# Patient Record
Sex: FEMALE | Race: OTHER | HISPANIC OR LATINO
[De-identification: names, ages, dates, MRNs, and addresses within clinical notes are randomized per-mention and may not be internally consistent; named-entity substitution may affect disease eponyms.]

---

## 2017-01-10 ENCOUNTER — APPOINTMENT (OUTPATIENT)
Dept: RHEUMATOLOGY | Facility: CLINIC | Age: 32
End: 2017-01-10

## 2017-01-11 ENCOUNTER — RX RENEWAL (OUTPATIENT)
Age: 32
End: 2017-01-11

## 2017-01-18 ENCOUNTER — APPOINTMENT (OUTPATIENT)
Dept: RHEUMATOLOGY | Facility: CLINIC | Age: 32
End: 2017-01-18

## 2017-02-06 ENCOUNTER — APPOINTMENT (OUTPATIENT)
Dept: RHEUMATOLOGY | Facility: CLINIC | Age: 32
End: 2017-02-06

## 2017-03-07 ENCOUNTER — APPOINTMENT (OUTPATIENT)
Dept: RHEUMATOLOGY | Facility: CLINIC | Age: 32
End: 2017-03-07

## 2017-03-28 ENCOUNTER — RX RENEWAL (OUTPATIENT)
Age: 32
End: 2017-03-28

## 2017-04-03 ENCOUNTER — APPOINTMENT (OUTPATIENT)
Dept: RHEUMATOLOGY | Facility: CLINIC | Age: 32
End: 2017-04-03

## 2017-05-02 ENCOUNTER — APPOINTMENT (OUTPATIENT)
Dept: RHEUMATOLOGY | Facility: CLINIC | Age: 32
End: 2017-05-02

## 2017-05-02 ENCOUNTER — RX RENEWAL (OUTPATIENT)
Age: 32
End: 2017-05-02

## 2017-05-05 ENCOUNTER — APPOINTMENT (OUTPATIENT)
Dept: RHEUMATOLOGY | Facility: CLINIC | Age: 32
End: 2017-05-05

## 2017-05-25 ENCOUNTER — APPOINTMENT (OUTPATIENT)
Dept: DERMATOLOGY | Facility: CLINIC | Age: 32
End: 2017-05-25

## 2017-05-25 VITALS
HEIGHT: 62 IN | SYSTOLIC BLOOD PRESSURE: 124 MMHG | BODY MASS INDEX: 22.82 KG/M2 | WEIGHT: 124 LBS | DIASTOLIC BLOOD PRESSURE: 80 MMHG

## 2017-05-30 ENCOUNTER — APPOINTMENT (OUTPATIENT)
Dept: RHEUMATOLOGY | Facility: CLINIC | Age: 32
End: 2017-05-30

## 2017-05-30 VITALS
SYSTOLIC BLOOD PRESSURE: 124 MMHG | TEMPERATURE: 98.3 F | HEIGHT: 62 IN | HEART RATE: 99 BPM | DIASTOLIC BLOOD PRESSURE: 88 MMHG | BODY MASS INDEX: 23.74 KG/M2 | OXYGEN SATURATION: 99 % | WEIGHT: 129 LBS

## 2017-05-30 DIAGNOSIS — B36.0 PITYRIASIS VERSICOLOR: ICD-10-CM

## 2017-06-26 ENCOUNTER — APPOINTMENT (OUTPATIENT)
Dept: RHEUMATOLOGY | Facility: CLINIC | Age: 32
End: 2017-06-26

## 2017-07-24 ENCOUNTER — APPOINTMENT (OUTPATIENT)
Dept: RHEUMATOLOGY | Facility: CLINIC | Age: 32
End: 2017-07-24

## 2017-08-22 ENCOUNTER — APPOINTMENT (OUTPATIENT)
Dept: RHEUMATOLOGY | Facility: CLINIC | Age: 32
End: 2017-08-22

## 2017-09-01 ENCOUNTER — RX RENEWAL (OUTPATIENT)
Age: 32
End: 2017-09-01

## 2017-09-18 ENCOUNTER — APPOINTMENT (OUTPATIENT)
Dept: RHEUMATOLOGY | Facility: CLINIC | Age: 32
End: 2017-09-18

## 2017-09-22 ENCOUNTER — LABORATORY RESULT (OUTPATIENT)
Age: 32
End: 2017-09-22

## 2017-09-22 LAB
BASOPHILS # BLD AUTO: 0.02 K/UL
BASOPHILS NFR BLD AUTO: 0.5 %
EOSINOPHIL # BLD AUTO: 0.12 K/UL
EOSINOPHIL NFR BLD AUTO: 3 %
HCT VFR BLD CALC: 38.8 %
HGB BLD-MCNC: 12.6 G/DL
IMM GRANULOCYTES NFR BLD AUTO: 0.2 %
LYMPHOCYTES # BLD AUTO: 0.99 K/UL
LYMPHOCYTES NFR BLD AUTO: 24.7 %
MAGNESIUM SERPL-MCNC: 2.4 MG/DL
MAN DIFF?: NORMAL
MCHC RBC-ENTMCNC: 28 PG
MCHC RBC-ENTMCNC: 32.5 GM/DL
MCV RBC AUTO: 86.2 FL
MONOCYTES # BLD AUTO: 0.54 K/UL
MONOCYTES NFR BLD AUTO: 13.5 %
NEUTROPHILS # BLD AUTO: 2.33 K/UL
NEUTROPHILS NFR BLD AUTO: 58.1 %
PLATELET # BLD AUTO: 375 K/UL
RBC # BLD: 4.5 M/UL
RBC # FLD: 13.6 %
WBC # FLD AUTO: 4.01 K/UL

## 2017-09-25 LAB
25(OH)D3 SERPL-MCNC: 28.7 NG/ML
APPEARANCE: ABNORMAL
BILIRUBIN URINE: NEGATIVE
BLOOD URINE: ABNORMAL
COLOR: ABNORMAL
GLUCOSE QUALITATIVE U: NORMAL MG/DL
KETONES URINE: NEGATIVE
LEUKOCYTE ESTERASE URINE: ABNORMAL
NITRITE URINE: POSITIVE
PH URINE: 5.5
PROTEIN URINE: 100 MG/DL
SPECIFIC GRAVITY URINE: 1.01
T3 SERPL-MCNC: 139 NG/DL
T3FREE SERPL-MCNC: 3.23 PG/ML
T3RU NFR SERPL: 1.18 INDEX
THYROGLOB AB SERPL-ACNC: <20 IU/ML
THYROPEROXIDASE AB SERPL IA-ACNC: <10 IU/ML
TSH SERPL-ACNC: 3.63 UIU/ML
UROBILINOGEN URINE: NORMAL MG/DL

## 2017-10-02 LAB — TSI ACT/NOR SER: 65 %

## 2017-10-03 ENCOUNTER — APPOINTMENT (OUTPATIENT)
Dept: RHEUMATOLOGY | Facility: CLINIC | Age: 32
End: 2017-10-03
Payer: COMMERCIAL

## 2017-10-03 VITALS
SYSTOLIC BLOOD PRESSURE: 115 MMHG | WEIGHT: 126 LBS | BODY MASS INDEX: 23.05 KG/M2 | OXYGEN SATURATION: 98 % | DIASTOLIC BLOOD PRESSURE: 85 MMHG | HEART RATE: 98 BPM | TEMPERATURE: 97.8 F

## 2017-10-03 PROCEDURE — 99214 OFFICE O/P EST MOD 30 MIN: CPT

## 2017-10-17 ENCOUNTER — APPOINTMENT (OUTPATIENT)
Dept: RHEUMATOLOGY | Facility: CLINIC | Age: 32
End: 2017-10-17

## 2017-11-14 ENCOUNTER — APPOINTMENT (OUTPATIENT)
Dept: RHEUMATOLOGY | Facility: CLINIC | Age: 32
End: 2017-11-14

## 2017-11-15 LAB
CARDIOLIPIN AB SER IA-ACNC: NEGATIVE
TSH SERPL-ACNC: 2.36 UIU/ML

## 2017-11-17 ENCOUNTER — MOBILE ON CALL (OUTPATIENT)
Age: 32
End: 2017-11-17

## 2017-11-20 LAB
B2 GLYCOPROT1 IGA SERPL IA-ACNC: 23.1 SAU
B2 GLYCOPROT1 IGG SER-ACNC: <5 SGU
B2 GLYCOPROT1 IGM SER-ACNC: <5 SMU
RPR SER-TITR: NORMAL

## 2017-11-21 ENCOUNTER — MESSAGE (OUTPATIENT)
Age: 32
End: 2017-11-21

## 2017-12-12 ENCOUNTER — APPOINTMENT (OUTPATIENT)
Dept: RHEUMATOLOGY | Facility: CLINIC | Age: 32
End: 2017-12-12

## 2018-01-09 ENCOUNTER — APPOINTMENT (OUTPATIENT)
Dept: RHEUMATOLOGY | Facility: CLINIC | Age: 33
End: 2018-01-09

## 2018-02-06 ENCOUNTER — APPOINTMENT (OUTPATIENT)
Dept: RHEUMATOLOGY | Facility: CLINIC | Age: 33
End: 2018-02-06

## 2018-02-15 ENCOUNTER — RX RENEWAL (OUTPATIENT)
Age: 33
End: 2018-02-15

## 2018-03-06 ENCOUNTER — APPOINTMENT (OUTPATIENT)
Dept: RHEUMATOLOGY | Facility: CLINIC | Age: 33
End: 2018-03-06

## 2018-03-06 ENCOUNTER — RX RENEWAL (OUTPATIENT)
Age: 33
End: 2018-03-06

## 2018-03-06 ENCOUNTER — MEDICATION RENEWAL (OUTPATIENT)
Age: 33
End: 2018-03-06

## 2018-04-03 ENCOUNTER — APPOINTMENT (OUTPATIENT)
Dept: RHEUMATOLOGY | Facility: CLINIC | Age: 33
End: 2018-04-03

## 2018-05-01 ENCOUNTER — APPOINTMENT (OUTPATIENT)
Dept: RHEUMATOLOGY | Facility: CLINIC | Age: 33
End: 2018-05-01

## 2018-05-29 ENCOUNTER — APPOINTMENT (OUTPATIENT)
Dept: RHEUMATOLOGY | Facility: CLINIC | Age: 33
End: 2018-05-29

## 2018-06-08 ENCOUNTER — APPOINTMENT (OUTPATIENT)
Dept: RHEUMATOLOGY | Facility: CLINIC | Age: 33
End: 2018-06-08
Payer: COMMERCIAL

## 2018-06-08 VITALS
SYSTOLIC BLOOD PRESSURE: 127 MMHG | WEIGHT: 124 LBS | DIASTOLIC BLOOD PRESSURE: 85 MMHG | TEMPERATURE: 98.3 F | BODY MASS INDEX: 22.82 KG/M2 | HEIGHT: 62 IN | HEART RATE: 98 BPM | OXYGEN SATURATION: 99 %

## 2018-06-08 PROCEDURE — 99214 OFFICE O/P EST MOD 30 MIN: CPT

## 2018-06-28 ENCOUNTER — APPOINTMENT (OUTPATIENT)
Dept: RHEUMATOLOGY | Facility: CLINIC | Age: 33
End: 2018-06-28

## 2018-08-01 ENCOUNTER — APPOINTMENT (OUTPATIENT)
Dept: RHEUMATOLOGY | Facility: CLINIC | Age: 33
End: 2018-08-01

## 2018-08-21 ENCOUNTER — APPOINTMENT (OUTPATIENT)
Dept: RHEUMATOLOGY | Facility: CLINIC | Age: 33
End: 2018-08-21

## 2018-09-18 ENCOUNTER — APPOINTMENT (OUTPATIENT)
Dept: RHEUMATOLOGY | Facility: CLINIC | Age: 33
End: 2018-09-18

## 2018-10-30 ENCOUNTER — APPOINTMENT (OUTPATIENT)
Dept: RHEUMATOLOGY | Facility: CLINIC | Age: 33
End: 2018-10-30

## 2019-01-05 RX ADMIN — HYDROMORPHONE HYDROCHLORIDE 0.5 MILLIGRAM(S): 2 INJECTION INTRAMUSCULAR; INTRAVENOUS; SUBCUTANEOUS at 05:53

## 2019-01-07 ENCOUNTER — MEDICATION RENEWAL (OUTPATIENT)
Age: 34
End: 2019-01-07

## 2019-01-10 ENCOUNTER — APPOINTMENT (OUTPATIENT)
Dept: INTERNAL MEDICINE | Facility: CLINIC | Age: 34
End: 2019-01-10
Payer: COMMERCIAL

## 2019-01-10 VITALS — RESPIRATION RATE: 12 BRPM | DIASTOLIC BLOOD PRESSURE: 72 MMHG | HEART RATE: 67 BPM | SYSTOLIC BLOOD PRESSURE: 120 MMHG

## 2019-01-10 VITALS — HEIGHT: 62 IN | WEIGHT: 119 LBS | BODY MASS INDEX: 21.9 KG/M2

## 2019-01-10 PROCEDURE — 99214 OFFICE O/P EST MOD 30 MIN: CPT

## 2019-01-10 NOTE — ASSESSMENT
[FreeTextEntry1] : predominant symptom is fibromyalgia\par don’t see lupus flareup and need for steroid\par suspect more emotional issues\par patient is emotionallyh fragile\par sed rate is normal last testing\par trial of sertraline\par see BHT

## 2019-01-10 NOTE — HISTORY OF PRESENT ILLNESS
[FreeTextEntry1] : patient for follow up  [de-identified] : patient not seen in long time\par has lupus and fibromyalgia\par of late has been depressed and has had flareups of fibromyalgia\par her back neck hurts no joint pain\par labs fro [place] were ok witn no new lupus issues\par completed treatment for lupus proteinuria\par her father  in October and has been a bit messed up about it

## 2019-01-10 NOTE — PHYSICAL EXAM

## 2019-01-11 ENCOUNTER — APPOINTMENT (OUTPATIENT)
Dept: INTERNAL MEDICINE | Facility: CLINIC | Age: 34
End: 2019-01-11

## 2019-01-14 ENCOUNTER — MESSAGE (OUTPATIENT)
Age: 34
End: 2019-01-14

## 2019-01-15 ENCOUNTER — MESSAGE (OUTPATIENT)
Age: 34
End: 2019-01-15

## 2019-01-15 ENCOUNTER — INPATIENT (INPATIENT)
Facility: HOSPITAL | Age: 34
LOS: 21 days | Discharge: NURSING, REHAB OR LONG-TERM CARE | DRG: 98 | End: 2019-02-06
Attending: HOSPITALIST | Admitting: FAMILY MEDICINE
Payer: COMMERCIAL

## 2019-01-15 VITALS
TEMPERATURE: 98 F | HEART RATE: 98 BPM | DIASTOLIC BLOOD PRESSURE: 70 MMHG | WEIGHT: 119.93 LBS | RESPIRATION RATE: 20 BRPM | HEIGHT: 62 IN | SYSTOLIC BLOOD PRESSURE: 100 MMHG | OXYGEN SATURATION: 98 %

## 2019-01-15 PROCEDURE — 99285 EMERGENCY DEPT VISIT HI MDM: CPT | Mod: 25

## 2019-01-15 PROCEDURE — 71046 X-RAY EXAM CHEST 2 VIEWS: CPT | Mod: 26

## 2019-01-15 PROCEDURE — 62270 DX LMBR SPI PNXR: CPT

## 2019-01-15 RX ORDER — DEXAMETHASONE 0.5 MG/5ML
6 ELIXIR ORAL ONCE
Qty: 0 | Refills: 0 | Status: COMPLETED | OUTPATIENT
Start: 2019-01-15 | End: 2019-01-15

## 2019-01-15 RX ORDER — SODIUM CHLORIDE 9 MG/ML
1000 INJECTION INTRAMUSCULAR; INTRAVENOUS; SUBCUTANEOUS ONCE
Qty: 0 | Refills: 0 | Status: COMPLETED | OUTPATIENT
Start: 2019-01-15 | End: 2019-01-15

## 2019-01-15 RX ORDER — ONDANSETRON 8 MG/1
4 TABLET, FILM COATED ORAL ONCE
Qty: 0 | Refills: 0 | Status: COMPLETED | OUTPATIENT
Start: 2019-01-15 | End: 2019-01-15

## 2019-01-15 RX ORDER — OXYCODONE AND ACETAMINOPHEN 5; 325 MG/1; MG/1
1 TABLET ORAL ONCE
Qty: 0 | Refills: 0 | Status: DISCONTINUED | OUTPATIENT
Start: 2019-01-15 | End: 2019-01-15

## 2019-01-15 RX ORDER — KETOROLAC TROMETHAMINE 30 MG/ML
30 SYRINGE (ML) INJECTION ONCE
Qty: 0 | Refills: 0 | Status: DISCONTINUED | OUTPATIENT
Start: 2019-01-15 | End: 2019-01-15

## 2019-01-15 NOTE — ED ADULT TRIAGE NOTE - CHIEF COMPLAINT QUOTE
Pt c/o pain all over body r/t fibromyalgia.  Pt also reports hx of lupus.  Pt was recently started on Sertraline 3 days ago.

## 2019-01-15 NOTE — ED PROVIDER NOTE - CARE PLAN
Principal Discharge DX:	Hypokalemia  Secondary Diagnosis:	Hyponatremia  Secondary Diagnosis:	Intractable pain Principal Discharge DX:	Leg weakness  Secondary Diagnosis:	Hyponatremia  Secondary Diagnosis:	Intractable pain  Secondary Diagnosis:	Hypokalemia

## 2019-01-15 NOTE — ED PROVIDER NOTE - PHYSICAL EXAMINATION
Gen: mild discomfort, awake, non toxic.   HEENT: Mucous membranes moist, pink conjunctivae, EOMI  Neck: supple, full rom.   CV: RRR, nl s1/s2.  Resp: CTAB, normal rate and effort  GI: Abdomen soft, mild lower abd tenderness.  : No CVAT  Neuro: A&O x 3, moving all 4 extremities, nl sensation/motor. nl affect.   MSK: No spine or joint tenderness to palpation  Skin: No rashes. intact and perfused.

## 2019-01-15 NOTE — ED PROVIDER NOTE - MEDICAL DECISION MAKING DETAILS
diffuse pain likely fibromylagia related, setraline and pain may make urinary retention worse, no paresthesias/signs cauda equina. will do labs, pain control, steroids for possible lupus component, ct abd/pel given some lower abd pain and difficulty urinating, reassess.

## 2019-01-15 NOTE — ED PROVIDER NOTE - PROGRESS NOTE DETAILS
Boris: pt with continued pain, given morphine, hypokalemic, given k and mag. hyponatremic. still with some difficulty urinating though bladder seems distended. nl rectal tone on rectal exam (chaperoned by tech Arthur). awaiting ct spine, abd, and head. will need admission, with possible lupus/fibromyalgia flare on top of lyte abnormalities. Patient ressess, finished potassium chloride, will sent a repeat BMP. Patient report pain is better but still very weak. She report weakness in the legs and unable to sit up. She report feeling off balanced. She had a MRI of the spine in december in [place] that was negative. Will give a dose of meclizine 25 mg PO and recheck BMP. Likely will need an MRI to assess for weakness or possible MS. bladder scan 1268, will place summers for urinary retention Patient seen by neurology, report she may have transverse myolitis. She has good reflex in the lower extremity, not guillian-barre at this point. She will need MRI of head, cervical psine, thoracic spine. Repeat BMP improved.  I spoke to Dr. ANTHONY Tom for admission.

## 2019-01-15 NOTE — ED STATDOCS - PROGRESS NOTE DETAILS
32 y/o F, with hx of lupus and fibromyalgia, presents to the ED c/o diffuse body aches, onset yesterday.  Family at bedside states that pt was recently started on new medication and started taking it shortly before pain onset.  Pt was started on Sertraline.  Family notes that at first pt was taking Sertraline with her Lyrica for 2 days, however sx worsened.   Associated sx include fever (Tmax 103F), cough, constipation, decreased appetite, vomiting, ear pain, and difficulty urinating.  Pt states that she is also hearing a persistent buzzing in her ears.  Pt also notes that she has been unable to sleep for the past 3 days secondary to pain.  Family at bedside states that pt has been more depressed and told them that when she closes her eyes she is hearing voices.  Family notes that pt recently lost her father and travelled to [place] for an extended period of time to be with family.  While in [place], pt had similar sx and had difficulty urinating.  At the time pt was seen by a neurologist in [place] and was prescribed Lyrica.  LNMP: 12/28  HPI translated by ED .  Pt will be transferred to the main ED for further evaluation by another provider.

## 2019-01-16 DIAGNOSIS — Z98.890 OTHER SPECIFIED POSTPROCEDURAL STATES: Chronic | ICD-10-CM

## 2019-01-16 DIAGNOSIS — R29.898 OTHER SYMPTOMS AND SIGNS INVOLVING THE MUSCULOSKELETAL SYSTEM: ICD-10-CM

## 2019-01-16 LAB
ALBUMIN SERPL ELPH-MCNC: 4 G/DL — SIGNIFICANT CHANGE UP (ref 3.3–5.2)
ALP SERPL-CCNC: 108 U/L — SIGNIFICANT CHANGE UP (ref 40–120)
ALT FLD-CCNC: 43 U/L — HIGH
AMPHET UR-MCNC: NEGATIVE — SIGNIFICANT CHANGE UP
ANION GAP SERPL CALC-SCNC: 13 MMOL/L — SIGNIFICANT CHANGE UP (ref 5–17)
ANION GAP SERPL CALC-SCNC: 16 MMOL/L — SIGNIFICANT CHANGE UP (ref 5–17)
APAP SERPL-MCNC: <7.5 UG/ML — LOW (ref 10–26)
APPEARANCE CSF: CLEAR — SIGNIFICANT CHANGE UP
APPEARANCE UR: CLEAR — SIGNIFICANT CHANGE UP
APTT BLD: 32.7 SEC — SIGNIFICANT CHANGE UP (ref 27.5–36.3)
AST SERPL-CCNC: 42 U/L — HIGH
BACTERIA # UR AUTO: ABNORMAL
BARBITURATES UR SCN-MCNC: NEGATIVE — SIGNIFICANT CHANGE UP
BENZODIAZ UR-MCNC: NEGATIVE — SIGNIFICANT CHANGE UP
BILIRUB SERPL-MCNC: 0.5 MG/DL — SIGNIFICANT CHANGE UP (ref 0.4–2)
BILIRUB UR-MCNC: NEGATIVE — SIGNIFICANT CHANGE UP
BUN SERPL-MCNC: 9 MG/DL — SIGNIFICANT CHANGE UP (ref 8–20)
BUN SERPL-MCNC: 9 MG/DL — SIGNIFICANT CHANGE UP (ref 8–20)
CALCIUM SERPL-MCNC: 7.9 MG/DL — LOW (ref 8.6–10.2)
CALCIUM SERPL-MCNC: 8.9 MG/DL — SIGNIFICANT CHANGE UP (ref 8.6–10.2)
CHLORIDE SERPL-SCNC: 100 MMOL/L — SIGNIFICANT CHANGE UP (ref 98–107)
CHLORIDE SERPL-SCNC: 87 MMOL/L — LOW (ref 98–107)
CO2 SERPL-SCNC: 22 MMOL/L — SIGNIFICANT CHANGE UP (ref 22–29)
CO2 SERPL-SCNC: 23 MMOL/L — SIGNIFICANT CHANGE UP (ref 22–29)
COCAINE METAB.OTHER UR-MCNC: NEGATIVE — SIGNIFICANT CHANGE UP
COLOR CSF: SIGNIFICANT CHANGE UP
COLOR SPEC: YELLOW — SIGNIFICANT CHANGE UP
CREAT SERPL-MCNC: 0.45 MG/DL — LOW (ref 0.5–1.3)
CREAT SERPL-MCNC: 0.55 MG/DL — SIGNIFICANT CHANGE UP (ref 0.5–1.3)
DIFF PNL FLD: ABNORMAL
EPI CELLS # UR: SIGNIFICANT CHANGE UP
ERYTHROCYTE [SEDIMENTATION RATE] IN BLOOD: 46 MM/HR — HIGH (ref 0–20)
ETHANOL SERPL-MCNC: <10 MG/DL — SIGNIFICANT CHANGE UP
FOLATE SERPL-MCNC: 15.5 NG/ML — SIGNIFICANT CHANGE UP
GLUCOSE SERPL-MCNC: 115 MG/DL — SIGNIFICANT CHANGE UP (ref 70–115)
GLUCOSE SERPL-MCNC: 118 MG/DL — HIGH (ref 70–115)
GLUCOSE UR QL: NEGATIVE MG/DL — SIGNIFICANT CHANGE UP
GRAM STN FLD: SIGNIFICANT CHANGE UP
HCG SERPL-ACNC: <4 MIU/ML — SIGNIFICANT CHANGE UP
HCT VFR BLD CALC: 39.4 % — SIGNIFICANT CHANGE UP (ref 37–47)
HGB BLD-MCNC: 13.3 G/DL — SIGNIFICANT CHANGE UP (ref 12–16)
HIV 1 & 2 AB SERPL IA.RAPID: SIGNIFICANT CHANGE UP
INR BLD: 1 RATIO — SIGNIFICANT CHANGE UP (ref 0.88–1.16)
KETONES UR-MCNC: ABNORMAL
LACTATE SERPL-SCNC: 1.1 MMOL/L — SIGNIFICANT CHANGE UP (ref 0.5–2)
LEUKOCYTE ESTERASE UR-ACNC: ABNORMAL
LG PLATELETS BLD QL AUTO: SLIGHT — SIGNIFICANT CHANGE UP
LYMPHOCYTES # BLD AUTO: 6 % — LOW (ref 20–55)
LYMPHOCYTES # CSF: 8 % — LOW (ref 40–80)
MCHC RBC-ENTMCNC: 26.8 PG — LOW (ref 27–31)
MCHC RBC-ENTMCNC: 33.8 G/DL — SIGNIFICANT CHANGE UP (ref 32–36)
MCV RBC AUTO: 79.4 FL — LOW (ref 81–99)
METHADONE UR-MCNC: NEGATIVE — SIGNIFICANT CHANGE UP
MONOCYTES NFR BLD AUTO: 7 % — SIGNIFICANT CHANGE UP (ref 3–10)
MONOS+MACROS NFR CSF: 19 % — SIGNIFICANT CHANGE UP
NEUTROPHILS # CSF: 73 % — SIGNIFICANT CHANGE UP
NEUTROPHILS NFR BLD AUTO: 85 % — HIGH (ref 37–73)
NEUTS BAND # BLD: 2 % — SIGNIFICANT CHANGE UP (ref 0–8)
NITRITE UR-MCNC: NEGATIVE — SIGNIFICANT CHANGE UP
NRBC NFR CSF: 324 /UL — HIGH (ref 0–5)
OPIATES UR-MCNC: POSITIVE
PCP SPEC-MCNC: SIGNIFICANT CHANGE UP
PCP UR-MCNC: NEGATIVE — SIGNIFICANT CHANGE UP
PH UR: 6 — SIGNIFICANT CHANGE UP (ref 5–8)
PLAT MORPH BLD: NORMAL — SIGNIFICANT CHANGE UP
PLATELET # BLD AUTO: 228 K/UL — SIGNIFICANT CHANGE UP (ref 150–400)
PLATELET COUNT - ESTIMATE: ADEQUATE — SIGNIFICANT CHANGE UP
POTASSIUM SERPL-MCNC: 2.8 MMOL/L — CRITICAL LOW (ref 3.5–5.3)
POTASSIUM SERPL-MCNC: 3.7 MMOL/L — SIGNIFICANT CHANGE UP (ref 3.5–5.3)
POTASSIUM SERPL-SCNC: 2.8 MMOL/L — CRITICAL LOW (ref 3.5–5.3)
POTASSIUM SERPL-SCNC: 3.7 MMOL/L — SIGNIFICANT CHANGE UP (ref 3.5–5.3)
PROT CSF-MCNC: 131 MG/DL — HIGH (ref 15–45)
PROT SERPL-MCNC: 8.1 G/DL — SIGNIFICANT CHANGE UP (ref 6.6–8.7)
PROT UR-MCNC: 100 MG/DL
PROTHROM AB SERPL-ACNC: 11.5 SEC — SIGNIFICANT CHANGE UP (ref 10–12.9)
RBC # BLD: 4.96 M/UL — SIGNIFICANT CHANGE UP (ref 4.4–5.2)
RBC # CSF: 55 /CMM — HIGH (ref 0–1)
RBC # FLD: 15.9 % — HIGH (ref 11–15.6)
RBC BLD AUTO: NORMAL — SIGNIFICANT CHANGE UP
RBC CASTS # UR COMP ASSIST: SIGNIFICANT CHANGE UP /HPF (ref 0–4)
SALICYLATES SERPL-MCNC: <0.6 MG/DL — LOW (ref 10–20)
SODIUM SERPL-SCNC: 125 MMOL/L — LOW (ref 135–145)
SODIUM SERPL-SCNC: 136 MMOL/L — SIGNIFICANT CHANGE UP (ref 135–145)
SP GR SPEC: 1.01 — SIGNIFICANT CHANGE UP (ref 1.01–1.02)
SPECIMEN SOURCE: SIGNIFICANT CHANGE UP
THC UR QL: NEGATIVE — SIGNIFICANT CHANGE UP
TROPONIN T SERPL-MCNC: <0.01 NG/ML — SIGNIFICANT CHANGE UP (ref 0–0.06)
TSH SERPL-MCNC: 0.62 UIU/ML — SIGNIFICANT CHANGE UP (ref 0.27–4.2)
TUBE TYPE: SIGNIFICANT CHANGE UP
UROBILINOGEN FLD QL: NEGATIVE MG/DL — SIGNIFICANT CHANGE UP
VIT B12 SERPL-MCNC: 1317 PG/ML — HIGH (ref 232–1245)
WBC # BLD: 9.5 K/UL — SIGNIFICANT CHANGE UP (ref 4.8–10.8)
WBC # FLD AUTO: 9.5 K/UL — SIGNIFICANT CHANGE UP (ref 4.8–10.8)
WBC UR QL: ABNORMAL

## 2019-01-16 PROCEDURE — 72157 MRI CHEST SPINE W/O & W/DYE: CPT | Mod: 26

## 2019-01-16 PROCEDURE — 70553 MRI BRAIN STEM W/O & W/DYE: CPT | Mod: 26

## 2019-01-16 PROCEDURE — 72156 MRI NECK SPINE W/O & W/DYE: CPT | Mod: 26

## 2019-01-16 PROCEDURE — 74177 CT ABD & PELVIS W/CONTRAST: CPT | Mod: 26

## 2019-01-16 PROCEDURE — 12345: CPT | Mod: NC

## 2019-01-16 PROCEDURE — 72128 CT CHEST SPINE W/O DYE: CPT | Mod: 26

## 2019-01-16 PROCEDURE — 76830 TRANSVAGINAL US NON-OB: CPT | Mod: 26

## 2019-01-16 PROCEDURE — 99223 1ST HOSP IP/OBS HIGH 75: CPT

## 2019-01-16 PROCEDURE — 93010 ELECTROCARDIOGRAM REPORT: CPT

## 2019-01-16 PROCEDURE — 72131 CT LUMBAR SPINE W/O DYE: CPT | Mod: 26

## 2019-01-16 PROCEDURE — 70450 CT HEAD/BRAIN W/O DYE: CPT | Mod: 26

## 2019-01-16 PROCEDURE — 76856 US EXAM PELVIC COMPLETE: CPT | Mod: 26

## 2019-01-16 RX ORDER — ACETAMINOPHEN 500 MG
650 TABLET ORAL EVERY 6 HOURS
Qty: 0 | Refills: 0 | Status: DISCONTINUED | OUTPATIENT
Start: 2019-01-16 | End: 2019-02-06

## 2019-01-16 RX ORDER — CALCIUM GLUCONATE 100 MG/ML
2 VIAL (ML) INTRAVENOUS ONCE
Qty: 0 | Refills: 0 | Status: COMPLETED | OUTPATIENT
Start: 2019-01-16 | End: 2019-01-16

## 2019-01-16 RX ORDER — SERTRALINE 25 MG/1
1 TABLET, FILM COATED ORAL
Qty: 0 | Refills: 0 | COMMUNITY

## 2019-01-16 RX ORDER — CEFTRIAXONE 500 MG/1
1 INJECTION, POWDER, FOR SOLUTION INTRAMUSCULAR; INTRAVENOUS ONCE
Qty: 0 | Refills: 0 | Status: COMPLETED | OUTPATIENT
Start: 2019-01-16 | End: 2019-01-16

## 2019-01-16 RX ORDER — MECLIZINE HCL 12.5 MG
25 TABLET ORAL ONCE
Qty: 0 | Refills: 0 | Status: COMPLETED | OUTPATIENT
Start: 2019-01-16 | End: 2019-01-16

## 2019-01-16 RX ORDER — DULOXETINE HYDROCHLORIDE 30 MG/1
60 CAPSULE, DELAYED RELEASE ORAL DAILY
Qty: 0 | Refills: 0 | Status: DISCONTINUED | OUTPATIENT
Start: 2019-01-16 | End: 2019-02-06

## 2019-01-16 RX ORDER — DOCUSATE SODIUM 100 MG
100 CAPSULE ORAL THREE TIMES A DAY
Qty: 0 | Refills: 0 | Status: DISCONTINUED | OUTPATIENT
Start: 2019-01-16 | End: 2019-01-21

## 2019-01-16 RX ORDER — ENOXAPARIN SODIUM 100 MG/ML
40 INJECTION SUBCUTANEOUS DAILY
Qty: 0 | Refills: 0 | Status: DISCONTINUED | OUTPATIENT
Start: 2019-01-16 | End: 2019-01-28

## 2019-01-16 RX ORDER — PANTOPRAZOLE SODIUM 20 MG/1
40 TABLET, DELAYED RELEASE ORAL
Qty: 0 | Refills: 0 | Status: DISCONTINUED | OUTPATIENT
Start: 2019-01-16 | End: 2019-02-06

## 2019-01-16 RX ORDER — POTASSIUM CHLORIDE 20 MEQ
10 PACKET (EA) ORAL
Qty: 0 | Refills: 0 | Status: COMPLETED | OUTPATIENT
Start: 2019-01-16 | End: 2019-01-16

## 2019-01-16 RX ORDER — MORPHINE SULFATE 50 MG/1
4 CAPSULE, EXTENDED RELEASE ORAL ONCE
Qty: 0 | Refills: 0 | Status: DISCONTINUED | OUTPATIENT
Start: 2019-01-16 | End: 2019-01-16

## 2019-01-16 RX ORDER — CEFTRIAXONE 500 MG/1
2 INJECTION, POWDER, FOR SOLUTION INTRAMUSCULAR; INTRAVENOUS EVERY 12 HOURS
Qty: 0 | Refills: 0 | Status: DISCONTINUED | OUTPATIENT
Start: 2019-01-16 | End: 2019-01-17

## 2019-01-16 RX ORDER — POTASSIUM CHLORIDE 20 MEQ
40 PACKET (EA) ORAL EVERY 4 HOURS
Qty: 0 | Refills: 0 | Status: COMPLETED | OUTPATIENT
Start: 2019-01-16 | End: 2019-01-16

## 2019-01-16 RX ORDER — TAMSULOSIN HYDROCHLORIDE 0.4 MG/1
0.4 CAPSULE ORAL AT BEDTIME
Qty: 0 | Refills: 0 | Status: DISCONTINUED | OUTPATIENT
Start: 2019-01-16 | End: 2019-01-25

## 2019-01-16 RX ORDER — CEFTRIAXONE 500 MG/1
1 INJECTION, POWDER, FOR SOLUTION INTRAMUSCULAR; INTRAVENOUS EVERY 24 HOURS
Qty: 0 | Refills: 0 | Status: DISCONTINUED | OUTPATIENT
Start: 2019-01-16 | End: 2019-01-16

## 2019-01-16 RX ORDER — ACYCLOVIR SODIUM 500 MG
VIAL (EA) INTRAVENOUS
Qty: 0 | Refills: 0 | Status: DISCONTINUED | OUTPATIENT
Start: 2019-01-16 | End: 2019-01-18

## 2019-01-16 RX ORDER — MAGNESIUM SULFATE 500 MG/ML
1 VIAL (ML) INJECTION ONCE
Qty: 0 | Refills: 0 | Status: COMPLETED | OUTPATIENT
Start: 2019-01-16 | End: 2019-01-16

## 2019-01-16 RX ORDER — VANCOMYCIN HCL 1 G
1000 VIAL (EA) INTRAVENOUS EVERY 8 HOURS
Qty: 0 | Refills: 0 | Status: DISCONTINUED | OUTPATIENT
Start: 2019-01-16 | End: 2019-01-17

## 2019-01-16 RX ORDER — SENNA PLUS 8.6 MG/1
2 TABLET ORAL AT BEDTIME
Qty: 0 | Refills: 0 | Status: DISCONTINUED | OUTPATIENT
Start: 2019-01-16 | End: 2019-01-28

## 2019-01-16 RX ORDER — ACYCLOVIR SODIUM 500 MG
500 VIAL (EA) INTRAVENOUS ONCE
Qty: 0 | Refills: 0 | Status: COMPLETED | OUTPATIENT
Start: 2019-01-16 | End: 2019-01-16

## 2019-01-16 RX ORDER — ACYCLOVIR SODIUM 500 MG
500 VIAL (EA) INTRAVENOUS EVERY 8 HOURS
Qty: 0 | Refills: 0 | Status: DISCONTINUED | OUTPATIENT
Start: 2019-01-16 | End: 2019-01-18

## 2019-01-16 RX ORDER — SACCHAROMYCES BOULARDII 250 MG
250 POWDER IN PACKET (EA) ORAL
Qty: 0 | Refills: 0 | Status: DISCONTINUED | OUTPATIENT
Start: 2019-01-16 | End: 2019-01-28

## 2019-01-16 RX ORDER — VANCOMYCIN HCL 1 G
750 VIAL (EA) INTRAVENOUS EVERY 8 HOURS
Qty: 0 | Refills: 0 | Status: DISCONTINUED | OUTPATIENT
Start: 2019-01-16 | End: 2019-01-16

## 2019-01-16 RX ADMIN — Medication 650 MILLIGRAM(S): at 16:45

## 2019-01-16 RX ADMIN — SENNA PLUS 2 TABLET(S): 8.6 TABLET ORAL at 22:52

## 2019-01-16 RX ADMIN — MORPHINE SULFATE 4 MILLIGRAM(S): 50 CAPSULE, EXTENDED RELEASE ORAL at 01:33

## 2019-01-16 RX ADMIN — Medication 200 GRAM(S): at 15:59

## 2019-01-16 RX ADMIN — Medication 100 MILLIEQUIVALENT(S): at 01:35

## 2019-01-16 RX ADMIN — Medication 1 GRAM(S): at 04:55

## 2019-01-16 RX ADMIN — Medication 40 MILLIEQUIVALENT(S): at 05:47

## 2019-01-16 RX ADMIN — OXYCODONE AND ACETAMINOPHEN 1 TABLET(S): 5; 325 TABLET ORAL at 00:00

## 2019-01-16 RX ADMIN — Medication 30 MILLIGRAM(S): at 01:34

## 2019-01-16 RX ADMIN — OXYCODONE AND ACETAMINOPHEN 1 TABLET(S): 5; 325 TABLET ORAL at 01:33

## 2019-01-16 RX ADMIN — SODIUM CHLORIDE 2000 MILLILITER(S): 9 INJECTION INTRAMUSCULAR; INTRAVENOUS; SUBCUTANEOUS at 00:06

## 2019-01-16 RX ADMIN — Medication 250 MILLIGRAM(S): at 17:34

## 2019-01-16 RX ADMIN — ONDANSETRON 4 MILLIGRAM(S): 8 TABLET, FILM COATED ORAL at 00:00

## 2019-01-16 RX ADMIN — Medication 58 MILLIGRAM(S): at 13:52

## 2019-01-16 RX ADMIN — MORPHINE SULFATE 4 MILLIGRAM(S): 50 CAPSULE, EXTENDED RELEASE ORAL at 14:58

## 2019-01-16 RX ADMIN — Medication 250 MILLIGRAM(S): at 22:00

## 2019-01-16 RX ADMIN — Medication 40 MILLIEQUIVALENT(S): at 01:34

## 2019-01-16 RX ADMIN — MORPHINE SULFATE 4 MILLIGRAM(S): 50 CAPSULE, EXTENDED RELEASE ORAL at 01:24

## 2019-01-16 RX ADMIN — Medication 6 MILLIGRAM(S): at 00:01

## 2019-01-16 RX ADMIN — Medication 10 MILLIEQUIVALENT(S): at 07:06

## 2019-01-16 RX ADMIN — CEFTRIAXONE 100 GRAM(S): 500 INJECTION, POWDER, FOR SOLUTION INTRAMUSCULAR; INTRAVENOUS at 09:09

## 2019-01-16 RX ADMIN — Medication 25 MILLIGRAM(S): at 09:09

## 2019-01-16 RX ADMIN — Medication 10 MILLIEQUIVALENT(S): at 04:55

## 2019-01-16 RX ADMIN — Medication 100 MILLIEQUIVALENT(S): at 07:06

## 2019-01-16 RX ADMIN — Medication 110 MILLIGRAM(S): at 18:59

## 2019-01-16 RX ADMIN — Medication 40 MILLIEQUIVALENT(S): at 14:04

## 2019-01-16 RX ADMIN — Medication 100 MILLIEQUIVALENT(S): at 05:47

## 2019-01-16 RX ADMIN — Medication 650 MILLIGRAM(S): at 16:15

## 2019-01-16 RX ADMIN — Medication 30 MILLIGRAM(S): at 04:55

## 2019-01-16 RX ADMIN — Medication 100 GRAM(S): at 03:22

## 2019-01-16 RX ADMIN — TAMSULOSIN HYDROCHLORIDE 0.4 MILLIGRAM(S): 0.4 CAPSULE ORAL at 22:52

## 2019-01-16 NOTE — H&P ADULT - ASSESSMENT
INCOMPLETE 33 years old female with PMH of SLE, Lupus Nephritis and Fibromyalgia brought by her  yesterday evening with generalized weakness. As per patient, she is not feeling well for last few days. She is having low grade fevers, generalized body pain, throat irritation, cough, ear pain and vomiting. Her appetite is very poor. She went to her PMD few days ago who prescribed Sertraline for her depressive symptoms. As per her, symptoms started after taking that medication. Yesterday, she was feeling so weak that she could not walk so her  brought her to ER. She is also complaining of difficulty urinating. She went to Urologist 10 days ago and she was prescribed Flomax which she has been taking but is unable to void. She also has constipation.   She recently came back from [place] on 12/29/18 after staying there for 2.5 to 3 months. She had MRI there in December due to back pain and generalized weakness and it was normal.   She has headache, neck pain and lightheadedness. Denies photophobia. + sick contacts with common cold.     1) Transverse Myelitis  - Neuro Consult appreciated  - Pending further work up including LP  - Continue Solumedrol 1 gm daily x 5 days  2) Urinary Retention  - Continue Jefferson's Catheter  - Flomax 0.4 mg  - Continue Rocephin for UTI  - Urine Culture pending  3) SLE / Lupus Nephritis  - Was on Cellcept but stopped few weeks ago  - Hold Hydroxychloroquine for now  4) Electrolyte Abnormality  - Potassium replaced  - Sodium is normal after IVF  - Calcium Gluconate 2 gm  5) Fibromyalgia  - Continue Cymbalta   6) Subserosal Myoma   - Outpatient follow up with GYN  DVT Prophylaxis -- Lovenox 40 mg 33 years old female with PMH of SLE, Lupus Nephritis and Fibromyalgia brought by her  yesterday evening with generalized weakness. As per patient, she is not feeling well for last few days. She is having low grade fevers, generalized body pain, throat irritation, cough, ear pain and vomiting. Her appetite is very poor. She went to her PMD few days ago who prescribed Sertraline for her depressive symptoms. As per her, symptoms started after taking that medication. Yesterday, she was feeling so weak that she could not walk so her  brought her to ER. She is also complaining of difficulty urinating. She went to Urologist 10 days ago and she was prescribed Flomax which she has been taking but is unable to void. She also has constipation.   She recently came back from [place] on 12/29/18 after staying there for 2.5 to 3 months. She had MRI there in December due to back pain and generalized weakness and it was normal.   She has headache, neck pain and lightheadedness. Denies photophobia. + sick contacts with common cold.     1) Transverse Myelitis  - Neuro Consult appreciated  - Pending further work up including LP  - Continue Solumedrol 1 gm daily x 5 days  2) Urinary Retention  - Continue Jefferson's Catheter  - Flomax 0.4 mg  - Continue Rocephin for UTI  - Urine Culture pending  - Blood Cultures  3) SLE / Lupus Nephritis  - Was on Cellcept but stopped few weeks ago  - Hold Hydroxychloroquine for now  4) Electrolyte Abnormality  - Potassium replaced  - Sodium is normal after IVF  - Calcium Gluconate 2 gm  5) Fibromyalgia  - Continue Cymbalta   6) Subserosal Myoma   - Outpatient follow up with GYN  DVT Prophylaxis -- Lovenox 40 mg 33 years old female with PMH of SLE, Lupus Nephritis and Fibromyalgia brought by her  yesterday evening with generalized weakness. As per patient, she is not feeling well for last few days. She is having low grade fevers, generalized body pain, throat irritation, cough, ear pain and vomiting. Her appetite is very poor. She went to her PMD few days ago who prescribed Sertraline for her depressive symptoms. As per her, symptoms started after taking that medication. Yesterday, she was feeling so weak that she could not walk so her  brought her to ER. She is also complaining of difficulty urinating. She went to Urologist 10 days ago and she was prescribed Flomax which she has been taking but is unable to void. She also has constipation.   She recently came back from [place] on 12/29/18 after staying there for 2.5 to 3 months. She had MRI there in December due to back pain and generalized weakness and it was normal.   She has headache, neck pain and lightheadedness. Denies photophobia. + sick contacts with common cold.     1) Transverse Myelitis  - Neuro Consult appreciated  - Pending further work up including LP  - Continue Solumedrol 1 gm daily x 5 days  2) Urinary Retention  - Continue Jefferson's Catheter  - Flomax 0.4 mg  - Continue Rocephin for UTI  - Urine Culture pending  - Blood Cultures  3) SLE / Lupus Nephritis  - Was on Cellcept but stopped few weeks ago  - Hold Hydroxychloroquine for now  4) Electrolyte Abnormality  - Potassium replaced  - Sodium is normal after IVF  - Calcium Gluconate 2 gm  5) Fibromyalgia  - Continue Cymbalta   6) Elevated LFTs  - CT shows hepatomegaly.   - Outpatient follow up with GI.  7) Subserosal Myoma   - Outpatient follow up with GYN  DVT Prophylaxis -- Lovenox 40 mg

## 2019-01-16 NOTE — H&P ADULT - HISTORY OF PRESENT ILLNESS
INCOMPLETE 33 years old female with PMH of SLE, Lupus Nephritis and Fibromyalgia brought by her  yesterday evening with generalized weakness. As per patient, she is not feeling well for last few days. She is having low grade fevers, generalized body pain, throat irritation, cough, ear pain and vomiting. Her appetite is very poor. She went to her PMD few days ago who prescribed Sertraline for her depressive symptoms. As per her, symptoms started after taking that medication. Yesterday, she was feeling so weak that she could not walk so her  brought her to ER. She is also complaining of difficulty urinating. She went to Urologist 10 days ago and she was prescribed Flomax which she has been taking but is unable to void. She also has constipation.   She recently came back from [place] on 12/29/18 after staying there for 2.5 to 3 months. She had MRI there in December due to back pain and generalized weakness and it was normal.   She has headache, neck pain and lightheadedness. Denies photophobia. + sick contacts with common cold.

## 2019-01-16 NOTE — ED ADULT NURSE REASSESSMENT NOTE - COMFORT CARE
meal provided/wait time explained/plan of care explained/po fluids offered
plan of care explained/wait time explained
wait time explained/plan of care explained
wait time explained/plan of care explained

## 2019-01-16 NOTE — ED ADULT NURSE NOTE - NSIMPLEMENTINTERV_GEN_ALL_ED
Implemented All Universal Safety Interventions:  [place] to call system. Call bell, personal items and telephone within reach. Instruct patient to call for assistance. Room bathroom lighting operational. Non-slip footwear when patient is off stretcher. Physically safe environment: no spills, clutter or unnecessary equipment. Stretcher in lowest position, wheels locked, appropriate side rails in place.

## 2019-01-16 NOTE — H&P ADULT - ATTENDING COMMENTS
Got a call from Dr. Mathew around 5 pm with LP results - recommending treatment for suspected Meningitis and ID Consult.  Patient is started on Rocephin, Vanco and Acyclovir.  RVP.  ID Consult placed.

## 2019-01-16 NOTE — H&P ADULT - FAMILY HISTORY
Father  Still living? Unknown  Family history of liver disease, Age at diagnosis: Age Unknown     Mother  Still living? Unknown  Family history of osteoarthritis, Age at diagnosis: Age Unknown Father  Still living? Unknown  Family history of liver disease, Age at diagnosis: Age Unknown     Mother  Still living? Unknown  Family history of osteoarthritis, Age at diagnosis: Age Unknown  Family history of melanoma, Age at diagnosis: Age Unknown

## 2019-01-16 NOTE — H&P ADULT - NSHPPHYSICALEXAM_GEN_ALL_CORE
Vital Signs   T(C): 36.5 (16 Jan 2019 07:34), Max: 36.8 (16 Jan 2019 06:33)  T(F): 97.7 (16 Jan 2019 07:34), Max: 98.3 (16 Jan 2019 06:33)  HR: 77 (16 Jan 2019 07:34) (67 - 98)  BP: 115/76 (16 Jan 2019 07:34) (97/68 - 115/76)  RR: 20 (16 Jan 2019 07:34) (19 - 20)  SpO2: 97% (16 Jan 2019 07:34) (97% - 99%) Vital Signs   T(C): 36.5 (16 Jan 2019 07:34), Max: 36.8 (16 Jan 2019 06:33)  T(F): 97.7 (16 Jan 2019 07:34), Max: 98.3 (16 Jan 2019 06:33)  HR: 77 (16 Jan 2019 07:34) (67 - 98)  BP: 115/76 (16 Jan 2019 07:34) (97/68 - 115/76)  RR: 20 (16 Jan 2019 07:34) (19 - 20)  SpO2: 97% (16 Jan 2019 07:34) (97% - 99%)  General: Young female lying in bed with mild discomfort.   HEENT: PERRLA. EOMI. Clear conjunctivae. Moist mucus membrane  Neck: Supple. No JVD. No Thyromegaly   Chest: CTA bilaterally - no wheezing, rales or rhonchi. No chest wall tenderness.  Heart: Normal S1 & S2 with RRR. No murmur.   Abdomen: Soft. Non-tender. Non-distended. + BS  Ext: No pedal edema. No calf tenderness   Neuro: AAO x 3. CN II-XII grossly WNL.  Motor 5/5 in Upper Extremities and 3/5 in Lower Extremities. Sensation intact. Reflexes 2+. No speech disorder.  Skin: Warm and Dry  Psychiatry: Normal mood and affect Vital Signs   T(C): 36.5 (16 Jan 2019 07:34), Max: 36.8 (16 Jan 2019 06:33)  T(F): 97.7 (16 Jan 2019 07:34), Max: 98.3 (16 Jan 2019 06:33)  HR: 77 (16 Jan 2019 07:34) (67 - 98)  BP: 115/76 (16 Jan 2019 07:34) (97/68 - 115/76)  RR: 20 (16 Jan 2019 07:34) (19 - 20)  SpO2: 97% (16 Jan 2019 07:34) (97% - 99%)  General: Young female lying in bed with mild discomfort.   HEENT: PERRLA. EOMI. Clear conjunctivae. Moist mucus membrane  Neck: Supple. No JVD. No Thyromegaly   Chest: CTA bilaterally - no wheezing, rales or rhonchi. No chest wall tenderness.  Heart: Normal S1 & S2 with RRR. No murmur.   Abdomen: Soft. Non-tender. Non-distended. + BS  Ext: No pedal edema. No calf tenderness   Neuro: AAO x 3. CN II-XII grossly WNL.  Motor 5/5 in Upper Extremities and 3/5 in Lower Extremities. Sensation intact. Reflexes 2+. No speech disorder. Negative Kernig Sign. ? Brudzinski Sign.   Skin: Warm and Dry  Psychiatry: Normal mood and affect

## 2019-01-16 NOTE — H&P ADULT - PSH
No significant past surgical history History of biopsy  Renal  History of esophagogastroduodenoscopy (EGD) History of biopsy  Renal  History of esophagogastroduodenoscopy (EGD)    S/P correction of deviated nasal septum

## 2019-01-16 NOTE — ED ADULT NURSE NOTE - OBJECTIVE STATEMENT
assumed care of pt aox4 family at bedside. pt was taking new medication for lupus and started feeling weak and was having trouble walking, pt was having pain all over and had not urinated in four hours per family.  pt c/o difficulty moving legs

## 2019-01-16 NOTE — H&P ADULT - NSHPLABSRESULTS_GEN_ALL_CORE
LABS:                        13.3   9.5   )-----------( 228      ( 16 Jan 2019 00:32 )             39.4     01-16    136  |  100  |  9.0  ----------------------------<  118<H>  3.7   |  23.0  |  0.55    Ca    7.9<L>      16 Jan 2019 09:47    TPro  8.1  /  Alb  4.0  /  TBili  0.5  /  DBili  x   /  AST  42<H>  /  ALT  43<H>  /  AlkPhos  108  01-16      CARDIAC MARKERS ( 16 Jan 2019 00:32 )  x     / <0.01 ng/mL / 36 U/L / x     / x          Xray Chest 2 Views PA/Lat (01.15.19 @ 23:34)    No evidence of acute cardiopulmonary disease. Calcified granuloma left lung base, unchanged..    CT Head No Cont (01.16.19 @ 02:40)    No acute CT findings.    CT Abd/Pelvis, Thoracic and Lumbar Spines No Cont (01.16.19 @ 02:45)     1. Moderately distended bladder. No evidence of hydronephrosis.  2. Complex 5.8 x 3.7 x 2.9 cm left adnexal lesion versus exophytic fibroid. Pelvic ultrasound is recommended for further evaluation.  3. Distended gallbladder without secondary signs of acute cholecystitis.  4. No acute findings within the thoracic and lumbar spine. MRI can be obtained for further evaluation if there is concern for an intraspinal  process.     US Pelvis Complete (01.16.19 @ 05:00)    Abnormality seen on CT correlates to a left subserosal myoma measuring 5.0 x 3.3 x 4.4 cm. LABS:                        13.3   9.5   )-----------( 228      ( 16 Jan 2019 00:32 )             39.4     01-16    136  |  100  |  9.0  ----------------------------<  118<H>  3.7   |  23.0  |  0.55    Ca    7.9<L>      16 Jan 2019 09:47    TPro  8.1  /  Alb  4.0  /  TBili  0.5  /  DBili  x   /  AST  42<H>  /  ALT  43<H>  /  AlkPhos  108  01-16      CARDIAC MARKERS ( 16 Jan 2019 00:32 )  x     / <0.01 ng/mL / 36 U/L / x     / x          Xray Chest 2 Views PA/Lat (01.15.19 @ 23:34)  No evidence of acute cardiopulmonary disease. Calcified granuloma left lung base, unchanged..    CT Head No Cont (01.16.19 @ 02:40)  No acute CT findings.    CT Abd/Pelvis, Thoracic and Lumbar Spines No Cont (01.16.19 @ 02:45)   1. Moderately distended bladder. No evidence of hydronephrosis.  2. Complex 5.8 x 3.7 x 2.9 cm left adnexal lesion versus exophytic fibroid. Pelvic ultrasound is recommended for further evaluation.  3. Distended gallbladder without secondary signs of acute cholecystitis.  4. No acute findings within the thoracic and lumbar spine. MRI can be obtained for further evaluation if there is concern for an intraspinal  process.     US Pelvis Complete (01.16.19 @ 05:00)  Abnormality seen on CT correlates to a left subserosal myoma measuring 5.0 x 3.3 x 4.4 cm.    MR Head w/wo IV Cont (01.16.19 @ 13:12)  Unremarkable MRI of the brain. Abnormal FLAIR signal in the cervical cord is partially visualized. Please refer to the dedicated CT   of the C-spine for further details.    MR Cervical Spine w/wo IV Cont (01.16.19 @ 13:13)  Abnormal cord signal throughout the cervical spine from the C1/C2 junction through C6/C7 with cord swelling. No evidence of cord   compression. No abnormal enhancement. Differential diagnosis includes transverse myelitis, infectious, inflammatory, autoimmune, infarct, and   demyelinating disease processes. Findings discussed with Dr. Tom.    MR Thoracic Spine w/wo IV Cont (01.16.19 @ 13:13)    Diffuse abnormal spinal cord T2 hyperintensity with edema. Focal enhancement in the conus medullaris.

## 2019-01-17 DIAGNOSIS — G04.91 MYELITIS, UNSPECIFIED: ICD-10-CM

## 2019-01-17 LAB
ALBUMIN CSF-MCNC: 64 MG/DL — HIGH (ref 14–25)
ALBUMIN SERPL ELPH-MCNC: 3.4 G/DL — SIGNIFICANT CHANGE UP (ref 3.3–5.2)
ALBUMIN SERPL ELPH-MCNC: 3016 MG/DL — LOW (ref 3500–5200)
ALP SERPL-CCNC: 92 U/L — SIGNIFICANT CHANGE UP (ref 40–120)
ALT FLD-CCNC: 41 U/L — HIGH
ANION GAP SERPL CALC-SCNC: 12 MMOL/L — SIGNIFICANT CHANGE UP (ref 5–17)
AST SERPL-CCNC: 35 U/L — HIGH
BASOPHILS # BLD AUTO: 0 K/UL — SIGNIFICANT CHANGE UP (ref 0–0.2)
BASOPHILS NFR BLD AUTO: 0.1 % — SIGNIFICANT CHANGE UP (ref 0–2)
BILIRUB SERPL-MCNC: 0.3 MG/DL — LOW (ref 0.4–2)
BUN SERPL-MCNC: 11 MG/DL — SIGNIFICANT CHANGE UP (ref 8–20)
CALCIUM SERPL-MCNC: 8.6 MG/DL — SIGNIFICANT CHANGE UP (ref 8.6–10.2)
CHLORIDE SERPL-SCNC: 102 MMOL/L — SIGNIFICANT CHANGE UP (ref 98–107)
CO2 SERPL-SCNC: 21 MMOL/L — LOW (ref 22–29)
CREAT SERPL-MCNC: 0.38 MG/DL — LOW (ref 0.5–1.3)
CRP SERPL-MCNC: 0.9 MG/DL — HIGH (ref 0–0.4)
CSF PCR RESULT: SIGNIFICANT CHANGE UP
EBV EA AB SER IA-ACNC: 128 U/ML — HIGH
EBV EA AB TITR SER IF: POSITIVE
EBV EA IGG SER-ACNC: POSITIVE
EBV NA IGG SER IA-ACNC: 104 U/ML — HIGH
EBV PATRN SPEC IB-IMP: SIGNIFICANT CHANGE UP
EBV VCA IGG AVIDITY SER QL IA: POSITIVE
EBV VCA IGM SER IA-ACNC: 507 U/ML — HIGH
EBV VCA IGM SER IA-ACNC: <10 U/ML — SIGNIFICANT CHANGE UP
EBV VCA IGM TITR FLD: NEGATIVE — SIGNIFICANT CHANGE UP
EOSINOPHIL # BLD AUTO: 0 K/UL — SIGNIFICANT CHANGE UP (ref 0–0.5)
EOSINOPHIL NFR BLD AUTO: 0 % — SIGNIFICANT CHANGE UP (ref 0–6)
GLUCOSE SERPL-MCNC: 154 MG/DL — HIGH (ref 70–115)
HCT VFR BLD CALC: 37.5 % — SIGNIFICANT CHANGE UP (ref 37–47)
HGB BLD-MCNC: 12.2 G/DL — SIGNIFICANT CHANGE UP (ref 12–16)
IGG CSF-MCNC: 22 MG/DL — HIGH
IGG FLD-MCNC: 1308 MG/DL — SIGNIFICANT CHANGE UP (ref 610–1660)
IGG SYNTH RATE SER+CSF CALC-MRATE: 44.8 MG/DAY — HIGH
IGG/ALB CLEAR SER+CSF-RTO: 0.8 — HIGH
IGG/ALB CSF: 0.34 RATIO — HIGH
IGG/ALB SER: 0.43 RATIO — SIGNIFICANT CHANGE UP
LYMPHOCYTES # BLD AUTO: 0.4 K/UL — LOW (ref 1–4.8)
LYMPHOCYTES # BLD AUTO: 6.2 % — LOW (ref 20–55)
MAGNESIUM SERPL-MCNC: 2.5 MG/DL — SIGNIFICANT CHANGE UP (ref 1.8–2.6)
MCHC RBC-ENTMCNC: 26.8 PG — LOW (ref 27–31)
MCHC RBC-ENTMCNC: 32.5 G/DL — SIGNIFICANT CHANGE UP (ref 32–36)
MCV RBC AUTO: 82.4 FL — SIGNIFICANT CHANGE UP (ref 81–99)
MONOCYTES # BLD AUTO: 0.8 K/UL — SIGNIFICANT CHANGE UP (ref 0–0.8)
MONOCYTES NFR BLD AUTO: 11.1 % — HIGH (ref 3–10)
NEUTROPHILS # BLD AUTO: 5.9 K/UL — SIGNIFICANT CHANGE UP (ref 1.8–8)
NEUTROPHILS NFR BLD AUTO: 82.3 % — HIGH (ref 37–73)
PLATELET # BLD AUTO: 276 K/UL — SIGNIFICANT CHANGE UP (ref 150–400)
POTASSIUM SERPL-MCNC: 4.1 MMOL/L — SIGNIFICANT CHANGE UP (ref 3.5–5.3)
POTASSIUM SERPL-SCNC: 4.1 MMOL/L — SIGNIFICANT CHANGE UP (ref 3.5–5.3)
PROT SERPL-MCNC: 7.1 G/DL — SIGNIFICANT CHANGE UP (ref 6.6–8.7)
RAPID RVP RESULT: SIGNIFICANT CHANGE UP
RBC # BLD: 4.55 M/UL — SIGNIFICANT CHANGE UP (ref 4.4–5.2)
RBC # FLD: 16.8 % — HIGH (ref 11–15.6)
RHEUMATOID FACT SERPL-ACNC: 17 IU/ML — HIGH (ref 0–13)
SODIUM SERPL-SCNC: 135 MMOL/L — SIGNIFICANT CHANGE UP (ref 135–145)
T PALLIDUM AB TITR SER: NEGATIVE — SIGNIFICANT CHANGE UP
VZV IGG FLD QL IA: 1316 INDEX — SIGNIFICANT CHANGE UP
VZV IGG FLD QL IA: POSITIVE — SIGNIFICANT CHANGE UP
WBC # BLD: 7.1 K/UL — SIGNIFICANT CHANGE UP (ref 4.8–10.8)
WBC # FLD AUTO: 7.1 K/UL — SIGNIFICANT CHANGE UP (ref 4.8–10.8)

## 2019-01-17 PROCEDURE — 99223 1ST HOSP IP/OBS HIGH 75: CPT

## 2019-01-17 PROCEDURE — 99233 SBSQ HOSP IP/OBS HIGH 50: CPT

## 2019-01-17 RX ADMIN — Medication 110 MILLIGRAM(S): at 03:53

## 2019-01-17 RX ADMIN — CEFTRIAXONE 100 GRAM(S): 500 INJECTION, POWDER, FOR SOLUTION INTRAMUSCULAR; INTRAVENOUS at 00:55

## 2019-01-17 RX ADMIN — Medication 110 MILLIGRAM(S): at 12:17

## 2019-01-17 RX ADMIN — Medication 250 MILLIGRAM(S): at 06:11

## 2019-01-17 RX ADMIN — Medication 58 MILLIGRAM(S): at 05:04

## 2019-01-17 RX ADMIN — Medication 250 MILLIGRAM(S): at 18:23

## 2019-01-17 RX ADMIN — PANTOPRAZOLE SODIUM 40 MILLIGRAM(S): 20 TABLET, DELAYED RELEASE ORAL at 08:12

## 2019-01-17 RX ADMIN — DULOXETINE HYDROCHLORIDE 60 MILLIGRAM(S): 30 CAPSULE, DELAYED RELEASE ORAL at 11:11

## 2019-01-17 RX ADMIN — Medication 110 MILLIGRAM(S): at 21:26

## 2019-01-17 RX ADMIN — ENOXAPARIN SODIUM 40 MILLIGRAM(S): 100 INJECTION SUBCUTANEOUS at 11:12

## 2019-01-17 NOTE — ED ADULT NURSE REASSESSMENT NOTE - NS ED NURSE REASSESS COMMENT FT1
Pt at MRI at this time.
Received patient alert skin warm and dry color good iv infusing well denies pains or discomforts at this time will continue to follow
assessment remains unchanged, pt in no distress, denies complaints, family at bedside updated regarding plan of care. will cont to monitor
patient sleeping at thia time iv infusing well patient on  in no distress will continue to follow
pt educated on need for straight cath, pt agree, pt straight cath, pt tolerated, 1250 urine obtain, Dr. Posada aware.
report received @ 7:45, assumed pt care at this time, pt resting comfortably in bed on CM, VSS, in no acute distress. RVP sent, Respirations are even and unlabored. pt voices no complaints at this time. pt updated and aware of plan of care. will cont to monitor.
Bladder scanner shows 1268ml of urine, Dr Quintana made aware.
Patient A&OX3, c/o generalized weakness at this time. Pt back from MRI. VSS.  at bedside.
Patient A&OX3, mild generalized pain at this time. Pt back from LP, dsg D/C/I. VSS.  at bedside.
Patient received at 0730; awake; alert and oriented x4. c/o generalized pain at this time. Denies SOB, dizziness. No distress noted. VSS. Respirations unlabored. Report received at bedside. Call bell and personal items in reach. Continue to monitor patient and maintain safety.

## 2019-01-17 NOTE — PROGRESS NOTE ADULT - ASSESSMENT
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

## 2019-01-17 NOTE — PROGRESS NOTE ADULT - SUBJECTIVE AND OBJECTIVE BOX
Interval History:  no change  MEDICATIONS  (STANDING):  acyclovir IVPB 500 milliGRAM(s) IV Intermittent every 8 hours  acyclovir IVPB      DULoxetine 60 milliGRAM(s) Oral daily  enoxaparin Injectable 40 milliGRAM(s) SubCutaneous daily  methylPREDNISolone sodium succinate IVPB 1000 milliGRAM(s) IV Intermittent daily  pantoprazole    Tablet 40 milliGRAM(s) Oral before breakfast  saccharomyces boulardii 250 milliGRAM(s) Oral two times a day  senna 2 Tablet(s) Oral at bedtime  tamsulosin 0.4 milliGRAM(s) Oral at bedtime    MEDICATIONS  (PRN):  acetaminophen   Tablet .. 650 milliGRAM(s) Oral every 6 hours PRN Temp greater or equal to 38C (100.4F), Mild Pain (1 - 3)  docusate sodium 100 milliGRAM(s) Oral three times a day PRN Constipation      Allergies    No Known Allergies    Intolerances        PHYSICAL EXAM:  Vital Signs Last 24 Hrs  T(F): 98.3 (19 @ 08:06)  HR: 88 (19 @ 03:14)  BP: 126/73 (19 @ 08:06)  RR: 20 (19 @ 08:06)    GENERAL: NAD, well-groomed, well-developed  HEAD:  Atraumatic, Normocephalic  EYES: EOMI, PERRLA, conjunctiva and sclera clear  NECK: Supple, No JVD, Normal thyroid, no carotid bruit bilateral  NERVOUS SYSTEM:  Alert & Oriented X3, speech and language normal, cranial nerves II-XII normal,   Good concentration; Motor Strength 4+/5, upper and lower extremities 3-4, ; DTRs 2+ intact and symmetric, plantar responses neutral, lt intact.   HEART: Regular rate and rhythm; No murmurs, rubs, or gallops    LABS:                        12.2   7.1   )-----------( 276      ( 2019 09:00 )             37.5         135  |  102  |  11.0  ----------------------------<  154<H>  4.1   |  21.0<L>  |  0.38<L>    Ca    8.6      2019 09:00  Mg     2.5         TPro  7.1  /  Alb  3.4  /  TBili  0.3<L>  /  DBili  x   /  AST  35<H>  /  ALT  41<H>  /  AlkPhos  92      PT/INR - ( 2019 17:02 )   PT: 11.5 sec;   INR: 1.00 ratio         PTT - ( 2019 17:02 )  PTT:32.7 sec  Urinalysis Basic - ( 2019 03:59 )    Color: Yellow / Appearance: Clear / S.010 / pH: x  Gluc: x / Ketone: Trace  / Bili: Negative / Urobili: Negative mg/dL   Blood: x / Protein: 100 mg/dL / Nitrite: Negative   Leuk Esterase: Trace / RBC: 0-2 /HPF / WBC 11-25   Sq Epi: x / Non Sq Epi: Occasional / Bacteria: Many        RADIOLOGY & ADDITIONAL STUDIES:

## 2019-01-17 NOTE — PROGRESS NOTE ADULT - ASSESSMENT
33 years old female with PMH of SLE, Lupus Nephritis and Fibromyalgia brought by her  yesterday evening with generalized weakness. As per patient, she is not feeling well for last few days. She is having low grade fevers, generalized body pain, throat irritation, cough, ear pain and vomiting. Her appetite is very poor. She went to her PMD few days ago who prescribed Sertraline for her depressive symptoms. As per her, symptoms started after taking that medication. Yesterday, she was feeling so weak that she could not walk so her  brought her to ER. She is also complaining of difficulty urinating. She went to Urologist 10 days ago and she was prescribed Flomax which she has been taking but is unable to void. She also has constipation.   She recently came back from [place] on 12/29/18 after staying there for 2.5 to 3 months. She had MRI there in December due to back pain and generalized weakness and it was normal.   She has headache, neck pain and lightheadedness. Denies photophobia. + sick contacts with common cold.     1) Transverse Myelitis  - Neuro Consult appreciated  - Pending further work up including LP  - Continue Solumedrol 1 gm daily x 5 days  2) Urinary Retention  - Continue Jefferson's Catheter  - Flomax 0.4 mg  - Continue Rocephin for UTI  - Urine Culture pending  - Blood Cultures  3) SLE / Lupus Nephritis  - Was on Cellcept but stopped few weeks ago  - Hold Hydroxychloroquine for now  4) Electrolyte Abnormality  - Potassium replaced  - Sodium is normal after IVF  - Calcium Gluconate 2 gm  5) Fibromyalgia  - Continue Cymbalta   6) Elevated LFTs  - CT shows hepatomegaly.   - Outpatient follow up with GI.  7) Subserosal Myoma   - Outpatient follow up with GYN  DVT Prophylaxis -- Lovenox 40 mg 33 years old female with PMH of SLE, Lupus Nephritis and Fibromyalgia brought by her  yesterday evening with generalized weakness. As per patient, she is not feeling well for last few days. She is having low grade fevers, generalized body pain, throat irritation, cough, ear pain and vomiting. Her appetite is very poor. She went to her PMD few days ago who prescribed Sertraline for her depressive symptoms. As per her, symptoms started after taking that medication. Yesterday, she was feeling so weak that she could not walk so her  brought her to ER. She is also complaining of difficulty urinating. She went to Urologist 10 days ago and she was prescribed Flomax which she has been taking but is unable to void. She also has constipation.   She recently came back from [place] on 12/29/18 after staying there for 2.5 to 3 months. She had MRI there in December due to back pain and generalized weakness and it was normal.   She has headache, neck pain and lightheadedness. Denies photophobia. + sick contacts with common cold.     1) Transverse Myelitis:   - Neuro Consult appreciated, CSF Meningo-encephalitis PRCs negative, CFS WBC are high, Lymphocytes are 8, Neutrophil 70,  CSF Protein is high, Rapid viral panel is negative, HIV negative, EBV workup is positive, Rheumatoid factor quantitative is 17, Continue Solumedrol 1 gm daily x 5 days, will continue with Neuro checks, Neurology is following, will get rheumatology consult as patient has Hx of SLE and is could be due to exacerbation of disease, ID is following and patient is on Acyclovir and being followed by ID.     2) Urinary Retention: Continue Jefferson's Catheter, Flomax 0.4 mg daily,, Urine Cultures shows coagulase negative staph, will repeat UA and urine cultures,   Blood Cultures pending.      3) SLE / Lupus Nephritis: Was on Cellcept but stopped few weeks ago,  Hold Hydroxychloroquine for now, CRP and ESR is high, will continue with steroids.     4) Electrolyte Abnormality: Potassium replaced, Sodium is normal after IVF, Calcium Gluconate 2 gm, will monitor calcium level.     5) Fibromyalgia: Continue Cymbalta.     6) Elevated LFTs: CT shows hepatomegaly, Outpatient follow up with GI, will send hepatitis panel.     7) Subserosal Myoma:  Outpatient follow up with GYN    DVT Prophylaxis -- Lovenox 40 mg

## 2019-01-17 NOTE — PROGRESS NOTE ADULT - SUBJECTIVE AND OBJECTIVE BOX
KANE LARA    [id number]    33y      Female    Patient is a 33y old  Female who presents with a chief complaint of Generalized pain (2019 10:09)      INTERVAL HPI/OVERNIGHT EVENTS:  Patient still have some improvement of blurry vision, has b/l lower extremities weakness no improvement, has urine retention required summers's cathter, she denies any back pain, has no chest pain, sob, dizziness, fever, chills.     REVIEW OF SYSTEMS:    CONSTITUTIONAL: No fever, weight loss, or fatigue  RESPIRATORY: No cough, No shortness of breath  CARDIOVASCULAR: No chest pain, palpitations  GASTROINTESTINAL: No abdominal, No nausea, vomiting  NEUROLOGICAL: b/l lower extremities weakness.   MISCELLANEOUS: No joint swelling or pain       Vital Signs Last 24 Hrs  T(C): 36.8 (2019 11:20), Max: 36.8 (2019 23:30)  T(F): 98.3 (2019 11:20), Max: 98.3 (2019 23:30)  HR: 75 (2019 18:19) (60 - 88)  BP: 131/82 (2019 18:19) (112/70 - 140/80)  RR: 18 (2019 18:19) (18 - 20)  SpO2: 95% (2019 18:19) (95% - 97%)    PHYSICAL EXAM:    GENERAL: Middle age female looking comfortable   HEENT: PERRL, +EOMI  NECK: soft, Supple, No JVD,   CHEST/LUNG: Clear to auscultate bilaterally; No wheezing  HEART: S1S2+, Regular rate and rhythm; No murmurs  ABDOMEN: Soft, Nontender, Nondistended; Bowel sounds present  EXTREMITIES:  2+ Peripheral Pulses, No edema  SKIN: No rashes or lesions  NEURO: AAOX3, Alert & Oriented X3, speech and language normal, cranial nerves II-XII normal, Motor Strength 4+/5, upper and lower extremities 3-4, ; DTRs 2+ intact and symmetric, plantar responses neutral, sensation intact  PSYCH: normal mood      LABS:                        12.2   7.1   )-----------( 276      ( 2019 09:00 )             37.5     01-17    135  |  102  |  11.0  ----------------------------<  154<H>  4.1   |  21.0<L>  |  0.38<L>    Ca    8.6      2019 09:00  Mg     2.5         TPro  7.1  /  Alb  3.4  /  TBili  0.3<L>  /  DBili  x   /  AST  35<H>  /  ALT  41<H>  /  AlkPhos  92      PT/INR - ( 2019 17:02 )   PT: 11.5 sec;   INR: 1.00 ratio         PTT - ( 2019 17:02 )  PTT:32.7 sec  Urinalysis Basic - ( 2019 03:59 )    Color: Yellow / Appearance: Clear / S.010 / pH: x  Gluc: x / Ketone: Trace  / Bili: Negative / Urobili: Negative mg/dL   Blood: x / Protein: 100 mg/dL / Nitrite: Negative   Leuk Esterase: Trace / RBC: 0-2 /HPF / WBC 11-25   Sq Epi: x / Non Sq Epi: Occasional / Bacteria: Many          I&O's Summary      MEDICATIONS  (STANDING):  acyclovir IVPB 500 milliGRAM(s) IV Intermittent every 8 hours  acyclovir IVPB      DULoxetine 60 milliGRAM(s) Oral daily  enoxaparin Injectable 40 milliGRAM(s) SubCutaneous daily  methylPREDNISolone sodium succinate IVPB 1000 milliGRAM(s) IV Intermittent daily  pantoprazole    Tablet 40 milliGRAM(s) Oral before breakfast  saccharomyces boulardii 250 milliGRAM(s) Oral two times a day  senna 2 Tablet(s) Oral at bedtime  tamsulosin 0.4 milliGRAM(s) Oral at bedtime    MEDICATIONS  (PRN):  acetaminophen   Tablet .. 650 milliGRAM(s) Oral every 6 hours PRN Temp greater or equal to 38C (100.4F), Mild Pain (1 - 3)  docusate sodium 100 milliGRAM(s) Oral three times a day PRN Constipation

## 2019-01-18 LAB
4/8 RATIO: 1.1 RATIO — SIGNIFICANT CHANGE UP (ref 0.9–3.6)
ABS CD8: 61 /UL — LOW (ref 142–740)
ALBUMIN SERPL ELPH-MCNC: 3.4 G/DL — SIGNIFICANT CHANGE UP (ref 3.3–5.2)
ALP SERPL-CCNC: 81 U/L — SIGNIFICANT CHANGE UP (ref 40–120)
ALT FLD-CCNC: 37 U/L — HIGH
ANA PAT FLD IF-IMP: ABNORMAL
ANA TITR SER: ABNORMAL
ANION GAP SERPL CALC-SCNC: 14 MMOL/L — SIGNIFICANT CHANGE UP (ref 5–17)
ANTI-RIBONUCLEAR PROTEIN: 3.8 AI — HIGH
APPEARANCE UR: CLEAR — SIGNIFICANT CHANGE UP
AST SERPL-CCNC: 25 U/L — SIGNIFICANT CHANGE UP
BILIRUB SERPL-MCNC: 0.3 MG/DL — LOW (ref 0.4–2)
BILIRUB UR-MCNC: NEGATIVE — SIGNIFICANT CHANGE UP
BUN SERPL-MCNC: 15 MG/DL — SIGNIFICANT CHANGE UP (ref 8–20)
CALCIUM SERPL-MCNC: 8.5 MG/DL — LOW (ref 8.6–10.2)
CD16+CD56+ CELLS NFR BLD: 17 % — SIGNIFICANT CHANGE UP (ref 5–23)
CD16+CD56+ CELLS NFR SPEC: 43 /UL — LOW (ref 71–410)
CD19 BLASTS SPEC-ACNC: 17 % — SIGNIFICANT CHANGE UP (ref 6–24)
CD19 BLASTS SPEC-ACNC: 42 /UL — LOW (ref 84–469)
CD3 BLASTS SPEC-ACNC: 145 /UL — LOW (ref 672–1870)
CD3 BLASTS SPEC-ACNC: 61 % — SIGNIFICANT CHANGE UP (ref 59–83)
CD4 %: 29 % — LOW (ref 30–62)
CD8 %: 26 % — SIGNIFICANT CHANGE UP (ref 12–36)
CHLORIDE SERPL-SCNC: 102 MMOL/L — SIGNIFICANT CHANGE UP (ref 98–107)
CO2 SERPL-SCNC: 22 MMOL/L — SIGNIFICANT CHANGE UP (ref 22–29)
COLOR SPEC: YELLOW — SIGNIFICANT CHANGE UP
CREAT SERPL-MCNC: 0.34 MG/DL — LOW (ref 0.5–1.3)
CULTURE RESULTS: SIGNIFICANT CHANGE UP
DIFF PNL FLD: ABNORMAL
ENA SM AB FLD QL: 1.2 AI — HIGH
EPI CELLS # UR: ABNORMAL
GLUCOSE SERPL-MCNC: 142 MG/DL — HIGH (ref 70–115)
GLUCOSE UR QL: NEGATIVE MG/DL — SIGNIFICANT CHANGE UP
HCT VFR BLD CALC: 37.4 % — SIGNIFICANT CHANGE UP (ref 37–47)
HGB BLD-MCNC: 11.8 G/DL — LOW (ref 12–16)
HYALINE CASTS # UR AUTO: ABNORMAL /LPF
KETONES UR-MCNC: ABNORMAL
LDH SERPL L TO P-CCNC: 320 U/L — HIGH (ref 98–192)
LEUKOCYTE ESTERASE UR-ACNC: ABNORMAL
MCHC RBC-ENTMCNC: 26.5 PG — LOW (ref 27–31)
MCHC RBC-ENTMCNC: 31.6 G/DL — LOW (ref 32–36)
MCV RBC AUTO: 84 FL — SIGNIFICANT CHANGE UP (ref 81–99)
NITRITE UR-MCNC: NEGATIVE — SIGNIFICANT CHANGE UP
PH UR: 6.5 — SIGNIFICANT CHANGE UP (ref 5–8)
PLATELET # BLD AUTO: 322 K/UL — SIGNIFICANT CHANGE UP (ref 150–400)
POTASSIUM SERPL-MCNC: 3.6 MMOL/L — SIGNIFICANT CHANGE UP (ref 3.5–5.3)
POTASSIUM SERPL-SCNC: 3.6 MMOL/L — SIGNIFICANT CHANGE UP (ref 3.5–5.3)
PROT SERPL-MCNC: 6.8 G/DL — SIGNIFICANT CHANGE UP (ref 6.6–8.7)
PROT UR-MCNC: 100 MG/DL
RBC # BLD: 4.45 M/UL — SIGNIFICANT CHANGE UP (ref 4.4–5.2)
RBC # FLD: 17 % — HIGH (ref 11–15.6)
RBC CASTS # UR COMP ASSIST: SIGNIFICANT CHANGE UP /HPF (ref 0–4)
SODIUM SERPL-SCNC: 138 MMOL/L — SIGNIFICANT CHANGE UP (ref 135–145)
SP GR SPEC: 1.01 — SIGNIFICANT CHANGE UP (ref 1.01–1.02)
SPECIMEN SOURCE: SIGNIFICANT CHANGE UP
T-CELL CD4 SUBSET PNL BLD: 67 /UL — LOW (ref 489–1457)
UROBILINOGEN FLD QL: NEGATIVE MG/DL — SIGNIFICANT CHANGE UP
WBC # BLD: 9 K/UL — SIGNIFICANT CHANGE UP (ref 4.8–10.8)
WBC # FLD AUTO: 9 K/UL — SIGNIFICANT CHANGE UP (ref 4.8–10.8)
WBC UR QL: ABNORMAL

## 2019-01-18 PROCEDURE — 99232 SBSQ HOSP IP/OBS MODERATE 35: CPT

## 2019-01-18 RX ORDER — ONDANSETRON 8 MG/1
4 TABLET, FILM COATED ORAL EVERY 6 HOURS
Qty: 0 | Refills: 0 | Status: DISCONTINUED | OUTPATIENT
Start: 2019-01-18 | End: 2019-02-06

## 2019-01-18 RX ORDER — CEFTRIAXONE 500 MG/1
INJECTION, POWDER, FOR SOLUTION INTRAMUSCULAR; INTRAVENOUS
Qty: 0 | Refills: 0 | Status: DISCONTINUED | OUTPATIENT
Start: 2019-01-18 | End: 2019-01-18

## 2019-01-18 RX ORDER — OXYCODONE AND ACETAMINOPHEN 5; 325 MG/1; MG/1
1 TABLET ORAL EVERY 4 HOURS
Qty: 0 | Refills: 0 | Status: DISCONTINUED | OUTPATIENT
Start: 2019-01-18 | End: 2019-01-24

## 2019-01-18 RX ORDER — CEFTRIAXONE 500 MG/1
1 INJECTION, POWDER, FOR SOLUTION INTRAMUSCULAR; INTRAVENOUS ONCE
Qty: 0 | Refills: 0 | Status: COMPLETED | OUTPATIENT
Start: 2019-01-18 | End: 2019-01-18

## 2019-01-18 RX ORDER — MORPHINE SULFATE 50 MG/1
2 CAPSULE, EXTENDED RELEASE ORAL EVERY 4 HOURS
Qty: 0 | Refills: 0 | Status: DISCONTINUED | OUTPATIENT
Start: 2019-01-18 | End: 2019-01-22

## 2019-01-18 RX ADMIN — SENNA PLUS 2 TABLET(S): 8.6 TABLET ORAL at 00:07

## 2019-01-18 RX ADMIN — Medication 250 MILLIGRAM(S): at 17:01

## 2019-01-18 RX ADMIN — Medication 110 MILLIGRAM(S): at 11:57

## 2019-01-18 RX ADMIN — ENOXAPARIN SODIUM 40 MILLIGRAM(S): 100 INJECTION SUBCUTANEOUS at 11:57

## 2019-01-18 RX ADMIN — Medication 58 MILLIGRAM(S): at 05:56

## 2019-01-18 RX ADMIN — PANTOPRAZOLE SODIUM 40 MILLIGRAM(S): 20 TABLET, DELAYED RELEASE ORAL at 05:57

## 2019-01-18 RX ADMIN — TAMSULOSIN HYDROCHLORIDE 0.4 MILLIGRAM(S): 0.4 CAPSULE ORAL at 21:13

## 2019-01-18 RX ADMIN — CEFTRIAXONE 100 GRAM(S): 500 INJECTION, POWDER, FOR SOLUTION INTRAMUSCULAR; INTRAVENOUS at 15:20

## 2019-01-18 RX ADMIN — SENNA PLUS 2 TABLET(S): 8.6 TABLET ORAL at 21:13

## 2019-01-18 RX ADMIN — Medication 110 MILLIGRAM(S): at 05:56

## 2019-01-18 RX ADMIN — DULOXETINE HYDROCHLORIDE 60 MILLIGRAM(S): 30 CAPSULE, DELAYED RELEASE ORAL at 11:57

## 2019-01-18 RX ADMIN — MORPHINE SULFATE 2 MILLIGRAM(S): 50 CAPSULE, EXTENDED RELEASE ORAL at 21:13

## 2019-01-18 RX ADMIN — MORPHINE SULFATE 2 MILLIGRAM(S): 50 CAPSULE, EXTENDED RELEASE ORAL at 22:13

## 2019-01-18 RX ADMIN — MORPHINE SULFATE 2 MILLIGRAM(S): 50 CAPSULE, EXTENDED RELEASE ORAL at 12:00

## 2019-01-18 RX ADMIN — MORPHINE SULFATE 2 MILLIGRAM(S): 50 CAPSULE, EXTENDED RELEASE ORAL at 13:03

## 2019-01-18 RX ADMIN — Medication 250 MILLIGRAM(S): at 05:57

## 2019-01-18 RX ADMIN — TAMSULOSIN HYDROCHLORIDE 0.4 MILLIGRAM(S): 0.4 CAPSULE ORAL at 00:07

## 2019-01-18 NOTE — CHART NOTE - NSCHARTNOTEFT_GEN_A_CORE
Consult re SLE and recent transverse myelitis seen on MRI and inflammatory CSF  Currently on pulse steroids  Pt w/ h/o SLE for 5-6 years (+serologies, alopecia, oral/nasal ulcers, proliferative LN, arthralgias, malar rash), most recently on MMF and HCQ. Previous study patient on anifrolumab; other meds including Benlysta and steroids.  Also w/ concomitant fibromyalgia previously on duloxetine, Lyrica.    Concern for possible NMO 2/2 SLE vs infectious process  - agree w/ pulse x 3 days, then 1mg/kg solumedrol. Also agree w/ antivirals and CTX. F/U cultures and labs  - c/w MMF 1.5g BID  - NMO pending, ordered addl serologies now. If +NMO, recommend PLEX.  Pre-testing labs for possible RTX ordered  - UA activtiy - concern SLE vs infectious due to urinary retention/UTI -will need to follow.  Spot TP/Cr ordered as well    Full consult to follow this evening  Call for any questions  [phone]

## 2019-01-18 NOTE — PROGRESS NOTE ADULT - SUBJECTIVE AND OBJECTIVE BOX
[facility]  INFECTIOUS DISEASES AND INTERNAL MEDICINE at [facility]  =======================================================  Adams Vasquez MD  Diplomates American Board of Internal Medicine and Infectious Diseases  =======================================================    KANE LARA [id number]    Follow up: R/O Meningitis     No headache, upper neck pain, no fever, no photophobia or phonophobia at this time.   No urinary symptoms.   Her initial symptoms looks like serotonin syndrome (recently started on zoloft and she was on many other meds that had interaction)    Allergies:  No Known Allergies    Antibiotics:   acyclovir IVPB      cefTRIAXone   IVPB         REVIEW OF SYSTEMS:  CONSTITUTIONAL:  No Fever or chills  HEENT:   No diplopia or blurred vision.  No earache, sore throat or runny nose.  CARDIOVASCULAR:  No chest pain or SOB  RESPIRATORY:  No cough, shortness of breath, PND or orthopnea.  GASTROINTESTINAL:  No nausea, vomiting or diarrhea.  GENITOURINARY:  No dysuria, frequency or urgency. No Blood in urine  MUSCULOSKELETAL:  no joint aches, no muscle pain  SKIN:  No change in skin, hair or nails.  NEUROLOGIC:  No paresthesias or weakness.  PSYCHIATRIC:  No disorder of thought or mood.  ENDOCRINE:  No heat or cold intolerance, polyuria or polydipsia.  HEMATOLOGICAL:  No easy bruising or bleeding.      Physical Exam:  ICU Vital Signs Last 24 Hrs  T(C): 36.7 (2019 12:03), Max: 37.6 (2019 00:05)  T(F): 98 (2019 12:03), Max: 99.6 (2019 00:05)  HR: 72 (2019 12:03) (72 - 77)  BP: 117/79 (2019 12:03) (117/79 - 133/65)  RR: 18 (2019 12:03) (18 - 19)  SpO2: 97% (2019 05:15) (94% - 98%)  GEN: NAD  HEENT: normocephalic and atraumatic. EOMI. BARTOLOME.    NECK: Supple.  No lymphadenopathy   LUNGS: Clear to auscultation.  HEART: Regular rate and rhythm without murmur.  ABDOMEN: Soft, nontender, and nondistended.  Positive bowel sounds.    : No CVA tenderness  EXTREMITIES: Without any cyanosis, clubbing, rash, lesions or edema.  MSK: no joint swelling  NEUROLOGIC: grossly intact.  PSYCHIATRIC: Appropriate affect .  SKIN: No ulceration or induration present.      Labs:      138  |  102  |  15.0  ----------------------------<  142<H>  3.6   |  22.0  |  0.34<L>    Ca    8.5<L>      2019 07:54  Mg     2.5         TPro  6.8  /  Alb  3.4  /  TBili  0.3<L>  /  DBili  x   /  AST  25  /  ALT  37<H>  /  AlkPhos  81                          11.8   9.0   )-----------( 322      ( 2019 07:54 )             37.4     PT/INR - ( 2019 17:02 )   PT: 11.5 sec;   INR: 1.00 ratio    PTT - ( 2019 17:02 )  PTT:32.7 sec  Urinalysis Basic - ( 2019 08:14 )    Color: Yellow / Appearance: Clear / S.010 / pH: x  Gluc: x / Ketone: Trace  / Bili: Negative / Urobili: Negative mg/dL   Blood: x / Protein: 100 mg/dL / Nitrite: Negative   Leuk Esterase: Trace / RBC: 20-30 /HPF / WBC 6-10   Sq Epi: x / Non Sq Epi: Moderate / Bacteria: x    LIVER FUNCTIONS - ( 2019 07:54 )  Alb: 3.4 g/dL / Pro: 6.8 g/dL / ALK PHOS: 81 U/L / ALT: 37 U/L / AST: 25 U/L / GGT: x           RECENT CULTURES:   @ 08:49        NotDetec   @ 15:53 .CSF     No growth at 1 day.  Culture in progress    Few White blood cells  No organisms seen     @ 04:00 .Urine     >100,000 CFU/ml Coag Negative Staphylococcus    All imaging and data are reviewed.

## 2019-01-18 NOTE — PROGRESS NOTE ADULT - SUBJECTIVE AND OBJECTIVE BOX
KANE LARA    [id number]    33y      Female    INTERVAL HPI/OVERNIGHT EVENTS:    patient being seen for transverse myelitis, lupus, uti and urinary retention. Patient seen at bedside with  and states her leg movement is improving.     patient complains of neck pain     REVIEW OF SYSTEMS:    CONSTITUTIONAL: pain   RESPIRATORY: No cough, wheezing, hemoptysis; No shortness of breath  CARDIOVASCULAR: No chest pain, palpitations  GASTROINTESTINAL: No abdominal or epigastric pain. No nausea, vomiting  NEUROLOGICAL: No headaches, memory loss, loss of strength.  MISCELLANEOUS:      Vital Signs Last 24 Hrs  T(C): 36.7 (2019 12:03), Max: 37.6 (2019 00:05)  T(F): 98 (2019 12:03), Max: 99.6 (2019 00:05)  HR: 72 (2019 12:03) (72 - 77)  BP: 117/79 (2019 12:03) (117/79 - 133/65)  BP(mean): --  RR: 18 (2019 12:03) (18 - 19)  SpO2: 97% (2019 05:15) (94% - 98%)    PHYSICAL EXAM:    GENERAL: Middle age female looking comfortable   HEENT: PERRL, +EOMI  CHEST/LUNG: Clear to auscultate bilaterally; No wheezing  HEART: S1S2+, Regular rate and rhythm; No murmurs  ABDOMEN: Soft, Nontender, Nondistended; Bowel sounds present  EXTREMITIES:  summers   SKIN: No rashes or lesions  NEURO: AAOX3, Alert & Oriented X3, reduced strength of LE, intact sensory   PSYCH: normal mood        LABS:                        11.8   9.0   )-----------( 322      ( 2019 07:54 )             37.4     01-18    138  |  102  |  15.0  ----------------------------<  142<H>  3.6   |  22.0  |  0.34<L>    Ca    8.5<L>      2019 07:54  Mg     2.5         TPro  6.8  /  Alb  3.4  /  TBili  0.3<L>  /  DBili  x   /  AST  25  /  ALT  37<H>  /  AlkPhos  81  18    PT/INR - ( 2019 17:02 )   PT: 11.5 sec;   INR: 1.00 ratio         PTT - ( 2019 17:02 )  PTT:32.7 sec  Urinalysis Basic - ( 2019 08:14 )    Color: Yellow / Appearance: Clear / S.010 / pH: x  Gluc: x / Ketone: Trace  / Bili: Negative / Urobili: Negative mg/dL   Blood: x / Protein: 100 mg/dL / Nitrite: Negative   Leuk Esterase: Trace / RBC: 20-30 /HPF / WBC 6-10   Sq Epi: x / Non Sq Epi: Moderate / Bacteria: x          MEDICATIONS  (STANDING):  acyclovir IVPB 500 milliGRAM(s) IV Intermittent every 8 hours  acyclovir IVPB      cefTRIAXone   IVPB 1 Gram(s) IV Intermittent once  cefTRIAXone   IVPB      DULoxetine 60 milliGRAM(s) Oral daily  enoxaparin Injectable 40 milliGRAM(s) SubCutaneous daily  methylPREDNISolone sodium succinate IVPB 1000 milliGRAM(s) IV Intermittent daily  pantoprazole    Tablet 40 milliGRAM(s) Oral before breakfast  saccharomyces boulardii 250 milliGRAM(s) Oral two times a day  senna 2 Tablet(s) Oral at bedtime  tamsulosin 0.4 milliGRAM(s) Oral at bedtime    MEDICATIONS  (PRN):  acetaminophen   Tablet .. 650 milliGRAM(s) Oral every 6 hours PRN Temp greater or equal to 38C (100.4F), Mild Pain (1 - 3)  bisacodyl 5 milliGRAM(s) Oral every 12 hours PRN Constipation  docusate sodium 100 milliGRAM(s) Oral three times a day PRN Constipation  morphine  - Injectable 2 milliGRAM(s) IV Push every 4 hours PRN Severe Pain (7 - 10)  ondansetron Injectable 4 milliGRAM(s) IV Push every 6 hours PRN Nausea and/or Vomiting  oxyCODONE    5 mG/acetaminophen 325 mG 1 Tablet(s) Oral every 4 hours PRN Moderate Pain (4 - 6)      RADIOLOGY & ADDITIONAL TESTS:

## 2019-01-18 NOTE — PROGRESS NOTE ADULT - SUBJECTIVE AND OBJECTIVE BOX
Interval History:  reports minimal improvment  MEDICATIONS  (STANDING):  acyclovir IVPB 500 milliGRAM(s) IV Intermittent every 8 hours  acyclovir IVPB      cefTRIAXone   IVPB 1 Gram(s) IV Intermittent once  cefTRIAXone   IVPB      DULoxetine 60 milliGRAM(s) Oral daily  enoxaparin Injectable 40 milliGRAM(s) SubCutaneous daily  methylPREDNISolone sodium succinate IVPB 1000 milliGRAM(s) IV Intermittent daily  pantoprazole    Tablet 40 milliGRAM(s) Oral before breakfast  saccharomyces boulardii 250 milliGRAM(s) Oral two times a day  senna 2 Tablet(s) Oral at bedtime  tamsulosin 0.4 milliGRAM(s) Oral at bedtime    MEDICATIONS  (PRN):  acetaminophen   Tablet .. 650 milliGRAM(s) Oral every 6 hours PRN Temp greater or equal to 38C (100.4F), Mild Pain (1 - 3)  bisacodyl 5 milliGRAM(s) Oral every 12 hours PRN Constipation  docusate sodium 100 milliGRAM(s) Oral three times a day PRN Constipation  morphine  - Injectable 2 milliGRAM(s) IV Push every 4 hours PRN Severe Pain (7 - 10)  ondansetron Injectable 4 milliGRAM(s) IV Push every 6 hours PRN Nausea and/or Vomiting  oxyCODONE    5 mG/acetaminophen 325 mG 1 Tablet(s) Oral every 4 hours PRN Moderate Pain (4 - 6)      Allergies    No Known Allergies    Intolerances        PHYSICAL EXAM:  Vital Signs Last 24 Hrs  T(F): 98 (19 @ 05:15)  HR: 77 (19 @ 05:15)  BP: 129/85 (19 @ 05:15)  RR: 18 (19 @ 05:15)    GENERAL: NAD, well-groomed, well-developed  HEAD:  Atraumatic, Normocephalic  EYES: EOMI, PERRLA   NECK: Supple,   NERVOUS SYSTEM:  Alert & Oriented X3, speech and language normal, cranial nerves II-XII normal,   Good concentration; Motor Strength 5/5 B/L upper , right LE df/pf 3+/5, proximal 2+/5, left LE df/pf 3/5, proximal 1-2/5,  DTRs 2-3 + intact and symmetric, plantar responses neutral.   HEART: Regular rate and rhythm; No murmurs, rubs, or gallops    LABS:                        11.8   9.0   )-----------( 322      ( 2019 07:54 )             37.4         138  |  102  |  15.0  ----------------------------<  142<H>  3.6   |  22.0  |  0.34<L>    Ca    8.5<L>      2019 07:54  Mg     2.5         TPro  6.8  /  Alb  3.4  /  TBili  0.3<L>  /  DBili  x   /  AST  25  /  ALT  37<H>  /  AlkPhos  81      PT/INR - ( 2019 17:02 )   PT: 11.5 sec;   INR: 1.00 ratio         PTT - ( 2019 17:02 )  PTT:32.7 sec  Urinalysis Basic - ( 2019 08:14 )    Color: Yellow / Appearance: Clear / S.010 / pH: x  Gluc: x / Ketone: Trace  / Bili: Negative / Urobili: Negative mg/dL   Blood: x / Protein: 100 mg/dL / Nitrite: Negative   Leuk Esterase: Trace / RBC: 20-30 /HPF / WBC 6-10   Sq Epi: x / Non Sq Epi: Moderate / Bacteria: x        RADIOLOGY & ADDITIONAL STUDIES:

## 2019-01-18 NOTE — PROGRESS NOTE ADULT - ASSESSMENT
patient with extensive myelitis in whole spinal cord, hard to say infection or autoimmune, recommend to c/w solumedrol day # 3 today, antiviral per ID. wait for remaining tests especially nmo antibodies. will also consider plex for autoimmune cause if solumedrol fails.  recommend also consult rheumatology due to her h/o lupus.

## 2019-01-18 NOTE — PROGRESS NOTE ADULT - ASSESSMENT
32 y/o woman with PMH of SLE, Lupus Nephritis and Fibromyalgia was admitted on 1/15 with generalized weakness. She had a low grade fevers, weakness and vomiting.   She was seen by neurology for the LE weakness and paresthesia and MRI was advised that showed transverse myelitis. Part of work up LP was done showed high WBC so ID was called for recommendation. She was seen by Dr. Luna and ABx were stopped due to stronger possibility of noninfectious meningitis (SLE or MS? ). Now she is only on acyclovir for possible septic meningitis but PCR also came back neg for viral infection.   Also the other possibility is serotonin syndrome due to sertraline initiation and interaction with her other meds.   She was in [place] recently.     UTI with CoNS  Transverse myelitis   Abnormal CSF most likely noninfectious     - Will follow up cultures, CSF culture as well  - Will follow up CSF Lyme serology  - I added [place] serology as well  - Neurology and rheumatology follow up, most likely SLE or MS? causing abnormal CSF  - Will stop acyclovir  - Will stop ceftriaxone   - Repeat UA and UC, high WBC in urine could be related to her nephritis since she is asymptomatic.     Will follow up. 32 y/o woman with PMH of SLE, Lupus Nephritis and Fibromyalgia was admitted on 1/15 with generalized weakness. She had a low grade fevers, weakness and vomiting.   She was seen by neurology for the LE weakness and paresthesia and MRI was advised that showed transverse myelitis. Part of work up LP was done showed high WBC so ID was called for recommendation. She was seen by Dr. Luna and ABx were stopped due to stronger possibility of noninfectious meningitis (SLE or MS? ). Now she is only on acyclovir for possible septic meningitis but PCR also came back neg for viral infection.   Also the other possibility is serotonin syndrome due to sertraline initiation and interaction with her other meds.   She was in [place] recently.     UTI with CoNS  Transverse myelitis   Abnormal CSF most likely noninfectious     - Will follow up cultures, CSF culture as well  - Will follow up CSF Lyme serology  - I added [place] serology as well  - EBV serology + for old infection, EBV PCR pending.   - Neurology and rheumatology follow up, most likely SLE or MS? causing abnormal CSF, work up pending.   - Will stop acyclovir  - Will stop ceftriaxone   - Repeat UA and UC, high WBC in urine could be related to her nephritis since she is asymptomatic.     Will follow up.

## 2019-01-18 NOTE — PROGRESS NOTE ADULT - ASSESSMENT
33 years old female with PMH of SLE, Lupus Nephritis and Fibromyalgia brought by her  with generalized weakness. As per patient, she is not feeling well for last few days. She is having low grade fevers, generalized body pain, throat irritation, cough, ear pain and vomiting. Her appetite is very poor. She went to her PMD few days ago who prescribed Sertraline for her depressive symptoms. As per her, symptoms started after taking that medication., she was feeling so weak that she could not walk so her  brought her to ER. She is also complaining of difficulty urinating. She went to Urologist 10 days prior and she was prescribed Flomax which she has been taking but is unable to void. She also has constipation.   She recently came back from [place] on 12/29/18 after staying there for 2.5 to 3 months. She had MRI there in December due to back pain and generalized weakness and it was normal.   She has headache, neck pain and lightheadedness. Denies photophobia. + sick contacts with common cold.     1) Transverse Myelitis:   - Neuro Consult appreciated, CSF Meningo-encephalitis PRCs negative, CSF WBC are high, Lymphocytes are 8, Neutrophil 70,  CSF Protein is high, Rapid viral panel is negative, HIV negative, EBV workup is positive, Rheumatoid factor quantitative is 17, Continue Solumedrol 1 gm daily x 5 days, (today is day #3 of 5) will continue with Neuro checks  --> consulted rheum,   --> neuro and ID are following and patient is on Acyclovir     2) Urinary Retention: Continue Summers's Catheter, Flomax 0.4 mg daily,  --> continue summers for now, likely due to TM as above  --> repeat ua showed positive uti, start iv ceft    3) SLE / Lupus Nephritis: Was on Cellcept but stopped few weeks ago,  Hold Hydroxychloroquine for now, CRP and ESR is high, will continue with steroids.   --> consulted rheum today     4) Electrolyte Abnormality: repleted    5) Fibromyalgia: Continue Cymbalta.     6) Elevated LFTs: CT shows hepatomegaly, Outpatient follow up with GI, will send hepatitis panel.     7) Subserosal Myoma:  Outpatient follow up with GYN    DVT Prophylaxis -- Lovenox 40 mg

## 2019-01-19 DIAGNOSIS — R33.9 RETENTION OF URINE, UNSPECIFIED: ICD-10-CM

## 2019-01-19 DIAGNOSIS — M32.14 GLOMERULAR DISEASE IN SYSTEMIC LUPUS ERYTHEMATOSUS: ICD-10-CM

## 2019-01-19 DIAGNOSIS — M32.19 OTHER ORGAN OR SYSTEM INVOLVEMENT IN SYSTEMIC LUPUS ERYTHEMATOSUS: ICD-10-CM

## 2019-01-19 DIAGNOSIS — K21.9 GASTRO-ESOPHAGEAL REFLUX DISEASE WITHOUT ESOPHAGITIS: ICD-10-CM

## 2019-01-19 DIAGNOSIS — R29.898 OTHER SYMPTOMS AND SIGNS INVOLVING THE MUSCULOSKELETAL SYSTEM: ICD-10-CM

## 2019-01-19 LAB
ALBUMIN SERPL ELPH-MCNC: 3.6 G/DL — SIGNIFICANT CHANGE UP (ref 3.3–5.2)
ALP SERPL-CCNC: 79 U/L — SIGNIFICANT CHANGE UP (ref 40–120)
ALT FLD-CCNC: 105 U/L — HIGH
ANION GAP SERPL CALC-SCNC: 15 MMOL/L — SIGNIFICANT CHANGE UP (ref 5–17)
AST SERPL-CCNC: 60 U/L — HIGH
B BURGDOR C6 AB SER-ACNC: NEGATIVE — SIGNIFICANT CHANGE UP
B BURGDOR IGG+IGM SER-ACNC: 0.2 INDEX — SIGNIFICANT CHANGE UP (ref 0.01–0.89)
BILIRUB SERPL-MCNC: 0.4 MG/DL — SIGNIFICANT CHANGE UP (ref 0.4–2)
BUN SERPL-MCNC: 23 MG/DL — HIGH (ref 8–20)
C3 SERPL-MCNC: 40 MG/DL — LOW (ref 81–157)
C4 SERPL-MCNC: 5 MG/DL — LOW (ref 13–39)
CALCIUM SERPL-MCNC: 8.5 MG/DL — LOW (ref 8.6–10.2)
CHLORIDE SERPL-SCNC: 103 MMOL/L — SIGNIFICANT CHANGE UP (ref 98–107)
CO2 SERPL-SCNC: 23 MMOL/L — SIGNIFICANT CHANGE UP (ref 22–29)
CREAT SERPL-MCNC: 0.44 MG/DL — LOW (ref 0.5–1.3)
GLUCOSE SERPL-MCNC: 147 MG/DL — HIGH (ref 70–115)
HAV IGM SER-ACNC: SIGNIFICANT CHANGE UP
HBV CORE AB SER-ACNC: SIGNIFICANT CHANGE UP
HBV CORE IGM SER-ACNC: SIGNIFICANT CHANGE UP
HBV CORE IGM SER-ACNC: SIGNIFICANT CHANGE UP
HBV SURFACE AB SER-ACNC: SIGNIFICANT CHANGE UP
HBV SURFACE AG SER-ACNC: SIGNIFICANT CHANGE UP
HBV SURFACE AG SER-ACNC: SIGNIFICANT CHANGE UP
HCT VFR BLD CALC: 37.5 % — SIGNIFICANT CHANGE UP (ref 37–47)
HCV AB S/CO SERPL IA: 0.18 S/CO — SIGNIFICANT CHANGE UP
HCV AB S/CO SERPL IA: 0.2 S/CO — SIGNIFICANT CHANGE UP
HCV AB SERPL-IMP: SIGNIFICANT CHANGE UP
HCV AB SERPL-IMP: SIGNIFICANT CHANGE UP
HGB BLD-MCNC: 12 G/DL — SIGNIFICANT CHANGE UP (ref 12–16)
IGA FLD-MCNC: 233 MG/DL — SIGNIFICANT CHANGE UP (ref 84–499)
IGG FLD-MCNC: 1134 MG/DL — SIGNIFICANT CHANGE UP (ref 610–1660)
IGM SERPL-MCNC: 118 MG/DL — SIGNIFICANT CHANGE UP (ref 35–242)
KAPPA LC SER QL IFE: 1.5 MG/DL — SIGNIFICANT CHANGE UP (ref 0.33–1.94)
KAPPA/LAMBDA FREE LIGHT CHAIN RATIO, SERUM: 0.58 RATIO — SIGNIFICANT CHANGE UP (ref 0.26–1.65)
LAMBDA LC SER QL IFE: 2.59 MG/DL — SIGNIFICANT CHANGE UP (ref 0.57–2.63)
MAGNESIUM SERPL-MCNC: 2.7 MG/DL — HIGH (ref 1.6–2.6)
MCHC RBC-ENTMCNC: 27.4 PG — SIGNIFICANT CHANGE UP (ref 27–31)
MCHC RBC-ENTMCNC: 32 G/DL — SIGNIFICANT CHANGE UP (ref 32–36)
MCV RBC AUTO: 85.6 FL — SIGNIFICANT CHANGE UP (ref 81–99)
PLATELET # BLD AUTO: 337 K/UL — SIGNIFICANT CHANGE UP (ref 150–400)
POTASSIUM SERPL-MCNC: 3.9 MMOL/L — SIGNIFICANT CHANGE UP (ref 3.5–5.3)
POTASSIUM SERPL-SCNC: 3.9 MMOL/L — SIGNIFICANT CHANGE UP (ref 3.5–5.3)
PROT SERPL-MCNC: 6.5 G/DL — SIGNIFICANT CHANGE UP (ref 6–8.3)
PROT SERPL-MCNC: 6.5 G/DL — SIGNIFICANT CHANGE UP (ref 6–8.3)
PROT SERPL-MCNC: 6.8 G/DL — SIGNIFICANT CHANGE UP (ref 6.6–8.7)
RAPID RVP RESULT: SIGNIFICANT CHANGE UP
RBC # BLD: 4.38 M/UL — LOW (ref 4.4–5.2)
RBC # FLD: 16.6 % — HIGH (ref 11–15.6)
SODIUM SERPL-SCNC: 141 MMOL/L — SIGNIFICANT CHANGE UP (ref 135–145)
VZV IGM SER-ACNC: 2.97 INDEX — HIGH (ref 0–0.9)
WBC # BLD: 10.2 K/UL — SIGNIFICANT CHANGE UP (ref 4.8–10.8)
WBC # FLD AUTO: 10.2 K/UL — SIGNIFICANT CHANGE UP (ref 4.8–10.8)

## 2019-01-19 PROCEDURE — 99255 IP/OBS CONSLTJ NEW/EST HI 80: CPT

## 2019-01-19 PROCEDURE — 99233 SBSQ HOSP IP/OBS HIGH 50: CPT

## 2019-01-19 RX ADMIN — Medication 250 MILLIGRAM(S): at 17:42

## 2019-01-19 RX ADMIN — MORPHINE SULFATE 2 MILLIGRAM(S): 50 CAPSULE, EXTENDED RELEASE ORAL at 06:24

## 2019-01-19 RX ADMIN — MORPHINE SULFATE 2 MILLIGRAM(S): 50 CAPSULE, EXTENDED RELEASE ORAL at 07:06

## 2019-01-19 RX ADMIN — OXYCODONE AND ACETAMINOPHEN 1 TABLET(S): 5; 325 TABLET ORAL at 22:03

## 2019-01-19 RX ADMIN — SENNA PLUS 2 TABLET(S): 8.6 TABLET ORAL at 21:35

## 2019-01-19 RX ADMIN — OXYCODONE AND ACETAMINOPHEN 1 TABLET(S): 5; 325 TABLET ORAL at 18:15

## 2019-01-19 RX ADMIN — TAMSULOSIN HYDROCHLORIDE 0.4 MILLIGRAM(S): 0.4 CAPSULE ORAL at 21:34

## 2019-01-19 RX ADMIN — ENOXAPARIN SODIUM 40 MILLIGRAM(S): 100 INJECTION SUBCUTANEOUS at 12:25

## 2019-01-19 RX ADMIN — Medication 58 MILLIGRAM(S): at 05:16

## 2019-01-19 RX ADMIN — Medication 250 MILLIGRAM(S): at 05:16

## 2019-01-19 RX ADMIN — OXYCODONE AND ACETAMINOPHEN 1 TABLET(S): 5; 325 TABLET ORAL at 17:45

## 2019-01-19 RX ADMIN — DULOXETINE HYDROCHLORIDE 60 MILLIGRAM(S): 30 CAPSULE, DELAYED RELEASE ORAL at 12:25

## 2019-01-19 RX ADMIN — PANTOPRAZOLE SODIUM 40 MILLIGRAM(S): 20 TABLET, DELAYED RELEASE ORAL at 05:16

## 2019-01-19 RX ADMIN — OXYCODONE AND ACETAMINOPHEN 1 TABLET(S): 5; 325 TABLET ORAL at 21:33

## 2019-01-19 NOTE — PROGRESS NOTE ADULT - SUBJECTIVE AND OBJECTIVE BOX
Interval History:  patient reports slight improvment in right le.   MEDICATIONS  (STANDING):  DULoxetine 60 milliGRAM(s) Oral daily  enoxaparin Injectable 40 milliGRAM(s) SubCutaneous daily  methylPREDNISolone sodium succinate IVPB 1000 milliGRAM(s) IV Intermittent daily  pantoprazole    Tablet 40 milliGRAM(s) Oral before breakfast  saccharomyces boulardii 250 milliGRAM(s) Oral two times a day  senna 2 Tablet(s) Oral at bedtime  tamsulosin 0.4 milliGRAM(s) Oral at bedtime    MEDICATIONS  (PRN):  acetaminophen   Tablet .. 650 milliGRAM(s) Oral every 6 hours PRN Temp greater or equal to 38C (100.4F), Mild Pain (1 - 3)  bisacodyl 5 milliGRAM(s) Oral every 12 hours PRN Constipation  docusate sodium 100 milliGRAM(s) Oral three times a day PRN Constipation  morphine  - Injectable 2 milliGRAM(s) IV Push every 4 hours PRN Severe Pain (7 - 10)  ondansetron Injectable 4 milliGRAM(s) IV Push every 6 hours PRN Nausea and/or Vomiting  oxyCODONE    5 mG/acetaminophen 325 mG 1 Tablet(s) Oral every 4 hours PRN Moderate Pain (4 - 6)      Allergies    No Known Allergies    Intolerances        PHYSICAL EXAM:  Vital Signs Last 24 Hrs  T(F): 98.3 (19 @ 11:46)  HR: 68 (19 @ 11:46)  BP: 125/72 (19 @ 11:46)  RR: 18 (19 @ 11:46)    NERVOUS SYSTEM:  Alert & Oriented X3, speech and language normal, cranial nerves II-XII normal,   Good concentration; Motor Strength 4+5 B/L upper extremities, and right LE hf 3/, kf/ke 3+. df/pf 2+. left LE 2/5 proximal ,df/pf3/5. pp normal in le.   HEART: Regular rate and rhythm; No murmurs, rubs, or gallops    LABS:                        12.0   10.2  )-----------( 337      ( 2019 09:12 )             37.5         141  |  103  |  23.0<H>  ----------------------------<  147<H>  3.9   |  23.0  |  0.44<L>    Ca    8.5<L>      2019 09:12  Mg     2.7         TPro  6.8  /  Alb  3.6  /  TBili  0.4  /  DBili  x   /  AST  60<H>  /  ALT  105<H>  /  AlkPhos  79        Urinalysis Basic - ( 2019 08:14 )    Color: Yellow / Appearance: Clear / S.010 / pH: x  Gluc: x / Ketone: Trace  / Bili: Negative / Urobili: Negative mg/dL   Blood: x / Protein: 100 mg/dL / Nitrite: Negative   Leuk Esterase: Trace / RBC: 20-30 /HPF / WBC 6-10   Sq Epi: x / Non Sq Epi: Moderate / Bacteria: x        RADIOLOGY & ADDITIONAL STUDIES:

## 2019-01-19 NOTE — PROGRESS NOTE ADULT - SUBJECTIVE AND OBJECTIVE BOX
KANE LARA Female 33y [id number]    Patient is a 33y old  Female who presents with a chief complaint of Generalized pain (2019 13:44)    On Service note:  Pt seen/ examined and charts reviewed,  at bedside, no over night event reported by night staff. Pt reports some improvement in LLE movement but still with significant weakness, no other complaints.     Review of system:  No fever, chills, nausea, vomiting, headache, dizziness, chest pain, SOB or palpitation.      PHYSICAL EXAM:    Vital Signs Last 24 Hrs  T(C): 36.8 (2019 11:46), Max: 37.1 (2019 20:40)  T(F): 98.3 (2019 11:46), Max: 98.7 (2019 20:40)  HR: 68 (2019 11:46) (65 - 77)  BP: 125/72 (2019 11:46) (125/72 - 128/62)  BP(mean): --  RR: 18 (2019 11:46) (18 - 18)  SpO2: 97% (2019 04:42) (95% - 97%)    GENERAL: Pt lying comfortably, NAD.  CHEST/LUNG: Clear to auscultation bilaterally; No wheezing.  HEART: S1S2+, Regular rate and rhythm; No murmurs.  ABDOMEN: Soft, Nontender, Nondistended; Bowel sounds present.  Extremities: No LE edema, pulses+  NEURO: AAOX3, LLE 2/5, LLE 3-4/5, 5/5 b/l UE, speech normal.   PSYCH: normal mood.          MEDICATIONS  (STANDING):  DULoxetine 60 milliGRAM(s) Oral daily  enoxaparin Injectable 40 milliGRAM(s) SubCutaneous daily  methylPREDNISolone sodium succinate IVPB 1000 milliGRAM(s) IV Intermittent daily  pantoprazole    Tablet 40 milliGRAM(s) Oral before breakfast  saccharomyces boulardii 250 milliGRAM(s) Oral two times a day  senna 2 Tablet(s) Oral at bedtime  tamsulosin 0.4 milliGRAM(s) Oral at bedtime    MEDICATIONS  (PRN):  acetaminophen   Tablet .. 650 milliGRAM(s) Oral every 6 hours PRN Temp greater or equal to 38C (100.4F), Mild Pain (1 - 3)  bisacodyl 5 milliGRAM(s) Oral every 12 hours PRN Constipation  docusate sodium 100 milliGRAM(s) Oral three times a day PRN Constipation  morphine  - Injectable 2 milliGRAM(s) IV Push every 4 hours PRN Severe Pain (7 - 10)  ondansetron Injectable 4 milliGRAM(s) IV Push every 6 hours PRN Nausea and/or Vomiting  oxyCODONE    5 mG/acetaminophen 325 mG 1 Tablet(s) Oral every 4 hours PRN Moderate Pain (4 - 6)        Labs:  LABS:                        12.0   10.2  )-----------( 337      ( 2019 09:12 )             37.5         141  |  103  |  23.0<H>  ----------------------------<  147<H>  3.9   |  23.0  |  0.44<L>    Ca    8.5<L>      2019 09:12  Mg     2.7         TPro  6.8  /  Alb  3.6  /  TBili  0.4  /  DBili  x   /  AST  60<H>  /  ALT  105<H>  /  AlkPhos  79          LIVER FUNCTIONS - ( 2019 09:12 )  Alb: 3.6 g/dL / Pro: 6.8 g/dL / ALK PHOS: 79 U/L / ALT: 105 U/L / AST: 60 U/L / GGT: x           Urinalysis Basic - ( 2019 08:14 )    Color: Yellow / Appearance: Clear / S.010 / pH: x  Gluc: x / Ketone: Trace  / Bili: Negative / Urobili: Negative mg/dL   Blood: x / Protein: 100 mg/dL / Nitrite: Negative   Leuk Esterase: Trace / RBC: 20-30 /HPF / WBC 6-10   Sq Epi: x / Non Sq Epi: Moderate / Bacteria: x

## 2019-01-19 NOTE — PROGRESS NOTE ADULT - ASSESSMENT
33 years old female with PMH of SLE, Lupus Nephritis and Fibromyalgia brought by her  with generalized weakness with difficulty walking, generalized body pain, throat irritation. She recently came back from [place] on 12/29/18 after staying there for 2.5 to 3 months. She had MRI there in December due to back pain and generalized weakness and it was normal. MRI here showed transverse myelitis and LP with high WBC. Pt has been seen in consultation with Neurology/ Rheumatology/ ID, currently on Abx per ID less likely infectious etiology, and Pt undergoing w/u for NMO/ MS.       Transverse Myelitis:   - Per ID less likely infectious etiology. Related to ? SLE vs NMO or MS.  - ID/ Neuro/ Rheumatology on board.   - Continue Solumedrol 1 gm daily x 5 days, (today is day #4 of 5)  - Will continue with Neuro checks  - W/u in process NMO Ab, Oligoclonal bands- will follow.    - Per neuro- Will need PLEX- however  wants to see steroids response over the weekend.     >Urinary Retention:   - Continue Jefferson's Catheter, Flomax 0.4 mg daily,    > SLE / Lupus Nephritis:   - Was on Cellcept but stopped few weeks ago, Hold Hydroxychloroquine for now, CRP and ESR is high, will continue with steroids.   - Rheumatology on board.     >Electrolyte Abnormality- Replace and monitor.     >Fibromyalgia-  Continue Cymbalta.     >Elevated LFTs: CT shows hepatomegaly, hepatitis panel negative. Outpatient follow up with GI.     >Subserosal Myoma:  Outpatient follow up with GYN    >DVT Prophylaxis -- Lovenox 40 mg

## 2019-01-19 NOTE — PROGRESS NOTE ADULT - ASSESSMENT
extensive changes of myelitis in whole spinal cord, today solumedrol #4, recommend to complete 5 th dose tomorrow, antimicrobial therapy per ID, patient reports some subjective improvement in right LE, most likely will need plex,  wants to wait until tomorrow before finally deciding for plex, patient will need triple lumen catheter  to be placed by vascular surgery   and needs to call blood bank in the city [phone], and ask for plex one treatment every other day for 5 treatments over 10 days.

## 2019-01-20 LAB
ALBUMIN SERPL ELPH-MCNC: 3.2 G/DL — LOW (ref 3.3–5.2)
ALP SERPL-CCNC: 75 U/L — SIGNIFICANT CHANGE UP (ref 40–120)
ALT FLD-CCNC: 86 U/L — HIGH
ANION GAP SERPL CALC-SCNC: 13 MMOL/L — SIGNIFICANT CHANGE UP (ref 5–17)
AST SERPL-CCNC: 48 U/L — HIGH
BILIRUB SERPL-MCNC: 0.5 MG/DL — SIGNIFICANT CHANGE UP (ref 0.4–2)
BUN SERPL-MCNC: 24 MG/DL — HIGH (ref 8–20)
CALCIUM SERPL-MCNC: 7.9 MG/DL — LOW (ref 8.6–10.2)
CHLORIDE SERPL-SCNC: 103 MMOL/L — SIGNIFICANT CHANGE UP (ref 98–107)
CO2 SERPL-SCNC: 23 MMOL/L — SIGNIFICANT CHANGE UP (ref 22–29)
CREAT ?TM UR-MCNC: 129 MG/DL — SIGNIFICANT CHANGE UP
CREAT SERPL-MCNC: 0.43 MG/DL — LOW (ref 0.5–1.3)
CULTURE RESULTS: SIGNIFICANT CHANGE UP
GLUCOSE SERPL-MCNC: 118 MG/DL — HIGH (ref 70–115)
HCT VFR BLD CALC: 34.7 % — LOW (ref 37–47)
HGB BLD-MCNC: 11.2 G/DL — LOW (ref 12–16)
MCHC RBC-ENTMCNC: 27.6 PG — SIGNIFICANT CHANGE UP (ref 27–31)
MCHC RBC-ENTMCNC: 32.3 G/DL — SIGNIFICANT CHANGE UP (ref 32–36)
MCV RBC AUTO: 85.5 FL — SIGNIFICANT CHANGE UP (ref 81–99)
PLATELET # BLD AUTO: 332 K/UL — SIGNIFICANT CHANGE UP (ref 150–400)
POTASSIUM SERPL-MCNC: 3.6 MMOL/L — SIGNIFICANT CHANGE UP (ref 3.5–5.3)
POTASSIUM SERPL-SCNC: 3.6 MMOL/L — SIGNIFICANT CHANGE UP (ref 3.5–5.3)
PROT ?TM UR-MCNC: 111 MG/DL — HIGH (ref 0–12)
PROT SERPL-MCNC: 6.1 G/DL — LOW (ref 6.6–8.7)
PROT/CREAT UR-RTO: 0.9 RATIO — HIGH
RBC # BLD: 4.06 M/UL — LOW (ref 4.4–5.2)
RBC # FLD: 16.2 % — HIGH (ref 11–15.6)
SODIUM SERPL-SCNC: 139 MMOL/L — SIGNIFICANT CHANGE UP (ref 135–145)
SPECIMEN SOURCE: SIGNIFICANT CHANGE UP
WBC # BLD: 11.1 K/UL — HIGH (ref 4.8–10.8)
WBC # FLD AUTO: 11.1 K/UL — HIGH (ref 4.8–10.8)

## 2019-01-20 PROCEDURE — 99233 SBSQ HOSP IP/OBS HIGH 50: CPT

## 2019-01-20 RX ORDER — MYCOPHENOLATE MOFETIL 250 MG/1
1500 CAPSULE ORAL
Qty: 0 | Refills: 0 | Status: DISCONTINUED | OUTPATIENT
Start: 2019-01-20 | End: 2019-02-06

## 2019-01-20 RX ORDER — LACTULOSE 10 G/15ML
10 SOLUTION ORAL ONCE
Qty: 0 | Refills: 0 | Status: COMPLETED | OUTPATIENT
Start: 2019-01-20 | End: 2019-01-20

## 2019-01-20 RX ADMIN — Medication 650 MILLIGRAM(S): at 21:03

## 2019-01-20 RX ADMIN — Medication 100 MILLIGRAM(S): at 12:14

## 2019-01-20 RX ADMIN — TAMSULOSIN HYDROCHLORIDE 0.4 MILLIGRAM(S): 0.4 CAPSULE ORAL at 21:00

## 2019-01-20 RX ADMIN — Medication 100 MILLIGRAM(S): at 21:01

## 2019-01-20 RX ADMIN — Medication 5 MILLIGRAM(S): at 14:12

## 2019-01-20 RX ADMIN — Medication 650 MILLIGRAM(S): at 06:11

## 2019-01-20 RX ADMIN — MYCOPHENOLATE MOFETIL 1500 MILLIGRAM(S): 250 CAPSULE ORAL at 18:29

## 2019-01-20 RX ADMIN — DULOXETINE HYDROCHLORIDE 60 MILLIGRAM(S): 30 CAPSULE, DELAYED RELEASE ORAL at 12:12

## 2019-01-20 RX ADMIN — LACTULOSE 10 GRAM(S): 10 SOLUTION ORAL at 14:13

## 2019-01-20 RX ADMIN — ENOXAPARIN SODIUM 40 MILLIGRAM(S): 100 INJECTION SUBCUTANEOUS at 12:19

## 2019-01-20 RX ADMIN — Medication 650 MILLIGRAM(S): at 05:43

## 2019-01-20 RX ADMIN — Medication 250 MILLIGRAM(S): at 05:41

## 2019-01-20 RX ADMIN — PANTOPRAZOLE SODIUM 40 MILLIGRAM(S): 20 TABLET, DELAYED RELEASE ORAL at 05:42

## 2019-01-20 RX ADMIN — Medication 250 MILLIGRAM(S): at 18:28

## 2019-01-20 RX ADMIN — SENNA PLUS 2 TABLET(S): 8.6 TABLET ORAL at 21:00

## 2019-01-20 RX ADMIN — Medication 58 MILLIGRAM(S): at 05:42

## 2019-01-20 RX ADMIN — Medication 650 MILLIGRAM(S): at 22:00

## 2019-01-20 NOTE — PHYSICAL THERAPY INITIAL EVALUATION ADULT - ADDITIONAL COMMENTS
pt reports that she lives in a apartment with her , with 1 step to enter and 3 steps inside.   pt reports being independent with no assistive device prior to admission.

## 2019-01-20 NOTE — PROGRESS NOTE ADULT - SUBJECTIVE AND OBJECTIVE BOX
Interval History:  Pt notes having some improvement in LEs strength; had some numbness L lower leg    MEDICATIONS  (STANDING):  DULoxetine 60 milliGRAM(s) Oral daily  enoxaparin Injectable 40 milliGRAM(s) SubCutaneous daily  methylPREDNISolone sodium succinate IVPB 1000 milliGRAM(s) IV Intermittent daily  pantoprazole    Tablet 40 milliGRAM(s) Oral before breakfast  saccharomyces boulardii 250 milliGRAM(s) Oral two times a day  senna 2 Tablet(s) Oral at bedtime  tamsulosin 0.4 milliGRAM(s) Oral at bedtime    MEDICATIONS  (PRN):  acetaminophen   Tablet .. 650 milliGRAM(s) Oral every 6 hours PRN Temp greater or equal to 38C (100.4F), Mild Pain (1 - 3)  bisacodyl 5 milliGRAM(s) Oral every 12 hours PRN Constipation  docusate sodium 100 milliGRAM(s) Oral three times a day PRN Constipation  morphine  - Injectable 2 milliGRAM(s) IV Push every 4 hours PRN Severe Pain (7 - 10)  ondansetron Injectable 4 milliGRAM(s) IV Push every 6 hours PRN Nausea and/or Vomiting  oxyCODONE    5 mG/acetaminophen 325 mG 1 Tablet(s) Oral every 4 hours PRN Moderate Pain (4 - 6)      Allergies    No Known Allergies    Intolerances        PHYSICAL EXAM:  Vital Signs Last 24 Hrs  T(F): 98.9 (01-20-19 @ 04:55)  HR: 87 (01-20-19 @ 04:55)  BP: 105/73 (01-20-19 @ 04:55)  RR: 17 (01-20-19 @ 04:55)    GENERAL: NAD, well-groomed, well-developed  HEAD:  Atraumatic, Normocephalic  EYES: EOMI, PERRLA, conjunctiva and sclera clear  NECK: Supple, No JVD, Normal thyroid, no carotid bruit bilateral  NERVOUS SYSTEM:  Alert & Oriented X3, speech and language normal, cranial nerves II-XII normal,   Good concentration; Motor Strength 5/5 B/L upper and right LE hf 3/, kf/ke 3+. df/pf 2+. left LE 2/5 proximal ,df/pf3/5 in lower extremities; DTRs 2+ intact and symmetric, plantar responses flexor bilaterally,   sensory exam normal to light touch, pin prick and position sense, no dysmetria UEs bilaterally  HEART: Regular rate and rhythm; No murmurs, rubs, or gallops    LABS:                        11.2   11.1  )-----------( 332      ( 20 Jan 2019 08:34 )             34.7     01-20    139  |  103  |  24.0<H>  ----------------------------<  118<H>  3.6   |  23.0  |  0.43<L>    Ca    7.9<L>      20 Jan 2019 08:34  Mg     2.7     01-19    TPro  6.1<L>  /  Alb  3.2<L>  /  TBili  0.5  /  DBili  x   /  AST  48<H>  /  ALT  86<H>  /  AlkPhos  75  01-20          RADIOLOGY & ADDITIONAL STUDIES:

## 2019-01-20 NOTE — PROGRESS NOTE ADULT - SUBJECTIVE AND OBJECTIVE BOX
KANE LARA Female 33y [id number]    Patient is a 33y old  Female who presents with a chief complaint of Generalized pain (20 Jan 2019 12:02)      Subjective/objective:  Pt seen and examined before 1 PM,  at bedside, no over night event reported by night staff. Pt reports some improvement in LLE weakness but still significantly weak, she also reports some generalized pain and constipation- last BM few days ago.     Review of system:  No fever, chills, nausea, vomiting, headache, dizziness, chest pain, SOB or palpitation.      PHYSICAL EXAM:    Vital Signs Last 24 Hrs  T(C): 36.8 (20 Jan 2019 12:16), Max: 37.2 (20 Jan 2019 04:55)  T(F): 98.3 (20 Jan 2019 12:16), Max: 98.9 (20 Jan 2019 04:55)  HR: 95 (20 Jan 2019 12:16) (73 - 95)  BP: 107/75 (20 Jan 2019 12:16) (105/73 - 146/91)  BP(mean): --  RR: 18 (20 Jan 2019 12:16) (17 - 20)  SpO2: 93% (20 Jan 2019 04:55) (93% - 98%)    GENERAL: Pt lying comfortably, NAD.  CHEST/LUNG: Clear to auscultation bilaterally; No wheezing.  HEART: S1S2+, Regular rate and rhythm; No murmurs.  ABDOMEN: Soft, Nontender, Nondistended; Bowel sounds present.  Extremities: No LE edema, pulses+  NEURO: AAOX3, LLE 2/5, LLE 3-4/5, 5/5 b/l UE, speech normal.   PSYCH: normal mood.    MEDICATIONS  (STANDING):  DULoxetine 60 milliGRAM(s) Oral daily  enoxaparin Injectable 40 milliGRAM(s) SubCutaneous daily  methylPREDNISolone sodium succinate IVPB 1000 milliGRAM(s) IV Intermittent daily  pantoprazole    Tablet 40 milliGRAM(s) Oral before breakfast  saccharomyces boulardii 250 milliGRAM(s) Oral two times a day  senna 2 Tablet(s) Oral at bedtime  tamsulosin 0.4 milliGRAM(s) Oral at bedtime    MEDICATIONS  (PRN):  acetaminophen   Tablet .. 650 milliGRAM(s) Oral every 6 hours PRN Temp greater or equal to 38C (100.4F), Mild Pain (1 - 3)  bisacodyl 5 milliGRAM(s) Oral every 12 hours PRN Constipation  docusate sodium 100 milliGRAM(s) Oral three times a day PRN Constipation  morphine  - Injectable 2 milliGRAM(s) IV Push every 4 hours PRN Severe Pain (7 - 10)  ondansetron Injectable 4 milliGRAM(s) IV Push every 6 hours PRN Nausea and/or Vomiting  oxyCODONE    5 mG/acetaminophen 325 mG 1 Tablet(s) Oral every 4 hours PRN Moderate Pain (4 - 6)        Labs:  LABS:                        11.2   11.1  )-----------( 332      ( 20 Jan 2019 08:34 )             34.7     01-20    139  |  103  |  24.0<H>  ----------------------------<  118<H>  3.6   |  23.0  |  0.43<L>    Ca    7.9<L>      20 Jan 2019 08:34  Mg     2.7     01-19    TPro  6.1<L>  /  Alb  3.2<L>  /  TBili  0.5  /  DBili  x   /  AST  48<H>  /  ALT  86<H>  /  AlkPhos  75  01-20        LIVER FUNCTIONS - ( 20 Jan 2019 08:34 )  Alb: 3.2 g/dL / Pro: 6.1 g/dL / ALK PHOS: 75 U/L / ALT: 86 U/L / AST: 48 U/L / GGT: x

## 2019-01-20 NOTE — PROGRESS NOTE ADULT - ASSESSMENT
33 years old female with PMH of SLE, Lupus Nephritis and Fibromyalgia brought by her  with generalized weakness with difficulty walking, generalized body pain, throat irritation. She recently came back from [place] on 12/29/18 after staying there for 2.5 to 3 months. She had MRI there in December due to back pain and generalized weakness and it was normal. MRI here showed transverse myelitis and LP with high WBC. Pt has been seen in consultation with Neurology/ Rheumatology/ ID, currently on Abx per ID less likely infectious etiology, and Pt undergoing w/u for NMO/ MS.       Transverse Myelitis:   - Etiology unknown at this time.   - Per ID less likely infectious etiology. Related to ? SLE vs NMO or MS.  - ID/ Neuro/ Rheumatology on board.   - Continue Solumedrol 1 gm daily x 5 days, (today day 5) then 1mg/kg/day per rheumatology  - Will continue with Neuro checks  - W/u in process NMO Ab, Oligoclonal bands- will follow.    - Per neuro/ rheumatology- Will need PLEX- vascular surgery consult for access.      >Urinary Retention:   - Continue Jefferson's Catheter, Flomax 0.4 mg daily,    > SLE / Lupus Nephritis:   - Was on Cellcept but stopped few weeks ago,  - Hold Hydroxychloroquine for now, CRP and ESR is high, will continue with steroids.   - Rheumatology on board. Recommended to restart MMF 1.5 g BID     >Electrolyte Abnormality- Replace and monitor.     >Fibromyalgia-  Continue Cymbalta.     >Elevated LFTs: CT shows hepatomegaly, hepatitis panel negative. Outpatient follow up with GI.     >Subserosal Myoma:  Outpatient follow up with GYN    >DVT Prophylaxis -- Lovenox 40 mg

## 2019-01-20 NOTE — PHYSICAL THERAPY INITIAL EVALUATION ADULT - LEVEL OF INDEPENDENCE: SIT/STAND, REHAB EVAL
maximum assist (25% patients effort) pt's bilateral knees buckled during initial attempt, pt did not fall. during second attempt pt able to stand with 2 person max assist and pt was hyperextending bilateral knees./maximum assist (25% patients effort)

## 2019-01-20 NOTE — PHYSICAL THERAPY INITIAL EVALUATION ADULT - GAIT DEVIATIONS NOTED, PT EVAL
decreased isis/increased time in double stance/decreased weight-shifting ability/decreased stride length/decreased velocity of limb motion/decreased step length/decreased swing-to-stance ratio decreased velocity of limb motion/pt shuffling her feet along floor instead of taking steps/decreased weight-shifting ability/increased time in double stance/decreased stride length/decreased swing-to-stance ratio/decreased isis/decreased step length

## 2019-01-20 NOTE — PHYSICAL THERAPY INITIAL EVALUATION ADULT - PERTINENT HX OF CURRENT PROBLEM, REHAB EVAL
She presented on 1/15 w/ increasing generalized weakness, sudden LE weakness of her limbs. SHe was having for the prior 3-4 weeks increased back pain and urinary retention.

## 2019-01-20 NOTE — PROGRESS NOTE ADULT - ASSESSMENT
Assessment/  LEs weakness due to Myelitis involving whole spinal cord, etiology unkown  patient reports some subjective improvement in right LE    Plan:  Complete solumedrol #5,   antimicrobial therapy per ID, await further serologies and CSF  most likely will need PLEX  want to wait until tomorrow for PLEX  patient will need triple lumen catheter  to be placed by vascular surgery and needs to call blood bank in the [facility] [phone], and ask for PLEX one treatment every other day for 5 treatments over 10 days.

## 2019-01-21 LAB
ALBUMIN SERPL ELPH-MCNC: 3.2 G/DL — LOW (ref 3.3–5.2)
ALP SERPL-CCNC: 79 U/L — SIGNIFICANT CHANGE UP (ref 40–120)
ALT FLD-CCNC: 93 U/L — HIGH
ANION GAP SERPL CALC-SCNC: 14 MMOL/L — SIGNIFICANT CHANGE UP (ref 5–17)
ANTI-RIBONUCLEAR PROTEIN: 3.7 AI — HIGH
AST SERPL-CCNC: 35 U/L — HIGH
BILIRUB SERPL-MCNC: 0.5 MG/DL — SIGNIFICANT CHANGE UP (ref 0.4–2)
BLD GP AB SCN SERPL QL: SIGNIFICANT CHANGE UP
BUN SERPL-MCNC: 22 MG/DL — HIGH (ref 8–20)
CALCIUM SERPL-MCNC: 8.1 MG/DL — LOW (ref 8.6–10.2)
CHLORIDE SERPL-SCNC: 108 MMOL/L — HIGH (ref 98–107)
CO2 SERPL-SCNC: 20 MMOL/L — LOW (ref 22–29)
CREAT SERPL-MCNC: 0.46 MG/DL — LOW (ref 0.5–1.3)
CULTURE RESULTS: SIGNIFICANT CHANGE UP
DSDNA AB FLD-ACNC: 1.3 AI — HIGH
DSDNA AB SER-ACNC: 684 IU/ML — HIGH
ENA SM AB FLD QL: 0.9 AI — SIGNIFICANT CHANGE UP
ENA SS-A AB FLD IA-ACNC: >8 AI — HIGH
GLUCOSE SERPL-MCNC: 119 MG/DL — HIGH (ref 70–115)
HCT VFR BLD CALC: 35.5 % — LOW (ref 37–47)
HGB BLD-MCNC: 11.3 G/DL — LOW (ref 12–16)
MCHC RBC-ENTMCNC: 27.1 PG — SIGNIFICANT CHANGE UP (ref 27–31)
MCHC RBC-ENTMCNC: 31.8 G/DL — LOW (ref 32–36)
MCV RBC AUTO: 85.1 FL — SIGNIFICANT CHANGE UP (ref 81–99)
PLATELET # BLD AUTO: 370 K/UL — SIGNIFICANT CHANGE UP (ref 150–400)
POTASSIUM SERPL-MCNC: 3.8 MMOL/L — SIGNIFICANT CHANGE UP (ref 3.5–5.3)
POTASSIUM SERPL-SCNC: 3.8 MMOL/L — SIGNIFICANT CHANGE UP (ref 3.5–5.3)
PROT SERPL-MCNC: 6.2 G/DL — LOW (ref 6.6–8.7)
RBC # BLD: 4.17 M/UL — LOW (ref 4.4–5.2)
RBC # FLD: 15.8 % — HIGH (ref 11–15.6)
SODIUM SERPL-SCNC: 142 MMOL/L — SIGNIFICANT CHANGE UP (ref 135–145)
SPECIMEN SOURCE: SIGNIFICANT CHANGE UP
TYPE + AB SCN PNL BLD: SIGNIFICANT CHANGE UP
VDRL CSF-TITR: NEGATIVE — SIGNIFICANT CHANGE UP
WBC # BLD: 10.3 K/UL — SIGNIFICANT CHANGE UP (ref 4.8–10.8)
WBC # FLD AUTO: 10.3 K/UL — SIGNIFICANT CHANGE UP (ref 4.8–10.8)

## 2019-01-21 PROCEDURE — 71045 X-RAY EXAM CHEST 1 VIEW: CPT | Mod: 26

## 2019-01-21 PROCEDURE — 99232 SBSQ HOSP IP/OBS MODERATE 35: CPT

## 2019-01-21 PROCEDURE — 99233 SBSQ HOSP IP/OBS HIGH 50: CPT

## 2019-01-21 RX ORDER — ALBUMIN HUMAN 25 %
3500 VIAL (ML) INTRAVENOUS ONCE
Qty: 0 | Refills: 0 | Status: DISCONTINUED | OUTPATIENT
Start: 2019-01-21 | End: 2019-01-22

## 2019-01-21 RX ORDER — HEPARIN SODIUM 5000 [USP'U]/ML
5000 INJECTION INTRAVENOUS; SUBCUTANEOUS ONCE
Qty: 0 | Refills: 0 | Status: DISCONTINUED | OUTPATIENT
Start: 2019-01-21 | End: 2019-01-22

## 2019-01-21 RX ORDER — CALCIUM GLUCONATE 100 MG/ML
1 VIAL (ML) INTRAVENOUS ONCE
Qty: 0 | Refills: 0 | Status: COMPLETED | OUTPATIENT
Start: 2019-01-21 | End: 2019-01-22

## 2019-01-21 RX ORDER — LIDOCAINE HCL 20 MG/ML
20 VIAL (ML) INJECTION ONCE
Qty: 0 | Refills: 0 | Status: COMPLETED | OUTPATIENT
Start: 2019-01-21 | End: 2019-01-21

## 2019-01-21 RX ORDER — DOCUSATE SODIUM 100 MG
100 CAPSULE ORAL
Qty: 0 | Refills: 0 | Status: DISCONTINUED | OUTPATIENT
Start: 2019-01-21 | End: 2019-01-28

## 2019-01-21 RX ORDER — HYDROMORPHONE HYDROCHLORIDE 2 MG/ML
1 INJECTION INTRAMUSCULAR; INTRAVENOUS; SUBCUTANEOUS ONCE
Qty: 0 | Refills: 0 | Status: DISCONTINUED | OUTPATIENT
Start: 2019-01-21 | End: 2019-01-21

## 2019-01-21 RX ORDER — POLYETHYLENE GLYCOL 3350 17 G/17G
17 POWDER, FOR SOLUTION ORAL DAILY
Qty: 0 | Refills: 0 | Status: DISCONTINUED | OUTPATIENT
Start: 2019-01-21 | End: 2019-01-28

## 2019-01-21 RX ADMIN — MYCOPHENOLATE MOFETIL 1500 MILLIGRAM(S): 250 CAPSULE ORAL at 17:58

## 2019-01-21 RX ADMIN — PANTOPRAZOLE SODIUM 40 MILLIGRAM(S): 20 TABLET, DELAYED RELEASE ORAL at 05:43

## 2019-01-21 RX ADMIN — HYDROMORPHONE HYDROCHLORIDE 1 MILLIGRAM(S): 2 INJECTION INTRAMUSCULAR; INTRAVENOUS; SUBCUTANEOUS at 17:56

## 2019-01-21 RX ADMIN — Medication 250 MILLIGRAM(S): at 05:43

## 2019-01-21 RX ADMIN — MYCOPHENOLATE MOFETIL 1500 MILLIGRAM(S): 250 CAPSULE ORAL at 09:12

## 2019-01-21 RX ADMIN — MORPHINE SULFATE 2 MILLIGRAM(S): 50 CAPSULE, EXTENDED RELEASE ORAL at 08:30

## 2019-01-21 RX ADMIN — DULOXETINE HYDROCHLORIDE 60 MILLIGRAM(S): 30 CAPSULE, DELAYED RELEASE ORAL at 12:10

## 2019-01-21 RX ADMIN — Medication 650 MILLIGRAM(S): at 05:43

## 2019-01-21 RX ADMIN — SENNA PLUS 2 TABLET(S): 8.6 TABLET ORAL at 21:43

## 2019-01-21 RX ADMIN — Medication 100 MILLIGRAM(S): at 17:58

## 2019-01-21 RX ADMIN — Medication 58 MILLIGRAM(S): at 05:43

## 2019-01-21 RX ADMIN — ENOXAPARIN SODIUM 40 MILLIGRAM(S): 100 INJECTION SUBCUTANEOUS at 12:10

## 2019-01-21 RX ADMIN — HYDROMORPHONE HYDROCHLORIDE 1 MILLIGRAM(S): 2 INJECTION INTRAMUSCULAR; INTRAVENOUS; SUBCUTANEOUS at 18:54

## 2019-01-21 RX ADMIN — MORPHINE SULFATE 2 MILLIGRAM(S): 50 CAPSULE, EXTENDED RELEASE ORAL at 09:13

## 2019-01-21 RX ADMIN — TAMSULOSIN HYDROCHLORIDE 0.4 MILLIGRAM(S): 0.4 CAPSULE ORAL at 21:43

## 2019-01-21 NOTE — PROGRESS NOTE ADULT - SUBJECTIVE AND OBJECTIVE BOX
[facility]  INFECTIOUS DISEASES AND INTERNAL MEDICINE at [place]  =======================================================  Adams Vasquez MD  Diplomates American Board of Internal Medicine and Infectious Diseases  =======================================================    KANE LARA [id number]    Follow up:  transverse myelitis  workup so far negative.   Varicella Zoster IgM positive, but no signs of herpes zoster.     Allergies:  No Known Allergies     REVIEW OF SYSTEMS:  able to move legs and feet today      =======================================================  Antibiotics:    Other medications:  albumin human  5% IVPB 3500 milliLiter(s) IV Intermittent once  calcium gluconate IVPB 1 Gram(s) IV Intermittent once  docusate sodium 100 milliGRAM(s) Oral two times a day  DULoxetine 60 milliGRAM(s) Oral daily  enoxaparin Injectable 40 milliGRAM(s) SubCutaneous daily  heparin  Injectable 5000 Unit(s) IV Push once  lidocaine 1% Injectable 20 milliLiter(s) Local Injection once  mycophenolate mofetil 1500 milliGRAM(s) Oral two times a day  pantoprazole    Tablet 40 milliGRAM(s) Oral before breakfast  polyethylene glycol 3350 17 Gram(s) Oral daily  saccharomyces boulardii 250 milliGRAM(s) Oral two times a day  senna 2 Tablet(s) Oral at bedtime  tamsulosin 0.4 milliGRAM(s) Oral at bedtime    =======================================================    Physical Exam:  T(F): 97.5 (21 Jan 2019 11:35), Max: 98.9 (20 Jan 2019 04:55)  HR: 66 (21 Jan 2019 11:35)  BP: 122/79 (21 Jan 2019 11:35)  RR: 18 (21 Jan 2019 11:35)  SpO2: 97% (21 Jan 2019 11:35) (93% - 98%)    GEN: NAD  HEENT: normocephalic and atraumatic. EOMI. BARTOLOME.    NECK: Supple.  No lymphadenopathy   LUNGS: Clear to auscultation.  HEART: Regular rate and rhythm without murmur.  ABDOMEN: Soft, nontender, and nondistended.  Positive bowel sounds.    : No CVA tenderness  EXTREMITIES: Without any cyanosis, clubbing, rash, lesions or edema.  MSK: no joint swelling  NEUROLOGIC: grossly intact. moves all extremities., Lower ext weakness.   PSYCHIATRIC: Appropriate affect .  SKIN: No ulceration or induration present.      ====================================================  Labs:                        11.3   10.3  )-----------( 370      ( 21 Jan 2019 08:46 )             35.5     01-21    142  |  108<H>  |  22.0<H>  ----------------------------<  119<H>  3.8   |  20.0<L>  |  0.46<L>    Ca    8.1<L>      21 Jan 2019 08:46    TPro  6.2<L>  /  Alb  3.2<L>  /  TBili  0.5  /  DBili  x   /  AST  35<H>  /  ALT  93<H>  /  AlkPhos  79  01-21      Culture - Urine (collected 01-20-19 @ 14:37)  Source: .Urine  Final Report (01-21-19 @ 09:25):    <10,000 CFU/ml Gram Negative Rods    Culture - Blood (collected 01-16-19 @ 17:03)  Source: .Blood    Culture - Blood (collected 01-16-19 @ 17:03)  Source: .Blood    Culture - CSF with Gram Stain (collected 01-16-19 @ 15:53)  Source: .CSF  Gram Stain (01-16-19 @ 18:09):    Few White blood cells    No organisms seen  Final Report (01-20-19 @ 09:15):    No growth at 3 days.    Culture - Urine (collected 01-16-19 @ 04:00)  Source: .Urine  Final Report (01-18-19 @ 08:50):    >100,000 CFU/ml Coag Negative Staphylococcus

## 2019-01-21 NOTE — DIETITIAN INITIAL EVALUATION ADULT. - SIGNS/SYMPTOMS
as evidenced by pt meeting <75% est energy needs >1mo, 14# wt loss (10%) x3-4mo, mild muscle wasting

## 2019-01-21 NOTE — PROGRESS NOTE ADULT - ATTENDING COMMENTS
Plan of care discussed with Pt and her  at bedside. Plan of care discussed with Pt and her  at bedside and also with neurology.

## 2019-01-21 NOTE — PROGRESS NOTE ADULT - SUBJECTIVE AND OBJECTIVE BOX
KANE LARA Female 33y [id number]    Patient is a 33y old  Female who presents with a chief complaint of Generalized pain (20 Jan 2019 15:06)      Subjective/objective:  Pt seen and examined,  at bedside, no over night event reported by night staff. Pt reports some improvement in LLE weakness but still significantly weak, she also reports some generalized pain and constipation.     Review of system:  No fever, chills, nausea, vomiting, headache, dizziness, chest pain, SOB or palpitation.      PHYSICAL EXAM:    Vital Signs Last 24 Hrs  T(C): 36.9 (21 Jan 2019 04:32), Max: 36.9 (21 Jan 2019 04:32)  T(F): 98.4 (21 Jan 2019 04:32), Max: 98.4 (21 Jan 2019 04:32)  HR: 86 (21 Jan 2019 04:32) (86 - 95)  BP: 118/76 (21 Jan 2019 04:32) (107/75 - 118/76)  BP(mean): --  RR: 17 (21 Jan 2019 04:32) (17 - 18)  SpO2: --      GENERAL: Pt lying comfortably, NAD.  CHEST/LUNG: Clear to auscultation bilaterally; No wheezing.  HEART: S1S2+, Regular rate and rhythm; No murmurs.  ABDOMEN: Soft, Nontender, Nondistended; Bowel sounds present.  Extremities: No LE edema, pulses+  NEURO: AAOX3, LLE 2/5, LLE 3-4/5, 5/5 b/l UE, speech normal.   PSYCH: normal mood.      MEDICATIONS  (STANDING):  DULoxetine 60 milliGRAM(s) Oral daily  enoxaparin Injectable 40 milliGRAM(s) SubCutaneous daily  mycophenolate mofetil 1500 milliGRAM(s) Oral two times a day  pantoprazole    Tablet 40 milliGRAM(s) Oral before breakfast  saccharomyces boulardii 250 milliGRAM(s) Oral two times a day  senna 2 Tablet(s) Oral at bedtime  tamsulosin 0.4 milliGRAM(s) Oral at bedtime    MEDICATIONS  (PRN):  acetaminophen   Tablet .. 650 milliGRAM(s) Oral every 6 hours PRN Temp greater or equal to 38C (100.4F), Mild Pain (1 - 3)  bisacodyl 5 milliGRAM(s) Oral every 12 hours PRN Constipation  docusate sodium 100 milliGRAM(s) Oral three times a day PRN Constipation  morphine  - Injectable 2 milliGRAM(s) IV Push every 4 hours PRN Severe Pain (7 - 10)  ondansetron Injectable 4 milliGRAM(s) IV Push every 6 hours PRN Nausea and/or Vomiting  oxyCODONE    5 mG/acetaminophen 325 mG 1 Tablet(s) Oral every 4 hours PRN Moderate Pain (4 - 6)        Labs:  LABS:                        11.3   10.3  )-----------( 370      ( 21 Jan 2019 08:46 )             35.5     01-21    142  |  108<H>  |  22.0<H>  ----------------------------<  119<H>  3.8   |  20.0<L>  |  0.46<L>    Ca    8.1<L>      21 Jan 2019 08:46    TPro  6.2<L>  /  Alb  3.2<L>  /  TBili  0.5  /  DBili  x   /  AST  35<H>  /  ALT  93<H>  /  AlkPhos  79  01-21        LIVER FUNCTIONS - ( 21 Jan 2019 08:46 )  Alb: 3.2 g/dL / Pro: 6.2 g/dL / ALK PHOS: 79 U/L / ALT: 93 U/L / AST: 35 U/L / GGT: x

## 2019-01-21 NOTE — PROGRESS NOTE ADULT - ASSESSMENT
33 years old female with PMH of SLE, Lupus Nephritis and Fibromyalgia brought by her  with generalized weakness with difficulty walking, generalized body pain, throat irritation. She recently came back from [place] on 12/29/18 after staying there for 2.5 to 3 months. She had MRI there in December due to back pain and generalized weakness and it was normal. MRI here showed transverse myelitis and LP with high WBC. Pt has been seen in consultation with Neurology/ Rheumatology/ ID. Per ID less likely infectious etiology and Abx d/stiven. Pt undergoing w/u for NMO/ MS, completed 5 days of pulse dose steroids and now Neuro rheumatology recommended PLEX.      Transverse Myelitis:   - Etiology unknown at this time.   - Per ID less likely infectious etiology. Related to ? SLE vs NMO or MS.  - ID/ Neuro/ Rheumatology on board.   - Continue Solumedrol 1 gm daily x 5 days, (today day 5) then 1mg/kg/day per rheumatology  - Will continue with Neuro checks  - W/u in process NMO Ab, Oligoclonal bands- will follow.    - Per neuro/ rheumatology- Will need PLEX- vascular surgery consult for access.      >Urinary Retention:   - Continue Jefferson's Catheter, Flomax 0.4 mg daily,    > SLE / Lupus Nephritis:   - Was on Cellcept but stopped few weeks ago,  - Hold Hydroxychloroquine for now, CRP and ESR is high, will continue with steroids.   - Rheumatology on board. Recommended to restart MMF 1.5 g BID     >Electrolyte Abnormality- Replace and monitor.     >Fibromyalgia-  Continue Cymbalta.     >Elevated LFTs: CT shows hepatomegaly, hepatitis panel negative. Outpatient follow up with GI.     >Subserosal Myoma:  Outpatient follow up with GYN    >DVT Prophylaxis -- Lovenox 40 mg 33 years old female with PMH of SLE, Lupus Nephritis and Fibromyalgia brought by her  with generalized weakness with difficulty walking, generalized body pain, throat irritation. She recently came back from [place] on 12/29/18 after staying there for 2.5 to 3 months. She had MRI there in December due to back pain and generalized weakness and it was normal. MRI here showed transverse myelitis and LP with high WBC. Pt has been seen in consultation with Neurology/ Rheumatology/ ID. Per ID less likely infectious etiology and Abx d/stiven. Pt undergoing w/u for NMO/ MS, completed 5 days of pulse dose steroids and now Neuro rheumatology recommended PLEX.      Transverse Myelitis:   - Etiology unknown at this time.   - Per ID less likely infectious etiology. Related to ? SLE vs NMO or MS. Abx d/stiven by ID  - ID/ Neuro/ Rheumatology on board.   - Completed Solumedrol pulse dose, now on 1mg/kg/day per rheumatology  - Will continue with Neuro checks  - W/u in process NMO Ab, Oligoclonal bands- will follow.    - Per neuro/ rheumatology- Will need PLEX- vascular surgery consulted for access.      >Urinary Retention:   - Continue Jefferson's Catheter, Flomax 0.4 mg daily,    > SLE / Lupus Nephritis:   - Was on Cellcept but stopped few weeks ago,  - Hold Hydroxychloroquine for now, CRP and ESR is high, will continue with steroids.   - Rheumatology on board. Recommended to restart MMF 1.5 g BID     >Electrolyte Abnormality- Replace and monitor.     >Fibromyalgia-  Continue Cymbalta.     >Elevated LFTs: CT shows hepatomegaly, hepatitis panel negative. Outpatient follow up with GI.     >Subserosal Myoma:  Outpatient follow up with GYN    >DVT Prophylaxis -- Lovenox 40 mg

## 2019-01-21 NOTE — DIETITIAN INITIAL EVALUATION ADULT. - ETIOLOGY
related to inadequate protein energy intake with poor appetite in setting of feeling depressed, N/V and weakness

## 2019-01-21 NOTE — PROGRESS NOTE ADULT - ASSESSMENT
32 y/o woman with PMH of SLE, Lupus Nephritis and Fibromyalgia was admitted on 1/15 with generalized weakness. She had a low grade fevers, weakness and vomiting.   MRI was advised that showed transverse myelitis.     no clear infecitious etiology on workup.   Varicella Zoster IgM positivity without rash, not clinically consistent with Disseminated or localized shinglees.       UTI with CoNS  Transverse myelitis   Abnormal CSF most likely noninfectious       - continue to observe off antibiotics and off antivirals  - follow up all outstanding cultures AND studies  - trend temperature and WBC curve  - repeat cultures from blood and all sources if febrile.     Will follow up.

## 2019-01-21 NOTE — PROGRESS NOTE ADULT - SUBJECTIVE AND OBJECTIVE BOX
INTERVAL HISTORY:  feels a little imporovment      VITAL SIGNS:  Vital Signs Last 24 Hrs  T(C): 36.9 (21 Jan 2019 04:32), Max: 36.9 (21 Jan 2019 04:32)  T(F): 98.4 (21 Jan 2019 04:32), Max: 98.4 (21 Jan 2019 04:32)  HR: 86 (21 Jan 2019 04:32) (86 - 95)  BP: 118/76 (21 Jan 2019 04:32) (107/75 - 118/76)  BP(mean): --  RR: 17 (21 Jan 2019 04:32) (17 - 18)  SpO2: --    PHYSICAL EXAMINATION:    Mentation:  nl  Language/Speech: nl  CN: nl  Visual Fields: full  Motor: 5/5 BUE,  3/5 BLE.. slight increased tone  Sensory: prop BLE intact  DTR: 3+ KJ,  Babinski: bilat extensoir      MEDS:  MEDICATIONS  (STANDING):  albumin human  5% IVPB 3500 milliLiter(s) IV Intermittent once  calcium gluconate IVPB 1 Gram(s) IV Intermittent once  docusate sodium 100 milliGRAM(s) Oral two times a day  DULoxetine 60 milliGRAM(s) Oral daily  enoxaparin Injectable 40 milliGRAM(s) SubCutaneous daily  heparin  Injectable 5000 Unit(s) IV Push once  mycophenolate mofetil 1500 milliGRAM(s) Oral two times a day  pantoprazole    Tablet 40 milliGRAM(s) Oral before breakfast  polyethylene glycol 3350 17 Gram(s) Oral daily  saccharomyces boulardii 250 milliGRAM(s) Oral two times a day  senna 2 Tablet(s) Oral at bedtime  tamsulosin 0.4 milliGRAM(s) Oral at bedtime    MEDICATIONS  (PRN):  acetaminophen   Tablet .. 650 milliGRAM(s) Oral every 6 hours PRN Temp greater or equal to 38C (100.4F), Mild Pain (1 - 3)  bisacodyl 5 milliGRAM(s) Oral every 12 hours PRN Constipation  morphine  - Injectable 2 milliGRAM(s) IV Push every 4 hours PRN Severe Pain (7 - 10)  ondansetron Injectable 4 milliGRAM(s) IV Push every 6 hours PRN Nausea and/or Vomiting  oxyCODONE    5 mG/acetaminophen 325 mG 1 Tablet(s) Oral every 4 hours PRN Moderate Pain (4 - 6)      LABS:                          11.3   10.3  )-----------( 370      ( 21 Jan 2019 08:46 )             35.5     01-21    142  |  108<H>  |  22.0<H>  ----------------------------<  119<H>  3.8   |  20.0<L>  |  0.46<L>    Ca    8.1<L>      21 Jan 2019 08:46    TPro  6.2<L>  /  Alb  3.2<L>  /  TBili  0.5  /  DBili  x   /  AST  35<H>  /  ALT  93<H>  /  AlkPhos  79  01-21    LIVER FUNCTIONS - ( 21 Jan 2019 08:46 )  Alb: 3.2 g/dL / Pro: 6.2 g/dL / ALK PHOS: 79 U/L / ALT: 93 U/L / AST: 35 U/L / GGT: x               RADIOLOGY & ADDITIONAL STUDIES:      IMPRESSION & PLAN:    Autoimmune myelitis.  Steroids complete  For PLEX  QOD for  5 sessions ( 10 days)  Orders placed , IV access line - vasc surg   [place] [facility] notified as per Stella Cullen

## 2019-01-21 NOTE — CHART NOTE - NSCHARTNOTEFT_GEN_A_CORE
Upon Nutritional Assessment by the Registered Dietitian your patient was determined to meet criteria / has evidence of the following diagnosis/diagnoses:          [ ]  Mild Protein Calorie Malnutrition        [x ]  Moderate Protein Calorie Malnutrition        [ ] Severe Protein Calorie Malnutrition        [ ] Unspecified Protein Calorie Malnutrition        [ ] Underweight / BMI <19        [ ] Morbid Obesity / BMI > 40      Findings as based on:  •  Comprehensive nutrition assessment and consultation  •  Calorie counts (nutrient intake analysis)  •  Food acceptance and intake status from observations by staff  •  Follow up  •  Patient education  •  Intervention secondary to interdisciplinary rounds  •   concerns      Treatment:    The following diet has been recommended:  Add Ensure TID    PROVIDER Section:     By signing this assessment you are acknowledging and agree with the diagnosis/diagnoses assigned by the Registered Dietitian    Comments:

## 2019-01-21 NOTE — PROCEDURE NOTE - NSPOSTCAREGUIDE_GEN_A_CORE
Instructed patient/caregiver regarding signs and symptoms of infection/Keep the cast/splint/dressing clean and dry/Verbal/written post procedure instructions were given to patient/caregiver/Care for catheter as per unit/ICU protocols

## 2019-01-21 NOTE — DIETITIAN INITIAL EVALUATION ADULT. - OTHER INFO
Pt reports good po intake at meals, appetite improving over the past 3-4 days.  Pt reports declining appetite/wt loss since Oct 2018 following family member passing away.  More recently pt with poor po intake x1 wk pta due to N/V and weakness.  Pts family requesting Ensure with meals.

## 2019-01-22 ENCOUNTER — MESSAGE (OUTPATIENT)
Age: 34
End: 2019-01-22

## 2019-01-22 DIAGNOSIS — L93.0 DISCOID LUPUS ERYTHEMATOSUS: ICD-10-CM

## 2019-01-22 DIAGNOSIS — G37.3 ACUTE TRANSVERSE MYELITIS IN DEMYELINATING DISEASE OF CENTRAL NERVOUS SYSTEM: ICD-10-CM

## 2019-01-22 LAB
% ALBUMIN: 49.8 % — SIGNIFICANT CHANGE UP
% ALPHA 1: 6.7 % — SIGNIFICANT CHANGE UP
% ALPHA 2: 16.2 % — SIGNIFICANT CHANGE UP
% BETA: 10.3 % — SIGNIFICANT CHANGE UP
% GAMMA: 17 % — SIGNIFICANT CHANGE UP
ALBUMIN SERPL ELPH-MCNC: 3.2 G/DL — LOW (ref 3.6–5.5)
ALBUMIN/GLOB SERPL ELPH: 1 RATIO — SIGNIFICANT CHANGE UP
ALPHA1 GLOB SERPL ELPH-MCNC: 0.4 G/DL — SIGNIFICANT CHANGE UP (ref 0.1–0.4)
ALPHA2 GLOB SERPL ELPH-MCNC: 1.1 G/DL — HIGH (ref 0.5–1)
ANION GAP SERPL CALC-SCNC: 15 MMOL/L — SIGNIFICANT CHANGE UP (ref 5–17)
APTT BLD: 25.1 SEC — LOW (ref 27.5–36.3)
B-GLOBULIN SERPL ELPH-MCNC: 0.7 G/DL — SIGNIFICANT CHANGE UP (ref 0.5–1)
BUN SERPL-MCNC: 19 MG/DL — SIGNIFICANT CHANGE UP (ref 8–20)
CALCIUM SERPL-MCNC: 8.1 MG/DL — LOW (ref 8.6–10.2)
CHLORIDE SERPL-SCNC: 102 MMOL/L — SIGNIFICANT CHANGE UP (ref 98–107)
CO2 SERPL-SCNC: 21 MMOL/L — LOW (ref 22–29)
CREAT SERPL-MCNC: 0.37 MG/DL — LOW (ref 0.5–1.3)
FIBRINOGEN PPP-MCNC: 394 MG/DL — SIGNIFICANT CHANGE UP (ref 350–510)
GAMMA GLOBULIN: 1.1 G/DL — SIGNIFICANT CHANGE UP (ref 0.6–1.6)
GAMMA INTERFERON BACKGROUND BLD IA-ACNC: 0.02 IU/ML — SIGNIFICANT CHANGE UP
GLUCOSE SERPL-MCNC: 128 MG/DL — HIGH (ref 70–115)
INR BLD: 0.94 RATIO — SIGNIFICANT CHANGE UP (ref 0.88–1.16)
INTERPRETATION SERPL IFE-IMP: SIGNIFICANT CHANGE UP
M TB IFN-G BLD-IMP: ABNORMAL
M TB IFN-G CD4+ BCKGRND COR BLD-ACNC: 0 IU/ML — SIGNIFICANT CHANGE UP
M TB IFN-G CD4+CD8+ BCKGRND COR BLD-ACNC: 0 IU/ML — SIGNIFICANT CHANGE UP
MAGNESIUM SERPL-MCNC: 2.5 MG/DL — SIGNIFICANT CHANGE UP (ref 1.6–2.6)
METHYLMALONATE SERPL-SCNC: 57 NMOL/L — SIGNIFICANT CHANGE UP (ref 0–378)
OLIGOCLONAL BANDS CSF ELPH-IMP: SIGNIFICANT CHANGE UP
POTASSIUM SERPL-MCNC: 3.8 MMOL/L — SIGNIFICANT CHANGE UP (ref 3.5–5.3)
POTASSIUM SERPL-SCNC: 3.8 MMOL/L — SIGNIFICANT CHANGE UP (ref 3.5–5.3)
PROT PATTERN SERPL ELPH-IMP: SIGNIFICANT CHANGE UP
PROTHROM AB SERPL-ACNC: 10.8 SEC — SIGNIFICANT CHANGE UP (ref 10–12.9)
QUANT TB PLUS MITOGEN MINUS NIL: 0.01 IU/ML — SIGNIFICANT CHANGE UP
SODIUM SERPL-SCNC: 138 MMOL/L — SIGNIFICANT CHANGE UP (ref 135–145)

## 2019-01-22 PROCEDURE — 99233 SBSQ HOSP IP/OBS HIGH 50: CPT

## 2019-01-22 PROCEDURE — 72156 MRI NECK SPINE W/O & W/DYE: CPT | Mod: 26

## 2019-01-22 PROCEDURE — 72157 MRI CHEST SPINE W/O & W/DYE: CPT | Mod: 26

## 2019-01-22 RX ORDER — ALBUMIN HUMAN 25 %
2500 VIAL (ML) INTRAVENOUS ONCE
Qty: 0 | Refills: 0 | Status: COMPLETED | OUTPATIENT
Start: 2019-01-22 | End: 2019-01-22

## 2019-01-22 RX ORDER — HEPARIN SODIUM 5000 [USP'U]/ML
5000 INJECTION INTRAVENOUS; SUBCUTANEOUS ONCE
Qty: 0 | Refills: 0 | Status: COMPLETED | OUTPATIENT
Start: 2019-01-22 | End: 2019-01-22

## 2019-01-22 RX ORDER — HYDROMORPHONE HYDROCHLORIDE 2 MG/ML
0.5 INJECTION INTRAMUSCULAR; INTRAVENOUS; SUBCUTANEOUS
Qty: 0 | Refills: 0 | Status: DISCONTINUED | OUTPATIENT
Start: 2019-01-22 | End: 2019-01-27

## 2019-01-22 RX ADMIN — MORPHINE SULFATE 2 MILLIGRAM(S): 50 CAPSULE, EXTENDED RELEASE ORAL at 03:26

## 2019-01-22 RX ADMIN — HYDROMORPHONE HYDROCHLORIDE 0.5 MILLIGRAM(S): 2 INJECTION INTRAMUSCULAR; INTRAVENOUS; SUBCUTANEOUS at 23:50

## 2019-01-22 RX ADMIN — ENOXAPARIN SODIUM 40 MILLIGRAM(S): 100 INJECTION SUBCUTANEOUS at 11:57

## 2019-01-22 RX ADMIN — OXYCODONE AND ACETAMINOPHEN 1 TABLET(S): 5; 325 TABLET ORAL at 08:50

## 2019-01-22 RX ADMIN — Medication 250 MILLIGRAM(S): at 17:02

## 2019-01-22 RX ADMIN — MORPHINE SULFATE 2 MILLIGRAM(S): 50 CAPSULE, EXTENDED RELEASE ORAL at 01:57

## 2019-01-22 RX ADMIN — DULOXETINE HYDROCHLORIDE 60 MILLIGRAM(S): 30 CAPSULE, DELAYED RELEASE ORAL at 11:57

## 2019-01-22 RX ADMIN — HYDROMORPHONE HYDROCHLORIDE 0.5 MILLIGRAM(S): 2 INJECTION INTRAMUSCULAR; INTRAVENOUS; SUBCUTANEOUS at 11:57

## 2019-01-22 RX ADMIN — MYCOPHENOLATE MOFETIL 1500 MILLIGRAM(S): 250 CAPSULE ORAL at 05:27

## 2019-01-22 RX ADMIN — Medication 200 GRAM(S): at 15:43

## 2019-01-22 RX ADMIN — HYDROMORPHONE HYDROCHLORIDE 0.5 MILLIGRAM(S): 2 INJECTION INTRAMUSCULAR; INTRAVENOUS; SUBCUTANEOUS at 22:09

## 2019-01-22 RX ADMIN — PANTOPRAZOLE SODIUM 40 MILLIGRAM(S): 20 TABLET, DELAYED RELEASE ORAL at 05:27

## 2019-01-22 RX ADMIN — HYDROMORPHONE HYDROCHLORIDE 0.5 MILLIGRAM(S): 2 INJECTION INTRAMUSCULAR; INTRAVENOUS; SUBCUTANEOUS at 13:01

## 2019-01-22 RX ADMIN — TAMSULOSIN HYDROCHLORIDE 0.4 MILLIGRAM(S): 0.4 CAPSULE ORAL at 22:04

## 2019-01-22 RX ADMIN — Medication 100 MILLIGRAM(S): at 05:27

## 2019-01-22 RX ADMIN — Medication 100 MILLIGRAM(S): at 17:02

## 2019-01-22 RX ADMIN — Medication 1250 MILLILITER(S): at 15:44

## 2019-01-22 RX ADMIN — Medication 250 MILLIGRAM(S): at 05:27

## 2019-01-22 RX ADMIN — HEPARIN SODIUM 5000 UNIT(S): 5000 INJECTION INTRAVENOUS; SUBCUTANEOUS at 15:47

## 2019-01-22 RX ADMIN — Medication 55 MILLIGRAM(S): at 05:27

## 2019-01-22 RX ADMIN — MYCOPHENOLATE MOFETIL 1500 MILLIGRAM(S): 250 CAPSULE ORAL at 17:03

## 2019-01-22 RX ADMIN — OXYCODONE AND ACETAMINOPHEN 1 TABLET(S): 5; 325 TABLET ORAL at 09:15

## 2019-01-22 RX ADMIN — SENNA PLUS 2 TABLET(S): 8.6 TABLET ORAL at 22:04

## 2019-01-22 RX ADMIN — POLYETHYLENE GLYCOL 3350 17 GRAM(S): 17 POWDER, FOR SOLUTION ORAL at 11:57

## 2019-01-22 NOTE — PROGRESS NOTE ADULT - SUBJECTIVE AND OBJECTIVE BOX
follow up visit for leg weakness possible myelitis   seen in am plasma ex therapy  ordered , contacted the blood bank in am for treatment plan   now in progress   no overnight events reported   d/w Dr Schneider in am     Vital Signs Last 24 Hrs  T(C): 37 (22 Jan 2019 09:30), Max: 37 (22 Jan 2019 09:30)  T(F): 98.6 (22 Jan 2019 09:30), Max: 98.6 (22 Jan 2019 09:30)  HR: 86 (22 Jan 2019 09:30) (60 - 86)  BP: 108/83 (22 Jan 2019 09:30) (108/83 - 134/90)  BP(mean): --  RR: 20 (22 Jan 2019 09:30) (19 - 20)  SpO2: 95% (22 Jan 2019 09:30) (95% - 95%)    GENERAL: Pt lying comfortably, NAD.  CHEST/LUNG: Clear to auscultation bilaterally  HEART: S1S2+, Regular rate and rhythm; No murmurs.  ABDOMEN: Soft, Nontender, Nondistended; Bowel sounds present.  Extremities: No LE edema, no calf tenderness                           11.3   10.3  )-----------( 370      ( 21 Jan 2019 08:46 )             35.5   01-22    138  |  102  |  19.0  ----------------------------<  128<H>  3.8   |  21.0<L>  |  0.37<L>    Ca    8.1<L>      22 Jan 2019 09:52  Mg     2.5     01-22    TPro  6.2<L>  /  Alb  3.2<L>  /  TBili  0.5  /  DBili  x   /  AST  35<H>  /  ALT  93<H>  /  AlkPhos  79  01-21

## 2019-01-22 NOTE — PROGRESS NOTE ADULT - PROBLEM SELECTOR PLAN 1
work up in progress   plasma ex therapy initiated per neurology   will follow W/u in process NMO Ab, Oligoclonal bands- will follow.

## 2019-01-22 NOTE — PROGRESS NOTE ADULT - ASSESSMENT
33 years old female with PMH of SLE, Lupus Nephritis and Fibromyalgia brought by her  with generalized weakness with difficulty walking, generalized body pain, throat irritation. She recently came back from [place] on 12/29/18 after staying there for 2.5 to 3 months. She had MRI there in December due to back pain and generalized weakness and it was normal. MRI here showed transverse myelitis and LP with high WBC. Pt has been seen in consultation with Neurology/ Rheumatology/ ID. Per ID less likely infectious etiology and Abx d/stiven. Pt undergoing w/u for NMO/ MS, completed 5 days of pulse dose steroids and now Neuro rheumatology recommended PLEX.

## 2019-01-23 LAB
B BURGDOR DNA SPEC QL NAA+PROBE: NEGATIVE — SIGNIFICANT CHANGE UP
M PNEUMO IGG SER IA-ACNC: 1.92 INDEX — HIGH
M PNEUMO IGG SER IA-ACNC: POSITIVE
M PNEUMO IGM SER-ACNC: 74 UNITS/ML — SIGNIFICANT CHANGE UP
MYCOPLASMA AG SPEC QL: NEGATIVE — SIGNIFICANT CHANGE UP

## 2019-01-23 PROCEDURE — 99232 SBSQ HOSP IP/OBS MODERATE 35: CPT

## 2019-01-23 RX ORDER — CHLORHEXIDINE GLUCONATE 213 G/1000ML
1 SOLUTION TOPICAL
Qty: 0 | Refills: 0 | Status: DISCONTINUED | OUTPATIENT
Start: 2019-01-23 | End: 2019-02-06

## 2019-01-23 RX ADMIN — HYDROMORPHONE HYDROCHLORIDE 0.5 MILLIGRAM(S): 2 INJECTION INTRAMUSCULAR; INTRAVENOUS; SUBCUTANEOUS at 13:33

## 2019-01-23 RX ADMIN — Medication 100 MILLIGRAM(S): at 06:13

## 2019-01-23 RX ADMIN — Medication 250 MILLIGRAM(S): at 06:13

## 2019-01-23 RX ADMIN — SENNA PLUS 2 TABLET(S): 8.6 TABLET ORAL at 21:07

## 2019-01-23 RX ADMIN — POLYETHYLENE GLYCOL 3350 17 GRAM(S): 17 POWDER, FOR SOLUTION ORAL at 11:47

## 2019-01-23 RX ADMIN — Medication 55 MILLIGRAM(S): at 06:14

## 2019-01-23 RX ADMIN — MYCOPHENOLATE MOFETIL 1500 MILLIGRAM(S): 250 CAPSULE ORAL at 06:14

## 2019-01-23 RX ADMIN — ENOXAPARIN SODIUM 40 MILLIGRAM(S): 100 INJECTION SUBCUTANEOUS at 11:47

## 2019-01-23 RX ADMIN — CHLORHEXIDINE GLUCONATE 1 APPLICATION(S): 213 SOLUTION TOPICAL at 17:13

## 2019-01-23 RX ADMIN — MYCOPHENOLATE MOFETIL 1500 MILLIGRAM(S): 250 CAPSULE ORAL at 17:13

## 2019-01-23 RX ADMIN — PANTOPRAZOLE SODIUM 40 MILLIGRAM(S): 20 TABLET, DELAYED RELEASE ORAL at 06:13

## 2019-01-23 RX ADMIN — Medication 250 MILLIGRAM(S): at 17:13

## 2019-01-23 RX ADMIN — TAMSULOSIN HYDROCHLORIDE 0.4 MILLIGRAM(S): 0.4 CAPSULE ORAL at 21:07

## 2019-01-23 RX ADMIN — Medication 100 MILLIGRAM(S): at 17:13

## 2019-01-23 RX ADMIN — DULOXETINE HYDROCHLORIDE 60 MILLIGRAM(S): 30 CAPSULE, DELAYED RELEASE ORAL at 11:46

## 2019-01-23 RX ADMIN — HYDROMORPHONE HYDROCHLORIDE 0.5 MILLIGRAM(S): 2 INJECTION INTRAMUSCULAR; INTRAVENOUS; SUBCUTANEOUS at 14:10

## 2019-01-23 RX ADMIN — Medication 5 MILLIGRAM(S): at 19:13

## 2019-01-23 RX ADMIN — HYDROMORPHONE HYDROCHLORIDE 0.5 MILLIGRAM(S): 2 INJECTION INTRAMUSCULAR; INTRAVENOUS; SUBCUTANEOUS at 06:26

## 2019-01-23 NOTE — PROGRESS NOTE ADULT - SUBJECTIVE AND OBJECTIVE BOX
follow up visit for leg weakness ,pt is c/o right neck pain on the central line site   able to urinate , no chest pain  , normal speech     Vital Signs Last 24 Hrs  T(C): 36.9 (23 Jan 2019 04:00), Max: 36.9 (23 Jan 2019 04:00)  T(F): 98.4 (23 Jan 2019 04:00), Max: 98.4 (23 Jan 2019 04:00)  HR: 104 (23 Jan 2019 06:10) (68 - 105)  BP: 93/64 (23 Jan 2019 06:10) (93/64 - 117/79)  BP(mean): 95 (22 Jan 2019 19:55) (95 - 95)  RR: 18 (23 Jan 2019 06:10) (18 - 19)  SpO2: 95% (22 Jan 2019 19:55) (95% - 95%)    GENERAL: Pt lying comfortably, NAD.  CHEST/LUNG: Clear to auscultation bilaterally  HEART: S1S2+, Regular rate and rhythm; No murmurs.  ABDOMEN: Soft, Nontender, Nondistended; Bowel sounds present.  Extremities: No LE edema, no calf tenderness   Neuro : AxOx3 , leg weakness , able to move slightly try to lift both legs   normal speech          01-22    138  |  102  |  19.0  ----------------------------<  128<H>  3.8   |  21.0<L>  |  0.37<L>    Ca    8.1<L>      22 Jan 2019 09:52  Mg     2.5     01-22

## 2019-01-23 NOTE — PROGRESS NOTE ADULT - PROBLEM SELECTOR PLAN 1
work up in progress   plasma ex therapy initiated total 5 therapy every other day   will follow W/u in process NMO Ab, Oligoclonal bands

## 2019-01-23 NOTE — PROGRESS NOTE ADULT - ASSESSMENT
33 years old female with PMH of SLE, Lupus Nephritis and Fibromyalgia brought by her  with generalized weakness with difficulty walking, generalized body pain, throat irritation. She recently came back from [place] on 12/29/18 after staying there for 2.5 to 3 months. She had MRI there in December due to back pain and generalized weakness and it was normal. MRI here showed transverse myelitis and LP with high WBC. Pt has been seen in consultation with Neurology/ Rheumatology/ ID. Per ID less likely infectious etiology and Abx d/stiven. Pt undergoing w/u for NMO/ MS, completed 5 days of pulse dose steroids and now Neuro rheumatology recommended PLEX, had started felipe therapy 1/22 will be done every other day total 5 treatment

## 2019-01-23 NOTE — PROGRESS NOTE ADULT - ASSESSMENT
34 y/o woman with PMH of SLE, Lupus Nephritis and Fibromyalgia was admitted on 1/15 with generalized weakness. She had a low grade fevers, weakness and vomiting.   MRI showed transverse myelitis.     no clear infectious etiology on workup.   Varicella Zoster IgM positivity without rash, not clinically consistent with Disseminated or localized shingles     UTI with CoNS  Transverse myelitis   Abnormal CSF most likely noninfectious     - no clear evidence of active infection contributing to current illness  - continue management per neurology team      please call back should new concerns arise.

## 2019-01-23 NOTE — PROGRESS NOTE ADULT - SUBJECTIVE AND OBJECTIVE BOX
INTERVAL HISTORY:   no overall changes Getting PLEX      VITAL SIGNS:  Vital Signs Last 24 Hrs  T(C): 36.9 (23 Jan 2019 04:00), Max: 36.9 (23 Jan 2019 04:00)  T(F): 98.4 (23 Jan 2019 04:00), Max: 98.4 (23 Jan 2019 04:00)  HR: 104 (23 Jan 2019 06:10) (68 - 105)  BP: 93/64 (23 Jan 2019 06:10) (93/64 - 117/79)  BP(mean): 95 (22 Jan 2019 19:55) (95 - 95)  RR: 18 (23 Jan 2019 06:10) (18 - 19)  SpO2: 95% (22 Jan 2019 19:55) (95% - 95%)    PHYSICAL EXAMINATION:    Mentation:  nl  Language/Speech: nl  CN: nl  Visual Fields:  Motor: unchanged  Sensory:  DTR:  Babinski:      MEDS:  MEDICATIONS  (STANDING):  docusate sodium 100 milliGRAM(s) Oral two times a day  DULoxetine 60 milliGRAM(s) Oral daily  enoxaparin Injectable 40 milliGRAM(s) SubCutaneous daily  methylPREDNISolone sodium succinate Injectable 55 milliGRAM(s) IV Push daily  mycophenolate mofetil 1500 milliGRAM(s) Oral two times a day  pantoprazole    Tablet 40 milliGRAM(s) Oral before breakfast  polyethylene glycol 3350 17 Gram(s) Oral daily  saccharomyces boulardii 250 milliGRAM(s) Oral two times a day  senna 2 Tablet(s) Oral at bedtime  tamsulosin 0.4 milliGRAM(s) Oral at bedtime    MEDICATIONS  (PRN):  acetaminophen   Tablet .. 650 milliGRAM(s) Oral every 6 hours PRN Temp greater or equal to 38C (100.4F), Mild Pain (1 - 3)  bisacodyl 5 milliGRAM(s) Oral every 12 hours PRN Constipation  HYDROmorphone  Injectable 0.5 milliGRAM(s) IV Push four times a day PRN Moderate Pain (4 - 6)  ondansetron Injectable 4 milliGRAM(s) IV Push every 6 hours PRN Nausea and/or Vomiting  oxyCODONE    5 mG/acetaminophen 325 mG 1 Tablet(s) Oral every 4 hours PRN Moderate Pain (4 - 6)      LABS:      01-22    138  |  102  |  19.0  ----------------------------<  128<H>  3.8   |  21.0<L>  |  0.37<L>    Ca    8.1<L>      22 Jan 2019 09:52  Mg     2.5     01-22            RADIOLOGY & ADDITIONAL STUDIES:      IMPRESSION & PLAN:      PLEX 1/5 completed  Labs support widespread immune system hyperactivity/ autoimmune disease.    Will likely need a longer term immunosuppresssion approach after PLEX is complete.

## 2019-01-23 NOTE — PROGRESS NOTE ADULT - SUBJECTIVE AND OBJECTIVE BOX
[facility]  INFECTIOUS DISEASES AND INTERNAL MEDICINE at [place]  =======================================================  Adams Vasquez MD  Diplomates American Board of Internal Medicine and Infectious Diseases  =======================================================    MARCO, KANE [id number]    Follow up:  transverse myelitis  infectious workup negative to date  Quanitferon test indeterminant due to absent mitogen response  EBV testing show IgG positivity only      Allergies:  No Known Allergies     REVIEW OF SYSTEMS:  able to move legs and feet today  =======================================================  Antibiotics:    Other medications:  docusate sodium 100 milliGRAM(s) Oral two times a day  DULoxetine 60 milliGRAM(s) Oral daily  enoxaparin Injectable 40 milliGRAM(s) SubCutaneous daily  methylPREDNISolone sodium succinate Injectable 55 milliGRAM(s) IV Push daily  mycophenolate mofetil 1500 milliGRAM(s) Oral two times a day  pantoprazole    Tablet 40 milliGRAM(s) Oral before breakfast  polyethylene glycol 3350 17 Gram(s) Oral daily  saccharomyces boulardii 250 milliGRAM(s) Oral two times a day  senna 2 Tablet(s) Oral at bedtime  tamsulosin 0.4 milliGRAM(s) Oral at bedtime    =======================================================    Physical Exam:  T(F): 98.4 (23 Jan 2019 04:00), Max: 98.6 (22 Jan 2019 09:30)  HR: 104 (23 Jan 2019 06:10)  BP: 93/64 (23 Jan 2019 06:10)  RR: 18 (23 Jan 2019 06:10)  SpO2: 95% (22 Jan 2019 19:55) (95% - 95%)    GEN: NAD  HEENT: normocephalic and atraumatic. EOMI. BARTOLOME.    NECK: Supple.  No lymphadenopathy   LUNGS: Clear to auscultation.  HEART: Regular rate and rhythm without murmur.  ABDOMEN: Soft, nontender, and nondistended.  Positive bowel sounds.    : No CVA tenderness  EXTREMITIES: Without any cyanosis, clubbing, rash, lesions or edema.  MSK: no joint swelling  NEUROLOGIC: grossly intact. moves all extremities., Lower ext weakness.   PSYCHIATRIC: Appropriate affect .  SKIN: No ulceration or induration present.      ====================================================    Labs:    01-22    138  |  102  |  19.0  ----------------------------<  128<H>  3.8   |  21.0<L>  |  0.37<L>    Ca    8.1<L>      22 Jan 2019 09:52  Mg     2.5     01-22          Culture - Urine (collected 01-20-19 @ 14:37)  Source: .Urine  Final Report (01-21-19 @ 09:25):    <10,000 CFU/ml Gram Negative Rods    Culture - Blood (collected 01-16-19 @ 17:03)  Source: .Blood  Final Report (01-21-19 @ 19:01):    No growth at 5 days.    Culture - Blood (collected 01-16-19 @ 17:03)  Source: .Blood  Final Report (01-21-19 @ 19:01):    No growth at 5 days.    Culture - CSF with Gram Stain (collected 01-16-19 @ 15:53)  Source: .CSF  Gram Stain (01-16-19 @ 18:09):    Few White blood cells    No organisms seen  Final Report (01-20-19 @ 09:15):    No growth at 3 days.    Culture - Urine (collected 01-16-19 @ 04:00)  Source: .Urine  Final Report (01-18-19 @ 08:50):    >100,000 CFU/ml Coag Negative Staphylococcus

## 2019-01-24 DIAGNOSIS — D72.829 ELEVATED WHITE BLOOD CELL COUNT, UNSPECIFIED: ICD-10-CM

## 2019-01-24 LAB
AQP4 H2O CHANNEL AB SERPL IA-ACNC: NEGATIVE — SIGNIFICANT CHANGE UP
AQP4 H2O CHANNEL AB SERPL IA-ACNC: NEGATIVE — SIGNIFICANT CHANGE UP
HCT VFR BLD CALC: 34.8 % — LOW (ref 37–47)
HGB BLD-MCNC: 11.2 G/DL — LOW (ref 12–16)
MCHC RBC-ENTMCNC: 27.1 PG — SIGNIFICANT CHANGE UP (ref 27–31)
MCHC RBC-ENTMCNC: 32.2 G/DL — SIGNIFICANT CHANGE UP (ref 32–36)
MCV RBC AUTO: 84.3 FL — SIGNIFICANT CHANGE UP (ref 81–99)
PLATELET # BLD AUTO: 279 K/UL — SIGNIFICANT CHANGE UP (ref 150–400)
RBC # BLD: 4.13 M/UL — LOW (ref 4.4–5.2)
RBC # FLD: 16.9 % — HIGH (ref 11–15.6)
WBC # BLD: 16.4 K/UL — HIGH (ref 4.8–10.8)
WBC # FLD AUTO: 16.4 K/UL — HIGH (ref 4.8–10.8)

## 2019-01-24 PROCEDURE — 99232 SBSQ HOSP IP/OBS MODERATE 35: CPT

## 2019-01-24 RX ORDER — HEPARIN SODIUM 5000 [USP'U]/ML
5000 INJECTION INTRAVENOUS; SUBCUTANEOUS ONCE
Qty: 0 | Refills: 0 | Status: COMPLETED | OUTPATIENT
Start: 2019-01-24 | End: 2019-01-24

## 2019-01-24 RX ORDER — ALBUMIN HUMAN 25 %
2500 VIAL (ML) INTRAVENOUS
Qty: 0 | Refills: 0 | Status: COMPLETED | OUTPATIENT
Start: 2019-01-24 | End: 2019-01-30

## 2019-01-24 RX ORDER — LACTULOSE 10 G/15ML
20 SOLUTION ORAL
Qty: 0 | Refills: 0 | Status: DISCONTINUED | OUTPATIENT
Start: 2019-01-24 | End: 2019-02-06

## 2019-01-24 RX ORDER — CALCIUM GLUCONATE 100 MG/ML
1 VIAL (ML) INTRAVENOUS ONCE
Qty: 0 | Refills: 0 | Status: COMPLETED | OUTPATIENT
Start: 2019-01-24 | End: 2019-01-24

## 2019-01-24 RX ORDER — MINERAL OIL
133 OIL (ML) MISCELLANEOUS AT BEDTIME
Qty: 0 | Refills: 0 | Status: DISCONTINUED | OUTPATIENT
Start: 2019-01-24 | End: 2019-02-06

## 2019-01-24 RX ADMIN — Medication 100 MILLIGRAM(S): at 05:08

## 2019-01-24 RX ADMIN — Medication 250 MILLIGRAM(S): at 17:10

## 2019-01-24 RX ADMIN — CHLORHEXIDINE GLUCONATE 1 APPLICATION(S): 213 SOLUTION TOPICAL at 05:08

## 2019-01-24 RX ADMIN — TAMSULOSIN HYDROCHLORIDE 0.4 MILLIGRAM(S): 0.4 CAPSULE ORAL at 21:50

## 2019-01-24 RX ADMIN — Medication 100 MILLIGRAM(S): at 17:10

## 2019-01-24 RX ADMIN — Medication 1250 MILLILITER(S): at 10:36

## 2019-01-24 RX ADMIN — OXYCODONE AND ACETAMINOPHEN 1 TABLET(S): 5; 325 TABLET ORAL at 13:27

## 2019-01-24 RX ADMIN — HYDROMORPHONE HYDROCHLORIDE 0.5 MILLIGRAM(S): 2 INJECTION INTRAMUSCULAR; INTRAVENOUS; SUBCUTANEOUS at 00:03

## 2019-01-24 RX ADMIN — PANTOPRAZOLE SODIUM 40 MILLIGRAM(S): 20 TABLET, DELAYED RELEASE ORAL at 05:08

## 2019-01-24 RX ADMIN — HEPARIN SODIUM 5000 UNIT(S): 5000 INJECTION INTRAVENOUS; SUBCUTANEOUS at 10:36

## 2019-01-24 RX ADMIN — HYDROMORPHONE HYDROCHLORIDE 0.5 MILLIGRAM(S): 2 INJECTION INTRAMUSCULAR; INTRAVENOUS; SUBCUTANEOUS at 01:04

## 2019-01-24 RX ADMIN — ENOXAPARIN SODIUM 40 MILLIGRAM(S): 100 INJECTION SUBCUTANEOUS at 12:10

## 2019-01-24 RX ADMIN — MYCOPHENOLATE MOFETIL 1500 MILLIGRAM(S): 250 CAPSULE ORAL at 05:08

## 2019-01-24 RX ADMIN — OXYCODONE AND ACETAMINOPHEN 1 TABLET(S): 5; 325 TABLET ORAL at 04:21

## 2019-01-24 RX ADMIN — LACTULOSE 20 GRAM(S): 10 SOLUTION ORAL at 16:00

## 2019-01-24 RX ADMIN — HYDROMORPHONE HYDROCHLORIDE 0.5 MILLIGRAM(S): 2 INJECTION INTRAMUSCULAR; INTRAVENOUS; SUBCUTANEOUS at 22:50

## 2019-01-24 RX ADMIN — OXYCODONE AND ACETAMINOPHEN 1 TABLET(S): 5; 325 TABLET ORAL at 19:18

## 2019-01-24 RX ADMIN — HYDROMORPHONE HYDROCHLORIDE 0.5 MILLIGRAM(S): 2 INJECTION INTRAMUSCULAR; INTRAVENOUS; SUBCUTANEOUS at 21:50

## 2019-01-24 RX ADMIN — OXYCODONE AND ACETAMINOPHEN 1 TABLET(S): 5; 325 TABLET ORAL at 05:22

## 2019-01-24 RX ADMIN — MYCOPHENOLATE MOFETIL 1500 MILLIGRAM(S): 250 CAPSULE ORAL at 17:10

## 2019-01-24 RX ADMIN — POLYETHYLENE GLYCOL 3350 17 GRAM(S): 17 POWDER, FOR SOLUTION ORAL at 12:10

## 2019-01-24 RX ADMIN — Medication 250 MILLIGRAM(S): at 05:08

## 2019-01-24 RX ADMIN — OXYCODONE AND ACETAMINOPHEN 1 TABLET(S): 5; 325 TABLET ORAL at 12:10

## 2019-01-24 RX ADMIN — OXYCODONE AND ACETAMINOPHEN 1 TABLET(S): 5; 325 TABLET ORAL at 20:20

## 2019-01-24 RX ADMIN — SENNA PLUS 2 TABLET(S): 8.6 TABLET ORAL at 21:50

## 2019-01-24 RX ADMIN — Medication 55 MILLIGRAM(S): at 05:08

## 2019-01-24 RX ADMIN — HYDROMORPHONE HYDROCHLORIDE 0.5 MILLIGRAM(S): 2 INJECTION INTRAMUSCULAR; INTRAVENOUS; SUBCUTANEOUS at 16:00

## 2019-01-24 RX ADMIN — HYDROMORPHONE HYDROCHLORIDE 0.5 MILLIGRAM(S): 2 INJECTION INTRAMUSCULAR; INTRAVENOUS; SUBCUTANEOUS at 16:39

## 2019-01-24 RX ADMIN — DULOXETINE HYDROCHLORIDE 60 MILLIGRAM(S): 30 CAPSULE, DELAYED RELEASE ORAL at 12:10

## 2019-01-24 RX ADMIN — Medication 200 GRAM(S): at 10:38

## 2019-01-24 NOTE — PROGRESS NOTE ADULT - SUBJECTIVE AND OBJECTIVE BOX
follow up visit for leg weakness ,pt is still complaining of discomfort on the right neck radiates to her jaw and ear area   able to move her legs little   no overnight events , stable     Vital Signs Last 24 Hrs  T(C): 37.3 (24 Jan 2019 04:00), Max: 37.3 (24 Jan 2019 04:00)  T(F): 99.1 (24 Jan 2019 04:00), Max: 99.1 (24 Jan 2019 04:00)  HR: 96 (24 Jan 2019 04:00) (92 - 106)  BP: 95/65 (24 Jan 2019 04:00) (95/65 - 108/74)  BP(mean): --  RR: 18 (24 Jan 2019 04:00) (18 - 18)  SpO2: 95% (24 Jan 2019 04:00) (95% - 97%)    CHEST/LUNG: Clear to auscultation bilaterally  HEART: S1S2+, Regular rate and rhythm; No murmurs.  ABDOMEN: Soft, Nontender, Nondistended; Bowel sounds present.  Extremities: No LE edema, no calf tenderness   Neuro : AxOx3 , leg weakness , able to move slightly both legs   normal speech                                11.2   16.4  )-----------( 279      ( 24 Jan 2019 09:03 )             34.8   Fibrinogen Assay: 394: Effective October 30th, 2018 the reference range for fibrinogen has  changed. mg/dl (01.22.19 @ 09:52)

## 2019-01-24 NOTE — PROGRESS NOTE ADULT - PROBLEM SELECTOR PLAN 1
work up in progress   plasma ex therapy initiated total 5 therapy every other day , number 2 today   cbc and fibrinogen prior each therapy needed   neurology follow up

## 2019-01-24 NOTE — CHART NOTE - NSCHARTNOTEFT_GEN_A_CORE
Source: Patient [ ]  Family [ ]   other [x ]    Current Diet: regular      PO intake:  < 50% [ ]   50-75%  [ ]   %  [x ]  other :  %    Source for PO intake [ ] Patient [ ] family [x ] chart [ ] staff [ ] other    Enteral /Parenteral Nutrition:     Current Weight: 1/15 54.4kg, 1/23 49.5kg    % Weight Change     Pertinent Medications: MEDICATIONS  (STANDING):  albumin human  5% IVPB 2500 milliLiter(s) IV Intermittent every 48 hours  chlorhexidine 2% Cloths 1 Application(s) Topical <User Schedule>  docusate sodium 100 milliGRAM(s) Oral two times a day  DULoxetine 60 milliGRAM(s) Oral daily  enoxaparin Injectable 40 milliGRAM(s) SubCutaneous daily  methylPREDNISolone sodium succinate Injectable 55 milliGRAM(s) IV Push daily  mycophenolate mofetil 1500 milliGRAM(s) Oral two times a day  pantoprazole    Tablet 40 milliGRAM(s) Oral before breakfast  polyethylene glycol 3350 17 Gram(s) Oral daily  saccharomyces boulardii 250 milliGRAM(s) Oral two times a day  senna 2 Tablet(s) Oral at bedtime  tamsulosin 0.4 milliGRAM(s) Oral at bedtime    MEDICATIONS  (PRN):  acetaminophen   Tablet .. 650 milliGRAM(s) Oral every 6 hours PRN Temp greater or equal to 38C (100.4F), Mild Pain (1 - 3)  bisacodyl 5 milliGRAM(s) Oral every 12 hours PRN Constipation  HYDROmorphone  Injectable 0.5 milliGRAM(s) IV Push four times a day PRN Moderate Pain (4 - 6)  ondansetron Injectable 4 milliGRAM(s) IV Push every 6 hours PRN Nausea and/or Vomiting  oxyCODONE    5 mG/acetaminophen 325 mG 1 Tablet(s) Oral every 4 hours PRN Moderate Pain (4 - 6)    Pertinent Labs: CBC Full  -  ( 24 Jan 2019 09:03 )  WBC Count : 16.4 K/uL  Hemoglobin : 11.2 g/dL  Hematocrit : 34.8 %  Platelet Count - Automated : 279 K/uL  Mean Cell Volume : 84.3 fl  Mean Cell Hemoglobin : 27.1 pg  Mean Cell Hemoglobin Concentration : 32.2 g/dL  Auto Neutrophil # : x  Auto Lymphocyte # : x  Auto Monocyte # : x  Auto Eosinophil # : x  Auto Basophil # : x  Auto Neutrophil % : x  Auto Lymphocyte % : x  Auto Monocyte % : x  Auto Eosinophil % : x  Auto Basophil % : x          Skin:     Nutrition focused physical exam previously conducted - found signs of malnutrition [ ]absent [x ]present    Subcutaneous fat loss: [ ] Orbital fat pads region, [x ]Buccal fat region, [ ]Triceps region,  [ ]Ribs region    Muscle wasting: [ ]Temples region, [x ]Clavicle region, [ ]Shoulder region, [ ]Scapula region, [ ]Interosseous region,  [ ]thigh region, [ ]Calf region    Estimated Needs:   [x ] no change since previous assessment  [ ] recalculated:     Current Nutrition Diagnosis:  Malnutrition; moderate (chronic) related to inadequate protein energy intake with poor appetite in setting of feeling depressed, N/V and weakness as evidenced by pt meeting <75% est energy needs >1mo, 14# wt loss (10%) x3-4mo, mild muscle wasting.      Recommendations: rec ensure TID    Monitoring and Evaluation:   [x ] PO intake [x ] Tolerance to diet prescription [X] Weights  [X] Follow up per protocol [X] Labs:

## 2019-01-25 ENCOUNTER — APPOINTMENT (OUTPATIENT)
Dept: INTERNAL MEDICINE | Facility: CLINIC | Age: 34
End: 2019-01-25

## 2019-01-25 LAB
APPEARANCE UR: CLEAR — SIGNIFICANT CHANGE UP
BACTERIA # UR AUTO: ABNORMAL
BILIRUB UR-MCNC: NEGATIVE — SIGNIFICANT CHANGE UP
COLOR SPEC: YELLOW — SIGNIFICANT CHANGE UP
DIFF PNL FLD: ABNORMAL
EPI CELLS # UR: SIGNIFICANT CHANGE UP
GLUCOSE UR QL: NEGATIVE MG/DL — SIGNIFICANT CHANGE UP
KETONES UR-MCNC: NEGATIVE — SIGNIFICANT CHANGE UP
LEUKOCYTE ESTERASE UR-ACNC: ABNORMAL
NITRITE UR-MCNC: POSITIVE
PH UR: 5 — SIGNIFICANT CHANGE UP (ref 5–8)
PROT UR-MCNC: 100 MG/DL
RBC CASTS # UR COMP ASSIST: ABNORMAL /HPF (ref 0–4)
SP GR SPEC: 1.02 — SIGNIFICANT CHANGE UP (ref 1.01–1.02)
UROBILINOGEN FLD QL: NEGATIVE MG/DL — SIGNIFICANT CHANGE UP
WBC UR QL: ABNORMAL

## 2019-01-25 PROCEDURE — 99232 SBSQ HOSP IP/OBS MODERATE 35: CPT

## 2019-01-25 RX ORDER — IBUPROFEN 200 MG
400 TABLET ORAL EVERY 8 HOURS
Qty: 0 | Refills: 0 | Status: DISCONTINUED | OUTPATIENT
Start: 2019-01-25 | End: 2019-01-26

## 2019-01-25 RX ORDER — RITUXIMAB 10 MG/ML
1000 INJECTION, SOLUTION INTRAVENOUS ONCE
Qty: 0 | Refills: 0 | Status: DISCONTINUED | OUTPATIENT
Start: 2019-01-27 | End: 2019-01-27

## 2019-01-25 RX ORDER — ACETAMINOPHEN 500 MG
1000 TABLET ORAL ONCE
Qty: 0 | Refills: 0 | Status: DISCONTINUED | OUTPATIENT
Start: 2019-01-25 | End: 2019-02-06

## 2019-01-25 RX ORDER — MEPERIDINE HYDROCHLORIDE 50 MG/ML
25 INJECTION INTRAMUSCULAR; INTRAVENOUS; SUBCUTANEOUS ONCE
Qty: 0 | Refills: 0 | Status: DISCONTINUED | OUTPATIENT
Start: 2019-01-27 | End: 2019-01-31

## 2019-01-25 RX ORDER — ACETAMINOPHEN 500 MG
650 TABLET ORAL ONCE
Qty: 0 | Refills: 0 | Status: COMPLETED | OUTPATIENT
Start: 2019-01-27 | End: 2019-01-28

## 2019-01-25 RX ORDER — TAMSULOSIN HYDROCHLORIDE 0.4 MG/1
0.8 CAPSULE ORAL AT BEDTIME
Qty: 0 | Refills: 0 | Status: DISCONTINUED | OUTPATIENT
Start: 2019-01-25 | End: 2019-02-06

## 2019-01-25 RX ORDER — DIPHENHYDRAMINE HCL 50 MG
25 CAPSULE ORAL ONCE
Qty: 0 | Refills: 0 | Status: COMPLETED | OUTPATIENT
Start: 2019-01-27 | End: 2019-01-28

## 2019-01-25 RX ORDER — SODIUM CHLORIDE 9 MG/ML
1000 INJECTION, SOLUTION INTRAVENOUS
Qty: 0 | Refills: 0 | Status: DISCONTINUED | OUTPATIENT
Start: 2019-01-25 | End: 2019-01-27

## 2019-01-25 RX ADMIN — Medication 100 MILLIGRAM(S): at 05:04

## 2019-01-25 RX ADMIN — MYCOPHENOLATE MOFETIL 1500 MILLIGRAM(S): 250 CAPSULE ORAL at 05:04

## 2019-01-25 RX ADMIN — TAMSULOSIN HYDROCHLORIDE 0.8 MILLIGRAM(S): 0.4 CAPSULE ORAL at 22:09

## 2019-01-25 RX ADMIN — PANTOPRAZOLE SODIUM 40 MILLIGRAM(S): 20 TABLET, DELAYED RELEASE ORAL at 05:04

## 2019-01-25 RX ADMIN — Medication 400 MILLIGRAM(S): at 09:11

## 2019-01-25 RX ADMIN — DULOXETINE HYDROCHLORIDE 60 MILLIGRAM(S): 30 CAPSULE, DELAYED RELEASE ORAL at 13:48

## 2019-01-25 RX ADMIN — Medication 250 MILLIGRAM(S): at 17:48

## 2019-01-25 RX ADMIN — HYDROMORPHONE HYDROCHLORIDE 0.5 MILLIGRAM(S): 2 INJECTION INTRAMUSCULAR; INTRAVENOUS; SUBCUTANEOUS at 11:52

## 2019-01-25 RX ADMIN — CHLORHEXIDINE GLUCONATE 1 APPLICATION(S): 213 SOLUTION TOPICAL at 05:05

## 2019-01-25 RX ADMIN — MYCOPHENOLATE MOFETIL 1500 MILLIGRAM(S): 250 CAPSULE ORAL at 17:48

## 2019-01-25 RX ADMIN — HYDROMORPHONE HYDROCHLORIDE 0.5 MILLIGRAM(S): 2 INJECTION INTRAMUSCULAR; INTRAVENOUS; SUBCUTANEOUS at 10:36

## 2019-01-25 RX ADMIN — Medication 400 MILLIGRAM(S): at 22:42

## 2019-01-25 RX ADMIN — HYDROMORPHONE HYDROCHLORIDE 0.5 MILLIGRAM(S): 2 INJECTION INTRAMUSCULAR; INTRAVENOUS; SUBCUTANEOUS at 23:38

## 2019-01-25 RX ADMIN — SENNA PLUS 2 TABLET(S): 8.6 TABLET ORAL at 22:10

## 2019-01-25 RX ADMIN — HYDROMORPHONE HYDROCHLORIDE 0.5 MILLIGRAM(S): 2 INJECTION INTRAMUSCULAR; INTRAVENOUS; SUBCUTANEOUS at 17:49

## 2019-01-25 RX ADMIN — HYDROMORPHONE HYDROCHLORIDE 0.5 MILLIGRAM(S): 2 INJECTION INTRAMUSCULAR; INTRAVENOUS; SUBCUTANEOUS at 06:05

## 2019-01-25 RX ADMIN — Medication 100 MILLIGRAM(S): at 17:49

## 2019-01-25 RX ADMIN — SODIUM CHLORIDE 100 MILLILITER(S): 9 INJECTION, SOLUTION INTRAVENOUS at 19:10

## 2019-01-25 RX ADMIN — Medication 250 MILLIGRAM(S): at 05:04

## 2019-01-25 RX ADMIN — HYDROMORPHONE HYDROCHLORIDE 0.5 MILLIGRAM(S): 2 INJECTION INTRAMUSCULAR; INTRAVENOUS; SUBCUTANEOUS at 05:05

## 2019-01-25 RX ADMIN — ENOXAPARIN SODIUM 40 MILLIGRAM(S): 100 INJECTION SUBCUTANEOUS at 13:49

## 2019-01-25 RX ADMIN — Medication 400 MILLIGRAM(S): at 08:54

## 2019-01-25 RX ADMIN — Medication 55 MILLIGRAM(S): at 05:04

## 2019-01-25 RX ADMIN — POLYETHYLENE GLYCOL 3350 17 GRAM(S): 17 POWDER, FOR SOLUTION ORAL at 13:49

## 2019-01-25 RX ADMIN — Medication 400 MILLIGRAM(S): at 22:12

## 2019-01-25 RX ADMIN — HYDROMORPHONE HYDROCHLORIDE 0.5 MILLIGRAM(S): 2 INJECTION INTRAMUSCULAR; INTRAVENOUS; SUBCUTANEOUS at 19:11

## 2019-01-25 NOTE — PROGRESS NOTE ADULT - PROBLEM SELECTOR PLAN 1
plasma ex therapy initiated total 5 therapy every other day , number 3 in am   cbc and fibrinogen prior each therapy needed   neurology follow up , MS work up send out test pending

## 2019-01-25 NOTE — PROGRESS NOTE ADULT - SUBJECTIVE AND OBJECTIVE BOX
cc ; leg weakness   she is with urinary retention getting straight cath as needed   + neck and ear discomfort at line site right neck   + sore throat , had BM yesterday     Vital Signs Last 24 Hrs  T(C): 37.1 (25 Jan 2019 11:16), Max: 37.1 (25 Jan 2019 11:16)  T(F): 98.7 (25 Jan 2019 11:16), Max: 98.7 (25 Jan 2019 11:16)  HR: 78 (25 Jan 2019 11:16) (76 - 85)  BP: 106/70 (25 Jan 2019 11:16) (100/62 - 111/77)  BP(mean): --  RR: 20 (25 Jan 2019 11:16) (18 - 20)  SpO2: 92% (25 Jan 2019 05:09) (92% - 92%)  CHEST/LUNG: Clear to auscultation bilaterally  Neck Right shiley cath in place   HEART: S1S2+, Regular rate and rhythm; No murmurs.  ABDOMEN: Soft, Nontender, Nondistended; Bowel sounds present.  Extremities: No LE edema, no calf tenderness   Neuro : AxOx3 , leg weakness , able to move slightly both legs   normal speech                                 11.2   16.4  )-----------( 279      ( 24 Jan 2019 09:03 )             34.8        Fibrinogen Assay: 394: Effective October 30th, 2018 the reference range for fibrinogen has  changed. mg/dl (01.22.19 @ 09:52)

## 2019-01-25 NOTE — CHART NOTE - NSCHARTNOTEFT_GEN_A_CORE
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  Indeterminate quantiferon, no evidence of pulmonary symptoms, no evidence of granulomatous disease on CXR. Remains most likely due to high dose steroids and previous steroid use.    -Plan for RTX 1g IV x 2 doses (q2 weeks)  every 6 months.   Premeds including Tylenol 650mg po x 1, Benadryl IVP 25mg, and prior to infusion Solumedrol 100mg IVP.  - Would like ID opinion to begin RTX, once clear, will perform RTX infusion after PLEX preferred, or if needed day between PLEX sessions  - c/w MMF 1.5g BID  - c/w current steroid dose, at least 1mg/kg/d    Will place orders when cleared from ID for RTX  Call for any questions Pt still remains with little LE movement, overall little improvements  Tenderness at IJ site  Currently s/p PLEX #2 / 5 over 10 days  Remains on IV steroids  NMO serologies negative  Dx likely related overall to underlying autoimmune process / SLE      Imp:  SLE currently w/ stable renal disease, elevated dsDNA, low complements, and 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  Indeterminate quantiferon, no evidence of pulmonary symptoms, no evidence of granulomatous disease on CXR. Remains most likely due to high dose steroids and previous steroid use.    -Plan for RTX 1g IV x 2 doses (q2 weeks)  every 6 months.   Premeds including Tylenol 650mg po x 1, Benadryl IVP 25mg, and prior to infusion Solumedrol 100mg IVP.  - Would like ID opinion to begin RTX, once clear, will perform RTX infusion after PLEX preferred, or if needed day between PLEX sessions  - c/w MMF 1.5g BID  - c/w current steroid dose, at least 1mg/kg/d    Will place orders when cleared from ID for RTX  Call for any questions,   [phone]

## 2019-01-26 LAB
ANION GAP SERPL CALC-SCNC: 13 MMOL/L — SIGNIFICANT CHANGE UP (ref 5–17)
BUN SERPL-MCNC: 16 MG/DL — SIGNIFICANT CHANGE UP (ref 8–20)
CALCIUM SERPL-MCNC: 8.3 MG/DL — LOW (ref 8.6–10.2)
CHLORIDE SERPL-SCNC: 107 MMOL/L — SIGNIFICANT CHANGE UP (ref 98–107)
CO2 SERPL-SCNC: 22 MMOL/L — SIGNIFICANT CHANGE UP (ref 22–29)
CREAT SERPL-MCNC: 0.24 MG/DL — LOW (ref 0.5–1.3)
FIBRINOGEN PPP-MCNC: 376 MG/DL — SIGNIFICANT CHANGE UP (ref 350–510)
GLUCOSE SERPL-MCNC: 181 MG/DL — HIGH (ref 70–115)
HCT VFR BLD CALC: 30.7 % — LOW (ref 37–47)
HGB BLD-MCNC: 10 G/DL — LOW (ref 12–16)
LYMPHOCYTES # BLD AUTO: 2 % — LOW (ref 20–55)
MCHC RBC-ENTMCNC: 27.7 PG — SIGNIFICANT CHANGE UP (ref 27–31)
MCHC RBC-ENTMCNC: 32.6 G/DL — SIGNIFICANT CHANGE UP (ref 32–36)
MCV RBC AUTO: 85 FL — SIGNIFICANT CHANGE UP (ref 81–99)
MONOCYTES NFR BLD AUTO: 3 % — SIGNIFICANT CHANGE UP (ref 3–10)
MYELOCYTES NFR BLD: 1 % — HIGH (ref 0–0)
NEUTROPHILS NFR BLD AUTO: 92 % — HIGH (ref 37–73)
PLAT MORPH BLD: NORMAL — SIGNIFICANT CHANGE UP
PLATELET # BLD AUTO: 255 K/UL — SIGNIFICANT CHANGE UP (ref 150–400)
POTASSIUM SERPL-MCNC: 4 MMOL/L — SIGNIFICANT CHANGE UP (ref 3.5–5.3)
POTASSIUM SERPL-SCNC: 4 MMOL/L — SIGNIFICANT CHANGE UP (ref 3.5–5.3)
PROMYELOCYTES # FLD: 1 % — HIGH (ref 0–0)
RBC # BLD: 3.61 M/UL — LOW (ref 4.4–5.2)
RBC # FLD: 17 % — HIGH (ref 11–15.6)
RBC BLD AUTO: SIGNIFICANT CHANGE UP
SODIUM SERPL-SCNC: 142 MMOL/L — SIGNIFICANT CHANGE UP (ref 135–145)
VARIANT LYMPHS # BLD: 1 % — SIGNIFICANT CHANGE UP (ref 0–6)
WBC # BLD: 12.4 K/UL — HIGH (ref 4.8–10.8)
WBC # FLD AUTO: 12.4 K/UL — HIGH (ref 4.8–10.8)

## 2019-01-26 PROCEDURE — 99233 SBSQ HOSP IP/OBS HIGH 50: CPT

## 2019-01-26 RX ORDER — HEPARIN SODIUM 5000 [USP'U]/ML
5000 INJECTION INTRAVENOUS; SUBCUTANEOUS ONCE
Qty: 0 | Refills: 0 | Status: COMPLETED | OUTPATIENT
Start: 2019-01-26 | End: 2019-01-26

## 2019-01-26 RX ORDER — ALBUMIN HUMAN 25 %
2500 VIAL (ML) INTRAVENOUS ONCE
Qty: 0 | Refills: 0 | Status: DISCONTINUED | OUTPATIENT
Start: 2019-01-26 | End: 2019-01-28

## 2019-01-26 RX ORDER — CALCIUM GLUCONATE 100 MG/ML
1 VIAL (ML) INTRAVENOUS ONCE
Qty: 0 | Refills: 0 | Status: COMPLETED | OUTPATIENT
Start: 2019-01-26 | End: 2019-01-26

## 2019-01-26 RX ORDER — MAGNESIUM HYDROXIDE 400 MG/1
30 TABLET, CHEWABLE ORAL DAILY
Qty: 0 | Refills: 0 | Status: DISCONTINUED | OUTPATIENT
Start: 2019-01-26 | End: 2019-02-06

## 2019-01-26 RX ADMIN — TAMSULOSIN HYDROCHLORIDE 0.8 MILLIGRAM(S): 0.4 CAPSULE ORAL at 23:05

## 2019-01-26 RX ADMIN — DULOXETINE HYDROCHLORIDE 60 MILLIGRAM(S): 30 CAPSULE, DELAYED RELEASE ORAL at 15:33

## 2019-01-26 RX ADMIN — Medication 55 MILLIGRAM(S): at 05:16

## 2019-01-26 RX ADMIN — Medication 100 MILLIGRAM(S): at 18:19

## 2019-01-26 RX ADMIN — MYCOPHENOLATE MOFETIL 1500 MILLIGRAM(S): 250 CAPSULE ORAL at 05:16

## 2019-01-26 RX ADMIN — HYDROMORPHONE HYDROCHLORIDE 0.5 MILLIGRAM(S): 2 INJECTION INTRAMUSCULAR; INTRAVENOUS; SUBCUTANEOUS at 00:08

## 2019-01-26 RX ADMIN — POLYETHYLENE GLYCOL 3350 17 GRAM(S): 17 POWDER, FOR SOLUTION ORAL at 15:33

## 2019-01-26 RX ADMIN — MYCOPHENOLATE MOFETIL 1500 MILLIGRAM(S): 250 CAPSULE ORAL at 18:19

## 2019-01-26 RX ADMIN — Medication 100 MILLIGRAM(S): at 05:14

## 2019-01-26 RX ADMIN — Medication 250 MILLIGRAM(S): at 05:14

## 2019-01-26 RX ADMIN — Medication 250 MILLIGRAM(S): at 18:19

## 2019-01-26 RX ADMIN — Medication 400 MILLIGRAM(S): at 15:32

## 2019-01-26 RX ADMIN — SODIUM CHLORIDE 100 MILLILITER(S): 9 INJECTION, SOLUTION INTRAVENOUS at 04:36

## 2019-01-26 RX ADMIN — HYDROMORPHONE HYDROCHLORIDE 0.5 MILLIGRAM(S): 2 INJECTION INTRAMUSCULAR; INTRAVENOUS; SUBCUTANEOUS at 23:35

## 2019-01-26 RX ADMIN — HYDROMORPHONE HYDROCHLORIDE 0.5 MILLIGRAM(S): 2 INJECTION INTRAMUSCULAR; INTRAVENOUS; SUBCUTANEOUS at 23:05

## 2019-01-26 RX ADMIN — ENOXAPARIN SODIUM 40 MILLIGRAM(S): 100 INJECTION SUBCUTANEOUS at 18:19

## 2019-01-26 RX ADMIN — CHLORHEXIDINE GLUCONATE 1 APPLICATION(S): 213 SOLUTION TOPICAL at 05:14

## 2019-01-26 RX ADMIN — HYDROMORPHONE HYDROCHLORIDE 0.5 MILLIGRAM(S): 2 INJECTION INTRAMUSCULAR; INTRAVENOUS; SUBCUTANEOUS at 15:30

## 2019-01-26 RX ADMIN — PANTOPRAZOLE SODIUM 40 MILLIGRAM(S): 20 TABLET, DELAYED RELEASE ORAL at 05:17

## 2019-01-26 RX ADMIN — HYDROMORPHONE HYDROCHLORIDE 0.5 MILLIGRAM(S): 2 INJECTION INTRAMUSCULAR; INTRAVENOUS; SUBCUTANEOUS at 05:23

## 2019-01-26 RX ADMIN — Medication 200 GRAM(S): at 15:04

## 2019-01-26 RX ADMIN — SENNA PLUS 2 TABLET(S): 8.6 TABLET ORAL at 23:05

## 2019-01-26 RX ADMIN — HEPARIN SODIUM 5000 UNIT(S): 5000 INJECTION INTRAVENOUS; SUBCUTANEOUS at 15:04

## 2019-01-26 RX ADMIN — LACTULOSE 20 GRAM(S): 10 SOLUTION ORAL at 23:05

## 2019-01-26 NOTE — PROGRESS NOTE ADULT - ASSESSMENT
33 years old female with hx of SLE, lupus Nephritis and Fibromyalgia, recently returned from [place] on 12/29 who presented with generalized weakness with associated difficulty with ambulation; outpt MRI from December was normal. MRI here showed transverse myelitis and LP with high WBC. Neurology/ Rheumatology/ ID continued to follow the pt. Per ID less likely infectious etiology and Abx d/stiven. Pt is underwent w/u for NMO/ MS which was negative likely etiology is Autoimmune, s/p completion of 5 days of pulse dose steroids and now undergoing plasmapheresis- PLEX, S/P 2 sessions, 3rd to be performed today and will be performed every other day for a total of 5 sessions. Plan for RTX 1g IV x 2 doses (q2 weeks)  every 6 months to be started tomorrow as per rheum. Hospital course further complicated by acute urinary retention requiring frequent bladder scans and straight catheterization.     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  Premeds including Tylenol 650mg po x 1, Benadryl IVP 25mg, and prior to infusion Solumedrol 100mg IVP.  - c/w pain mx regimen with tylenol, demerol and dilaudid depending on pain scale   - rheum f/u noted and appreciated  - neuro f/u noted and appreciated and d/w Dr. Schneider the above plan of care     Acute urinary retention likely 2/2 to above  - c/w bladder scans q8hrs  - c/w straight cath as needed  - c/w flomax 0.8mg po qhs     SLE with nephritis   - stable renal disease, elevated dsDNA, low complements  - rheum f/u noted and appreciated     Fibromyalgia   - c/w tylenol for pain prn     Depression  - c/w cymbalta 60mg po qd    Constipation  - c/w colace/ senna   - will give lactulose tonight x 1 dose     DVT ppx  - c/w lovenox 40mg sq qd    Dispo: PT consulted and recommended acute rehab, PMNR consulted.

## 2019-01-26 NOTE — PROGRESS NOTE ADULT - SUBJECTIVE AND OBJECTIVE BOX
INTERVAL HISTORY:  Modest   improvement in LE power, Today is day 3/5 PLEX      VITAL SIGNS:  Vital Signs Last 24 Hrs  T(C): 36.7 (26 Jan 2019 04:35), Max: 36.9 (25 Jan 2019 22:07)  T(F): 98.1 (26 Jan 2019 04:35), Max: 98.4 (25 Jan 2019 22:07)  HR: 50 (26 Jan 2019 04:35) (50 - 81)  BP: 115/79 (26 Jan 2019 04:35) (107/74 - 115/79)  BP(mean): --  RR: 20 (26 Jan 2019 04:35) (18 - 20)  SpO2: 96% (26 Jan 2019 04:35) (95% - 96%)    PHYSICAL EXAMINATION:    Mentation:  nl  Language/Speech: nl  CN: nl  Visual Fields: full  Motor:  5/5 BUE,,  2/5 prox right LE, 3/5 prox LLE, ,4/5 distal BLE  Sensory:  DTR:  Babinski:      MEDS:  MEDICATIONS  (STANDING):  albumin human  5% IVPB 2500 milliLiter(s) IV Intermittent every 48 hours  chlorhexidine 2% Cloths 1 Application(s) Topical <User Schedule>  docusate sodium 100 milliGRAM(s) Oral two times a day  DULoxetine 60 milliGRAM(s) Oral daily  enoxaparin Injectable 40 milliGRAM(s) SubCutaneous daily  lactated ringers. 1000 milliLiter(s) (100 mL/Hr) IV Continuous <Continuous>  methylPREDNISolone sodium succinate Injectable 55 milliGRAM(s) IV Push daily  mycophenolate mofetil 1500 milliGRAM(s) Oral two times a day  pantoprazole    Tablet 40 milliGRAM(s) Oral before breakfast  polyethylene glycol 3350 17 Gram(s) Oral daily  saccharomyces boulardii 250 milliGRAM(s) Oral two times a day  senna 2 Tablet(s) Oral at bedtime  tamsulosin 0.8 milliGRAM(s) Oral at bedtime    MEDICATIONS  (PRN):  acetaminophen   Tablet .. 650 milliGRAM(s) Oral every 6 hours PRN Temp greater or equal to 38C (100.4F), Mild Pain (1 - 3)  acetaminophen  IVPB .. 1000 milliGRAM(s) IV Intermittent once PRN Severe Pain (7 - 10)  bisacodyl 5 milliGRAM(s) Oral every 12 hours PRN Constipation  HYDROmorphone  Injectable 0.5 milliGRAM(s) IV Push four times a day PRN Moderate Pain (4 - 6)  ibuprofen  Tablet. 400 milliGRAM(s) Oral every 8 hours PRN Moderate Pain (4 - 6)  lactulose Syrup 20 Gram(s) Oral two times a day PRN constipation  mineral oil enema 133 milliLiter(s) Rectal at bedtime PRN as needed if no bm for 3 days  ondansetron Injectable 4 milliGRAM(s) IV Push every 6 hours PRN Nausea and/or Vomiting      LABS:                          10.0   12.4  )-----------( 255      ( 26 Jan 2019 12:01 )             30.7                 RADIOLOGY & ADDITIONAL STUDIES:      IMPRESSION & PLAN:    Autoimmune myelitis.  Labs do not support MS or NMO.  Plan:  Complete PLEX by Wednesday  Rituxan as per Rheum.  Dispo planning for later next week.

## 2019-01-26 NOTE — PROGRESS NOTE ADULT - SUBJECTIVE AND OBJECTIVE BOX
Patient is a 33y old  Female who presents with a chief complaint of Generalized pain (2019 12:28)      HEALTH ISSUES - PROBLEM Dx:  Leukocytosis, unspecified type: Leukocytosis, unspecified type  Lupus erythematosus, unspecified form: Lupus erythematosus, unspecified form  Transverse myelitis: Transverse myelitis  Urinary retention: Urinary retention  GERD (gastroesophageal reflux disease): GERD (gastroesophageal reflux disease)  Leg weakness: Leg weakness  Lupus nephritis: Lupus nephritis  Systemic lupus erythematosus with other organ involvement: Systemic lupus erythematosus with other organ involvement  Myelitis: Myelitis  Weakness of both legs: Weakness of both legs      INTERVAL HPI/OVERNIGHT EVENTS:  Patient seen and examined at bedside. No acute events overnight. Patient states she is feeling better, is regaining back function in b/l le moves it much better than before. Continues to feel pain at times as well as constipation. Requesting lactulose post plasmapheresis. Aside from this in good spirits. Updated her and  on the plan of care. Patient denies chest pain, SOB, abd pain, N/V, fever, chills, dysuria or any other complaints. All remainder ROS negative.     Vital Signs Last 24 Hrs  T(C): 36.7 (2019 16:20), Max: 36.9 (2019 22:07)  T(F): 98 (2019 16:20), Max: 98.4 (2019 22:07)  HR: 91 (2019 16:20) (50 - 91)  BP: 98/67 (2019 16:20) (93/63 - 115/79)  BP(mean): --  RR: 18 (2019 15:21) (18 - 20)  SpO2: 96% (2019 04:35) (95% - 96%)      I&O's Summary    2019 07:01  -  2019 07:00  --------------------------------------------------------  IN: 1320 mL / OUT: 0 mL / NET: 1320 mL        CONSTITUTIONAL: Well appearing, well nourished, awake, alert and in no apparent distress  CARDIAC: Normal rate, regular rhythm.  Heart sounds S1, S2.  No murmurs, rubs or gallops   RESPIRATORY: Breath sounds clear and equal bilaterally. No wheezes, rhales or rhonchi  GASTROENTEROLOGY: Soft nt nd bs +normoactive   MUSCULOSKELETAL:  5/5 BUE,,  2/5 prox right LE, 3/5 prox LLE, 4/5 distal   EXTREMITIES: No edema, cyanosis or deformity   NEUROLOGICAL: Alert and oriented, no focal deficits, no motor or sensory deficits.  SKIN: No rash, skin turgor wnl    MEDICATIONS  (STANDING):  albumin human  5% IVPB 2500 milliLiter(s) IV Intermittent once  albumin human  5% IVPB 2500 milliLiter(s) IV Intermittent every 48 hours  chlorhexidine 2% Cloths 1 Application(s) Topical <User Schedule>  docusate sodium 100 milliGRAM(s) Oral two times a day  DULoxetine 60 milliGRAM(s) Oral daily  enoxaparin Injectable 40 milliGRAM(s) SubCutaneous daily  lactated ringers. 1000 milliLiter(s) (100 mL/Hr) IV Continuous <Continuous>  methylPREDNISolone sodium succinate Injectable 55 milliGRAM(s) IV Push daily  mycophenolate mofetil 1500 milliGRAM(s) Oral two times a day  pantoprazole    Tablet 40 milliGRAM(s) Oral before breakfast  polyethylene glycol 3350 17 Gram(s) Oral daily  saccharomyces boulardii 250 milliGRAM(s) Oral two times a day  senna 2 Tablet(s) Oral at bedtime  tamsulosin 0.8 milliGRAM(s) Oral at bedtime    MEDICATIONS  (PRN):  acetaminophen   Tablet .. 650 milliGRAM(s) Oral every 6 hours PRN Temp greater or equal to 38C (100.4F), Mild Pain (1 - 3)  acetaminophen  IVPB .. 1000 milliGRAM(s) IV Intermittent once PRN Severe Pain (7 - 10)  bisacodyl 5 milliGRAM(s) Oral every 12 hours PRN Constipation  HYDROmorphone  Injectable 0.5 milliGRAM(s) IV Push four times a day PRN Moderate Pain (4 - 6)  ibuprofen  Tablet. 400 milliGRAM(s) Oral every 8 hours PRN Moderate Pain (4 - 6)  lactulose Syrup 20 Gram(s) Oral two times a day PRN constipation  mineral oil enema 133 milliLiter(s) Rectal at bedtime PRN as needed if no bm for 3 days  ondansetron Injectable 4 milliGRAM(s) IV Push every 6 hours PRN Nausea and/or Vomiting      LABS:                        10.0   12.4  )-----------( 255      ( 2019 12:01 )             30.7         142  |  107  |  16.0  ----------------------------<  181<H>  4.0   |  22.0  |  0.24<L>    Ca    8.3<L>      2019 12:01        Urinalysis Basic - ( 2019 13:46 )    Color: Yellow / Appearance: Clear / S.020 / pH: x  Gluc: x / Ketone: Negative  / Bili: Negative / Urobili: Negative mg/dL   Blood: x / Protein: 100 mg/dL / Nitrite: Positive   Leuk Esterase: Moderate / RBC: 6-10 /HPF / WBC 26-50   Sq Epi: x / Non Sq Epi: Occasional / Bacteria: Many          RADIOLOGY & ADDITIONAL TESTS:  No new imaging studies

## 2019-01-27 LAB
ANION GAP SERPL CALC-SCNC: 16 MMOL/L — SIGNIFICANT CHANGE UP (ref 5–17)
BASOPHILS # BLD AUTO: 0 K/UL — SIGNIFICANT CHANGE UP (ref 0–0.2)
BASOPHILS NFR BLD AUTO: 0.2 % — SIGNIFICANT CHANGE UP (ref 0–2)
BUN SERPL-MCNC: 15 MG/DL — SIGNIFICANT CHANGE UP (ref 8–20)
CALCIUM SERPL-MCNC: 8.6 MG/DL — SIGNIFICANT CHANGE UP (ref 8.6–10.2)
CHLORIDE SERPL-SCNC: 110 MMOL/L — HIGH (ref 98–107)
CO2 SERPL-SCNC: 19 MMOL/L — LOW (ref 22–29)
CREAT SERPL-MCNC: 0.29 MG/DL — LOW (ref 0.5–1.3)
EOSINOPHIL # BLD AUTO: 0 K/UL — SIGNIFICANT CHANGE UP (ref 0–0.5)
EOSINOPHIL NFR BLD AUTO: 0.7 % — SIGNIFICANT CHANGE UP (ref 0–6)
GLUCOSE SERPL-MCNC: 106 MG/DL — SIGNIFICANT CHANGE UP (ref 70–115)
HCT VFR BLD CALC: 29.5 % — LOW (ref 37–47)
HGB BLD-MCNC: 9.7 G/DL — LOW (ref 12–16)
LYMPHOCYTES # BLD AUTO: 0.7 K/UL — LOW (ref 1–4.8)
LYMPHOCYTES # BLD AUTO: 12.1 % — LOW (ref 20–55)
MAGNESIUM SERPL-MCNC: 1.7 MG/DL — SIGNIFICANT CHANGE UP (ref 1.6–2.6)
MCHC RBC-ENTMCNC: 27.9 PG — SIGNIFICANT CHANGE UP (ref 27–31)
MCHC RBC-ENTMCNC: 32.9 G/DL — SIGNIFICANT CHANGE UP (ref 32–36)
MCV RBC AUTO: 84.8 FL — SIGNIFICANT CHANGE UP (ref 81–99)
MONOCYTES # BLD AUTO: 0.7 K/UL — SIGNIFICANT CHANGE UP (ref 0–0.8)
MONOCYTES NFR BLD AUTO: 11.7 % — HIGH (ref 3–10)
NEUTROPHILS # BLD AUTO: 4.2 K/UL — SIGNIFICANT CHANGE UP (ref 1.8–8)
NEUTROPHILS NFR BLD AUTO: 71.3 % — SIGNIFICANT CHANGE UP (ref 37–73)
PHOSPHATE SERPL-MCNC: 4.1 MG/DL — SIGNIFICANT CHANGE UP (ref 2.4–4.7)
PLATELET # BLD AUTO: 229 K/UL — SIGNIFICANT CHANGE UP (ref 150–400)
POTASSIUM SERPL-MCNC: 3.5 MMOL/L — SIGNIFICANT CHANGE UP (ref 3.5–5.3)
POTASSIUM SERPL-SCNC: 3.5 MMOL/L — SIGNIFICANT CHANGE UP (ref 3.5–5.3)
RBC # BLD: 3.48 M/UL — LOW (ref 4.4–5.2)
RBC # FLD: 16.9 % — HIGH (ref 11–15.6)
SODIUM SERPL-SCNC: 145 MMOL/L — SIGNIFICANT CHANGE UP (ref 135–145)
WBC # BLD: 6 K/UL — SIGNIFICANT CHANGE UP (ref 4.8–10.8)
WBC # FLD AUTO: 6 K/UL — SIGNIFICANT CHANGE UP (ref 4.8–10.8)

## 2019-01-27 PROCEDURE — 99233 SBSQ HOSP IP/OBS HIGH 50: CPT

## 2019-01-27 RX ORDER — IBUPROFEN 200 MG
400 TABLET ORAL ONCE
Qty: 0 | Refills: 0 | Status: COMPLETED | OUTPATIENT
Start: 2019-01-27 | End: 2019-01-27

## 2019-01-27 RX ORDER — IBUPROFEN 200 MG
400 TABLET ORAL EVERY 8 HOURS
Qty: 0 | Refills: 0 | Status: DISCONTINUED | OUTPATIENT
Start: 2019-01-27 | End: 2019-02-06

## 2019-01-27 RX ORDER — HYDROMORPHONE HYDROCHLORIDE 2 MG/ML
0.5 INJECTION INTRAMUSCULAR; INTRAVENOUS; SUBCUTANEOUS EVERY 8 HOURS
Qty: 0 | Refills: 0 | Status: DISCONTINUED | OUTPATIENT
Start: 2019-01-27 | End: 2019-01-29

## 2019-01-27 RX ADMIN — HYDROMORPHONE HYDROCHLORIDE 0.5 MILLIGRAM(S): 2 INJECTION INTRAMUSCULAR; INTRAVENOUS; SUBCUTANEOUS at 22:12

## 2019-01-27 RX ADMIN — Medication 250 MILLIGRAM(S): at 07:21

## 2019-01-27 RX ADMIN — Medication 400 MILLIGRAM(S): at 01:22

## 2019-01-27 RX ADMIN — HYDROMORPHONE HYDROCHLORIDE 0.5 MILLIGRAM(S): 2 INJECTION INTRAMUSCULAR; INTRAVENOUS; SUBCUTANEOUS at 14:02

## 2019-01-27 RX ADMIN — Medication 5 MILLIGRAM(S): at 09:28

## 2019-01-27 RX ADMIN — HYDROMORPHONE HYDROCHLORIDE 0.5 MILLIGRAM(S): 2 INJECTION INTRAMUSCULAR; INTRAVENOUS; SUBCUTANEOUS at 22:28

## 2019-01-27 RX ADMIN — Medication 100 MILLIGRAM(S): at 05:54

## 2019-01-27 RX ADMIN — Medication 55 MILLIGRAM(S): at 05:57

## 2019-01-27 RX ADMIN — HYDROMORPHONE HYDROCHLORIDE 0.5 MILLIGRAM(S): 2 INJECTION INTRAMUSCULAR; INTRAVENOUS; SUBCUTANEOUS at 09:00

## 2019-01-27 RX ADMIN — SODIUM CHLORIDE 100 MILLILITER(S): 9 INJECTION, SOLUTION INTRAVENOUS at 05:55

## 2019-01-27 RX ADMIN — TAMSULOSIN HYDROCHLORIDE 0.8 MILLIGRAM(S): 0.4 CAPSULE ORAL at 22:13

## 2019-01-27 RX ADMIN — Medication 650 MILLIGRAM(S): at 11:55

## 2019-01-27 RX ADMIN — CHLORHEXIDINE GLUCONATE 1 APPLICATION(S): 213 SOLUTION TOPICAL at 05:57

## 2019-01-27 RX ADMIN — HYDROMORPHONE HYDROCHLORIDE 0.5 MILLIGRAM(S): 2 INJECTION INTRAMUSCULAR; INTRAVENOUS; SUBCUTANEOUS at 08:43

## 2019-01-27 RX ADMIN — POLYETHYLENE GLYCOL 3350 17 GRAM(S): 17 POWDER, FOR SOLUTION ORAL at 11:55

## 2019-01-27 RX ADMIN — PANTOPRAZOLE SODIUM 40 MILLIGRAM(S): 20 TABLET, DELAYED RELEASE ORAL at 05:54

## 2019-01-27 RX ADMIN — ENOXAPARIN SODIUM 40 MILLIGRAM(S): 100 INJECTION SUBCUTANEOUS at 11:55

## 2019-01-27 RX ADMIN — LACTULOSE 20 GRAM(S): 10 SOLUTION ORAL at 09:25

## 2019-01-27 RX ADMIN — Medication 400 MILLIGRAM(S): at 00:52

## 2019-01-27 RX ADMIN — MYCOPHENOLATE MOFETIL 1500 MILLIGRAM(S): 250 CAPSULE ORAL at 05:54

## 2019-01-27 RX ADMIN — DULOXETINE HYDROCHLORIDE 60 MILLIGRAM(S): 30 CAPSULE, DELAYED RELEASE ORAL at 11:55

## 2019-01-27 RX ADMIN — HYDROMORPHONE HYDROCHLORIDE 0.5 MILLIGRAM(S): 2 INJECTION INTRAMUSCULAR; INTRAVENOUS; SUBCUTANEOUS at 15:02

## 2019-01-27 RX ADMIN — MYCOPHENOLATE MOFETIL 1500 MILLIGRAM(S): 250 CAPSULE ORAL at 18:32

## 2019-01-27 RX ADMIN — Medication 250 MILLIGRAM(S): at 22:13

## 2019-01-27 NOTE — PROGRESS NOTE ADULT - ASSESSMENT
33 years old female with hx of SLE, lupus Nephritis and Fibromyalgia, recently returned from [place] on 12/29 who presented with generalized weakness with associated difficulty with ambulation; outpt MRI from December was normal. MRI here showed transverse myelitis and LP with high WBC. Neurology/ Rheumatology/ ID continued to follow the pt. Per ID less likely infectious etiology and Abx d/stiven. Pt is underwent w/u for NMO/ MS which was negative likely etiology is Autoimmune, s/p completion of 5 days of pulse dose steroids and now undergoing plasmapheresis- PLEX, S/P 3 sessions, will be performed every other day for a total of 5 sessions, next session for the am. Plan for RTX 1g IV x 2 doses (q2 weeks) every 6 months to be started today as per rheum. Hospital course further complicated by acute urinary retention requiring frequent bladder scans and straight catheterization.     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  Premeds including Tylenol 650mg po x 1, Benadryl IVP 25mg, and prior to infusion Solumedrol 100mg IVP. To be started 1/27/19 which is today. Inability for pt to receive dose until tonight. Will d/w rheum if possible to postpone dose to after completion of plasmapheresis or after next session. Awaiting rheum call back.   - c/w pain mx regimen with tylenol, demerol and dilaudid depending on pain scale   - rheum f/u noted and appreciated  - neuro f/u noted and appreciated      Acute urinary retention likely 2/2 to above  - c/w bladder scans q8hrs  - c/w straight cath as needed  - c/w flomax 0.8mg po qhs     SLE with nephritis   - stable renal disease, elevated dsDNA, low complements  - rheum f/u noted and appreciated     Fibromyalgia   - c/w tylenol for pain prn     Depression  - c/w cymbalta 60mg po qd    Constipation  - c/w colace/ senna   - will give lactulose tonight x 1 dose     DVT ppx  - c/w lovenox 40mg sq qd    Dispo: PT consulted and recommended acute rehab, PMNR consulted for further recommendations.  is hesitant and would prefer patient home, d/w him will see how patient performs post tx.

## 2019-01-27 NOTE — PROGRESS NOTE ADULT - SUBJECTIVE AND OBJECTIVE BOX
Patient is a 33y old  Female who presents with a chief complaint of Generalized pain (2019 17:16) slight movement returning to b/l        HEALTH ISSUES - PROBLEM Dx:  Leukocytosis, unspecified type: Leukocytosis, unspecified type  Lupus erythematosus, unspecified form: Lupus erythematosus, unspecified form  Transverse myelitis: Transverse myelitis  Urinary retention: Urinary retention  GERD (gastroesophageal reflux disease): GERD (gastroesophageal reflux disease)  Leg weakness: Leg weakness  Lupus nephritis: Lupus nephritis  Systemic lupus erythematosus with other organ involvement: Systemic lupus erythematosus with other organ involvement  Myelitis: Myelitis  Weakness of both legs: Weakness of both legs      INTERVAL HPI/OVERNIGHT EVENTS:  Patient seen and examined at bedside. No acute events overnight. Patient states she is feeling well, just tired. Pain is well controlled on current regimen but requesting ibuprofen as anti inflammatory agent.  at bedside and has multiple questions with regards     Vital Signs Last 24 Hrs  T(C): 37.1 (2019 11:44), Max: 37.1 (2019 11:44)  T(F): 98.7 (2019 11:44), Max: 98.7 (2019 11:44)  HR: 85 (2019 11:44) (80 - 91)  BP: 104/66 (2019 11:44) (93/63 - 118/82)  BP(mean): --  RR: 18 (2019 11:44) (18 - 18)  SpO2: 97% (2019 11:44) (97% - 98%)      I&O's Summary    2019 07:  -  2019 07:00  --------------------------------------------------------  IN: 2701 mL / OUT: 2200 mL / NET: 501 mL    2019 07:01  -  2019 12:24  --------------------------------------------------------  IN: 0 mL / OUT: 650 mL / NET: -650 mL    CONSTITUTIONAL: Well appearing, well nourished, awake, alert and in no apparent distress  CARDIAC: Normal rate, regular rhythm.  Heart sounds S1, S2.  No murmurs, rubs or gallops   RESPIRATORY: Breath sounds clear and equal bilaterally. No wheezes, rhales or rhonchi  GASTROENTEROLOGY: Soft nt nd bs +normoactive   MUSCULOSKELETAL:  5/5 BUE,,  2/5 prox right LE, 3/5 prox LLE, 4/5 distal   EXTREMITIES: No edema, cyanosis or deformity   NEUROLOGICAL: Alert and oriented, no focal deficits, no motor or sensory deficits.  SKIN: No rash, skin turgor wnl      MEDICATIONS  (STANDING):  acetaminophen   Tablet .. 650 milliGRAM(s) Oral once  albumin human  5% IVPB 2500 milliLiter(s) IV Intermittent once  albumin human  5% IVPB 2500 milliLiter(s) IV Intermittent every 48 hours  chlorhexidine 2% Cloths 1 Application(s) Topical <User Schedule>  diphenhydrAMINE   Injectable 25 milliGRAM(s) IV Push once  docusate sodium 100 milliGRAM(s) Oral two times a day  DULoxetine 60 milliGRAM(s) Oral daily  enoxaparin Injectable 40 milliGRAM(s) SubCutaneous daily  methylPREDNISolone sodium succinate Injectable 100 milliGRAM(s) IV Push once  methylPREDNISolone sodium succinate Injectable 55 milliGRAM(s) IV Push daily  mycophenolate mofetil 1500 milliGRAM(s) Oral two times a day  pantoprazole    Tablet 40 milliGRAM(s) Oral before breakfast  polyethylene glycol 3350 17 Gram(s) Oral daily  riTUXimab IVPB (Non - oncologic) 1000 milliGRAM(s) IV Intermittent once  saccharomyces boulardii 250 milliGRAM(s) Oral two times a day  senna 2 Tablet(s) Oral at bedtime  tamsulosin 0.8 milliGRAM(s) Oral at bedtime    MEDICATIONS  (PRN):  acetaminophen   Tablet .. 650 milliGRAM(s) Oral every 6 hours PRN Temp greater or equal to 38C (100.4F), Mild Pain (1 - 3)  acetaminophen  IVPB .. 1000 milliGRAM(s) IV Intermittent once PRN Severe Pain (7 - 10)  bisacodyl 5 milliGRAM(s) Oral every 12 hours PRN Constipation  HYDROmorphone  Injectable 0.5 milliGRAM(s) IV Push every 8 hours PRN Severe Pain (7 - 10)  ibuprofen  Tablet. 400 milliGRAM(s) Oral every 8 hours PRN Moderate Pain (4 - 6)  lactulose Syrup 20 Gram(s) Oral two times a day PRN constipation  magnesium hydroxide Suspension 30 milliLiter(s) Oral daily PRN Constipation  meperidine     Injectable 25 milliGRAM(s) IV Push once PRN Shivering during infusion  mineral oil enema 133 milliLiter(s) Rectal at bedtime PRN as needed if no bm for 3 days  ondansetron Injectable 4 milliGRAM(s) IV Push every 6 hours PRN Nausea and/or Vomiting      LABS:                        9.7    6.0   )-----------( 229      ( 2019 07:33 )             29.5         145  |  110<H>  |  15.0  ----------------------------<  106  3.5   |  19.0<L>  |  0.29<L>    Ca    8.6      2019 07:33  Phos  4.1       Mg     1.7           Urinalysis Basic - ( 2019 13:46 )    Color: Yellow / Appearance: Clear / S.020 / pH: x  Gluc: x / Ketone: Negative  / Bili: Negative / Urobili: Negative mg/dL   Blood: x / Protein: 100 mg/dL / Nitrite: Positive   Leuk Esterase: Moderate / RBC: 6-10 /HPF / WBC 26-50   Sq Epi: x / Non Sq Epi: Occasional / Bacteria: Many      RADIOLOGY & ADDITIONAL TESTS:  No new imaging studies

## 2019-01-28 LAB
APPEARANCE UR: CLEAR — SIGNIFICANT CHANGE UP
BACTERIA # UR AUTO: ABNORMAL
BILIRUB UR-MCNC: NEGATIVE — SIGNIFICANT CHANGE UP
COLOR SPEC: YELLOW — SIGNIFICANT CHANGE UP
DIFF PNL FLD: ABNORMAL
EPI CELLS # UR: SIGNIFICANT CHANGE UP
FIBRINOGEN PPP-MCNC: 148 MG/DL — LOW (ref 350–510)
GLUCOSE UR QL: NEGATIVE MG/DL — SIGNIFICANT CHANGE UP
KETONES UR-MCNC: NEGATIVE — SIGNIFICANT CHANGE UP
LEUKOCYTE ESTERASE UR-ACNC: ABNORMAL
NITRITE UR-MCNC: NEGATIVE — SIGNIFICANT CHANGE UP
PH UR: 5 — SIGNIFICANT CHANGE UP (ref 5–8)
PROT UR-MCNC: 15 MG/DL
RBC CASTS # UR COMP ASSIST: SIGNIFICANT CHANGE UP /HPF (ref 0–4)
SP GR SPEC: 1.01 — SIGNIFICANT CHANGE UP (ref 1.01–1.02)
UROBILINOGEN FLD QL: NEGATIVE MG/DL — SIGNIFICANT CHANGE UP
WBC UR QL: SIGNIFICANT CHANGE UP

## 2019-01-28 PROCEDURE — 99223 1ST HOSP IP/OBS HIGH 75: CPT

## 2019-01-28 PROCEDURE — 99233 SBSQ HOSP IP/OBS HIGH 50: CPT

## 2019-01-28 RX ORDER — ENOXAPARIN SODIUM 100 MG/ML
40 INJECTION SUBCUTANEOUS DAILY
Qty: 0 | Refills: 0 | Status: DISCONTINUED | OUTPATIENT
Start: 2019-01-28 | End: 2019-02-06

## 2019-01-28 RX ORDER — RITUXIMAB 10 MG/ML
1000 INJECTION, SOLUTION INTRAVENOUS ONCE
Qty: 0 | Refills: 0 | Status: COMPLETED | OUTPATIENT
Start: 2019-01-28 | End: 2019-01-28

## 2019-01-28 RX ORDER — DOCUSATE SODIUM 100 MG
100 CAPSULE ORAL DAILY
Qty: 0 | Refills: 0 | Status: DISCONTINUED | OUTPATIENT
Start: 2019-01-28 | End: 2019-01-29

## 2019-01-28 RX ORDER — CALCIUM GLUCONATE 100 MG/ML
1 VIAL (ML) INTRAVENOUS ONCE
Qty: 0 | Refills: 0 | Status: COMPLETED | OUTPATIENT
Start: 2019-01-28 | End: 2019-01-28

## 2019-01-28 RX ORDER — OXYCODONE HYDROCHLORIDE 5 MG/1
5 TABLET ORAL ONCE
Qty: 0 | Refills: 0 | Status: DISCONTINUED | OUTPATIENT
Start: 2019-01-28 | End: 2019-01-28

## 2019-01-28 RX ORDER — HEPARIN SODIUM 5000 [USP'U]/ML
5000 INJECTION INTRAVENOUS; SUBCUTANEOUS ONCE
Qty: 0 | Refills: 0 | Status: COMPLETED | OUTPATIENT
Start: 2019-01-28 | End: 2019-01-28

## 2019-01-28 RX ORDER — BACLOFEN 100 %
10 POWDER (GRAM) MISCELLANEOUS EVERY 8 HOURS
Qty: 0 | Refills: 0 | Status: DISCONTINUED | OUTPATIENT
Start: 2019-01-28 | End: 2019-02-06

## 2019-01-28 RX ADMIN — DULOXETINE HYDROCHLORIDE 60 MILLIGRAM(S): 30 CAPSULE, DELAYED RELEASE ORAL at 13:07

## 2019-01-28 RX ADMIN — ENOXAPARIN SODIUM 40 MILLIGRAM(S): 100 INJECTION SUBCUTANEOUS at 13:07

## 2019-01-28 RX ADMIN — HYDROMORPHONE HYDROCHLORIDE 0.5 MILLIGRAM(S): 2 INJECTION INTRAMUSCULAR; INTRAVENOUS; SUBCUTANEOUS at 23:57

## 2019-01-28 RX ADMIN — CHLORHEXIDINE GLUCONATE 1 APPLICATION(S): 213 SOLUTION TOPICAL at 05:58

## 2019-01-28 RX ADMIN — Medication 25 MILLIGRAM(S): at 18:28

## 2019-01-28 RX ADMIN — HYDROMORPHONE HYDROCHLORIDE 0.5 MILLIGRAM(S): 2 INJECTION INTRAMUSCULAR; INTRAVENOUS; SUBCUTANEOUS at 06:20

## 2019-01-28 RX ADMIN — MYCOPHENOLATE MOFETIL 1500 MILLIGRAM(S): 250 CAPSULE ORAL at 06:19

## 2019-01-28 RX ADMIN — MYCOPHENOLATE MOFETIL 1500 MILLIGRAM(S): 250 CAPSULE ORAL at 21:56

## 2019-01-28 RX ADMIN — OXYCODONE HYDROCHLORIDE 5 MILLIGRAM(S): 5 TABLET ORAL at 04:27

## 2019-01-28 RX ADMIN — HEPARIN SODIUM 5000 UNIT(S): 5000 INJECTION INTRAVENOUS; SUBCUTANEOUS at 11:21

## 2019-01-28 RX ADMIN — OXYCODONE HYDROCHLORIDE 5 MILLIGRAM(S): 5 TABLET ORAL at 03:27

## 2019-01-28 RX ADMIN — TAMSULOSIN HYDROCHLORIDE 0.8 MILLIGRAM(S): 0.4 CAPSULE ORAL at 21:58

## 2019-01-28 RX ADMIN — Medication 250 MILLIGRAM(S): at 06:19

## 2019-01-28 RX ADMIN — RITUXIMAB 120 MILLIGRAM(S): 10 INJECTION, SOLUTION INTRAVENOUS at 18:34

## 2019-01-28 RX ADMIN — Medication 55 MILLIGRAM(S): at 06:24

## 2019-01-28 RX ADMIN — Medication 100 MILLIGRAM(S): at 18:27

## 2019-01-28 RX ADMIN — Medication 1250 MILLILITER(S): at 10:21

## 2019-01-28 RX ADMIN — PANTOPRAZOLE SODIUM 40 MILLIGRAM(S): 20 TABLET, DELAYED RELEASE ORAL at 06:19

## 2019-01-28 RX ADMIN — Medication 650 MILLIGRAM(S): at 18:27

## 2019-01-28 RX ADMIN — HYDROMORPHONE HYDROCHLORIDE 0.5 MILLIGRAM(S): 2 INJECTION INTRAMUSCULAR; INTRAVENOUS; SUBCUTANEOUS at 06:35

## 2019-01-28 RX ADMIN — HYDROMORPHONE HYDROCHLORIDE 0.5 MILLIGRAM(S): 2 INJECTION INTRAMUSCULAR; INTRAVENOUS; SUBCUTANEOUS at 15:38

## 2019-01-28 RX ADMIN — Medication 100 MILLIGRAM(S): at 13:06

## 2019-01-28 RX ADMIN — Medication 200 GRAM(S): at 10:21

## 2019-01-28 NOTE — CHART NOTE - NSCHARTNOTEFT_GEN_A_CORE
Consent signed in chart this evening  Pt otherwise stable, some mild RLE>LLE improvement  Still c/o trapezius/upper paraspinal spasms and pain    Imp/Plan:  SLE w/ transverse myelitis, NMO negative with LE weakness  Currently undergoing PLEX. Plan for RTX  - c/w solumedrol vs prendisone 1mg/kg/d  - start RTX 1g x 2 doses 2 weeks apart, premeds ordered  - c/w MMF 1500mg BID for now  - can resume hydroxychloroquine 200mg BID  - complete PLEX sessions x 5 over 10 days    Call for any questions  [phone]

## 2019-01-28 NOTE — PROGRESS NOTE ADULT - SUBJECTIVE AND OBJECTIVE BOX
Patient seen and examined . Very pleasant ,  at bed side . Comfortable , getting plasmapheresis , tolerating well  . c/o multiple loose stools since yesterday , urinary retention and abdominal discomfort .    CC : generalized weakness , LE weakness with slight improvement , loose stools since yesterday , urinary retention and abdominal discomfort     HPI:  33 years old female with PMH of SLE, Lupus Nephritis and Fibromyalgia brought by her  yesterday evening with generalized weakness. As per patient, she is not feeling well for last few days. She is having low grade fevers, generalized body pain, throat irritation, cough, ear pain and vomiting. Her appetite is very poor. She went to her PMD few days ago who prescribed Sertraline for her depressive symptoms. As per her, symptoms started after taking that medication. Yesterday, she was feeling so weak that she could not walk so her  brought her to ER. She is also complaining of difficulty urinating. She went to Urologist 10 days ago and she was prescribed Flomax which she has been taking but is unable to void. She also has constipation.   She recently came back from [place] on 12/29/18 after staying there for 2.5 to 3 months. She had MRI there in December due to back pain and generalized weakness and it was normal.   She has headache, neck pain and lightheadedness. Denies photophobia. + sick contacts with common cold. (16 Jan 2019 11:58)      PAST MEDICAL & SURGICAL HISTORY:  Fibromyalgia  Lupus  GERD (gastroesophageal reflux disease)  Lupus nephritis  S/P correction of deviated nasal septum  History of esophagogastroduodenoscopy (EGD)  History of biopsy: Renal      MEDICATIONS  (STANDING):  acetaminophen   Tablet .. 650 milliGRAM(s) Oral once  albumin human  5% IVPB 2500 milliLiter(s) IV Intermittent every 48 hours  chlorhexidine 2% Cloths 1 Application(s) Topical <User Schedule>  diphenhydrAMINE   Injectable 25 milliGRAM(s) IV Push once  docusate sodium 100 milliGRAM(s) Oral two times a day  DULoxetine 60 milliGRAM(s) Oral daily  enoxaparin Injectable 40 milliGRAM(s) SubCutaneous daily  heparin  Injectable 5000 Unit(s) IV Push once  methylPREDNISolone sodium succinate Injectable 100 milliGRAM(s) IV Push once  methylPREDNISolone sodium succinate Injectable 55 milliGRAM(s) IV Push daily  mycophenolate mofetil 1500 milliGRAM(s) Oral two times a day  pantoprazole    Tablet 40 milliGRAM(s) Oral before breakfast  riTUXimab IVPB (Non - oncologic) 1000 milliGRAM(s) IV Intermittent once  tamsulosin 0.8 milliGRAM(s) Oral at bedtime    MEDICATIONS  (PRN):  acetaminophen   Tablet .. 650 milliGRAM(s) Oral every 6 hours PRN Temp greater or equal to 38C (100.4F), Mild Pain (1 - 3)  acetaminophen  IVPB .. 1000 milliGRAM(s) IV Intermittent once PRN Severe Pain (7 - 10)  bisacodyl 5 milliGRAM(s) Oral every 12 hours PRN Constipation  HYDROmorphone  Injectable 0.5 milliGRAM(s) IV Push every 8 hours PRN Severe Pain (7 - 10)  ibuprofen  Tablet. 400 milliGRAM(s) Oral every 8 hours PRN Moderate Pain (4 - 6)  lactulose Syrup 20 Gram(s) Oral two times a day PRN constipation  magnesium hydroxide Suspension 30 milliLiter(s) Oral daily PRN Constipation  meperidine     Injectable 25 milliGRAM(s) IV Push once PRN Shivering during infusion  mineral oil enema 133 milliLiter(s) Rectal at bedtime PRN as needed if no bm for 3 days  ondansetron Injectable 4 milliGRAM(s) IV Push every 6 hours PRN Nausea and/or Vomiting      LABS:                          9.7    6.0   )-----------( 229      ( 27 Jan 2019 07:33 )             29.5     01-27    145  |  110<H>  |  15.0  ----------------------------<  106  3.5   |  19.0<L>  |  0.29<L>    Ca    8.6      27 Jan 2019 07:33  Phos  4.1     01-27  Mg     1.7     01-27    Urinalysis + Microscopic Examination (01.25.19 @ 13:46)    Urine Appearance: Clear    Urobilinogen: Negative mg/dL    Specific Gravity: 1.020    Protein, Urine: 100 mg/dL    pH Urine: 5.0    Leukocyte Esterase Concentration: Moderate    Nitrite: Positive    Ketone - Urine: Negative    Bilirubin: Negative    Color: Yellow    Glucose Qualitative, Urine: Negative mg/dL    Blood, Urine: Large    Red Blood Cell - Urine: 6-10 /HPF    White Blood Cell - Urine: 26-50    Epithelial Cells: Occasional    Bacteria: Many          RADIOLOGY & ADDITIONAL TESTS:  < from: MR Cervical Spine w/wo IV Cont (01.22.19 @ 07:46) >     EXAM:  MR SPINE THORACIC WAW IC                         EXAM:  MR SPINE CERVICAL WAW IC                          PROCEDURE DATE:  01/22/2019          INTERPRETATION:  CLINICAL HISTORY: Leg weakness, transverse myelitis    COMPARISON: MRI cervicothoracic spine dated 1/16/2019    TECHNIQUE: Cervical spine and thoracic MRI: Multiplanar, multisequence MR   images of the cervical and thoracic spine are obtained with and without   the administration of 5 cc intravenous Gadavist contrast. 2.5 cc of   contrast was discarded.    < end of copied text >  < from: MR Cervical Spine w/wo IV Cont (01.22.19 @ 07:46) >    IMPRESSION:    Mild improved cord signal abnormality in the cervical spine and thoracic   spine. Minimallyimproved focal enhancement in the spinal cord at the T5   and T12 levels.        LILIA DUMONT M.D., ATTENDING RADIOLOGIST  This document has been electronically signed. Jan 22 2019  8:42AM        < end of copied text >        REVIEW OF SYSTEMS:    Generalized weakness / b/l le weakness slightly better , loose stools + , urinary retention and abdominal discomfort ,all other systems reviewed and are negative     Vital Signs Last 24 Hrs  T(C): 37.1 (28 Jan 2019 08:04), Max: 37.1 (27 Jan 2019 11:44)  T(F): 98.8 (28 Jan 2019 08:04), Max: 98.8 (28 Jan 2019 08:04)  HR: 85 (28 Jan 2019 08:04) (85 - 97)  BP: 103/74 (28 Jan 2019 08:04) (103/74 - 119/68)  BP(mean): --  RR: 18 (28 Jan 2019 08:04) (18 - 18)  SpO2: 97% (28 Jan 2019 08:04) (97% - 98%)    PHYSICAL EXAM:    GENERAL: NAD, well-groomed, well-developed  HEAD:  Atraumatic, Normocephalic  EYES: EOMI, PERRLA, conjunctiva and sclera clear  NECK: Supple, No JVD, Normal thyroid  NERVOUS SYSTEM:  Alert & Oriented X3, b/l LE weakness   CHEST/LUNG: CTA b/l ,  no  rales, rhonchi, wheezing, or rubs  HEART: Regular rate and rhythm; No murmurs, rubs, or gallops  ABDOMEN: Soft, mild lower abdominal discomfort , Nondistended; Bowel sounds present  EXTREMITIES:  2+ Peripheral Pulses, No clubbing, cyanosis, or edema ,   5/5 BUE,,  2/5 prox right LE, 3/5 prox LLE, 4/5 distal   LYMPH: No lymphadenopathy noted  SKIN: No rashes or lesions  L IJ line +

## 2019-01-28 NOTE — PROGRESS NOTE ADULT - ASSESSMENT
33 years old female with hx of SLE, lupus Nephritis and Fibromyalgia, recently returned from [place] on 12/29 who presented with generalized weakness with associated difficulty with ambulation; outpt MRI from December was normal. MRI here showed transverse myelitis and LP with high WBC. Neurology/ Rheumatology/ ID continued to follow the pt. Per ID less likely infectious etiology and Abx d/stiven. Pt is underwent w/u for NMO/ MS which was negative likely etiology is Autoimmune, s/p completion of 5 days of pulse dose steroids and now undergoing plasmapheresis- PLEX, S/P 3 sessions, will be performed every other day for a total of 5 sessions, next session for the am. Plan for RTX 1g IV x 2 doses (q2 weeks) every 6 months to be started today as per rheum. Hospital course further complicated by acute urinary retention requiring frequent bladder scans and straight catheterization. Today c/o loose stools since yesterday .   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  Premeds including Tylenol 650mg po x 1, Benadryl IVP 25mg, and prior to infusion Solumedrol 100mg IVP. To be started 1/27/19 which is today. Inability for pt to receive dose until tonight. Will d/w rheum if possible to postpone dose to after completion of plasmapheresis or after next session. Awaiting rheum call back.   - c/w pain mx regimen with tylenol, demerol and dilaudid depending on pain scale   - rheum f/u noted and appreciated  - neuro f/u noted and appreciated      Acute urinary retention likely 2/2 to above  - c/w bladder scans q8hrs  - c/w straight cath as needed  - c/w flomax 0.8mg po qhs   - UA noted , will repeat UA / culture , r/o UTI     SLE with nephritis   - stable renal disease, elevated dsDNA, low complements  - rheum f/u noted and appreciated     Fibromyalgia   - c/w tylenol for pain prn     Depression  - c/w cymbalta 60mg po qd    Constipation  - now with loose stools , will d/c Senna / Miralax / hold colace if loose stools , will observe     DVT ppx  - c/w lovenox 40mg sq qd    Dispo: PT consulted and recommended acute rehab, PMNR consulted for further recommendations.  is hesitant and would prefer patient home, d/w him will see how patient performs post tx.

## 2019-01-29 LAB
APPEARANCE UR: CLEAR — SIGNIFICANT CHANGE UP
BACTERIA # UR AUTO: ABNORMAL
BILIRUB UR-MCNC: NEGATIVE — SIGNIFICANT CHANGE UP
COLOR SPEC: YELLOW — SIGNIFICANT CHANGE UP
DIFF PNL FLD: ABNORMAL
EPI CELLS # UR: SIGNIFICANT CHANGE UP
GLUCOSE UR QL: NEGATIVE MG/DL — SIGNIFICANT CHANGE UP
KETONES UR-MCNC: NEGATIVE — SIGNIFICANT CHANGE UP
LEUKOCYTE ESTERASE UR-ACNC: ABNORMAL
NITRITE UR-MCNC: NEGATIVE — SIGNIFICANT CHANGE UP
PH UR: 5 — SIGNIFICANT CHANGE UP (ref 5–8)
PROT UR-MCNC: 30 MG/DL
RBC CASTS # UR COMP ASSIST: ABNORMAL /HPF (ref 0–4)
SP GR SPEC: 1.02 — SIGNIFICANT CHANGE UP (ref 1.01–1.02)
UROBILINOGEN FLD QL: NEGATIVE MG/DL — SIGNIFICANT CHANGE UP
WBC UR QL: SIGNIFICANT CHANGE UP

## 2019-01-29 PROCEDURE — 99233 SBSQ HOSP IP/OBS HIGH 50: CPT

## 2019-01-29 PROCEDURE — 99232 SBSQ HOSP IP/OBS MODERATE 35: CPT

## 2019-01-29 RX ORDER — SENNA PLUS 8.6 MG/1
1 TABLET ORAL AT BEDTIME
Qty: 0 | Refills: 0 | Status: DISCONTINUED | OUTPATIENT
Start: 2019-01-29 | End: 2019-02-06

## 2019-01-29 RX ORDER — DOCUSATE SODIUM 100 MG
100 CAPSULE ORAL
Qty: 0 | Refills: 0 | Status: DISCONTINUED | OUTPATIENT
Start: 2019-01-29 | End: 2019-02-06

## 2019-01-29 RX ORDER — SODIUM CHLORIDE 9 MG/ML
1000 INJECTION INTRAMUSCULAR; INTRAVENOUS; SUBCUTANEOUS
Qty: 0 | Refills: 0 | Status: COMPLETED | OUTPATIENT
Start: 2019-01-29 | End: 2019-01-29

## 2019-01-29 RX ORDER — HYDROXYCHLOROQUINE SULFATE 200 MG
200 TABLET ORAL
Qty: 0 | Refills: 0 | Status: DISCONTINUED | OUTPATIENT
Start: 2019-01-29 | End: 2019-02-06

## 2019-01-29 RX ORDER — POLYETHYLENE GLYCOL 3350 17 G/17G
17 POWDER, FOR SOLUTION ORAL DAILY
Qty: 0 | Refills: 0 | Status: DISCONTINUED | OUTPATIENT
Start: 2019-01-29 | End: 2019-02-06

## 2019-01-29 RX ORDER — OXYCODONE AND ACETAMINOPHEN 5; 325 MG/1; MG/1
2 TABLET ORAL EVERY 6 HOURS
Qty: 0 | Refills: 0 | Status: DISCONTINUED | OUTPATIENT
Start: 2019-01-29 | End: 2019-02-05

## 2019-01-29 RX ADMIN — Medication 55 MILLIGRAM(S): at 05:21

## 2019-01-29 RX ADMIN — PANTOPRAZOLE SODIUM 40 MILLIGRAM(S): 20 TABLET, DELAYED RELEASE ORAL at 05:22

## 2019-01-29 RX ADMIN — MYCOPHENOLATE MOFETIL 1500 MILLIGRAM(S): 250 CAPSULE ORAL at 17:25

## 2019-01-29 RX ADMIN — SODIUM CHLORIDE 100 MILLILITER(S): 9 INJECTION INTRAMUSCULAR; INTRAVENOUS; SUBCUTANEOUS at 11:17

## 2019-01-29 RX ADMIN — HYDROMORPHONE HYDROCHLORIDE 0.5 MILLIGRAM(S): 2 INJECTION INTRAMUSCULAR; INTRAVENOUS; SUBCUTANEOUS at 01:15

## 2019-01-29 RX ADMIN — POLYETHYLENE GLYCOL 3350 17 GRAM(S): 17 POWDER, FOR SOLUTION ORAL at 17:25

## 2019-01-29 RX ADMIN — OXYCODONE AND ACETAMINOPHEN 2 TABLET(S): 5; 325 TABLET ORAL at 11:57

## 2019-01-29 RX ADMIN — OXYCODONE AND ACETAMINOPHEN 2 TABLET(S): 5; 325 TABLET ORAL at 12:50

## 2019-01-29 RX ADMIN — Medication 200 MILLIGRAM(S): at 17:25

## 2019-01-29 RX ADMIN — OXYCODONE AND ACETAMINOPHEN 2 TABLET(S): 5; 325 TABLET ORAL at 21:33

## 2019-01-29 RX ADMIN — Medication 100 MILLIGRAM(S): at 11:18

## 2019-01-29 RX ADMIN — TAMSULOSIN HYDROCHLORIDE 0.8 MILLIGRAM(S): 0.4 CAPSULE ORAL at 21:28

## 2019-01-29 RX ADMIN — SENNA PLUS 1 TABLET(S): 8.6 TABLET ORAL at 21:28

## 2019-01-29 RX ADMIN — CHLORHEXIDINE GLUCONATE 1 APPLICATION(S): 213 SOLUTION TOPICAL at 05:22

## 2019-01-29 RX ADMIN — DULOXETINE HYDROCHLORIDE 60 MILLIGRAM(S): 30 CAPSULE, DELAYED RELEASE ORAL at 11:18

## 2019-01-29 RX ADMIN — Medication 400 MILLIGRAM(S): at 06:30

## 2019-01-29 RX ADMIN — Medication 400 MILLIGRAM(S): at 05:21

## 2019-01-29 RX ADMIN — Medication 100 MILLIGRAM(S): at 17:26

## 2019-01-29 RX ADMIN — MYCOPHENOLATE MOFETIL 1500 MILLIGRAM(S): 250 CAPSULE ORAL at 05:22

## 2019-01-29 RX ADMIN — ENOXAPARIN SODIUM 40 MILLIGRAM(S): 100 INJECTION SUBCUTANEOUS at 11:18

## 2019-01-29 NOTE — PROGRESS NOTE ADULT - ASSESSMENT
33 years old female with hx of SLE, lupus Nephritis and Fibromyalgia, recently returned from [place] on 12/29 who presented with generalized weakness with associated difficulty with ambulation; outpt MRI from December was normal. MRI here showed transverse myelitis and LP with high WBC. Neurology/ Rheumatology/ ID continued to follow the pt. Per ID less likely infectious etiology and Abx d/stiven. Pt is underwent w/u for NMO/ MS which was negative likely etiology is Autoimmune, s/p completion of 5 days of pulse dose steroids and now undergoing plasmapheresis- PLEX, S/P 3 sessions, will be performed every other day for a total of 5 sessions, next session for the am. Plan for RTX 1g IV x 2 doses (q2 weeks) every 6 months to be started today as per rheum. Hospital course further complicated by acute urinary retention requiring frequent bladder scans and straight catheterization. Today seen and examined , c/o low back pain , still with urinary retention , being straight cath , no BM X 2 days .    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 Plan for RTX  - c/w solumedrol vs prendisone 1mg/kg/d ( may switch on discharge )   - started on  RTX 1g x 2 doses 2 weeks apart, premeds ordered  - c/w MMF 1500mg BID for now  - can resume hydroxychloroquine 200mg BID( restarted )   - complete PLEX sessions x 5 over 10 days 9 last dose tomorrow )     Call for any questions  [phone].    Acute urinary retention likely 2/2 to above  - c/w bladder scans q8hrs  - c/w straight cath as needed  - c/w flomax 0.8mg po qhs   - UA noted , as per nursing urine is smelly ,  will repeat UA / culture , r/o UTI     SLE with nephritis   - stable renal disease, elevated dsDNA, low complements  - rheum f/u noted and appreciated     Fibromyalgia   - c/w tylenol for pain prn     Depression  - c/w cymbalta 60mg po qd    Constipation  - had  loose stools 3 days , bowel regimen was adjusted , now no BM X 2 days , will restart Miralax / senna     DVT ppx  - c/w lovenox 40mg sq qd    BP on lower side / patient tach - likely dehydrated - will hydrate     Dispo: PT consulted and recommended acute rehab, PMNR consulted  - as per PMNR - patient will benefit from acute rehab .  If stable can be d/c to rehab by Friday .

## 2019-01-29 NOTE — OCCUPATIONAL THERAPY INITIAL EVALUATION ADULT - LEVEL OF INDEPENDENCE: STAND/SIT, REHAB EVAL
Message   thyroid is in range      Verified Results  (1) TSH [id number] 03:25PM Aron Contreras courtesy copy of this report has been sent to  the patient       Test Name Result Flag Reference   TSH 3 750 uIU/mL  0 450-4 500 minimum assist (75% patients effort)

## 2019-01-29 NOTE — PROGRESS NOTE ADULT - SUBJECTIVE AND OBJECTIVE BOX
Patient seen and examined . c/o back pain , urinary retention , abd discomfort , last BM 2 days ago , moving b/l LE better ,  at bed side     CC : LE weakness - improving , urinary retention , back pain     HPI:  33 years old female with PMH of SLE, Lupus Nephritis and Fibromyalgia brought by her  yesterday evening with generalized weakness. As per patient, she is not feeling well for last few days. She is having low grade fevers, generalized body pain, throat irritation, cough, ear pain and vomiting. Her appetite is very poor. She went to her PMD few days ago who prescribed Sertraline for her depressive symptoms. As per her, symptoms started after taking that medication. Yesterday, she was feeling so weak that she could not walk so her  brought her to ER. She is also complaining of difficulty urinating. She went to Urologist 10 days ago and she was prescribed Flomax which she has been taking but is unable to void. She also has constipation.   She recently came back from [place] on 18 after staying there for 2.5 to 3 months. She had MRI there in December due to back pain and generalized weakness and it was normal.     PAST MEDICAL & SURGICAL HISTORY:  Fibromyalgia  Lupus  GERD (gastroesophageal reflux disease)  Lupus nephritis  S/P correction of deviated nasal septum  History of esophagogastroduodenoscopy (EGD)  History of biopsy: Renal      MEDICATIONS  (STANDING):  albumin human  5% IVPB 2500 milliLiter(s) IV Intermittent every 48 hours  chlorhexidine 2% Cloths 1 Application(s) Topical <User Schedule>  docusate sodium 100 milliGRAM(s) Oral daily  DULoxetine 60 milliGRAM(s) Oral daily  enoxaparin Injectable 40 milliGRAM(s) SubCutaneous daily  methylPREDNISolone sodium succinate Injectable 55 milliGRAM(s) IV Push daily  mycophenolate mofetil 1500 milliGRAM(s) Oral two times a day  pantoprazole    Tablet 40 milliGRAM(s) Oral before breakfast  tamsulosin 0.8 milliGRAM(s) Oral at bedtime    MEDICATIONS  (PRN):  acetaminophen   Tablet .. 650 milliGRAM(s) Oral every 6 hours PRN Temp greater or equal to 38C (100.4F), Mild Pain (1 - 3)  acetaminophen  IVPB .. 1000 milliGRAM(s) IV Intermittent once PRN Severe Pain (7 - 10)  baclofen 10 milliGRAM(s) Oral every 8 hours PRN Muscle Spasm  bisacodyl 5 milliGRAM(s) Oral every 12 hours PRN Constipation  ibuprofen  Tablet. 400 milliGRAM(s) Oral every 8 hours PRN Moderate Pain (4 - 6)  lactulose Syrup 20 Gram(s) Oral two times a day PRN constipation  magnesium hydroxide Suspension 30 milliLiter(s) Oral daily PRN Constipation  meperidine     Injectable 25 milliGRAM(s) IV Push once PRN Shivering during infusion  mineral oil enema 133 milliLiter(s) Rectal at bedtime PRN as needed if no bm for 3 days  ondansetron Injectable 4 milliGRAM(s) IV Push every 6 hours PRN Nausea and/or Vomiting  oxyCODONE    5 mG/acetaminophen 325 mG 2 Tablet(s) Oral every 6 hours PRN Moderate Pain (4 - 6)      LABS:    Urinalysis Basic - ( 2019 15:57 )    Color: Yellow / Appearance: Clear / S.010 / pH: x  Gluc: x / Ketone: Negative  / Bili: Negative / Urobili: Negative mg/dL   Blood: x / Protein: 15 mg/dL / Nitrite: Negative   Leuk Esterase: Trace / RBC: 0-2 /HPF / WBC 0-2   Sq Epi: x / Non Sq Epi: Occasional / Bacteria: Many      REVIEW OF SYSTEMS:    As above , all other  systems are reviewed and are negative     Vital Signs Last 24 Hrs  T(C): 36.9 (2019 08:03), Max: 37 (2019 00:43)  T(F): 98.4 (2019 08:03), Max: 98.6 (2019 00:43)  HR: 95 (2019 08:03) (88 - 114)  BP: 95/57 (2019 08:03) (95/57 - 108/68)  BP(mean): --  RR: 18 (2019 08:03) (18 - 21)  SpO2: 97% (2019 00:43) (97% - 97%)    PHYSICAL EXAM:    GENERAL: NAD, well-groomed, well-developed  HEAD:  Atraumatic, Normocephalic  EYES: EOMI, PERRLA, conjunctiva and sclera clear  NECK: Supple, No JVD, Normal thyroid  NERVOUS SYSTEM:  Alert & Oriented X3, no focal deficit  CHEST/LUNG: CTA b/l ,  no  rales, rhonchi, wheezing, or rubs  HEART: Regular rate and rhythm; No murmurs, rubs, or gallops  ABDOMEN: Soft, Nontender, Nondistended; Bowel sounds present  EXTREMITIES:  2+ Peripheral Pulses, No clubbing, cyanosis, or edema     Motor -                      LEFT    UE - C5 5/5, C6 5/5, C7 5/5, C8 5/5, T1 5/5                    RIGHT UE - C5 5/5, C6 5/5, C7 5/5, C8 5/5, T1 5/5                    LEFT    LE - L2 2/5, L3 3/5, L4 2/5, L5 4/5, S1 5/5                    RIGHT LE - L2 3/5, L3 4/5, L4 2/5, L5 3/5, S1 5/5      Sensory - Impaired to LT and PP up to T8     Reflexes - Flaccid  LYMPH: No lymphadenopathy noted  SKIN: No rashes or lesions

## 2019-01-29 NOTE — OCCUPATIONAL THERAPY INITIAL EVALUATION ADULT - ADDITIONAL COMMENTS
Pt and  state lives in an apartment with 1 [address] and 2 steps inside to the den and pt was not using any device and was independent prior. Pts  works but he is taking time off from work to help her when she comes home.

## 2019-01-29 NOTE — PROGRESS NOTE ADULT - SUBJECTIVE AND OBJECTIVE BOX
Patient in bed, RN at bedside.   Continues to be 'ed.  Feels more sensation in her legs and noting more movement.   Pain is controlled.  Has not had BM in 3 days.   No abdominal pain.     FUNCTIONAL PROGRESS    Bed Mobility: Supine to Sit:     · Level of [place]	moderate assist (50% patients effort)	  · Physical Assist/Nonphysical Assist	1 person assist	    Bed Mobility Analysis:     · Bed Mobility Limitations	decreased ability to use legs for bridging/pushing	  · Impairments Contributing to Impaired Bed Mobility	impaired balance; pain; decreased strength; fibromyalgia pain 8/10	    Transfer: Sit to Stand:     · Level of [place]	maximum assist (25% patients effort); pt's bilateral knees buckled during initial attempt, pt did not fall. during second attempt pt able to stand with 2 person max assist and pt was hyperextending bilateral knees.	  		  · Physical Assist/Nonphysical Assist	2 person assist	  · Weight-Bearing Restrictions	full weight-bearing	  · Assistive Device	rolling walker	    Transfer: Stand to Sit:     · Level of [place]	maximum assist (25% patients effort)	  · Physical Assist/Nonphysical Assist	2 person assist	  · Weight-Bearing Restrictions	full weight-bearing	  · Assistive Device	rolling walker	    Sit/Stand Transfer Safety Analysis:     · Transfer Safety Concerns Noted	decreased step length; decreased weight-shifting ability	  		  · Impairments Contributing to Impaired Transfers	impaired balance; decreased ROM; decreased strength; knees buckling	    Gait Skills:     · Level of [place]	maximum assist (25% patients effort)	  · Physical Assist/Nonphysical Assist	2 person assist	  · Weight-Bearing Restrictions	full weight-bearing	  · Assistive Device	rolling walker	  · Gait Distance	bed to chair	            REVIEW OF SYSTEMS  Constitutional - No fever,  +fatigue  HEENT - No vertigo, No neck pain  Neurological - No headaches, No memory loss, +loss of strength, +numbness, No tremors  Skin - No rashes, No lesions   Musculoskeletal - No joint pain, No joint swelling, No muscle pain  Psychiatric - No depression, No anxiety    VITALS  T(C): 36.9 (19 @ 08:03), Max: 37 (19 @ 00:43)  HR: 95 (19 @ 08:03) (88 - 114)  BP: 95/57 (19 @ 08:03) (95/57 - 108/68)  RR: 18 (19 @ 08:03) (18 - 21)  SpO2: 97% (19 @ 00:43) (97% - 97%)  Wt(kg): --    MEDICATIONS   acetaminophen   Tablet .. 650 milliGRAM(s) every 6 hours PRN  acetaminophen  IVPB .. 1000 milliGRAM(s) once PRN  albumin human  5% IVPB 2500 milliLiter(s) every 48 hours  baclofen 10 milliGRAM(s) every 8 hours PRN  bisacodyl 5 milliGRAM(s) every 12 hours PRN  chlorhexidine 2% Cloths 1 Application(s) <User Schedule>  docusate sodium 100 milliGRAM(s) daily  DULoxetine 60 milliGRAM(s) daily  enoxaparin Injectable 40 milliGRAM(s) daily  HYDROmorphone  Injectable 0.5 milliGRAM(s) every 8 hours PRN  ibuprofen  Tablet. 400 milliGRAM(s) every 8 hours PRN  lactulose Syrup 20 Gram(s) two times a day PRN  magnesium hydroxide Suspension 30 milliLiter(s) daily PRN  meperidine     Injectable 25 milliGRAM(s) once PRN  methylPREDNISolone sodium succinate Injectable 55 milliGRAM(s) daily  mineral oil enema 133 milliLiter(s) at bedtime PRN  mycophenolate mofetil 1500 milliGRAM(s) two times a day  ondansetron Injectable 4 milliGRAM(s) every 6 hours PRN  pantoprazole    Tablet 40 milliGRAM(s) before breakfast  sodium chloride 0.9%. 1000 milliLiter(s) <Continuous>  tamsulosin 0.8 milliGRAM(s) at bedtime      RECENT LABS/IMAGING          Urinalysis Basic - ( 2019 15:57 )    Color: Yellow / Appearance: Clear / S.010 / pH: x  Gluc: x / Ketone: Negative  / Bili: Negative / Urobili: Negative mg/dL   Blood: x / Protein: 15 mg/dL / Nitrite: Negative   Leuk Esterase: Trace / RBC: 0-2 /HPF / WBC 0-2   Sq Epi: x / Non Sq Epi: Occasional / Bacteria: Many      ----------------------------------------------------------------------------------------  PHYSICAL EXAM  Constitutional - NAD, Comfortable  Abdomen - Soft, NT  Extremities - No C/C/E, No calf tenderness   Neurologic Exam -                    Cognitive - Awake, Alert, AAO to self, place, date, year, situation     Communication - Fluent, No dysarthria     Motor -                      LEFT    UE - C5 5/5, C6 5/5, C7 5/5, C8 5/5, T1 5/5                    RIGHT UE - C5 5/5, C6 5/5, C7 5/5, C8 5/5, T1 5/5                    LEFT    LE - L2 2/5, L3 3/5, L4 2/5, L5 4/5, S1 5/5                    RIGHT LE - L2 3/5, L3 4/5, L4 2/5, L5 3/5, S1 5/5      Sensory - Impaired to LT and PP up to T8     Reflexes - Flaccid  Psychiatric - Mood stable, Affect WNL  ----------------------------------------------------------------------------------------  ASSESSMENT/PLAN  33yFemale with functional deficits related to nontraumatic incomplete SCI paraparesis  SLE TM - Rituxan, Plasmapheresis, Medrol, Cellcept  Pain/Spasms - Tylenol, Ibuprofen, Cymbalta, Baclofen  Neurogenic Bladder - Maintain low volumes < 350mL, IC Q4 hours, Flomax  Neurogenic Bowel - Senna, Lactulose, Fleet, Colace, Miralax, MOM  DVT PPX - SCDs, Lovenox  Rehab - Will continue to follow for ongoing rehab needs and recommendations.  Recommend ACUTE inpatient rehabilitation for the functional deficits consisting of 3 hours of therapy/day & 24 hour RN/daily PMR physician for comorbid medical management. Patient will be able to tolerate 3 hours a day. Will need to wait after plasmapheresis.     Continue bedside therapy as well as OOB throughout the day with mobilization by staff to maintain cardiopulmonary function and prevention of secondary complications related to debility.

## 2019-01-30 LAB
-  AMIKACIN: SIGNIFICANT CHANGE UP
-  AMPICILLIN/SULBACTAM: SIGNIFICANT CHANGE UP
-  AMPICILLIN: SIGNIFICANT CHANGE UP
-  AZTREONAM: SIGNIFICANT CHANGE UP
-  CEFAZOLIN: SIGNIFICANT CHANGE UP
-  CEFEPIME: SIGNIFICANT CHANGE UP
-  CEFOXITIN: SIGNIFICANT CHANGE UP
-  CEFTRIAXONE: SIGNIFICANT CHANGE UP
-  CIPROFLOXACIN: SIGNIFICANT CHANGE UP
-  ERTAPENEM: SIGNIFICANT CHANGE UP
-  GENTAMICIN: SIGNIFICANT CHANGE UP
-  IMIPENEM: SIGNIFICANT CHANGE UP
-  LEVOFLOXACIN: SIGNIFICANT CHANGE UP
-  MEROPENEM: SIGNIFICANT CHANGE UP
-  NITROFURANTOIN: SIGNIFICANT CHANGE UP
-  PIPERACILLIN/TAZOBACTAM: SIGNIFICANT CHANGE UP
-  TIGECYCLINE: SIGNIFICANT CHANGE UP
-  TOBRAMYCIN: SIGNIFICANT CHANGE UP
-  TRIMETHOPRIM/SULFAMETHOXAZOLE: SIGNIFICANT CHANGE UP
CULTURE RESULTS: SIGNIFICANT CHANGE UP
FIBRINOGEN PPP-MCNC: 220 MG/DL — LOW (ref 350–510)
METHOD TYPE: SIGNIFICANT CHANGE UP
ORGANISM # SPEC MICROSCOPIC CNT: SIGNIFICANT CHANGE UP
ORGANISM # SPEC MICROSCOPIC CNT: SIGNIFICANT CHANGE UP
SPECIMEN SOURCE: SIGNIFICANT CHANGE UP

## 2019-01-30 PROCEDURE — 99232 SBSQ HOSP IP/OBS MODERATE 35: CPT

## 2019-01-30 PROCEDURE — 99233 SBSQ HOSP IP/OBS HIGH 50: CPT

## 2019-01-30 RX ORDER — ERTAPENEM SODIUM 1 G/1
1000 INJECTION, POWDER, LYOPHILIZED, FOR SOLUTION INTRAMUSCULAR; INTRAVENOUS ONCE
Qty: 0 | Refills: 0 | Status: COMPLETED | OUTPATIENT
Start: 2019-01-30 | End: 2019-01-30

## 2019-01-30 RX ORDER — CALCIUM GLUCONATE 100 MG/ML
1 VIAL (ML) INTRAVENOUS ONCE
Qty: 0 | Refills: 0 | Status: COMPLETED | OUTPATIENT
Start: 2019-01-30 | End: 2019-01-30

## 2019-01-30 RX ORDER — HEPARIN SODIUM 5000 [USP'U]/ML
5000 INJECTION INTRAVENOUS; SUBCUTANEOUS ONCE
Qty: 0 | Refills: 0 | Status: COMPLETED | OUTPATIENT
Start: 2019-01-30 | End: 2019-01-30

## 2019-01-30 RX ORDER — ACETAMINOPHEN 500 MG
1000 TABLET ORAL ONCE
Qty: 0 | Refills: 0 | Status: COMPLETED | OUTPATIENT
Start: 2019-01-30 | End: 2019-01-30

## 2019-01-30 RX ORDER — ERTAPENEM SODIUM 1 G/1
INJECTION, POWDER, LYOPHILIZED, FOR SOLUTION INTRAMUSCULAR; INTRAVENOUS
Qty: 0 | Refills: 0 | Status: COMPLETED | OUTPATIENT
Start: 2019-01-30 | End: 2019-02-04

## 2019-01-30 RX ORDER — ERTAPENEM SODIUM 1 G/1
1000 INJECTION, POWDER, LYOPHILIZED, FOR SOLUTION INTRAMUSCULAR; INTRAVENOUS EVERY 24 HOURS
Qty: 0 | Refills: 0 | Status: COMPLETED | OUTPATIENT
Start: 2019-01-31 | End: 2019-02-04

## 2019-01-30 RX ADMIN — OXYCODONE AND ACETAMINOPHEN 2 TABLET(S): 5; 325 TABLET ORAL at 18:15

## 2019-01-30 RX ADMIN — ERTAPENEM SODIUM 120 MILLIGRAM(S): 1 INJECTION, POWDER, LYOPHILIZED, FOR SOLUTION INTRAMUSCULAR; INTRAVENOUS at 12:33

## 2019-01-30 RX ADMIN — Medication 1000 MILLIGRAM(S): at 20:10

## 2019-01-30 RX ADMIN — SENNA PLUS 1 TABLET(S): 8.6 TABLET ORAL at 21:38

## 2019-01-30 RX ADMIN — Medication 200 MILLIGRAM(S): at 18:17

## 2019-01-30 RX ADMIN — Medication 100 MILLIGRAM(S): at 05:04

## 2019-01-30 RX ADMIN — TAMSULOSIN HYDROCHLORIDE 0.8 MILLIGRAM(S): 0.4 CAPSULE ORAL at 21:38

## 2019-01-30 RX ADMIN — ENOXAPARIN SODIUM 40 MILLIGRAM(S): 100 INJECTION SUBCUTANEOUS at 12:44

## 2019-01-30 RX ADMIN — Medication 400 MILLIGRAM(S): at 19:55

## 2019-01-30 RX ADMIN — HEPARIN SODIUM 5000 UNIT(S): 5000 INJECTION INTRAVENOUS; SUBCUTANEOUS at 10:00

## 2019-01-30 RX ADMIN — DULOXETINE HYDROCHLORIDE 60 MILLIGRAM(S): 30 CAPSULE, DELAYED RELEASE ORAL at 12:34

## 2019-01-30 RX ADMIN — MYCOPHENOLATE MOFETIL 1500 MILLIGRAM(S): 250 CAPSULE ORAL at 18:17

## 2019-01-30 RX ADMIN — Medication 55 MILLIGRAM(S): at 05:03

## 2019-01-30 RX ADMIN — MYCOPHENOLATE MOFETIL 1500 MILLIGRAM(S): 250 CAPSULE ORAL at 05:04

## 2019-01-30 RX ADMIN — Medication 200 MILLIGRAM(S): at 05:04

## 2019-01-30 RX ADMIN — PANTOPRAZOLE SODIUM 40 MILLIGRAM(S): 20 TABLET, DELAYED RELEASE ORAL at 05:04

## 2019-01-30 RX ADMIN — Medication 100 MILLIGRAM(S): at 18:17

## 2019-01-30 RX ADMIN — Medication 1250 MILLILITER(S): at 10:00

## 2019-01-30 RX ADMIN — CHLORHEXIDINE GLUCONATE 1 APPLICATION(S): 213 SOLUTION TOPICAL at 05:04

## 2019-01-30 RX ADMIN — Medication 200 GRAM(S): at 10:00

## 2019-01-30 NOTE — PROGRESS NOTE ADULT - SUBJECTIVE AND OBJECTIVE BOX
[facility]  INFECTIOUS DISEASES AND INTERNAL MEDICINE at [place]  =======================================================  Adams Vasquez MD  Diplomates American Board of Internal Medicine and Infectious Diseases  =======================================================    KANE LARA [id number]    Recalled for ESBL E coli UTI    No fevers or chills  Reports urinary retention the last few days      Allergies:  No Known Allergies      Antibiotics:  ertapenem  IVPB 1000 milliGRAM(s) IV Intermittent once       REVIEW OF SYSTEMS:  CONSTITUTIONAL:  No Fever or chills  HEENT:   No diplopia or blurred vision.  No earache, sore throat or runny nose.  CARDIOVASCULAR:  No pressure, squeezing, strangling, tightness, heaviness or aching about the chest, neck, axilla or epigastrium.  RESPIRATORY:  No cough, shortness of breath  GASTROINTESTINAL:  No nausea, vomiting or diarrhea.  GENITOURINARY:  + retention  MUSCULOSKELETAL:  no joint aches, no muscle pain  SKIN:  No change in skin, hair or nails.  NEUROLOGIC: + weakness.  PSYCHIATRIC:  No disorder of thought or mood.  ENDOCRINE:  No heat or cold intolerance  HEMATOLOGICAL:  No easy bruising or bleeding.       Physical Exam:  Vital Signs Last 24 Hrs  T(C): 36.7 (2019 08:03), Max: 36.9 (2019 00:53)  T(F): 98 (2019 08:03), Max: 98.4 (2019 00:53)  HR: 87 (2019 08:03) (71 - 92)  BP: 101/64 (2019 08:03) (100/66 - 108/71)  RR: 18 (2019 08:03) (18 - 18)  SpO2: 95% (2019 00:53) (95% - 98%)      GEN: NAD, pleasant  HEENT: normocephalic and atraumatic. EOMI. PERRL.  Anicteric   NECK: Supple.   LUNGS: Clear to auscultation.  HEART: Regular rate and rhythm   ABDOMEN: Soft, nontender, and nondistended.  Positive bowel sounds.    : No CVA tenderness  EXTREMITIES: Without any edema.  MSK: no joint swelling  NEUROLOGIC: Awake, alert and oriented x3  PSYCHIATRIC: Appropriate affect .  SKIN: No Rash      Labs:  Urinalysis Basic - ( 2019 14:14 )    Color: Yellow / Appearance: Clear / S.020 / pH: x  Gluc: x / Ketone: Negative  / Bili: Negative / Urobili: Negative mg/dL   Blood: x / Protein: 30 mg/dL / Nitrite: Negative   Leuk Esterase: Trace / RBC: 25-50 /HPF / WBC 3-5   Sq Epi: x / Non Sq Epi: Few / Bacteria: Many        RECENT CULTURES:   @ 14:14 .Urine     >100,000 CFU/ml Gram Negative Rods Identification and susceptibility to  follow.       @ 15:58 .Urine Escherichia coli ESBL    >100,000 CFU/ml Escherichia coli ESBL       @ 14:37 .Urine     <10,000 CFU/ml Gram Negative Rods       @ 12:49    RVP  NotDetec       @ 08:49    RVP  NotDetec       @ 17:03 .Blood     No growth at 5 days.       @ 15:53 .CSF     No growth at 3 days.  Few White blood cells  No organisms seen

## 2019-01-30 NOTE — PROGRESS NOTE ADULT - SUBJECTIVE AND OBJECTIVE BOX
Patient in bed, feels well.  Pain is localized to the neck at site of her central line.  Hope it will come out after completion of her immunoglobulin therapy.   Noted improved coordination, but limited in motor.   Sensory appears to improve.  Had BM.   Continuing IC.     FUNCTIONAL PROGRESS    Bathing Training:     · Level of [place]	moderate assist (50% patients effort)	  · Physical Assist/Nonphysical Assist	1 person assist; verbal cues	  · Assistive Device	tub seat with back	    Upper Body Dressing Training:     · Level of [place]	minimum assist (75% patients effort)	  · Physical Assist/Nonphysical Assist	1 person assist; verbal cues	    Lower Body Dressing Training:     · Level of [place]	moderate assist (50% patients effort)	  · Physical Assist/Nonphysical Assist	1 person assist; verbal cues	    Toilet Hygiene Training:     · Level of [place]	moderate assist (50% patients effort)	  · Physical Assist/Nonphysical Assist	1 person assist; verbal cues	  · Assistive Device	commode; rolling walker	    Grooming Training:     · Level of [place]	independent	    Eating/Self-Feeding Training:     · Level of [place]	independent	      REVIEW OF SYSTEMS  Constitutional - No fever,  +fatigue  HEENT - No vertigo, No neck pain  Neurological - No headaches, No memory loss, +loss of strength, +numbness, No tremors  Skin - No rashes, No lesions   Musculoskeletal - No joint pain, No joint swelling, No muscle pain  Psychiatric - No depression, No anxiety    VITALS  T(C): 36.7 (19 @ 08:03), Max: 36.9 (19 @ 00:53)  HR: 87 (19 @ 08:03) (71 - 92)  BP: 101/64 (19 @ 08:03) (100/66 - 108/71)  RR: 18 (19 @ 08:03) (18 - 18)  SpO2: 95% (19 @ 00:53) (95% - 98%)  Wt(kg): --    MEDICATIONS   acetaminophen   Tablet .. 650 milliGRAM(s) every 6 hours PRN  acetaminophen  IVPB .. 1000 milliGRAM(s) once PRN  albumin human  5% IVPB 2500 milliLiter(s) every 48 hours  baclofen 10 milliGRAM(s) every 8 hours PRN  bisacodyl 5 milliGRAM(s) every 12 hours PRN  calcium gluconate IVPB 1 Gram(s) once  chlorhexidine 2% Cloths 1 Application(s) <User Schedule>  docusate sodium 100 milliGRAM(s) two times a day  DULoxetine 60 milliGRAM(s) daily  enoxaparin Injectable 40 milliGRAM(s) daily  ertapenem  IVPB 1000 milliGRAM(s) once  ertapenem  IVPB     heparin  Injectable 5000 Unit(s) once  heparin  Injectable 5000 Unit(s) once  hydroxychloroquine 200 milliGRAM(s) two times a day  ibuprofen  Tablet. 400 milliGRAM(s) every 8 hours PRN  lactulose Syrup 20 Gram(s) two times a day PRN  magnesium hydroxide Suspension 30 milliLiter(s) daily PRN  meperidine     Injectable 25 milliGRAM(s) once PRN  methylPREDNISolone sodium succinate Injectable 55 milliGRAM(s) daily  mineral oil enema 133 milliLiter(s) at bedtime PRN  mycophenolate mofetil 1500 milliGRAM(s) two times a day  ondansetron Injectable 4 milliGRAM(s) every 6 hours PRN  oxyCODONE    5 mG/acetaminophen 325 mG 2 Tablet(s) every 6 hours PRN  pantoprazole    Tablet 40 milliGRAM(s) before breakfast  polyethylene glycol 3350 17 Gram(s) daily  senna 1 Tablet(s) at bedtime  tamsulosin 0.8 milliGRAM(s) at bedtime      RECENT LABS/IMAGING          Urinalysis Basic - ( 2019 14:14 )    Color: Yellow / Appearance: Clear / S.020 / pH: x  Gluc: x / Ketone: Negative  / Bili: Negative / Urobili: Negative mg/dL   Blood: x / Protein: 30 mg/dL / Nitrite: Negative   Leuk Esterase: Trace / RBC: 25-50 /HPF / WBC 3-5   Sq Epi: x / Non Sq Epi: Few / Bacteria: Many    ----------------------------------------------------------------------------------------  PHYSICAL EXAM  Constitutional - NAD, Comfortable  Extremities - No C/C/E, No calf tenderness   Neurologic Exam -                    Motor -                      LEFT    UE - C5 5/5, C6 5/5, C7 5/5, C8 5/5, T1 5/5                    RIGHT UE - C5 5/5, C6 5/5, C7 5/5, C8 5/5, T1 5/5                    LEFT    LE - L2 2/5, L3 3/5, L4 4/5, L5 4/5, S1 5/5                    RIGHT LE - L2 3/5, L3 4/5, L4 4/5, L5 4/5, S1 5/5      Sensory - Impaired to LT and PP up to T8     Reflexes - Flaccid  Psychiatric - Mood stable, Affect WNL  ----------------------------------------------------------------------------------------  ASSESSMENT/PLAN  33yFemale with functional deficits related to nontraumatic incomplete SCI paraparesis  SLE TM - Rituxan, Plasmapheresis, Medrol, Cellcept  ESBL UTO - Invanz  Pain/Spasms - Tylenol, Ibuprofen, Cymbalta, Baclofen, Percocet  Neurogenic Bladder - Maintain low volumes < 350mL, IC Q4 hours, Flomax  Neurogenic Bowel - Senna, Lactulose, Fleet, Colace, Miralax, MOM  DVT PPX - SCDs, Lovenox  Rehab - Will continue to follow for ongoing rehab needs and recommendations.  Recommend ACUTE inpatient rehabilitation for the functional deficits consisting of 3 hours of therapy/day & 24 hour RN/daily PMR physician for comorbid medical management. Patient will be able to tolerate 3 hours a day. Will need to wait after plasmapheresis.     Continue bedside therapy as well as OOB throughout the day with mobilization by staff to maintain cardiopulmonary function and prevention of secondary complications related to debility.

## 2019-01-30 NOTE — PROGRESS NOTE ADULT - SUBJECTIVE AND OBJECTIVE BOX
INTERVAL HISTORY:        VITAL SIGNS:  Vital Signs Last 24 Hrs  T(C): 36.7 (30 Jan 2019 08:03), Max: 36.9 (30 Jan 2019 00:53)  T(F): 98 (30 Jan 2019 08:03), Max: 98.4 (30 Jan 2019 00:53)  HR: 87 (30 Jan 2019 08:03) (71 - 92)  BP: 101/64 (30 Jan 2019 08:03) (100/66 - 108/71)  BP(mean): --  RR: 18 (30 Jan 2019 08:03) (18 - 18)  SpO2: 95% (30 Jan 2019 00:53) (95% - 98%)    PHYSICAL EXAMINATION:    Mentation:    Language/Speech:  CN:  Visual Fields:  Motor:  Sensory:  DTR:  Babinski:      MEDS:  MEDICATIONS  (STANDING):  albumin human  5% IVPB 2500 milliLiter(s) IV Intermittent every 48 hours  calcium gluconate IVPB 1 Gram(s) IV Intermittent once  chlorhexidine 2% Cloths 1 Application(s) Topical <User Schedule>  docusate sodium 100 milliGRAM(s) Oral two times a day  DULoxetine 60 milliGRAM(s) Oral daily  enoxaparin Injectable 40 milliGRAM(s) SubCutaneous daily  ertapenem  IVPB 1000 milliGRAM(s) IV Intermittent once  ertapenem  IVPB      heparin  Injectable 5000 Unit(s) IV Push once  heparin  Injectable 5000 Unit(s) IV Push once  hydroxychloroquine 200 milliGRAM(s) Oral two times a day  methylPREDNISolone sodium succinate Injectable 55 milliGRAM(s) IV Push daily  mycophenolate mofetil 1500 milliGRAM(s) Oral two times a day  pantoprazole    Tablet 40 milliGRAM(s) Oral before breakfast  polyethylene glycol 3350 17 Gram(s) Oral daily  senna 1 Tablet(s) Oral at bedtime  tamsulosin 0.8 milliGRAM(s) Oral at bedtime    MEDICATIONS  (PRN):  acetaminophen   Tablet .. 650 milliGRAM(s) Oral every 6 hours PRN Temp greater or equal to 38C (100.4F), Mild Pain (1 - 3)  acetaminophen  IVPB .. 1000 milliGRAM(s) IV Intermittent once PRN Severe Pain (7 - 10)  baclofen 10 milliGRAM(s) Oral every 8 hours PRN Muscle Spasm  bisacodyl 5 milliGRAM(s) Oral every 12 hours PRN Constipation  ibuprofen  Tablet. 400 milliGRAM(s) Oral every 8 hours PRN Moderate Pain (4 - 6)  lactulose Syrup 20 Gram(s) Oral two times a day PRN constipation  magnesium hydroxide Suspension 30 milliLiter(s) Oral daily PRN Constipation  meperidine     Injectable 25 milliGRAM(s) IV Push once PRN Shivering during infusion  mineral oil enema 133 milliLiter(s) Rectal at bedtime PRN as needed if no bm for 3 days  ondansetron Injectable 4 milliGRAM(s) IV Push every 6 hours PRN Nausea and/or Vomiting  oxyCODONE    5 mG/acetaminophen 325 mG 2 Tablet(s) Oral every 6 hours PRN Moderate Pain (4 - 6)      LABS:                  RADIOLOGY & ADDITIONAL STUDIES:      IMPRESSION & PLAN:

## 2019-01-30 NOTE — PROVIDER CONTACT NOTE (CRITICAL VALUE NOTIFICATION) - ACTION/TREATMENT ORDERED:
voice mail left for dr. villarreal, T [facility] notified of result and acknowledged.
MD will follow up

## 2019-01-30 NOTE — PROGRESS NOTE ADULT - ASSESSMENT
34 y/o F with transverse Myelitis now with urinary retention and urine culture with ESBL E coli      ESBL E coli UTI  Urinary retention  Transverse myelitis     - Urine culture with ESBL E coli  - No fevers  - No leukocytosis  - Continue Ertapenem 1gm IV q 24hours  - Plan for antibiotics till 2/4/19  - No need for imaging since cause of this is likely Transverse myelitis   - If re-infection will need imaging and Urology eval    Will sign off. Please call PRN.

## 2019-01-30 NOTE — PROGRESS NOTE ADULT - ASSESSMENT
33 years old female with hx of SLE, lupus Nephritis and Fibromyalgia, recently returned from [place] on 12/29 who presented with generalized weakness with associated difficulty with ambulation; outpt MRI from December was normal. MRI here showed transverse myelitis and LP with high WBC. Neurology/ Rheumatology/ ID continued to follow the pt. Per ID less likely infectious etiology and Abx d/stiven. Pt is underwent w/u for NMO/ MS which was negative likely etiology is Autoimmune, s/p completion of 5 days of pulse dose steroids and now undergoing plasmapheresis- PLEX, S/P 3 sessions, will be performed every other day for a total of 5 sessions, next session for the am. Plan for RTX 1g IV x 2 doses (q2 weeks) every 6 months to be started today as per rheum. Hospital course further complicated by acute urinary retention requiring frequent bladder scans and straight catheterization. Today seen and examined , c/o low back pain , still with urinary retention , being straight cath , no BM X 2 days .    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   - Improving function in b/l le   - leukocytosis likely 2/2 steroids - resolved   - c/w solumedrol 55mg IV QD, prednisone on dc  - c/w cellcept 1500mg po bid   - c/w hydroxychloroquine 200mg BID  - sp 5 sessions PLEX  - plan for acute rehab w/ continued rheum + neuro fu    Acute urinary retention  - c/w bladder scans q8hrs  - c/w straight cath as needed  - c/w flomax 0.8mg po qhs   - likely combo of neurogenic bladder + UTI, uc confirming esbl ecoli uti  - ertapenem started 1/30, last day 2/4 per ID, no plans for picc or mid, per ID can be given @ rehab via peripheral as it is short course    SLE with nephritis   - stable renal disease, elevated dsDNA, low complements  - rheum f/u noted and appreciated     Fibromyalgia   - c/w tylenol for pain prn     Depression  - c/w cymbalta 60mg po qd    Constipation  - Miralax / senna     DVT ppx  - c/w lovenox 40mg sq qd    Dispo: PT consulted and recommended acute rehab, PMNR consulted  - as per PMNR - patient will benefit from acute rehab. plan for dc in next 24-48hrs

## 2019-01-30 NOTE — PROGRESS NOTE ADULT - SUBJECTIVE AND OBJECTIVE BOX
INTERVAL HISTORY:  Finishing PLEX today. She notes definite improvment  Able to walk with walker to bathroom      VITAL SIGNS:  Vital Signs Last 24 Hrs  T(C): 36.7 (30 Jan 2019 08:03), Max: 36.9 (30 Jan 2019 00:53)  T(F): 98 (30 Jan 2019 08:03), Max: 98.4 (30 Jan 2019 00:53)  HR: 87 (30 Jan 2019 08:03) (71 - 92)  BP: 101/64 (30 Jan 2019 08:03) (100/66 - 108/71)  BP(mean): --  RR: 18 (30 Jan 2019 08:03) (18 - 18)  SpO2: 95% (30 Jan 2019 00:53) (95% - 98%)    PHYSICAL EXAMINATION:    Mentation:  nl  Language/Speech: nl  CN: nl  Visual Fields: full  Motor: 5/5 BUE, 2-3/5 BLE, no spasticity  Sensory:  DTR: 2+, nonsustained clonus  Babinski:      MEDS:  MEDICATIONS  (STANDING):  albumin human  5% IVPB 2500 milliLiter(s) IV Intermittent every 48 hours  calcium gluconate IVPB 1 Gram(s) IV Intermittent once  chlorhexidine 2% Cloths 1 Application(s) Topical <User Schedule>  docusate sodium 100 milliGRAM(s) Oral two times a day  DULoxetine 60 milliGRAM(s) Oral daily  enoxaparin Injectable 40 milliGRAM(s) SubCutaneous daily  ertapenem  IVPB 1000 milliGRAM(s) IV Intermittent once  ertapenem  IVPB      heparin  Injectable 5000 Unit(s) IV Push once  heparin  Injectable 5000 Unit(s) IV Push once  hydroxychloroquine 200 milliGRAM(s) Oral two times a day  methylPREDNISolone sodium succinate Injectable 55 milliGRAM(s) IV Push daily  mycophenolate mofetil 1500 milliGRAM(s) Oral two times a day  pantoprazole    Tablet 40 milliGRAM(s) Oral before breakfast  polyethylene glycol 3350 17 Gram(s) Oral daily  senna 1 Tablet(s) Oral at bedtime  tamsulosin 0.8 milliGRAM(s) Oral at bedtime    MEDICATIONS  (PRN):  acetaminophen   Tablet .. 650 milliGRAM(s) Oral every 6 hours PRN Temp greater or equal to 38C (100.4F), Mild Pain (1 - 3)  acetaminophen  IVPB .. 1000 milliGRAM(s) IV Intermittent once PRN Severe Pain (7 - 10)  baclofen 10 milliGRAM(s) Oral every 8 hours PRN Muscle Spasm  bisacodyl 5 milliGRAM(s) Oral every 12 hours PRN Constipation  ibuprofen  Tablet. 400 milliGRAM(s) Oral every 8 hours PRN Moderate Pain (4 - 6)  lactulose Syrup 20 Gram(s) Oral two times a day PRN constipation  magnesium hydroxide Suspension 30 milliLiter(s) Oral daily PRN Constipation  meperidine     Injectable 25 milliGRAM(s) IV Push once PRN Shivering during infusion  mineral oil enema 133 milliLiter(s) Rectal at bedtime PRN as needed if no bm for 3 days  ondansetron Injectable 4 milliGRAM(s) IV Push every 6 hours PRN Nausea and/or Vomiting  oxyCODONE    5 mG/acetaminophen 325 mG 2 Tablet(s) Oral every 6 hours PRN Severe Pain (7 - 10)      LABS:                  RADIOLOGY & ADDITIONAL STUDIES:      IMPRESSION & PLAN:    Transverse myelitis  SLE    Plan:  Acute rehab  Follow up with her rhematologist for medication regiment- RTX, hydroxycloroquine etc  She will follow up with me in 4-6 weeks. Repeat MRI  Will not actively follow.   Neurologically cleared for discharge/disposition.  Please recontact as needed.  Follow up in office in 4-6 weeks as instructed.

## 2019-01-30 NOTE — PROGRESS NOTE ADULT - SUBJECTIVE AND OBJECTIVE BOX
CC: transverse myelitis    Patient seen and examined at the bedside. No acute overnight events. urinary retention noted yesterday. Complaining of continued urine retention this AM. Denies fever/chills, headache, lightheadedness, dizziness, chest pain, palpitations, shortness of breath, cough, abd pain, nausea/vomiting/diarrhea. admits to mild RT neck pain      =========================================================================================  T(C): 37.4 (19 @ 16:02), Max: 37.4 (19 @ 16:02)  HR: 84 (19 @ 16:02) (84 - 92)  BP: 94/65 (19 @ 16:02) (94/65 - 108/71)  RR: 18 (19 @ 16:02) (18 - 18)  SpO2: 97% (19 @ 16:02) (95% - 97%)    PHYSICAL EXAM.    GEN - appears age appropriate. well nourished. pleasant. no distress.   HEENT - NCAT, EOMI, ARTEMIO. RIJ in place  RESP - CTA BL, no wheeze/stridor/rhonchi/crackles. not on supplemental O2. able to speak in full sentences without distress.   CARDIO - NS1S2, RRR. No murmurs/rubs/gallops.  ABD - Soft/Non tender/Non distended. Normal BS x4 quadrants. no guarding/rebound tenderness.  Ext - No NISHI.  MSK - BL 5/5 strength on upper ext. 3-4/5 RLE strength, 2/5 LLE strength  Neuro - AAOx3. cn 2-12 grossly intact  Psych - normal affect  Skin - c/d/i. no rashes/lesions      I&O's Summary    2019 07:01  -  2019 07:00  --------------------------------------------------------  IN: 900 mL / OUT: 1660 mL / NET: -760 mL    2019 07:  -  2019 17:52  --------------------------------------------------------  IN: 0 mL / OUT: 1600 mL / NET: -1600 mL      Daily     Daily     =========================================================================================  LABS.                    Urinalysis Basic - ( 2019 14:14 )    Color: Yellow / Appearance: Clear / S.020 / pH: x  Gluc: x / Ketone: Negative  / Bili: Negative / Urobili: Negative mg/dL   Blood: x / Protein: 30 mg/dL / Nitrite: Negative   Leuk Esterase: Trace / RBC: 25-50 /HPF / WBC 3-5   Sq Epi: x / Non Sq Epi: Few / Bacteria: Many          =========================================================================================  IMAGING.     =========================================================================================    HOME MEDS.    Home Medications:  Cymbalta 60 mg oral delayed release capsule: 1 cap(s) orally once a day (2019 22:11)  hydroxychloroquine 200 mg oral tablet: 1 tab(s) orally once a day (2019 22:12)  Lyrica 75 mg oral capsule: 1 cap(s) orally once a day, As Needed (2019 22:12)  sertraline 50 mg oral tablet: 1 tab(s) orally once a day (2019 22:14)      =========================================================================================    HOSPITAL MEDS.    MEDICATIONS  (STANDING):  chlorhexidine 2% Cloths 1 Application(s) Topical <User Schedule>  docusate sodium 100 milliGRAM(s) Oral two times a day  DULoxetine 60 milliGRAM(s) Oral daily  enoxaparin Injectable 40 milliGRAM(s) SubCutaneous daily  ertapenem  IVPB      hydroxychloroquine 200 milliGRAM(s) Oral two times a day  methylPREDNISolone sodium succinate Injectable 55 milliGRAM(s) IV Push daily  mycophenolate mofetil 1500 milliGRAM(s) Oral two times a day  pantoprazole    Tablet 40 milliGRAM(s) Oral before breakfast  polyethylene glycol 3350 17 Gram(s) Oral daily  senna 1 Tablet(s) Oral at bedtime  tamsulosin 0.8 milliGRAM(s) Oral at bedtime    MEDICATIONS  (PRN):  acetaminophen   Tablet .. 650 milliGRAM(s) Oral every 6 hours PRN Temp greater or equal to 38C (100.4F), Mild Pain (1 - 3)  acetaminophen  IVPB .. 1000 milliGRAM(s) IV Intermittent once PRN Severe Pain (7 - 10)  baclofen 10 milliGRAM(s) Oral every 8 hours PRN Muscle Spasm  bisacodyl 5 milliGRAM(s) Oral every 12 hours PRN Constipation  ibuprofen  Tablet. 400 milliGRAM(s) Oral every 8 hours PRN Moderate Pain (4 - 6)  lactulose Syrup 20 Gram(s) Oral two times a day PRN constipation  magnesium hydroxide Suspension 30 milliLiter(s) Oral daily PRN Constipation  meperidine     Injectable 25 milliGRAM(s) IV Push once PRN Shivering during infusion  mineral oil enema 133 milliLiter(s) Rectal at bedtime PRN as needed if no bm for 3 days  ondansetron Injectable 4 milliGRAM(s) IV Push every 6 hours PRN Nausea and/or Vomiting  oxyCODONE    5 mG/acetaminophen 325 mG 2 Tablet(s) Oral every 6 hours PRN Severe Pain (7 - 10)

## 2019-01-31 DIAGNOSIS — K59.00 CONSTIPATION, UNSPECIFIED: ICD-10-CM

## 2019-01-31 DIAGNOSIS — M79.7 FIBROMYALGIA: ICD-10-CM

## 2019-01-31 DIAGNOSIS — F32.9 MAJOR DEPRESSIVE DISORDER, SINGLE EPISODE, UNSPECIFIED: ICD-10-CM

## 2019-01-31 LAB
-  AMIKACIN: SIGNIFICANT CHANGE UP
-  AMPICILLIN/SULBACTAM: SIGNIFICANT CHANGE UP
-  AMPICILLIN: SIGNIFICANT CHANGE UP
-  AZTREONAM: SIGNIFICANT CHANGE UP
-  CEFAZOLIN: SIGNIFICANT CHANGE UP
-  CEFEPIME: SIGNIFICANT CHANGE UP
-  CEFOXITIN: SIGNIFICANT CHANGE UP
-  CEFTRIAXONE: SIGNIFICANT CHANGE UP
-  CIPROFLOXACIN: SIGNIFICANT CHANGE UP
-  ERTAPENEM: SIGNIFICANT CHANGE UP
-  GENTAMICIN: SIGNIFICANT CHANGE UP
-  IMIPENEM: SIGNIFICANT CHANGE UP
-  LEVOFLOXACIN: SIGNIFICANT CHANGE UP
-  MEROPENEM: SIGNIFICANT CHANGE UP
-  NITROFURANTOIN: SIGNIFICANT CHANGE UP
-  PIPERACILLIN/TAZOBACTAM: SIGNIFICANT CHANGE UP
-  TIGECYCLINE: SIGNIFICANT CHANGE UP
-  TOBRAMYCIN: SIGNIFICANT CHANGE UP
-  TRIMETHOPRIM/SULFAMETHOXAZOLE: SIGNIFICANT CHANGE UP
ANION GAP SERPL CALC-SCNC: 16 MMOL/L — SIGNIFICANT CHANGE UP (ref 5–17)
ANISOCYTOSIS BLD QL: SLIGHT — SIGNIFICANT CHANGE UP
BASOPHILS # BLD AUTO: 0 K/UL — SIGNIFICANT CHANGE UP (ref 0–0.2)
BASOPHILS NFR BLD AUTO: 0.1 % — SIGNIFICANT CHANGE UP (ref 0–2)
BUN SERPL-MCNC: 15 MG/DL — SIGNIFICANT CHANGE UP (ref 8–20)
CALCIUM SERPL-MCNC: 8.5 MG/DL — LOW (ref 8.6–10.2)
CHLORIDE SERPL-SCNC: 103 MMOL/L — SIGNIFICANT CHANGE UP (ref 98–107)
CO2 SERPL-SCNC: 20 MMOL/L — LOW (ref 22–29)
CREAT SERPL-MCNC: 0.24 MG/DL — LOW (ref 0.5–1.3)
CULTURE RESULTS: SIGNIFICANT CHANGE UP
ELLIPTOCYTES BLD QL SMEAR: SLIGHT — SIGNIFICANT CHANGE UP
EOSINOPHIL # BLD AUTO: 0.1 K/UL — SIGNIFICANT CHANGE UP (ref 0–0.5)
EOSINOPHIL NFR BLD AUTO: 0.7 % — SIGNIFICANT CHANGE UP (ref 0–6)
GLUCOSE SERPL-MCNC: 98 MG/DL — SIGNIFICANT CHANGE UP (ref 70–115)
HCT VFR BLD CALC: 32.2 % — LOW (ref 37–47)
HGB BLD-MCNC: 10.4 G/DL — LOW (ref 12–16)
HYPOCHROMIA BLD QL: SLIGHT — SIGNIFICANT CHANGE UP
LYMPHOCYTES # BLD AUTO: 0.7 K/UL — LOW (ref 1–4.8)
LYMPHOCYTES # BLD AUTO: 4.1 % — LOW (ref 20–55)
MACROCYTES BLD QL: SLIGHT — SIGNIFICANT CHANGE UP
MCHC RBC-ENTMCNC: 28.1 PG — SIGNIFICANT CHANGE UP (ref 27–31)
MCHC RBC-ENTMCNC: 32.3 G/DL — SIGNIFICANT CHANGE UP (ref 32–36)
MCV RBC AUTO: 87 FL — SIGNIFICANT CHANGE UP (ref 81–99)
METHOD TYPE: SIGNIFICANT CHANGE UP
MICROCYTES BLD QL: SLIGHT — SIGNIFICANT CHANGE UP
MONOCYTES # BLD AUTO: 0.2 K/UL — SIGNIFICANT CHANGE UP (ref 0–0.8)
MONOCYTES NFR BLD AUTO: 1.5 % — LOW (ref 3–10)
NEUTROPHILS # BLD AUTO: 15 K/UL — HIGH (ref 1.8–8)
NEUTROPHILS NFR BLD AUTO: 92.6 % — HIGH (ref 37–73)
ORGANISM # SPEC MICROSCOPIC CNT: SIGNIFICANT CHANGE UP
ORGANISM # SPEC MICROSCOPIC CNT: SIGNIFICANT CHANGE UP
OVALOCYTES BLD QL SMEAR: SLIGHT — SIGNIFICANT CHANGE UP
PLAT MORPH BLD: NORMAL — SIGNIFICANT CHANGE UP
PLATELET # BLD AUTO: 167 K/UL — SIGNIFICANT CHANGE UP (ref 150–400)
POIKILOCYTOSIS BLD QL AUTO: SLIGHT — SIGNIFICANT CHANGE UP
POTASSIUM SERPL-MCNC: 4.6 MMOL/L — SIGNIFICANT CHANGE UP (ref 3.5–5.3)
POTASSIUM SERPL-SCNC: 4.6 MMOL/L — SIGNIFICANT CHANGE UP (ref 3.5–5.3)
RBC # BLD: 3.7 M/UL — LOW (ref 4.4–5.2)
RBC # FLD: 18.8 % — HIGH (ref 11–15.6)
RBC BLD AUTO: ABNORMAL
SODIUM SERPL-SCNC: 139 MMOL/L — SIGNIFICANT CHANGE UP (ref 135–145)
SPECIMEN SOURCE: SIGNIFICANT CHANGE UP
WBC # BLD: 16.2 K/UL — HIGH (ref 4.8–10.8)
WBC # FLD AUTO: 16.2 K/UL — HIGH (ref 4.8–10.8)

## 2019-01-31 PROCEDURE — 76775 US EXAM ABDO BACK WALL LIM: CPT | Mod: 26

## 2019-01-31 PROCEDURE — 99232 SBSQ HOSP IP/OBS MODERATE 35: CPT

## 2019-01-31 PROCEDURE — 99232 SBSQ HOSP IP/OBS MODERATE 35: CPT | Mod: GC

## 2019-01-31 PROCEDURE — 99222 1ST HOSP IP/OBS MODERATE 55: CPT

## 2019-01-31 RX ADMIN — Medication 1 TABLET(S): at 18:00

## 2019-01-31 RX ADMIN — Medication 55 MILLIGRAM(S): at 05:12

## 2019-01-31 RX ADMIN — SENNA PLUS 1 TABLET(S): 8.6 TABLET ORAL at 22:37

## 2019-01-31 RX ADMIN — ERTAPENEM SODIUM 120 MILLIGRAM(S): 1 INJECTION, POWDER, LYOPHILIZED, FOR SOLUTION INTRAMUSCULAR; INTRAVENOUS at 12:19

## 2019-01-31 RX ADMIN — MYCOPHENOLATE MOFETIL 1500 MILLIGRAM(S): 250 CAPSULE ORAL at 18:00

## 2019-01-31 RX ADMIN — CHLORHEXIDINE GLUCONATE 1 APPLICATION(S): 213 SOLUTION TOPICAL at 05:12

## 2019-01-31 RX ADMIN — Medication 200 MILLIGRAM(S): at 18:00

## 2019-01-31 RX ADMIN — DULOXETINE HYDROCHLORIDE 60 MILLIGRAM(S): 30 CAPSULE, DELAYED RELEASE ORAL at 12:19

## 2019-01-31 RX ADMIN — PANTOPRAZOLE SODIUM 40 MILLIGRAM(S): 20 TABLET, DELAYED RELEASE ORAL at 05:13

## 2019-01-31 RX ADMIN — OXYCODONE AND ACETAMINOPHEN 2 TABLET(S): 5; 325 TABLET ORAL at 13:46

## 2019-01-31 RX ADMIN — OXYCODONE AND ACETAMINOPHEN 2 TABLET(S): 5; 325 TABLET ORAL at 22:36

## 2019-01-31 RX ADMIN — Medication 200 MILLIGRAM(S): at 05:13

## 2019-01-31 RX ADMIN — OXYCODONE AND ACETAMINOPHEN 2 TABLET(S): 5; 325 TABLET ORAL at 15:24

## 2019-01-31 RX ADMIN — MYCOPHENOLATE MOFETIL 1500 MILLIGRAM(S): 250 CAPSULE ORAL at 05:13

## 2019-01-31 RX ADMIN — OXYCODONE AND ACETAMINOPHEN 2 TABLET(S): 5; 325 TABLET ORAL at 05:12

## 2019-01-31 RX ADMIN — Medication 100 MILLIGRAM(S): at 18:00

## 2019-01-31 RX ADMIN — ENOXAPARIN SODIUM 40 MILLIGRAM(S): 100 INJECTION SUBCUTANEOUS at 12:19

## 2019-01-31 RX ADMIN — TAMSULOSIN HYDROCHLORIDE 0.8 MILLIGRAM(S): 0.4 CAPSULE ORAL at 22:37

## 2019-01-31 RX ADMIN — Medication 100 MILLIGRAM(S): at 05:13

## 2019-01-31 NOTE — PROGRESS NOTE ADULT - PROBLEM SELECTOR PLAN 3
stable renal disease, electaed dsDNA, low complements  rheum follow up noted and appreciated stable renal disease, elevated dsDNA, low complements  rheum follow up noted and appreciated

## 2019-01-31 NOTE — PROGRESS NOTE ADULT - SUBJECTIVE AND OBJECTIVE BOX
CC: transverse myelitis    Patient seen and examined at the bedside. No acute overnight events. urinary retention noted still. Complaining of continued urine retention this AM. Denies fever/chills, headache, lightheadedness, dizziness, chest pain, palpitations, shortness of breath, cough, abd pain, nausea/vomiting/diarrhea.      =========================================================================================  T(C): 36.7 (19 @ 00:23), Max: 36.8 (19 @ 08:34)  HR: 79 (19 @ 00:23) (79 - 81)  BP: 96/61 (19 @ 00:23) (85/52 - 104/56)  RR: 18 (19 @ 00:23) (18 - 18)  SpO2: 97% (19 @ 00:23) (97%-97%)    PHYSICAL EXAM.    GEN - appears age appropriate. well nourished. pleasant. no distress.   HEENT - NCAT, EOMI, ARTEMIO. RIJ in place  RESP - CTA BL, no wheeze/stridor/rhonchi/crackles. not on supplemental O2. able to speak in full sentences without distress.   CARDIO - NS1S2, RRR. No murmurs/rubs/gallops.  ABD - Soft/Non tender/Non distended. Normal BS x4 quadrants. no guarding/rebound tenderness.  Ext - No NISHI.  MSK - BL 5/5 strength on upper ext. 3-4/5 RLE strength, 2/5 LLE strength  Neuro - AAOx3. cn 2-12 grossly intact  Psych - normal affect  Skin - c/d/i. no rashes/lesions      I&O's Summary    2019 07:01  -  2019 07:00  --------------------------------------------------------  IN: 900 mL / OUT: 1660 mL / NET: -760 mL    2019 07:  -  2019 17:52  --------------------------------------------------------  IN: 0 mL / OUT: 1600 mL / NET: -1600 mL      Daily     Daily     =========================================================================================  LABS.                    Urinalysis Basic - ( 2019 14:14 )    Color: Yellow / Appearance: Clear / S.020 / pH: x  Gluc: x / Ketone: Negative  / Bili: Negative / Urobili: Negative mg/dL   Blood: x / Protein: 30 mg/dL / Nitrite: Negative   Leuk Esterase: Trace / RBC: 25-50 /HPF / WBC 3-5   Sq Epi: x / Non Sq Epi: Few / Bacteria: Many          =========================================================================================  IMAGING.     =========================================================================================    HOME MEDS.    Home Medications:  Cymbalta 60 mg oral delayed release capsule: 1 cap(s) orally once a day (2019 22:11)  hydroxychloroquine 200 mg oral tablet: 1 tab(s) orally once a day (2019 22:12)  Lyrica 75 mg oral capsule: 1 cap(s) orally once a day, As Needed (2019 22:12)  sertraline 50 mg oral tablet: 1 tab(s) orally once a day (2019 22:14)      =========================================================================================    HOSPITAL MEDS.    MEDICATIONS  (STANDING):  chlorhexidine 2% Cloths 1 Application(s) Topical <User Schedule>  docusate sodium 100 milliGRAM(s) Oral two times a day  DULoxetine 60 milliGRAM(s) Oral daily  enoxaparin Injectable 40 milliGRAM(s) SubCutaneous daily  ertapenem  IVPB      hydroxychloroquine 200 milliGRAM(s) Oral two times a day  methylPREDNISolone sodium succinate Injectable 55 milliGRAM(s) IV Push daily CC: transverse myelitis    Patient seen and examined at the bedside. No acute overnight events. urinary retention continues. Complaining of continued urine retention this AM. Denies fever/chills, headache, lightheadedness, dizziness, chest pain, palpitations, shortness of breath, cough, abd pain, nausea/vomiting/diarrhea.      =========================================================================================  T(C): 36.7 (01-31-19 @ 00:23), Max: 36.8 (01-31-19 @ 08:34)  HR: 79 (01-31-19 @ 00:23) (79 - 81)  BP: 96/61 (01-31-19 @ 00:23) (85/52 - 104/56)  RR: 18 (01-31-19 @ 00:23) (18 - 18)  SpO2: 97% (01-31-19 @ 00:23) (97%-97%)    PHYSICAL EXAM.    GEN - appears age appropriate. well nourished. pleasant. no distress.   HEENT - NCAT, EOMI, ARTEMIO. RIJ in place  RESP - CTA BL, no wheeze/stridor/rhonchi/crackles. not on supplemental O2. able to speak in full sentences without distress.   CARDIO - NS1S2, RRR. No murmurs/rubs/gallops.  ABD - Soft/Non tender/Non distended. Normal BS x4 quadrants. no guarding/rebound tenderness.  Ext - No NISHI.  MSK - BL 5/5 strength on upper ext. 3-4/5 RLE strength, 2/5 LLE strength  Neuro - AAOx3. cn 2-12 grossly intact  Psych - normal affect  Skin - c/d/i. no rashes/lesions      I&O's Summary    29 Jan 2019 07:01  -  30 Jan 2019 07:00  --------------------------------------------------------  IN: 900 mL / OUT: 1660 mL / NET: -760 mL    30 Jan 2019 07:01 - 30 Jan 2019 17:52  --------------------------------------------------------  IN: 0 mL / OUT: 1600 mL / NET: -1600 mL    31 Jan 2019 07:01 - 31 Jan 2019 11:29  --------------------------------------------------------  IN 0 mL/ Out: 2100 mL / NET: -2100 mL      Daily     Daily     =========================================================================================  LABS.                            =========================================================================================  IMAGING.     =========================================================================================    HOME MEDS.    Home Medications:  Cymbalta 60 mg oral delayed release capsule: 1 cap(s) orally once a day (16 Jan 2019 22:11)  hydroxychloroquine 200 mg oral tablet: 1 tab(s) orally once a day (16 Jan 2019 22:12)  Lyrica 75 mg oral capsule: 1 cap(s) orally once a day, As Needed (16 Jan 2019 22:12)  sertraline 50 mg oral tablet: 1 tab(s) orally once a day (16 Jan 2019 22:14)      =========================================================================================    HOSPITAL MEDS.    MEDICATIONS  (STANDING):  chlorhexidine 2% Cloths 1 Application(s) Topical <User Schedule>  docusate sodium 100 milliGRAM(s) Oral two times a day  DULoxetine 60 milliGRAM(s) Oral daily  enoxaparin Injectable 40 milliGRAM(s) SubCutaneous daily  ertapenem  IVPB      hydroxychloroquine 200 milliGRAM(s) Oral two times a day  methylPREDNISolone sodium succinate Injectable 55 milliGRAM(s) IV Push daily CC: transverse myelitis    Patient seen and examined at the bedside. No acute overnight events. urinary retention continues and she is doing straight cath q 4-5 h. Right IJ is removed. Asking for shower.   Denies fever/chills, headache, lightheadedness, dizziness, chest pain, palpitations, shortness of breath, cough, abd pain, nausea/vomiting/diarrhea.      =========================================================================================  T(C): 36.7 (01-31-19 @ 00:23), Max: 36.8 (01-31-19 @ 08:34)  HR: 79 (01-31-19 @ 00:23) (79 - 81)  BP: 96/61 (01-31-19 @ 00:23) (85/52 - 104/56)  RR: 18 (01-31-19 @ 00:23) (18 - 18)  SpO2: 97% (01-31-19 @ 00:23) (97%-97%)    PHYSICAL EXAM.    GEN - appears age appropriate. well nourished. pleasant. no distress.   HEENT - NCAT, EOMI, ARTEMIO. RIJ in place  RESP - CTA BL, no wheeze/stridor/rhonchi/crackles. not on supplemental O2. able to speak in full sentences without distress.   CARDIO - NS1S2, RRR. No murmurs/rubs/gallops.  ABD - Soft/Non tender/Non distended. Normal BS x4 quadrants. no guarding/rebound tenderness.  Ext - No NISHI.  MSK - BL 5/5 strength on upper ext. 3-4/5 RLE strength, 2/5 LLE strength  Neuro - AAOx3. cn 2-12 grossly intact  Psych - normal affect  Skin - c/d/i. no rashes/lesions                              =========================================================================================  IMAGING.     =========================================================================================    HOME MEDS.    Home Medications:  Cymbalta 60 mg oral delayed release capsule: 1 cap(s) orally once a day (16 Jan 2019 22:11)  hydroxychloroquine 200 mg oral tablet: 1 tab(s) orally once a day (16 Jan 2019 22:12)  Lyrica 75 mg oral capsule: 1 cap(s) orally once a day, As Needed (16 Jan 2019 22:12)  sertraline 50 mg oral tablet: 1 tab(s) orally once a day (16 Jan 2019 22:14)      =========================================================================================    HOSPITAL MEDS.    MEDICATIONS  (STANDING):  chlorhexidine 2% Cloths 1 Application(s) Topical <User Schedule>  docusate sodium 100 milliGRAM(s) Oral two times a day  DULoxetine 60 milliGRAM(s) Oral daily  enoxaparin Injectable 40 milliGRAM(s) SubCutaneous daily  ertapenem  IVPB      hydroxychloroquine 200 milliGRAM(s) Oral two times a day  methylPREDNISolone sodium succinate Injectable 55 milliGRAM(s) IV Push daily

## 2019-01-31 NOTE — PROGRESS NOTE ADULT - SUBJECTIVE AND OBJECTIVE BOX
Patient in bed, feels better.  Noting improved bowel control.   No improvement in the bladder. But thinks it is better since the new antibiotics.     FUNCTIONAL PROGRESS    Sit-Stand Transfer Training  Transfer Training Sit-to-Stand Transfer: moderate assist (50% patient effort)  Transfer Training Stand-to-Sit Transfer: moderate assist (50% patient effort)    Gait Training  Gait Trainin'x2 RW modA chair follow  Gait Analysis: decreased gait velocity and activity tolerance, assist for steadying, unsteadiness c turns requiring assist, verbal cues for sequencing    Therapeutic Exercise  Therapeutic Exercise Detail: LAQ and ankle pumps         REVIEW OF SYSTEMS  Constitutional - No fever,  No fatigue  HEENT - No vertigo, No neck pain  Neurological - No headaches, No memory loss, +loss of strength, No numbness, No tremors  Skin - No rashes, No lesions   Musculoskeletal - No joint pain, No joint swelling, No muscle pain  Psychiatric - No depression, No anxiety    VITALS  T(C): 36.8 (19 @ 08:34), Max: 37.4 (19 @ 16:02)  HR: 81 (19 @ 08:34) (79 - 84)  BP: 85/52 (19 @ 08:34) (85/52 - 96/61)  RR: 18 (19 @ 08:34) (18 - 18)  SpO2: 97% (19 @ 08:34) (97% - 97%)  Wt(kg): --    MEDICATIONS   acetaminophen   Tablet .. 650 milliGRAM(s) every 6 hours PRN  acetaminophen  IVPB .. 1000 milliGRAM(s) once PRN  baclofen 10 milliGRAM(s) every 8 hours PRN  bisacodyl 5 milliGRAM(s) every 12 hours PRN  chlorhexidine 2% Cloths 1 Application(s) <User Schedule>  docusate sodium 100 milliGRAM(s) two times a day  DULoxetine 60 milliGRAM(s) daily  enoxaparin Injectable 40 milliGRAM(s) daily  ertapenem  IVPB 1000 milliGRAM(s) every 24 hours  ertapenem  IVPB     hydroxychloroquine 200 milliGRAM(s) two times a day  ibuprofen  Tablet. 400 milliGRAM(s) every 8 hours PRN  lactulose Syrup 20 Gram(s) two times a day PRN  magnesium hydroxide Suspension 30 milliLiter(s) daily PRN  meperidine     Injectable 25 milliGRAM(s) once PRN  methylPREDNISolone sodium succinate Injectable 55 milliGRAM(s) daily  mineral oil enema 133 milliLiter(s) at bedtime PRN  mycophenolate mofetil 1500 milliGRAM(s) two times a day  ondansetron Injectable 4 milliGRAM(s) every 6 hours PRN  oxyCODONE    5 mG/acetaminophen 325 mG 2 Tablet(s) every 6 hours PRN  pantoprazole    Tablet 40 milliGRAM(s) before breakfast  polyethylene glycol 3350 17 Gram(s) daily  senna 1 Tablet(s) at bedtime  tamsulosin 0.8 milliGRAM(s) at bedtime      RECENT LABS/IMAGING          Urinalysis Basic - ( 2019 14:14 )    Color: Yellow / Appearance: Clear / S.020 / pH: x  Gluc: x / Ketone: Negative  / Bili: Negative / Urobili: Negative mg/dL   Blood: x / Protein: 30 mg/dL / Nitrite: Negative   Leuk Esterase: Trace / RBC: 25-50 /HPF / WBC 3-5   Sq Epi: x / Non Sq Epi: Few / Bacteria: Many      ----------------------------------------------------------------------------------------  PHYSICAL EXAM  Constitutional - NAD, Comfortable  Extremities - No C/C/E, No calf tenderness   Neurologic Exam -                    Motor -                      LEFT    UE - C5 5/5, C6 5/5, C7 5/5, C8 5/5, T1 5/5                    RIGHT UE - C5 5/5, C6 5/5, C7 5/5, C8 5/5, T1 5/5                    LEFT    LE - L2 2/5, L3 3/5, L4 4/5, L5 4/5, S1 5/5                    RIGHT LE - L2 3/5, L3 4/5, L4 4/5, L5 4/5, S1 5/5      Sensory - Impaired to LT and PP up to T8     Reflexes - Flaccid  Psychiatric - Mood stable, Affect WNL  ----------------------------------------------------------------------------------------  ASSESSMENT/PLAN  33yFemale with functional deficits related to nontraumatic incomplete SCI paraparesis  SLE TM - Plaquenil, Steroids, Cellcept  ESBL UTO - Invanz  Pain/Spasms - Tylenol, Ibuprofen, Cymbalta, Baclofen, Percocet  Neurogenic Bladder - Maintain low volumes < 350mL, IC Q4 hours, Flomax  Neurogenic Bowel - Senna, Lactulose, Fleet, Colace, Miralax, MOM  DVT PPX - SCDs, Lovenox  Rehab - Will continue to follow for ongoing rehab needs and recommendations.  Recommend ACUTE inpatient rehabilitation for the functional deficits consisting of 3 hours of therapy/day & 24 hour RN/daily PMR physician for comorbid medical management. Patient will be able to tolerate 3 hours a day.     Continue bedside therapy as well as OOB throughout the day with mobilization by staff to maintain cardiopulmonary function and prevention of secondary complications related to debility.

## 2019-01-31 NOTE — PROGRESS NOTE ADULT - ATTENDING COMMENTS
33 years old female with hx of SLE, lupus Nephritis and Fibromyalgia, recently returned from [place] on 12/29 who presented with generalized weakness found to have transverse myelitis. She got plamapheresis, rtx and steroids and improved. Course complicated with urinary retention and ESBL ECOLI.  urology consulted. Patient is accepted by acute rehab and will go to acute rehab..     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 Rheum is working on infusion.    Acute urinary retention  - c/w bladder scans q8hrs  - c/w straight cath as needed  - c/w flomax 0.8mg po qhs   - likely combo of neurogenic bladder + UTI, uc confirming esbl ecoli uti  - ertapenem started 1/30, last day 2/4 per ID, no plans for picc or mid, per ID can be given @ rehab via peripheral as it is short course  Renal us to see if she has hydronephrosis, Urology note appreciated.    SLE with nephritis   - stable renal disease, elevated dsDNA, low complements  - rheum f/u noted and appreciated     Fibromyalgia   - c/w tylenol for pain prn     Depression  - c/w cymbalta 60mg po qd    Constipation  - Miralax / senna     DVT ppx  - c/w lovenox 40mg sq qd    Dispo: PT consulted and recommended acute rehab, PMNR consulted  - as per PMNR - patient will benefit from acute rehab. plan for dc in next 24-48hrs

## 2019-01-31 NOTE — CHART NOTE - NSCHARTNOTEFT_GEN_A_CORE
Source: Patient [ ]  Family [ ]   other [X] EMR, staff; Pt with door closed not wanting to be disturbed    Current Diet:   Diet, Regular (01-16-19 @ 11:57)    Patient reports [ ] nausea  [ ] vomiting [ ] diarrhea [ ] constipation  [ ]chewing problems [ ] swallowing issues  [ ] other:     PO intake:  < 50% [ ]   50-75%  [X]   %  [ ]  other :    Source for PO intake [ ] Patient [ ] family [X] chart [ ] staff [ ] other    Current Weight:   (1/23) 109lbs  (1/15) 120lbs    % Weight Change: Question accuracy of weights     Pertinent Medications: MEDICATIONS  (STANDING):  chlorhexidine 2% Cloths 1 Application(s) Topical <User Schedule>  docusate sodium 100 milliGRAM(s) Oral two times a day  DULoxetine 60 milliGRAM(s) Oral daily  enoxaparin Injectable 40 milliGRAM(s) SubCutaneous daily  ertapenem  IVPB 1000 milliGRAM(s) IV Intermittent every 24 hours  ertapenem  IVPB      hydroxychloroquine 200 milliGRAM(s) Oral two times a day  methylPREDNISolone sodium succinate Injectable 55 milliGRAM(s) IV Push daily  mycophenolate mofetil 1500 milliGRAM(s) Oral two times a day  pantoprazole    Tablet 40 milliGRAM(s) Oral before breakfast  polyethylene glycol 3350 17 Gram(s) Oral daily  senna 1 Tablet(s) Oral at bedtime  tamsulosin 0.8 milliGRAM(s) Oral at bedtime    MEDICATIONS  (PRN):  acetaminophen   Tablet .. 650 milliGRAM(s) Oral every 6 hours PRN Temp greater or equal to 38C (100.4F), Mild Pain (1 - 3)  acetaminophen  IVPB .. 1000 milliGRAM(s) IV Intermittent once PRN Severe Pain (7 - 10)  baclofen 10 milliGRAM(s) Oral every 8 hours PRN Muscle Spasm  bisacodyl 5 milliGRAM(s) Oral every 12 hours PRN Constipation  ibuprofen  Tablet. 400 milliGRAM(s) Oral every 8 hours PRN Moderate Pain (4 - 6)  lactulose Syrup 20 Gram(s) Oral two times a day PRN constipation  magnesium hydroxide Suspension 30 milliLiter(s) Oral daily PRN Constipation  meperidine     Injectable 25 milliGRAM(s) IV Push once PRN Shivering during infusion  mineral oil enema 133 milliLiter(s) Rectal at bedtime PRN as needed if no bm for 3 days  ondansetron Injectable 4 milliGRAM(s) IV Push every 6 hours PRN Nausea and/or Vomiting  oxyCODONE    5 mG/acetaminophen 325 mG 2 Tablet(s) Oral every 6 hours PRN Severe Pain (7 - 10)    Pertinent Labs: CBC Full  -  ( 31 Jan 2019 08:56 )  WBC Count : 16.2 K/uL  Hemoglobin : 10.4 g/dL  Hematocrit : 32.2 %  Platelet Count - Automated : 167 K/uL  Mean Cell Volume : 87.0 fl  Mean Cell Hemoglobin : 28.1 pg  Mean Cell Hemoglobin Concentration : 32.3 g/dL  01-31 Na139 mmol/L Glu 98 mg/dL K+ 4.6 mmol/L Cr  0.24 mg/dL<L> BUN 15.0 mg/dL Phos n/a   Alb n/a   PAB n/a       Skin: No skin breakdown/edema noted     Nutrition focused physical exam previously conducted - found signs of malnutrition [ ]absent [x ]present    Subcutaneous fat loss: [ ] Orbital fat pads region, [x ]Buccal fat region, [ ]Triceps region,  [ ]Ribs region    Muscle wasting: [ ]Temples region, [x ]Clavicle region, [ ]Shoulder region, [ ]Scapula region, [ ]Interosseous region,  [ ]thigh region, [ ]Calf region    Estimated Needs:   [X] no change since previous assessment  [ ] recalculated:     Current Nutrition Diagnosis: Pt remains at high nutrition risk secondary to malnutrition (moderate chronic) related to inadequate protein energy intake with poor appetite in setting of feeling depressed, N/V and weakness as evidenced by pt meeting <75% est energy needs >1mo, 14# wt loss (10%) x3-4mo, mild muscle wasting. Pt with appetite improving, consuming ~75% of meals x 2 days per documentation. Last BM 1/30 noted. RD to remain available.     Recommendations:   1) Ensure Enlive TID to supplement suboptimal po intake   2) Obtain/provide food preferences daily to inc PO     Monitoring and Evaluation:   [X] PO intake [X] Tolerance to diet prescription [X] Weights  [X] Follow up per protocol [X] Labs:

## 2019-01-31 NOTE — PROGRESS NOTE ADULT - PROBLEM SELECTOR PROBLEM 3
Leukocytosis, unspecified type
Lupus erythematosus, unspecified form
Urinary retention
Lupus nephritis

## 2019-01-31 NOTE — PROGRESS NOTE ADULT - PROBLEM SELECTOR PLAN 1
improving function in b/l LE, ambulates with assistance  cw solumedrol 55mg IV QD, prednisone on dc  cw cellcept 1500mg PO BID  cw hydroxycloroquine 200mg PO BID  s/p 5 sessions PLEX  Plan for acute rehab with continued rheum + neuro f/u improving function in b/l LE, ambulates with walker  cw solumedrol 55mg IV QD, prednisone on dc  cw cellcept 1500mg PO BID  cw hydroxycloroquine 200mg PO BID  s/p 5 sessions PLEX  Plan for acute rehab with continued rheum + neuro f/u  as per rheum plan for RTX 1g IV x 2 doses ( q 2 weejs) every 6 months

## 2019-01-31 NOTE — CONSULT NOTE ADULT - ASSESSMENT
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 There is no current evidence of CNS infectious etiology, however she has possible UTI and was treated w/ abx.  She has increasing urinary sediment, low complements, and previously moderate dsDNA levels indicating fairly moderately active SLE. She needs further spot TP/Cr and repeat UA after abx for possible UTI to evaluate for any active lupus nephritis.  Her myelitis may be due to her underlying SLE vs possible MS as well.  Pending some serologies including NMO, dsDNA abs, and other CSF studies to evaluate.  She is on day 4/5 of pulse steroids with some mild RLE improvements.    - c/w pulse x 5 days then c/w solumedrol 1mg/kg/d  - agree w/ neuro for PLEX. Will need vascular access and blood bank/transfusion med to setup QOD PLEX  - pretesting done for possible RTX pending her serologies, if +NMO will likely recommend.   - repeat UA after completion of CTX/abx.  Pending TP/Cr spot  - pending dsDNA and other serologies  - c/w duloxetine 60mg/d  - restart MMF 1.5g BID for now  - repeat MRI cervical and thoracic spine for any improvements of inflammatory T2 hyperintensity of myelitis  - can restart HCQ 200mg BID, but not necessary in acute setting    Call for any questions  [phone]
patient h/o lupus with worsening pains in neck, mid back, feet, also c/o le weakness with parestheisas, distended bladder, hard to dx, she does have low K, plus uti, which can flare of her lupus, however will need to rule out cns lesion like acute demyelination in brain, transverse myelitis in spinal cord, also needs to assess for acute peripheral neuropathy like gbs, at the present time her reflexes are intact but she needs to be monitored closely. LP may also be considered depending on clinical course and results of mri.
Urology Attending:    Neurogenic bladder, most likely bladder atony    Would recommend teaching patient intermittent cath every 6 hours    Needs outpatient follow up for urodynamics.

## 2019-01-31 NOTE — PROGRESS NOTE ADULT - PROBLEM SELECTOR PLAN 4
summers removed , voiding   no retention
summers removed , voiding with intermittent striaght cath for retention   cont flomax
c/w tylenol PRN for pain

## 2019-01-31 NOTE — CONSULT NOTE ADULT - SUBJECTIVE AND OBJECTIVE BOX
33yF was admitted on 01-16 with paraplegia, generalized illness in setting of change in bladder function. As per history, was in [place] for 3 months, and returned a few weeks ago. She had back pain in [place] and an MRI a month ago was WNL as per report.     MRI C SPINE - Abnormal cord signal throughout the cervical spine from the C1/C2 junction through C6/C7 with cord swelling. No evidence of cord compression. No abnormal enhancement. Differential diagnosis includes transverse myelitis, infectious, inflammatory, autoimmune, infarct, and demyelinating disease processes. Findings discussed with Dr. Tom.    MRI L SPINE - Diffuse abnormal spinal cord T2 hyperintensity with edema. Focal enhancement in the conus medullaris.    MRI BRAIN - WNL    Infectious workup showed +EBV, Varicella. Patient workup consistent with autoimmune disease, SLE s/p IV steroids.  Now being treated with Cellcept plasmapheresis x5 doses QOD and Rituximab IV every 2 weeks every 6 months.     Patient with improved BLE weakness and has ongoing sensory changes. Able to feel pinprick, but less. Also notes improved sensory of her bladder - needing cath, needs bowel meds to assist with BM.     REVIEW OF SYSTEMS  Constitutional - No fever, No weight loss, +fatigue  HEENT - No eye pain, No visual disturbances, No difficulty hearing, No tinnitus, No vertigo, No neck pain  Respiratory - No cough, No wheezing, No shortness of breath  Cardiovascular - No chest pain, No palpitations  Gastrointestinal - No abdominal pain, No nausea, No vomiting, No diarrhea, +constipation  Genitourinary - No dysuria, No frequency, No hematuria, +incontinence  Neurological - No headaches, No memory loss, +loss of strength, +numbness, No tremors  Skin - No itching, No rashes, No lesions   Endocrine - No temperature intolerance  Musculoskeletal - No joint pain, No joint swelling, +muscle pain  Psychiatric - No depression, No anxiety    VITALS  T(C): 37.1 (01-28-19 @ 08:04), Max: 37.1 (01-27-19 @ 11:44)  HR: 85 (01-28-19 @ 08:04) (85 - 97)  BP: 103/74 (01-28-19 @ 08:04) (103/74 - 119/68)  RR: 18 (01-28-19 @ 08:04) (18 - 18)  SpO2: 97% (01-28-19 @ 08:04) (97% - 98%)  Wt(kg): --    PAST MEDICAL & SURGICAL HISTORY  Fibromyalgia  Lupus  GERD (gastroesophageal reflux disease)  Lupus nephritis  S/P correction of deviated nasal septum  History of esophagogastroduodenoscopy (EGD)  History of biopsy      SOCIAL HISTORY  Smoking - Denied  EtOH - Denied   Drugs - Denied    FUNCTIONAL HISTORY  Lives with spouse, 3 [place]  Independent    CURRENT FUNCTIONAL STATUS  1/20  Bed Mobility: Supine to Sit:     · Level of [place]	moderate assist (50% patients effort)	  · Physical Assist/Nonphysical Assist	1 person assist	    Bed Mobility Analysis:     · Bed Mobility Limitations	decreased ability to use legs for bridging/pushing	  · Impairments Contributing to Impaired Bed Mobility	impaired balance; pain; decreased strength; fibromyalgia pain 8/10	    Transfer: Sit to Stand:     · Level of [place]	maximum assist (25% patients effort); pt's bilateral knees buckled during initial attempt, pt did not fall. during second attempt pt able to stand with 2 person max assist and pt was hyperextending bilateral knees.	  		  · Physical Assist/Nonphysical Assist	2 person assist	  · Weight-Bearing Restrictions	full weight-bearing	  · Assistive Device	rolling walker	    Transfer: Stand to Sit:     · Level of [place]	maximum assist (25% patients effort)	  · Physical Assist/Nonphysical Assist	2 person assist	  · Weight-Bearing Restrictions	full weight-bearing	  · Assistive Device	rolling walker	    Sit/Stand Transfer Safety Analysis:     · Transfer Safety Concerns Noted	decreased step length; decreased weight-shifting ability	  		  · Impairments Contributing to Impaired Transfers	impaired balance; decreased ROM; decreased strength; knees buckling	    Gait Skills:     · Level of [place]	maximum assist (25% patients effort)	  · Physical Assist/Nonphysical Assist	2 person assist	  · Weight-Bearing Restrictions	full weight-bearing	  · Assistive Device	rolling walker	  · Gait Distance	bed to chair	        FAMILY HISTORY   Family history of melanoma (Mother)  Family history of osteoarthritis (Mother)  Family history of liver disease (Father)      RECENT LABS/IMAGING  CBC Full  -  ( 27 Jan 2019 07:33 )  WBC Count : 6.0 K/uL  Hemoglobin : 9.7 g/dL  Hematocrit : 29.5 %  Platelet Count - Automated : 229 K/uL  Mean Cell Volume : 84.8 fl  Mean Cell Hemoglobin : 27.9 pg  Mean Cell Hemoglobin Concentration : 32.9 g/dL  Auto Neutrophil # : 4.2 K/uL  Auto Lymphocyte # : 0.7 K/uL  Auto Monocyte # : 0.7 K/uL  Auto Eosinophil # : 0.0 K/uL  Auto Basophil # : 0.0 K/uL  Auto Neutrophil % : 71.3 %  Auto Lymphocyte % : 12.1 %  Auto Monocyte % : 11.7 %  Auto Eosinophil % : 0.7 %  Auto Basophil % : 0.2 %    01-27    145  |  110<H>  |  15.0  ----------------------------<  106  3.5   |  19.0<L>  |  0.29<L>    Ca    8.6      27 Jan 2019 07:33  Phos  4.1     01-27  Mg     1.7     01-27          ALLERGIES  No Known Allergies      MEDICATIONS   acetaminophen   Tablet .. 650 milliGRAM(s) Oral every 6 hours PRN  acetaminophen   Tablet .. 650 milliGRAM(s) Oral once  acetaminophen  IVPB .. 1000 milliGRAM(s) IV Intermittent once PRN  albumin human  5% IVPB 2500 milliLiter(s) IV Intermittent every 48 hours  bisacodyl 5 milliGRAM(s) Oral every 12 hours PRN  chlorhexidine 2% Cloths 1 Application(s) Topical <User Schedule>  diphenhydrAMINE   Injectable 25 milliGRAM(s) IV Push once  docusate sodium 100 milliGRAM(s) Oral two times a day  DULoxetine 60 milliGRAM(s) Oral daily  enoxaparin Injectable 40 milliGRAM(s) SubCutaneous daily  HYDROmorphone  Injectable 0.5 milliGRAM(s) IV Push every 8 hours PRN  ibuprofen  Tablet. 400 milliGRAM(s) Oral every 8 hours PRN  lactulose Syrup 20 Gram(s) Oral two times a day PRN  magnesium hydroxide Suspension 30 milliLiter(s) Oral daily PRN  meperidine     Injectable 25 milliGRAM(s) IV Push once PRN  methylPREDNISolone sodium succinate Injectable 100 milliGRAM(s) IV Push once  methylPREDNISolone sodium succinate Injectable 55 milliGRAM(s) IV Push daily  mineral oil enema 133 milliLiter(s) Rectal at bedtime PRN  mycophenolate mofetil 1500 milliGRAM(s) Oral two times a day  ondansetron Injectable 4 milliGRAM(s) IV Push every 6 hours PRN  pantoprazole    Tablet 40 milliGRAM(s) Oral before breakfast  polyethylene glycol 3350 17 Gram(s) Oral daily  riTUXimab IVPB (Non - oncologic) 1000 milliGRAM(s) IV Intermittent once  saccharomyces boulardii 250 milliGRAM(s) Oral two times a day  senna 2 Tablet(s) Oral at bedtime  tamsulosin 0.8 milliGRAM(s) Oral at bedtime      ----------------------------------------------------------------------------------------  PHYSICAL EXAM  Constitutional - NAD, Comfortable  HEENT - NCAT, EOMI  Neck - Supple, No limited ROM  Chest - Breathing comfortably, No wheezing  Cardiovascular - S1S2   Abdomen - Soft   Extremities - No C/C/E, No calf tenderness   Neurologic Exam -                    Cognitive - Awake, Alert, AAO to self, place, date, year, situation     Communication - Fluent, No dysarthria     Motor -                      LEFT    UE - C5 5/5, C6 5/5, C7 5/5, C8 5/5, T1 5/5                    RIGHT UE - C5 5/5, C6 5/5, C7 5/5, C8 5/5, T1 5/5                    LEFT    LE - L2 2/5, L3 3/5, L4 2/5, L5 4/5, S1 5/5                    RIGHT LE - L2 3/5, L3 4/5, L4 2/5, L5 3/5, S1 5/5      Sensory - Impaired to LT and PP up to T8     Reflexes - Flaccid  Psychiatric - Mood stable, Affect WNL  ----------------------------------------------------------------------------------------  ASSESSMENT/PLAN  33yFemale with functional deficits related to nontraumatic incomplete SCI paraparesis  SLE TM - Rituxan, Plasmapheresis, Medrol, Cellcept  Pain - DC Dilaudid,  Tylenol, Ibuprofen  Neurogenic Bladder - Maintain low volumes < 350mL, IC Q4 hours, Flomax  Neurogenic Bowel - Senna, Lactulose, Fleet, Colace, Miralax, MOM  DVT PPX - SCDs  Rehab - Reordered PT and OT eval. Will continue to follow for ongoing rehab needs and recommendations.  Recommend ACUTE inpatient rehabilitation for the functional deficits consisting of 3 hours of therapy/day & 24 hour RN/daily PMR physician for comorbid medical management. Patient will be able to tolerate 3 hours a day. Will need to wait after plasmapheresis.     Continue bedside therapy as well as OOB throughout the day with mobilization by staff to maintain cardiopulmonary function and prevention of secondary complications related to debility.
[facility] Rheumatology [facility]                                     Yuri Ma MD, PhD, FABRICE                                    [address], [place], [place]  ---------------------------------------------------------------------------------------------------------    HISTORY OF PRESENT ILLNESS:  33y F w/ SLE (+serologies, alopecia, oral ulcers, polyarthritis, malar rash w/ proliferative LN) w/ previous most recent meds including MMF 1g BID and HCQ 200mg BID. She current is enrolled in the anifrolumab study for lupus nephritis w/ research group at [facility].  She also has a h/o chronic arthralgia, myalgia, myofascial tenderness, chronic fatigue, hyperesthesia and tender pts c/w fibromyalgia. She was treated w/ Lyrica and duloxetine most recently. She also had recent the addition of another SSRI, however still within tolerance for a SNRI and SSRI.  She presented on 1/15 w/ increasing generalized weakness, sudden LE weakness of her limbs. SHe was having for the prior 3-4 weeks increased back pain and urinary retention. During her previous stay in [place] she was worked up for similar symptoms and had negative MRI imaging which was reviewed and in her [facility] [employer] account - she was seen at rheumatology office for her research study appointment and seen by Dr. Cabrera, she had labs done with low complements, moderate dsDNA, and 0.3g proteinuria which is not far from her baseline.  Due to her urinary symptoms he recommended a neurology evaluation which was never obtained.    She reports4 increasing fatigue, generalized weakness, low-grade temps , myalgias, diffuse cervical/thoracic pain, retention and dysuria, and constipation.  She also had some mild URI sxs of pharyngitis and ear fullness, she reports some sick members at home with mild URI sxs as well.    She was admitted to [facility] and seen by neurology, MRI imaging on cervicothoracic spine shows diffuse moderate myelitis. She had an inflammatory LP w/ predominantly PMNs. CSF so far has been unrevealing of infectious etiology as well as serum serologies. She was placed on pulse steroids for a plan of 5 days. She is pending NMO ab.    She reports some trace improvement in her RLE weakness after pulse steroids, but denies any UE weakness similar. She has a summers catheter in place and cannot comment on her urinary symptoms, she did feel some irritation though. She has felt some mild subjective fevers, but no chills currently.    She denies any alopecia, oral/nasal ulcers, sicca, positional CP, pleuritic CP, SOB, cough, inflammatory arthritis, Raynaud's, or rash.  As per Dr. Cabrera her study labs had normal CBC and LFTs.  C3 55 and C4 9.  Her TP/Cr was 0.3g      PAST MEDICAL & SURGICAL HISTORY:  GERD (gastroesophageal reflux disease)  Lupus nephritis  S/P correction of deviated nasal septum  History of esophagogastroduodenoscopy (EGD)  History of biopsy: Renal      REVIEW OF SYSTEMS      General:	+weakness, fevers  Skin/Breast:   no rash  Ophthalmologic:   no eye pain/no redness. No xerophthalmia  ENMT:   no xerostomia,  No oral/nasal ulcers. No nasal crusting.  Respiratory and Thorax:  no SOB or cough.  No pleuritic CP  Cardiovascular:	  no supine/positional CP, palpitations  Gastrointestinal:	  +constipation. Mild Nausea w/o vomiting or diarrhea  Genitourinary:	+retention, dysuria  Musculoskeletal:	  arthralgia and myalgia. No inflammatory arthriits  Neurological:	 neg  Psychiatric:	neg  Hematology/Lymphatics:	neg  Endocrine:	neg  Allergic/Immunologic:	 neg      MEDICATIONS  (STANDING):  DULoxetine 60 milliGRAM(s) Oral daily  enoxaparin Injectable 40 milliGRAM(s) SubCutaneous daily  methylPREDNISolone sodium succinate IVPB 1000 milliGRAM(s) IV Intermittent daily  pantoprazole    Tablet 40 milliGRAM(s) Oral before breakfast  saccharomyces boulardii 250 milliGRAM(s) Oral two times a day  senna 2 Tablet(s) Oral at bedtime  tamsulosin 0.4 milliGRAM(s) Oral at bedtime    MEDICATIONS  (PRN):  acetaminophen   Tablet .. 650 milliGRAM(s) Oral every 6 hours PRN Temp greater or equal to 38C (100.4F), Mild Pain (1 - 3)  bisacodyl 5 milliGRAM(s) Oral every 12 hours PRN Constipation  docusate sodium 100 milliGRAM(s) Oral three times a day PRN Constipation  morphine  - Injectable 2 milliGRAM(s) IV Push every 4 hours PRN Severe Pain (7 - 10)  ondansetron Injectable 4 milliGRAM(s) IV Push every 6 hours PRN Nausea and/or Vomiting  oxyCODONE    5 mG/acetaminophen 325 mG 1 Tablet(s) Oral every 4 hours PRN Moderate Pain (4 - 6)      Allergies    No Known Allergies    Intolerances        PERTINENT MEDICATION HISTORY:  ANIFROLUMAB STUDY FOR LUPUS NEPHRITIS  MMF 1g BID   (stopped 1 week prior)  HCQ 200MG BID  (continued)  LYRICA 75MG BID  (stopped end of Dec)  DULOXETINE 60mg DAILY (stopped)    SOCIAL HISTORY:     Tobacco:  N     Alcohol use:  N     Illicit drug use:  N    FAMILY HISTORY:  Family history of melanoma (Mother): In Eye  Family history of osteoarthritis (Mother)  Family history of liver disease (Father)      Vital Signs Last 24 Hrs  T(C): 36.8 (2019 21:23), Max: 37.1 (2019 18:22)  T(F): 98.2 (2019 21:23), Max: 98.7 (2019 18:22)  HR: 73 (2019 21:23) (68 - 77)  BP: 146/84 (2019 21:23) (125/72 - 146/91)  BP(mean): --  RR: 19 (2019 21:23) (18 - 20)  SpO2: 98% (2019 21:23) (97% - 98%)    Daily     Daily     PHYSICAL EXAM:    Constitutional:  NAD   Eyes:  No eye pain/redness  ENMT:  No oral nasal ulcers, no epistaxis, no nasal crusting  Neck: supple  Respiratory:  CTABL  Cardiovascular:  nl S1S2 RRR  Gastrointestinal:  soft, nt nd. +BS  Genitourinary:  no suprapubic tenderness  Extremities:  Warm, perfused  Vascular:  2+ DP, ulnar and radial pulses  Neurological:  RLE>LLE weakness= prox hip flexors 5/10, distal 6+/10; L hip flex 4/10, distal 6/10.  UE prox/distal 9/10 to 10/10 throughout.  Neck flexor/extensors 9/10  Skin:  no rash  Lymph Nodes:  no cervical or supraclav,axillary LAD  Musculoskeletal:  diffuse tender points, myofascial tenderness. Mild arthralgia in wrists, knees. No synovitis.  cervical and midthoracic paraspinal tenderness.  Psychiatric:  appropriate    LABS:                        12.0   10.2  )-----------( 337      ( 2019 09:12 )             37.5         141  |  103  |  23.0<H>  ----------------------------<  147<H>  3.9   |  23.0  |  0.44<L>    Ca    8.5<L>      2019 09:12  Mg     2.7         TPro  6.8  /  Alb  3.6  /  TBili  0.4  /  DBili  x   /  AST  60<H>  /  ALT  105<H>  /  AlkPhos  79        Urinalysis Basic - ( 2019 08:14 )  Color: Yellow / Appearance: Clear / S.010 / pH: x  Gluc: x / Ketone: Trace  / Bili: Negative / Urobili: Negative mg/dL   Blood: x / Protein: 100 mg/dL / Nitrite: Negative   Leuk Esterase: Trace / RBC: 20-30 /HPF / WBC 6-10   Sq Epi: x / Non Sq Epi: Moderate / Bacteria: x  Protein, Urine: 100 mg/dL <!> (19 @ 03:59)    C3 Complement, Serum: 40 mg/dL <L> [81 - 157] (19 @ 20:59)  C4 Complement, Serum: 5 mg/dL <L> [13 - 39] (19 @ 20:59)    Quantitative IgM: 118 mg/dL [35 - 242] (19 @ 20:59)  Quantitative IgA: 233 mg/dL [84 - 499] (19 @ 20:59)  Quantitative Ig mg/dL [610 - 1660] (19 @ 20:59)    Hepatitis B Core Antibody, Total: Nonreact (19 @ 20:59)  Hepatitis B Core IgM Antibody: Nonreact (19 @ 20:59)  Hepatitis B Surface Antibody: Nonreact (19 @ 20:59)  Hepatitis B Surface Antigen: Nonreact (19 20:59)  Hepatitis C Virus Interpretation: Nonreact (19 20:59)    CD19 %: 17 % [6 - 24] (19 @ 20:51)        Lyme C6 Interpretation: Negative (19 @ 16:26)    Rheumatoid Factor Quant, Serum or Plasma: 17 IU/mL <H> [0 - 13] (19 @ 16:25)  PAUL Pattern: Homogeneous <!> (19 @ 01:47)  CHEY Antibody Screening Test (19 @ 16:26)    SM (Parrish) Ab FBIA: 1.2 AI    RNP: 3.8    C-Reactive Protein, Serum: 0.90 mg/dL <H> [0.00 - 0.40] (19 @ 02:45)  Sedimentation Rate, Erythrocyte: 46 mm/hr <H> [0 - 20] (19 @ 22:17)  Lactate Dehydrogenase, Serum: 320 U/L <H> [98 - 192] (19 @ 15:47)    Creatine Kinase, Serum: 36 U/L [25 - 170] (19 @ 00:32)    Culture Results:   No growth at 48 hours (19 @ 17:03)  Culture Results:   No growth at 48 hours (19 @ 17:03)  Culture Results:   No growth at 2 days.    Culture in progress (19 @ 15:53)  Culture Results:   >100,000 CFU/ml Coag Negative Staphylococcus (19 @ 04:00)          RADIOLOGY & ADDITIONAL STUDIES:  MR Cervical Spine w/wo IV Cont (19 @ 13:13) >  Abnormal cord signal throughout the cervical spine from the C1/C2   junction through C6/C7 with cord swelling. No evidence of cord   compression. No abnormal enhancement. Differential diagnosis includes   transverse myelitis, infectious, inflammatory, autoimmune, infarct, and   demyelinating disease processes. Findings discussed with Dr. Tom.    MR Thoracic Spine w/wo IV Cont (19 @ 13:13) >  Diffuse abnormal spinal cord T2 hyperintensity with edema. Focal   enhancement in the conus medullaris.
HPI:  32 yo was dx with lupus/fibromyalgia with pain in hands and but also some diffuse pains, per patient she was treated with high dose sterois for 7 days and later on placed om plaquenil, per patient she was feeling better on plaquenil but intermittent will some flare up of her pain, she was also using cymbalta 60 mg per day, few weeks ago she was also started on sertraline due to depressed mood due to her mother death, patient sarted feeling increasing neck pain, also increasing thoracic pains and difficulty urinating, also noted some fever yesterday. last evening also felt some paresthesias in lower extremites extending to abdomen and chest but today feels paresthesias only in the feet. she is aslo feeling muscle weakness in lower extremites since the start of paresthesias, altough she reports paresthesias are better but muscle weakness is not improving. ct abden did show moderately distended bladder needing foly placemnt.     PAST MEDICAL & SURGICAL HISTORY:      REVIEW OF SYSTEMS:    CONSTITUTIONAL: No fever, weight loss, or fatigueEYES: No eye pain, visual disturbances, or discharge  ENMT:  No difficulty hearing, tinnitus, vertigo; No sinus or throat pain  NECK: No pain or stiffness  RESPIRATORY: No cough, wheezing, chills or hemoptysis; No shortness of breath  CARDIOVASCULAR: No chest pain, palpitations, dizziness, or leg swelling  GASTROINTESTINAL: No abdominal or epigastric pain. No nausea, vomiting, or hematemesis; No diarrhea or constipation. No melena or hematochezia.  GENITOURINARY: No dysuria, frequency, hematuria, or incontinence  NEUROLOGICAL: No headaches, memory loss,  or tremors  SKIN: No itching, burning, rashes, or lesions   LYMPH NODES: No enlarged glands  ENDOCRINE: No heat or cold intolerance; No hair loss  MUSCULOSKELETAL: +for pains  PSYCHIATRIC: No depression, anxiety, mood swings, or difficulty sleeping  HEME/LYMPH: No easy bruising, or bleeding gums    MEDICATIONS  (STANDING):  cefTRIAXone   IVPB 1 Gram(s) IV Intermittent every 24 hours  potassium chloride    Tablet ER 40 milliEquivalent(s) Oral every 4 hours    MEDICATIONS  (PRN):      Allergies    No Known Allergies    Intolerances        SOCIAL HISTORY:    FAMILY HISTORY:      PHYSICAL EXAM:  Vital Signs Last 24 Hrs  T(F): 97.7 (19 @ 07:34)  HR: 77 (19 @ 07:34)  BP: 115/76 (19 @ 07:34)  RR: 20 (19 @ 07:34)    GENERAL: NAD, well-groomed, well-developed  HEAD:  Atraumatic, Normocephalic  EYES: EOMI, PERRLA, conjunctiva and sclera clear  NERVOUS SYSTEM:  Alert & Oriented X3, speech and language normal, cranial nerves II-XII normal,   Good concentration; Motor Strength 5/5 B/L upper extremities, in lower extremities hip and knees 3+/5, and ankle 4-4+/5; DTRs 2+ intact and symmetric, plantar responses flexor bilaterally, sensory exam normal to light touch.   HEART: Regular rate and rhythm; No murmurs, rubs, or gallops          LABS:                        13.3   9.5   )-----------( 228      ( 2019 00:32 )             39.4         136  |  100  |  9.0  ----------------------------<  118<H>  3.7   |  23.0  |  0.55    Ca    7.9<L>      2019 09:47    TPro  8.1  /  Alb  4.0  /  TBili  0.5  /  DBili  x   /  AST  42<H>  /  ALT  43<H>  /  AlkPhos  108  -      Urinalysis Basic - ( 2019 03:59 )    Color: Yellow / Appearance: Clear / S.010 / pH: x  Gluc: x / Ketone: Trace  / Bili: Negative / Urobili: Negative mg/dL   Blood: x / Protein: 100 mg/dL / Nitrite: Negative   Leuk Esterase: Trace / RBC: 0-2 /HPF / WBC 11-25   Sq Epi: x / Non Sq Epi: Occasional / Bacteria: Many        RADIOLOGY & ADDITIONAL STUDIES:
HPI:  33 years old female with PMH of SLE, Lupus Nephritis and Fibromyalgia brought by her  yesterday evening with generalized weakness. As per patient, she is not feeling well for last few days. She is having low grade fevers, generalized body pain, throat irritation, cough, ear pain and vomiting. Her appetite is very poor. She went to her PMD few days ago who prescribed Sertraline for her depressive symptoms. As per her, symptoms started after taking that medication. Yesterday, she was feeling so weak that she could not walk so her  brought her to ER. She is also complaining of difficulty urinating. She went to Urologist 10 days ago and she was prescribed Flomax which she has been taking but is unable to void. She also has constipation. She recently came back from [place] on 18 after staying there for 2.5 to 3 months. She had MRI there in December due to back pain and generalized weakness and it was normal. She has headache, neck pain and lightheadedness. Denies photophobia. + sick contacts with common cold. (2019 11:58)      Consult called for urinary retention.  Pt states she has to ability to feel fullness in bladder however is unable to spontaneously void.  Pt pt, a few days ago there was spontaneous urine.  Denies fecal incontinence.        ICU Vital Signs Last 24 Hrs  T(C): 36.8 (2019 08:34), Max: 37.4 (2019 16:02)  T(F): 98.2 (2019 08:34), Max: 99.3 (2019 16:02)  HR: 81 (2019 08:34) (79 - 84)  BP: 104/56 (2019 09:15) (85/52 - 104/56)  BP(mean): --  ABP: --  ABP(mean): --  RR: 18 (2019 08:34) (18 - 18)  SpO2: 97% (2019 08:34) (97% - 97%)      I&O's Detail    2019 07:01  -  2019 07:00  --------------------------------------------------------  IN:  Total IN: 0 mL    OUT:    Intermittent Catheterization - Urethral: 2100 mL  Total OUT: 2100 mL    Total NET: -2100 mL                MEDICATIONS  (STANDING):  chlorhexidine 2% Cloths 1 Application(s) Topical <User Schedule>  docusate sodium 100 milliGRAM(s) Oral two times a day  DULoxetine 60 milliGRAM(s) Oral daily  enoxaparin Injectable 40 milliGRAM(s) SubCutaneous daily  ertapenem  IVPB 1000 milliGRAM(s) IV Intermittent every 24 hours  ertapenem  IVPB      hydroxychloroquine 200 milliGRAM(s) Oral two times a day  methylPREDNISolone sodium succinate Injectable 55 milliGRAM(s) IV Push daily  mycophenolate mofetil 1500 milliGRAM(s) Oral two times a day  pantoprazole    Tablet 40 milliGRAM(s) Oral before breakfast  polyethylene glycol 3350 17 Gram(s) Oral daily  senna 1 Tablet(s) Oral at bedtime  tamsulosin 0.8 milliGRAM(s) Oral at bedtime    MEDICATIONS  (PRN):  acetaminophen   Tablet .. 650 milliGRAM(s) Oral every 6 hours PRN Temp greater or equal to 38C (100.4F), Mild Pain (1 - 3)  acetaminophen  IVPB .. 1000 milliGRAM(s) IV Intermittent once PRN Severe Pain (7 - 10)  baclofen 10 milliGRAM(s) Oral every 8 hours PRN Muscle Spasm  bisacodyl 5 milliGRAM(s) Oral every 12 hours PRN Constipation  ibuprofen  Tablet. 400 milliGRAM(s) Oral every 8 hours PRN Moderate Pain (4 - 6)  lactulose Syrup 20 Gram(s) Oral two times a day PRN constipation  magnesium hydroxide Suspension 30 milliLiter(s) Oral daily PRN Constipation  meperidine     Injectable 25 milliGRAM(s) IV Push once PRN Shivering during infusion  mineral oil enema 133 milliLiter(s) Rectal at bedtime PRN as needed if no bm for 3 days  ondansetron Injectable 4 milliGRAM(s) IV Push every 6 hours PRN Nausea and/or Vomiting  oxyCODONE    5 mG/acetaminophen 325 mG 2 Tablet(s) Oral every 6 hours PRN Severe Pain (7 - 10)      NUTRITION/IVF: Reg/IVL      PHYSICAL EXAM:    Gen:  NAD sitting comfortably in bed    Eyes:  EOMI's BL    Neurological:  pt endorses decreased sensation to BL L2 distribution as compared to BL UE.   Sensation to L1 less than L2.  Mild weakness noted to LL 4-5/5.  RLE intact.  Distal=proximal strength.  BL UE intact 5/5 throughout      ENMT: MMM    Pulmonary:  Unlabored    Cardiovascular:  NSR    Gastrointestinal:  Soft, grossly nontender, nondistended.  No fullness noted in suprapubic area    Genitourinary:  No summers    Back:  No CVAT noted    Extremities:  AFROM    Skin:  warm, dry, intact          LABS:  CBC Full  -  ( 2019 08:56 )  WBC Count : 16.2 K/uL  Hemoglobin : 10.4 g/dL  Hematocrit : 32.2 %  Platelet Count - Automated : 167 K/uL  Mean Cell Volume : 87.0 fl  Mean Cell Hemoglobin : 28.1 pg  Mean Cell Hemoglobin Concentration : 32.3 g/dL  Auto Neutrophil # : 15.0 K/uL  Auto Lymphocyte # : 0.7 K/uL  Auto Monocyte # : 0.2 K/uL  Auto Eosinophil # : 0.1 K/uL  Auto Basophil # : 0.0 K/uL  Auto Neutrophil % : 92.6 %  Auto Lymphocyte % : 4.1 %  Auto Monocyte % : 1.5 %  Auto Eosinophil % : 0.7 %  Auto Basophil % : 0.1 %        139  |  103  |  15.0  ----------------------------<  98  4.6   |  20.0<L>  |  0.24<L>    Ca    8.5<L>      2019 08:56        Urinalysis Basic - ( 2019 14:14 )    Color: Yellow / Appearance: Clear / S.020 / pH: x  Gluc: x / Ketone: Negative  / Bili: Negative / Urobili: Negative mg/dL   Blood: x / Protein: 30 mg/dL / Nitrite: Negative   Leuk Esterase: Trace / RBC: 25-50 /HPF / WBC 3-5   Sq Epi: x / Non Sq Epi: Few / Bacteria: Many      RECENT CULTURES:   .Urine Escherichia coli ESBL XXXX   >100,000 CFU/ml Escherichia coli ESBL **KNOWN**  .  Copy to Infection Control, 2019 09:40     .Urine Escherichia coli ESBL XXXX   >100,000 CFU/ml Escherichia coli ESBL  .  TYPE: (C=Critical, N=Notification, A=Abnormal) C  TESTS:  _ ESBL  DATE/TIME CALLED: _ 2019 09:39  CALLED TO: _ PriscilaTWR: Tere Esquivel RN  READ BACK (2 Patient Identifiers)(Y/N): _ Y  READ BACK VALUES (Y/N): _ Y  CALLED BY: Gibson garza  .  Copy to Infection Control, [employer]. 2019 09:40              CAPILLARY BLOOD GLUCOSE      RADIOLOGY & ADDITIONAL STUDIES:    ASSESSMENT/PLAN:  33yFemale presenting with:  marthaley neurogenic bladder 2/2 to transverse myelitis    Primary management per Medicine  Agree with current plan of care for Q4 hours bladder scan with intermittent catheterization  Recommend Renal US to ensure no hydronephrosis 2/2 to increased bladder pressures  Pt will need outpt urodynamics  Continue to monitor Cr    D/W Dr Dawkins
NPP [facility] [place]  HUY WHEELER MD FACP   GRACIELA HU MD  Diplomates American Board of Internal Medicine and Infecctious Diseases  [phone]  [id number] EDU [id number]      HPI:  33 years old female with PMH of SLE, Lupus Nephritis and Fibromyalgia brought by her  yesterday evening with generalized weakness. As per patient, she is not feeling well for last few days. She is having low grade fevers, generalized body pain, throat irritation, cough, ear pain and vomiting. Her appetite is very poor. She went to her PMD few days ago who prescribed Sertraline for her depressive symptoms. As per her, symptoms started after taking that medication. Yesterday, she was feeling so weak that she could not walk so her  brought her to ER. She is also complaining of difficulty urinating. She went to Urologist 10 days ago and she was prescribed Flomax which she has been taking but is unable to void. She also has constipation.   She recently came back from [place] on 18 after staying there for 2.5 to 3 months. She had MRI there in December due to back pain and generalized weakness and it was normal.   She has headache, neck pain and lightheadedness. Denies photophobia. + sick contacts with common cold.  PT SEEN BY NEUROLOGY AND POSSIBLE TRANSVERSE MYELITIS LP DONE WITH INCREASED WBC  ASKED TO EVALUATE FROM ID STANDPOINT       PAST MEDICAL & SURGICAL HISTORY:  GERD (gastroesophageal reflux disease)  Lupus nephritis  S/P correction of deviated nasal septum  History of esophagogastroduodenoscopy (EGD)  History of biopsy: Renal      ANTIBIOTICS  acyclovir IVPB 500 milliGRAM(s) IV Intermittent every 8 hours  acyclovir IVPB      cefTRIAXone   IVPB 2 Gram(s) IV Intermittent every 12 hours  vancomycin  IVPB 1000 milliGRAM(s) IV Intermittent every 8 hours      Allergies    No Known Allergies    Intolerances        SOCIAL HISTORY:       FAMILY HX   FAMILY HISTORY:  Family history of melanoma (Mother): In Eye  Family history of osteoarthritis (Mother)  Family history of liver disease (Father)      Vital Signs Last 24 Hrs  T(C): 36.8 (2019 08:06), Max: 37.9 (2019 16:10)  T(F): 98.3 (2019 08:06), Max: 100.2 (2019 16:10)  HR: 88 (2019 03:14) (81 - 89)  BP: 126/73 (2019 08:06) (106/59 - 126/73)  BP(mean): --  RR: 20 (2019 08:06) (20 - 20)  SpO2: 95% (2019 08:06) (95% - 98%)  Drug Dosing Weight  Height (cm): 157.48 (15 Ian 2019 21:17)  Weight (kg): 54.4 (15 Ian 2019 21:17)  BMI (kg/m2): 21.9 (15 Ian 2019 21:17)  BSA (m2): 1.54 (15 Ian 2019 21:17)      REVIEW OF SYSTEMS:    CONSTITUTIONAL:  As per HPI.    HEENT:  Eyes:  No diplopia or blurred vision. ENT:  No earache, sore throat or runny nose.    CARDIOVASCULAR:  No pressure, squeezing, strangling, tightness, heaviness or aching about the chest, neck, axilla or epigastrium.    RESPIRATORY:  No cough, shortness of breath, PND or orthopnea.    GASTROINTESTINAL:  No nausea, vomiting or diarrhea.    GENITOURINARY:  No dysuria, frequency or urgency.    MUSCULOSKELETAL:  As per HPI.    SKIN:  No change in skin, hair or nails.    NEUROLOGIC:  No paresthesias, fasciculations, seizures or weakness.                  PHYSICAL EXAMINATION:    GENERAL: The patient is a well-developed, well-nourished _____in no apparent distress. ___ is alert and oriented x3.    VITAL SIGNS: T(C): 36.8 (19 @ 08:06), Max: 37.9 (19 @ 16:10)  HR: 88 (19 @ 03:14) (81 - 89)  BP: 126/73 (19 @ 08:06) (106/59 - 126/73)  RR: 20 (19 @ 08:06) (20 - 20)  SpO2: 95% (19 @ 08:06) (95% - 98%)  Wt(kg): --    HEENT: Head is normocephalic and atraumatic.  ANICTERIC  NECK: Supple. No carotid bruits.  No lymphadenopathy or thyromegaly.    LUNGS:COARSE BREATH SOUNDS    HEART: Regular rate and rhythm without murmur.    ABDOMEN: Soft, nontender, and nondistended.  Positive bowel sounds.  No hepatosplenomegaly was noted. NO REBOUND NO GUARDING    EXTREMITIES: NO EDEMA NO ERYTHEMA    NEUROLOGIC: NON FOCAL      SKIN: No ulceration or induration present. NO RASH        BLOOD CULTURES       URINE CX          LABS:                        13.3   9.5   )-----------( 228      ( 2019 00:32 )             39.4     -    136  |  100  |  9.0  ----------------------------<  118<H>  3.7   |  23.0  |  0.55    Ca    7.9<L>      2019 09:47    TPro  8.1  /  Alb  4.0  /  TBili  0.5  /  DBili  x   /  AST  42<H>  /  ALT  43<H>  /  AlkPhos  108      PT/INR - ( 2019 17:02 )   PT: 11.5 sec;   INR: 1.00 ratio         PTT - ( 2019 17:02 )  PTT:32.7 sec  Urinalysis Basic - ( 2019 03:59 )    Color: Yellow / Appearance: Clear / S.010 / pH: x  Gluc: x / Ketone: Trace  / Bili: Negative / Urobili: Negative mg/dL   Blood: x / Protein: 100 mg/dL / Nitrite: Negative   Leuk Esterase: Trace / RBC: 0-2 /HPF / WBC 11-25   Sq Epi: x / Non Sq Epi: Occasional / Bacteria: Many        RADIOLOGY & ADDITIONAL STUDIES:      ASSESSMENT/PLAN      33 years old female with PMH of SLE, Lupus Nephritis and Fibromyalgia brought by her  yesterday evening with generalized weakness. As per patient, she is not feeling well for last few days. She is having low grade fevers, generalized body pain, throat irritation, cough, ear pain and vomiting. Her appetite is very poor. She went to her PMD few days ago who prescribed Sertraline for her depressive symptoms. As per her, symptoms started after taking that medication. Yesterday, she was feeling so weak that she could not walk so her  brought her to ER. She is also complaining of difficulty urinating. She went to Urologist 10 days ago and she was prescribed Flomax which she has been taking but is unable to void. She also has constipation.   She recently came back from [place] on 18 after staying there for 2.5 to 3 months. She had MRI there in December due to back pain and generalized weakness and it was normal.   She has headache, neck pain and lightheadedness. Denies photophobia. + sick contacts with common cold.  PT SEEN BY NEUROLOGY AND POSSIBLE TRANSVERSE MYELITIS LP DONE WITH INCREASED WBC  DOUBT BACTERIAL MENINGITIS AS PROCESS GOING  ON FOR A WHILE COULD BE VIRAL WILL D.C ABX CONTINUE ACYLOVIR FOR NOW WILL D/W NEUROLOGY            GRACIELA KNOX MD
VASCULAR SURGERY CONSULT NOTE    HPI: 33 years old female with PMH of SLE, Lupus Nephritis and Fibromyalgia brought by her  yesterday evening with generalized weakness. As per patient, she is not feeling well for last few days. She is having low grade fevers, generalized body pain, throat irritation, cough, ear pain and vomiting. Her appetite is very poor. She went to her PMD few days ago who prescribed Sertraline for her depressive symptoms. As per her, symptoms started after taking that medication. Yesterday, she was feeling so weak that she could not walk so her  brought her to ER. She is also complaining of difficulty urinating. She went to Urologist 10 days ago and she was prescribed Flomax which she has been taking but is unable to void. She also has constipation.   She recently came back from [place] on 12/29/18 after staying there for 2.5 to 3 months. She had MRI there in December due to back pain and generalized weakness and it was normal.   She has headache, neck pain and lightheadedness. Denies photophobia. + sick contacts with common cold.     Was found with transverse myelitis and involvement of the entire spinal cord and neurology recommending plasmapheresis for MS vs NMO x 10 days. Vascular surgery was called for access. Patient denies fevers/chills. Denies having central line placed before, denies lightheadedness/dizziness, denies SOB/chest pain, denies nausea/vomiting, denies constipation/diarrhea.      ROS:   General: denies fatigue, unintended weight loss or night sweats  Neuro: denies focal weakness, numbness or tingling  CV: denies chest pain, chest pressure or palpitations  Resp: denies shortness of breath, pleuritic pain, cough, or wheezing  GI: denies changes in bowel movement, melena or BRBPR  : denies dysuria, hematuria, urinary frequency or urinary urgency  MSK: denies myalgias  	    PAST MEDICAL & SURGICAL HISTORY:  Fibromyalgia  Lupus  GERD (gastroesophageal reflux disease)  Lupus nephritis  S/P correction of deviated nasal septum  History of esophagogastroduodenoscopy (EGD)  History of biopsy: Renal    FAMILY HISTORY:  Family history of melanoma (Mother): In Eye  Family history of osteoarthritis (Mother)  Family history of liver disease (Father)    SOCIAL HISTORY:  [facility]    ALLERGIES: NKA No Known Allergies    Home Medications:  Cymbalta 60 mg oral delayed release capsule: 1 cap(s) orally once a day (16 Jan 2019 22:11)  hydroxychloroquine 200 mg oral tablet: 1 tab(s) orally once a day (16 Jan 2019 22:12)  Lyrica 75 mg oral capsule: 1 cap(s) orally once a day, As Needed (16 Jan 2019 22:12)  sertraline 50 mg oral tablet: 1 tab(s) orally once a day (16 Jan 2019 22:14)      --------------------------------------------------------------------------------------------    PHYSICAL EXAM:   General: NAD, Lying in bed comfortably  Neuro: A+Ox3, BLE weakness>BUE weakness  HEENT: NC/AT, EOMI  Neck: Soft, supple  Cardio: RRR, nml S1/S2  Resp: Good effort, CTA b/l  GI/Abd: Soft, NT/ND, no rebound/guarding, no masses palpated  Vascular: All 4 extremities warm. Palpable femoral and DP pulses b/l  Skin: Intact, no breakdown    --------------------------------------------------------------------------------------------    LABS                 11.3   10.3   )----------(  370       ( 21 Jan 2019 08:46 )               35.5      142    |  108    |  22.0   ----------------------------<  119        ( 21 Jan 2019 08:46 )  3.8     |  20.0   |  0.46     Ca    8.1        ( 21 Jan 2019 08:46 )    TPro  6.2    /  Alb  3.2    /  TBili  0.5    /  DBili  x      /  AST  35     /  ALT  93     /  AlkPhos  79     ( 21 Jan 2019 08:46 )    LIVER FUNCTIONS - ( 21 Jan 2019 08:46 )  Alb: 3.2 g/dL / Pro: 6.2 g/dL / ALK PHOS: 79 U/L / ALT: 93 U/L / AST: 35 U/L / GGT: x               ASSESSMENT: Patient is a 33y old female with transverse myelitis likely MS vs NMO requiring plasmapheresis x 10 days    PLAN:    - Will place [facility] for plasmapheresis  - Patient seen/examined with attending.  - Plan discussed with Attending, Dr. Pal

## 2019-01-31 NOTE — PROGRESS NOTE ADULT - ASSESSMENT
33 years old female with hx of SLE, lupus Nephritis and Fibromyalgia, recently returned from [place] on 12/29 who presented with generalized weakness with associated difficulty with ambulation; outpt MRI from December was normal. MRI here showed transverse myelitis and LP with high WBC. Neurology/ Rheumatology/ ID continued to follow the pt. Per ID less likely infectious etiology and Abx d/stiven. Pt is underwent w/u for NMO/ MS which was negative likely etiology is Autoimmune, s/p completion of 5 days of pulse dose steroids and now undergoing plasmapheresis. Plan for RTX 1g IV x 2 doses (q2 weeks) every 6 months to be started today as per rheum. Hospital course further complicated by acute urinary retention requiring frequent bladder scans and straight catheterization. Today seen and examined , c/o low back pain, still with urinary retention , being straight cath q 4 hours

## 2019-01-31 NOTE — PROGRESS NOTE ADULT - PROBLEM SELECTOR PROBLEM 1
Myelitis
Transverse myelitis

## 2019-01-31 NOTE — PROGRESS NOTE ADULT - PROBLEM SELECTOR PROBLEM 2
Leukocytosis, unspecified type
Lupus erythematosus, unspecified form
Urinary retention

## 2019-01-31 NOTE — PROGRESS NOTE ADULT - PROBLEM SELECTOR PLAN 2
cont current therapy per rheumotology
likely reactive , UA ordered   repeat cbc in am
c/w bladder scans q 8hrs  c/w straight cath as need  c/w flomax 0.8mg PO qhs  awaiting urology consult  likely combination of neurogenic bladder and uti, uc confirming ESBL ecoli uti  c/w ertapenem 1g q24 hours. ok to dc pt to rehab to finish abx until 2/4

## 2019-02-01 PROCEDURE — 99232 SBSQ HOSP IP/OBS MODERATE 35: CPT

## 2019-02-01 PROCEDURE — 99232 SBSQ HOSP IP/OBS MODERATE 35: CPT | Mod: GC

## 2019-02-01 PROCEDURE — 99231 SBSQ HOSP IP/OBS SF/LOW 25: CPT

## 2019-02-01 RX ADMIN — Medication 55 MILLIGRAM(S): at 06:28

## 2019-02-01 RX ADMIN — DULOXETINE HYDROCHLORIDE 60 MILLIGRAM(S): 30 CAPSULE, DELAYED RELEASE ORAL at 11:03

## 2019-02-01 RX ADMIN — PANTOPRAZOLE SODIUM 40 MILLIGRAM(S): 20 TABLET, DELAYED RELEASE ORAL at 06:28

## 2019-02-01 RX ADMIN — ENOXAPARIN SODIUM 40 MILLIGRAM(S): 100 INJECTION SUBCUTANEOUS at 11:04

## 2019-02-01 RX ADMIN — Medication 1 TABLET(S): at 08:00

## 2019-02-01 RX ADMIN — MYCOPHENOLATE MOFETIL 1500 MILLIGRAM(S): 250 CAPSULE ORAL at 17:27

## 2019-02-01 RX ADMIN — Medication 200 MILLIGRAM(S): at 17:27

## 2019-02-01 RX ADMIN — ERTAPENEM SODIUM 120 MILLIGRAM(S): 1 INJECTION, POWDER, LYOPHILIZED, FOR SOLUTION INTRAMUSCULAR; INTRAVENOUS at 09:51

## 2019-02-01 RX ADMIN — Medication 100 MILLIGRAM(S): at 06:26

## 2019-02-01 RX ADMIN — CHLORHEXIDINE GLUCONATE 1 APPLICATION(S): 213 SOLUTION TOPICAL at 08:00

## 2019-02-01 RX ADMIN — Medication 100 MILLIGRAM(S): at 17:29

## 2019-02-01 RX ADMIN — Medication 200 MILLIGRAM(S): at 06:27

## 2019-02-01 RX ADMIN — TAMSULOSIN HYDROCHLORIDE 0.8 MILLIGRAM(S): 0.4 CAPSULE ORAL at 22:34

## 2019-02-01 RX ADMIN — MYCOPHENOLATE MOFETIL 1500 MILLIGRAM(S): 250 CAPSULE ORAL at 06:27

## 2019-02-01 RX ADMIN — Medication 1 TABLET(S): at 17:27

## 2019-02-01 RX ADMIN — POLYETHYLENE GLYCOL 3350 17 GRAM(S): 17 POWDER, FOR SOLUTION ORAL at 11:04

## 2019-02-01 NOTE — PROGRESS NOTE ADULT - SUBJECTIVE AND OBJECTIVE BOX
Patient in bed. Feels well.  Noting some neck pain, but overall improving.   Reports numbness of the BLE and abdomen is slightly better.  Continues to need IC and has not had BM since yesterday.    FUNCTIONAL PROGRESS    Gait Training  Gait Trainin'x2 RW modA chair follow  Gait Analysis: decreased gait velocity and activity tolerance, assist for steadying, unsteadiness c turns requiring assist, verbal cues for sequencing    Therapeutic Exercise  Therapeutic Exercise Detail: LAQ and ankle pumps         REVIEW OF SYSTEMS  Constitutional - No fever,  +fatigue  HEENT - No vertigo, No neck pain  Neurological - No headaches, No memory loss, +loss of strength, +numbness, No tremors  Skin - No rashes, No lesions   Musculoskeletal - No joint pain, No joint swelling, +muscle pain  Psychiatric - No depression, No anxiety    VITALS  T(C): 36.9 (19 @ 08:09), Max: 36.9 (19 @ 08:09)  HR: 82 (19 @ 08:09) (82 - 88)  BP: 100/68 (19 @ 08:09) (100/68 - 105/74)  RR: 18 (19 @ 08:09) (18 - 18)  SpO2: 96% (19 @ 08:09) (96% - 96%)  Wt(kg): --    MEDICATIONS   acetaminophen   Tablet .. 650 milliGRAM(s) every 6 hours PRN  acetaminophen  IVPB .. 1000 milliGRAM(s) once PRN  baclofen 10 milliGRAM(s) every 8 hours PRN  bisacodyl 5 milliGRAM(s) every 12 hours PRN  chlorhexidine 2% Cloths 1 Application(s) <User Schedule>  docusate sodium 100 milliGRAM(s) two times a day  DULoxetine 60 milliGRAM(s) daily  enoxaparin Injectable 40 milliGRAM(s) daily  ertapenem  IVPB 1000 milliGRAM(s) every 24 hours  ertapenem  IVPB     hydroxychloroquine 200 milliGRAM(s) two times a day  ibuprofen  Tablet. 400 milliGRAM(s) every 8 hours PRN  lactulose Syrup 20 Gram(s) two times a day PRN  magnesium hydroxide Suspension 30 milliLiter(s) daily PRN  mineral oil enema 133 milliLiter(s) at bedtime PRN  multivitamin 1 Tablet(s) <User Schedule>  mycophenolate mofetil 1500 milliGRAM(s) two times a day  ondansetron Injectable 4 milliGRAM(s) every 6 hours PRN  oxyCODONE    5 mG/acetaminophen 325 mG 2 Tablet(s) every 6 hours PRN  pantoprazole    Tablet 40 milliGRAM(s) before breakfast  polyethylene glycol 3350 17 Gram(s) daily  predniSONE   Tablet 55 milliGRAM(s) daily  senna 1 Tablet(s) at bedtime  tamsulosin 0.8 milliGRAM(s) at bedtime      RECENT LABS/IMAGING  CBC Full  -  ( 2019 08:56 )  WBC Count : 16.2 K/uL  Hemoglobin : 10.4 g/dL  Hematocrit : 32.2 %  Platelet Count - Automated : 167 K/uL  Mean Cell Volume : 87.0 fl  Mean Cell Hemoglobin : 28.1 pg  Mean Cell Hemoglobin Concentration : 32.3 g/dL  Auto Neutrophil # : 15.0 K/uL  Auto Lymphocyte # : 0.7 K/uL  Auto Monocyte # : 0.2 K/uL  Auto Eosinophil # : 0.1 K/uL  Auto Basophil # : 0.0 K/uL  Auto Neutrophil % : 92.6 %  Auto Lymphocyte % : 4.1 %  Auto Monocyte % : 1.5 %  Auto Eosinophil % : 0.7 %  Auto Basophil % : 0.1 %        139  |  103  |  15.0  ----------------------------<  98  4.6   |  20.0<L>  |  0.24<L>    Ca    8.5<L>      2019 08:56        -----------------------------------------------------------------------------------  PHYSICAL EXAM  Constitutional - NAD, Comfortable  Extremities - No C/C/E, No calf tenderness   Neurologic Exam -                    Motor -                      LEFT    UE - C5 5/5, C6 5/5, C7 5/5, C8 5/5, T1 5/5                    RIGHT UE - C5 5/5, C6 5/5, C7 5/5, C8 5/5, T1 5/5                    LEFT    LE - L2 2/5, L3 3/5, L4 4/5, L5 4/5, S1 5/5                    RIGHT LE - L2 3/5, L3 4/5, L4 4/5, L5 4/5, S1 5/5      Sensory - Impaired to LT and PP up to T8     Reflexes - Flaccid  Psychiatric - Mood stable, Affect WNL  ----------------------------------------------------------------------------------------  ASSESSMENT/PLAN  33yFemale with functional deficits related to nontraumatic incomplete SCI paraparesis  SLE TM - Plaquenil, Steroids, Cellcept  ESBL UTO - Invanz  Pain/Spasms - Tylenol, Ibuprofen, Cymbalta, Baclofen, Percocet  Neurogenic Bladder - Maintain low volumes < 350mL, IC Q4 hours, Flomax  Neurogenic Bowel - Senna, Lactulose, Fleet, Colace, Miralax, MOM  DVT PPX - SCDs, Lovenox  Rehab - Recommend ACUTE inpatient rehabilitation for the functional deficits consisting of 3 hours of therapy/day & 24 hour RN/daily PMR physician for comorbid medical management. Patient will be able to tolerate 3 hours a day.     Continue bedside therapy as well as OOB throughout the day with mobilization by staff to maintain cardiopulmonary function and prevention of secondary complications related to debility.

## 2019-02-01 NOTE — CHART NOTE - NSCHARTNOTEFT_GEN_A_CORE
Patient will get 1 dose of rituximab IVPB 1 gm infuse over 5 hours on Feb 11. Premedication with tylenol 650 mg, bendaryl 20 mg IVPB, and solumedrol 100 mg ivpb.  f/u with rheumatology as an outpatient after that.

## 2019-02-01 NOTE — PROGRESS NOTE ADULT - ASSESSMENT
Attending Attestation:   33 years old female with hx of SLE, lupus Nephritis and Fibromyalgia, recently returned from [place] on 12/29 who presented with generalized weakness found to have transverse myelitis. She got plamapheresis, rtx and steroids and improved. Course complicated with urinary retention and ESBL ECOLI.  urology consulted. Patient is accepted by acute rehab and will go to acute rehab. awaiting insurance authorization Paper work sent.    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 Premedication with tylenol 650 mg, bendaryl 20 mg IVPB, and solumedrol 100 mg ivpb.  f/u with rheumatology as an outpatient after that. To be repeated Rituximab in 6 months after that.      Acute urinary retention  - c/w bladder scans q8hrs  - c/w straight cath as needed  - c/w flomax 0.8mg po qhs   - likely combo of neurogenic bladder + UTI, uc confirming esbl ecoli uti  - ertapenem started 1/30, last day 2/4 per ID, no plans for picc or mid, per ID can be given @ rehab via peripheral as it is short course  Renal us showed no evidence of  hydronephrosis, Urology note appreciated.    SLE with nephritis   - stable renal disease, elevated dsDNA, low complements  - rheum f/u noted and appreciated     Fibromyalgia   - c/w tylenol for pain prn     Depression  - c/w cymbalta 60mg po qd    Constipation  - Miralax / senna     DVT ppx  - c/w lovenox 40mg sq qd    Dispo: PT consulted and recommended acute rehab, PMNR consulted  - as per PMNR - patient will benefit from acute rehab. plan for dc in next 24-48hrs .     Plan discussed with patient, rn, cm and sw.    33 years old female with hx of SLE, lupus Nephritis and Fibromyalgia, recently returned from [place] on 12/29 who presented with generalized weakness with associated difficulty with ambulation; outpt MRI from December was normal. MRI here showed transverse myelitis and LP with high WBC. Neurology/ Rheumatology/ ID continued to follow the pt. Per ID less likely infectious etiology and Abx d/stiven. Pt is underwent w/u for NMO/ MS which was negative likely etiology is Autoimmune, s/p completion of 5 days of pulse dose steroids and now undergoing plasmapheresis. Plan for RTX 1g IV x 2 doses (q2 weeks) every 6 months to be started today as per rheum. Hospital course further complicated by acute urinary retention requiring frequent bladder scans and straight catheterization. Today seen and examined , c/o low back pain, still with urinary retention , being straight cath q 4 hours       Problem/Plan - 1:  ·  Problem: Transverse myelitis.  Plan: improving function in b/l LE, ambulates with walker  cw solumedrol 55mg IV QD, prednisone on dc  cw cellcept 1500mg PO BID  cw hydroxycloroquine 200mg PO BID  s/p 5 sessions PLEX  Plan for acute rehab with continued rheum + neuro f/u  as per rheum plan for RTX 1g IV x 2 doses ( q 2 weejs) every 6 months.      Problem/Plan - 2:  ·  Problem: Urinary retention.  Plan: c/w bladder scans q 8hrs  c/w straight cath as need  c/w flomax 0.8mg PO qhs  awaiting urology consult  likely combination of neurogenic bladder and uti, uc confirming ESBL ecoli uti  c/w ertapenem 1g q24 hours. ok to dc pt to rehab to finish abx until 2/4.      Problem/Plan - 3:  ·  Problem: Lupus nephritis.  Plan: stable renal disease, elevated dsDNA, low complements  rheum follow up noted and appreciated.      Problem/Plan - 4:  ·  Problem: Fibromyalgia.  Plan: c/w tylenol PRN for pain.      Problem/Plan - 5:  ·  Problem: Depression.  Plan: c/w cymbalta 60mg PO QD.      Problem/Plan - 6:  Problem: Constipation. Plan: c/w miralax and colace  lactulose syrup and magnesium hydroxide PRN. Attending Attestation:   33 years old female with hx of SLE, lupus Nephritis and Fibromyalgia, recently returned from [place] on 12/29 who presented with generalized weakness found to have transverse myelitis. She got plamapheresis, rtx and steroids and improved. Course complicated with urinary retention and ESBL ECOLI.  urology consulted. Patient is accepted by acute rehab and will go to acute rehab. awaiting insurance authorization Paper work sent.    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 Premedication with tylenol 650 mg, bendaryl 20 mg IVPB, and solumedrol 100 mg ivpb.  f/u with rheumatology as an outpatient after that. To be repeated Rituximab in 6 months after that.      Acute urinary retention  - c/w bladder scans q8hrs  - c/w straight cath as needed  - c/w flomax 0.8mg po qhs   - likely combo of neurogenic bladder + UTI, uc confirming esbl ecoli uti  - ertapenem started 1/30, last day 2/4 per ID, no plans for picc or mid, per ID can be given @ rehab via peripheral as it is short course  Renal us showed no evidence of  hydronephrosis, Urology note appreciated.    SLE with nephritis   - stable renal disease, elevated dsDNA, low complements  - rheum f/u noted and appreciated     Fibromyalgia   - c/w tylenol for pain prn     Depression  - c/w cymbalta 60mg po qd    Constipation  - Miralax / senna   improved    DVT ppx  - c/w lovenox 40mg sq qd    Dispo: PT consulted and recommended acute rehab, PMNR consulted  - as per PMNR - patient will benefit from acute rehab. plan for dc to [facility] once insurance auth is done as per sw. Patient is medically ready for discharge.

## 2019-02-01 NOTE — PROGRESS NOTE ADULT - SUBJECTIVE AND OBJECTIVE BOX
KANE LARA    [id number]    33y      Female    CC:    INTERVAL HPI/OVERNIGHT EVENTS:    REVIEW OF SYSTEMS:    CONSTITUTIONAL: No fever, weight loss, or fatigue  RESPIRATORY: No cough, wheezing, hemoptysis; No shortness of breath  CARDIOVASCULAR: No chest pain, palpitations  GASTROINTESTINAL: No abdominal or epigastric pain. No nausea, vomiting  NEUROLOGICAL: No headaches, memory loss, loss of strength.  MISCELLANEOUS:      Vital Signs Last 24 Hrs  T(C): 36.9 (01 Feb 2019 08:09), Max: 36.9 (01 Feb 2019 08:09)  T(F): 98.4 (01 Feb 2019 08:09), Max: 98.4 (01 Feb 2019 08:09)  HR: 82 (01 Feb 2019 08:09) (82 - 88)  BP: 100/68 (01 Feb 2019 08:09) (100/68 - 105/74)  BP(mean): --  RR: 18 (01 Feb 2019 08:09) (18 - 18)  SpO2: 96% (01 Feb 2019 08:09) (96% - 96%)    PHYSICAL EXAM:    GENERAL: NAD, well-groomed  HEENT: PERRL, +EOMI  NECK: soft, Supple, No JVD,   CHEST/LUNG: Clear to auscultation bilaterally; No wheezing  HEART: S1S2+, Regular rate and rhythm; No murmurs, rubs, or gallops  ABDOMEN: Soft, Nontender, Nondistended; Bowel sounds present  EXTREMITIES:  2+ Peripheral Pulses, No clubbing, cyanosis, or edema  SKIN: No rashes or lesions  NEURO: AAOX3, no focal deficits, no motor r sensory loss  PSYCH: normal mood      LABS:                        10.4   16.2  )-----------( 167      ( 31 Jan 2019 08:56 )             32.2     01-31    139  |  103  |  15.0  ----------------------------<  98  4.6   |  20.0<L>  |  0.24<L>    Ca    8.5<L>      31 Jan 2019 08:56              MEDICATIONS  (STANDING):  chlorhexidine 2% Cloths 1 Application(s) Topical <User Schedule>  docusate sodium 100 milliGRAM(s) Oral two times a day  DULoxetine 60 milliGRAM(s) Oral daily  enoxaparin Injectable 40 milliGRAM(s) SubCutaneous daily  ertapenem  IVPB 1000 milliGRAM(s) IV Intermittent every 24 hours  ertapenem  IVPB      hydroxychloroquine 200 milliGRAM(s) Oral two times a day  multivitamin 1 Tablet(s) Oral <User Schedule>  mycophenolate mofetil 1500 milliGRAM(s) Oral two times a day  pantoprazole    Tablet 40 milliGRAM(s) Oral before breakfast  polyethylene glycol 3350 17 Gram(s) Oral daily  predniSONE   Tablet 55 milliGRAM(s) Oral daily  senna 1 Tablet(s) Oral at bedtime  tamsulosin 0.8 milliGRAM(s) Oral at bedtime    MEDICATIONS  (PRN):  acetaminophen   Tablet .. 650 milliGRAM(s) Oral every 6 hours PRN Temp greater or equal to 38C (100.4F), Mild Pain (1 - 3)  acetaminophen  IVPB .. 1000 milliGRAM(s) IV Intermittent once PRN Severe Pain (7 - 10)  baclofen 10 milliGRAM(s) Oral every 8 hours PRN Muscle Spasm  bisacodyl 5 milliGRAM(s) Oral every 12 hours PRN Constipation  ibuprofen  Tablet. 400 milliGRAM(s) Oral every 8 hours PRN Moderate Pain (4 - 6)  lactulose Syrup 20 Gram(s) Oral two times a day PRN constipation  magnesium hydroxide Suspension 30 milliLiter(s) Oral daily PRN Constipation  mineral oil enema 133 milliLiter(s) Rectal at bedtime PRN as needed if no bm for 3 days  ondansetron Injectable 4 milliGRAM(s) IV Push every 6 hours PRN Nausea and/or Vomiting  oxyCODONE    5 mG/acetaminophen 325 mG 2 Tablet(s) Oral every 6 hours PRN Severe Pain (7 - 10)      RADIOLOGY & ADDITIONAL TESTS: KANE LARA    [id number]    33y      Female    CC: transverse myelitis    Patient seen and examined at the bedside. No acute overnight events. Urology evaluation noticed, renal US showed no hydronephrosis.  Patient is awaiting placement to [facility] acute re[facility].   Denies fever/chills, headache, lightheadedness, dizziness, chest pain, palpitations, shortness of breath, cough, abd pain, nausea/vomiting/diarrhea.        Vital Signs Last 24 Hrs  T(C): 36.9 (01 Feb 2019 08:09), Max: 36.9 (01 Feb 2019 08:09)  T(F): 98.4 (01 Feb 2019 08:09), Max: 98.4 (01 Feb 2019 08:09)  HR: 82 (01 Feb 2019 08:09) (82 - 88)  BP: 100/68 (01 Feb 2019 08:09) (100/68 - 105/74)  BP(mean): --  RR: 18 (01 Feb 2019 08:09) (18 - 18)  SpO2: 96% (01 Feb 2019 08:09) (96% - 96%)    PHYSICAL EXAM.    GEN - appears age appropriate. well nourished. pleasant. no distress.   HEENT - NCAT, EOMI, ARTEMIO.   RESP - CTA BL, no wheeze/stridor/rhonchi/crackles. not on supplemental O2. able to speak in full sentences without distress.   CARDIO - NS1S2, RRR. No murmurs/rubs/gallops.  ABD - Soft/Non tender/Non distended. Normal BS x4 quadrants. no guarding/rebound tenderness.  Ext - No NISHI.  MSK - BL 5/5 strength on upper ext. 3-4/5 RLE strength, 2/5 LLE strength  Neuro - AAOx3. cn 2-12 grossly intact  Psych - normal affect  Skin - c/d/i. no rashes/lesions        LABS:                        10.4   16.2  )-----------( 167      ( 31 Jan 2019 08:56 )             32.2     01-31    139  |  103  |  15.0  ----------------------------<  98  4.6   |  20.0<L>  |  0.24<L>    Ca    8.5<L>      31 Jan 2019 08:56              MEDICATIONS  (STANDING):  chlorhexidine 2% Cloths 1 Application(s) Topical <User Schedule>  docusate sodium 100 milliGRAM(s) Oral two times a day  DULoxetine 60 milliGRAM(s) Oral daily  enoxaparin Injectable 40 milliGRAM(s) SubCutaneous daily  ertapenem  IVPB 1000 milliGRAM(s) IV Intermittent every 24 hours  ertapenem  IVPB      hydroxychloroquine 200 milliGRAM(s) Oral two times a day  multivitamin 1 Tablet(s) Oral <User Schedule>  mycophenolate mofetil 1500 milliGRAM(s) Oral two times a day  pantoprazole    Tablet 40 milliGRAM(s) Oral before breakfast  polyethylene glycol 3350 17 Gram(s) Oral daily  predniSONE   Tablet 55 milliGRAM(s) Oral daily  senna 1 Tablet(s) Oral at bedtime  tamsulosin 0.8 milliGRAM(s) Oral at bedtime    MEDICATIONS  (PRN):  acetaminophen   Tablet .. 650 milliGRAM(s) Oral every 6 hours PRN Temp greater or equal to 38C (100.4F), Mild Pain (1 - 3)  acetaminophen  IVPB .. 1000 milliGRAM(s) IV Intermittent once PRN Severe Pain (7 - 10)  baclofen 10 milliGRAM(s) Oral every 8 hours PRN Muscle Spasm  bisacodyl 5 milliGRAM(s) Oral every 12 hours PRN Constipation  ibuprofen  Tablet. 400 milliGRAM(s) Oral every 8 hours PRN Moderate Pain (4 - 6)  lactulose Syrup 20 Gram(s) Oral two times a day PRN constipation  magnesium hydroxide Suspension 30 milliLiter(s) Oral daily PRN Constipation  mineral oil enema 133 milliLiter(s) Rectal at bedtime PRN as needed if no bm for 3 days  ondansetron Injectable 4 milliGRAM(s) IV Push every 6 hours PRN Nausea and/or Vomiting  oxyCODONE    5 mG/acetaminophen 325 mG 2 Tablet(s) Oral every 6 hours PRN Severe Pain (7 - 10)      RADIOLOGY & ADDITIONAL TESTS:

## 2019-02-01 NOTE — PROGRESS NOTE ADULT - SUBJECTIVE AND OBJECTIVE BOX
KANE ALRA  [id number]  33y, Female    Other symptom or sign involving musculoskeletal system      Patient is a 33y old  Female who presents with a chief complaint of Generalized pain (01 Feb 2019 11:58)      HPI:  33 years old female with PMH of SLE, Lupus Nephritis and Fibromyalgia brought by her  yesterday evening with generalized weakness. As per patient, she is not feeling well for last few days. She is having low grade fevers, generalized body pain, throat irritation, cough, ear pain and vomiting. Her appetite is very poor. She went to her PMD few days ago who prescribed Sertraline for her depressive symptoms. As per her, symptoms started after taking that medication. Yesterday, she was feeling so weak that she could not walk so her  brought her to ER. She is also complaining of difficulty urinating. She went to Urologist 10 days ago and she was prescribed Flomax which she has been taking but is unable to void. She also has constipation.   She recently came back from [place] on 12/29/18 after staying there for 2.5 to 3 months. She had MRI there in December due to back pain and generalized weakness and it was normal.   She has headache, neck pain and lightheadedness. Denies photophobia. + sick contacts with common cold. (16 Jan 2019 11:58)    She has begun to receive instruction on intermittent cath. no abdominal pain. Feels stronger, no fevers, chills or hematuria	  Allergies    No Known Allergies    Intolerances        MEDICATIONS  (STANDING):  chlorhexidine 2% Cloths 1 Application(s) Topical <User Schedule>  docusate sodium 100 milliGRAM(s) Oral two times a day  DULoxetine 60 milliGRAM(s) Oral daily  enoxaparin Injectable 40 milliGRAM(s) SubCutaneous daily  ertapenem  IVPB 1000 milliGRAM(s) IV Intermittent every 24 hours  ertapenem  IVPB      hydroxychloroquine 200 milliGRAM(s) Oral two times a day  multivitamin 1 Tablet(s) Oral <User Schedule>  mycophenolate mofetil 1500 milliGRAM(s) Oral two times a day  pantoprazole    Tablet 40 milliGRAM(s) Oral before breakfast  polyethylene glycol 3350 17 Gram(s) Oral daily  predniSONE   Tablet 55 milliGRAM(s) Oral daily  senna 1 Tablet(s) Oral at bedtime  tamsulosin 0.8 milliGRAM(s) Oral at bedtime    MEDICATIONS  (PRN):  acetaminophen   Tablet .. 650 milliGRAM(s) Oral every 6 hours PRN Temp greater or equal to 38C (100.4F), Mild Pain (1 - 3)  acetaminophen  IVPB .. 1000 milliGRAM(s) IV Intermittent once PRN Severe Pain (7 - 10)  baclofen 10 milliGRAM(s) Oral every 8 hours PRN Muscle Spasm  bisacodyl 5 milliGRAM(s) Oral every 12 hours PRN Constipation  ibuprofen  Tablet. 400 milliGRAM(s) Oral every 8 hours PRN Moderate Pain (4 - 6)  lactulose Syrup 20 Gram(s) Oral two times a day PRN constipation  magnesium hydroxide Suspension 30 milliLiter(s) Oral daily PRN Constipation  mineral oil enema 133 milliLiter(s) Rectal at bedtime PRN as needed if no bm for 3 days  ondansetron Injectable 4 milliGRAM(s) IV Push every 6 hours PRN Nausea and/or Vomiting  oxyCODONE    5 mG/acetaminophen 325 mG 2 Tablet(s) Oral every 6 hours PRN Severe Pain (7 - 10)      PAST MEDICAL & SURGICAL HISTORY:  Fibromyalgia  Lupus  GERD (gastroesophageal reflux disease)  Lupus nephritis  S/P correction of deviated nasal septum  History of esophagogastroduodenoscopy (EGD)  History of biopsy: Renal      I&O's Detail    31 Jan 2019 07:01  -  01 Feb 2019 07:00  --------------------------------------------------------  IN:  Total IN: 0 mL    OUT:    Intermittent Catheterization - Urethral: 2130 mL  Total OUT: 2130 mL    Total NET: -2130 mL          PE:    Vital Signs Last 24 Hrs  T(C): 36.9 (01 Feb 2019 08:09), Max: 36.9 (01 Feb 2019 08:09)  T(F): 98.4 (01 Feb 2019 08:09), Max: 98.4 (01 Feb 2019 08:09)  HR: 82 (01 Feb 2019 08:09) (82 - 88)  BP: 100/68 (01 Feb 2019 08:09) (100/68 - 105/74)  BP(mean): --  RR: 18 (01 Feb 2019 08:09) (18 - 18)  SpO2: 96% (01 Feb 2019 08:09) (96% - 96%)    Daily     Daily     PHYSICAL EXAM:      Constitutional: no acute distress    Eyes: normal conjunctivae    ENMT: NC/AT    Neck: full ROM    Respiratory: no respiratory distress    Extremities: no C/C/E    Psychiatric: alert and oriented X 3        Labs:                          10.4   16.2  )-----------( 167      ( 31 Jan 2019 08:56 )             32.2       01-31    139  |  103  |  15.0  ----------------------------<  98  4.6   |  20.0<L>  |  0.24<L>    Ca    8.5<L>      31 Jan 2019 08:56            Impression:  neurogenic bladder	      Plan:  Continue self cath  outpatient urodynamics    Humberto Dawkins MD  02-01-19 @ 12:25

## 2019-02-01 NOTE — CHART NOTE - NSCHARTNOTESELECT_GEN_ALL_CORE
Nutrition Services
Event Note
Event Note/Rheumatology
Event Note/Rheumatology
Nutrition Services
Nutrition Services
Rheumatology/Event Note

## 2019-02-02 PROCEDURE — 99232 SBSQ HOSP IP/OBS MODERATE 35: CPT

## 2019-02-02 RX ADMIN — PANTOPRAZOLE SODIUM 40 MILLIGRAM(S): 20 TABLET, DELAYED RELEASE ORAL at 05:25

## 2019-02-02 RX ADMIN — Medication 200 MILLIGRAM(S): at 17:25

## 2019-02-02 RX ADMIN — ERTAPENEM SODIUM 120 MILLIGRAM(S): 1 INJECTION, POWDER, LYOPHILIZED, FOR SOLUTION INTRAMUSCULAR; INTRAVENOUS at 11:08

## 2019-02-02 RX ADMIN — ENOXAPARIN SODIUM 40 MILLIGRAM(S): 100 INJECTION SUBCUTANEOUS at 11:09

## 2019-02-02 RX ADMIN — SENNA PLUS 1 TABLET(S): 8.6 TABLET ORAL at 22:35

## 2019-02-02 RX ADMIN — DULOXETINE HYDROCHLORIDE 60 MILLIGRAM(S): 30 CAPSULE, DELAYED RELEASE ORAL at 11:08

## 2019-02-02 RX ADMIN — MYCOPHENOLATE MOFETIL 1500 MILLIGRAM(S): 250 CAPSULE ORAL at 05:25

## 2019-02-02 RX ADMIN — Medication 1 TABLET(S): at 07:55

## 2019-02-02 RX ADMIN — Medication 100 MILLIGRAM(S): at 05:26

## 2019-02-02 RX ADMIN — Medication 1 TABLET(S): at 17:25

## 2019-02-02 RX ADMIN — Medication 55 MILLIGRAM(S): at 05:26

## 2019-02-02 RX ADMIN — TAMSULOSIN HYDROCHLORIDE 0.8 MILLIGRAM(S): 0.4 CAPSULE ORAL at 22:35

## 2019-02-02 RX ADMIN — MYCOPHENOLATE MOFETIL 1500 MILLIGRAM(S): 250 CAPSULE ORAL at 17:25

## 2019-02-02 RX ADMIN — Medication 10 MILLIGRAM(S): at 05:26

## 2019-02-02 RX ADMIN — Medication 200 MILLIGRAM(S): at 05:25

## 2019-02-02 RX ADMIN — CHLORHEXIDINE GLUCONATE 1 APPLICATION(S): 213 SOLUTION TOPICAL at 05:26

## 2019-02-02 NOTE — PROGRESS NOTE ADULT - SUBJECTIVE AND OBJECTIVE BOX
KANE LARA Patient is a 33y old  Female who presents with a chief complaint of Generalized pain (01 Feb 2019 12:24)     HPI:  33 years old female with PMH of SLE, Lupus Nephritis and Fibromyalgia brought by her  yesterday evening with generalized weakness. As per patient, she is not feeling well for last few days. She is having low grade fevers, generalized body pain, throat irritation, cough, ear pain and vomiting. Her appetite is very poor. She went to her PMD few days ago who prescribed Sertraline for her depressive symptoms. As per her, symptoms started after taking that medication. Yesterday, she was feeling so weak that she could not walk so her  brought her to ER. She is also complaining of difficulty urinating. She went to Urologist 10 days ago and she was prescribed Flomax which she has been taking but is unable to void. She also has constipation.   She recently came back from [place] on 12/29/18 after staying there for 2.5 to 3 months. She had MRI there in December due to back pain and generalized weakness and it was normal.   She has headache, neck pain and lightheadedness. Denies photophobia. + sick contacts with common cold. (16 Jan 2019 11:58)    The patient was seen and evaluated   The patient is in no acute distress.  Denied any fever chest pain, palpitations, shortness of breath, abdominal pain, fever, dysuria, cough,   Complains of not being able to urinate , states tried at 9 am and will try again now     I&O's Summary    01 Feb 2019 07:01  -  02 Feb 2019 07:00  --------------------------------------------------------  IN: 50 mL / OUT: 1162 mL / NET: -1112 mL    02 Feb 2019 07:01  -  02 Feb 2019 15:12  --------------------------------------------------------  IN: 50 mL / OUT: 0 mL / NET: 50 mL      Allergies    No Known Allergies    Intolerances      HEALTH ISSUES - PROBLEM Dx:  Constipation: Constipation  Depression: Depression  Fibromyalgia: Fibromyalgia  Leukocytosis, unspecified type: Leukocytosis, unspecified type  Lupus erythematosus, unspecified form: Lupus erythematosus, unspecified form  Transverse myelitis: Transverse myelitis  Urinary retention: Urinary retention  GERD (gastroesophageal reflux disease): GERD (gastroesophageal reflux disease)  Leg weakness: Leg weakness  Lupus nephritis: Lupus nephritis  Systemic lupus erythematosus with other organ involvement: Systemic lupus erythematosus with other organ involvement  Myelitis: Myelitis  Weakness of both legs: Weakness of both legs        PAST MEDICAL & SURGICAL HISTORY:  Fibromyalgia  Lupus  GERD (gastroesophageal reflux disease)  Lupus nephritis  S/P correction of deviated nasal septum  History of esophagogastroduodenoscopy (EGD)  History of biopsy: Renal          Vital Signs Last 24 Hrs  T(C): 37.1 (02 Feb 2019 08:15), Max: 37.1 (02 Feb 2019 08:15)  T(F): 98.8 (02 Feb 2019 08:15), Max: 98.8 (02 Feb 2019 08:15)  HR: 107 (02 Feb 2019 08:15) (100 - 107)  BP: 97/62 (02 Feb 2019 08:15) (97/62 - 105/74)  BP(mean): --  RR: 18 (02 Feb 2019 08:15) (18 - 18)  SpO2: 97% (02 Feb 2019 08:15) (97% - 98%)T(C): 37.1 (02-02-19 @ 08:15), Max: 37.1 (02-02-19 @ 08:15)  HR: 107 (02-02-19 @ 08:15) (100 - 107)  BP: 97/62 (02-02-19 @ 08:15) (97/62 - 105/74)  RR: 18 (02-02-19 @ 08:15) (18 - 18)  SpO2: 97% (02-02-19 @ 08:15) (97% - 98%)  Wt(kg): --    PHYSICAL EXAM:    GENERAL: NAD  HEAD:  Atraumatic, Normocephalic  EYES: EOMI, PERRL, conjunctiva and sclera clear  ENMT:  Moist mucous membranes,  No lesions  NERVOUS SYSTEM:  Alert & Oriented X3,  Moves upper and lower extremities; CNS-II-XII  CHEST/LUNG: Clear to auscultation bilaterally; No rales, rhonchi, wheezing,   HEART: Regular rate and rhythm; No murmurs,   ABDOMEN: Soft, Nontender, Nondistended; Bowel sounds present  EXTREMITIES:  Peripheral Pulses, No  cyanosis, or edema  Psychiatry- mood and affect appropriate Insight and judgement intact     acetaminophen   Tablet .. 650 milliGRAM(s) Oral every 6 hours PRN  acetaminophen  IVPB .. 1000 milliGRAM(s) IV Intermittent once PRN  baclofen 10 milliGRAM(s) Oral every 8 hours PRN  bisacodyl 5 milliGRAM(s) Oral every 12 hours PRN  chlorhexidine 2% Cloths 1 Application(s) Topical <User Schedule>  docusate sodium 100 milliGRAM(s) Oral two times a day  DULoxetine 60 milliGRAM(s) Oral daily  enoxaparin Injectable 40 milliGRAM(s) SubCutaneous daily  ertapenem  IVPB 1000 milliGRAM(s) IV Intermittent every 24 hours  ertapenem  IVPB      hydroxychloroquine 200 milliGRAM(s) Oral two times a day  ibuprofen  Tablet. 400 milliGRAM(s) Oral every 8 hours PRN  lactulose Syrup 20 Gram(s) Oral two times a day PRN  magnesium hydroxide Suspension 30 milliLiter(s) Oral daily PRN  mineral oil enema 133 milliLiter(s) Rectal at bedtime PRN  multivitamin 1 Tablet(s) Oral <User Schedule>  mycophenolate mofetil 1500 milliGRAM(s) Oral two times a day  ondansetron Injectable 4 milliGRAM(s) IV Push every 6 hours PRN  oxyCODONE    5 mG/acetaminophen 325 mG 2 Tablet(s) Oral every 6 hours PRN  pantoprazole    Tablet 40 milliGRAM(s) Oral before breakfast  polyethylene glycol 3350 17 Gram(s) Oral daily  predniSONE   Tablet 55 milliGRAM(s) Oral daily  senna 1 Tablet(s) Oral at bedtime  tamsulosin 0.8 milliGRAM(s) Oral at bedtime      LABS:                      CAPILLARY BLOOD GLUCOSE          RADIOLOGY & ADDITIONAL TESTS:      Consultant notes reviewed    Case discussed with consultant/provider/ family /patient

## 2019-02-02 NOTE — PROGRESS NOTE ADULT - ASSESSMENT
33 years old female with hx of SLE, lupus Nephritis and Fibromyalgia, recently returned from [place] on 12/29 who presented with generalized weakness found to have transverse myelitis. She got plasmapheresis rtx and steroids and improved. Course complicated with urinary retention and ESBL ECOLI.  urology consulted. Patient is accepted by acute rehab and will go to acute rehab. awaiting insurance authorization Paper work sent.    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 Premedication with Tylenol 650 mg, Benadryl 20 mg IVPB, and solumedrol 100 mg ivpb.  f/u with rheumatology as an outpatient after that. To be repeated Rituximab in 6 months after that.      Acute urinary retention  - c/w bladder scans q8hrs  - c/w straight cath as needed  - c/w Flomax 0.8mg po qhs   - likely combo of neurogenic bladder + UTI, uc confirming ESBL-Ecoli UTI  - ertapenem started 1/30, last day 2/4 per ID, no plans for PICC or mid, per ID can be given @ rehab via peripheral as it is short course  Renal us showed no evidence of  hydronephrosis, Urology following- recommend straight cath and Flomax     SLE with nephritis   - stable renal disease, elevated ds-DNA, low complements  - rheum f/u noted and appreciated     Fibromyalgia   - c/w tylenol for pain prn     Depression  - c/w cymbalta 60mg po qd    Constipation  - Miralax / senna   improved    DVT ppx  - c/w lovenox 40mg sq qd    Dispo: PT consulted and recommended acute rehab, PMNR consulted  - as per PMNR - patient will benefit from acute rehab. plan for dc to [facility] once insurance auth is done as per sw. Patient is medically ready for discharge.

## 2019-02-03 PROCEDURE — 99232 SBSQ HOSP IP/OBS MODERATE 35: CPT

## 2019-02-03 RX ADMIN — Medication 200 MILLIGRAM(S): at 17:25

## 2019-02-03 RX ADMIN — CHLORHEXIDINE GLUCONATE 1 APPLICATION(S): 213 SOLUTION TOPICAL at 05:10

## 2019-02-03 RX ADMIN — OXYCODONE AND ACETAMINOPHEN 2 TABLET(S): 5; 325 TABLET ORAL at 07:58

## 2019-02-03 RX ADMIN — Medication 200 MILLIGRAM(S): at 05:10

## 2019-02-03 RX ADMIN — Medication 100 MILLIGRAM(S): at 05:10

## 2019-02-03 RX ADMIN — ERTAPENEM SODIUM 120 MILLIGRAM(S): 1 INJECTION, POWDER, LYOPHILIZED, FOR SOLUTION INTRAMUSCULAR; INTRAVENOUS at 09:40

## 2019-02-03 RX ADMIN — ENOXAPARIN SODIUM 40 MILLIGRAM(S): 100 INJECTION SUBCUTANEOUS at 12:09

## 2019-02-03 RX ADMIN — DULOXETINE HYDROCHLORIDE 60 MILLIGRAM(S): 30 CAPSULE, DELAYED RELEASE ORAL at 17:25

## 2019-02-03 RX ADMIN — Medication 1 TABLET(S): at 17:25

## 2019-02-03 RX ADMIN — OXYCODONE AND ACETAMINOPHEN 2 TABLET(S): 5; 325 TABLET ORAL at 21:01

## 2019-02-03 RX ADMIN — POLYETHYLENE GLYCOL 3350 17 GRAM(S): 17 POWDER, FOR SOLUTION ORAL at 12:09

## 2019-02-03 RX ADMIN — TAMSULOSIN HYDROCHLORIDE 0.8 MILLIGRAM(S): 0.4 CAPSULE ORAL at 21:01

## 2019-02-03 RX ADMIN — Medication 1 TABLET(S): at 07:58

## 2019-02-03 RX ADMIN — MYCOPHENOLATE MOFETIL 1500 MILLIGRAM(S): 250 CAPSULE ORAL at 05:11

## 2019-02-03 RX ADMIN — Medication 100 MILLIGRAM(S): at 17:25

## 2019-02-03 RX ADMIN — OXYCODONE AND ACETAMINOPHEN 2 TABLET(S): 5; 325 TABLET ORAL at 08:58

## 2019-02-03 RX ADMIN — MYCOPHENOLATE MOFETIL 1500 MILLIGRAM(S): 250 CAPSULE ORAL at 17:25

## 2019-02-03 RX ADMIN — PANTOPRAZOLE SODIUM 40 MILLIGRAM(S): 20 TABLET, DELAYED RELEASE ORAL at 05:12

## 2019-02-03 RX ADMIN — Medication 55 MILLIGRAM(S): at 05:11

## 2019-02-03 NOTE — PROGRESS NOTE ADULT - SUBJECTIVE AND OBJECTIVE BOX
KANE LARA Patient is a 33y old  Female who presents with a chief complaint of Generalized pain (02 Feb 2019 15:12)     HPI:  33 years old female with PMH of SLE, Lupus Nephritis and Fibromyalgia brought by her  yesterday evening with generalized weakness. As per patient, she is not feeling well for last few days. She is having low grade fevers, generalized body pain, throat irritation, cough, ear pain and vomiting. Her appetite is very poor. She went to her PMD few days ago who prescribed Sertraline for her depressive symptoms. As per her, symptoms started after taking that medication. Yesterday, she was feeling so weak that she could not walk so her  brought her to ER. She is also complaining of difficulty urinating. She went to Urologist 10 days ago and she was prescribed Flomax which she has been taking but is unable to void. She also has constipation.   She recently came back from [place] on 12/29/18 after staying there for 2.5 to 3 months. She had MRI there in December due to back pain and generalized weakness and it was normal.   She has headache, neck pain and lightheadedness. Denies photophobia. + sick contacts with common cold. (16 Jan 2019 11:58)    The patient was seen and evaluated   The patient is in no acute distress.  Denied any fever chest pain, palpitations, shortness of breath, abdominal pain, fever, dysuria, cough, edema   Complains of still not being able to urinate and has been doing straight cath and also had pain back and legs     I&O's Summary    02 Feb 2019 07:01  -  03 Feb 2019 07:00  --------------------------------------------------------  IN: 50 mL / OUT: 2050 mL / NET: -2000 mL      Allergies    No Known Allergies    Intolerances      HEALTH ISSUES - PROBLEM Dx:  Constipation: Constipation  Depression: Depression  Fibromyalgia: Fibromyalgia  Leukocytosis, unspecified type: Leukocytosis, unspecified type  Lupus erythematosus, unspecified form: Lupus erythematosus, unspecified form  Transverse myelitis: Transverse myelitis  Urinary retention: Urinary retention  GERD (gastroesophageal reflux disease): GERD (gastroesophageal reflux disease)  Leg weakness: Leg weakness  Lupus nephritis: Lupus nephritis  Systemic lupus erythematosus with other organ involvement: Systemic lupus erythematosus with other organ involvement  Myelitis: Myelitis  Weakness of both legs: Weakness of both legs        PAST MEDICAL & SURGICAL HISTORY:  Fibromyalgia  Lupus  GERD (gastroesophageal reflux disease)  Lupus nephritis  S/P correction of deviated nasal septum  History of esophagogastroduodenoscopy (EGD)  History of biopsy: Renal          Vital Signs Last 24 Hrs  T(C): 36.5 (03 Feb 2019 16:09), Max: 36.8 (03 Feb 2019 07:56)  T(F): 97.7 (03 Feb 2019 16:09), Max: 98.3 (03 Feb 2019 07:56)  HR: 78 (03 Feb 2019 16:09) (78 - 92)  BP: 106/77 (03 Feb 2019 16:09) (100/66 - 106/77)  BP(mean): --  RR: 18 (03 Feb 2019 16:09) (17 - 18)  SpO2: 98% (03 Feb 2019 16:09) (97% - 98%)T(C): 36.5 (02-03-19 @ 16:09), Max: 36.8 (02-03-19 @ 07:56)  HR: 78 (02-03-19 @ 16:09) (78 - 92)  BP: 106/77 (02-03-19 @ 16:09) (100/66 - 106/77)  RR: 18 (02-03-19 @ 16:09) (17 - 18)  SpO2: 98% (02-03-19 @ 16:09) (97% - 98%)  Wt(kg): --    PHYSICAL EXAM:    GENERAL: NAD, ill appearing   HEAD:  Atraumatic, Normocephalic  EYES: EOMI, PERRL, conjunctiva and sclera clear  NERVOUS SYSTEM:  Alert & Oriented X3,  Moves upper and lower extremities; CNS-II-XII  CHEST/LUNG: Clear to auscultation bilaterally; No rales, rhonchi, wheezing,   HEART: Regular rate and rhythm; No murmurs,   ABDOMEN: Soft, Nontender, Nondistended; Bowel sounds present  EXTREMITIES:  Peripheral Pulses, No  cyanosis, or edema  SKIN: No rashes or lesions  psychiatry- mood and affect approprite, Insight and judgement intact     acetaminophen   Tablet .. 650 milliGRAM(s) Oral every 6 hours PRN  acetaminophen  IVPB .. 1000 milliGRAM(s) IV Intermittent once PRN  baclofen 10 milliGRAM(s) Oral every 8 hours PRN  bisacodyl 5 milliGRAM(s) Oral every 12 hours PRN  chlorhexidine 2% Cloths 1 Application(s) Topical <User Schedule>  docusate sodium 100 milliGRAM(s) Oral two times a day  DULoxetine 60 milliGRAM(s) Oral daily  enoxaparin Injectable 40 milliGRAM(s) SubCutaneous daily  ertapenem  IVPB 1000 milliGRAM(s) IV Intermittent every 24 hours  ertapenem  IVPB      hydroxychloroquine 200 milliGRAM(s) Oral two times a day  ibuprofen  Tablet. 400 milliGRAM(s) Oral every 8 hours PRN  lactulose Syrup 20 Gram(s) Oral two times a day PRN  magnesium hydroxide Suspension 30 milliLiter(s) Oral daily PRN  mineral oil enema 133 milliLiter(s) Rectal at bedtime PRN  multivitamin 1 Tablet(s) Oral <User Schedule>  mycophenolate mofetil 1500 milliGRAM(s) Oral two times a day  ondansetron Injectable 4 milliGRAM(s) IV Push every 6 hours PRN  oxyCODONE    5 mG/acetaminophen 325 mG 2 Tablet(s) Oral every 6 hours PRN  pantoprazole    Tablet 40 milliGRAM(s) Oral before breakfast  polyethylene glycol 3350 17 Gram(s) Oral daily  predniSONE   Tablet 55 milliGRAM(s) Oral daily  senna 1 Tablet(s) Oral at bedtime  tamsulosin 0.8 milliGRAM(s) Oral at bedtime      LABS:                      CAPILLARY BLOOD GLUCOSE          RADIOLOGY & ADDITIONAL TESTS:      Consultant notes reviewed    Case discussed with consultant/provider/ family /patient

## 2019-02-03 NOTE — PROGRESS NOTE ADULT - ASSESSMENT
33 years old female with hx of SLE, lupus Nephritis and Fibromyalgia, recently returned from [place] on 12/29 who presented with generalized weakness found to have transverse myelitis. She got plamapheresis, rtx and steroids and improved. Course complicated with urinary retention and ESBL ECOLI.  urology consulted. Patient is accepted by acute rehab and will go to acute rehab. awaiting insurance authorization Paper work sent.    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 Premedication with tylenol 650 mg, bendaryl 20 mg IVPB, and solumedrol 100 mg ivpb.  f/u with rheumatology as an outpatient after that. To be repeated Rituximab in 6 months after that.      Acute urinary retention  - c/w bladder scans q8hrs  - c/w straight cath as needed  - c/w flomax 0.8mg po qhs   - likely combo of neurogenic bladder + UTI, uc confirming esbl ecoli uti  - ertapenem started 1/30, last day 2/4 per ID, no plans for picc or mid, per ID can be given @ rehab via peripheral as it is short course  Renal us showed no evidence of  hydronephrosis, Urology note appreciated.    SLE with nephritis - labs for AM ordered   - stable renal disease, elevated dsDNA, low complements  - rheum f/u noted and appreciated     Fibromyalgia   - c/w Tylenol for pain prn     Depression  - c/w Cymbalta 60mg po qd    Constipation  - Miralax / senna   improved    DVT ppx  - c/w lovenox 40mg sq qd    Dispo: PT consulted and recommended acute rehab, PMNR consulted  - as per PMNR - patient will benefit from acute rehab. plan for dc to [facility] once insurance auth is done as per sw. Patient is medically ready for discharge.

## 2019-02-04 LAB
ALBUMIN SERPL ELPH-MCNC: 3.9 G/DL — SIGNIFICANT CHANGE UP (ref 3.3–5.2)
ALP SERPL-CCNC: 67 U/L — SIGNIFICANT CHANGE UP (ref 40–120)
ALT FLD-CCNC: 63 U/L — HIGH
AMYLASE P1 CFR SERPL: 109 U/L — SIGNIFICANT CHANGE UP (ref 36–128)
ANION GAP SERPL CALC-SCNC: 15 MMOL/L — SIGNIFICANT CHANGE UP (ref 5–17)
AST SERPL-CCNC: 27 U/L — SIGNIFICANT CHANGE UP
BASOPHILS # BLD AUTO: 0 K/UL — SIGNIFICANT CHANGE UP (ref 0–0.2)
BASOPHILS NFR BLD AUTO: 0.3 % — SIGNIFICANT CHANGE UP (ref 0–2)
BILIRUB DIRECT SERPL-MCNC: 0.1 MG/DL — SIGNIFICANT CHANGE UP (ref 0–0.3)
BILIRUB INDIRECT FLD-MCNC: 0.5 MG/DL — SIGNIFICANT CHANGE UP (ref 0.2–1)
BILIRUB SERPL-MCNC: 0.6 MG/DL — SIGNIFICANT CHANGE UP (ref 0.4–2)
BUN SERPL-MCNC: 19 MG/DL — SIGNIFICANT CHANGE UP (ref 8–20)
CALCIUM SERPL-MCNC: 8.7 MG/DL — SIGNIFICANT CHANGE UP (ref 8.6–10.2)
CHLORIDE SERPL-SCNC: 101 MMOL/L — SIGNIFICANT CHANGE UP (ref 98–107)
CO2 SERPL-SCNC: 24 MMOL/L — SIGNIFICANT CHANGE UP (ref 22–29)
CREAT SERPL-MCNC: 0.25 MG/DL — LOW (ref 0.5–1.3)
EOSINOPHIL # BLD AUTO: 0.2 K/UL — SIGNIFICANT CHANGE UP (ref 0–0.5)
EOSINOPHIL NFR BLD AUTO: 2.1 % — SIGNIFICANT CHANGE UP (ref 0–6)
GGT SERPL-CCNC: 85 U/L — HIGH (ref 5–36)
GLUCOSE SERPL-MCNC: 130 MG/DL — HIGH (ref 70–115)
HCT VFR BLD CALC: 32.2 % — LOW (ref 37–47)
HGB BLD-MCNC: 10 G/DL — LOW (ref 12–16)
LYMPHOCYTES # BLD AUTO: 0.8 K/UL — LOW (ref 1–4.8)
LYMPHOCYTES # BLD AUTO: 10.7 % — LOW (ref 20–55)
MAGNESIUM SERPL-MCNC: 2.2 MG/DL — SIGNIFICANT CHANGE UP (ref 1.6–2.6)
MCHC RBC-ENTMCNC: 27.6 PG — SIGNIFICANT CHANGE UP (ref 27–31)
MCHC RBC-ENTMCNC: 31.1 G/DL — LOW (ref 32–36)
MCV RBC AUTO: 89 FL — SIGNIFICANT CHANGE UP (ref 81–99)
MONOCYTES # BLD AUTO: 0.2 K/UL — SIGNIFICANT CHANGE UP (ref 0–0.8)
MONOCYTES NFR BLD AUTO: 3.5 % — SIGNIFICANT CHANGE UP (ref 3–10)
NEUTROPHILS # BLD AUTO: 5.8 K/UL — SIGNIFICANT CHANGE UP (ref 1.8–8)
NEUTROPHILS NFR BLD AUTO: 81.6 % — HIGH (ref 37–73)
PHOSPHATE SERPL-MCNC: 4.7 MG/DL — SIGNIFICANT CHANGE UP (ref 2.4–4.7)
PLATELET # BLD AUTO: 241 K/UL — SIGNIFICANT CHANGE UP (ref 150–400)
POTASSIUM SERPL-MCNC: 3.2 MMOL/L — LOW (ref 3.5–5.3)
POTASSIUM SERPL-SCNC: 3.2 MMOL/L — LOW (ref 3.5–5.3)
PROT SERPL-MCNC: 5.8 G/DL — LOW (ref 6.6–8.7)
RBC # BLD: 3.62 M/UL — LOW (ref 4.4–5.2)
RBC # FLD: 18.9 % — HIGH (ref 11–15.6)
SODIUM SERPL-SCNC: 140 MMOL/L — SIGNIFICANT CHANGE UP (ref 135–145)
URATE SERPL-MCNC: 3.5 MG/DL — SIGNIFICANT CHANGE UP (ref 2.4–5.7)
WBC # BLD: 7.2 K/UL — SIGNIFICANT CHANGE UP (ref 4.8–10.8)
WBC # FLD AUTO: 7.2 K/UL — SIGNIFICANT CHANGE UP (ref 4.8–10.8)

## 2019-02-04 PROCEDURE — 99232 SBSQ HOSP IP/OBS MODERATE 35: CPT

## 2019-02-04 RX ADMIN — Medication 1 TABLET(S): at 13:29

## 2019-02-04 RX ADMIN — Medication 55 MILLIGRAM(S): at 06:05

## 2019-02-04 RX ADMIN — SENNA PLUS 1 TABLET(S): 8.6 TABLET ORAL at 21:16

## 2019-02-04 RX ADMIN — PANTOPRAZOLE SODIUM 40 MILLIGRAM(S): 20 TABLET, DELAYED RELEASE ORAL at 06:04

## 2019-02-04 RX ADMIN — ENOXAPARIN SODIUM 40 MILLIGRAM(S): 100 INJECTION SUBCUTANEOUS at 13:29

## 2019-02-04 RX ADMIN — TAMSULOSIN HYDROCHLORIDE 0.8 MILLIGRAM(S): 0.4 CAPSULE ORAL at 21:16

## 2019-02-04 RX ADMIN — Medication 200 MILLIGRAM(S): at 06:02

## 2019-02-04 RX ADMIN — Medication 1 TABLET(S): at 17:08

## 2019-02-04 RX ADMIN — Medication 200 MILLIGRAM(S): at 17:09

## 2019-02-04 RX ADMIN — Medication 100 MILLIGRAM(S): at 17:10

## 2019-02-04 RX ADMIN — ERTAPENEM SODIUM 120 MILLIGRAM(S): 1 INJECTION, POWDER, LYOPHILIZED, FOR SOLUTION INTRAMUSCULAR; INTRAVENOUS at 17:07

## 2019-02-04 RX ADMIN — Medication 100 MILLIGRAM(S): at 06:04

## 2019-02-04 RX ADMIN — DULOXETINE HYDROCHLORIDE 60 MILLIGRAM(S): 30 CAPSULE, DELAYED RELEASE ORAL at 17:08

## 2019-02-04 RX ADMIN — MYCOPHENOLATE MOFETIL 1500 MILLIGRAM(S): 250 CAPSULE ORAL at 17:09

## 2019-02-04 RX ADMIN — MYCOPHENOLATE MOFETIL 1500 MILLIGRAM(S): 250 CAPSULE ORAL at 06:02

## 2019-02-04 NOTE — PROGRESS NOTE ADULT - ASSESSMENT
33 years old female with hx of SLE, lupus Nephritis and Fibromyalgia, recently returned from [place] on 12/29 who presented with generalized weakness found to have transverse myelitis. She got plamapheresis, rtx and steroids and improved. Course complicated with urinary retention and ESBL ECOLI.  urology consulted. Patient is accepted by acute rehab and will go to acute rehab. awaiting insurance authorization Paper work sent.    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 Premedication with tylenol 650 mg, bendaryl 20 mg IVPB, and solumedrol 100 mg ivpb.  f/u with rheumatology as an outpatient after that. To be repeated Rituximab in 6 months after that.      Acute urinary retention  - c/w bladder scans q8hrs  - c/w straight cath as needed  - c/w flomax 0.8mg po qhs   - likely combo of neurogenic bladder + UTI, uc confirming esbl ecoli uti  - ertapenem started 1/30, last day 2/4 per ID, today, Renal us showed no evidence of  hydronephrosis, Urology note appreciated.    SLE with nephritis - labs for AM ordered   - stable renal disease, elevated dsDNA, low complements  - rheum f/u noted and appreciated     Fibromyalgia   - c/w Tylenol for pain prn     Depression  - c/w Cymbalta 60mg po qd    Constipation  - Miralax / senna   improved    DVT ppx  - c/w lovenox 40mg sq qd    Dispo: PT consulted and recommended acute rehab, PMNR consulted  - as per PMNR - patient will benefit from acute rehab. plan for dc to [facility] once insurance auth is done as per cesar. Patient is medically ready for discharge.

## 2019-02-04 NOTE — PROGRESS NOTE ADULT - SUBJECTIVE AND OBJECTIVE BOX
KANE LARA Patient is a 33y old  Female who presents with a chief complaint of Generalized pain (04 Feb 2019 11:25)     HPI:  33 years old female with PMH of SLE, Lupus Nephritis and Fibromyalgia brought by her  yesterday evening with generalized weakness. As per patient, she is not feeling well for last few days. She is having low grade fevers, generalized body pain, throat irritation, cough, ear pain and vomiting. Her appetite is very poor. She went to her PMD few days ago who prescribed Sertraline for her depressive symptoms. As per her, symptoms started after taking that medication. Yesterday, she was feeling so weak that she could not walk so her  brought her to ER. She is also complaining of difficulty urinating. She went to Urologist 10 days ago and she was prescribed Flomax which she has been taking but is unable to void. She also has constipation.   She recently came back from [place] on 12/29/18 after staying there for 2.5 to 3 months. She had MRI there in December due to back pain and generalized weakness and it was normal.   She has headache, neck pain and lightheadedness. Denies photophobia. + sick contacts with common cold. (16 Jan 2019 11:58)    The patient was seen and evaluated   The patient is in no acute distress.  Denied any fever chest pain, palpitations, shortness of breath, abdominal pain, fever, dysuria, cough, edema   Complains of slight itching in eye left side no redness no swelling tiney bump on the eye lai line no redness no tenderness and still not being able to pee     I&O's Summary    Allergies    No Known Allergies    Intolerances      HEALTH ISSUES - PROBLEM Dx:  Constipation: Constipation  Depression: Depression  Fibromyalgia: Fibromyalgia  Leukocytosis, unspecified type: Leukocytosis, unspecified type  Lupus erythematosus, unspecified form: Lupus erythematosus, unspecified form  Transverse myelitis: Transverse myelitis  Urinary retention: Urinary retention  GERD (gastroesophageal reflux disease): GERD (gastroesophageal reflux disease)  Leg weakness: Leg weakness  Lupus nephritis: Lupus nephritis  Systemic lupus erythematosus with other organ involvement: Systemic lupus erythematosus with other organ involvement  Myelitis: Myelitis  Weakness of both legs: Weakness of both legs        PAST MEDICAL & SURGICAL HISTORY:  Fibromyalgia  Lupus  GERD (gastroesophageal reflux disease)  Lupus nephritis  S/P correction of deviated nasal septum  History of esophagogastroduodenoscopy (EGD)  History of biopsy: Renal          Vital Signs Last 24 Hrs  T(C): 36.9 (04 Feb 2019 08:27), Max: 37.1 (03 Feb 2019 23:51)  T(F): 98.5 (04 Feb 2019 08:27), Max: 98.7 (03 Feb 2019 23:51)  HR: 75 (04 Feb 2019 08:27) (75 - 91)  BP: 94/60 (04 Feb 2019 08:27) (91/59 - 94/60)  BP(mean): --  RR: 20 (04 Feb 2019 08:27) (20 - 20)  SpO2: 99% (03 Feb 2019 23:51) (99% - 99%)T(C): 36.9 (02-04-19 @ 08:27), Max: 37.1 (02-03-19 @ 23:51)  HR: 75 (02-04-19 @ 08:27) (75 - 91)  BP: 94/60 (02-04-19 @ 08:27) (91/59 - 94/60)  RR: 20 (02-04-19 @ 08:27) (20 - 20)  SpO2: 99% (02-03-19 @ 23:51) (99% - 99%)  Wt(kg): --    PHYSICAL EXAM:    GENERAL: NAD, well-groomed, well-developed  HEAD:  Atraumatic, Normocephalic  EYES: EOMI, PERRL, conjunctiva and sclera clear  ENMT:  Moist mucous membranes,  No lesions  NECK: Supple, No JVD, Normal thyroid  NERVOUS SYSTEM:  Alert & Oriented X3,  Moves upper and lower extremities; CNS-II-XII  CHEST/LUNG: Clear to auscultation bilaterally; No rales, rhonchi, wheezing,   HEART: Regular rate and rhythm; No murmurs,   ABDOMEN: Soft, Nontender, Nondistended; Bowel sounds present  EXTREMITIES:  Peripheral Pulses, No  cyanosis, or edema  SKIN: No rashes or lesions  psychiatry- mood and affect approprite, Insight and judgement intact     acetaminophen   Tablet .. 650 milliGRAM(s) Oral every 6 hours PRN  acetaminophen  IVPB .. 1000 milliGRAM(s) IV Intermittent once PRN  baclofen 10 milliGRAM(s) Oral every 8 hours PRN  bisacodyl 5 milliGRAM(s) Oral every 12 hours PRN  chlorhexidine 2% Cloths 1 Application(s) Topical <User Schedule>  docusate sodium 100 milliGRAM(s) Oral two times a day  DULoxetine 60 milliGRAM(s) Oral daily  enoxaparin Injectable 40 milliGRAM(s) SubCutaneous daily  ertapenem  IVPB 1000 milliGRAM(s) IV Intermittent every 24 hours  ertapenem  IVPB      hydroxychloroquine 200 milliGRAM(s) Oral two times a day  ibuprofen  Tablet. 400 milliGRAM(s) Oral every 8 hours PRN  lactulose Syrup 20 Gram(s) Oral two times a day PRN  magnesium hydroxide Suspension 30 milliLiter(s) Oral daily PRN  mineral oil enema 133 milliLiter(s) Rectal at bedtime PRN  multivitamin 1 Tablet(s) Oral <User Schedule>  mycophenolate mofetil 1500 milliGRAM(s) Oral two times a day  ondansetron Injectable 4 milliGRAM(s) IV Push every 6 hours PRN  oxyCODONE    5 mG/acetaminophen 325 mG 2 Tablet(s) Oral every 6 hours PRN  pantoprazole    Tablet 40 milliGRAM(s) Oral before breakfast  polyethylene glycol 3350 17 Gram(s) Oral daily  predniSONE   Tablet 55 milliGRAM(s) Oral daily  senna 1 Tablet(s) Oral at bedtime  tamsulosin 0.8 milliGRAM(s) Oral at bedtime      LABS:                          10.0   7.2   )-----------( 241      ( 04 Feb 2019 07:32 )             32.2     02-04    140  |  101  |  19.0  ----------------------------<  130<H>  3.2<L>   |  24.0  |  0.25<L>    Ca    8.7      04 Feb 2019 07:32  Phos  4.7     02-04  Mg     2.2     02-04    TPro  5.8<L>  /  Alb  3.9  /  TBili  0.6  /  DBili  0.1  /  AST  27  /  ALT  63<H>  /  AlkPhos  67  02-04    LIVER FUNCTIONS - ( 04 Feb 2019 07:32 )  Alb: 3.9 g/dL / Pro: 5.8 g/dL / ALK PHOS: 67 U/L / ALT: 63 U/L / AST: 27 U/L / GGT: 85 U/L               CAPILLARY BLOOD GLUCOSE          RADIOLOGY & ADDITIONAL TESTS:      Consultant notes reviewed    Case discussed with consultant/provider/ family /patient

## 2019-02-04 NOTE — PROGRESS NOTE ADULT - SUBJECTIVE AND OBJECTIVE BOX
Patient working with OT and is attempting to urinate. Not able to.   Noting improved motor function, motor exam is relatively unchanged.     FUNCTIONAL PROGRESS  2/4  Bed Mobility  Bed Mobility Training Supine-to-Sit: supervision;  pt requires increased time to complete  Bed Mobility Training Limitations: decreased strength;  decreased sensation    Bed-Chair Transfer Training  Transfer Training Bed-to-Chair Transfer: minimum assist (75% patient effort);  1 person assist;  rolling walker  Bed-to-Chair Transfer Training Transfer Safety Analysis: decreased strength;  impaired balance;  impaired motor control    Sit-Stand Transfer Training  Transfer Training Sit-to-Stand Transfer: minimum assist (75% patient effort);  1 person assist  Transfer Training Stand-to-Sit Transfer: minimum assist (75% patient effort);  1 person assist  Sit-to-Stand Transfer Training Transfer Safety Analysis: decreased strength;  impaired motor control    Toilet Transfer Training  Transfer Training Toilet Transfer: minimum assist (75% patient effort);  1 person assist;  on low toilet height;  rolling walker  Toilet Transfer Training Transfer Safety Analysis: decreased strength;  impaired balance    Functional Endurance  Functional Endurance Detail: Pt ambulated with RW with minimal assist in room and bathroom. Pt with fair tolerance due to decreased strength, endurance and muscle fatigue in bilat LEs.     Lower Body Dressing Training  Lower Body Dressing Training Assistance: minimum assist (75% patient effort);  1 person assist;  to don socks seated at the edge of the bed;  decreased strength;  impaired coordination    Upper Body Dressing Training  Upper Body Dressing Training Assistance: supervision;  to don shirt seated at the edge of the bed          REVIEW OF SYSTEMS  Constitutional - No fever,  +fatigue  Neurological - No headaches, +loss of strength, +numbness, No tremors  Skin - No rashes, No lesions   Musculoskeletal - No joint pain, No joint swelling, +muscle pain  Psychiatric - No depression, No anxiety    VITALS  T(C): 36.9 (02-04-19 @ 08:27), Max: 37.1 (02-03-19 @ 23:51)  HR: 75 (02-04-19 @ 08:27) (75 - 91)  BP: 94/60 (02-04-19 @ 08:27) (91/59 - 106/77)  RR: 20 (02-04-19 @ 08:27) (18 - 20)  SpO2: 99% (02-03-19 @ 23:51) (98% - 99%)  Wt(kg): --    MEDICATIONS   acetaminophen   Tablet .. 650 milliGRAM(s) every 6 hours PRN  acetaminophen  IVPB .. 1000 milliGRAM(s) once PRN  baclofen 10 milliGRAM(s) every 8 hours PRN  bisacodyl 5 milliGRAM(s) every 12 hours PRN  chlorhexidine 2% Cloths 1 Application(s) <User Schedule>  docusate sodium 100 milliGRAM(s) two times a day  DULoxetine 60 milliGRAM(s) daily  enoxaparin Injectable 40 milliGRAM(s) daily  ertapenem  IVPB 1000 milliGRAM(s) every 24 hours  ertapenem  IVPB     hydroxychloroquine 200 milliGRAM(s) two times a day  ibuprofen  Tablet. 400 milliGRAM(s) every 8 hours PRN  lactulose Syrup 20 Gram(s) two times a day PRN  magnesium hydroxide Suspension 30 milliLiter(s) daily PRN  mineral oil enema 133 milliLiter(s) at bedtime PRN  multivitamin 1 Tablet(s) <User Schedule>  mycophenolate mofetil 1500 milliGRAM(s) two times a day  ondansetron Injectable 4 milliGRAM(s) every 6 hours PRN  oxyCODONE    5 mG/acetaminophen 325 mG 2 Tablet(s) every 6 hours PRN  pantoprazole    Tablet 40 milliGRAM(s) before breakfast  polyethylene glycol 3350 17 Gram(s) daily  predniSONE   Tablet 55 milliGRAM(s) daily  senna 1 Tablet(s) at bedtime  tamsulosin 0.8 milliGRAM(s) at bedtime      RECENT LABS/IMAGING  CBC Full  -  ( 04 Feb 2019 07:32 )  WBC Count : 7.2 K/uL  Hemoglobin : 10.0 g/dL  Hematocrit : 32.2 %  Platelet Count - Automated : 241 K/uL  Mean Cell Volume : 89.0 fl  Mean Cell Hemoglobin : 27.6 pg  Mean Cell Hemoglobin Concentration : 31.1 g/dL  Auto Neutrophil # : 5.8 K/uL  Auto Lymphocyte # : 0.8 K/uL  Auto Monocyte # : 0.2 K/uL  Auto Eosinophil # : 0.2 K/uL  Auto Basophil # : 0.0 K/uL  Auto Neutrophil % : 81.6 %  Auto Lymphocyte % : 10.7 %  Auto Monocyte % : 3.5 %  Auto Eosinophil % : 2.1 %  Auto Basophil % : 0.3 %    02-04    140  |  101  |  19.0  ----------------------------<  130<H>  3.2<L>   |  24.0  |  0.25<L>    Ca    8.7      04 Feb 2019 07:32  Phos  4.7     02-04  Mg     2.2     02-04    TPro  5.8<L>  /  Alb  3.9  /  TBili  0.6  /  DBili  0.1  /  AST  27  /  ALT  63<H>  /  AlkPhos  67  02-04    -----------------------------------------------------------------------------------  PHYSICAL EXAM  Constitutional - NAD, Comfortable  Extremities - No C/C/E, No calf tenderness   Neurologic Exam -                    Motor -                      LEFT    UE - C5 5/5, C6 5/5, C7 5/5, C8 5/5, T1 5/5                    RIGHT UE - C5 5/5, C6 5/5, C7 5/5, C8 5/5, T1 5/5                    LEFT    LE - L2 2/5, L3 3/5, L4 4/5, L5 4/5, S1 5/5                    RIGHT LE - L2 3/5, L3 4/5, L4 4/5, L5 4/5, S1 5/5      Sensory - Impaired to LT and PP up to T8     Reflexes - Flaccid  Psychiatric - Mood stable, Affect WNL  ----------------------------------------------------------------------------------------  ASSESSMENT/PLAN  33yFemale with functional deficits related to nontraumatic incomplete SCI paraparesis  SLE TM - Plaquenil, Prednisone, Cellcept  ESBL UTO - Invanz  Pain/Spasms - Tylenol, Ibuprofen, Cymbalta, Baclofen, Percocet  Neurogenic Bladder - Maintain low volumes < 350mL, IC Q4 hours, Flomax  Neurogenic Bowel - Senna, Lactulose, Fleet, Colace, Miralax, MOM  DVT PPX - SCDs, Lovenox  Rehab - Recommend ACUTE inpatient rehabilitation for the functional deficits consisting of 3 hours of therapy/day & 24 hour RN/daily PMR physician for comorbid medical management. Patient will be able to tolerate 3 hours a day.     Continue bedside therapy as well as OOB throughout the day with mobilization by staff to maintain cardiopulmonary function and prevention of secondary complications related to debility.

## 2019-02-05 PROCEDURE — 99232 SBSQ HOSP IP/OBS MODERATE 35: CPT

## 2019-02-05 RX ORDER — POTASSIUM CHLORIDE 20 MEQ
40 PACKET (EA) ORAL EVERY 4 HOURS
Qty: 0 | Refills: 0 | Status: COMPLETED | OUTPATIENT
Start: 2019-02-05 | End: 2019-02-05

## 2019-02-05 RX ADMIN — Medication 1 TABLET(S): at 17:37

## 2019-02-05 RX ADMIN — Medication 100 MILLIGRAM(S): at 17:34

## 2019-02-05 RX ADMIN — Medication 200 MILLIGRAM(S): at 06:08

## 2019-02-05 RX ADMIN — Medication 55 MILLIGRAM(S): at 06:08

## 2019-02-05 RX ADMIN — OXYCODONE AND ACETAMINOPHEN 2 TABLET(S): 5; 325 TABLET ORAL at 21:28

## 2019-02-05 RX ADMIN — SENNA PLUS 1 TABLET(S): 8.6 TABLET ORAL at 21:20

## 2019-02-05 RX ADMIN — Medication 40 MILLIEQUIVALENT(S): at 14:00

## 2019-02-05 RX ADMIN — TAMSULOSIN HYDROCHLORIDE 0.8 MILLIGRAM(S): 0.4 CAPSULE ORAL at 21:20

## 2019-02-05 RX ADMIN — DULOXETINE HYDROCHLORIDE 60 MILLIGRAM(S): 30 CAPSULE, DELAYED RELEASE ORAL at 13:07

## 2019-02-05 RX ADMIN — Medication 200 MILLIGRAM(S): at 17:34

## 2019-02-05 RX ADMIN — MYCOPHENOLATE MOFETIL 1500 MILLIGRAM(S): 250 CAPSULE ORAL at 17:36

## 2019-02-05 RX ADMIN — Medication 40 MILLIEQUIVALENT(S): at 17:35

## 2019-02-05 RX ADMIN — ENOXAPARIN SODIUM 40 MILLIGRAM(S): 100 INJECTION SUBCUTANEOUS at 13:07

## 2019-02-05 RX ADMIN — PANTOPRAZOLE SODIUM 40 MILLIGRAM(S): 20 TABLET, DELAYED RELEASE ORAL at 06:08

## 2019-02-05 RX ADMIN — CHLORHEXIDINE GLUCONATE 1 APPLICATION(S): 213 SOLUTION TOPICAL at 06:10

## 2019-02-05 RX ADMIN — Medication 100 MILLIGRAM(S): at 06:08

## 2019-02-05 RX ADMIN — MYCOPHENOLATE MOFETIL 1500 MILLIGRAM(S): 250 CAPSULE ORAL at 06:08

## 2019-02-05 NOTE — PROGRESS NOTE ADULT - SUBJECTIVE AND OBJECTIVE BOX
KANE LARA Patient is a 33y old  Female who presents with a chief complaint of Generalized pain (04 Feb 2019 16:09)     HPI:  33 years old female with PMH of SLE, Lupus Nephritis and Fibromyalgia brought by her  yesterday evening with generalized weakness. As per patient, she is not feeling well for last few days. She is having low grade fevers, generalized body pain, throat irritation, cough, ear pain and vomiting. Her appetite is very poor. She went to her PMD few days ago who prescribed Sertraline for her depressive symptoms. As per her, symptoms started after taking that medication. Yesterday, she was feeling so weak that she could not walk so her  brought her to ER. She is also complaining of difficulty urinating. She went to Urologist 10 days ago and she was prescribed Flomax which she has been taking but is unable to void. She also has constipation.   She recently came back from [place] on 12/29/18 after staying there for 2.5 to 3 months. She had MRI there in December due to back pain and generalized weakness and it was normal.   She has headache, neck pain and lightheadedness. Denies photophobia. + sick contacts with common cold. (16 Jan 2019 11:58)    The patient was seen and evaluated   The patient is in no acute distress.  Denied any fever chest pain, palpitations, shortness of breath, abdominal pain, fever, dysuria, cough, edema   Complains of some stool incontinence - cant make it on time     I&O's Summary    Allergies    No Known Allergies    Intolerances      HEALTH ISSUES - PROBLEM Dx:  Constipation: Constipation  Depression: Depression  Fibromyalgia: Fibromyalgia  Leukocytosis, unspecified type: Leukocytosis, unspecified type  Lupus erythematosus, unspecified form: Lupus erythematosus, unspecified form  Transverse myelitis: Transverse myelitis  Urinary retention: Urinary retention  GERD (gastroesophageal reflux disease): GERD (gastroesophageal reflux disease)  Leg weakness: Leg weakness  Lupus nephritis: Lupus nephritis  Systemic lupus erythematosus with other organ involvement: Systemic lupus erythematosus with other organ involvement  Myelitis: Myelitis  Weakness of both legs: Weakness of both legs        PAST MEDICAL & SURGICAL HISTORY:  Fibromyalgia  Lupus  GERD (gastroesophageal reflux disease)  Lupus nephritis  S/P correction of deviated nasal septum  History of esophagogastroduodenoscopy (EGD)  History of biopsy: Renal          Vital Signs Last 24 Hrs  T(C): 36.9 (05 Feb 2019 07:55), Max: 37.1 (05 Feb 2019 01:30)  T(F): 98.4 (05 Feb 2019 07:55), Max: 98.7 (05 Feb 2019 01:30)  HR: 85 (05 Feb 2019 07:55) (70 - 85)  BP: 100/59 (05 Feb 2019 07:55) (100/59 - 101/69)  BP(mean): --  RR: 19 (05 Feb 2019 07:55) (18 - 19)  SpO2: 98% (05 Feb 2019 06:14) (97% - 98%)T(C): 36.9 (02-05-19 @ 07:55), Max: 37.1 (02-05-19 @ 01:30)  HR: 85 (02-05-19 @ 07:55) (70 - 85)  BP: 100/59 (02-05-19 @ 07:55) (100/59 - 101/69)  RR: 19 (02-05-19 @ 07:55) (18 - 19)  SpO2: 98% (02-05-19 @ 06:14) (97% - 98%)  Wt(kg): --    PHYSICAL EXAM:    GENERAL: NAD, well-groomed, well-developed  HEAD:  Atraumatic, Normocephalic  EYES: EOMI, PERRL, conjunctiva and sclera clear  ENMT:  Moist mucous membranes,  No lesions  NECK: Supple, No JVD, Normal thyroid  NERVOUS SYSTEM:  Alert & Oriented X3,  Moves upper and lower extremities; CNS-II-XII  CHEST/LUNG: Clear to auscultation bilaterally; No rales, rhonchi, wheezing,   HEART: Regular rate and rhythm; No murmurs,   ABDOMEN: Soft, Nontender, Nondistended; Bowel sounds present  EXTREMITIES:  Peripheral Pulses, No  cyanosis, or edema  SKIN: No rashes or lesions  psychiatry- mood and affect approprite, Insight and judgement intact     acetaminophen   Tablet .. 650 milliGRAM(s) Oral every 6 hours PRN  acetaminophen  IVPB .. 1000 milliGRAM(s) IV Intermittent once PRN  baclofen 10 milliGRAM(s) Oral every 8 hours PRN  bisacodyl 5 milliGRAM(s) Oral every 12 hours PRN  chlorhexidine 2% Cloths 1 Application(s) Topical <User Schedule>  docusate sodium 100 milliGRAM(s) Oral two times a day  DULoxetine 60 milliGRAM(s) Oral daily  enoxaparin Injectable 40 milliGRAM(s) SubCutaneous daily  hydroxychloroquine 200 milliGRAM(s) Oral two times a day  ibuprofen  Tablet. 400 milliGRAM(s) Oral every 8 hours PRN  lactulose Syrup 20 Gram(s) Oral two times a day PRN  magnesium hydroxide Suspension 30 milliLiter(s) Oral daily PRN  mineral oil enema 133 milliLiter(s) Rectal at bedtime PRN  multivitamin 1 Tablet(s) Oral <User Schedule>  mycophenolate mofetil 1500 milliGRAM(s) Oral two times a day  ondansetron Injectable 4 milliGRAM(s) IV Push every 6 hours PRN  oxyCODONE    5 mG/acetaminophen 325 mG 2 Tablet(s) Oral every 6 hours PRN  pantoprazole    Tablet 40 milliGRAM(s) Oral before breakfast  polyethylene glycol 3350 17 Gram(s) Oral daily  potassium chloride    Tablet ER 40 milliEquivalent(s) Oral every 4 hours  predniSONE   Tablet 55 milliGRAM(s) Oral daily  senna 1 Tablet(s) Oral at bedtime  tamsulosin 0.8 milliGRAM(s) Oral at bedtime      LABS:                          10.0   7.2   )-----------( 241      ( 04 Feb 2019 07:32 )             32.2     02-04    140  |  101  |  19.0  ----------------------------<  130<H>  3.2<L>   |  24.0  |  0.25<L>    Ca    8.7      04 Feb 2019 07:32  Phos  4.7     02-04  Mg     2.2     02-04    TPro  5.8<L>  /  Alb  3.9  /  TBili  0.6  /  DBili  0.1  /  AST  27  /  ALT  63<H>  /  AlkPhos  67  02-04    LIVER FUNCTIONS - ( 04 Feb 2019 07:32 )  Alb: 3.9 g/dL / Pro: 5.8 g/dL / ALK PHOS: 67 U/L / ALT: 63 U/L / AST: 27 U/L / GGT: 85 U/L               CAPILLARY BLOOD GLUCOSE          RADIOLOGY & ADDITIONAL TESTS:      Consultant notes reviewed    Case discussed with consultant/provider/ family /patient

## 2019-02-05 NOTE — PROGRESS NOTE ADULT - ASSESSMENT
33 years old female with hx of SLE, lupus Nephritis and Fibromyalgia, recently returned from [place] on 12/29 who presented with generalized weakness found to have transverse myelitis. She got plamapheresis, rtx and steroids and improved. Course complicated with urinary retention and ESBL ECOLI.  urology consulted. Patient is accepted by acute rehab and will go to acute rehab. awaiting insurance authorization Paper work sent.    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 Premedication with tylenol 650 mg, bendaryl 20 mg IVPB, and solumedrol 100 mg ivpb.  f/u with rheumatology as an outpatient after that. To be repeated Rituximab in 6 months after that.      Acute urinary retention- patient ad  request repeat UA   - c/w bladder scans q8hrs  - c/w straight cath as needed  - c/w flomax 0.8mg po qhs   - likely combo of neurogenic bladder + UTI, uc confirming esbl ecoli uti  - ertapenem started 1/30, last day 2/4 per ID, today, Renal us showed no evidence of  hydronephrosis, Urology note appreciated.    SLE with nephritis - labs for AM ordered   - stable renal disease, elevated dsDNA, low complements  - rheum f/u noted and appreciated     Fibromyalgia   - c/w Tylenol for pain prn     Depression  - c/w Cymbalta 60mg po qd    Constipation  - Miralax / senna   improved    DVT ppx  - c/w lovenox 40mg sq qd    Dispo: PT consulted and recommended acute rehab, PMNR consulted  - as per PMNR - patient will benefit from acute rehab. plan for dc to [facility] once insurance auth is done as per sw. Patient is medically ready for discharge.

## 2019-02-06 ENCOUNTER — TRANSCRIPTION ENCOUNTER (OUTPATIENT)
Age: 34
End: 2019-02-06

## 2019-02-06 ENCOUNTER — INPATIENT (INPATIENT)
Facility: HOSPITAL | Age: 34
LOS: 14 days | Discharge: HOME | DRG: 950 | End: 2019-02-21
Attending: PHYSICAL MEDICINE & REHABILITATION | Admitting: PHYSICAL MEDICINE & REHABILITATION
Payer: COMMERCIAL

## 2019-02-06 VITALS
DIASTOLIC BLOOD PRESSURE: 76 MMHG | TEMPERATURE: 98 F | OXYGEN SATURATION: 97 % | SYSTOLIC BLOOD PRESSURE: 108 MMHG | RESPIRATION RATE: 18 BRPM | HEART RATE: 76 BPM

## 2019-02-06 VITALS
HEART RATE: 91 BPM | SYSTOLIC BLOOD PRESSURE: 108 MMHG | DIASTOLIC BLOOD PRESSURE: 74 MMHG | OXYGEN SATURATION: 100 % | HEIGHT: 64 IN | RESPIRATION RATE: 14 BRPM | WEIGHT: 122.36 LBS | TEMPERATURE: 98 F

## 2019-02-06 DIAGNOSIS — G37.3 ACUTE TRANSVERSE MYELITIS IN DEMYELINATING DISEASE OF CENTRAL NERVOUS SYSTEM: ICD-10-CM

## 2019-02-06 DIAGNOSIS — Z98.890 OTHER SPECIFIED POSTPROCEDURAL STATES: Chronic | ICD-10-CM

## 2019-02-06 LAB
APPEARANCE UR: CLEAR — SIGNIFICANT CHANGE UP
BILIRUB UR-MCNC: NEGATIVE — SIGNIFICANT CHANGE UP
COLOR SPEC: YELLOW — SIGNIFICANT CHANGE UP
DIFF PNL FLD: NEGATIVE — SIGNIFICANT CHANGE UP
EPI CELLS # UR: SIGNIFICANT CHANGE UP
GLUCOSE UR QL: NEGATIVE MG/DL — SIGNIFICANT CHANGE UP
KETONES UR-MCNC: ABNORMAL
LEUKOCYTE ESTERASE UR-ACNC: NEGATIVE — SIGNIFICANT CHANGE UP
NITRITE UR-MCNC: NEGATIVE — SIGNIFICANT CHANGE UP
PH UR: 6 — SIGNIFICANT CHANGE UP (ref 5–8)
PROT UR-MCNC: 30 MG/DL
RBC CASTS # UR COMP ASSIST: SIGNIFICANT CHANGE UP /HPF (ref 0–4)
SP GR SPEC: 1.02 — SIGNIFICANT CHANGE UP (ref 1.01–1.02)
UROBILINOGEN FLD QL: 1 MG/DL
WBC UR QL: SIGNIFICANT CHANGE UP

## 2019-02-06 PROCEDURE — 97163 PT EVAL HIGH COMPLEX 45 MIN: CPT

## 2019-02-06 PROCEDURE — 97535 SELF CARE MNGMENT TRAINING: CPT

## 2019-02-06 PROCEDURE — 80307 DRUG TEST PRSMV CHEM ANLYZR: CPT

## 2019-02-06 PROCEDURE — 86704 HEP B CORE ANTIBODY TOTAL: CPT

## 2019-02-06 PROCEDURE — 96368 THER/DIAG CONCURRENT INF: CPT | Mod: XU

## 2019-02-06 PROCEDURE — 83605 ASSAY OF LACTIC ACID: CPT

## 2019-02-06 PROCEDURE — 89051 BODY FLUID CELL COUNT: CPT

## 2019-02-06 PROCEDURE — 86480 TB TEST CELL IMMUN MEASURE: CPT

## 2019-02-06 PROCEDURE — 86255 FLUORESCENT ANTIBODY SCREEN: CPT

## 2019-02-06 PROCEDURE — 86038 ANTINUCLEAR ANTIBODIES: CPT

## 2019-02-06 PROCEDURE — 80074 ACUTE HEPATITIS PANEL: CPT

## 2019-02-06 PROCEDURE — 82607 VITAMIN B-12: CPT

## 2019-02-06 PROCEDURE — 86235 NUCLEAR ANTIGEN ANTIBODY: CPT

## 2019-02-06 PROCEDURE — 99285 EMERGENCY DEPT VISIT HI MDM: CPT | Mod: 25

## 2019-02-06 PROCEDURE — 87633 RESP VIRUS 12-25 TARGETS: CPT

## 2019-02-06 PROCEDURE — 84157 ASSAY OF PROTEIN OTHER: CPT

## 2019-02-06 PROCEDURE — 76856 US EXAM PELVIC COMPLETE: CPT

## 2019-02-06 PROCEDURE — 36415 COLL VENOUS BLD VENIPUNCTURE: CPT

## 2019-02-06 PROCEDURE — 86431 RHEUMATOID FACTOR QUANT: CPT

## 2019-02-06 PROCEDURE — 86787 VARICELLA-ZOSTER ANTIBODY: CPT

## 2019-02-06 PROCEDURE — 86738 MYCOPLASMA ANTIBODY: CPT

## 2019-02-06 PROCEDURE — 87486 CHLMYD PNEUM DNA AMP PROBE: CPT

## 2019-02-06 PROCEDURE — 82150 ASSAY OF AMYLASE: CPT

## 2019-02-06 PROCEDURE — 85610 PROTHROMBIN TIME: CPT

## 2019-02-06 PROCEDURE — 86850 RBC ANTIBODY SCREEN: CPT

## 2019-02-06 PROCEDURE — 96375 TX/PRO/DX INJ NEW DRUG ADDON: CPT | Mod: XU

## 2019-02-06 PROCEDURE — 99239 HOSP IP/OBS DSCHRG MGMT >30: CPT

## 2019-02-06 PROCEDURE — 70450 CT HEAD/BRAIN W/O DYE: CPT

## 2019-02-06 PROCEDURE — 74177 CT ABD & PELVIS W/CONTRAST: CPT

## 2019-02-06 PROCEDURE — 86592 SYPHILIS TEST NON-TREP QUAL: CPT

## 2019-02-06 PROCEDURE — 86901 BLOOD TYPING SEROLOGIC RH(D): CPT

## 2019-02-06 PROCEDURE — 86803 HEPATITIS C AB TEST: CPT

## 2019-02-06 PROCEDURE — 83735 ASSAY OF MAGNESIUM: CPT

## 2019-02-06 PROCEDURE — 86618 LYME DISEASE ANTIBODY: CPT

## 2019-02-06 PROCEDURE — 97530 THERAPEUTIC ACTIVITIES: CPT

## 2019-02-06 PROCEDURE — 86355 B CELLS TOTAL COUNT: CPT

## 2019-02-06 PROCEDURE — 93005 ELECTROCARDIOGRAM TRACING: CPT

## 2019-02-06 PROCEDURE — 86900 BLOOD TYPING SEROLOGIC ABO: CPT

## 2019-02-06 PROCEDURE — 87798 DETECT AGENT NOS DNA AMP: CPT

## 2019-02-06 PROCEDURE — 83615 LACTATE (LD) (LDH) ENZYME: CPT

## 2019-02-06 PROCEDURE — 72128 CT CHEST SPINE W/O DYE: CPT

## 2019-02-06 PROCEDURE — 72157 MRI CHEST SPINE W/O & W/DYE: CPT

## 2019-02-06 PROCEDURE — 76775 US EXAM ABDO BACK WALL LIM: CPT

## 2019-02-06 PROCEDURE — 84443 ASSAY THYROID STIM HORMONE: CPT

## 2019-02-06 PROCEDURE — 51701 INSERT BLADDER CATHETER: CPT | Mod: XU

## 2019-02-06 PROCEDURE — 86703 HIV-1/HIV-2 1 RESULT ANTBDY: CPT

## 2019-02-06 PROCEDURE — 87040 BLOOD CULTURE FOR BACTERIA: CPT

## 2019-02-06 PROCEDURE — 99232 SBSQ HOSP IP/OBS MODERATE 35: CPT

## 2019-02-06 PROCEDURE — 82248 BILIRUBIN DIRECT: CPT

## 2019-02-06 PROCEDURE — 82570 ASSAY OF URINE CREATININE: CPT

## 2019-02-06 PROCEDURE — 86664 EPSTEIN-BARR NUCLEAR ANTIGEN: CPT

## 2019-02-06 PROCEDURE — 82550 ASSAY OF CK (CPK): CPT

## 2019-02-06 PROCEDURE — 84156 ASSAY OF PROTEIN URINE: CPT

## 2019-02-06 PROCEDURE — 87205 SMEAR GRAM STAIN: CPT

## 2019-02-06 PROCEDURE — 85652 RBC SED RATE AUTOMATED: CPT

## 2019-02-06 PROCEDURE — 86140 C-REACTIVE PROTEIN: CPT

## 2019-02-06 PROCEDURE — 83921 ORGANIC ACID SINGLE QUANT: CPT

## 2019-02-06 PROCEDURE — 85384 FIBRINOGEN ACTIVITY: CPT

## 2019-02-06 PROCEDURE — 84550 ASSAY OF BLOOD/URIC ACID: CPT

## 2019-02-06 PROCEDURE — 82746 ASSAY OF FOLIC ACID SERUM: CPT

## 2019-02-06 PROCEDURE — 86663 EPSTEIN-BARR ANTIBODY: CPT

## 2019-02-06 PROCEDURE — 87581 M.PNEUMON DNA AMP PROBE: CPT

## 2019-02-06 PROCEDURE — T1013: CPT

## 2019-02-06 PROCEDURE — P9045: CPT

## 2019-02-06 PROCEDURE — 72156 MRI NECK SPINE W/O & W/DYE: CPT

## 2019-02-06 PROCEDURE — 86225 DNA ANTIBODY NATIVE: CPT

## 2019-02-06 PROCEDURE — 84165 PROTEIN E-PHORESIS SERUM: CPT

## 2019-02-06 PROCEDURE — 85027 COMPLETE CBC AUTOMATED: CPT

## 2019-02-06 PROCEDURE — 97110 THERAPEUTIC EXERCISES: CPT

## 2019-02-06 PROCEDURE — 62270 DX LMBR SPI PNXR: CPT

## 2019-02-06 PROCEDURE — 86357 NK CELLS TOTAL COUNT: CPT

## 2019-02-06 PROCEDURE — 84100 ASSAY OF PHOSPHORUS: CPT

## 2019-02-06 PROCEDURE — 97167 OT EVAL HIGH COMPLEX 60 MIN: CPT

## 2019-02-06 PROCEDURE — 86780 TREPONEMA PALLIDUM: CPT

## 2019-02-06 PROCEDURE — 76830 TRANSVAGINAL US NON-OB: CPT

## 2019-02-06 PROCEDURE — 71045 X-RAY EXAM CHEST 1 VIEW: CPT

## 2019-02-06 PROCEDURE — 86665 EPSTEIN-BARR CAPSID VCA: CPT

## 2019-02-06 PROCEDURE — 81001 URINALYSIS AUTO W/SCOPE: CPT

## 2019-02-06 PROCEDURE — 97116 GAIT TRAINING THERAPY: CPT

## 2019-02-06 PROCEDURE — 96365 THER/PROPH/DIAG IV INF INIT: CPT | Mod: XU

## 2019-02-06 PROCEDURE — 80048 BASIC METABOLIC PNL TOTAL CA: CPT

## 2019-02-06 PROCEDURE — 70553 MRI BRAIN STEM W/O & W/DYE: CPT

## 2019-02-06 PROCEDURE — 87476 LYME DIS DNA AMP PROBE: CPT

## 2019-02-06 PROCEDURE — 82977 ASSAY OF GGT: CPT

## 2019-02-06 PROCEDURE — 87186 SC STD MICRODIL/AGAR DIL: CPT

## 2019-02-06 PROCEDURE — 86705 HEP B CORE ANTIBODY IGM: CPT

## 2019-02-06 PROCEDURE — 87340 HEPATITIS B SURFACE AG IA: CPT

## 2019-02-06 PROCEDURE — 86706 HEP B SURFACE ANTIBODY: CPT

## 2019-02-06 PROCEDURE — 84702 CHORIONIC GONADOTROPIN TEST: CPT

## 2019-02-06 PROCEDURE — 87070 CULTURE OTHR SPECIMN AEROBIC: CPT

## 2019-02-06 PROCEDURE — 87483 CNS DNA AMP PROBE TYPE 12-25: CPT

## 2019-02-06 PROCEDURE — 84484 ASSAY OF TROPONIN QUANT: CPT

## 2019-02-06 PROCEDURE — 96366 THER/PROPH/DIAG IV INF ADDON: CPT | Mod: XU

## 2019-02-06 PROCEDURE — 87086 URINE CULTURE/COLONY COUNT: CPT

## 2019-02-06 PROCEDURE — 83916 OLIGOCLONAL BANDS: CPT

## 2019-02-06 PROCEDURE — 86160 COMPLEMENT ANTIGEN: CPT

## 2019-02-06 PROCEDURE — 85730 THROMBOPLASTIN TIME PARTIAL: CPT

## 2019-02-06 PROCEDURE — 80053 COMPREHEN METABOLIC PANEL: CPT

## 2019-02-06 PROCEDURE — 71046 X-RAY EXAM CHEST 2 VIEWS: CPT

## 2019-02-06 RX ORDER — TAMSULOSIN HYDROCHLORIDE 0.4 MG/1
2 CAPSULE ORAL
Qty: 0 | Refills: 0 | COMMUNITY
Start: 2019-02-06

## 2019-02-06 RX ORDER — ENOXAPARIN SODIUM 100 MG/ML
40 INJECTION SUBCUTANEOUS DAILY
Qty: 0 | Refills: 0 | Status: DISCONTINUED | OUTPATIENT
Start: 2019-02-06 | End: 2019-02-21

## 2019-02-06 RX ORDER — ACETAMINOPHEN 500 MG
650 TABLET ORAL EVERY 6 HOURS
Qty: 0 | Refills: 0 | Status: DISCONTINUED | OUTPATIENT
Start: 2019-02-06 | End: 2019-02-21

## 2019-02-06 RX ORDER — DOCUSATE SODIUM 100 MG
1 CAPSULE ORAL
Qty: 0 | Refills: 0 | COMMUNITY
Start: 2019-02-06

## 2019-02-06 RX ORDER — MINERAL OIL
133 OIL (ML) MISCELLANEOUS
Qty: 0 | Refills: 0 | COMMUNITY
Start: 2019-02-06

## 2019-02-06 RX ORDER — BACLOFEN 100 %
1 POWDER (GRAM) MISCELLANEOUS
Qty: 0 | Refills: 0 | COMMUNITY
Start: 2019-02-06

## 2019-02-06 RX ORDER — TAMSULOSIN HYDROCHLORIDE 0.4 MG/1
0.8 CAPSULE ORAL AT BEDTIME
Qty: 0 | Refills: 0 | Status: DISCONTINUED | OUTPATIENT
Start: 2019-02-06 | End: 2019-02-20

## 2019-02-06 RX ORDER — MYCOPHENOLATE MOFETIL 250 MG/1
1500 CAPSULE ORAL
Qty: 0 | Refills: 0 | Status: DISCONTINUED | OUTPATIENT
Start: 2019-02-06 | End: 2019-02-21

## 2019-02-06 RX ORDER — BACLOFEN 100 %
10 POWDER (GRAM) MISCELLANEOUS EVERY 8 HOURS
Qty: 0 | Refills: 0 | Status: DISCONTINUED | OUTPATIENT
Start: 2019-02-06 | End: 2019-02-13

## 2019-02-06 RX ORDER — DULOXETINE HYDROCHLORIDE 30 MG/1
1 CAPSULE, DELAYED RELEASE ORAL
Qty: 0 | Refills: 0 | COMMUNITY

## 2019-02-06 RX ORDER — ACETAMINOPHEN 500 MG
2 TABLET ORAL
Qty: 0 | Refills: 0 | COMMUNITY
Start: 2019-02-06

## 2019-02-06 RX ORDER — SERTRALINE 25 MG/1
1 TABLET, FILM COATED ORAL
Qty: 0 | Refills: 0 | COMMUNITY

## 2019-02-06 RX ORDER — PANTOPRAZOLE SODIUM 20 MG/1
40 TABLET, DELAYED RELEASE ORAL
Qty: 0 | Refills: 0 | Status: DISCONTINUED | OUTPATIENT
Start: 2019-02-06 | End: 2019-02-21

## 2019-02-06 RX ORDER — PANTOPRAZOLE SODIUM 20 MG/1
1 TABLET, DELAYED RELEASE ORAL
Qty: 0 | Refills: 0 | COMMUNITY
Start: 2019-02-06

## 2019-02-06 RX ORDER — HYDROXYCHLOROQUINE SULFATE 200 MG
200 TABLET ORAL
Qty: 0 | Refills: 0 | Status: DISCONTINUED | OUTPATIENT
Start: 2019-02-06 | End: 2019-02-21

## 2019-02-06 RX ORDER — DOCUSATE SODIUM 100 MG
100 CAPSULE ORAL THREE TIMES A DAY
Qty: 0 | Refills: 0 | Status: DISCONTINUED | OUTPATIENT
Start: 2019-02-06 | End: 2019-02-21

## 2019-02-06 RX ORDER — ACETAMINOPHEN 500 MG
650 TABLET ORAL EVERY 6 HOURS
Qty: 0 | Refills: 0 | Status: DISCONTINUED | OUTPATIENT
Start: 2019-02-06 | End: 2019-02-06

## 2019-02-06 RX ORDER — MAGNESIUM HYDROXIDE 400 MG/1
30 TABLET, CHEWABLE ORAL
Qty: 0 | Refills: 0 | DISCHARGE
Start: 2019-02-06

## 2019-02-06 RX ORDER — SENNA PLUS 8.6 MG/1
2 TABLET ORAL AT BEDTIME
Qty: 0 | Refills: 0 | Status: DISCONTINUED | OUTPATIENT
Start: 2019-02-06 | End: 2019-02-21

## 2019-02-06 RX ORDER — HYDROXYCHLOROQUINE SULFATE 200 MG
1 TABLET ORAL
Qty: 0 | Refills: 0 | COMMUNITY
Start: 2019-02-06

## 2019-02-06 RX ORDER — IBUPROFEN 200 MG
1 TABLET ORAL
Qty: 0 | Refills: 0 | COMMUNITY
Start: 2019-02-06

## 2019-02-06 RX ORDER — POLYETHYLENE GLYCOL 3350 17 G/17G
17 POWDER, FOR SOLUTION ORAL DAILY
Qty: 0 | Refills: 0 | Status: DISCONTINUED | OUTPATIENT
Start: 2019-02-06 | End: 2019-02-21

## 2019-02-06 RX ORDER — SENNA PLUS 8.6 MG/1
1 TABLET ORAL
Qty: 0 | Refills: 0 | COMMUNITY
Start: 2019-02-06

## 2019-02-06 RX ORDER — MYCOPHENOLATE MOFETIL 250 MG/1
1500 CAPSULE ORAL
Qty: 0 | Refills: 0 | COMMUNITY
Start: 2019-02-06

## 2019-02-06 RX ORDER — HYDROXYCHLOROQUINE SULFATE 200 MG
1 TABLET ORAL
Qty: 0 | Refills: 0 | COMMUNITY

## 2019-02-06 RX ORDER — DULOXETINE HYDROCHLORIDE 30 MG/1
60 CAPSULE, DELAYED RELEASE ORAL DAILY
Qty: 0 | Refills: 0 | Status: DISCONTINUED | OUTPATIENT
Start: 2019-02-06 | End: 2019-02-21

## 2019-02-06 RX ORDER — CHLORHEXIDINE GLUCONATE 213 G/1000ML
1 SOLUTION TOPICAL
Qty: 0 | Refills: 0 | COMMUNITY
Start: 2019-02-06

## 2019-02-06 RX ORDER — DULOXETINE HYDROCHLORIDE 30 MG/1
1 CAPSULE, DELAYED RELEASE ORAL
Qty: 0 | Refills: 0 | COMMUNITY
Start: 2019-02-06

## 2019-02-06 RX ADMIN — SENNA PLUS 2 TABLET(S): 8.6 TABLET ORAL at 21:32

## 2019-02-06 RX ADMIN — Medication 1 TABLET(S): at 08:57

## 2019-02-06 RX ADMIN — MYCOPHENOLATE MOFETIL 1500 MILLIGRAM(S): 250 CAPSULE ORAL at 18:19

## 2019-02-06 RX ADMIN — DULOXETINE HYDROCHLORIDE 60 MILLIGRAM(S): 30 CAPSULE, DELAYED RELEASE ORAL at 14:05

## 2019-02-06 RX ADMIN — TAMSULOSIN HYDROCHLORIDE 0.8 MILLIGRAM(S): 0.4 CAPSULE ORAL at 21:32

## 2019-02-06 RX ADMIN — PANTOPRAZOLE SODIUM 40 MILLIGRAM(S): 20 TABLET, DELAYED RELEASE ORAL at 05:26

## 2019-02-06 RX ADMIN — Medication 55 MILLIGRAM(S): at 05:27

## 2019-02-06 RX ADMIN — Medication 200 MILLIGRAM(S): at 18:19

## 2019-02-06 RX ADMIN — Medication 200 MILLIGRAM(S): at 05:27

## 2019-02-06 RX ADMIN — Medication 100 MILLIGRAM(S): at 05:26

## 2019-02-06 RX ADMIN — OXYCODONE AND ACETAMINOPHEN 2 TABLET(S): 5; 325 TABLET ORAL at 00:02

## 2019-02-06 RX ADMIN — Medication 100 MILLIGRAM(S): at 21:32

## 2019-02-06 RX ADMIN — CHLORHEXIDINE GLUCONATE 1 APPLICATION(S): 213 SOLUTION TOPICAL at 05:36

## 2019-02-06 RX ADMIN — MYCOPHENOLATE MOFETIL 1500 MILLIGRAM(S): 250 CAPSULE ORAL at 05:26

## 2019-02-06 NOTE — DISCHARGE NOTE ADULT - MEDICATION SUMMARY - MEDICATIONS TO TAKE
I will START or STAY ON the medications listed below when I get home from the hospital:    predniSONE 5 mg oral tablet  -- 11 tab(s) by mouth once a day  -- Indication: For Myelitis    acetaminophen 325 mg oral tablet  -- 2 tab(s) by mouth every 6 hours, As needed, Temp greater or equal to 38C (100.4F), Mild Pain (1 - 3)  -- Indication: For Intractable pain    ibuprofen 400 mg oral tablet  -- 1 tab(s) by mouth every 8 hours, As needed, Moderate Pain (4 - 6)  -- Indication: For Intractable pain    magnesium hydroxide 8% oral suspension  -- 30 milliliter(s) by mouth once a day, As needed, Constipation  -- Indication: For GERD (gastroesophageal reflux disease)    tamsulosin 0.4 mg oral capsule  -- 2 cap(s) by mouth once a day (at bedtime)  -- Indication: For Urinary retention    DULoxetine 60 mg oral delayed release capsule  -- 1 cap(s) by mouth once a day  -- Indication: For Depression    hydroxychloroquine 200 mg oral tablet  -- 1 tab(s) by mouth 2 times a day  -- Indication: For Leg weakness    chlorhexidine 2% topical pad  -- 1 application on skin   -- Indication: For rash    mycophenolate mofetil 250 mg oral capsule  -- 1500  by mouth 2 times a day  -- Indication: For Transverse myelitis    senna oral tablet  -- 1 tab(s) by mouth once a day (at bedtime)  -- Indication: For Constipation    bisacodyl 5 mg oral delayed release tablet  -- 1 tab(s) by mouth every 12 hours, As needed, Constipation  -- Indication: For Constipation    mineral oil rectal enema  -- 133 milliliter(s) rectally once a day (at bedtime), As needed, as needed if no bm for 3 days  -- Indication: For Constipation    docusate sodium 100 mg oral capsule  -- 1 cap(s) by mouth 2 times a day, As Needed  -- Indication: For Constipation    baclofen 10 mg oral tablet  -- 1 tab(s) by mouth every 8 hours, As needed, Muscle Spasm  -- Indication: For Leg weakness    pantoprazole 40 mg oral delayed release tablet  -- 1 tab(s) by mouth once a day (before a meal)  -- Indication: For GERD (gastroesophageal reflux disease)    Multiple Vitamins oral tablet  -- 1 tab(s) by mouth   -- Indication: For Lupus

## 2019-02-06 NOTE — H&P ADULT - NSHPLABSRESULTS_GEN_ALL_CORE
RECENT LABS/IMAGING    MRI C SPINE - Abnormal cord signal throughout the cervical spine from the C1/C2 junction through C6/C7 with cord swelling. No evidence of cord compression. No abnormal enhancement. Differential diagnosis includes transverse myelitis, infectious, inflammatory, autoimmune, infarct, and demyelinating disease processes. Findings discussed with Dr. Tom.    MRI L SPINE - Diffuse abnormal spinal cord T2 hyperintensity with edema. Focal enhancement in the conus medullaris.    MRI BRAIN - WNL

## 2019-02-06 NOTE — DISCHARGE NOTE ADULT - MEDICATION SUMMARY - MEDICATIONS TO CHANGE
I will SWITCH the dose or number of times a day I take the medications listed below when I get home from the hospital:    hydroxychloroquine 200 mg oral tablet  -- 1 tab(s) by mouth once a day

## 2019-02-06 NOTE — H&P ADULT - ATTENDING COMMENTS
Agree with above.  Pt is a is a 34yo Female with PMH of SLE, lupus nephritis, and fibromyalgia admitted to rehab with transverse myelitis resulting in decreased functional mobility, gait instability and ADL impairments.  Pt requires comprehensive rehab with physician management of comorbidities.

## 2019-02-06 NOTE — PROGRESS NOTE ADULT - SUBJECTIVE AND OBJECTIVE BOX
Patient in bed. Feels well.  Reports no pain at this time.  Slept well.  Plan for DC to [facility] today when bed available.     FUNCTIONAL PROGRESS  2/4  Bed Mobility  Bed Mobility Training Supine-to-Sit: supervsion  Bed Mobility Training Limitations: decreased ability to use legs for bridging/pushing;  decreased strength    Sit-Stand Transfer Training  Transfer Training Sit-to-Stand Transfer: minimum assist (75% patient effort);  1 person assist;  full weight-bearing   rolling walker  Transfer Training Stand-to-Sit Transfer: minimum assist (75% patient effort);  1 person assist;  full weight-bearing   rolling walker  Sit-to-Stand Transfer Training Transfer Safety Analysis: decreased balance;  decreased strength;  impaired balance    Gait Training  Gait Training: minimum assist (75% patient effort);  1 person assist;  full weight-bearing   rolling walker;  40 feet   Gait Analysis: 2-point gait   decreased isis;  unsteadiness with turns;  decreased velocity of limb motion;  decreased step length;  decreased strength;  impaired balance;  decreased endurance  Type of Rest Type of Rest: sitting  Duration of Rest Duration of Rest: 1 min     Stair Training  Physical Assist/Nonphysical Assist: 1 person assist;  moderate assist  Weight-Bearing Restrictions: full weight-bearing  Assistive Device: bilateral rails;  modified step at bedside   Number of Stairs: 1     REVIEW OF SYSTEMS  Constitutional - No fever,  +fatigue  HEENT - No vertigo, No neck pain  Neurological - No headaches, No memory loss, +loss of strength, +numbness, No tremors  Skin - No rashes, No lesions   Musculoskeletal - No joint pain, No joint swelling, No muscle pain  Psychiatric - No depression, No anxiety    VITALS  T(C): 36.8 (19 @ 02:46), Max: 36.9 (19 @ 16:36)  HR: 80 (19 @ 16:36) (80 - 80)  BP: 96/74 (19 @ 16:36) (96/74 - 96/74)  RR: 18 (19 @ 16:36) (18 - 18)  SpO2: 98% (19 @ 16:36) (98% - 98%)  Wt(kg): --    MEDICATIONS   acetaminophen   Tablet .. 650 milliGRAM(s) every 6 hours PRN  acetaminophen  IVPB .. 1000 milliGRAM(s) once PRN  baclofen 10 milliGRAM(s) every 8 hours PRN  bisacodyl 5 milliGRAM(s) every 12 hours PRN  chlorhexidine 2% Cloths 1 Application(s) <User Schedule>  docusate sodium 100 milliGRAM(s) two times a day  DULoxetine 60 milliGRAM(s) daily  enoxaparin Injectable 40 milliGRAM(s) daily  hydroxychloroquine 200 milliGRAM(s) two times a day  ibuprofen  Tablet. 400 milliGRAM(s) every 8 hours PRN  lactulose Syrup 20 Gram(s) two times a day PRN  magnesium hydroxide Suspension 30 milliLiter(s) daily PRN  mineral oil enema 133 milliLiter(s) at bedtime PRN  multivitamin 1 Tablet(s) <User Schedule>  mycophenolate mofetil 1500 milliGRAM(s) two times a day  ondansetron Injectable 4 milliGRAM(s) every 6 hours PRN  pantoprazole    Tablet 40 milliGRAM(s) before breakfast  polyethylene glycol 3350 17 Gram(s) daily  predniSONE   Tablet 55 milliGRAM(s) daily  senna 1 Tablet(s) at bedtime  tamsulosin 0.8 milliGRAM(s) at bedtime      RECENT LABS/IMAGING          Urinalysis Basic - ( 2019 01:00 )    Color: Yellow / Appearance: Clear / S.025 / pH: x  Gluc: x / Ketone: Trace  / Bili: Negative / Urobili: 1 mg/dL   Blood: x / Protein: 30 mg/dL / Nitrite: Negative   Leuk Esterase: Negative / RBC: 0-2 /HPF / WBC 0-2   Sq Epi: x / Non Sq Epi: Occasional / Bacteria: x    -----------------------------------------------------------------------------------  PHYSICAL EXAM  Constitutional - NAD, Comfortable  Extremities - No C/C/E, No calf tenderness   Neurologic Exam -                    Motor -                      LEFT    UE - C5 5/5, C6 5/5, C7 5/5, C8 5/5, T1 5/5                    RIGHT UE - C5 5/5, C6 5/5, C7 5/5, C8 5/5, T1 5/5                    LEFT    LE - L2 2/5, L3 3/5, L4 4/5, L5 4/5, S1 5/5                    RIGHT LE - L2 3/5, L3 4/5, L4 4/5, L5 4/5, S1 5/5      Sensory - Impaired to LT and PP up to T8     Reflexes - Flaccid  Psychiatric - Mood stable, Affect WNL  ----------------------------------------------------------------------------------------  ASSESSMENT/PLAN  33yFemale with functional deficits related to nontraumatic incomplete SCI paraparesis  SLE TM - Plaquenil, Prednisone, Cellcept  ESBL UTI - s/p Invanz  Pain/Spasms - Tylenol, Ibuprofen, Cymbalta, Baclofen, Percocet  Neurogenic Bladder - Maintain low volumes < 350mL, IC Q4 hours, Flomax, Begin self-cath  Neurogenic Bowel - Senna, Lactulose, Fleet, Colace, Miralax, MOM  DVT PPX - SCDs, Lovenox  Rehab - Recommend ACUTE inpatient rehabilitation for the functional deficits consisting of 3 hours of therapy/day & 24 hour RN/daily PMR physician for comorbid medical management. Patient will be able to tolerate 3 hours a day.     Continue bedside therapy as well as OOB throughout the day with mobilization by staff to maintain cardiopulmonary function and prevention of secondary complications related to debility.

## 2019-02-06 NOTE — PROGRESS NOTE ADULT - REASON FOR ADMISSION
Generalized pain
Generalized pain, leg weakness

## 2019-02-06 NOTE — H&P ADULT - PSH
History of biopsy  Renal  History of esophagogastroduodenoscopy (EGD)    S/P correction of deviated nasal septum

## 2019-02-06 NOTE — DISCHARGE NOTE ADULT - SECONDARY DIAGNOSIS.
Multiple sclerosis Weakness of both lower extremities Hyponatremia Gastroesophageal reflux disease, esophagitis presence not specified Constipation, unspecified constipation type Current episode of major depressive disorder without prior episode, unspecified depression episode severity

## 2019-02-06 NOTE — H&P ADULT - HISTORY OF PRESENT ILLNESS
Patient is a 33y old  Female who presents with a chief complaint of transverse myelitis    HPI:  33 years old female with PMH of SLE, Lupus Nephritis and Fibromyalgia brought by her  yesterday evening with generalized weakness. As per patient, she is not feeling well for last few days. She is having low grade fevers, generalized body pain, throat irritation, cough, ear pain and vomiting. Her appetite is very poor. She went to her PMD few days ago who prescribed Sertraline for her depressive symptoms. As per her, symptoms started after taking that medication. Yesterday, she was feeling so weak that she could not walk so her  brought her to ER. She is also complaining of difficulty urinating. She went to Urologist 10 days ago and she was prescribed Flomax which she has been taking but is unable to void. She also has constipation.   She recently came back from [place] on 12/29/18 after staying there for 2.5 to 3 months. She had MRI there in December due to back pain and generalized weakness and it was normal.   She has headache, neck pain and lightheadedness. Denies photophobia. + sick contacts with common cold. (16 Jan 2019 11:58)  33 years old female with hx of SLE, lupus Nephritis and Fibromyalgia, recently returned from [place] on 12/29 who presented with generalized weakness found to have transverse myelitis. She got plamapheresis, rtx and steroids and improved. Course complicated with urinary retention and ESBL ECOLI (tx'd with ertapenem, last dose 2/4).     Pt medically optimized and cleared for transfer to [place] for acute rehab on 2/6/19.     REVIEW OF SYSTEMS  Constitutional - Denies fevers, chills  HEENT - Denies changes in vision or hearing  Respiratory - Denies cough, dyspnea  Cardiovascular - Denies chest pain, palpitations  Gastrointestinal - Denies n/v, constipation, bowel incontinence  Genitourinary - Denies dysuria, urinary incontinence  Neurological - Denies weakness, numbness, headaches  Skin - Denies rashes  Musculoskeletal - Denies arthralgia, myalgias, back pain  Psychiatric - Denies depressed mood, anxiety    PAST MEDICAL & SURGICAL HISTORY      ALLERGIES      MEDICATIONS       SOCIAL HISTORY  Smoking - Denied  EtOH - Denied   Drugs - Denied    FUNCTIONAL HISTORY  Lives with , 3 [place]  Prior Level of Function: Independent in ADLs and ambulation    CURRENT FUNCTIONAL STATUS  (as of 2/4)  Bed Mobility  Bed Mobility Training Supine-to-Sit: supervsion  Bed Mobility Training Limitations: decreased ability to use legs for bridging/pushing;  decreased strength    Sit-Stand Transfer Training  Transfer Training Sit-to-Stand Transfer: minimum assist (75% patient effort);  1 person assist;  full weight-bearing   rolling walker  Transfer Training Stand-to-Sit Transfer: minimum assist (75% patient effort);  1 person assist;  full weight-bearing   rolling walker  Sit-to-Stand Transfer Training Transfer Safety Analysis: decreased balance;  decreased strength;  impaired balance    Gait Training  Gait Training: minimum assist (75% patient effort);  1 person assist;  full weight-bearing   rolling walker;  40 feet   Gait Analysis: 2-point gait   decreased isis;  unsteadiness with turns;  decreased velocity of limb motion;  decreased step length;  decreased strength;  impaired balance;  decreased endurance  Type of Rest Type of Rest: sitting  Duration of Rest Duration of Rest: 1 min     Stair Training  Physical Assist/Nonphysical Assist: 1 person assist;  moderate assist  Weight-Bearing Restrictions: full weight-bearing  Assistive Device: bilateral rails;  modified step at bedside   Number of Stairs: 1       FAMILY HISTORY:  reviewed; non-contributory Patient is a 33y old  Female who presents with a chief complaint of transverse myelitis    HPI:  33 years old female with PMH of SLE, Lupus Nephritis and Fibromyalgia admitted to acute rehab after diagnosis of transverse myelitis.  She was initially brought to [facility] on 1/16 with generalized weakness, low grade fevers, generalized body pain, throat irritation, cough, ear pain, vomiting, and poor appetite. She went to her PMD few days prior who prescribed Sertraline for her depressive symptoms. As per her, symptoms started after taking that medication. At time of initial presentation, she was feeling so weak that she could not walk so her  brought her to ER. She was also complaining of difficulty urinating. She went to Urologist 10 days ago and she was prescribed Flomax which she has been taking but is unable to void. She was also noting constipation. Pt recently came back from [place] on 12/29/18 after staying there for 2.5 to 3 months. She had MRI there in December due to back pain and generalized weakness and it was normal. Workup at [facility] was notable for transverse myelitis.  She received plamapheresis, rtx and steroids and improved. Course complicated with urinary retention and ESBL ECOLI (tx'd with ertapenem, last dose 2/4).     Pt medically optimized and cleared for transfer to [place] for acute rehab on 2/6/19.     REVIEW OF SYSTEMS  Constitutional - Denies fevers, chills  HEENT - Denies changes in vision or hearing  Respiratory - Denies cough, dyspnea  Cardiovascular - Denies chest pain, palpitations  Gastrointestinal - Denies n/v, constipation, bowel incontinence  Genitourinary - Denies dysuria, urinary incontinence  Neurological - Denies weakness, numbness, headaches  Skin - Denies rashes  Musculoskeletal - Denies arthralgia, myalgias, back pain  Psychiatric - Denies depressed mood, anxiety    PAST MEDICAL & SURGICAL HISTORY      ALLERGIES      MEDICATIONS       SOCIAL HISTORY  Smoking - Denied  EtOH - Denied   Drugs - Denied    FUNCTIONAL HISTORY  Lives with , 3 [place]  Prior Level of Function: Independent in ADLs and ambulation    CURRENT FUNCTIONAL STATUS  (as of 2/4)  Bed Mobility  Bed Mobility Training Supine-to-Sit: supervsion  Bed Mobility Training Limitations: decreased ability to use legs for bridging/pushing;  decreased strength    Sit-Stand Transfer Training  Transfer Training Sit-to-Stand Transfer: minimum assist (75% patient effort);  1 person assist;  full weight-bearing   rolling walker  Transfer Training Stand-to-Sit Transfer: minimum assist (75% patient effort);  1 person assist;  full weight-bearing   rolling walker  Sit-to-Stand Transfer Training Transfer Safety Analysis: decreased balance;  decreased strength;  impaired balance    Gait Training  Gait Training: minimum assist (75% patient effort);  1 person assist;  full weight-bearing   rolling walker;  40 feet   Gait Analysis: 2-point gait   decreased isis;  unsteadiness with turns;  decreased velocity of limb motion;  decreased step length;  decreased strength;  impaired balance;  decreased endurance  Type of Rest Type of Rest: sitting  Duration of Rest Duration of Rest: 1 min     Stair Training  Physical Assist/Nonphysical Assist: 1 person assist;  moderate assist  Weight-Bearing Restrictions: full weight-bearing  Assistive Device: bilateral rails;  modified step at bedside   Number of Stairs: 1       FAMILY HISTORY:  reviewed; non-contributory Patient is a 33y old  Female who presents with a chief complaint of transverse myelitis    HPI:  33 years old female with PMH of SLE, Lupus Nephritis and Fibromyalgia admitted to acute rehab after diagnosis of transverse myelitis.  She was initially brought to [facility] on 1/16 with generalized weakness, low grade fevers, generalized body pain, throat irritation, cough, ear pain, vomiting, and poor appetite. She went to her PMD few days prior who prescribed Sertraline for her depressive symptoms. As per her, symptoms started after taking that medication. At time of initial presentation, she was feeling so weak that she could not walk so her  brought her to ER. She was also complaining of difficulty urinating. She went to Urologist 10 days ago and she was prescribed Flomax which she has been taking but is unable to void. She was also noting constipation. Pt recently came back from [place] on 12/29/18 after staying there for 2.5 to 3 months. She had MRI there in December due to back pain and generalized weakness and it was normal. Workup at [facility] was notable for transverse myelitis.  She received plamapheresis, rtx and steroids and improved. Course complicated with urinary retention and ESBL ECOLI (tx'd with ertapenem, last dose 2/4).     Pt medically optimized and cleared for transfer to [place] for acute rehab on 2/6/19.     REVIEW OF SYSTEMS  Constitutional - Denies fevers, chills  HEENT - Denies changes in vision or hearing, dry mouth   Respiratory - Denies cough, SOB  Cardiovascular - Denies chest pain, palpitations  Gastrointestinal - Denies n/v, constipation, has bowel control  Genitourinary - Denies dysuria, urinary retention, voided spontaneously today   Neurological - +weakness and +numbness bilateral LE   Skin - Denies rashes  Musculoskeletal - Denies arthralgia, myalgias, back pain  Psychiatric - Denies depressed mood, +anxiety    PAST MEDICAL & SURGICAL HISTORY  SLE, Fibromyalgia, Lupus Nephritis.     ALLERGIES  None    MEDICATIONS   Plaquenil   Cellcept  cymbalta    SOCIAL HISTORY  Smoking - Denied  EtOH - Denied   Drugs - Denied    FUNCTIONAL HISTORY  Lives with , 3 [address]  Prior Level of Function: Independent in ADLs and ambulation    CURRENT FUNCTIONAL STATUS  (as of 2/4)  Bed Mobility  Bed Mobility Training Supine-to-Sit: supervsion  Bed Mobility Training Limitations: decreased ability to use legs for bridging/pushing;  decreased strength    Sit-Stand Transfer Training  Transfer Training Sit-to-Stand Transfer: minimum assist (75% patient effort);  1 person assist;  full weight-bearing   rolling walker  Transfer Training Stand-to-Sit Transfer: minimum assist (75% patient effort);  1 person assist;  full weight-bearing   rolling walker  Sit-to-Stand Transfer Training Transfer Safety Analysis: decreased balance;  decreased strength;  impaired balance    Gait Training  Gait Training: minimum assist (75% patient effort);  1 person assist;  full weight-bearing   rolling walker;  40 feet   Gait Analysis: 2-point gait   decreased isis;  unsteadiness with turns;  decreased velocity of limb motion;  decreased step length;  decreased strength;  impaired balance;  decreased endurance  Type of Rest Type of Rest: sitting  Duration of Rest Duration of Rest: 1 min     Stair Training  Physical Assist/Nonphysical Assist: 1 person assist;  moderate assist  Weight-Bearing Restrictions: full weight-bearing  Assistive Device: bilateral rails;  modified step at bedside   Number of Stairs: 1       FAMILY HISTORY:  reviewed; non-contributory

## 2019-02-06 NOTE — DISCHARGE NOTE ADULT - PATIENT PORTAL LINK FT
You can access the [facility] Patient Portal, offered by [facility], by registering with the following website: [email or web address]

## 2019-02-06 NOTE — H&P ADULT - ASSESSMENT
ASSESSMENT/PLAN  KANE LARA is a 32yo Female with PMH of SLE, lupus nephritis, and fibromyalgia admitted to rehab with transverse myelitis resulting in decreased functional mobility, gait instability and ADL impairments.      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 Premedication with tylenol 650 mg, bendaryl 20 mg IVPB, and solumedrol 100 mg ivpb.  f/u with rheumatology as an outpatient after that. To be repeated Rituximab in 6 months after that.    Acute urinary retention- patient ad  request repeat UA   - c/w bladder scans q8hrs  - c/w straight cath as needed  - c/w flomax 0.8mg po qhs   - likely combo of neurogenic bladder + UTI, uc confirming esbl ecoli uti  - ertapenem started 1/30, last day 2/4 per ID, today, Renal us showed no evidence of  hydronephrosis, Urology note appreciated.    SLE with nephritis -   - stable renal disease, elevated dsDNA, low complements  - rheum f/u      Fibromyalgia   - c/w Tylenol for pain prn     Depression  - c/w Cymbalta 60mg po qd    Constipation  - Miralax / senna     Pain Mgmt - Tylenol PRN  GI/Bowel Mgmt - Colace, Senna,  Miralax PRN  /Bladder Mgmt - Voiding independently, PVR      Precautions / PROPHYLAXIS:   - Falls, Cardiac, Sternal, Spinal, Seizure   - Ortho: Weight bearing status: WBAT   - Lungs: Aspiration, Incentive Spirometer   - Pressure injury/Skin: Turn Q2hrs while in bed, OOB to Chair, PT/OT    - DVT: Lovenox, SCDs, TEDs     MEDICAL PROGNOSIS: GOOD            REHAB POTENTIAL: GOOD             ESTIMATED DISPOSITION: HOME WITH HOME CARE            ELOS: 10-14 Days   EXPECTED THERAPY:     P.T. 1hr/day       O.T. 1hr/day      S.L.P. 1hr/day     P&O Unnecessary     EXP FREQUENCY: 5 days per 7 day period     PRESCREEN COMPARISION:   I have reviewed the prescreen information and I have found no relevant changes between the preadmission screening and my post admission evaluation     RATIONALE FOR INPATIENT ADMISSION - Patient demonstrates the following: (check all that apply)  [X] Medically appropriate for rehabilitation admission  [X] Has attainable rehab goals with an appropriate initial discharge plan  [X] Has rehabilitation potential (expected to make a significant improvement within a reasonable period of time)   [X] Requires close medical management by a rehab physician, rehab nursing care, Hospitalist and comprehensive interdisciplinary team (including PT, OT, & or SLP, Prosthetics and Orthotics) ASSESSMENT/PLAN  KANE LARA is a 32yo Female with PMH of SLE, lupus nephritis, and fibromyalgia admitted to rehab with transverse myelitis resulting in decreased functional mobility, gait instability and ADL impairments.      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 Premedication with tylenol 650 mg, bendaryl 20 mg IVPB, and solumedrol 100 mg ivpb.  f/u with rheumatology as an outpatient after that. To be repeated Rituximab in 6 months after that.    Acute urinary retention- patient ad  request repeat UA   - c/w bladder scans q8hrs  - c/w straight cath as needed  - c/w flomax 0.8mg po qhs   - likely combo of neurogenic bladder + UTI, uc confirming esbl ecoli uti  - ertapenem started 1/30, last day 2/4 per ID, today, Renal us showed no evidence of  hydronephrosis, Urology note appreciated.    SLE with nephritis -   - stable renal disease, elevated dsDNA, low complements  - rheum f/u      Fibromyalgia   - c/w Tylenol for pain prn     Depression  - c/w Cymbalta 60mg po qd    Constipation  - Miralax / senna     Pain Mgmt - Tylenol PRN  GI/Bowel Mgmt - Colace, Senna,  Miralax PRN  /Bladder Mgmt - Voiding independently, PVR      Precautions / PROPHYLAXIS:   - Falls, Cardiac, Sternal, Spinal, Seizure   - Ortho: Weight bearing status: WBAT   - Lungs: Aspiration, Incentive Spirometer   - Pressure injury/Skin: Turn Q2hrs while in bed, OOB to Chair, PT/OT    - DVT: Lovenox, SCDs, TEDs     MEDICAL PROGNOSIS: GOOD            REHAB POTENTIAL: GOOD             ESTIMATED DISPOSITION: HOME WITH HOME CARE            ELOS: 10-14 Days   EXPECTED THERAPY:     P.T. 1.5hr/day       O.T. 1.5 hr/day     EXP FREQUENCY: 5 days per 7 day period     PRESCREEN COMPARISION:   I have reviewed the prescreen information and I have found no relevant changes between the preadmission screening and my post admission evaluation     RATIONALE FOR INPATIENT ADMISSION - Patient demonstrates the following: (check all that apply)  [X] Medically appropriate for rehabilitation admission  [X] Has attainable rehab goals with an appropriate initial discharge plan  [X] Has rehabilitation potential (expected to make a significant improvement within a reasonable period of time)   [X] Requires close medical management by a rehab physician, rehab nursing care, Hospitalist and comprehensive interdisciplinary team (including PT, OT, & or SLP, Prosthetics and Orthotics) ASSESSMENT/PLAN  KANE LARA is a 32yo Female with PMH of SLE, lupus nephritis, and fibromyalgia admitted to rehab with transverse myelitis resulting in decreased functional mobility, gait instability and ADL impairments.      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 Premedication with tylenol 650 mg, bendaryl 20 mg IVPB, and solumedrol 100 mg ivpb.  f/u with rheumatology as an outpatient after that. To be repeated Rituximab in 6 months after that.    Acute urinary retention- patient ad  request repeat UA   - c/w bladder scans q8hrs  - c/w straight cath as needed  - c/w flomax 0.8mg po qhs   - likely combo of neurogenic bladder + UTI, uc confirming esbl ecoli uti  - ertapenem started 1/30, last day 2/4 per ID, today, Renal us showed no evidence of  hydronephrosis, Urology note appreciated.    SLE with nephritis -   - stable renal disease, elevated dsDNA, low complements  - rheum f/u      Fibromyalgia   - c/w Tylenol for pain prn     Depression  - c/w Cymbalta 60mg po qd    Constipation  - Miralax / senna     Pain Mgmt - Tylenol PRN  GI/Bowel Mgmt - Colace, Senna,  Miralax PRN  /Bladder Mgmt - Voiding independently, PVR      Precautions / PROPHYLAXIS:   - Falls, spinal   - Ortho: Weight bearing status: WBAT   - Lungs: Aspiration, Incentive Spirometer   - Pressure injury/Skin: Turn Q2hrs while in bed, OOB to Chair, PT/OT    - DVT: Lovenox, SCDs, TEDs     MEDICAL PROGNOSIS: GOOD            REHAB POTENTIAL: GOOD             ESTIMATED DISPOSITION: HOME WITH HOME CARE            ELOS: 10-14 Days   EXPECTED THERAPY:     P.T. 1.5hr/day       O.T. 1.5 hr/day     EXP FREQUENCY: 5 days per 7 day period     PRESCREEN COMPARISION:   I have reviewed the prescreen information and I have found no relevant changes between the preadmission screening and my post admission evaluation     RATIONALE FOR INPATIENT ADMISSION - Patient demonstrates the following: (check all that apply)  [X] Medically appropriate for rehabilitation admission  [X] Has attainable rehab goals with an appropriate initial discharge plan  [X] Has rehabilitation potential (expected to make a significant improvement within a reasonable period of time)   [X] Requires close medical management by a rehab physician, rehab nursing care, Hospitalist and comprehensive interdisciplinary team (including PT, OT, & or SLP, Prosthetics and Orthotics) ASSESSMENT/PLAN  KANE LARA is a 32yo Female with PMH of SLE, lupus nephritis, and fibromyalgia admitted to rehab with transverse myelitis resulting in decreased functional mobility, gait instability and ADL impairments.      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 Premedication with tylenol 650 mg, bendaryl 20 mg IVPB, and solumedrol 100 mg ivpb.  f/u with rheumatology as an outpatient after that. To be repeated Rituximab in 6 months after that.    Acute urinary retention- patient ad  request repeat UA   - c/w bladder scans q8hrs  - c/w straight cath as needed  - c/w flomax 0.8mg po qhs   - likely combo of neurogenic bladder + UTI, uc confirming esbl ecoli uti  - ertapenem started 1/30, last day 2/4 per ID, today, Renal us showed no evidence of  hydronephrosis, Urology note appreciated.    SLE with nephritis -   - stable renal disease, elevated dsDNA, low complements  - rheum f/u      Fibromyalgia   - c/w Tylenol for pain prn     Depression  - c/w Cymbalta 60mg po qd    Constipation  - Miralax / senna     Pain Mgmt - Tylenol PRN  GI/Bowel Mgmt - Colace, Senna,  Miralax PRN  /Bladder Mgmt - Voiding independently, PVR      Precautions / PROPHYLAXIS:   - Falls, spinal   - Ortho: Weight bearing status: WBAT   - Lungs: Aspiration, Incentive Spirometer   - Pressure injury/Skin: Turn Q2hrs while in bed, OOB to Chair, PT/OT    - DVT: Lovenox, SCDs, TEDs     MEDICAL PROGNOSIS: GOOD            REHAB POTENTIAL: GOOD             ESTIMATED DISPOSITION: HOME WITH HOME CARE            ELOS: 14-17 Days   EXPECTED THERAPY:     P.T. 1.5hr/day       O.T. 1.5 hr/day     EXP FREQUENCY: 5 days per 7 day period     PRESCREEN COMPARISION:   I have reviewed the prescreen information and I have found no relevant changes between the preadmission screening and my post admission evaluation     RATIONALE FOR INPATIENT ADMISSION - Patient demonstrates the following: (check all that apply)  [X] Medically appropriate for rehabilitation admission  [X] Has attainable rehab goals with an appropriate initial discharge plan  [X] Has rehabilitation potential (expected to make a significant improvement within a reasonable period of time)   [X] Requires close medical management by a rehab physician, rehab nursing care, Hospitalist and comprehensive interdisciplinary team (including PT, OT, & or SLP, Prosthetics and Orthotics)

## 2019-02-06 NOTE — DISCHARGE NOTE ADULT - CARE PLAN
Principal Discharge DX:	Transverse myelitis  Goal:	improve wekness  Assessment and plan of treatment:	follow with neurology  Secondary Diagnosis:	Multiple sclerosis  Secondary Diagnosis:	Weakness of both lower extremities  Secondary Diagnosis:	Hyponatremia  Secondary Diagnosis:	Gastroesophageal reflux disease, esophagitis presence not specified  Secondary Diagnosis:	Constipation, unspecified constipation type  Secondary Diagnosis:	Current episode of major depressive disorder without prior episode, unspecified depression episode severity

## 2019-02-06 NOTE — H&P ADULT - REASON FOR ADMISSION
Transverse myelitis Weakness, bladder dysfunction, gait instability, ADL impairments 2/2 Transverse myelitis

## 2019-02-06 NOTE — H&P ADULT - NSHPPHYSICALEXAM_GEN_ALL_CORE
PHYSICAL EXAM  VITALS  T(C): --  HR: --  BP: --  RR: --  SpO2: --    Gen - NAD, Comfortable  HEENT - NCAT, EOMI, MMM  Neck - Supple, No limited ROM  Pulm - CTAB, No wheeze, No rhonchi, No crackles  Cardiovascular - RRR, S1S2, No murmurs  Abdomen - Soft, NT/ND, +BS  Extremities - No C/C/E, No calf tenderness  Neuro-     Cognitive - AAOx3     Communication - Fluent, No dysarthria     Attention: Intact      Judgement: Good evidence of judgement     Memory: Recall 3 objects immediate and 3 min later         Cranial Nerves - CN 2-12 intact     Motor - No focal deficits                    LEFT    UE - ShAB 5/5, EF 5/5, EE 5/5, WE 5/5,  5/5                    RIGHT UE - ShAB 5/5, EF 5/5, EE 5/5, WE 5/5,  5/5                    LEFT    LE - HF 5/5, KE 5/5, DF 5/5, PF 5/5                    RIGHT LE - HF 5/5, KE 5/5, DF 5/5, PF 5/5        Sensory - Intact to LT     Reflexes - DTR Intact, No primitive reflexive     Coordination - FTN intact     Tone - normal  Psychiatric - Mood stable, Affect WNL  Skin:  all skin intact    Wounds: None Present Vital Signs Last 24 Hrs  T(C): 36.7 (06 Feb 2019 16:20), Max: 36.7 (06 Feb 2019 16:20)  T(F): 98 (06 Feb 2019 16:20), Max: 98 (06 Feb 2019 16:20)  HR: 91 (06 Feb 2019 16:20) (91 - 91)  BP: 108/74 (06 Feb 2019 16:20) (108/74 - 108/74)  BP(mean): --  RR: 14 (06 Feb 2019 16:20) (14 - 14)  SpO2: 100% (06 Feb 2019 16:20) (100% - 100%)    Gen - NAD, Comfortable  HEENT - NCAT, EOMI, MMM  Neck - Supple, No limited ROM  Pulm - CTAB, No wheeze, No rhonchi, No crackles  Cardiovascular - RRR, S1S2, No murmurs  Abdomen - Soft, NT/ND, +BS  Extremities - No C/C/E, No calf tenderness  Neuro-     Cognitive - AAOx3     Communication - Fluent, No dysarthria     Attention: Intact      Judgement: Good evidence of judgement     Memory: Recall 3 objects immediate and 3 min later         Cranial Nerves - CN 2-12 intact     Motor - No focal deficits                    LEFT    UE - ShAB 5/5, EF 5/5, EE 5/5, WE 5/5,  5/5                    RIGHT UE - ShAB 5/5, EF 5/5, EE 5/5, WE 5/5,  5/5                    LEFT    LE - HF 1/5, KE 5/5, DF 4/5, EHL 4/4, PF 5/5                    RIGHT LE - HF 3+/5, KE 5/5, DF 4/5, EHL 4/4, PF 5/5        Sensory - diminished to LT bilateral LE      Coordination - FTN intact     Tone - normal  Psychiatric - Mood stable, Affect WNL  Skin:  all skin intact    Wounds: None Present Vital Signs Last 24 Hrs  T(C): 36.7 (06 Feb 2019 16:20), Max: 36.7 (06 Feb 2019 16:20)  T(F): 98 (06 Feb 2019 16:20), Max: 98 (06 Feb 2019 16:20)  HR: 91 (06 Feb 2019 16:20) (91 - 91)  BP: 108/74 (06 Feb 2019 16:20) (108/74 - 108/74)  BP(mean): --  RR: 14 (06 Feb 2019 16:20) (14 - 14)  SpO2: 100% (06 Feb 2019 16:20) (100% - 100%)    Gen - NAD, Comfortable  HEENT - NCAT, EOMI, MMM  Neck - Supple, No limited ROM  Pulm - CTAB, No wheeze, No rhonchi, No crackles  Cardiovascular - RRR, S1S2, No murmurs  Abdomen - Soft, NT/ND, +BS  Extremities - No C/C/E, No calf tenderness  Neuro-     Cognitive - AAOx3     Communication - Fluent, No dysarthria     Attention: Intact      Judgement: Good evidence of judgement     Memory: Recall 3 objects immediate and 3 min later         Cranial Nerves - CN 2-12 intact     Motor - No focal deficits                    LEFT    UE - ShAB 5/5, EF 5/5, EE 5/5, WE 5/5,  5/5                    RIGHT UE - ShAB 5/5, EF 5/5, EE 5/5, WE 5/5,  5/5                    LEFT    LE - HF 1/5, KE 4/5, DF 4/5, EHL 4/4, PF 5/5                    RIGHT LE - HF 3+/5, KE 5/5, DF 4/5, EHL 4/4, PF 5/5        Sensory - diminished to LT bilateral LE      Coordination - FTN intact     Tone - normal  Psychiatric - Mood stable, Affect WNL  Skin:  all skin intact    Wounds: None Present

## 2019-02-06 NOTE — DISCHARGE NOTE ADULT - MEDICATION SUMMARY - MEDICATIONS TO STOP TAKING
I will STOP taking the medications listed below when I get home from the hospital:    Lyrica 75 mg oral capsule  -- 1 cap(s) by mouth once a day, As Needed    sertraline 50 mg oral tablet  -- 1 tab(s) by mouth once a day

## 2019-02-06 NOTE — DISCHARGE NOTE ADULT - CARE PROVIDER_API CALL
Humberto Dawkins)  Urology  [address], [address]  [place] [address]  Phone: [phone]  Fax: [phone]  Follow Up Time:     Scotty Schneider)  Neurology  [address] [place] [address]  Phone: [phone]  Fax: [phone]  Follow Up Time:

## 2019-02-06 NOTE — PROGRESS NOTE ADULT - PROVIDER SPECIALTY LIST ADULT
Hospitalist
Infectious Disease
Internal Medicine
Neurology
Rehab Medicine
Infectious Disease
Urology
Hospitalist
Rehab Medicine
Rehab Medicine

## 2019-02-06 NOTE — H&P ADULT - FAMILY HISTORY
Father  Still living? Unknown  Family history of liver disease, Age at diagnosis: Age Unknown     Mother  Still living? Unknown  Family history of osteoarthritis, Age at diagnosis: Age Unknown  Family history of melanoma, Age at diagnosis: Age Unknown

## 2019-02-07 ENCOUNTER — CHART COPY (OUTPATIENT)
Age: 34
End: 2019-02-07

## 2019-02-07 PROBLEM — M79.7 FIBROMYALGIA: Chronic | Status: ACTIVE | Noted: 2019-01-20

## 2019-02-07 PROBLEM — K21.9 GASTRO-ESOPHAGEAL REFLUX DISEASE WITHOUT ESOPHAGITIS: Chronic | Status: ACTIVE | Noted: 2019-01-16

## 2019-02-07 PROBLEM — M32.14 GLOMERULAR DISEASE IN SYSTEMIC LUPUS ERYTHEMATOSUS: Chronic | Status: ACTIVE | Noted: 2019-01-16

## 2019-02-07 PROBLEM — L93.0 DISCOID LUPUS ERYTHEMATOSUS: Chronic | Status: ACTIVE | Noted: 2019-01-20

## 2019-02-07 LAB
CULTURE RESULTS: NO GROWTH — SIGNIFICANT CHANGE UP
SPECIMEN SOURCE: SIGNIFICANT CHANGE UP

## 2019-02-07 PROCEDURE — 99222 1ST HOSP IP/OBS MODERATE 55: CPT | Mod: AI

## 2019-02-07 PROCEDURE — 99255 IP/OBS CONSLTJ NEW/EST HI 80: CPT

## 2019-02-07 RX ADMIN — Medication 100 MILLIGRAM(S): at 12:18

## 2019-02-07 RX ADMIN — Medication 200 MILLIGRAM(S): at 06:10

## 2019-02-07 RX ADMIN — PANTOPRAZOLE SODIUM 40 MILLIGRAM(S): 20 TABLET, DELAYED RELEASE ORAL at 06:12

## 2019-02-07 RX ADMIN — DULOXETINE HYDROCHLORIDE 60 MILLIGRAM(S): 30 CAPSULE, DELAYED RELEASE ORAL at 12:18

## 2019-02-07 RX ADMIN — TAMSULOSIN HYDROCHLORIDE 0.8 MILLIGRAM(S): 0.4 CAPSULE ORAL at 20:49

## 2019-02-07 RX ADMIN — Medication 1 TABLET(S): at 12:18

## 2019-02-07 RX ADMIN — MYCOPHENOLATE MOFETIL 1500 MILLIGRAM(S): 250 CAPSULE ORAL at 06:10

## 2019-02-07 RX ADMIN — Medication 100 MILLIGRAM(S): at 06:10

## 2019-02-07 RX ADMIN — MYCOPHENOLATE MOFETIL 1500 MILLIGRAM(S): 250 CAPSULE ORAL at 18:20

## 2019-02-07 RX ADMIN — ENOXAPARIN SODIUM 40 MILLIGRAM(S): 100 INJECTION SUBCUTANEOUS at 12:19

## 2019-02-07 RX ADMIN — Medication 55 MILLIGRAM(S): at 06:14

## 2019-02-07 RX ADMIN — Medication 100 MILLIGRAM(S): at 20:49

## 2019-02-07 RX ADMIN — Medication 200 MILLIGRAM(S): at 18:20

## 2019-02-07 RX ADMIN — SENNA PLUS 2 TABLET(S): 8.6 TABLET ORAL at 20:49

## 2019-02-07 NOTE — CONSULT NOTE ADULT - SUBJECTIVE AND OBJECTIVE BOX
Patient is a 33y old  Female who presents with a chief complaint of Weakness, bladder dysfunction, gait instability, ADL impairments 2/2 Transverse myelitis (2019 11:06)    HPI:  Patient is a 33y old  Female who presents with a chief complaint of transverse myelitis    HPI:  33 years old female with PMH of SLE, Lupus Nephritis and Fibromyalgia admitted to acute rehab after diagnosis of transverse myelitis.  She was initially brought to [facility] on  with generalized weakness, low grade fevers, generalized body pain, throat irritation, cough, ear pain, vomiting, and poor appetite. She went to her PMD few days prior who prescribed Sertraline for her depressive symptoms. As per her, symptoms started after taking that medication. At time of initial presentation, she was feeling so weak that she could not walk so her  brought her to ER. She was also complaining of difficulty urinating. She went to Urologist 10 days ago and she was prescribed Flomax which she has been taking but is unable to void. She was also noting constipation. Pt recently came back from [place] on 18 after staying there for 2.5 to 3 months. She had MRI there in December due to back pain and generalized weakness and it was normal. Workup at [facility] was notable for transverse myelitis.  She received plamapheresis, rtx and steroids and improved. Course complicated with urinary retention and ESBL ECOLI (tx'd with ertapenem, last dose ).     Pt medically optimized and cleared for transfer to [facility] for acute rehab on 19.     patient seen and examined at bedside. no complaints. feels well.     PAST MEDICAL & SURGICAL HISTORY:  Fibromyalgia  Lupus  GERD (gastroesophageal reflux disease)  Lupus nephritis  S/P correction of deviated nasal septum  History of esophagogastroduodenoscopy (EGD)  History of biopsy: Renal    SOCIAL HISTORY:  Tobacco Usage:  ( x  ) never smoked   (   ) former smoker   (   ) current smoker  (     ) pack years    Tobacco Quit Date:  Substance Use (Street drugs): ( x ) never used  (  ) other:  Alcohol Usage:    Family history reviewed and otherwise non-contributory    ALLERGIES:  No Known Allergies    MEDICATIONS:  acetaminophen Tablet .. 650 milliGRAM(s) Oral every 6 hours PRN    REVIEW OF SYSTEMS:  CONSTITUTIONAL: No fever, weight loss, or fatigue  EYES: No eye pain, visual disturbances, or discharge  ENMT:  No difficulty hearing, tinnitus, vertigo; No sinus or throat pain  NECK: No neck pain or neck stiffness  RESPIRATORY: No cough, wheezing, chills or hemoptysis; No shortness of breath  CARDIOVASCULAR: No chest pain, palpitations, dizziness, or leg swelling  GASTROINTESTINAL: No abdominal pain, No nausea, vomiting, or hematemesis; No diarrhea or constipation  GENITOURINARY: No dysuria, frequency, hematuria, or incontinence  NEUROLOGICAL: No headaches, memory loss, loss of strength, numbness, or tremors  SKIN: No itching, burning, rashes, or lesions   ENDOCRINE: No heat or cold intolerance; No hair loss  MUSCULOSKELETAL: No joint pain or swelling; No muscle, back, or extremity pain  PSYCHIATRIC: No depression, anxiety, mood swings, or difficulty sleeping  HEME/LYMPH: No easy bruising or bleeding  ALLERY AND IMMUNOLOGIC: No hives or eczema    All other ROS reviewed and negative except as otherwise stated    Vital Signs Last 24 Hrs  T(F): 97.8 (2019 10:35), Max: 98.2 (2019 14:27)  HR: 95 (2019 10:35) (76 - 95)  BP: 97/65 (2019 10:35) (94/63 - 108/74)  RR: 14 (2019 10:35) (14 - 18)  SpO2: 100% (2019 10:35) (97% - 100%)  I&O's Summary    2019 07:01  -  2019 14:05  --------------------------------------------------------  IN: 0 mL / OUT: 450 mL / NET: -450 mL      PHYSICAL EXAM:  GENERAL: NAD, well-groomed, well-developed  HEAD:  Atraumatic, Normocephalic  EYES: EOMI, PERRLA, conjunctiva and sclera clear  ENMT: No tonsillar erythema, exudates, or enlargement; Moist mucous membranes, Good dentition  NECK: Supple, No JVD  CHEST/LUNG: Clear to auscultation bilaterally; No rales, rhonchi, wheezing, or rubs  HEART: Regular rate and rhythm; S1/S2, No murmurs, rubs, or gallops  ABDOMEN: Soft, Nontender, Nondistended; Bowel sounds present  VASCULAR: Normal pulses, Normal capillary refill  EXTREMITIES:  2+ Peripheral Pulses, No cyanosis, No edema  SKIN: Warm, Intact  PSYCH: Normal mood and affect  NERVOUS SYSTEM:  A/O x3, Good concentration; CN 2-12 intact.     LABS:    CAPILLARY BLOOD GLUCOSE    Urinalysis Basic - ( 2019 01:00 )    Color: Yellow / Appearance: Clear / S.025 / pH: x  Gluc: x / Ketone: Trace  / Bili: Negative / Urobili: 1 mg/dL   Blood: x / Protein: 30 mg/dL / Nitrite: Negative   Leuk Esterase: Negative / RBC: 0-2 /HPF / WBC 0-2   Sq Epi: x / Non Sq Epi: Occasional / Bacteria: x      Culture - Urine (collected 2019 01:01)  Source: .Urine  Final Report (2019 09:26):    No growth    RADIOLOGY & ADDITIONAL TESTS:    Care Discussed with Consultants/Other Providers:

## 2019-02-07 NOTE — PROGRESS NOTE ADULT - ASSESSMENT
Assessment and Plan     33 year-old Woman with PMH of SLE, lupus nephritis, and fibromyalgia admitted to rehab with transverse myelitis resulting in decreased functional mobility, gait instability and ADL impairments.      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 Premedication with tylenol 650 mg, bendaryl 20 mg IVPB, and solumedrol 100 mg ivpb. - will get either Neurology or Hem/onc to consult for administration   - f/u with rheumatology as an outpatient after that. To be repeated Rituximab in 6 months after that.    Neurogenic Bladder / Acute urinary retention  - ertapenem started 1/30, last day 2/4 per ID, today, Renal us showed no evidence of  hydronephrosis, Urology note appreciated.  - likely combo of neurogenic bladder + UTI, uc confirming esbl ecoli uti  Plan   - c/w bladder scans   - c/w straight cath as needed  - c/w flomax 0.8mg po qhs     SLE with nephritis    - stable renal disease, elevated dsDNA, low complements  - rheum f/u      Fibromyalgia   - c/w Tylenol for pain prn     Depression  - c/w Cymbalta 60mg po qd    Constipation  - Miralax / senna     Pain Mgmt - Tylenol PRN  GI/Bowel Mgmt - Colace, Senna,  Miralax PRN  /Bladder Mgmt - Voiding independently, PVR      Precautions / PROPHYLAXIS:   - Falls, Spinal  - Ortho: Weight bearing status: WBAT   - Lungs: Incentive Spirometer   - Pressure injury/Skin: Turn Q2hrs while in bed, OOB to Chair, PT/OT    - DVT: Lovenox, SCDs, TEDs

## 2019-02-07 NOTE — PROGRESS NOTE ADULT - SUBJECTIVE AND OBJECTIVE BOX
HISTORY OF PRESENT ILLNESS    PMHx - 33 years old female with PMH of SLE, Lupus Nephritis and Fibromyalgia admitted to acute rehab after diagnosis of transverse myelitis.  She was initially brought to [facility] on  with generalized weakness, low grade fevers, generalized body pain, throat irritation, cough, ear pain, vomiting, and poor appetite. She went to her PMD few days prior who prescribed Sertraline for her depressive symptoms. As per her, symptoms started after taking that medication. At time of initial presentation, she was feeling so weak that she could not walk so her  brought her to ER. She was also complaining of difficulty urinating. She went to Urologist 10 days ago and she was prescribed Flomax which she has been taking but is unable to void. She was also noting constipation. Pt recently came back from [place] on 18 after staying there for 2.5 to 3 months. She had MRI there in December due to back pain and generalized weakness and it was normal. Workup at [facility] was notable for transverse myelitis.  She received plamapheresis, rtx and steroids and improved. Course complicated with urinary retention and ESBL ECOLI (tx'd with ertapenem, last dose ).     Pt medically optimized and cleared for transfer to [facility] for acute rehab on 19.       TODAY'S SUBJECTIVE &  REVIEW OF SYMPTOMS  No acute events overnight reported by the night team resident or nursing. Patient seen and evaluated at the bedside.  present. Patient had no issues overnight, slept well. Denies pain. She does complain of band like tightness in the abdomen and tingling in the Lower extremity. She has Lower extremity weakness but is stable. Has urinary retention and using SC. Has bowel sensations and good control.     [X] Constitutional WNL       [X] HEENT   [X] Cardio WNL              [X] Resp WNL            [X] GI WNL                            [X]  - retention   [X] MSK - thoracic bandlike pain.   [X] Neuro weakness, numbness   [X] Cognitive WNL   [X] Psych WNL  [X] Skin WNL        VITALS  Vital Signs Last 24 Hrs  T(C): 36.6 (2019 10:35), Max: 36.8 (2019 14:27)  T(F): 97.8 (2019 10:35), Max: 98.2 (2019 14:27)  HR: 95 (2019 10:35) (76 - 95)  BP: 97/65 (2019 10:35) (94/63 - 108/74)  BP(mean): --  RR: 14 (2019 10:35) (14 - 18)  SpO2: 100% (2019 10:35) (97% - 100%)      PHYSICAL EXAM  General- NAD, Comfortable                                                                                    HEENT - NCAT, EOMI  Cardio- RRR, S1S2, No murmurs                                                                           Resp- CTA bilaterally, No wheeze / rhonchi / Rales  Abdomen - BS+, Soft, NTND                                                                                    Extremities - No C/C/E, No calf tenderness   Skin: Intact                                                                                                                 Wounds: none     Neuro                   Cognitive - Awake, Alert, AAO x 3                                                                          Communication - Fluent, No dysarthria  Cranial Nerves - CN II-XII intact                                                                              Sensory - Intact to LT       MSK - wnl   Motor   LEFT    UE: SF [5/5], EF [5/5], EE [5/5], WE [5/5],  [wnl]  RIGHT UE: SF [5/5], EF [5/5], EE [5/5], WE [5/5],  [wnl]  LEFT    LE:  HF [1/5], KE [4/5], DF [4/5], EHL [4/5],  PF [5/5]  RIGHT LE:  HF [5/5], KE [4/5], DF [4/5], EHL [4/5],  PF [5/5]                                                                                      Psychiatric - Mood stable, Affect WNL      FUNCTIONAL PROGRESS  Gait - tbd  ADLs - tbd  Transfers - tbd	  Functional transfer - tbd    RECENT LABS              Urinalysis Basic - ( 2019 01:00 )    Color: Yellow / Appearance: Clear / S.025 / pH: x  Gluc: x / Ketone: Trace  / Bili: Negative / Urobili: 1 mg/dL   Blood: x / Protein: 30 mg/dL / Nitrite: Negative   Leuk Esterase: Negative / RBC: 0-2 /HPF / WBC 0-2   Sq Epi: x / Non Sq Epi: Occasional / Bacteria: x          RADIOLOGY/OTHER RESULTS      CURRENT MEDICATIONS  MEDICATIONS  (STANDING):  docusate sodium 100 milliGRAM(s) Oral three times a day  DULoxetine 60 milliGRAM(s) Oral daily  enoxaparin Injectable 40 milliGRAM(s) SubCutaneous daily  hydroxychloroquine 200 milliGRAM(s) Oral two times a day  multivitamin 1 Tablet(s) Oral daily  mycophenolate mofetil 1500 milliGRAM(s) Oral two times a day  pantoprazole    Tablet 40 milliGRAM(s) Oral before breakfast  predniSONE   Tablet 55 milliGRAM(s) Oral daily  senna 2 Tablet(s) Oral at bedtime  tamsulosin 0.8 milliGRAM(s) Oral at bedtime    MEDICATIONS  (PRN):  acetaminophen   Tablet .. 650 milliGRAM(s) Oral every 6 hours PRN Mild Pain (1 - 3)  baclofen 10 milliGRAM(s) Oral every 8 hours PRN Muscle Spasm  polyethylene glycol 3350 17 Gram(s) Oral daily PRN Constipation

## 2019-02-07 NOTE — CONSULT NOTE ADULT - ASSESSMENT
Patient is 33/F admitted to acute rehab, after diagnosed with transverse myelitis.   Generalized weakness, debility  impaired ADLs/mobility   PT/OT per rehab team    #SLE  hx of lupus nephritis   continue prednisone, plaquenil, cellcept     #Fibromyalgia, continue cymbalta     #DVT prophylaxis

## 2019-02-07 NOTE — DIETITIAN INITIAL EVALUATION ADULT. - OTHER INFO
33yr Old Female - Dx: Transverse Myletisis - Initial Assessment - Regular Diet w/ Thin Liquids, Denies Food Allergy, Tolerates Diet, No Chewing/Swallowing Complications (Per Pt), No Pressure Ulcers, No Edema, No Recent V/D/C

## 2019-02-08 LAB
24R-OH-CALCIDIOL SERPL-MCNC: 10.9 NG/ML — LOW (ref 30–80)
ALBUMIN SERPL ELPH-MCNC: 3.3 G/DL — SIGNIFICANT CHANGE UP (ref 3.3–5)
ALP SERPL-CCNC: 67 U/L — SIGNIFICANT CHANGE UP (ref 40–120)
ALT FLD-CCNC: 46 U/L DA — HIGH (ref 10–45)
ANION GAP SERPL CALC-SCNC: 10 MMOL/L — SIGNIFICANT CHANGE UP (ref 5–17)
AST SERPL-CCNC: 18 U/L — SIGNIFICANT CHANGE UP (ref 10–40)
BILIRUB SERPL-MCNC: 0.6 MG/DL — SIGNIFICANT CHANGE UP (ref 0.2–1.2)
BUN SERPL-MCNC: 16 MG/DL — SIGNIFICANT CHANGE UP (ref 7–23)
CALCIUM SERPL-MCNC: 8.7 MG/DL — SIGNIFICANT CHANGE UP (ref 8.4–10.5)
CHLORIDE SERPL-SCNC: 106 MMOL/L — SIGNIFICANT CHANGE UP (ref 96–108)
CO2 SERPL-SCNC: 28 MMOL/L — SIGNIFICANT CHANGE UP (ref 22–31)
CREAT SERPL-MCNC: 0.52 MG/DL — SIGNIFICANT CHANGE UP (ref 0.5–1.3)
GLUCOSE SERPL-MCNC: 84 MG/DL — SIGNIFICANT CHANGE UP (ref 70–99)
HCT VFR BLD CALC: 33.4 % — LOW (ref 34.5–45)
HGB BLD-MCNC: 10.6 G/DL — LOW (ref 11.5–15.5)
MCHC RBC-ENTMCNC: 29.2 PG — SIGNIFICANT CHANGE UP (ref 27–34)
MCHC RBC-ENTMCNC: 31.6 GM/DL — LOW (ref 32–36)
MCV RBC AUTO: 92.3 FL — SIGNIFICANT CHANGE UP (ref 80–100)
PLATELET # BLD AUTO: 332 K/UL — SIGNIFICANT CHANGE UP (ref 150–400)
POTASSIUM SERPL-MCNC: 3.5 MMOL/L — SIGNIFICANT CHANGE UP (ref 3.5–5.3)
POTASSIUM SERPL-SCNC: 3.5 MMOL/L — SIGNIFICANT CHANGE UP (ref 3.5–5.3)
PROT SERPL-MCNC: 5.8 G/DL — LOW (ref 6–8.3)
RBC # BLD: 3.62 M/UL — LOW (ref 3.8–5.2)
RBC # FLD: 17.1 % — HIGH (ref 10.3–14.5)
SODIUM SERPL-SCNC: 144 MMOL/L — SIGNIFICANT CHANGE UP (ref 135–145)
WBC # BLD: 3.7 K/UL — LOW (ref 3.8–10.5)
WBC # FLD AUTO: 3.7 K/UL — LOW (ref 3.8–10.5)

## 2019-02-08 PROCEDURE — 99232 SBSQ HOSP IP/OBS MODERATE 35: CPT | Mod: GC

## 2019-02-08 RX ORDER — ERGOCALCIFEROL 1.25 MG/1
50000 CAPSULE ORAL
Qty: 0 | Refills: 0 | Status: DISCONTINUED | OUTPATIENT
Start: 2019-02-08 | End: 2019-02-21

## 2019-02-08 RX ADMIN — ENOXAPARIN SODIUM 40 MILLIGRAM(S): 100 INJECTION SUBCUTANEOUS at 12:21

## 2019-02-08 RX ADMIN — Medication 200 MILLIGRAM(S): at 05:38

## 2019-02-08 RX ADMIN — PANTOPRAZOLE SODIUM 40 MILLIGRAM(S): 20 TABLET, DELAYED RELEASE ORAL at 05:38

## 2019-02-08 RX ADMIN — Medication 200 MILLIGRAM(S): at 18:10

## 2019-02-08 RX ADMIN — TAMSULOSIN HYDROCHLORIDE 0.8 MILLIGRAM(S): 0.4 CAPSULE ORAL at 21:15

## 2019-02-08 RX ADMIN — MYCOPHENOLATE MOFETIL 1500 MILLIGRAM(S): 250 CAPSULE ORAL at 05:38

## 2019-02-08 RX ADMIN — ERGOCALCIFEROL 50000 UNIT(S): 1.25 CAPSULE ORAL at 18:10

## 2019-02-08 RX ADMIN — Medication 1 TABLET(S): at 12:22

## 2019-02-08 RX ADMIN — SENNA PLUS 2 TABLET(S): 8.6 TABLET ORAL at 21:15

## 2019-02-08 RX ADMIN — Medication 100 MILLIGRAM(S): at 21:15

## 2019-02-08 RX ADMIN — Medication 100 MILLIGRAM(S): at 05:38

## 2019-02-08 RX ADMIN — MYCOPHENOLATE MOFETIL 1500 MILLIGRAM(S): 250 CAPSULE ORAL at 18:10

## 2019-02-08 RX ADMIN — Medication 55 MILLIGRAM(S): at 05:38

## 2019-02-08 RX ADMIN — Medication 100 MILLIGRAM(S): at 12:20

## 2019-02-08 RX ADMIN — DULOXETINE HYDROCHLORIDE 60 MILLIGRAM(S): 30 CAPSULE, DELAYED RELEASE ORAL at 12:20

## 2019-02-08 NOTE — PROGRESS NOTE ADULT - ASSESSMENT
Assessment and Plan     33 year-old Woman with PMH of SLE, lupus nephritis, and fibromyalgia admitted to rehab with transverse myelitis resulting in decreased functional mobility, gait instability and ADL impairments.      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 Premedication with tylenol 650 mg, bendaryl 20 mg IVPB, and solumedrol 100 mg ivpb. - will need approval for administration   - Hem/onc will be unable to admin rituximab   - f/u with rheumatology as an outpatient after that. To be repeated Rituximab in 6 months after that.    Neurogenic Bladder / Acute urinary retention  - ertapenem started 1/30, last day 2/4 per ID, today, Renal us showed no evidence of  hydronephrosis, Urology note appreciated.  - likely combo of neurogenic bladder + UTI, uc confirming esbl ecoli uti  Plan   - c/w bladder scans   - c/w straight cath as needed  - c/w flomax 0.8mg po qhs     SLE with nephritis    - stable renal disease, elevated dsDNA, low complements  - rheum f/u      Fibromyalgia   - c/w Tylenol for pain prn     Depression  - c/w Cymbalta 60mg po qd    GI/Bowel Mgmt - Colace, Senna,  Miralax PRN    Pain Mgmt - Tylenol PRN    /Bladder Mgmt - Voiding independently, PVR      Precautions / PROPHYLAXIS:   - Falls, Spinal  - Ortho: Weight bearing status: WBAT   - Lungs: Incentive Spirometer   - Pressure injury/Skin: Turn Q2hrs while in bed, OOB to Chair, PT/OT    - DVT: Lovenox, SCDs, TEDs Assessment and Plan     33 year-old Woman with PMH of SLE, lupus nephritis, and fibromyalgia admitted to rehab with transverse myelitis resulting in decreased functional mobility, gait instability and ADL impairments.      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 Premedication with tylenol 650 mg, bendaryl 20 mg IVPB, and solumedrol 100 mg ivpb. - will need approval for administration   - Hem/onc will be unable to admin rituximab   - f/u with rheumatology as an outpatient after that. To be repeated Rituximab in 6 months after that.    Neurogenic Bladder / Acute urinary retention  - ertapenem started 1/30, last day 2/4 per ID.  Renal us showed no evidence of  hydronephrosis, Urology note appreciated.  - likely combo of neurogenic bladder + previous UTI, uc confirming esbl ecoli uti  Plan   - c/w bladder scans   - c/w straight cath as needed.  Recent urine culture 2/6 NG  - c/w flomax 0.8mg po qhs     SLE with nephritis    - stable renal disease, elevated dsDNA, low complements  - rheum f/u      Fibromyalgia   - c/w Tylenol for pain prn     Depression  - c/w Cymbalta 60mg po qd    Vitamin D deficiency  -Vit D 10.9.  Add Vit D 50,000U weekly    GI/Bowel Mgmt - Colace, Senna,  Miralax PRN    Pain Mgmt - Tylenol PRN    /Bladder Mgmt - Voiding independently,   Instruct in self cath      Precautions / PROPHYLAXIS:   - Falls, Spinal  - Ortho: Weight bearing status: WBAT   - Lungs: Incentive Spirometer   - Pressure injury/Skin: Turn Q2hrs while in bed, OOB to Chair, PT/OT    - DVT: Lovenox, SCDs, TEDs

## 2019-02-08 NOTE — PROGRESS NOTE ADULT - SUBJECTIVE AND OBJECTIVE BOX
HISTORY OF PRESENT ILLNESS    PMHx - 33 years old female with PMH of SLE, Lupus Nephritis and Fibromyalgia admitted to acute rehab after diagnosis of transverse myelitis.  She was initially brought to [facility] on  with generalized weakness, low grade fevers, generalized body pain, throat irritation, cough, ear pain, vomiting, and poor appetite. She went to her PMD few days prior who prescribed Sertraline for her depressive symptoms. As per her, symptoms started after taking that medication. At time of initial presentation, she was feeling so weak that she could not walk so her  brought her to ER. She was also complaining of difficulty urinating. She went to Urologist 10 days ago and she was prescribed Flomax which she has been taking but is unable to void. She was also noting constipation. Pt recently came back from [place] on 18 after staying there for 2.5 to 3 months. She had MRI there in December due to back pain and generalized weakness and it was normal. Workup at [facility] was notable for transverse myelitis.  She received plamapheresis, rtx and steroids and improved. Course complicated with urinary retention and ESBL ECOLI (tx'd with ertapenem, last dose ).     Pt medically optimized and cleared for transfer to [place] for acute rehab on 19.       TODAY'S SUBJECTIVE &  REVIEW OF SYMPTOMS  No acute events overnight reported by the night team resident or nursing. Patient seen and evaluated at the bedside. Doing well. No new complaints. Continues to have the band like pressure over abdomen. Using SC. Feels sensation when needs to empty, as well as bowel.     [X] Constitutional WNL       [X] HEENT   [X] Cardio WNL              [X] Resp WNL            [X] GI WNL                            [X]  - retention   [X] MSK - thoracic bandlike pain.   [X] Neuro weakness, numbness   [X] Cognitive WNL   [X] Psych WNL  [X] Skin WNL        VITALS  T(C): 36.8 (19 @ 07:52)  T(F): 98.2 (19 @ 07:52), Max: 98.2 (19 @ 07:52)  HR: 88 (19 @ 07:52) (88 - 105)  BP: 94/62 (02-08-19 @ 07:52) (94/62 - 113/77)  RR:  (14 - 14)  SpO2:  (97% - 100%)  Wt(kg): --      PHYSICAL EXAM  General- NAD, Comfortable                                                                                    HEENT - NCAT, EOMI  Cardio- RRR, S1S2, No murmurs                                                                           Resp- CTA bilaterally, No wheeze / rhonchi / Rales  Abdomen - BS+, Soft, NTND                                                                                    Extremities - No C/C/E, No calf tenderness   Skin: Intact                                                                                                                 Wounds: none     Neuro                   Cognitive - Awake, Alert, AAO x 3                                                                          Communication - Fluent, No dysarthria  Cranial Nerves - CN II-XII intact                                                                              Sensory - Intact to LT       MSK - wnl   Motor   LEFT    UE: SF [5/5], EF [5/5], EE [5/5], WE [5/5],  [wnl]  RIGHT UE: SF [5/5], EF [5/5], EE [5/5], WE [5/5],  [wnl]  LEFT    LE:  HF [1/5], KE [4/5], DF [4/5], EHL [4/5],  PF [5/5]  RIGHT LE:  HF [5/5], KE [4/5], DF [4/5], EHL [4/5],  PF [5/5]                                                                                      Psychiatric - Mood stable, Affect WNL      FUNCTIONAL PROGRESS  Gait - tbd  ADLs - tbd  Transfers - tbd	  Functional transfer - tbd    RECENT LABS              Urinalysis Basic - ( 2019 01:00 )    Color: Yellow / Appearance: Clear / S.025 / pH: x  Gluc: x / Ketone: Trace  / Bili: Negative / Urobili: 1 mg/dL   Blood: x / Protein: 30 mg/dL / Nitrite: Negative   Leuk Esterase: Negative / RBC: 0-2 /HPF / WBC 0-2   Sq Epi: x / Non Sq Epi: Occasional / Bacteria: x          RADIOLOGY/OTHER RESULTS      CURRENT MEDICATIONS  MEDICATIONS  (STANDING):  docusate sodium 100 milliGRAM(s) Oral three times a day  DULoxetine 60 milliGRAM(s) Oral daily  enoxaparin Injectable 40 milliGRAM(s) SubCutaneous daily  hydroxychloroquine 200 milliGRAM(s) Oral two times a day  multivitamin 1 Tablet(s) Oral daily  mycophenolate mofetil 1500 milliGRAM(s) Oral two times a day  pantoprazole    Tablet 40 milliGRAM(s) Oral before breakfast  predniSONE   Tablet 55 milliGRAM(s) Oral daily  senna 2 Tablet(s) Oral at bedtime  tamsulosin 0.8 milliGRAM(s) Oral at bedtime    MEDICATIONS  (PRN):  acetaminophen   Tablet .. 650 milliGRAM(s) Oral every 6 hours PRN Mild Pain (1 - 3)  baclofen 10 milliGRAM(s) Oral every 8 hours PRN Muscle Spasm  polyethylene glycol 3350 17 Gram(s) Oral daily PRN Constipation HISTORY OF PRESENT ILLNESS    PMHx - 33 years old female with PMH of SLE, Lupus Nephritis and Fibromyalgia admitted to acute rehab after diagnosis of transverse myelitis.  She was initially brought to [facility] on  with generalized weakness, low grade fevers, generalized body pain, throat irritation, cough, ear pain, vomiting, and poor appetite. She went to her PMD few days prior who prescribed Sertraline for her depressive symptoms. As per her, symptoms started after taking that medication. At time of initial presentation, she was feeling so weak that she could not walk so her  brought her to ER. She was also complaining of difficulty urinating. She went to Urologist 10 days ago and she was prescribed Flomax which she has been taking but is unable to void. She was also noting constipation. Pt recently came back from [place] on 18 after staying there for 2.5 to 3 months. She had MRI there in December due to back pain and generalized weakness and it was normal. Workup at [facility] was notable for transverse myelitis.  She received plamapheresis, rtx and steroids and improved. Course complicated with urinary retention and ESBL ECOLI (tx'd with ertapenem, last dose ).     Pt medically optimized and cleared for transfer to [place] for acute rehab on 19.       TODAY'S SUBJECTIVE &  REVIEW OF SYMPTOMS  No acute events overnight reported by the night team resident or nursing. Patient seen and evaluated at the bedside. Doing well. No new complaints. Continues to have the band like pressure over abdomen. Using SC. Feels sensation when needs to empty, as well as bowel.     [X] Constitutional WNL       [X] HEENT   [X] Cardio WNL              [X] Resp WNL            [X] GI WNL                            [X]  - retention   [X] MSK - thoracic bandlike pain.   [X] Neuro weakness, numbness   [X] Cognitive WNL   [X] Psych WNL  [X] Skin WNL        VITALS  T(C): 36.8 (19 @ 07:52)  T(F): 98.2 (19 @ 07:52), Max: 98.2 (19 @ 07:52)  HR: 88 (19 @ 07:52) (88 - 105)  BP: 94/62 (02-08-19 @ 07:52) (94/62 - 113/77)  RR:  (14 - 14)  SpO2:  (97% - 100%)  Wt(kg): --      PHYSICAL EXAM  General- NAD, Comfortable                                                                                    HEENT - NCAT, EOMI  Cardio- RRR, S1S2, No murmurs                                                                           Resp- CTA bilaterally, No wheeze / rhonchi / Rales  Abdomen - BS+, Soft, NTND                                                                                    Extremities - No C/C/E, No calf tenderness   Skin: Intact                                                                                                                 Wounds: none     Neuro                   Cognitive - Awake, Alert, AAO x 3                                                                          Communication - Fluent, No dysarthria  Cranial Nerves - CN II-XII intact                                                                              Sensory - Intact to LT       MSK - wnl   Motor   LEFT    UE: SF [5/5], EF [5/5], EE [5/5], WE [5/5],  [wnl]  RIGHT UE: SF [5/5], EF [5/5], EE [5/5], WE [5/5],  [wnl]  LEFT    LE:  HF [1/5], KE [4/5], DF [4/5], EHL [4/5],  PF [5/5]  RIGHT LE:  HF [5/5], KE [4/5], DF [4/5], EHL [4/5],  PF [5/5]                                                                                      Psychiatric - Mood stable, Affect WNL      FUNCTIONAL PROGRESS  Bed mobility-Min/mod A  Transfers-Min A  Gait - Amb RW 65' Min A  ADLs - UE dress min A, LE dress Mod   Functional transfer - Commode Min A    RECENT LABS                        10.6   3.7   )-----------( 332      ( 2019 05:30 )             33.4   02-08    144  |  106  |  16  ----------------------------<  84  3.5   |  28  |  0.52    Ca    8.7      2019 05:30    TPro  5.8<L>  /  Alb  3.3  /  TBili  0.6  /  DBili  x   /  AST  18  /  ALT  46<H>  /  AlkPhos  67  02-08    Vitamin D 10.9            Urinalysis Basic - ( 2019 01:00 )    Color: Yellow / Appearance: Clear / S.025 / pH: x  Gluc: x / Ketone: Trace  / Bili: Negative / Urobili: 1 mg/dL   Blood: x / Protein: 30 mg/dL / Nitrite: Negative   Leuk Esterase: Negative / RBC: 0-2 /HPF / WBC 0-2   Sq Epi: x / Non Sq Epi: Occasional / Bacteria: x    Urine Culture NG            CURRENT MEDICATIONS  MEDICATIONS  (STANDING):  docusate sodium 100 milliGRAM(s) Oral three times a day  DULoxetine 60 milliGRAM(s) Oral daily  enoxaparin Injectable 40 milliGRAM(s) SubCutaneous daily  hydroxychloroquine 200 milliGRAM(s) Oral two times a day  multivitamin 1 Tablet(s) Oral daily  mycophenolate mofetil 1500 milliGRAM(s) Oral two times a day  pantoprazole    Tablet 40 milliGRAM(s) Oral before breakfast  predniSONE   Tablet 55 milliGRAM(s) Oral daily  senna 2 Tablet(s) Oral at bedtime  tamsulosin 0.8 milliGRAM(s) Oral at bedtime    MEDICATIONS  (PRN):  acetaminophen   Tablet .. 650 milliGRAM(s) Oral every 6 hours PRN Mild Pain (1 - 3)  baclofen 10 milliGRAM(s) Oral every 8 hours PRN Muscle Spasm  polyethylene glycol 3350 17 Gram(s) Oral daily PRN Constipation

## 2019-02-08 NOTE — CHART NOTE - NSCHARTNOTEFT_GEN_A_CORE
[facility] Interdisciplinary Plan of Care    REHABILITATION DIAGNOSIS:  Acute transverse myelitis in demyelinating disease of central nervous system        COMORBIDITIES/COMPLICATING CONDITIONS IMPACTING REHABILITATION:  HEALTH ISSUES - PROBLEM Dx: Fibromyalgia, SLE, GERD, Nephritis, Vitamin D deficiency        PAST MEDICAL & SURGICAL HISTORY:  Fibromyalgia  Lupus  GERD (gastroesophageal reflux disease)  Lupus nephritis  S/P correction of deviated nasal septum  History of esophagogastroduodenoscopy (EGD)  History of biopsy: Renal      Based upon consideration of the patient's impairments, functional status, complicating conditions and any other contributing factors and after information garnered from the assessments of all therapy disciplines involved in treating the patient and other pertinent clinicians:    INTERDISCIPLINARY REHABILITATION INTERVENTIONS:    [ x  ] Transfer Training  [ x  ] Bed Mobility  [ x  ] Therapeutic Exercise  [ x  ] Balance/Coordination Exercises  [ x  ] Locomotion retraining  [  x ] Stairs  [   x] Functional Transfer Training  [  x ] Bowel/Bladder program  [  x ] Pain Management  [  x ] Skin/Wound Care  [   ] Visual/Perceptual Training  [ x  ] Therapeutic Recreation Activities  [   ] Neuromuscular Re-education  [x   ] Activities of Daily Living  [   ] Speech Exercise  [   ] Swallowing Exercises  [   ] Vital Stim  [  x ] Dietary Supplements  [   ] Calorie Count  [   ] Cognitive Exercises  [   ] Cognitive/Linguistic Treatment  [   ] Behavior Program  [   ] Neuropsych Therapy  [x   ] Patient/Family Counseling  [ x  ] Family Training  [ x  ] Community Re-entry  [   ] Orthotic Evaluation  [   ] Prosthetic Eval/Training    MEDICAL PROGNOSIS:  Good    REHAB POTENTIAL:  Good  EXPECTED DAILY THERAPY:         PT:1.5hrs       OT:1.5hrs       ST:0       P&O:Eval    EXPECTED INTENSITY OF PROGRAM:  3 hrs / Day    EXPECTED FREQUENCY OF PROGRAM: 5 Days/ Week    ESTIMATED LOS:  [  ] 5-7 Days  [  ] 7-10 Days  [  ] 10- 14 Days  [ x ] 14- 18 Days  [  ] 18- 21 Days    ESTIMATED DISPOSITION:  [  ] Home   [  ] Home with Outpatient Therapies  [ x ] Home with Home Therapies  [  ] Assisted Living  [  ] Nursing Home  [  ] Long Term Acute Care    INTERDISCIPLINARY FUNCTIONAL OUTCOMES/GOALS:         Gait/Mobility:6       Transfers:6       ADLs:6       Functional Transfers:6       Medication Management:7       Communication:na       Cognitive:na       Dysphagia:na       Bladder:6       Bowel:7     Functional Independent Measures:   7 = Independent  6 = Modified Independent  5 = Supervision  4 = Minimal Assist/ Contact Guard  3 = Moderate Assistance  2 = Maximum Assistance  1 = Total Assistance  0 = Unable to assess

## 2019-02-08 NOTE — PROGRESS NOTE ADULT - ATTENDING COMMENTS
Agree with above.  Medically stable.  Labs noted.  Vitamin D added for deficiency.  Continue IC for urinary retention and instruct in self cath.  Consider urology consult.  Cont comprehensive rehab, dvt ppx, medical management

## 2019-02-09 PROCEDURE — 99232 SBSQ HOSP IP/OBS MODERATE 35: CPT

## 2019-02-09 PROCEDURE — 99233 SBSQ HOSP IP/OBS HIGH 50: CPT

## 2019-02-09 RX ORDER — OXYCODONE AND ACETAMINOPHEN 5; 325 MG/1; MG/1
1 TABLET ORAL EVERY 6 HOURS
Qty: 0 | Refills: 0 | Status: DISCONTINUED | OUTPATIENT
Start: 2019-02-09 | End: 2019-02-10

## 2019-02-09 RX ADMIN — Medication 200 MILLIGRAM(S): at 17:45

## 2019-02-09 RX ADMIN — Medication 10 MILLIGRAM(S): at 12:32

## 2019-02-09 RX ADMIN — Medication 650 MILLIGRAM(S): at 13:30

## 2019-02-09 RX ADMIN — OXYCODONE AND ACETAMINOPHEN 1 TABLET(S): 5; 325 TABLET ORAL at 22:03

## 2019-02-09 RX ADMIN — Medication 650 MILLIGRAM(S): at 12:34

## 2019-02-09 RX ADMIN — DULOXETINE HYDROCHLORIDE 60 MILLIGRAM(S): 30 CAPSULE, DELAYED RELEASE ORAL at 12:31

## 2019-02-09 RX ADMIN — ENOXAPARIN SODIUM 40 MILLIGRAM(S): 100 INJECTION SUBCUTANEOUS at 12:31

## 2019-02-09 RX ADMIN — TAMSULOSIN HYDROCHLORIDE 0.8 MILLIGRAM(S): 0.4 CAPSULE ORAL at 21:15

## 2019-02-09 RX ADMIN — Medication 100 MILLIGRAM(S): at 12:31

## 2019-02-09 RX ADMIN — Medication 55 MILLIGRAM(S): at 05:12

## 2019-02-09 RX ADMIN — MYCOPHENOLATE MOFETIL 1500 MILLIGRAM(S): 250 CAPSULE ORAL at 17:45

## 2019-02-09 RX ADMIN — OXYCODONE AND ACETAMINOPHEN 1 TABLET(S): 5; 325 TABLET ORAL at 23:00

## 2019-02-09 RX ADMIN — Medication 200 MILLIGRAM(S): at 05:12

## 2019-02-09 RX ADMIN — PANTOPRAZOLE SODIUM 40 MILLIGRAM(S): 20 TABLET, DELAYED RELEASE ORAL at 05:12

## 2019-02-09 RX ADMIN — Medication 100 MILLIGRAM(S): at 05:12

## 2019-02-09 RX ADMIN — SENNA PLUS 2 TABLET(S): 8.6 TABLET ORAL at 21:16

## 2019-02-09 RX ADMIN — MYCOPHENOLATE MOFETIL 1500 MILLIGRAM(S): 250 CAPSULE ORAL at 05:12

## 2019-02-09 RX ADMIN — Medication 100 MILLIGRAM(S): at 21:15

## 2019-02-09 RX ADMIN — Medication 1 TABLET(S): at 12:31

## 2019-02-09 NOTE — PROGRESS NOTE ADULT - SUBJECTIVE AND OBJECTIVE BOX
HISTORY OF PRESENT ILLNESS    PMHx - 33 years old female with PMH of SLE, Lupus Nephritis and Fibromyalgia admitted to acute rehab after diagnosis of transverse myelitis.  She was initially brought to [facility] on  with generalized weakness, low grade fevers, generalized body pain, throat irritation, cough, ear pain, vomiting, and poor appetite. She went to her PMD few days prior who prescribed Sertraline for her depressive symptoms. As per her, symptoms started after taking that medication. At time of initial presentation, she was feeling so weak that she could not walk so her  brought her to ER. She was also complaining of difficulty urinating. She went to Urologist 10 days ago and she was prescribed Flomax which she has been taking but is unable to void. She was also noting constipation. Pt recently came back from [place] on 18 after staying there for 2.5 to 3 months. She had MRI there in December due to back pain and generalized weakness and it was normal. Workup at [facility] was notable for transverse myelitis.  She received plamapheresis, rtx and steroids and improved. Course complicated with urinary retention and ESBL ECOLI (tx'd with ertapenem, last dose ).     Pt medically optimized and cleared for transfer to [place] for acute rehab on 19.       TODAY'S SUBJECTIVE &  REVIEW OF SYMPTOMS  No acute events overnight. No SOB, no n/v, no pain.    [X] Constitutional WNL       [] HEENT   [X] Cardio WNL              [X] Resp WNL            [X] GI WNL                            []    [] MSK -   [X] Neuro weakness,  [] Cognitive    [X] Psych WNL  [] Skin         VITALS  Vital Signs Last 24 Hrs  T(C): 36.6 (2019 20:48), Max: 36.6 (2019 20:48)  T(F): 97.9 (2019 20:48), Max: 97.9 (2019 20:48)  HR: 95 (2019 20:48) (95 - 95)  BP: 115/75 (2019 20:48) (115/75 - 115/75)  BP(mean): --  RR: 14 (2019 20:48) (14 - 14)  SpO2: 98% (2019 20:48) (98% - 98%)      PHYSICAL EXAM  General- NAD, Comfortable                                                                                    HEENT - NCAT,  Cardio- RRR, S1S2                                                                          Resp- CTA bilaterally, No wheeze   Abdomen - BS+, Soft, NTND                                                                                    Extremities - No C/C/E  Skin: Intact                                                                                                                 Wounds: none     Neuro                   Cognitive - Awake, Alert, AAO x 3                                                                          Communication - Fluent, No dysarthri                                                                             Sensory - Intact to LT       MSK - wnl   Motor   LEFT    UE: SF [5/5], EF [5/5], EE [5/5], WE [5/5],  [wnl]  RIGHT UE: SF [5/5], EF [5/5], EE [5/5], WE [5/5],  [wnl]  LEFT    LE:  HF [<3/5], KE [4/5], DF [4/5], EHL [4/5],  PF [5/5]  RIGHT LE:  HF [5/5], KE [5/5], DF [4+/5],   PF [5/5]                                                                                      Psychiatric - Mood stable, Affect WNL      RECENT LABS                        10.6   3.7   )-----------( 332      ( 2019 05:30 )             33.4   02-08    144  |  106  |  16  ----------------------------<  84  3.5   |  28  |  0.52    Ca    8.7      2019 05:30    TPro  5.8<L>  /  Alb  3.3  /  TBili  0.6  /  DBili  x   /  AST  18  /  ALT  46<H>  /  AlkPhos  67  02-08    Vitamin D 10.9            Urinalysis Basic - ( 2019 01:00 )    Color: Yellow / Appearance: Clear / S.025 / pH: x  Gluc: x / Ketone: Trace  / Bili: Negative / Urobili: 1 mg/dL   Blood: x / Protein: 30 mg/dL / Nitrite: Negative   Leuk Esterase: Negative / RBC: 0-2 /HPF / WBC 0-2   Sq Epi: x / Non Sq Epi: Occasional / Bacteria: x    Urine Culture NG            CURRENT MEDICATIONS  MEDICATIONS  (STANDING):  docusate sodium 100 milliGRAM(s) Oral three times a day  DULoxetine 60 milliGRAM(s) Oral daily  enoxaparin Injectable 40 milliGRAM(s) SubCutaneous daily  hydroxychloroquine 200 milliGRAM(s) Oral two times a day  multivitamin 1 Tablet(s) Oral daily  mycophenolate mofetil 1500 milliGRAM(s) Oral two times a day  pantoprazole    Tablet 40 milliGRAM(s) Oral before breakfast  predniSONE   Tablet 55 milliGRAM(s) Oral daily  senna 2 Tablet(s) Oral at bedtime  tamsulosin 0.8 milliGRAM(s) Oral at bedtime    MEDICATIONS  (PRN):  acetaminophen   Tablet .. 650 milliGRAM(s) Oral every 6 hours PRN Mild Pain (1 - 3)  baclofen 10 milliGRAM(s) Oral every 8 hours PRN Muscle Spasm  polyethylene glycol 3350 17 Gram(s) Oral daily PRN Constipation

## 2019-02-09 NOTE — PROGRESS NOTE ADULT - SUBJECTIVE AND OBJECTIVE BOX
Patient is a 33y old  Female who presents with a chief complaint of Weakness, bladder dysfunction, gait instability, ADL impairments 2/2 Transverse myelitis (09 Feb 2019 08:10)      Patient seen and examined at bedside. feels well, no complaints     ALLERGIES:  No Known Allergies    MEDICATIONS:  acetaminophen   Tablet .. 650 milliGRAM(s) Oral every 6 hours PRN  ergocalciferol 63617 Unit(s) Oral <User Schedule>    Vital Signs Last 24 Hrs  T(F): 98.6 (09 Feb 2019 09:43), Max: 98.6 (09 Feb 2019 09:43)  HR: 88 (09 Feb 2019 09:43) (88 - 95)  BP: 104/66 (09 Feb 2019 09:43) (104/66 - 115/75)  RR: 14 (09 Feb 2019 09:43) (14 - 14)  SpO2: 98% (09 Feb 2019 09:43) (98% - 98%)  I&O's Summary    08 Feb 2019 07:01  -  09 Feb 2019 07:00  --------------------------------------------------------  IN: 0 mL / OUT: 950 mL / NET: -950 mL    09 Feb 2019 07:01  -  09 Feb 2019 09:57  --------------------------------------------------------  IN: 0 mL / OUT: 700 mL / NET: -700 mL        PHYSICAL EXAM:  General: NAD, Alert oriented x 3  Neck: Supple, No JVD  Lungs: Clear to auscultation bilaterally  Cardio: RRR, S1/S2, No murmurs  Abdomen: Soft, Nontender, Nondistended; Bowel sounds present  Extremities: No clubbing, cyanosis, or edema      LABS:                        10.6   3.7   )-----------( 332      ( 08 Feb 2019 05:30 )             33.4     02-08    144  |  106  |  16  ----------------------------<  84  3.5   |  28  |  0.52    Ca    8.7      08 Feb 2019 05:30    TPro  5.8  /  Alb  3.3  /  TBili  0.6  /  DBili  x   /  AST  18  /  ALT  46  /  AlkPhos  67  02-08    eGFR if Non African American: 125 mL/min/1.73M2 (02-08-19 @ 05:30)  eGFR if African American: 145 mL/min/1.73M2 (02-08-19 @ 05:30)      CAPILLARY BLOOD GLUCOSE    Culture - Urine (collected 06 Feb 2019 01:01)  Source: .Urine  Final Report (07 Feb 2019 09:26):    No growth    Care Discussed with Consultants/Other Providers:

## 2019-02-09 NOTE — PROGRESS NOTE ADULT - ASSESSMENT
Assessment and Plan     33 year-old Woman with PMH of SLE, lupus nephritis, and fibromyalgia admitted to rehab with transverse myelitis resulting in decreased functional mobility, gait instability and ADL impairments.      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 Premedication with tylenol 650 mg, bendaryl 20 mg IVPB, and solumedrol 100 mg ivpb. - will need approval for administration   - Hem/onc will be unable to admin rituximab   - f/u with rheumatology as an outpatient after that. To be repeated Rituximab in 6 months after that.    Neurogenic Bladder / Acute urinary retention  - ertapenem started 1/30, last day 2/4 per ID.  Renal us showed no evidence of  hydronephrosis.  - likely combo of neurogenic bladder + previous UTI, uc confirming esbl ecoli uti  Plan   - c/w bladder scans   - c/w straight cath as needed.  Recent urine culture 2/6 NG  - c/w flomax 0.8mg po qhs     SLE with nephritis    - stable renal disease, elevated dsDNA, low complements  - rheum f/u      Fibromyalgia   - c/w Tylenol for pain prn     Depression  - c/w Cymbalta 60mg po qd    Vitamin D deficiency  -Vit D 10.9.  Add Vit D 50,000U weekly    GI/Bowel Mgmt - Colace, Senna,  Miralax PRN    Pain Mgmt - Tylenol PRN    /Bladder Mgmt - Voiding independently,   Instruct in self cath     - DVT ppx: Lovenox, SCDs, TEDs

## 2019-02-10 PROCEDURE — 99233 SBSQ HOSP IP/OBS HIGH 50: CPT

## 2019-02-10 PROCEDURE — 99232 SBSQ HOSP IP/OBS MODERATE 35: CPT

## 2019-02-10 RX ORDER — OXYCODONE AND ACETAMINOPHEN 5; 325 MG/1; MG/1
2 TABLET ORAL EVERY 6 HOURS
Qty: 0 | Refills: 0 | Status: DISCONTINUED | OUTPATIENT
Start: 2019-02-10 | End: 2019-02-15

## 2019-02-10 RX ORDER — OXYCODONE AND ACETAMINOPHEN 5; 325 MG/1; MG/1
1 TABLET ORAL EVERY 6 HOURS
Qty: 0 | Refills: 0 | Status: DISCONTINUED | OUTPATIENT
Start: 2019-02-10 | End: 2019-02-15

## 2019-02-10 RX ADMIN — Medication 1 TABLET(S): at 12:33

## 2019-02-10 RX ADMIN — Medication 100 MILLIGRAM(S): at 22:08

## 2019-02-10 RX ADMIN — Medication 1 APPLICATORFUL: at 22:08

## 2019-02-10 RX ADMIN — OXYCODONE AND ACETAMINOPHEN 2 TABLET(S): 5; 325 TABLET ORAL at 22:46

## 2019-02-10 RX ADMIN — ENOXAPARIN SODIUM 40 MILLIGRAM(S): 100 INJECTION SUBCUTANEOUS at 12:33

## 2019-02-10 RX ADMIN — OXYCODONE AND ACETAMINOPHEN 2 TABLET(S): 5; 325 TABLET ORAL at 23:45

## 2019-02-10 RX ADMIN — TAMSULOSIN HYDROCHLORIDE 0.8 MILLIGRAM(S): 0.4 CAPSULE ORAL at 22:08

## 2019-02-10 RX ADMIN — MYCOPHENOLATE MOFETIL 1500 MILLIGRAM(S): 250 CAPSULE ORAL at 17:33

## 2019-02-10 RX ADMIN — Medication 55 MILLIGRAM(S): at 05:34

## 2019-02-10 RX ADMIN — SENNA PLUS 2 TABLET(S): 8.6 TABLET ORAL at 22:09

## 2019-02-10 RX ADMIN — Medication 200 MILLIGRAM(S): at 05:34

## 2019-02-10 RX ADMIN — PANTOPRAZOLE SODIUM 40 MILLIGRAM(S): 20 TABLET, DELAYED RELEASE ORAL at 06:01

## 2019-02-10 RX ADMIN — Medication 100 MILLIGRAM(S): at 12:33

## 2019-02-10 RX ADMIN — MYCOPHENOLATE MOFETIL 1500 MILLIGRAM(S): 250 CAPSULE ORAL at 05:34

## 2019-02-10 RX ADMIN — DULOXETINE HYDROCHLORIDE 60 MILLIGRAM(S): 30 CAPSULE, DELAYED RELEASE ORAL at 12:32

## 2019-02-10 RX ADMIN — Medication 200 MILLIGRAM(S): at 17:33

## 2019-02-10 RX ADMIN — Medication 100 MILLIGRAM(S): at 05:34

## 2019-02-10 NOTE — PROGRESS NOTE ADULT - ASSESSMENT
Assessment and Plan     33 year-old Woman with PMH of SLE, lupus nephritis, and fibromyalgia admitted to rehab with transverse myelitis resulting in decreased functional mobility, gait instability and ADL impairments.      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 Premedication with tylenol 650 mg, bendaryl 20 mg IVPB, and solumedrol 100 mg ivpb. - will need approval for administration   - Hem/onc will be unable to admin rituximab   - f/u with rheumatology as an outpatient after that. To be repeated Rituximab in 6 months after that.    Neurogenic Bladder / Acute urinary retention  - ertapenem started 1/30, last day 2/4 per ID.  Renal us showed no evidence of  hydronephrosis.  Plan   - c/w bladder scans   - c/w straight cath as needed.  Recent urine culture 2/6 NG  - c/w flomax 0.8mg po qhs     SLE with nephritis    - stable renal disease, elevated dsDNA, low complements  - rheum f/u      Fibromyalgia   - c/w Tylenol for pain prn     Depression  - c/w Cymbalta 60mg po qd    Vitamin D deficiency  -Vit D 10.9.  Add Vit D 50,000U weekly    GI/Bowel Mgmt - Colace, Senna,  Miralax PRN    Pain Mgmt - Tylenol PRN    /Bladder Mgmt - Voiding independently,   Instruct in self cath     - DVT ppx: Lovenox, SCDs, TEDs

## 2019-02-10 NOTE — PROVIDER CONTACT NOTE (OTHER) - ASSESSMENT
Pt states that vaginal discharge is more than usual. Discharge is white and thick. Pt c/o of burning in genitals.

## 2019-02-10 NOTE — PROGRESS NOTE ADULT - SUBJECTIVE AND OBJECTIVE BOX
HISTORY OF PRESENT ILLNESS    PMHx - 33 years old female with PMH of SLE, Lupus Nephritis and Fibromyalgia admitted to acute rehab after diagnosis of transverse myelitis.  She was initially brought to [facility] on  with generalized weakness, low grade fevers, generalized body pain, throat irritation, cough, ear pain, vomiting, and poor appetite. She went to her PMD few days prior who prescribed Sertraline for her depressive symptoms. As per her, symptoms started after taking that medication. At time of initial presentation, she was feeling so weak that she could not walk so her  brought her to ER. She was also complaining of difficulty urinating. She went to Urologist 10 days ago and she was prescribed Flomax which she has been taking but is unable to void. She was also noting constipation. Pt recently came back from [place] on 18 after staying there for 2.5 to 3 months. She had MRI there in December due to back pain and generalized weakness and it was normal. Workup at [facility] was notable for transverse myelitis.  She received plamapheresis, rtx and steroids and improved. Course complicated with urinary retention and ESBL ECOLI (tx'd with ertapenem, last dose ).     Pt medically optimized and cleared for transfer to [place] for acute rehab on 19.       TODAY'S SUBJECTIVE &  REVIEW OF SYMPTOMS  No acute events overnight. No SOB, no n/v, no pain.    [X] Constitutional WNL       [] HEENT   [X] Cardio WNL              [X] Resp WNL            [X] GI WNL                            []    [] MSK -   [X] Neuro weakness,  [] Cognitive    [X] Psych WNL  [] Skin         VITALS  Vital Signs Last 24 Hrs  T(C): 36.7 (2019 20:54), Max: 37 (2019 09:43)  T(F): 98 (2019 20:54), Max: 98.6 (2019 09:43)  HR: 85 (2019 20:54) (85 - 88)  BP: 103/63 (2019 20:54) (103/63 - 104/66)  BP(mean): --  RR: 14 (2019 20:54) (14 - 14)  SpO2: 99% (2019 20:54) (98% - 99%)      PHYSICAL EXAM  General- NAD, Comfortable                                                                                    HEENT - NCAT,  Cardio- RRR, S1S2                                                                          Resp- CTA bilaterally, No wheeze   Abdomen - BS+, Soft, NTND                                                                                    Extremities - No C/C/E  Skin: Intact                                                                                                                 Wounds: none     Neuro                   Cognitive - Awake, Alert, AAO x 3                                                                          Communication - Fluent, No dysarthria                                                                             Sensory - Intact to LT       MSK - wnl   Motor   LEFT    UE: SF [5/5], EF [5/5], EE [5/5], WE [5/5],  [wnl]  RIGHT UE: SF [5/5], EF [5/5], EE [5/5], WE [5/5],  [wnl]  LEFT    LE:  HF [<3/5], KE [4/5], DF [4/5], EHL [4/5],  PF [5/5]  RIGHT LE:  HF [5/5], KE [5/5], DF [4+/5],   PF [5/5]                                                                                      Psychiatric - Mood stable, Affect WNL      RECENT LABS                        10.6   3.7   )-----------( 332      ( 2019 05:30 )             33.4   02-08    144  |  106  |  16  ----------------------------<  84  3.5   |  28  |  0.52    Ca    8.7      2019 05:30    TPro  5.8<L>  /  Alb  3.3  /  TBili  0.6  /  DBili  x   /  AST  18  /  ALT  46<H>  /  AlkPhos  67  02-08    Vitamin D 10.9            Urinalysis Basic - ( 2019 01:00 )    Color: Yellow / Appearance: Clear / S.025 / pH: x  Gluc: x / Ketone: Trace  / Bili: Negative / Urobili: 1 mg/dL   Blood: x / Protein: 30 mg/dL / Nitrite: Negative   Leuk Esterase: Negative / RBC: 0-2 /HPF / WBC 0-2   Sq Epi: x / Non Sq Epi: Occasional / Bacteria: x    Urine Culture NG            CURRENT MEDICATIONS  MEDICATIONS  (STANDING):  docusate sodium 100 milliGRAM(s) Oral three times a day  DULoxetine 60 milliGRAM(s) Oral daily  enoxaparin Injectable 40 milliGRAM(s) SubCutaneous daily  hydroxychloroquine 200 milliGRAM(s) Oral two times a day  multivitamin 1 Tablet(s) Oral daily  mycophenolate mofetil 1500 milliGRAM(s) Oral two times a day  pantoprazole    Tablet 40 milliGRAM(s) Oral before breakfast  predniSONE   Tablet 55 milliGRAM(s) Oral daily  senna 2 Tablet(s) Oral at bedtime  tamsulosin 0.8 milliGRAM(s) Oral at bedtime    MEDICATIONS  (PRN):  acetaminophen   Tablet .. 650 milliGRAM(s) Oral every 6 hours PRN Mild Pain (1 - 3)  baclofen 10 milliGRAM(s) Oral every 8 hours PRN Muscle Spasm  polyethylene glycol 3350 17 Gram(s) Oral daily PRN Constipation

## 2019-02-10 NOTE — PROGRESS NOTE ADULT - SUBJECTIVE AND OBJECTIVE BOX
Patient is a 33y old  Female who presents with a chief complaint of Weakness, bladder dysfunction, gait instability, ADL impairments 2/2 Transverse myelitis (10 Feb 2019 08:52)      Patient seen and examined at bedside. feels well, no complaints    ALLERGIES:  No Known Allergies    MEDICATIONS:  acetaminophen   Tablet .. 650 milliGRAM(s) Oral every 6 hours PRN  ergocalciferol 61586 Unit(s) Oral <User Schedule>  oxyCODONE    5 mG/acetaminophen 325 mG 1 Tablet(s) Oral every 6 hours PRN    Vital Signs Last 24 Hrs  T(F): 97.7 (10 Feb 2019 09:02), Max: 98.6 (09 Feb 2019 09:43)  HR: 105 (10 Feb 2019 09:02) (85 - 105)  BP: 100/64 (10 Feb 2019 09:02) (100/64 - 104/66)  RR: 14 (10 Feb 2019 09:02) (14 - 14)  SpO2: 99% (10 Feb 2019 09:02) (98% - 99%)  I&O's Summary    09 Feb 2019 07:01  -  10 Feb 2019 07:00  --------------------------------------------------------  IN: 0 mL / OUT: 700 mL / NET: -700 mL        PHYSICAL EXAM:  General: NAD, AO x 3  Neck: Supple, No JVD  Lungs: Clear to auscultation bilaterally  Cardio: RRR, S1/S2, No murmurs  Abdomen: Soft, Nontender, Nondistended; Bowel sounds present  Extremities: No clubbing, cyanosis, or edema      LABS:                        10.6   3.7   )-----------( 332      ( 08 Feb 2019 05:30 )             33.4     02-08    144  |  106  |  16  ----------------------------<  84  3.5   |  28  |  0.52    Ca    8.7      08 Feb 2019 05:30    TPro  5.8  /  Alb  3.3  /  TBili  0.6  /  DBili  x   /  AST  18  /  ALT  46  /  AlkPhos  67  02-08    eGFR if Non African American: 125 mL/min/1.73M2 (02-08-19 @ 05:30)  eGFR if African American: 145 mL/min/1.73M2 (02-08-19 @ 05:30)    CAPILLARY BLOOD GLUCOSE      Culture - Urine (collected 06 Feb 2019 01:01)  Source: .Urine  Final Report (07 Feb 2019 09:26):    No growth    RADIOLOGY & ADDITIONAL TESTS:    Care Discussed with Consultants/Other Providers:

## 2019-02-11 DIAGNOSIS — G37.3 ACUTE TRANSVERSE MYELITIS IN DEMYELINATING DISEASE OF CENTRAL NERVOUS SYSTEM: ICD-10-CM

## 2019-02-11 DIAGNOSIS — M32.14 GLOMERULAR DISEASE IN SYSTEMIC LUPUS ERYTHEMATOSUS: ICD-10-CM

## 2019-02-11 DIAGNOSIS — R29.898 OTHER SYMPTOMS AND SIGNS INVOLVING THE MUSCULOSKELETAL SYSTEM: ICD-10-CM

## 2019-02-11 LAB
ALBUMIN SERPL ELPH-MCNC: 3.4 G/DL — SIGNIFICANT CHANGE UP (ref 3.3–5)
ALP SERPL-CCNC: 69 U/L — SIGNIFICANT CHANGE UP (ref 40–120)
ALT FLD-CCNC: 34 U/L DA — SIGNIFICANT CHANGE UP (ref 10–45)
ANION GAP SERPL CALC-SCNC: 12 MMOL/L — SIGNIFICANT CHANGE UP (ref 5–17)
AST SERPL-CCNC: 17 U/L — SIGNIFICANT CHANGE UP (ref 10–40)
BASOPHILS # BLD AUTO: 0.2 K/UL — SIGNIFICANT CHANGE UP (ref 0–0.2)
BASOPHILS NFR BLD AUTO: 4.4 % — HIGH (ref 0–2)
BILIRUB SERPL-MCNC: 0.4 MG/DL — SIGNIFICANT CHANGE UP (ref 0.2–1.2)
BUN SERPL-MCNC: 12 MG/DL — SIGNIFICANT CHANGE UP (ref 7–23)
CALCIUM SERPL-MCNC: 8.6 MG/DL — SIGNIFICANT CHANGE UP (ref 8.4–10.5)
CHLORIDE SERPL-SCNC: 101 MMOL/L — SIGNIFICANT CHANGE UP (ref 96–108)
CO2 SERPL-SCNC: 26 MMOL/L — SIGNIFICANT CHANGE UP (ref 22–31)
CREAT SERPL-MCNC: 0.53 MG/DL — SIGNIFICANT CHANGE UP (ref 0.5–1.3)
EOSINOPHIL # BLD AUTO: 0.1 K/UL — SIGNIFICANT CHANGE UP (ref 0–0.5)
EOSINOPHIL NFR BLD AUTO: 3.7 % — SIGNIFICANT CHANGE UP (ref 0–6)
GLUCOSE SERPL-MCNC: 88 MG/DL — SIGNIFICANT CHANGE UP (ref 70–99)
HCT VFR BLD CALC: 35.3 % — SIGNIFICANT CHANGE UP (ref 34.5–45)
HGB BLD-MCNC: 11.4 G/DL — LOW (ref 11.5–15.5)
LYMPHOCYTES # BLD AUTO: 0.8 K/UL — LOW (ref 1–3.3)
LYMPHOCYTES # BLD AUTO: 19.8 % — SIGNIFICANT CHANGE UP (ref 13–44)
MCHC RBC-ENTMCNC: 29.6 PG — SIGNIFICANT CHANGE UP (ref 27–34)
MCHC RBC-ENTMCNC: 32.3 GM/DL — SIGNIFICANT CHANGE UP (ref 32–36)
MCV RBC AUTO: 91.6 FL — SIGNIFICANT CHANGE UP (ref 80–100)
MONOCYTES # BLD AUTO: 0.6 K/UL — SIGNIFICANT CHANGE UP (ref 0–0.9)
MONOCYTES NFR BLD AUTO: 14.7 % — HIGH (ref 2–14)
NEUTROPHILS # BLD AUTO: 2.2 K/UL — SIGNIFICANT CHANGE UP (ref 1.8–7.4)
NEUTROPHILS NFR BLD AUTO: 57.4 % — SIGNIFICANT CHANGE UP (ref 43–77)
PLATELET # BLD AUTO: 390 K/UL — SIGNIFICANT CHANGE UP (ref 150–400)
POTASSIUM SERPL-MCNC: 3.6 MMOL/L — SIGNIFICANT CHANGE UP (ref 3.5–5.3)
POTASSIUM SERPL-SCNC: 3.6 MMOL/L — SIGNIFICANT CHANGE UP (ref 3.5–5.3)
PROT SERPL-MCNC: 6.3 G/DL — SIGNIFICANT CHANGE UP (ref 6–8.3)
RBC # BLD: 3.85 M/UL — SIGNIFICANT CHANGE UP (ref 3.8–5.2)
RBC # FLD: 16.5 % — HIGH (ref 10.3–14.5)
SODIUM SERPL-SCNC: 139 MMOL/L — SIGNIFICANT CHANGE UP (ref 135–145)
WBC # BLD: 3.8 K/UL — SIGNIFICANT CHANGE UP (ref 3.8–10.5)
WBC # FLD AUTO: 3.8 K/UL — SIGNIFICANT CHANGE UP (ref 3.8–10.5)

## 2019-02-11 PROCEDURE — 99233 SBSQ HOSP IP/OBS HIGH 50: CPT

## 2019-02-11 PROCEDURE — 99232 SBSQ HOSP IP/OBS MODERATE 35: CPT

## 2019-02-11 PROCEDURE — 99253 IP/OBS CNSLTJ NEW/EST LOW 45: CPT

## 2019-02-11 RX ORDER — DIPHENHYDRAMINE HCL 50 MG
20 CAPSULE ORAL ONCE
Qty: 0 | Refills: 0 | Status: COMPLETED | OUTPATIENT
Start: 2019-02-12 | End: 2019-02-16

## 2019-02-11 RX ORDER — ACETAMINOPHEN 500 MG
650 TABLET ORAL ONCE
Qty: 0 | Refills: 0 | Status: COMPLETED | OUTPATIENT
Start: 2019-02-12 | End: 2019-02-16

## 2019-02-11 RX ORDER — BETHANECHOL CHLORIDE 25 MG
10 TABLET ORAL EVERY 8 HOURS
Qty: 0 | Refills: 0 | Status: DISCONTINUED | OUTPATIENT
Start: 2019-02-11 | End: 2019-02-13

## 2019-02-11 RX ORDER — RITUXIMAB 10 MG/ML
1000 INJECTION, SOLUTION INTRAVENOUS ONCE
Qty: 0 | Refills: 0 | Status: COMPLETED | OUTPATIENT
Start: 2019-02-12 | End: 2019-02-16

## 2019-02-11 RX ADMIN — PANTOPRAZOLE SODIUM 40 MILLIGRAM(S): 20 TABLET, DELAYED RELEASE ORAL at 06:12

## 2019-02-11 RX ADMIN — DULOXETINE HYDROCHLORIDE 60 MILLIGRAM(S): 30 CAPSULE, DELAYED RELEASE ORAL at 12:29

## 2019-02-11 RX ADMIN — ENOXAPARIN SODIUM 40 MILLIGRAM(S): 100 INJECTION SUBCUTANEOUS at 12:29

## 2019-02-11 RX ADMIN — Medication 100 MILLIGRAM(S): at 06:12

## 2019-02-11 RX ADMIN — Medication 1 APPLICATORFUL: at 21:15

## 2019-02-11 RX ADMIN — Medication 100 MILLIGRAM(S): at 21:14

## 2019-02-11 RX ADMIN — Medication 55 MILLIGRAM(S): at 06:12

## 2019-02-11 RX ADMIN — Medication 10 MILLIGRAM(S): at 21:14

## 2019-02-11 RX ADMIN — TAMSULOSIN HYDROCHLORIDE 0.8 MILLIGRAM(S): 0.4 CAPSULE ORAL at 21:14

## 2019-02-11 RX ADMIN — Medication 1 TABLET(S): at 12:29

## 2019-02-11 RX ADMIN — Medication 10 MILLIGRAM(S): at 13:28

## 2019-02-11 RX ADMIN — Medication 200 MILLIGRAM(S): at 06:11

## 2019-02-11 RX ADMIN — Medication 10 MILLIGRAM(S): at 06:22

## 2019-02-11 RX ADMIN — SENNA PLUS 2 TABLET(S): 8.6 TABLET ORAL at 21:14

## 2019-02-11 RX ADMIN — Medication 100 MILLIGRAM(S): at 13:28

## 2019-02-11 RX ADMIN — MYCOPHENOLATE MOFETIL 1500 MILLIGRAM(S): 250 CAPSULE ORAL at 06:12

## 2019-02-11 NOTE — PROGRESS NOTE ADULT - SUBJECTIVE AND OBJECTIVE BOX
Patient is a 33y old  Female who presents with a chief complaint of Weakness, bladder dysfunction, gait instability, ADL impairments 2/2 Transverse myelitis (11 Feb 2019 10:03)      Patient seen and examined at bedside.    ALLERGIES:  No Known Allergies    MEDICATIONS  (STANDING):  bethanechol 10 milliGRAM(s) Oral every 8 hours  clotrimazole 1% Vaginal Cream 1 Applicatorful Vaginal at bedtime  docusate sodium 100 milliGRAM(s) Oral three times a day  DULoxetine 60 milliGRAM(s) Oral daily  enoxaparin Injectable 40 milliGRAM(s) SubCutaneous daily  ergocalciferol 44634 Unit(s) Oral <User Schedule>  hydroxychloroquine 200 milliGRAM(s) Oral two times a day  multivitamin 1 Tablet(s) Oral daily  mycophenolate mofetil 1500 milliGRAM(s) Oral two times a day  pantoprazole    Tablet 40 milliGRAM(s) Oral before breakfast  predniSONE   Tablet 55 milliGRAM(s) Oral daily  senna 2 Tablet(s) Oral at bedtime  tamsulosin 0.8 milliGRAM(s) Oral at bedtime    MEDICATIONS  (PRN):  acetaminophen   Tablet .. 650 milliGRAM(s) Oral every 6 hours PRN Mild Pain (1 - 3)  baclofen 10 milliGRAM(s) Oral every 8 hours PRN Muscle Spasm  bisacodyl Suppository 10 milliGRAM(s) Rectal daily PRN Constipation  oxyCODONE    5 mG/acetaminophen 325 mG 1 Tablet(s) Oral every 6 hours PRN Moderate Pain (4 - 6)  oxyCODONE    5 mG/acetaminophen 325 mG 2 Tablet(s) Oral every 6 hours PRN Severe Pain (7 - 10)  polyethylene glycol 3350 17 Gram(s) Oral daily PRN Constipation    Vital Signs Last 24 Hrs  T(F): 97.9 (11 Feb 2019 08:26), Max: 98 (10 Feb 2019 22:22)  HR: 100 (11 Feb 2019 08:26) (94 - 100)  BP: 96/62 (11 Feb 2019 08:26) (96/62 - 113/74)  RR: 14 (11 Feb 2019 08:26) (14 - 14)  SpO2: 100% (11 Feb 2019 08:26) (99% - 100%)  I&O's Summary    10 Feb 2019 07:01 - 11 Feb 2019 07:00  --------------------------------------------------------  IN: 0 mL / OUT: 1550 mL / NET: -1550 mL      BMI (kg/m2): 21 (02-06-19 @ 16:20)  PHYSICAL EXAM:  General: NAD, A/O x 3  ENT: MMM  Neck: Supple, No JVD  Lungs: Clear to auscultation bilaterally  Cardio: RRR, S1/S2, No murmurs  Abdomen: Soft, Nontender, Nondistended; Bowel sounds present  Extremities: No calf tenderness, No pitting edema, right leg stronger than left, able to lift both legs off bed, right leg able to lift against resistance, left not able to lift against resistance     LABS:                        11.4   3.8   )-----------( 390      ( 11 Feb 2019 06:55 )             35.3       02-11    139  |  101  |  12  ----------------------------<  88  3.6   |  26  |  0.53    Ca    8.6      11 Feb 2019 06:55    TPro  6.3  /  Alb  3.4  /  TBili  0.4  /  DBili  x   /  AST  17  /  ALT  34  /  AlkPhos  69  02-11     eGFR if Non African American: 124 mL/min/1.73M2 (02-11-19 @ 06:55)  eGFR if African American: 144 mL/min/1.73M2 (02-11-19 @ 06:55)    CAPILLARY BLOOD GLUCOSE    RADIOLOGY & ADDITIONAL TESTS:    Care Discussed with Consultants/Other Providers:

## 2019-02-11 NOTE — PROGRESS NOTE ADULT - SUBJECTIVE AND OBJECTIVE BOX
HISTORY OF PRESENT ILLNESS  PMHx - 33 years old female with PMH of SLE, Lupus Nephritis and Fibromyalgia admitted to acute rehab after diagnosis of transverse myelitis.  She was initially brought to [facility] on  with generalized weakness, low grade fevers, generalized body pain, throat irritation, cough, ear pain, vomiting, and poor appetite. She went to her PMD few days prior who prescribed Sertraline for her depressive symptoms. As per her, symptoms started after taking that medication. At time of initial presentation, she was feeling so weak that she could not walk so her  brought her to ER. She was also complaining of difficulty urinating. She went to Urologist 10 days ago and she was prescribed Flomax which she has been taking but is unable to void. She was also noting constipation. Pt recently came back from [place] on 18 after staying there for 2.5 to 3 months. She had MRI there in December due to back pain and generalized weakness and it was normal. Workup at [facility] was notable for transverse myelitis.  She received plamapheresis, rtx and steroids and improved. Course complicated with urinary retention and ESBL ECOLI (tx'd with ertapenem, last dose ).     Pt medically optimized and cleared for transfer to [place] for acute rehab on 19.     TODAY'S SUBJECTIVE &  REVIEW OF SYMPTOMS  No acute events overnight. Complains of continued "belt-like" feeling across lower abdomen, as well as tingling sensation in both legs. Denies numbness. Feels her strength is improving but left leg still weaker than right. + BM this morning after suppository. Still not voiding spontaneously except when having a BM.     [X] Constitutional WNL       [x] HEENT   [X] Cardio WNL              [X] Resp WNL            [X] GI - As in HPI                     []  - As in HPI  [] MSK -   [X] Neuro weakness  [] Cognitive    [X] Psych WNL  [ x ] Skin         VITALS  Vital Signs Last 24 Hrs  T(C): 36.6 (2019 08:26), Max: 36.7 (10 Feb 2019 22:22)  T(F): 97.9 (2019 08:26), Max: 98 (10 Feb 2019 22:22)  HR: 100 (2019 08:26) (94 - 100)  BP: 96/62 (2019 08:26) (96/62 - 113/74)  RR: 14 (2019 08:26) (14 - 14)  SpO2: 100% (2019 08:26) (99% - 100%)    PHYSICAL EXAM  General- NAD, Comfortable                                                                                    HEENT - NCAT,  Cardio- RRR, S1S2                                                                          Resp- CTA bilaterally, No wheeze   Abdomen - BS+, Soft, NTND                                                                                    Extremities - No C/C/E  Skin: Intact                                                                                                                 Wounds: none     Neuro                   Cognitive - Awake, Alert, AAO x 3                                                                          Communication - Fluent, No dysarthria                                                                             Sensory - Intact to LT       MSK - wnl   Motor   LEFT    UE: SF [5/5], EF [5/5], EE [5/5], WE [5/5],  [wnl]  RIGHT UE: SF [5/5], EF [5/5], EE [5/5], WE [5/5],  [wnl]  LEFT    LE:  HF [<3/5], KE [5/5], DF [5/5], EHL [5/5],  PF [5/5]  RIGHT LE:  HF [4/5], KE [5/5], DF [5/5],   PF [5/5]                                                                                      Psychiatric - Mood stable, Affect WNL    FUNCTIONAL PROGRESS:  Observed going up and downs steps with bilateral HRs in PT  Amb straight can 50'x 2 Min A   Sit to stand tx Min A     RECENT LABS             LABS:                        11.4   3.8   )-----------( 390      ( 2019 06:55 )             35.3     2019 06:55    139    |  101    |  12     ----------------------------<  88     3.6     |  26     |  0.53     Ca    8.6        2019 06:55    TPro  6.3    /  Alb  3.4    /  TBili  0.4    /  DBili  x      /  AST  17     /  ALT  34     /  AlkPhos  69     2019 06:55    Urinalysis Basic - ( 2019 01:00 )    Color: Yellow / Appearance: Clear / S.025 / pH: x  Gluc: x / Ketone: Trace  / Bili: Negative / Urobili: 1 mg/dL   Blood: x / Protein: 30 mg/dL / Nitrite: Negative   Leuk Esterase: Negative / RBC: 0-2 /HPF / WBC 0-2   Sq Epi: x / Non Sq Epi: Occasional / Bacteria: x    Urine Culture NG    CURRENT MEDICATIONS  MEDICATIONS  (STANDING):  clotrimazole 1% Vaginal Cream 1 Applicatorful Vaginal at bedtime  docusate sodium 100 milliGRAM(s) Oral three times a day  DULoxetine 60 milliGRAM(s) Oral daily  enoxaparin Injectable 40 milliGRAM(s) SubCutaneous daily  ergocalciferol 33062 Unit(s) Oral <User Schedule>  hydroxychloroquine 200 milliGRAM(s) Oral two times a day  multivitamin 1 Tablet(s) Oral daily  mycophenolate mofetil 1500 milliGRAM(s) Oral two times a day  pantoprazole    Tablet 40 milliGRAM(s) Oral before breakfast  predniSONE   Tablet 55 milliGRAM(s) Oral daily  senna 2 Tablet(s) Oral at bedtime  tamsulosin 0.8 milliGRAM(s) Oral at bedtime    MEDICATIONS  (PRN):  acetaminophen   Tablet .. 650 milliGRAM(s) Oral every 6 hours PRN Mild Pain (1 - 3)  baclofen 10 milliGRAM(s) Oral every 8 hours PRN Muscle Spasm  bisacodyl Suppository 10 milliGRAM(s) Rectal daily PRN Constipation  oxyCODONE    5 mG/acetaminophen 325 mG 1 Tablet(s) Oral every 6 hours PRN Moderate Pain (4 - 6)  oxyCODONE    5 mG/acetaminophen 325 mG 2 Tablet(s) Oral every 6 hours PRN Severe Pain (7 - 10)  polyethylene glycol 3350 17 Gram(s) Oral daily PRN Constipation

## 2019-02-11 NOTE — PROGRESS NOTE ADULT - ASSESSMENT
Patient is 33/F admitted to acute rehab, after diagnosed with transverse myelitis.   Generalized weakness, debility  impaired ADLs/mobility   PT/OT per rehab team    #SLE  hx of lupus nephritis   continue prednisone, plaquenil, cellcept     #Fibromyalgia, continue cymbalta     #DVT prophylaxis Patient is 33/F admitted to acute rehab, after diagnosed with transverse myelitis.

## 2019-02-11 NOTE — CONSULT NOTE ADULT - SUBJECTIVE AND OBJECTIVE BOX
33 yr-old woman with hx of SLE developed paraparesis and urinary retention one month ago. Admitted to [facility] and diagnosed with transverse myelitis. MRI C-spine showed diffuse abnormal signal in entire C-spine. MRI T-spine showed diffuse T2 STIR signal from T4 to conus (reviewed in PACS). MRI brain normal. She improved with plasmapheresis and 1st infusion of rituximab without adverse effects. Transferred here for rehab and needs second infusion of Rituximab.    OE: Alert and oriented. CN intact. No weakness in upper limbs. Mild weakness of hip flexors,R4+,L4/5. Mild weakness of left foot dorsiflexors(4+/5) DTR's brisk with crossed adductors at knees and unsustained ankle clonus. Position sense intact. Band like sensory loss T10.

## 2019-02-11 NOTE — CONSULT NOTE ADULT - REASON FOR ADMISSION
Weakness, bladder dysfunction, gait instability, ADL impairments 2/2 Transverse myelitis
Weakness, bladder dysfunction, gait instability, ADL impairments 2/2 Transverse myelitis

## 2019-02-11 NOTE — PROGRESS NOTE ADULT - ATTENDING COMMENTS
Agree with above.  Medically stable.  Appreciate neurology consult with Dr SURINDER Mathias.  Participating in therapies well.  Continue PT, OT, dvt ppx, pain management, bladder program

## 2019-02-11 NOTE — PROGRESS NOTE ADULT - ASSESSMENT
Assessment and Plan     33 year-old Woman with PMH of SLE, lupus nephritis, and fibromyalgia admitted to rehab with transverse myelitis resulting in decreased functional mobility, gait instability and ADL impairments.    COMORBIDITES/ACTIVE MEDICAL ISSUES     Gait Instability, ADL impairments and Functional impairments:   - continue Comprehensive Rehab Program of PT/OT     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   - c/w prednisone 55mg qd for 4 weeks as per rheumatology on dc  - c/w cellcept 1500mg po bid   - c/w hydroxychloroquine 200mg BID  - s/p 5 sessions PLEX  - Patient will get 1 dose of rituximab IVPB 1 gm infuse over 5 hours on Feb 11--Neurology Dr. Collier consulted for rituxan order. Premedication with tylenol 650 mg, bendaryl 20 mg IVPB, and solumedrol 100 mg ivpb. - will need approval for administration   - Hem/onc will be unable to admin rituximab   - f/u with rheumatology as an outpatient after that. To be repeated Rituximab in 6 months after that.    Neurogenic Bladder / Acute urinary retention  - ertapenem started 1/30, last day 2/4 per ID.  Renal us showed no evidence of  hydronephrosis.  Plan   - c/w bladder scans   - c/w straight cath as needed.  Recent urine culture 2/6 NG  - c/w flomax 0.8mg po qhs   - 2/11: Add Urecholine 10 mg TID     SLE with nephritis    - stable renal disease, elevated dsDNA, low complements  - rheum f/u      Fibromyalgia   - c/w Tylenol for pain prn     Depression  - c/w Cymbalta 60mg po qd    Vitamin D deficiency  -Vit D 10.9.  Add Vit D 50,000U weekly    GI  -Bowel Mgmt - Colace, Senna,  Miralax PRN  -FEN: MVI  -GI ppx; Protonix    Pain Mgmt - Tylenol PRN, oxycodone prn, baclofen prn spasms    /Bladder Mgmt -  cc;  Instruct in self cath     DVT ppx: Lovenox, SCDs, TEDs Assessment and Plan     33 year-old Woman with PMH of SLE, lupus nephritis, and fibromyalgia admitted to rehab with transverse myelitis resulting in decreased functional mobility, gait instability and ADL impairments.    COMORBIDITES/ACTIVE MEDICAL ISSUES     Gait Instability, ADL impairments and Functional impairments:   - continue Comprehensive Rehab Program of PT/OT     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   - c/w prednisone 55mg qd for 4 weeks as per rheumatology on dc  - c/w cellcept 1500mg po bid   - c/w hydroxychloroquine 200mg BID  - s/p 5 sessions PLEX  - Patient will get 1 dose of rituximab IVPB 1 gm infuse over 5 hours on Feb 11--Neurology Dr. Mathias consulted for rituxan order. Premedication with tylenol 650 mg, bendaryl 20 mg IVPB, and solumedrol 100 mg ivpb. - will need approval for administration   - Hem/onc will be unable to admin rituximab   - f/u with rheumatology as an outpatient after that. To be repeated Rituximab in 6 months after that.    Neurogenic Bladder / Acute urinary retention  - ertapenem started 1/30, last day 2/4 per ID.  Renal us showed no evidence of  hydronephrosis.  Plan   - c/w bladder scans-latest scans 452, 450  - c/w straight cath as needed.  Recent urine culture 2/6 NG  - c/w flomax 0.8mg po qhs   - 2/11: Add Urecholine 10 mg TID     SLE with nephritis    - stable renal disease, elevated dsDNA, low complements  - rheum f/u      Fibromyalgia   - c/w Tylenol for pain prn     Depression  - c/w Cymbalta 60mg po qd    Vitamin D deficiency  -Vit D 10.9.  Add Vit D 50,000U weekly    GI  -Bowel Mgmt - Colace, Senna,  Miralax PRN  -FEN: MVI  -GI ppx; Protonix    Pain Mgmt - Tylenol PRN, oxycodone prn, baclofen prn spasms    /Bladder Mgmt -  cc;  Instruct in self cath     DVT ppx: Lovenox, SCDs, TEDs

## 2019-02-12 PROCEDURE — 99233 SBSQ HOSP IP/OBS HIGH 50: CPT

## 2019-02-12 PROCEDURE — 99232 SBSQ HOSP IP/OBS MODERATE 35: CPT

## 2019-02-12 RX ADMIN — Medication 10 MILLIGRAM(S): at 06:48

## 2019-02-12 RX ADMIN — Medication 200 MILLIGRAM(S): at 17:23

## 2019-02-12 RX ADMIN — SENNA PLUS 2 TABLET(S): 8.6 TABLET ORAL at 21:42

## 2019-02-12 RX ADMIN — Medication 1 APPLICATORFUL: at 21:43

## 2019-02-12 RX ADMIN — ENOXAPARIN SODIUM 40 MILLIGRAM(S): 100 INJECTION SUBCUTANEOUS at 11:38

## 2019-02-12 RX ADMIN — TAMSULOSIN HYDROCHLORIDE 0.8 MILLIGRAM(S): 0.4 CAPSULE ORAL at 21:42

## 2019-02-12 RX ADMIN — DULOXETINE HYDROCHLORIDE 60 MILLIGRAM(S): 30 CAPSULE, DELAYED RELEASE ORAL at 11:38

## 2019-02-12 RX ADMIN — Medication 1 TABLET(S): at 11:38

## 2019-02-12 RX ADMIN — Medication 10 MILLIGRAM(S): at 21:43

## 2019-02-12 RX ADMIN — POLYETHYLENE GLYCOL 3350 17 GRAM(S): 17 POWDER, FOR SOLUTION ORAL at 17:30

## 2019-02-12 RX ADMIN — MYCOPHENOLATE MOFETIL 1500 MILLIGRAM(S): 250 CAPSULE ORAL at 17:23

## 2019-02-12 RX ADMIN — Medication 100 MILLIGRAM(S): at 06:48

## 2019-02-12 RX ADMIN — Medication 200 MILLIGRAM(S): at 06:48

## 2019-02-12 RX ADMIN — Medication 100 MILLIGRAM(S): at 21:42

## 2019-02-12 RX ADMIN — MYCOPHENOLATE MOFETIL 1500 MILLIGRAM(S): 250 CAPSULE ORAL at 06:47

## 2019-02-12 RX ADMIN — Medication 10 MILLIGRAM(S): at 13:58

## 2019-02-12 RX ADMIN — PANTOPRAZOLE SODIUM 40 MILLIGRAM(S): 20 TABLET, DELAYED RELEASE ORAL at 06:48

## 2019-02-12 RX ADMIN — Medication 55 MILLIGRAM(S): at 06:48

## 2019-02-12 RX ADMIN — Medication 100 MILLIGRAM(S): at 13:58

## 2019-02-12 NOTE — PROGRESS NOTE ADULT - SUBJECTIVE AND OBJECTIVE BOX
Patient is a 33y old  Female who presents with a chief complaint of Weakness, bladder dysfunction, gait instability, ADL impairments 2/2 Transverse myelitis (11 Feb 2019 13:51)    Feels well. Awaiting rituxan.  Denies chest pain, sob, nausea, vomiting.      Patient seen and examined at bedside.    ALLERGIES:  No Known Allergies    MEDICATIONS  (STANDING):  acetaminophen   Tablet .. 650 milliGRAM(s) Oral once  bethanechol 10 milliGRAM(s) Oral every 8 hours  clotrimazole 1% Vaginal Cream 1 Applicatorful Vaginal at bedtime  diphenhydrAMINE  IVPB 20 milliGRAM(s) IV Intermittent once  docusate sodium 100 milliGRAM(s) Oral three times a day  DULoxetine 60 milliGRAM(s) Oral daily  enoxaparin Injectable 40 milliGRAM(s) SubCutaneous daily  ergocalciferol 99799 Unit(s) Oral <User Schedule>  hydroxychloroquine 200 milliGRAM(s) Oral two times a day  methylPREDNISolone sodium succinate Injectable 100 milliGRAM(s) IV Push once  multivitamin 1 Tablet(s) Oral daily  mycophenolate mofetil 1500 milliGRAM(s) Oral two times a day  pantoprazole    Tablet 40 milliGRAM(s) Oral before breakfast  predniSONE   Tablet 55 milliGRAM(s) Oral daily  riTUXimab IVPB (Non - oncologic) 1000 milliGRAM(s) IV Intermittent once  senna 2 Tablet(s) Oral at bedtime  tamsulosin 0.8 milliGRAM(s) Oral at bedtime    MEDICATIONS  (PRN):  acetaminophen   Tablet .. 650 milliGRAM(s) Oral every 6 hours PRN Mild Pain (1 - 3)  baclofen 10 milliGRAM(s) Oral every 8 hours PRN Muscle Spasm  bisacodyl Suppository 10 milliGRAM(s) Rectal daily PRN Constipation  oxyCODONE    5 mG/acetaminophen 325 mG 1 Tablet(s) Oral every 6 hours PRN Moderate Pain (4 - 6)  oxyCODONE    5 mG/acetaminophen 325 mG 2 Tablet(s) Oral every 6 hours PRN Severe Pain (7 - 10)  polyethylene glycol 3350 17 Gram(s) Oral daily PRN Constipation    Vital Signs Last 24 Hrs  T(F): 97.8 (12 Feb 2019 07:21), Max: 97.8 (11 Feb 2019 21:03)  HR: 88 (12 Feb 2019 07:21) (88 - 94)  BP: 101/66 (12 Feb 2019 07:21) (101/66 - 111/79)  RR: 14 (12 Feb 2019 07:21) (14 - 14)  SpO2: 97% (12 Feb 2019 07:21) (97% - 99%)  I&O's Summary    11 Feb 2019 07:01  -  12 Feb 2019 07:00  --------------------------------------------------------  IN: 0 mL / OUT: 700 mL / NET: -700 mL    PHYSICAL EXAM:  General: NAD, A/O x 3  ENT: MMM  Neck: Supple, No JVD  Lungs: Clear to auscultation bilaterally  Cardio: RRR, S1/S2, No murmurs  Abdomen: Soft, Nontender, Nondistended; Bowel sounds present  Extremities: No calf tenderness, No pitting edema    LABS:                        11.4   3.8   )-----------( 390      ( 11 Feb 2019 06:55 )             35.3       02-11    139  |  101  |  12  ----------------------------<  88  3.6   |  26  |  0.53    Ca    8.6      11 Feb 2019 06:55    TPro  6.3  /  Alb  3.4  /  TBili  0.4  /  DBili  x   /  AST  17  /  ALT  34  /  AlkPhos  69  02-11     eGFR if Non African American: 124 mL/min/1.73M2 (02-11-19 @ 06:55)  eGFR if African American: 144 mL/min/1.73M2 (02-11-19 @ 06:55)    CAPILLARY BLOOD GLUCOSE    RADIOLOGY & ADDITIONAL TESTS:    Care Discussed with Consultants/Other Providers:

## 2019-02-12 NOTE — PROGRESS NOTE ADULT - SUBJECTIVE AND OBJECTIVE BOX
HISTORY OF PRESENT ILLNESS  PMHx - 33 years old female with PMH of SLE, Lupus Nephritis and Fibromyalgia admitted to acute rehab after diagnosis of transverse myelitis.  She was initially brought to [facility] on  with generalized weakness, low grade fevers, generalized body pain, throat irritation, cough, ear pain, vomiting, and poor appetite. She went to her PMD few days prior who prescribed Sertraline for her depressive symptoms. As per her, symptoms started after taking that medication. At time of initial presentation, she was feeling so weak that she could not walk so her  brought her to ER. She was also complaining of difficulty urinating. She went to Urologist 10 days ago and she was prescribed Flomax which she has been taking but is unable to void. She was also noting constipation. Pt recently came back from [place] on 18 after staying there for 2.5 to 3 months. She had MRI there in December due to back pain and generalized weakness and it was normal. Workup at [facility] was notable for transverse myelitis.  She received plamapheresis, rtx and steroids and improved. Course complicated with urinary retention and ESBL ECOLI (tx'd with ertapenem, last dose ).     Pt medically optimized and cleared for transfer to [place] for acute rehab on 19.     TODAY'S SUBJECTIVE &  REVIEW OF SYMPTOMS  No acute events overnight. Complains of continued "belt-like" feeling across lower abdomen, as well as tingling sensation in both legs, alternating with numbness. BM x 2 yesterday. Urinated a small amount this AM, then SC ~ 700cc per pt.     [X] Constitutional WNL       [x] HEENT   [X] Cardio WNL              [X] Resp WNL            [X] GI - As in HPI                     []  - As in HPI  [] MSK -   [X] Neuro weakness, as in HPI  [] Cognitive    [X] Psych WNL  [ x ] Skin         VITALS  Vital Signs Last 24 Hrs  Vital Signs Last 24 Hrs  T(C): 36.6 (2019 07:21), Max: 36.6 (2019 21:03)  T(F): 97.8 (2019 07:21), Max: 97.8 (2019 21:03)  HR: 88 (2019 07:21) (88 - 94)  BP: 101/66 (2019 07:21) (101/66 - 111/79)  RR: 14 (2019 07:21) (14 - 14)  SpO2: 97% (2019 07:21) (97% - 99%)    PHYSICAL EXAM  General- NAD, Comfortable                                                                                    HEENT - NCAT,  Cardio- RRR, S1S2                                                                          Resp- CTA bilaterally, No wheeze   Abdomen - BS+, Soft, NTND                                                                                    Extremities - No C/C/E  Skin: Intact                                                                                                                 Wounds: none     Neuro                   Cognitive - Awake, Alert, AAO x 3                                                                          Communication - Fluent, No dysarthria                                                                             Sensory - Intact to LT       MSK - wnl   Motor   LEFT    UE: SF [5/5], EF [5/5], EE [5/5], WE [5/5],  [wnl]  RIGHT UE: SF [5/5], EF [5/5], EE [5/5], WE [5/5],  [wnl]  LEFT    LE:  HF [<3/5], KE [5/5], DF [5/5], EHL [5/5],  PF [5/5]  RIGHT LE:  HF [4/5], KE [5/5], DF [5/5],   PF [5/5]                                                                                      Psychiatric - Mood stable, Affect WNL    FUNCTIONAL PROGRESS:  Transfers CG/CS  Steps + 2HR Min A   Ambulation with RW, CG/CS    RECENT LABS           LABS:                        11.4   3.8   )-----------( 390      ( 2019 06:55 )             35.3     2019 06:55    139    |  101    |  12     ----------------------------<  88     3.6     |  26     |  0.53     Ca    8.6        2019 06:55    TPro  6.3    /  Alb  3.4    /  TBili  0.4    /  DBili  x      /  AST  17     /  ALT  34     /  AlkPhos  69     2019 06:55    Urinalysis Basic - ( 2019 01:00 )    Color: Yellow / Appearance: Clear / S.025 / pH: x  Gluc: x / Ketone: Trace  / Bili: Negative / Urobili: 1 mg/dL   Blood: x / Protein: 30 mg/dL / Nitrite: Negative   Leuk Esterase: Negative / RBC: 0-2 /HPF / WBC 0-2   Sq Epi: x / Non Sq Epi: Occasional / Bacteria: x    Urine Culture NG    CURRENT MEDICATIONS  MEDICATIONS  (STANDING):  acetaminophen   Tablet .. 650 milliGRAM(s) Oral once  bethanechol 10 milliGRAM(s) Oral every 8 hours  clotrimazole 1% Vaginal Cream 1 Applicatorful Vaginal at bedtime  diphenhydrAMINE  IVPB 20 milliGRAM(s) IV Intermittent once  docusate sodium 100 milliGRAM(s) Oral three times a day  DULoxetine 60 milliGRAM(s) Oral daily  enoxaparin Injectable 40 milliGRAM(s) SubCutaneous daily  ergocalciferol 94054 Unit(s) Oral <User Schedule>  hydroxychloroquine 200 milliGRAM(s) Oral two times a day  methylPREDNISolone sodium succinate Injectable 100 milliGRAM(s) IV Push once  multivitamin 1 Tablet(s) Oral daily  mycophenolate mofetil 1500 milliGRAM(s) Oral two times a day  pantoprazole    Tablet 40 milliGRAM(s) Oral before breakfast  predniSONE   Tablet 55 milliGRAM(s) Oral daily  riTUXimab IVPB (Non - oncologic) 1000 milliGRAM(s) IV Intermittent once  senna 2 Tablet(s) Oral at bedtime  tamsulosin 0.8 milliGRAM(s) Oral at bedtime    MEDICATIONS  (PRN):  acetaminophen   Tablet .. 650 milliGRAM(s) Oral every 6 hours PRN Mild Pain (1 - 3)  baclofen 10 milliGRAM(s) Oral every 8 hours PRN Muscle Spasm  bisacodyl Suppository 10 milliGRAM(s) Rectal daily PRN Constipation  oxyCODONE    5 mG/acetaminophen 325 mG 1 Tablet(s) Oral every 6 hours PRN Moderate Pain (4 - 6)  oxyCODONE    5 mG/acetaminophen 325 mG 2 Tablet(s) Oral every 6 hours PRN Severe Pain (7 - 10)  polyethylene glycol 3350 17 Gram(s) Oral daily PRN Constipation

## 2019-02-12 NOTE — PROGRESS NOTE ADULT - ATTENDING COMMENTS
Agree with above.  Medically stable.  Appreciate neurology consult with Dr SURINDER Mathias.  Participating in therapies well.  Continue PT, OT, dvt ppx, pain management, bladder program Patient medically stable. Making progress towards rehab goals.   Continue rehab program.

## 2019-02-12 NOTE — PROGRESS NOTE ADULT - ASSESSMENT
Assessment and Plan     33 year-old Woman with PMH of SLE, lupus nephritis, and fibromyalgia admitted to rehab with transverse myelitis resulting in decreased functional mobility, gait instability and ADL impairments.    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 Premedication with tylenol 650 mg, bendaryl 20 mg IVPB, and solumedrol 100 mg ivpb. - will need approval for administration. Plan on Rituxan on 2/13  - f/u with rheumatology as an outpatient after that. To be repeated Rituximab in 6 months after that.    Neurogenic Bladder / Acute urinary retention  - ertapenem started 1/30, last day 2/4 per ID.  Renal us showed no evidence of  hydronephrosis.  Plan   - c/w bladder scans-latest scans 452, 450  - c/w straight cath as needed.  Recent urine culture 2/6 NG  - c/w flomax 0.8mg po qhs   - 2/11: Added Urecholine 10 mg TID with some minimal improvement    Neuropathic pain   -Will trial gabapentin 100 mg tonight     SLE with nephritis    - stable renal disease, elevated dsDNA, low complements  - rheum f/u      Fibromyalgia   - c/w Tylenol for pain prn     Depression  - c/w Cymbalta 60mg po qd    Vitamin D deficiency  -Vit D 10.9.  Add Vit D 50,000U weekly    GI  -Bowel Mgmt - Colace, Senna,  Miralax PRN  -FEN: MVI  -GI ppx; Protonix    Pain Mgmt - Tylenol PRN, oxycodone prn, baclofen prn spasms    /Bladder Mgmt -  cc;  Instruct in self cath     DVT ppx: Lovenox, SCDs, TEDs

## 2019-02-13 PROCEDURE — 99232 SBSQ HOSP IP/OBS MODERATE 35: CPT

## 2019-02-13 PROCEDURE — 99233 SBSQ HOSP IP/OBS HIGH 50: CPT

## 2019-02-13 RX ORDER — BACLOFEN 100 %
5 POWDER (GRAM) MISCELLANEOUS EVERY 8 HOURS
Qty: 0 | Refills: 0 | Status: DISCONTINUED | OUTPATIENT
Start: 2019-02-13 | End: 2019-02-21

## 2019-02-13 RX ORDER — GABAPENTIN 400 MG/1
100 CAPSULE ORAL
Qty: 0 | Refills: 0 | Status: DISCONTINUED | OUTPATIENT
Start: 2019-02-13 | End: 2019-02-21

## 2019-02-13 RX ORDER — GABAPENTIN 400 MG/1
300 CAPSULE ORAL AT BEDTIME
Qty: 0 | Refills: 0 | Status: DISCONTINUED | OUTPATIENT
Start: 2019-02-13 | End: 2019-02-21

## 2019-02-13 RX ORDER — LANOLIN ALCOHOL/MO/W.PET/CERES
6 CREAM (GRAM) TOPICAL
Qty: 0 | Refills: 0 | Status: DISCONTINUED | OUTPATIENT
Start: 2019-02-13 | End: 2019-02-21

## 2019-02-13 RX ORDER — BETHANECHOL CHLORIDE 25 MG
15 TABLET ORAL EVERY 8 HOURS
Qty: 0 | Refills: 0 | Status: DISCONTINUED | OUTPATIENT
Start: 2019-02-13 | End: 2019-02-20

## 2019-02-13 RX ADMIN — Medication 15 MILLIGRAM(S): at 20:50

## 2019-02-13 RX ADMIN — GABAPENTIN 100 MILLIGRAM(S): 400 CAPSULE ORAL at 17:28

## 2019-02-13 RX ADMIN — Medication 15 MILLIGRAM(S): at 15:15

## 2019-02-13 RX ADMIN — Medication 5 MILLIGRAM(S): at 15:16

## 2019-02-13 RX ADMIN — Medication 100 MILLIGRAM(S): at 06:26

## 2019-02-13 RX ADMIN — TAMSULOSIN HYDROCHLORIDE 0.8 MILLIGRAM(S): 0.4 CAPSULE ORAL at 20:50

## 2019-02-13 RX ADMIN — Medication 5 MILLIGRAM(S): at 20:51

## 2019-02-13 RX ADMIN — ENOXAPARIN SODIUM 40 MILLIGRAM(S): 100 INJECTION SUBCUTANEOUS at 12:01

## 2019-02-13 RX ADMIN — MYCOPHENOLATE MOFETIL 1500 MILLIGRAM(S): 250 CAPSULE ORAL at 06:26

## 2019-02-13 RX ADMIN — Medication 1 APPLICATORFUL: at 20:51

## 2019-02-13 RX ADMIN — PANTOPRAZOLE SODIUM 40 MILLIGRAM(S): 20 TABLET, DELAYED RELEASE ORAL at 06:26

## 2019-02-13 RX ADMIN — DULOXETINE HYDROCHLORIDE 60 MILLIGRAM(S): 30 CAPSULE, DELAYED RELEASE ORAL at 12:01

## 2019-02-13 RX ADMIN — Medication 200 MILLIGRAM(S): at 06:26

## 2019-02-13 RX ADMIN — Medication 100 MILLIGRAM(S): at 20:50

## 2019-02-13 RX ADMIN — MYCOPHENOLATE MOFETIL 1500 MILLIGRAM(S): 250 CAPSULE ORAL at 17:28

## 2019-02-13 RX ADMIN — Medication 200 MILLIGRAM(S): at 17:28

## 2019-02-13 RX ADMIN — GABAPENTIN 300 MILLIGRAM(S): 400 CAPSULE ORAL at 20:50

## 2019-02-13 RX ADMIN — Medication 6 MILLIGRAM(S): at 20:50

## 2019-02-13 RX ADMIN — Medication 10 MILLIGRAM(S): at 06:26

## 2019-02-13 RX ADMIN — Medication 100 MILLIGRAM(S): at 15:16

## 2019-02-13 RX ADMIN — SENNA PLUS 2 TABLET(S): 8.6 TABLET ORAL at 20:50

## 2019-02-13 RX ADMIN — Medication 55 MILLIGRAM(S): at 06:27

## 2019-02-13 RX ADMIN — Medication 1 TABLET(S): at 12:01

## 2019-02-13 NOTE — PROGRESS NOTE ADULT - ASSESSMENT
Assessment and Plan     33 year-old Woman with PMH of SLE, lupus nephritis, and fibromyalgia admitted to rehab with transverse myelitis resulting in decreased functional mobility, gait instability and ADL impairments.    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 Mathias consulted for rituxan order. Premedication with tylenol 650 mg, bendaryl 20 mg IVPB, and solumedrol 100 mg ivpb.   - Hem/onc will be unable to admin rituximab   - f/u with rheumatology as an outpatient after that. To be repeated Rituximab in 6 months after that.    Neurogenic Bladder / Acute urinary retention  - ertapenem started 1/30, last day 2/4 per ID.  Renal us showed no evidence of  hydronephrosis.  Plan   - c/w bladder scans-latest scans 328, 194, 500  - c/w straight cath as needed.  Recent urine culture 2/6 NG  - c/w flomax 0.8mg po qhs   - 2/11: Added Urecholine 10 mg TID with slight improvement; increase to 15mg TID    SLE with nephritis    - stable renal disease, elevated dsDNA, low complements  - rheum f/u      Fibromyalgia   - c/w Tylenol for pain prn     Depression  - c/w Cymbalta 60mg po qd    Vitamin D deficiency  -Vit D 10.9.  Added Vit D 50,000U weekly    GI  -Bowel Mgmt - Colace, Senna,  Miralax PRN  -FEN: MVI  -GI ppx; Protonix    Pain Mgmt - Tylenol PRN, oxycodone prn, baclofen prn spasms    /Bladder Mgmt - Retention;  Instructed in self cath     DVT ppx: Lovenox, SCDs, TEDs Assessment and Plan     33 year-old Woman with PMH of SLE, lupus nephritis, and fibromyalgia admitted to rehab with transverse myelitis resulting in decreased functional mobility, gait instability and ADL impairments.    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 Mathias consulted for rituxan order. Premedication with tylenol 650 mg, bendaryl 20 mg IVPB, and solumedrol 100 mg ivpb.   - Hem/onc will be unable to admin rituximab   - f/u with rheumatology as an outpatient after that. To be repeated Rituximab in 6 months after that.    Neurogenic Bladder / Acute urinary retention  - ertapenem started 1/30, last day 2/4 per ID.  Renal us showed no evidence of  hydronephrosis.  Plan   - c/w bladder scans-latest scans 328, 194, 500  - c/w straight cath as needed.  Recent urine culture 2/6 NG  - c/w flomax 0.8mg po qhs   - 2/11: Added Urecholine 10 mg TID with slight improvement; increase to 15mg TID    SLE with nephritis    - stable renal disease, elevated dsDNA, low complements  - rheum f/u      Fibromyalgia   - c/w Tylenol for pain prn     Depression  - c/w Cymbalta 60mg po qd    Vitamin D deficiency  -Vit D 10.9.  Added Vit D 50,000U weekly    GI  -Bowel Mgmt - Colace, Senna,  Miralax PRN  -FEN: MVI  -GI ppx; Protonix    Pain Mgmt - Tylenol PRN, oxycodone prn, baclofen prn spasms-change to scheduled.  Add Gabapentin 100mg bid and 300mg qhs    /Bladder Mgmt - Retention;  Instructed in self cath     DVT ppx: Lovenox, SCDs, TEDs     Insomnia: Add melatonin 6mg qhs

## 2019-02-13 NOTE — PROGRESS NOTE ADULT - SUBJECTIVE AND OBJECTIVE BOX
HISTORY OF PRESENT ILLNESS  PMHx - 33 years old female with PMH of SLE, Lupus Nephritis and Fibromyalgia admitted to acute rehab after diagnosis of transverse myelitis.  She was initially brought to [facility] on  with generalized weakness, low grade fevers, generalized body pain, throat irritation, cough, ear pain, vomiting, and poor appetite. She went to her PMD few days prior who prescribed Sertraline for her depressive symptoms. As per her, symptoms started after taking that medication. At time of initial presentation, she was feeling so weak that she could not walk so her  brought her to ER. She was also complaining of difficulty urinating. She went to Urologist 10 days ago and she was prescribed Flomax which she has been taking but is unable to void. She was also noting constipation. Pt recently came back from [place] on 18 after staying there for 2.5 to 3 months. She had MRI there in December due to back pain and generalized weakness and it was normal. Workup at [facility] was notable for transverse myelitis.  She received plamapheresis, rtx and steroids and improved. Course complicated with urinary retention and ESBL ECOLI (tx'd with ertapenem, last dose ).     Pt medically optimized and cleared for transfer to [place] for acute rehab on 19.     TODAY'S SUBJECTIVE &  REVIEW OF SYMPTOMS  No acute events overnight. Complains of continued "belt-like" feeling of "tightness" across lower abdomen.  Pt reports less numbness and tingling sensation in both legs. Feels her strength is improving but left leg still weaker than right.  There is occasional spasms BLEs.  + BM yesterday. Voiding slightly with Valsalva.     [X] Constitutional WNL       [x] HEENT   [X] Cardio WNL              [X] Resp WNL            [X] GI -                    []  - Urinary retention  [] MSK - Weakness  [X] Neuro weakness  [] Cognitive    [X] Psych WNL  [ x ] Skin         VITALS  Vital Signs Last 24 Hrs  T(C): 36.6 (2019 07:32), Max: 36.9 (2019 21:30)  T(F): 97.9 (2019 07:32), Max: 98.5 (2019 21:30)  HR: 78 (2019 07:32) (78 - 85)  BP: 103/73 (2019 07:32) (103/73 - 114/81)  BP(mean): --  RR: 14 (2019 07:32) (14 - 14)  SpO2: 100% (2019 07:32) (99% - 100%)    PHYSICAL EXAM  General- NAD, Comfortable                                                                                    HEENT - NCAT,  Cardio- RRR, S1S2                                                                          Resp- CTA bilaterally, No wheeze   Abdomen - BS+, Soft, NTND                                                                                    Extremities - No C/C/E  Skin: Intact                                                                                                                 Wounds: none     Neuro                   Cognitive - Awake, Alert, AAO x 3                                                                          Communication - Fluent, No dysarthria                                                                             Sensory - Decreased to LT to level of T8       MSK - wnl   Motor   LEFT    UE: SF [5/5], EF [5/5], EE [5/5], WE [5/5],  [wnl]  RIGHT UE: SF [5/5], EF [5/5], EE [5/5], WE [5/5],  [wnl]  LEFT    LE:  HF [<3/5], HE 3/5, KE [5/5], KF 3+, DF [5/5], EHL [5/5],  PF [5/5]  RIGHT LE:  HF [4/5],HE 4/5, KE [5/5], KF 5/5, DF [5/5],   PF [5/5]                                                                                      Psychiatric - Mood stable, Affect WNL    FUNCTIONAL PROGRESS:  Bed mobility: CS/CG  Transfers: supervision  Gait: Amb 145' RW CS/CG  Stairs: 12 steps 2HR CG      RECENT LABS             LABS:                        11.4   3.8   )-----------( 390      ( 2019 06:55 )             35.3     2019 06:55    139    |  101    |  12     ----------------------------<  88     3.6     |  26     |  0.53     Ca    8.6        2019 06:55    TPro  6.3    /  Alb  3.4    /  TBili  0.4    /  DBili  x      /  AST  17     /  ALT  34     /  AlkPhos  69     2019 06:55    Urinalysis Basic - ( 2019 01:00 )    Color: Yellow / Appearance: Clear / S.025 / pH: x  Gluc: x / Ketone: Trace  / Bili: Negative / Urobili: 1 mg/dL   Blood: x / Protein: 30 mg/dL / Nitrite: Negative   Leuk Esterase: Negative / RBC: 0-2 /HPF / WBC 0-2   Sq Epi: x / Non Sq Epi: Occasional / Bacteria: x    Urine Culture NG    CURRENT MEDICATIONS  MEDICATIONS  (STANDING):  clotrimazole 1% Vaginal Cream 1 Applicatorful Vaginal at bedtime  docusate sodium 100 milliGRAM(s) Oral three times a day  DULoxetine 60 milliGRAM(s) Oral daily  enoxaparin Injectable 40 milliGRAM(s) SubCutaneous daily  ergocalciferol 20579 Unit(s) Oral <User Schedule>  hydroxychloroquine 200 milliGRAM(s) Oral two times a day  multivitamin 1 Tablet(s) Oral daily  mycophenolate mofetil 1500 milliGRAM(s) Oral two times a day  pantoprazole    Tablet 40 milliGRAM(s) Oral before breakfast  predniSONE   Tablet 55 milliGRAM(s) Oral daily  senna 2 Tablet(s) Oral at bedtime  tamsulosin 0.8 milliGRAM(s) Oral at bedtime    MEDICATIONS  (PRN):  acetaminophen   Tablet .. 650 milliGRAM(s) Oral every 6 hours PRN Mild Pain (1 - 3)  baclofen 10 milliGRAM(s) Oral every 8 hours PRN Muscle Spasm  bisacodyl Suppository 10 milliGRAM(s) Rectal daily PRN Constipation  oxyCODONE    5 mG/acetaminophen 325 mG 1 Tablet(s) Oral every 6 hours PRN Moderate Pain (4 - 6)  oxyCODONE    5 mG/acetaminophen 325 mG 2 Tablet(s) Oral every 6 hours PRN Severe Pain (7 - 10)  polyethylene glycol 3350 17 Gram(s) Oral daily PRN Constipation HISTORY OF PRESENT ILLNESS  PMHx - 33 years old female with PMH of SLE, Lupus Nephritis and Fibromyalgia admitted to acute rehab after diagnosis of transverse myelitis.  She was initially brought to [facility] on  with generalized weakness, low grade fevers, generalized body pain, throat irritation, cough, ear pain, vomiting, and poor appetite. She went to her PMD few days prior who prescribed Sertraline for her depressive symptoms. As per her, symptoms started after taking that medication. At time of initial presentation, she was feeling so weak that she could not walk so her  brought her to ER. She was also complaining of difficulty urinating. She went to Urologist 10 days ago and she was prescribed Flomax which she has been taking but is unable to void. She was also noting constipation. Pt recently came back from [place] on 18 after staying there for 2.5 to 3 months. She had MRI there in December due to back pain and generalized weakness and it was normal. Workup at [facility] was notable for transverse myelitis.  She received plamapheresis, rtx and steroids and improved. Course complicated with urinary retention and ESBL ECOLI (tx'd with ertapenem, last dose ).     Pt medically optimized and cleared for transfer to [place] for acute rehab on 19.     TODAY'S SUBJECTIVE &  REVIEW OF SYMPTOMS  No acute events overnight.  Pt reports not having had slept well.  Complains of continued "belt-like" feeling of "tightness" across lower abdomen.  Pt reports less numbness and tingling sensation in both legs. Feels her strength is improving but left leg still weaker than right.  There is occasional spasms BLEs.  + BM yesterday. Voiding slightly with Valsalva.     [X] Constitutional WNL       [x] HEENT   [X] Cardio WNL              [X] Resp WNL            [X] GI -                    []  - Urinary retention  [] MSK - Weakness  [X] Neuro weakness  [] Cognitive    [X] Psych WNL  [ x ] Skin         VITALS  Vital Signs Last 24 Hrs  T(C): 36.6 (2019 07:32), Max: 36.9 (2019 21:30)  T(F): 97.9 (2019 07:32), Max: 98.5 (2019 21:30)  HR: 78 (2019 07:32) (78 - 85)  BP: 103/73 (2019 07:32) (103/73 - 114/81)  BP(mean): --  RR: 14 (2019 07:32) (14 - 14)  SpO2: 100% (2019 07:32) (99% - 100%)    PHYSICAL EXAM  General- NAD, Comfortable                                                                                    HEENT - NCAT,  Cardio- RRR, S1S2                                                                          Resp- CTA bilaterally, No wheeze   Abdomen - BS+, Soft, NTND                                                                                    Extremities - No C/C/E  Skin: Intact                                                                                                                 Wounds: none     Neuro                   Cognitive - Awake, Alert, AAO x 3                                                                          Communication - Fluent, No dysarthria                                                                             Sensory - Decreased to LT to level of T8       MSK - wnl   Motor   LEFT    UE: SF [5/5], EF [5/5], EE [5/5], WE [5/5],  [wnl]  RIGHT UE: SF [5/5], EF [5/5], EE [5/5], WE [5/5],  [wnl]  LEFT    LE:  HF [<3/5], HE 3/5, KE [5/5], KF 3+, DF [5/5], EHL [5/5],  PF [5/5]  RIGHT LE:  HF [4/5],HE 4/5, KE [5/5], KF 5/5, DF [5/5],   PF [5/5]                                                                                      Psychiatric - Mood stable, Affect WNL    FUNCTIONAL PROGRESS:  Bed mobility: CS/CG  Transfers: supervision  Gait: Amb 145' RW CS/CG  Stairs: 12 steps 2HR CG      RECENT LABS             LABS:                        11.4   3.8   )-----------( 390      ( 2019 06:55 )             35.3     2019 06:55    139    |  101    |  12     ----------------------------<  88     3.6     |  26     |  0.53     Ca    8.6        2019 06:55    TPro  6.3    /  Alb  3.4    /  TBili  0.4    /  DBili  x      /  AST  17     /  ALT  34     /  AlkPhos  69     2019 06:55    Urinalysis Basic - ( 2019 01:00 )    Color: Yellow / Appearance: Clear / S.025 / pH: x  Gluc: x / Ketone: Trace  / Bili: Negative / Urobili: 1 mg/dL   Blood: x / Protein: 30 mg/dL / Nitrite: Negative   Leuk Esterase: Negative / RBC: 0-2 /HPF / WBC 0-2   Sq Epi: x / Non Sq Epi: Occasional / Bacteria: x    Urine Culture NG    CURRENT MEDICATIONS  MEDICATIONS  (STANDING):  clotrimazole 1% Vaginal Cream 1 Applicatorful Vaginal at bedtime  docusate sodium 100 milliGRAM(s) Oral three times a day  DULoxetine 60 milliGRAM(s) Oral daily  enoxaparin Injectable 40 milliGRAM(s) SubCutaneous daily  ergocalciferol 17819 Unit(s) Oral <User Schedule>  hydroxychloroquine 200 milliGRAM(s) Oral two times a day  multivitamin 1 Tablet(s) Oral daily  mycophenolate mofetil 1500 milliGRAM(s) Oral two times a day  pantoprazole    Tablet 40 milliGRAM(s) Oral before breakfast  predniSONE   Tablet 55 milliGRAM(s) Oral daily  senna 2 Tablet(s) Oral at bedtime  tamsulosin 0.8 milliGRAM(s) Oral at bedtime    MEDICATIONS  (PRN):  acetaminophen   Tablet .. 650 milliGRAM(s) Oral every 6 hours PRN Mild Pain (1 - 3)  baclofen 10 milliGRAM(s) Oral every 8 hours PRN Muscle Spasm  bisacodyl Suppository 10 milliGRAM(s) Rectal daily PRN Constipation  oxyCODONE    5 mG/acetaminophen 325 mG 1 Tablet(s) Oral every 6 hours PRN Moderate Pain (4 - 6)  oxyCODONE    5 mG/acetaminophen 325 mG 2 Tablet(s) Oral every 6 hours PRN Severe Pain (7 - 10)  polyethylene glycol 3350 17 Gram(s) Oral daily PRN Constipation

## 2019-02-13 NOTE — CHART NOTE - NSCHARTNOTEFT_GEN_A_CORE
Nutrition Follow Up Note  Hospital Course (Per Electronic Medical Record):   Source: Medical Record [X] Patient [X] Family [ ] Nursing Staff [ ]     Diet: Regular Diet w/ Thin Liquids  Tolerates Diet Well  No Chewing/Swallowing Difficulties  No Recent Nausea, Vomiting, Diarrhea or Constipation   Consumes % of Meals (as Per Documentation) - States Good PO Intake/Appetite   Education Provided on Hydration  Obtained Food Preferences from Patient     Enteral/Parenteral Nutrition: N/A    Current Weight: 122.3lb on 2/6  Obtain New Weight   Obtain Weights Weekly      Pertinent Medications: MEDICATIONS  (STANDING):  acetaminophen   Tablet .. 650 milliGRAM(s) Oral once  bethanechol 10 milliGRAM(s) Oral every 8 hours  clotrimazole 1% Vaginal Cream 1 Applicatorful Vaginal at bedtime  diphenhydrAMINE  IVPB 20 milliGRAM(s) IV Intermittent once  docusate sodium 100 milliGRAM(s) Oral three times a day  DULoxetine 60 milliGRAM(s) Oral daily  enoxaparin Injectable 40 milliGRAM(s) SubCutaneous daily  ergocalciferol 88269 Unit(s) Oral <User Schedule>  hydroxychloroquine 200 milliGRAM(s) Oral two times a day  methylPREDNISolone sodium succinate Injectable 100 milliGRAM(s) IV Push once  multivitamin 1 Tablet(s) Oral daily  mycophenolate mofetil 1500 milliGRAM(s) Oral two times a day  pantoprazole    Tablet 40 milliGRAM(s) Oral before breakfast  predniSONE   Tablet 55 milliGRAM(s) Oral daily  riTUXimab IVPB (Non - oncologic) 1000 milliGRAM(s) IV Intermittent once  senna 2 Tablet(s) Oral at bedtime  tamsulosin 0.8 milliGRAM(s) Oral at bedtime    MEDICATIONS  (PRN):  acetaminophen   Tablet .. 650 milliGRAM(s) Oral every 6 hours PRN Mild Pain (1 - 3)  baclofen 10 milliGRAM(s) Oral every 8 hours PRN Muscle Spasm  bisacodyl Suppository 10 milliGRAM(s) Rectal daily PRN Constipation  oxyCODONE    5 mG/acetaminophen 325 mG 1 Tablet(s) Oral every 6 hours PRN Moderate Pain (4 - 6)  oxyCODONE    5 mG/acetaminophen 325 mG 2 Tablet(s) Oral every 6 hours PRN Severe Pain (7 - 10)  polyethylene glycol 3350 17 Gram(s) Oral daily PRN Constipation    Pertinent Labs:  02-11 Na139 mmol/L Glu 88 mg/dL K+ 3.6 mmol/L Cr  0.53 mg/dL BUN 12 mg/dL 02-11 Alb 3.4 g/dL    Skin: No Pressure Ulcers     Edema: None Noted     Last BM: on 2/12    Estimated Needs:   [X] No Change since Previous Assessment    Previous Nutrition Diagnosis:   No Active Nutrition Dx @ This Present Time    Nutrition Diagnosis is [X] Not Applicable    New Nutrition Diagnosis: [X] Not Applicable    Interventions:   1. Recommend Continue Nutrition Plan of Care   2. Education Provided on Hydration    Monitoring & Evaluation:   [X] Weights   [X] PO Intake   [X] Follow Up (Per Protocol)  [X] Tolerance to Diet Prescription   [X] Other: Labs  RD Remains Available.  Darnell Galloway RD

## 2019-02-14 PROCEDURE — 99233 SBSQ HOSP IP/OBS HIGH 50: CPT

## 2019-02-14 PROCEDURE — 99232 SBSQ HOSP IP/OBS MODERATE 35: CPT | Mod: GC

## 2019-02-14 RX ADMIN — GABAPENTIN 100 MILLIGRAM(S): 400 CAPSULE ORAL at 17:30

## 2019-02-14 RX ADMIN — SENNA PLUS 2 TABLET(S): 8.6 TABLET ORAL at 20:54

## 2019-02-14 RX ADMIN — Medication 1 APPLICATORFUL: at 20:58

## 2019-02-14 RX ADMIN — Medication 15 MILLIGRAM(S): at 06:21

## 2019-02-14 RX ADMIN — GABAPENTIN 100 MILLIGRAM(S): 400 CAPSULE ORAL at 06:22

## 2019-02-14 RX ADMIN — Medication 100 MILLIGRAM(S): at 12:50

## 2019-02-14 RX ADMIN — PANTOPRAZOLE SODIUM 40 MILLIGRAM(S): 20 TABLET, DELAYED RELEASE ORAL at 06:21

## 2019-02-14 RX ADMIN — GABAPENTIN 300 MILLIGRAM(S): 400 CAPSULE ORAL at 20:53

## 2019-02-14 RX ADMIN — TAMSULOSIN HYDROCHLORIDE 0.8 MILLIGRAM(S): 0.4 CAPSULE ORAL at 20:54

## 2019-02-14 RX ADMIN — Medication 5 MILLIGRAM(S): at 14:36

## 2019-02-14 RX ADMIN — Medication 5 MILLIGRAM(S): at 20:53

## 2019-02-14 RX ADMIN — MYCOPHENOLATE MOFETIL 1500 MILLIGRAM(S): 250 CAPSULE ORAL at 06:21

## 2019-02-14 RX ADMIN — Medication 55 MILLIGRAM(S): at 06:21

## 2019-02-14 RX ADMIN — Medication 6 MILLIGRAM(S): at 20:54

## 2019-02-14 RX ADMIN — DULOXETINE HYDROCHLORIDE 60 MILLIGRAM(S): 30 CAPSULE, DELAYED RELEASE ORAL at 12:47

## 2019-02-14 RX ADMIN — Medication 5 MILLIGRAM(S): at 06:21

## 2019-02-14 RX ADMIN — Medication 15 MILLIGRAM(S): at 20:53

## 2019-02-14 RX ADMIN — Medication 15 MILLIGRAM(S): at 14:36

## 2019-02-14 RX ADMIN — Medication 1 TABLET(S): at 12:47

## 2019-02-14 RX ADMIN — Medication 100 MILLIGRAM(S): at 20:53

## 2019-02-14 RX ADMIN — Medication 200 MILLIGRAM(S): at 06:21

## 2019-02-14 RX ADMIN — MYCOPHENOLATE MOFETIL 1500 MILLIGRAM(S): 250 CAPSULE ORAL at 17:31

## 2019-02-14 RX ADMIN — Medication 100 MILLIGRAM(S): at 06:22

## 2019-02-14 RX ADMIN — ENOXAPARIN SODIUM 40 MILLIGRAM(S): 100 INJECTION SUBCUTANEOUS at 12:46

## 2019-02-14 RX ADMIN — Medication 200 MILLIGRAM(S): at 17:30

## 2019-02-14 NOTE — PROGRESS NOTE ADULT - ASSESSMENT
Assessment and Plan     33 year-old Woman with PMH of SLE, lupus nephritis, and fibromyalgia admitted to rehab with transverse myelitis resulting in decreased functional mobility, gait instability and ADL impairments.    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 Premedication with tylenol 650 mg, bendaryl 20 mg IVPB, and solumedrol 100 mg ivpb.   - f/u with rheumatology as an outpatient after that. To be repeated Rituximab in 6 months after that.    Neurogenic Bladder / Acute urinary retention  - ertapenem started 1/30, last day 2/4 per ID.  Renal us showed no evidence of  hydronephrosis.  Plan   - c/w bladder scans-latest scan 320  - c/w straight cath as needed.  Recent urine culture 2/6 NG  - c/w flomax 0.8mg po qhs   - 2/11: Added Urecholine 10 mg TID with slight improvement; increase to 15mg TID 2/13    SLE with nephritis    - stable renal disease, elevated dsDNA, low complements  - rheum f/u      Fibromyalgia   - c/w Tylenol for pain prn     Psych  -Depression: c/w Cymbalta 60mg po qd  -Insomnia: Add melatonin 6mg qhs    Vitamin D deficiency  -Vit D 10.9.  Added Vit D 50,000U weekly    GI  -Bowel Mgmt - Colace, Senna,  Miralax PRN  -FEN: MVI  -GI ppx; Protonix    Pain Mgmt - Tylenol PRN, oxycodone prn, baclofen prn spasms-change to scheduled.  Add Gabapentin 100mg bid and 300mg qhs    /Bladder Mgmt - Retention;  Instructed in self cath     DVT ppx: Lovenox, SCDs, TEDs Assessment and Plan     33 year-old Woman with PMH of SLE, lupus nephritis, and fibromyalgia admitted to rehab with transverse myelitis resulting in decreased functional mobility, gait instability and ADL impairments.    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 Premedication with tylenol 650 mg, bendaryl 20 mg IVPB, and solumedrol 100 mg ivpb.   - f/u with rheumatology as an outpatient after that. To be repeated Rituximab in 6 months after that.    Neurogenic Bladder / Acute urinary retention  - ertapenem started 1/30, last day 2/4 per ID.  Renal us showed no evidence of  hydronephrosis.  Plan   - c/w bladder scans-latest scan 320  - c/w straight cath as needed.  Recent urine culture 2/6 NG  - c/w flomax 0.8mg po qhs   - 2/11: Added Urecholine 10 mg TID with slight improvement; increase to 15mg TID 2/13    SLE with nephritis    - stable renal disease, elevated dsDNA, low complements  - rheum f/u      Fibromyalgia   - c/w Tylenol for pain prn     Psych  -Depression: c/w Cymbalta 60mg po qd  -Insomnia: Add melatonin 6mg qhs    Vitamin D deficiency  -Vit D 10.9.  Added Vit D 50,000U weekly    GI  -Bowel Mgmt - Colace, Senna,  Miralax PRN  -FEN: MVI  -GI ppx; Protonix    Pain Mgmt - Tylenol PRN, oxycodone prn, baclofen prn spasms-change to scheduled.  Added Gabapentin 100mg bid and 300mg qhs    /Bladder Mgmt - Retention;  Instructed in self cath     DVT ppx: Lovenox, SCDs, TEDs

## 2019-02-14 NOTE — PROGRESS NOTE ADULT - PROBLEM SELECTOR PLAN 1
for rituxan today  neurology consulted by primary team for dosing  cont prednisone
for rituxan is planned on saturday now  neurology following for dosing  gabapentin started 2/13 for pain caused by this myelitis -so far not much affect
for rituxan today or tomorrow  neurology following for dosing

## 2019-02-14 NOTE — PROGRESS NOTE ADULT - SUBJECTIVE AND OBJECTIVE BOX
Patient is a 33y old  Female who presents with a chief complaint of Weakness, bladder dysfunction, gait instability, ADL impairments 2/2 Transverse myelitis (14 Feb 2019 11:50)      Patient seen and examined at bedside.  Patient denies any chest Pain or shortness of breath.  Reports that the midline abdominal pain continues - we discussed this neuropathic pain   ALLERGIES:  No Known Allergies    MEDICATIONS:  acetaminophen   Tablet .. 650 milliGRAM(s) Oral every 6 hours PRN  acetaminophen   Tablet .. 650 milliGRAM(s) Oral once  baclofen 5 milliGRAM(s) Oral every 8 hours  bethanechol 15 milliGRAM(s) Oral every 8 hours  bisacodyl Suppository 10 milliGRAM(s) Rectal daily PRN  clotrimazole 1% Vaginal Cream 1 Applicatorful Vaginal at bedtime  diphenhydrAMINE  IVPB 20 milliGRAM(s) IV Intermittent once  ergocalciferol 19161 Unit(s) Oral <User Schedule>  gabapentin 100 milliGRAM(s) Oral two times a day  gabapentin 300 milliGRAM(s) Oral at bedtime  melatonin 6 milliGRAM(s) Oral <User Schedule>  methylPREDNISolone sodium succinate Injectable 100 milliGRAM(s) IV Push once  oxyCODONE    5 mG/acetaminophen 325 mG 1 Tablet(s) Oral every 6 hours PRN  oxyCODONE    5 mG/acetaminophen 325 mG 2 Tablet(s) Oral every 6 hours PRN  riTUXimab IVPB (Non - oncologic) 1000 milliGRAM(s) IV Intermittent once    Vital Signs Last 24 Hrs  T(F): 98.4 (14 Feb 2019 08:14), Max: 98.6 (13 Feb 2019 20:22)  HR: 101 (14 Feb 2019 08:14) (97 - 101)  BP: 112/79 (14 Feb 2019 08:14) (101/52 - 112/79)  RR: 14 (14 Feb 2019 08:14) (14 - 14)  SpO2: 98% (14 Feb 2019 08:14) (98% - 100%)  I&O's Summary      PHYSICAL EXAM:  General: NAD, A/O x 3  ENT: MMM  Neck: Supple, No JVD  Lungs: Clear to auscultation bilaterally  Cardio: RRR, S1/S2, No murmurs  Abdomen: Soft, Nontender, Nondistended; Bowel sounds present  Extremities: No cyanosis, No edema    LABS:                            CAPILLARY BLOOD GLUCOSE                RADIOLOGY & ADDITIONAL TESTS:    Care Discussed with Consultants/Other Providers:

## 2019-02-14 NOTE — PROGRESS NOTE ADULT - ATTENDING COMMENTS
Pt seen and examined.  Agree with above resident note.  Pt slept well.  Voiding better with urecholine although still requiring IC.  Notes less numbness, tingling and weakness BLEs.  Cont comprehensive rehab, dvt ppx, pain management.  Rituxan scheduled to be given 2/16

## 2019-02-14 NOTE — PROGRESS NOTE ADULT - PROBLEM SELECTOR PROBLEM 3
Weakness of both lower extremities

## 2019-02-14 NOTE — PROGRESS NOTE ADULT - SUBJECTIVE AND OBJECTIVE BOX
HISTORY OF PRESENT ILLNESS  PMHx - 33 years old female with PMH of SLE, Lupus Nephritis and Fibromyalgia admitted to acute rehab after diagnosis of transverse myelitis.  She was initially brought to [facility] on  with generalized weakness, low grade fevers, generalized body pain, throat irritation, cough, ear pain, vomiting, and poor appetite. She went to her PMD few days prior who prescribed Sertraline for her depressive symptoms. As per her, symptoms started after taking that medication. At time of initial presentation, she was feeling so weak that she could not walk so her  brought her to ER. She was also complaining of difficulty urinating. She went to Urologist 10 days ago and she was prescribed Flomax which she has been taking but is unable to void. She was also noting constipation. Pt recently came back from [place] on 18 after staying there for 2.5 to 3 months. She had MRI there in December due to back pain and generalized weakness and it was normal. Workup at [facility] was notable for transverse myelitis.  She received plamapheresis, rtx and steroids and improved. Course complicated with urinary retention and ESBL ECOLI (tx'd with ertapenem, last dose ).     Pt medically optimized and cleared for transfer to [place] for acute rehab on 19.     TODAY'S SUBJECTIVE &  REVIEW OF SYMPTOMS  No acute events overnight. Seen and examined. Reports improved sensation in her left LE, tingling has improved. Spasms also improved. Feels she is doing better in therapy.   : She is voiding a little more.  Last BM yesterday.       [X] Constitutional WNL       [x] HEENT   [X] Cardio WNL              [X] Resp WNL            [X] GI - wnl                   []  - Urinary retention, as above   [] MSK - Weakness  [X] Neuro weakness  [] Cognitive    [X] Psych WNL  [ x ] Skin         VITALS  Vital Signs Last 24 Hrs  T(C): 36.9 (2019 08:14), Max: 37 (2019 20:22)  T(F): 98.4 (2019 08:14), Max: 98.6 (2019 20:22)  HR: 101 (2019 08:14) (97 - 101)  BP: 112/79 (2019 08:14) (101/52 - 112/79)  RR: 14 (2019 08:14) (14 - 14)  SpO2: 98% (2019 08:14) (98% - 100%)    PHYSICAL EXAM  General- NAD, Comfortable                                                                                    HEENT - NCAT,  Cardio- RRR, S1S2                                                                          Resp- CTA bilaterally, No wheeze   Abdomen - BS+, Soft, NTND                                                                                    Extremities - No C/C/E  Skin: Intact                                                                                                                 Wounds: none     Neuro                   Cognitive - Awake, Alert, AAO x 3                                                                          Communication - Fluent, No dysarthria                                                                             Sensory - Decreased to LT to level of T8       MSK - wnl   Motor   LEFT    UE: SF [5/5], EF [5/5], EE [5/5], WE [5/5],  [wnl]  RIGHT UE: SF [5/5], EF [5/5], EE [5/5], WE [5/5],  [wnl]  LEFT    LE:  HF 5/5, HE 3/5, KE 4+/5, KF 4/5, hip abduction 3/5, hip adduction 3/5, evertors 5/5, invertors 5/5, DF 5/5, EHL 4/5, PF 5/5  RIGHT LE:  HF 5/5, HE 3/5, KE 5/5, KE 4/5, hip abduction 3/5, hip adduction 3/5, evertors 5/5, invertors 5/5, DF 5/5, EHL 3/5, PF 5/5                                                                                    Psychiatric - Mood stable, Affect WNL    FUNCTIONAL PROGRESS:  Bed mobility: CS/CG  Transfers: close supervision  Gait: Amb 150' RW CS/CG  ADLs: Shower tx, commode tx      RECENT LABS             LABS:                        11.4   3.8   )-----------( 390      ( 2019 06:55 )             35.3     2019 06:55    139    |  101    |  12     ----------------------------<  88     3.6     |  26     |  0.53     Ca    8.6        2019 06:55    TPro  6.3    /  Alb  3.4    /  TBili  0.4    /  DBili  x      /  AST  17     /  ALT  34     /  AlkPhos  69     2019 06:55    Urinalysis Basic - ( 2019 01:00 )    Color: Yellow / Appearance: Clear / S.025 / pH: x  Gluc: x / Ketone: Trace  / Bili: Negative / Urobili: 1 mg/dL   Blood: x / Protein: 30 mg/dL / Nitrite: Negative   Leuk Esterase: Negative / RBC: 0-2 /HPF / WBC 0-2   Sq Epi: x / Non Sq Epi: Occasional / Bacteria: x    Urine Culture NG    CURRENT MEDICATIONS  MEDICATIONS  (STANDING):  acetaminophen   Tablet .. 650 milliGRAM(s) Oral once  baclofen 5 milliGRAM(s) Oral every 8 hours  bethanechol 15 milliGRAM(s) Oral every 8 hours  clotrimazole 1% Vaginal Cream 1 Applicatorful Vaginal at bedtime  diphenhydrAMINE  IVPB 20 milliGRAM(s) IV Intermittent once  docusate sodium 100 milliGRAM(s) Oral three times a day  DULoxetine 60 milliGRAM(s) Oral daily  enoxaparin Injectable 40 milliGRAM(s) SubCutaneous daily  ergocalciferol 45382 Unit(s) Oral <User Schedule>  gabapentin 100 milliGRAM(s) Oral two times a day  gabapentin 300 milliGRAM(s) Oral at bedtime  hydroxychloroquine 200 milliGRAM(s) Oral two times a day  melatonin 6 milliGRAM(s) Oral <User Schedule>  methylPREDNISolone sodium succinate Injectable 100 milliGRAM(s) IV Push once  multivitamin 1 Tablet(s) Oral daily  mycophenolate mofetil 1500 milliGRAM(s) Oral two times a day  pantoprazole    Tablet 40 milliGRAM(s) Oral before breakfast  predniSONE   Tablet 55 milliGRAM(s) Oral daily  riTUXimab IVPB (Non - oncologic) 1000 milliGRAM(s) IV Intermittent once  senna 2 Tablet(s) Oral at bedtime  tamsulosin 0.8 milliGRAM(s) Oral at bedtime    MEDICATIONS  (PRN):  acetaminophen   Tablet .. 650 milliGRAM(s) Oral every 6 hours PRN Mild Pain (1 - 3)  bisacodyl Suppository 10 milliGRAM(s) Rectal daily PRN Constipation  oxyCODONE    5 mG/acetaminophen 325 mG 1 Tablet(s) Oral every 6 hours PRN Moderate Pain (4 - 6)  oxyCODONE    5 mG/acetaminophen 325 mG 2 Tablet(s) Oral every 6 hours PRN Severe Pain (7 - 10)  polyethylene glycol 3350 17 Gram(s) Oral daily PRN Constipation

## 2019-02-15 PROCEDURE — 99232 SBSQ HOSP IP/OBS MODERATE 35: CPT

## 2019-02-15 RX ORDER — OXYCODONE AND ACETAMINOPHEN 5; 325 MG/1; MG/1
1 TABLET ORAL EVERY 6 HOURS
Qty: 0 | Refills: 0 | Status: DISCONTINUED | OUTPATIENT
Start: 2019-02-15 | End: 2019-02-18

## 2019-02-15 RX ADMIN — GABAPENTIN 100 MILLIGRAM(S): 400 CAPSULE ORAL at 17:58

## 2019-02-15 RX ADMIN — ENOXAPARIN SODIUM 40 MILLIGRAM(S): 100 INJECTION SUBCUTANEOUS at 12:26

## 2019-02-15 RX ADMIN — MYCOPHENOLATE MOFETIL 1500 MILLIGRAM(S): 250 CAPSULE ORAL at 17:59

## 2019-02-15 RX ADMIN — GABAPENTIN 300 MILLIGRAM(S): 400 CAPSULE ORAL at 22:02

## 2019-02-15 RX ADMIN — Medication 55 MILLIGRAM(S): at 06:05

## 2019-02-15 RX ADMIN — MYCOPHENOLATE MOFETIL 1500 MILLIGRAM(S): 250 CAPSULE ORAL at 06:05

## 2019-02-15 RX ADMIN — DULOXETINE HYDROCHLORIDE 60 MILLIGRAM(S): 30 CAPSULE, DELAYED RELEASE ORAL at 12:26

## 2019-02-15 RX ADMIN — Medication 1 TABLET(S): at 12:26

## 2019-02-15 RX ADMIN — Medication 1 APPLICATORFUL: at 22:03

## 2019-02-15 RX ADMIN — Medication 200 MILLIGRAM(S): at 06:06

## 2019-02-15 RX ADMIN — TAMSULOSIN HYDROCHLORIDE 0.8 MILLIGRAM(S): 0.4 CAPSULE ORAL at 22:01

## 2019-02-15 RX ADMIN — Medication 200 MILLIGRAM(S): at 17:58

## 2019-02-15 RX ADMIN — Medication 100 MILLIGRAM(S): at 06:06

## 2019-02-15 RX ADMIN — Medication 5 MILLIGRAM(S): at 06:04

## 2019-02-15 RX ADMIN — PANTOPRAZOLE SODIUM 40 MILLIGRAM(S): 20 TABLET, DELAYED RELEASE ORAL at 06:06

## 2019-02-15 RX ADMIN — SENNA PLUS 2 TABLET(S): 8.6 TABLET ORAL at 22:00

## 2019-02-15 RX ADMIN — Medication 100 MILLIGRAM(S): at 22:02

## 2019-02-15 RX ADMIN — Medication 15 MILLIGRAM(S): at 22:01

## 2019-02-15 RX ADMIN — Medication 15 MILLIGRAM(S): at 14:07

## 2019-02-15 RX ADMIN — Medication 5 MILLIGRAM(S): at 22:02

## 2019-02-15 RX ADMIN — Medication 15 MILLIGRAM(S): at 06:04

## 2019-02-15 RX ADMIN — ERGOCALCIFEROL 50000 UNIT(S): 1.25 CAPSULE ORAL at 17:58

## 2019-02-15 RX ADMIN — GABAPENTIN 100 MILLIGRAM(S): 400 CAPSULE ORAL at 06:04

## 2019-02-15 RX ADMIN — Medication 5 MILLIGRAM(S): at 14:10

## 2019-02-15 RX ADMIN — Medication 6 MILLIGRAM(S): at 22:02

## 2019-02-15 RX ADMIN — Medication 100 MILLIGRAM(S): at 14:07

## 2019-02-15 NOTE — PROGRESS NOTE ADULT - SUBJECTIVE AND OBJECTIVE BOX
HISTORY OF PRESENT ILLNESS  PMHx - 33 years old female with PMH of SLE, Lupus Nephritis and Fibromyalgia admitted to acute rehab after diagnosis of transverse myelitis.  She was initially brought to [facility] on  with generalized weakness, low grade fevers, generalized body pain, throat irritation, cough, ear pain, vomiting, and poor appetite. She went to her PMD few days prior who prescribed Sertraline for her depressive symptoms. As per her, symptoms started after taking that medication. At time of initial presentation, she was feeling so weak that she could not walk so her  brought her to ER. She was also complaining of difficulty urinating. She went to Urologist 10 days ago and she was prescribed Flomax which she has been taking but is unable to void. She was also noting constipation. Pt recently came back from [place] on 18 after staying there for 2.5 to 3 months. She had MRI there in December due to back pain and generalized weakness and it was normal. Workup at [facility] was notable for transverse myelitis.  She received plamapheresis, rtx and steroids and improved. Course complicated with urinary retention and ESBL ECOLI (tx'd with ertapenem, last dose ).     Pt medically optimized and cleared for transfer to [place] for acute rehab on 19.     TODAY'S SUBJECTIVE &  REVIEW OF SYMPTOMS  No acute events overnight. Seen and examined. Slept well last night.   Reports less numbness and tingling in her legs and less spasms. Continued belt like sensation of pain across abdomen. Feels her left leg is getting stronger with improved sensation (states sensation in both legs now equal).   Last BM 2 days ago. Voiding small amounts of urine spontaneously, otherwise requires SC.     [X] Constitutional WNL       [x] HEENT   [X] Cardio WNL              [X] Resp WNL            [X] GI - wnl                   []  - Urinary retention, as above   [] MSK - Weakness  [X] Neuro weakness  [] Cognitive    [X] Psych WNL  [ x ] Skin         VITALS  Vital Signs Last 24 Hrs  T(C): 36.6 (15 Feb 2019 07:20), Max: 36.7 (2019 20:45)  T(F): 97.8 (15 Feb 2019 07:20), Max: 98.1 (2019 20:45)  HR: 80 (15 Feb 2019 07:20) (80 - 98)  BP: 102/69 (15 Feb 2019 07:20) (102/69 - 111/76)  RR: 14 (15 Feb 2019 07:20) (14 - 14)  SpO2: 100% (15 Feb 2019 07:20) (99% - 100%)    PHYSICAL EXAM  General- NAD, Comfortable                                                                                    HEENT - NCAT,  Cardio- RRR, S1S2                                                                          Resp- CTA bilaterally, No wheeze   Abdomen - BS+, Soft, NTND                                                                                    Extremities - No C/C/E  Skin: Intact                                                                                                                 Wounds: none     Neuro                   Cognitive - Awake, Alert, AAO x 3                                                                          Communication - Fluent, No dysarthria                                                                             Sensory - Decreased to LT to level of T8       MSK - wnl   Motor   LEFT    UE: SF [5/5], EF [5/5], EE [5/5], WE [5/5],  [wnl]  RIGHT UE: SF [5/5], EF [5/5], EE [5/5], WE [5/5],  [wnl]  LEFT    LE:  HF 5/5, HE 3/5, KE 4+/5, KF 4/5, hip abduction 3/5, hip adduction 3/5, evertors 5/5, invertors 5/5, DF 5/5, EHL 4/5, PF 5/5  RIGHT LE:  HF 5/5, HE 3/5, KE 5/5, KE 4/5, hip abduction 3/5, hip adduction 3/5, evertors 5/5, invertors 5/5, DF 5/5, EHL 3/5, PF 5/5                                                                                    Psychiatric - Mood stable, Affect WNL    FUNCTIONAL PROGRESS:  Bed mobility: CS/CG  Transfers: close supervision  Gait: Amb 100' RW CS  ADLs: Laundry - CS    RECENT LABS             LABS:                        11.4   3.8   )-----------( 390      ( 2019 06:55 )             35.3     2019 06:55    139    |  101    |  12     ----------------------------<  88     3.6     |  26     |  0.53     Ca    8.6        2019 06:55    TPro  6.3    /  Alb  3.4    /  TBili  0.4    /  DBili  x      /  AST  17     /  ALT  34     /  AlkPhos  69     2019 06:55    Urinalysis Basic - ( 2019 01:00 )    Color: Yellow / Appearance: Clear / S.025 / pH: x  Gluc: x / Ketone: Trace  / Bili: Negative / Urobili: 1 mg/dL   Blood: x / Protein: 30 mg/dL / Nitrite: Negative   Leuk Esterase: Negative / RBC: 0-2 /HPF / WBC 0-2   Sq Epi: x / Non Sq Epi: Occasional / Bacteria: x    Urine Culture NG    CURRENT MEDICATIONS  MEDICATIONS  (STANDING):  acetaminophen   Tablet .. 650 milliGRAM(s) Oral once  baclofen 5 milliGRAM(s) Oral every 8 hours  bethanechol 15 milliGRAM(s) Oral every 8 hours  clotrimazole 1% Vaginal Cream 1 Applicatorful Vaginal at bedtime  diphenhydrAMINE  IVPB 20 milliGRAM(s) IV Intermittent once  docusate sodium 100 milliGRAM(s) Oral three times a day  DULoxetine 60 milliGRAM(s) Oral daily  enoxaparin Injectable 40 milliGRAM(s) SubCutaneous daily  ergocalciferol 26765 Unit(s) Oral <User Schedule>  gabapentin 100 milliGRAM(s) Oral two times a day  gabapentin 300 milliGRAM(s) Oral at bedtime  hydroxychloroquine 200 milliGRAM(s) Oral two times a day  melatonin 6 milliGRAM(s) Oral <User Schedule>  methylPREDNISolone sodium succinate Injectable 100 milliGRAM(s) IV Push once  multivitamin 1 Tablet(s) Oral daily  mycophenolate mofetil 1500 milliGRAM(s) Oral two times a day  pantoprazole    Tablet 40 milliGRAM(s) Oral before breakfast  predniSONE   Tablet 55 milliGRAM(s) Oral daily  riTUXimab IVPB (Non - oncologic) 1000 milliGRAM(s) IV Intermittent once  senna 2 Tablet(s) Oral at bedtime  tamsulosin 0.8 milliGRAM(s) Oral at bedtime    MEDICATIONS  (PRN):  acetaminophen   Tablet .. 650 milliGRAM(s) Oral every 6 hours PRN Mild Pain (1 - 3)  bisacodyl Suppository 10 milliGRAM(s) Rectal daily PRN Constipation  oxyCODONE    5 mG/acetaminophen 325 mG 1 Tablet(s) Oral every 6 hours PRN Moderate Pain (4 - 6)  oxyCODONE    5 mG/acetaminophen 325 mG 2 Tablet(s) Oral every 6 hours PRN Severe Pain (7 - 10)  polyethylene glycol 3350 17 Gram(s) Oral daily PRN Constipation HISTORY OF PRESENT ILLNESS  PMHx - 33 years old female with PMH of SLE, Lupus Nephritis and Fibromyalgia admitted to acute rehab after diagnosis of transverse myelitis.  She was initially brought to [facility] on  with generalized weakness, low grade fevers, generalized body pain, throat irritation, cough, ear pain, vomiting, and poor appetite. She went to her PMD few days prior who prescribed Sertraline for her depressive symptoms. As per her, symptoms started after taking that medication. At time of initial presentation, she was feeling so weak that she could not walk so her  brought her to ER. She was also complaining of difficulty urinating. She went to Urologist 10 days ago and she was prescribed Flomax which she has been taking but is unable to void. She was also noting constipation. Pt recently came back from [place] on 18 after staying there for 2.5 to 3 months. She had MRI there in December due to back pain and generalized weakness and it was normal. Workup at [facility] was notable for transverse myelitis.  She received plamapheresis, rtx and steroids and improved. Course complicated with urinary retention and ESBL ECOLI (tx'd with ertapenem, last dose ).     Pt medically optimized and cleared for transfer to [place] for acute rehab on 19.     TODAY'S SUBJECTIVE &  REVIEW OF SYMPTOMS  No acute events overnight. Seen and examined. Slept well last night.   Reports less numbness and tingling in her legs and less spasms. Continued belt like sensation of pain across abdomen. Feels her left leg is getting stronger with improved sensation (states sensation in both legs now equal).   Last BM 2 days ago. Voiding small amounts of urine spontaneously, otherwise requires SC.     [X] Constitutional WNL       [x] HEENT   [X] Cardio WNL              [X] Resp WNL            [X] GI - wnl                   []  - Urinary retention, as above   [] MSK - Weakness  [X] Neuro weakness  [] Cognitive    [X] Psych WNL  [ x ] Skin         VITALS  Vital Signs Last 24 Hrs  T(C): 36.6 (15 Feb 2019 07:20), Max: 36.7 (2019 20:45)  T(F): 97.8 (15 Feb 2019 07:20), Max: 98.1 (2019 20:45)  HR: 80 (15 Feb 2019 07:20) (80 - 98)  BP: 102/69 (15 Feb 2019 07:20) (102/69 - 111/76)  RR: 14 (15 Feb 2019 07:20) (14 - 14)  SpO2: 100% (15 Feb 2019 07:20) (99% - 100%)    PHYSICAL EXAM  General- NAD, Comfortable                                                                                    HEENT - NCAT,  Cardio- RRR, S1S2                                                                          Resp- CTA bilaterally, No wheeze   Abdomen - BS+, Soft, NTND                                                                                    Extremities - No C/C/E  Skin: Intact                                                                                                                 Wounds: none     Neuro                   Cognitive - Awake, Alert, AAO x 3                                                                          Communication - Fluent, No dysarthria                                                                             Sensory - Decreased to LT to level of T8       MSK - wnl   Motor   LEFT    UE: SF [5/5], EF [5/5], EE [5/5], WE [5/5],  [wnl]  RIGHT UE: SF [5/5], EF [5/5], EE [5/5], WE [5/5],  [wnl]  LEFT    LE:  HF 5/5, HE 3/5, KE 4+/5, KF 4/5, hip abduction 3/5, hip adduction 3/5, evertors 5/5, invertors 5/5, DF 5/5, EHL 4/5, PF 5/5  RIGHT LE:  HF 5/5, HE 3/5, KE 5/5, KE 4/5, hip abduction 3/5, hip adduction 3/5, evertors 5/5, invertors 5/5, DF 5/5, EHL 3/5, PF 5/5                                                                                    Psychiatric - Mood stable, Affect WNL    FUNCTIONAL PROGRESS:  Bed mobility: CS/CG  Transfers: close supervision  Gait: Amb 100' RW supervision  ADLs: Laundry - CS    RECENT LABS             LABS:                        11.4   3.8   )-----------( 390      ( 2019 06:55 )             35.3     2019 06:55    139    |  101    |  12     ----------------------------<  88     3.6     |  26     |  0.53     Ca    8.6        2019 06:55    TPro  6.3    /  Alb  3.4    /  TBili  0.4    /  DBili  x      /  AST  17     /  ALT  34     /  AlkPhos  69     2019 06:55    Urinalysis Basic - ( 2019 01:00 )    Color: Yellow / Appearance: Clear / S.025 / pH: x  Gluc: x / Ketone: Trace  / Bili: Negative / Urobili: 1 mg/dL   Blood: x / Protein: 30 mg/dL / Nitrite: Negative   Leuk Esterase: Negative / RBC: 0-2 /HPF / WBC 0-2   Sq Epi: x / Non Sq Epi: Occasional / Bacteria: x    Urine Culture NG    CURRENT MEDICATIONS  MEDICATIONS  (STANDING):  acetaminophen   Tablet .. 650 milliGRAM(s) Oral once  baclofen 5 milliGRAM(s) Oral every 8 hours  bethanechol 15 milliGRAM(s) Oral every 8 hours  clotrimazole 1% Vaginal Cream 1 Applicatorful Vaginal at bedtime  diphenhydrAMINE  IVPB 20 milliGRAM(s) IV Intermittent once  docusate sodium 100 milliGRAM(s) Oral three times a day  DULoxetine 60 milliGRAM(s) Oral daily  enoxaparin Injectable 40 milliGRAM(s) SubCutaneous daily  ergocalciferol 15067 Unit(s) Oral <User Schedule>  gabapentin 100 milliGRAM(s) Oral two times a day  gabapentin 300 milliGRAM(s) Oral at bedtime  hydroxychloroquine 200 milliGRAM(s) Oral two times a day  melatonin 6 milliGRAM(s) Oral <User Schedule>  methylPREDNISolone sodium succinate Injectable 100 milliGRAM(s) IV Push once  multivitamin 1 Tablet(s) Oral daily  mycophenolate mofetil 1500 milliGRAM(s) Oral two times a day  pantoprazole    Tablet 40 milliGRAM(s) Oral before breakfast  predniSONE   Tablet 55 milliGRAM(s) Oral daily  riTUXimab IVPB (Non - oncologic) 1000 milliGRAM(s) IV Intermittent once  senna 2 Tablet(s) Oral at bedtime  tamsulosin 0.8 milliGRAM(s) Oral at bedtime    MEDICATIONS  (PRN):  acetaminophen   Tablet .. 650 milliGRAM(s) Oral every 6 hours PRN Mild Pain (1 - 3)  bisacodyl Suppository 10 milliGRAM(s) Rectal daily PRN Constipation  oxyCODONE    5 mG/acetaminophen 325 mG 1 Tablet(s) Oral every 6 hours PRN Moderate Pain (4 - 6)  oxyCODONE    5 mG/acetaminophen 325 mG 2 Tablet(s) Oral every 6 hours PRN Severe Pain (7 - 10)  polyethylene glycol 3350 17 Gram(s) Oral daily PRN Constipation

## 2019-02-15 NOTE — PROGRESS NOTE ADULT - ATTENDING COMMENTS
Agree with above.  Doing well.  Improving functionally.  Increased strength noted.  Rituxan pending for tomorrow.  cont comprehensive rehab, dvt ppx, pain management

## 2019-02-15 NOTE — PROGRESS NOTE ADULT - ASSESSMENT
Assessment and Plan     33 year-old Woman with PMH of SLE, lupus nephritis, and fibromyalgia admitted to rehab with transverse myelitis resulting in decreased functional mobility, gait instability and ADL impairments.    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 Premedication with tylenol 650 mg, bendaryl 20 mg IVPB, and solumedrol 100 mg ivpb.   - f/u with rheumatology as an outpatient after that. To be repeated Rituximab in 6 months after that.    Neurogenic Bladder / Acute urinary retention  - ertapenem started 1/30, last day 2/4 per ID.  Renal us showed no evidence of  hydronephrosis.  Plan   - c/w bladder scans - SC last evening 400   - c/w straight cath as needed.  Recent urine culture 2/6 NG  - c/w flomax 0.8mg po qhs   - 2/11: Added Urecholine 10 mg TID with slight improvement; increase to 15mg TID 2/13    SLE with nephritis    - stable renal disease, elevated dsDNA, low complements  - rheum f/u      Fibromyalgia   - c/w Tylenol for pain prn     Psych  -Depression: c/w Cymbalta 60mg po qd  -Insomnia: Add melatonin 6mg qhs    Vitamin D deficiency  -Vit D 10.9.  Added Vit D 50,000U weekly    GI  -Bowel Mgmt - Colace, Senna,  Miralax PRN  -FEN: MVI  -GI ppx; Protonix    Pain Mgmt - Tylenol PRN, oxycodone prn, baclofen prn spasms-change to scheduled.  Added Gabapentin 100mg bid and 300mg qhs    /Bladder Mgmt - Retention;  Instructed in self cath     DVT ppx: Lovenox, SCDs, TEDs

## 2019-02-16 LAB
ALBUMIN SERPL ELPH-MCNC: 3.2 G/DL — LOW (ref 3.3–5)
ALP SERPL-CCNC: 67 U/L — SIGNIFICANT CHANGE UP (ref 40–120)
ALT FLD-CCNC: 44 U/L DA — SIGNIFICANT CHANGE UP (ref 10–45)
ANION GAP SERPL CALC-SCNC: 12 MMOL/L — SIGNIFICANT CHANGE UP (ref 5–17)
AST SERPL-CCNC: 19 U/L — SIGNIFICANT CHANGE UP (ref 10–40)
BASOPHILS # BLD AUTO: 0.1 K/UL — SIGNIFICANT CHANGE UP (ref 0–0.2)
BASOPHILS NFR BLD AUTO: 0.8 % — SIGNIFICANT CHANGE UP (ref 0–2)
BILIRUB SERPL-MCNC: 0.4 MG/DL — SIGNIFICANT CHANGE UP (ref 0.2–1.2)
BUN SERPL-MCNC: 17 MG/DL — SIGNIFICANT CHANGE UP (ref 7–23)
CALCIUM SERPL-MCNC: 8.7 MG/DL — SIGNIFICANT CHANGE UP (ref 8.4–10.5)
CHLORIDE SERPL-SCNC: 103 MMOL/L — SIGNIFICANT CHANGE UP (ref 96–108)
CO2 SERPL-SCNC: 25 MMOL/L — SIGNIFICANT CHANGE UP (ref 22–31)
CREAT SERPL-MCNC: 0.48 MG/DL — LOW (ref 0.5–1.3)
EOSINOPHIL # BLD AUTO: 0 K/UL — SIGNIFICANT CHANGE UP (ref 0–0.5)
EOSINOPHIL NFR BLD AUTO: 0.1 % — SIGNIFICANT CHANGE UP (ref 0–6)
GLUCOSE SERPL-MCNC: 146 MG/DL — HIGH (ref 70–99)
HCT VFR BLD CALC: 33.7 % — LOW (ref 34.5–45)
HGB BLD-MCNC: 10.9 G/DL — LOW (ref 11.5–15.5)
LYMPHOCYTES # BLD AUTO: 0.8 K/UL — LOW (ref 1–3.3)
LYMPHOCYTES # BLD AUTO: 10.2 % — LOW (ref 13–44)
MCHC RBC-ENTMCNC: 29 PG — SIGNIFICANT CHANGE UP (ref 27–34)
MCHC RBC-ENTMCNC: 32.2 GM/DL — SIGNIFICANT CHANGE UP (ref 32–36)
MCV RBC AUTO: 90 FL — SIGNIFICANT CHANGE UP (ref 80–100)
MONOCYTES # BLD AUTO: 0.3 K/UL — SIGNIFICANT CHANGE UP (ref 0–0.9)
MONOCYTES NFR BLD AUTO: 4.1 % — SIGNIFICANT CHANGE UP (ref 1–9)
NEUTROPHILS # BLD AUTO: 6.3 K/UL — SIGNIFICANT CHANGE UP (ref 1.8–7.4)
NEUTROPHILS NFR BLD AUTO: 84.8 % — HIGH (ref 43–77)
PLATELET # BLD AUTO: 356 K/UL — SIGNIFICANT CHANGE UP (ref 150–400)
POTASSIUM SERPL-MCNC: 3.8 MMOL/L — SIGNIFICANT CHANGE UP (ref 3.5–5.3)
POTASSIUM SERPL-SCNC: 3.8 MMOL/L — SIGNIFICANT CHANGE UP (ref 3.5–5.3)
PROT SERPL-MCNC: 6.2 G/DL — SIGNIFICANT CHANGE UP (ref 6–8.3)
RBC # BLD: 3.74 M/UL — LOW (ref 3.8–5.2)
RBC # FLD: 15.8 % — HIGH (ref 10.3–14.5)
SODIUM SERPL-SCNC: 140 MMOL/L — SIGNIFICANT CHANGE UP (ref 135–145)
WBC # BLD: 7.4 K/UL — SIGNIFICANT CHANGE UP (ref 3.8–10.5)
WBC # FLD AUTO: 7.4 K/UL — SIGNIFICANT CHANGE UP (ref 3.8–10.5)

## 2019-02-16 PROCEDURE — 99232 SBSQ HOSP IP/OBS MODERATE 35: CPT

## 2019-02-16 PROCEDURE — 99233 SBSQ HOSP IP/OBS HIGH 50: CPT

## 2019-02-16 RX ADMIN — Medication 650 MILLIGRAM(S): at 18:05

## 2019-02-16 RX ADMIN — Medication 55 MILLIGRAM(S): at 05:15

## 2019-02-16 RX ADMIN — Medication 15 MILLIGRAM(S): at 05:15

## 2019-02-16 RX ADMIN — Medication 200 MILLIGRAM(S): at 05:16

## 2019-02-16 RX ADMIN — DULOXETINE HYDROCHLORIDE 60 MILLIGRAM(S): 30 CAPSULE, DELAYED RELEASE ORAL at 13:42

## 2019-02-16 RX ADMIN — Medication 100.8 MILLIGRAM(S): at 11:35

## 2019-02-16 RX ADMIN — Medication 15 MILLIGRAM(S): at 21:50

## 2019-02-16 RX ADMIN — MYCOPHENOLATE MOFETIL 1500 MILLIGRAM(S): 250 CAPSULE ORAL at 05:17

## 2019-02-16 RX ADMIN — Medication 6 MILLIGRAM(S): at 21:50

## 2019-02-16 RX ADMIN — Medication 100 MILLIGRAM(S): at 11:35

## 2019-02-16 RX ADMIN — Medication 100 MILLIGRAM(S): at 21:50

## 2019-02-16 RX ADMIN — MYCOPHENOLATE MOFETIL 1500 MILLIGRAM(S): 250 CAPSULE ORAL at 17:05

## 2019-02-16 RX ADMIN — ENOXAPARIN SODIUM 40 MILLIGRAM(S): 100 INJECTION SUBCUTANEOUS at 11:46

## 2019-02-16 RX ADMIN — Medication 5 MILLIGRAM(S): at 21:50

## 2019-02-16 RX ADMIN — Medication 1 APPLICATORFUL: at 21:36

## 2019-02-16 RX ADMIN — Medication 5 MILLIGRAM(S): at 05:16

## 2019-02-16 RX ADMIN — GABAPENTIN 100 MILLIGRAM(S): 400 CAPSULE ORAL at 05:16

## 2019-02-16 RX ADMIN — GABAPENTIN 300 MILLIGRAM(S): 400 CAPSULE ORAL at 21:50

## 2019-02-16 RX ADMIN — Medication 100 MILLIGRAM(S): at 05:15

## 2019-02-16 RX ADMIN — Medication 1 TABLET(S): at 11:46

## 2019-02-16 RX ADMIN — TAMSULOSIN HYDROCHLORIDE 0.8 MILLIGRAM(S): 0.4 CAPSULE ORAL at 21:51

## 2019-02-16 RX ADMIN — Medication 100 MILLIGRAM(S): at 13:44

## 2019-02-16 RX ADMIN — Medication 15 MILLIGRAM(S): at 13:42

## 2019-02-16 RX ADMIN — PANTOPRAZOLE SODIUM 40 MILLIGRAM(S): 20 TABLET, DELAYED RELEASE ORAL at 05:15

## 2019-02-16 RX ADMIN — RITUXIMAB 83.33 MILLIGRAM(S): 10 INJECTION, SOLUTION INTRAVENOUS at 12:00

## 2019-02-16 RX ADMIN — GABAPENTIN 100 MILLIGRAM(S): 400 CAPSULE ORAL at 17:05

## 2019-02-16 RX ADMIN — Medication 200 MILLIGRAM(S): at 17:05

## 2019-02-16 RX ADMIN — Medication 5 MILLIGRAM(S): at 13:43

## 2019-02-16 RX ADMIN — SENNA PLUS 2 TABLET(S): 8.6 TABLET ORAL at 21:49

## 2019-02-16 RX ADMIN — Medication 650 MILLIGRAM(S): at 10:07

## 2019-02-16 NOTE — PROGRESS NOTE ADULT - ASSESSMENT
33F admitted to acute rehab, after diagnosed with transverse myelitis.     >Transverse myelitis.  Plan: is for rituxan today  neurology following for dosing  gabapentin started 2/13 for pain caused by this myelitis     >Lupus nephritis.  Plan: cont plaquenil/prednisone.     > Weakness of both lower extremities.  Plan: secondary to above.  PT/OT/ rehab    >Neurogenic Bladder / Acute urinary retention  - ertapenem 1/30 - 2/4 per ID.  Renal us showed no evidence of  hydronephrosis.  - c/w bladder scans, - c/w straight cath as needed.  Recent urine culture 2/6 NG  - c/w flomax 0.8 mg po qhs and Urecholine 15mg TID     >DVT ppx: Lovenox

## 2019-02-16 NOTE — PROGRESS NOTE ADULT - SUBJECTIVE AND OBJECTIVE BOX
Patient is a 33 year old  female who presents with a chief complaint of weakness, bladder dysfunction, gait instability, ADL impairments 2/2 Transverse myelitis (15 Feb 2019 13:00)    Patient seen and examined at bedside. She denies any chest pain, shortness of breath, but reports intermittent midline abdominal pain due to neuropathy.    MEDICATIONS  (STANDING):  baclofen 5 milliGRAM(s) Oral every 8 hours  bethanechol 15 milliGRAM(s) Oral every 8 hours  clotrimazole 1% Vaginal Cream 1 Applicatorful Vaginal at bedtime  docusate sodium 100 milliGRAM(s) Oral three times a day  DULoxetine 60 milliGRAM(s) Oral daily  enoxaparin Injectable 40 milliGRAM(s) SubCutaneous daily  ergocalciferol 69378 Unit(s) Oral <User Schedule>  gabapentin 100 milliGRAM(s) Oral two times a day  gabapentin 300 milliGRAM(s) Oral at bedtime  hydroxychloroquine 200 milliGRAM(s) Oral two times a day  melatonin 6 milliGRAM(s) Oral <User Schedule>  multivitamin 1 Tablet(s) Oral daily  mycophenolate mofetil 1500 milliGRAM(s) Oral two times a day  pantoprazole    Tablet 40 milliGRAM(s) Oral before breakfast  predniSONE   Tablet 55 milliGRAM(s) Oral daily  riTUXimab IVPB (Non - oncologic) 1000 milliGRAM(s) IV Intermittent once  senna 2 Tablet(s) Oral at bedtime  tamsulosin 0.8 milliGRAM(s) Oral at bedtime    MEDICATIONS  (PRN):  acetaminophen   Tablet .. 650 milliGRAM(s) Oral every 6 hours PRN Mild Pain (1 - 3)  bisacodyl Suppository 10 milliGRAM(s) Rectal daily PRN Constipation  oxyCODONE    5 mG/acetaminophen 325 mG 1 Tablet(s) Oral every 6 hours PRN Moderate Pain (4 - 6)  polyethylene glycol 3350 17 Gram(s) Oral daily PRN Constipation      REVIEW OF SYSTEMS:  CONSTITUTIONAL: No fever, weight loss, or fatigue  EYES: No eye pain, visual disturbances, or discharge  ENMT:  No difficulty hearing, tinnitus, vertigo; No sinus or throat pain  NECK: No pain or stiffness  RESPIRATORY: No cough, wheezing, chills or hemoptysis; No shortness of breath  CARDIOVASCULAR: No chest pain, palpitations, dizziness, or leg swelling  GASTROINTESTINAL: intermittent midline abdominal pain, due to neuropathy. No nausea, vomiting, or hematemesis; No diarrhea or constipation. No melena or hematochezia.  GENITOURINARY: No dysuria, frequency, hematuria, or incontinence  NEUROLOGICAL: No headaches, memory loss, loss of strength, numbness, or tremors  SKIN: No itching, burning, rashes, or lesions   ENDOCRINE: No heat or cold intolerance; No hair loss  MUSCULOSKELETAL: No joint pain or swelling; No muscle, back, or extremity pain  PSYCHIATRIC: No depression, anxiety, mood swings, or difficulty sleeping  HEME/LYMPH: No easy bruising, or bleeding gums  ALLERGY AND IMMUNOLOGIC: No hives or eczema    PHYSICAL EXAM:  T(C): 36.7 (02-16-19 @ 08:25), Max: 36.8 (02-15-19 @ 22:03)  HR: 99 (02-16-19 @ 08:25) (86 - 99)  BP: 100/66 (02-16-19 @ 08:25) (100/66 - 106/63)  RR: 12 (02-16-19 @ 08:25) (12 - 14)  SpO2: 98% (02-16-19 @ 08:25) (98% - 98%)  I&O's Summary    15 Feb 2019 07:01  -  16 Feb 2019 07:00  --------------------------------------------------------  IN: 0 mL / OUT: 900 mL / NET: -900 mL    GENERAL: NAD, well-groomed, well-developed  HEAD:  Atraumatic, Normocephalic  EYES: EOMI, PERRL, conjunctiva and sclera clear  ENMT: moist mucous membranes  NECK: Supple, No JVD, Normal thyroid  NERVOUS SYSTEM:  Alert & Oriented X3, no focal deficit  CHEST/LUNG: Clear to ascultation bilaterally; No rales, rhonchi, wheezing, or rubs  HEART: Regular rate and rhythm; S1, S2, No murmurs, rubs, or gallops  ABDOMEN: Soft, Nontender, Nondistended; Bowel sounds present  EXTREMITIES:  2+ Peripheral Pulses, No clubbing, cyanosis, or edema  SKIN: No rash    LABS: none today    RADIOLOGY & ADDITIONAL TESTS:    Imaging Personally Reviewed:  [x] YES  [ ] NO    Consultant(s) Notes Reviewed:  [x] YES  [ ] NO    Care Discussed with Consultants/Other Providers [x] YES  [ ] NO

## 2019-02-16 NOTE — PROGRESS NOTE ADULT - ASSESSMENT
Assessment and Plan     33 year-old Woman with PMH of SLE, lupus nephritis, and fibromyalgia admitted to rehab with transverse myelitis resulting in decreased functional mobility, gait instability and ADL impairments.    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  Patient receiving second dose of rituximab IVPB 1 gm infuse over 5 hours today. Premedication with tylenol 650 mg, bendaryl 20 mg IVPB, and solumedrol 100 mg ivpb.    - f/u with rheumatology as an outpatient after that. To be repeated Rituximab in 6 months after that.    Neurogenic Bladder / Acute urinary retention  - ertapenem started 1/30, last day 2/4 per ID.  Renal us showed no evidence of  hydronephrosis.  Plan   - c/w bladder scans - SC last evening 400   - c/w straight cath as needed.  Recent urine culture 2/6 NG  - c/w flomax 0.8mg po qhs   - 2/11: Added Urecholine 10 mg TID with slight improvement; increased to 15mg TID 2/13    SLE with nephritis    - stable renal disease, elevated dsDNA, low complements  - rheum f/u      Fibromyalgia   - c/w Tylenol for pain prn     Psych  -Depression: c/w Cymbalta 60mg po qd  -Insomnia: Added melatonin 6mg qhs with improvement    Vitamin D deficiency  -Vit D 10.9.  Added Vit D 50,000U weekly    GI  -Bowel Mgmt - Colace, Senna,  Miralax PRN  -FEN: MVI  -GI ppx; Protonix    Pain Mgmt - Tylenol PRN, oxycodone prn, baclofen prn spasms-change to scheduled.  Added Gabapentin 100mg bid and 300mg qhs    /Bladder Mgmt - Retention;  Instructed in self cath     DVT ppx: Lovenox, SCDs, TEDs

## 2019-02-16 NOTE — PROGRESS NOTE ADULT - SUBJECTIVE AND OBJECTIVE BOX
HISTORY OF PRESENT ILLNESS  PMHx - 33 years old female with PMH of SLE, Lupus Nephritis and Fibromyalgia admitted to acute rehab after diagnosis of transverse myelitis.  She was initially brought to [facility] on  with generalized weakness, low grade fevers, generalized body pain, throat irritation, cough, ear pain, vomiting, and poor appetite. She went to her PMD few days prior who prescribed Sertraline for her depressive symptoms. As per her, symptoms started after taking that medication. At time of initial presentation, she was feeling so weak that she could not walk so her  brought her to ER. She was also complaining of difficulty urinating. She went to Urologist 10 days ago and she was prescribed Flomax which she has been taking but is unable to void. She was also noting constipation. Pt recently came back from [place] on 18 after staying there for 2.5 to 3 months. She had MRI there in December due to back pain and generalized weakness and it was normal. Workup at [facility] was notable for transverse myelitis.  She received plamapheresis, rtx and steroids and improved. Course complicated with urinary retention and ESBL ECOLI (tx'd with ertapenem, last dose ).     Pt medically optimized and cleared for transfer to [place] for acute rehab on 19.     TODAY'S SUBJECTIVE &  REVIEW OF SYMPTOMS  No acute events overnight. Seen and examined.  Pt has been sleeping well with Melatonin.   Reports less numbness and tingling in her legs and less spasms as well as more LE strength. Persistent belt like sensation of pain across abdomen.   Last BM yesterday. Voiding small amounts of urine spontaneously, otherwise requires SC.     [X] Constitutional WNL       [x] HEENT   [X] Cardio WNL              [X] Resp WNL            [X] GI - wnl                   []  - Urinary retention, as above   [] MSK - Weakness  [X] Neuro weakness  [] Cognitive    [X] Psych WNL  [ x ] Skin         VITALS  Vital Signs Last 24 Hrs  T(C): 36.9 (2019 13:15), Max: 36.9 (2019 12:15)  T(F): 98.4 (2019 13:15), Max: 98.5 (2019 12:15)  HR: 89 (2019 13:15) (86 - 99)  BP: 102/66 (2019 13:15) (100/66 - 106/63)  BP(mean): --  RR: 14 (2019 13:15) (12 - 14)  SpO2: 95% (2019 13:15) (95% - 98%)    PHYSICAL EXAM  General- NAD, Comfortable                                                                                    HEENT - NCAT,  Cardio- RRR, S1S2                                                                          Resp- CTA bilaterally, No wheeze   Abdomen - BS+, Soft, NTND                                                                                    Extremities - No C/C/E  Skin: Intact                                                                                                                 Wounds: none     Neuro                   Cognitive - Awake, Alert, AAO x 3                                                                          Communication - Fluent, No dysarthria                                                                             Sensory - Decreased to LT to level of T8       MSK - wnl   Motor   LEFT    UE: SF [5/5], EF [5/5], EE [5/5], WE [5/5],  [wnl]  RIGHT UE: SF [5/5], EF [5/5], EE [5/5], WE [5/5],  [wnl]  LEFT    LE:  HF 5/5, HE 3/5, KE 4+/5, KF 4/5, hip abduction 3/5, hip adduction 3/5, evertors 5/5, invertors 5/5, DF 5/5, EHL 4/5, PF 5/5  RIGHT LE:  HF 5/5, HE 3/5, KE 5/5, KE 4/5, hip abduction 3/5, hip adduction 3/5, evertors 5/5, invertors 5/5, DF 5/5, EHL 3/5, PF 5/5                                                                                    Psychiatric - Mood stable, Affect WNL    FUNCTIONAL PROGRESS:  Bed mobility: CS  Transfers: close supervision  Gait: Amb 130' RW close supervision  ADLs: Laundry - CS    RECENT LABS                                   10.9   7.4   )-----------( 356      ( 2019 10:50 )             33.7   02-16    140  |  103  |  17  ----------------------------<  146<H>  3.8   |  25  |  0.48<L>    Ca    8.7      2019 10:50    TPro  6.2  /  Alb  3.2<L>  /  TBili  0.4  /  DBili  x   /  AST  19  /  ALT  44  /  AlkPhos  67  02-16      Urinalysis Basic - ( 2019 01:00 )    Color: Yellow / Appearance: Clear / S.025 / pH: x  Gluc: x / Ketone: Trace  / Bili: Negative / Urobili: 1 mg/dL   Blood: x / Protein: 30 mg/dL / Nitrite: Negative   Leuk Esterase: Negative / RBC: 0-2 /HPF / WBC 0-2   Sq Epi: x / Non Sq Epi: Occasional / Bacteria: x    Urine Culture NG    CURRENT MEDICATIONS  MEDICATIONS  (STANDING):  baclofen 5 milliGRAM(s) Oral every 8 hours  bethanechol 15 milliGRAM(s) Oral every 8 hours  clotrimazole 1% Vaginal Cream 1 Applicatorful Vaginal at bedtime  docusate sodium 100 milliGRAM(s) Oral three times a day  DULoxetine 60 milliGRAM(s) Oral daily  enoxaparin Injectable 40 milliGRAM(s) SubCutaneous daily  ergocalciferol 01159 Unit(s) Oral <User Schedule>  gabapentin 100 milliGRAM(s) Oral two times a day  gabapentin 300 milliGRAM(s) Oral at bedtime  hydroxychloroquine 200 milliGRAM(s) Oral two times a day  melatonin 6 milliGRAM(s) Oral <User Schedule>  multivitamin 1 Tablet(s) Oral daily  mycophenolate mofetil 1500 milliGRAM(s) Oral two times a day  pantoprazole    Tablet 40 milliGRAM(s) Oral before breakfast  predniSONE   Tablet 55 milliGRAM(s) Oral daily  senna 2 Tablet(s) Oral at bedtime  tamsulosin 0.8 milliGRAM(s) Oral at bedtime    MEDICATIONS  (PRN):  acetaminophen   Tablet .. 650 milliGRAM(s) Oral every 6 hours PRN Mild Pain (1 - 3)  bisacodyl Suppository 10 milliGRAM(s) Rectal daily PRN Constipation  oxyCODONE    5 mG/acetaminophen 325 mG 1 Tablet(s) Oral every 6 hours PRN Moderate Pain (4 - 6)  polyethylene glycol 3350 17 Gram(s) Oral daily PRN Constipation

## 2019-02-17 PROCEDURE — 99232 SBSQ HOSP IP/OBS MODERATE 35: CPT

## 2019-02-17 RX ORDER — MAGNESIUM OXIDE 400 MG ORAL TABLET 241.3 MG
400 TABLET ORAL
Qty: 0 | Refills: 0 | Status: DISCONTINUED | OUTPATIENT
Start: 2019-02-17 | End: 2019-02-21

## 2019-02-17 RX ADMIN — GABAPENTIN 100 MILLIGRAM(S): 400 CAPSULE ORAL at 17:32

## 2019-02-17 RX ADMIN — Medication 200 MILLIGRAM(S): at 07:31

## 2019-02-17 RX ADMIN — GABAPENTIN 100 MILLIGRAM(S): 400 CAPSULE ORAL at 05:18

## 2019-02-17 RX ADMIN — Medication 55 MILLIGRAM(S): at 05:17

## 2019-02-17 RX ADMIN — Medication 5 MILLIGRAM(S): at 13:02

## 2019-02-17 RX ADMIN — Medication 200 MILLIGRAM(S): at 17:32

## 2019-02-17 RX ADMIN — Medication 100 MILLIGRAM(S): at 22:32

## 2019-02-17 RX ADMIN — TAMSULOSIN HYDROCHLORIDE 0.8 MILLIGRAM(S): 0.4 CAPSULE ORAL at 22:41

## 2019-02-17 RX ADMIN — Medication 15 MILLIGRAM(S): at 05:18

## 2019-02-17 RX ADMIN — Medication 6 MILLIGRAM(S): at 22:31

## 2019-02-17 RX ADMIN — MYCOPHENOLATE MOFETIL 1500 MILLIGRAM(S): 250 CAPSULE ORAL at 05:17

## 2019-02-17 RX ADMIN — GABAPENTIN 300 MILLIGRAM(S): 400 CAPSULE ORAL at 22:31

## 2019-02-17 RX ADMIN — Medication 1 TABLET(S): at 12:41

## 2019-02-17 RX ADMIN — Medication 100 MILLIGRAM(S): at 05:19

## 2019-02-17 RX ADMIN — Medication 100 MILLIGRAM(S): at 13:02

## 2019-02-17 RX ADMIN — SENNA PLUS 2 TABLET(S): 8.6 TABLET ORAL at 22:31

## 2019-02-17 RX ADMIN — Medication 5 MILLIGRAM(S): at 22:32

## 2019-02-17 RX ADMIN — DULOXETINE HYDROCHLORIDE 60 MILLIGRAM(S): 30 CAPSULE, DELAYED RELEASE ORAL at 12:41

## 2019-02-17 RX ADMIN — MAGNESIUM OXIDE 400 MG ORAL TABLET 400 MILLIGRAM(S): 241.3 TABLET ORAL at 17:32

## 2019-02-17 RX ADMIN — PANTOPRAZOLE SODIUM 40 MILLIGRAM(S): 20 TABLET, DELAYED RELEASE ORAL at 08:18

## 2019-02-17 RX ADMIN — ENOXAPARIN SODIUM 40 MILLIGRAM(S): 100 INJECTION SUBCUTANEOUS at 12:41

## 2019-02-17 RX ADMIN — Medication 15 MILLIGRAM(S): at 22:31

## 2019-02-17 RX ADMIN — MYCOPHENOLATE MOFETIL 1500 MILLIGRAM(S): 250 CAPSULE ORAL at 17:32

## 2019-02-17 RX ADMIN — Medication 15 MILLIGRAM(S): at 13:02

## 2019-02-17 RX ADMIN — Medication 5 MILLIGRAM(S): at 05:18

## 2019-02-17 NOTE — PROGRESS NOTE ADULT - SUBJECTIVE AND OBJECTIVE BOX
HISTORY OF PRESENT ILLNESS  PMHx - 33 years old female with PMH of SLE, Lupus Nephritis and Fibromyalgia admitted to acute rehab after diagnosis of transverse myelitis.  She was initially brought to [facility] on  with generalized weakness, low grade fevers, generalized body pain, throat irritation, cough, ear pain, vomiting, and poor appetite. She went to her PMD few days prior who prescribed Sertraline for her depressive symptoms. As per her, symptoms started after taking that medication. At time of initial presentation, she was feeling so weak that she could not walk so her  brought her to ER. She was also complaining of difficulty urinating. She went to Urologist 10 days ago and she was prescribed Flomax which she has been taking but is unable to void. She was also noting constipation. Pt recently came back from [place] on 18 after staying there for 2.5 to 3 months. She had MRI there in December due to back pain and generalized weakness and it was normal. Workup at [facility] was notable for transverse myelitis.  She received plamapheresis, rtx and steroids and improved. Course complicated with urinary retention and ESBL ECOLI (tx'd with ertapenem, last dose ).     Pt medically optimized and cleared for transfer to [place] for acute rehab on 19.     TODAY'S SUBJECTIVE &  REVIEW OF SYMPTOMS  No acute events overnight. Seen and examined.  Pt reports that since having had Rituxan yesterday pt has noticed increased BLE sensation.  Pt is voiding mildy better.  Pt has been sleeping well with Melatonin.   Reports less less spasms as well as slight increase in LE strength. Belt like sensation of pain across abdomen slightly improved.   Last BM yesterday.  Still notes right eye twitches    [X] Constitutional WNL       [x] HEENT   [X] Cardio WNL              [X] Resp WNL            [X] GI - wnl                   []  - Urinary retention, as above   [] MSK - Weakness  [X] Neuro weakness  [] Cognitive    [X] Psych WNL  [ x ] Skin         VITALS  Vital Signs Last 24 Hrs  T(C): 36.7 (2019 09:19), Max: 36.9 (2019 12:15)  T(F): 98 (2019 09:19), Max: 98.5 (2019 12:15)  HR: 100 (2019 09:19) (87 - 100)  BP: 102/69 (2019 09:19) (102/66 - 115/77)  BP(mean): --  RR: 12 (2019 09:19) (12 - 14)  SpO2: 98% (2019 09:19) (95% - 98%)    PHYSICAL EXAM  General- NAD, Comfortable                                                                                    HEENT - NCAT,  Cardio- RRR, S1S2                                                                          Resp- CTA bilaterally, No wheeze   Abdomen - BS+, Soft, NTND                                                                                    Extremities - No C/C/E  Skin: Intact                                                                                                                 Wounds: none     Neuro                   Cognitive - Awake, Alert, AAO x 3                                                                          Communication - Fluent, No dysarthria                                                                             Sensory - Decreased to LT to level of T8       MSK - wnl   Motor   LEFT    UE: SF [5/5], EF [5/5], EE [5/5], WE [5/5],  [wnl]  RIGHT UE: SF [5/5], EF [5/5], EE [5/5], WE [5/5],  [wnl]  LEFT    LE:  HF 5/5, HE 3/5, KE 4+/5, KF 4/5, hip abduction 3/5, hip adduction 3/5, evertors 5/5, invertors 5/5, DF 5/5, EHL 4/5, PF 5/5  RIGHT LE:  HF 5/5, HE 3/5, KE 5/5, KE 4/5, hip abduction 3/5, hip adduction 3/5, evertors 5/5, invertors 5/5, DF 5/5, EHL 3/5, PF 5/5                                                                                    Psychiatric - Mood stable, Affect WNL    FUNCTIONAL PROGRESS:  Bed mobility: CS  Transfers: close supervision  Gait: Amb 130' RW close supervision  ADLs: Laundry - CS    RECENT LABS                                   10.9   7.4   )-----------( 356      ( 2019 10:50 )             33.7   02-16    140  |  103  |  17  ----------------------------<  146<H>  3.8   |  25  |  0.48<L>    Ca    8.7      2019 10:50    TPro  6.2  /  Alb  3.2<L>  /  TBili  0.4  /  DBili  x   /  AST  19  /  ALT  44  /  AlkPhos  67  02-16      Urinalysis Basic - ( 2019 01:00 )    Color: Yellow / Appearance: Clear / S.025 / pH: x  Gluc: x / Ketone: Trace  / Bili: Negative / Urobili: 1 mg/dL   Blood: x / Protein: 30 mg/dL / Nitrite: Negative   Leuk Esterase: Negative / RBC: 0-2 /HPF / WBC 0-2   Sq Epi: x / Non Sq Epi: Occasional / Bacteria: x    Urine Culture NG    CURRENT MEDICATIONS  MEDICATIONS  (STANDING):  baclofen 5 milliGRAM(s) Oral every 8 hours  bethanechol 15 milliGRAM(s) Oral every 8 hours  docusate sodium 100 milliGRAM(s) Oral three times a day  DULoxetine 60 milliGRAM(s) Oral daily  enoxaparin Injectable 40 milliGRAM(s) SubCutaneous daily  ergocalciferol 09450 Unit(s) Oral <User Schedule>  gabapentin 100 milliGRAM(s) Oral two times a day  gabapentin 300 milliGRAM(s) Oral at bedtime  hydroxychloroquine 200 milliGRAM(s) Oral two times a day  magnesium oxide 400 milliGRAM(s) Oral two times a day with meals  melatonin 6 milliGRAM(s) Oral <User Schedule>  multivitamin 1 Tablet(s) Oral daily  mycophenolate mofetil 1500 milliGRAM(s) Oral two times a day  pantoprazole    Tablet 40 milliGRAM(s) Oral before breakfast  predniSONE   Tablet 55 milliGRAM(s) Oral daily  senna 2 Tablet(s) Oral at bedtime  tamsulosin 0.8 milliGRAM(s) Oral at bedtime    MEDICATIONS  (PRN):  acetaminophen   Tablet .. 650 milliGRAM(s) Oral every 6 hours PRN Mild Pain (1 - 3)  artificial  tears Solution 1 Drop(s) Both EYES three times a day PRN Dry Eyes  bisacodyl Suppository 10 milliGRAM(s) Rectal daily PRN Constipation  oxyCODONE    5 mG/acetaminophen 325 mG 1 Tablet(s) Oral every 6 hours PRN Moderate Pain (4 - 6)  polyethylene glycol 3350 17 Gram(s) Oral daily PRN Constipation

## 2019-02-17 NOTE — PROGRESS NOTE ADULT - ASSESSMENT
Assessment and Plan     33 year-old Woman with PMH of SLE, lupus nephritis, and fibromyalgia admitted to rehab with transverse myelitis resulting in decreased functional mobility, gait instability and ADL impairments.    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  Patient received second dose of rituximab IVPB 1 gm infuse over 5 hours 2/16 without side effects.  Premedicated with tylenol 650 mg, bendaryl 20 mg IVPB, and solumedrol 100 mg ivpb.    - f/u with rheumatology as an outpatient after that. To be repeated Rituximab in 6 months after that.    Neurogenic Bladder / Acute urinary retention  - ertapenem started 1/30, last day 2/4 per ID.  Renal us showed no evidence of  hydronephrosis.  Plan   - c/w bladder scans - Latest scans 357, 300  - c/w straight cath as needed.  Recent urine culture 2/6 NG  - c/w flomax 0.8mg po qhs   - 2/11: Added Urecholine 10 mg TID with slight improvement; increased to 15mg TID 2/13    SLE with nephritis    - stable renal disease, elevated dsDNA, low complements  - rheum f/u      Fibromyalgia   - c/w Tylenol for pain prn     Psych  -Depression: c/w Cymbalta 60mg po qd  -Insomnia: Added melatonin 6mg qhs with improvement    Vitamin D deficiency  -Vit D 10.9.  Added Vit D 50,000U weekly    GI  -Bowel Mgmt - Colace, Senna,  Miralax PRN  -FEN: MVI  -GI ppx; Protonix    Pain Mgmt - Tylenol PRN, oxycodone prn, baclofen prn spasms-change to scheduled.  Added Gabapentin 100mg bid and 300mg qhs    /Bladder Mgmt - Retention;  Instructed in self cath     DVT ppx: Lovenox, SCDs, TEDs

## 2019-02-18 PROCEDURE — 99233 SBSQ HOSP IP/OBS HIGH 50: CPT

## 2019-02-18 PROCEDURE — 99232 SBSQ HOSP IP/OBS MODERATE 35: CPT

## 2019-02-18 RX ADMIN — Medication 5 MILLIGRAM(S): at 20:10

## 2019-02-18 RX ADMIN — MAGNESIUM OXIDE 400 MG ORAL TABLET 400 MILLIGRAM(S): 241.3 TABLET ORAL at 17:21

## 2019-02-18 RX ADMIN — Medication 100 MILLIGRAM(S): at 13:07

## 2019-02-18 RX ADMIN — GABAPENTIN 100 MILLIGRAM(S): 400 CAPSULE ORAL at 06:09

## 2019-02-18 RX ADMIN — Medication 200 MILLIGRAM(S): at 06:09

## 2019-02-18 RX ADMIN — DULOXETINE HYDROCHLORIDE 60 MILLIGRAM(S): 30 CAPSULE, DELAYED RELEASE ORAL at 12:16

## 2019-02-18 RX ADMIN — Medication 100 MILLIGRAM(S): at 20:11

## 2019-02-18 RX ADMIN — ENOXAPARIN SODIUM 40 MILLIGRAM(S): 100 INJECTION SUBCUTANEOUS at 12:16

## 2019-02-18 RX ADMIN — TAMSULOSIN HYDROCHLORIDE 0.8 MILLIGRAM(S): 0.4 CAPSULE ORAL at 22:00

## 2019-02-18 RX ADMIN — MAGNESIUM OXIDE 400 MG ORAL TABLET 400 MILLIGRAM(S): 241.3 TABLET ORAL at 08:17

## 2019-02-18 RX ADMIN — Medication 5 MILLIGRAM(S): at 06:08

## 2019-02-18 RX ADMIN — GABAPENTIN 300 MILLIGRAM(S): 400 CAPSULE ORAL at 20:11

## 2019-02-18 RX ADMIN — SENNA PLUS 2 TABLET(S): 8.6 TABLET ORAL at 22:00

## 2019-02-18 RX ADMIN — Medication 15 MILLIGRAM(S): at 06:09

## 2019-02-18 RX ADMIN — MYCOPHENOLATE MOFETIL 1500 MILLIGRAM(S): 250 CAPSULE ORAL at 17:21

## 2019-02-18 RX ADMIN — Medication 15 MILLIGRAM(S): at 13:06

## 2019-02-18 RX ADMIN — Medication 6 MILLIGRAM(S): at 20:11

## 2019-02-18 RX ADMIN — Medication 15 MILLIGRAM(S): at 20:10

## 2019-02-18 RX ADMIN — Medication 100 MILLIGRAM(S): at 06:09

## 2019-02-18 RX ADMIN — Medication 200 MILLIGRAM(S): at 17:21

## 2019-02-18 RX ADMIN — Medication 5 MILLIGRAM(S): at 13:06

## 2019-02-18 RX ADMIN — Medication 5 MILLIGRAM(S): at 06:09

## 2019-02-18 RX ADMIN — MYCOPHENOLATE MOFETIL 1500 MILLIGRAM(S): 250 CAPSULE ORAL at 06:09

## 2019-02-18 RX ADMIN — PANTOPRAZOLE SODIUM 40 MILLIGRAM(S): 20 TABLET, DELAYED RELEASE ORAL at 06:09

## 2019-02-18 RX ADMIN — Medication 1 TABLET(S): at 12:16

## 2019-02-18 RX ADMIN — GABAPENTIN 100 MILLIGRAM(S): 400 CAPSULE ORAL at 17:21

## 2019-02-18 NOTE — PROGRESS NOTE ADULT - SUBJECTIVE AND OBJECTIVE BOX
MARCOTJNA  33y  Female    Patient is a 33y old  Female who presents with a chief complaint of Weakness, bladder dysfunction, gait instability, ADL impairments 2/2 Transverse myelitis (17 Feb 2019 11:35)    Pt seen and examined, states her pain overall is getting better as well as her bladder incontinence       PAST MEDICAL & SURGICAL HISTORY:  Fibromyalgia  Lupus  GERD (gastroesophageal reflux disease)  Lupus nephritis  S/P correction of deviated nasal septum  History of esophagogastroduodenoscopy (EGD)  History of biopsy: Renal        MedsMEDICATIONS  (STANDING):  baclofen 5 milliGRAM(s) Oral every 8 hours  bethanechol 15 milliGRAM(s) Oral every 8 hours  docusate sodium 100 milliGRAM(s) Oral three times a day  DULoxetine 60 milliGRAM(s) Oral daily  enoxaparin Injectable 40 milliGRAM(s) SubCutaneous daily  ergocalciferol 77769 Unit(s) Oral <User Schedule>  gabapentin 100 milliGRAM(s) Oral two times a day  gabapentin 300 milliGRAM(s) Oral at bedtime  hydroxychloroquine 200 milliGRAM(s) Oral two times a day  magnesium oxide 400 milliGRAM(s) Oral two times a day with meals  melatonin 6 milliGRAM(s) Oral <User Schedule>  multivitamin 1 Tablet(s) Oral daily  mycophenolate mofetil 1500 milliGRAM(s) Oral two times a day  pantoprazole    Tablet 40 milliGRAM(s) Oral before breakfast  predniSONE   Tablet 55 milliGRAM(s) Oral daily  senna 2 Tablet(s) Oral at bedtime  tamsulosin 0.8 milliGRAM(s) Oral at bedtime    MEDICATIONS  (PRN):  acetaminophen   Tablet .. 650 milliGRAM(s) Oral every 6 hours PRN Mild Pain (1 - 3)  artificial  tears Solution 1 Drop(s) Both EYES three times a day PRN Dry Eyes  bisacodyl Suppository 10 milliGRAM(s) Rectal daily PRN Constipation  oxyCODONE    5 mG/acetaminophen 325 mG 1 Tablet(s) Oral every 6 hours PRN Moderate Pain (4 - 6)  polyethylene glycol 3350 17 Gram(s) Oral daily PRN Constipation      Vital Signs Last 24 Hrs  T(C): 37.1 (18 Feb 2019 08:16), Max: 37.1 (18 Feb 2019 08:16)  T(F): 98.8 (18 Feb 2019 08:16), Max: 98.8 (18 Feb 2019 08:16)  HR: 98 (18 Feb 2019 08:16) (91 - 98)  BP: 112/80 (18 Feb 2019 08:16) (112/80 - 116/74)  BP(mean): --  RR: 14 (18 Feb 2019 08:16) (14 - 14)  SpO2: 98% (18 Feb 2019 08:16) (98% - 99%)    PHYSICAL EXAM:  GENERAL: NAD  HEAD:  NC/AT  EYES: EOMI, PERRLA, conjunctiva and sclera clear  NERVOUS SYSTEM:  Alert & Oriented X3  CHEST/LUNG: Clear lungs b/l  HEART: S1S2, RRR   ABDOMEN: Soft, non-tender, non-distended, + bowel sounds  EXTREMITIES:  2+ Peripheral Pulses, No clubbing, cyanosis, or edema      LABS:  reviewed         RADIOLOGY & ADDITIONAL TESTS:    Imaging Personally Reviewed:  [ ] YES  [ ] NO      HEALTH ISSUES - PROBLEM Dx:  Weakness of both lower extremities: Weakness of both lower extremities  Lupus nephritis: Lupus nephritis  Transverse myelitis: Transverse myelitis          Care Discussed with Consultants/Other Providers [ x] YES  [ ] NO

## 2019-02-18 NOTE — PROGRESS NOTE ADULT - SUBJECTIVE AND OBJECTIVE BOX
HISTORY OF PRESENT ILLNESS  PMHx - 33 years old female with PMH of SLE, Lupus Nephritis and Fibromyalgia admitted to acute rehab after diagnosis of transverse myelitis.  She was initially brought to [facility] on  with generalized weakness, low grade fevers, generalized body pain, throat irritation, cough, ear pain, vomiting, and poor appetite. She went to her PMD few days prior who prescribed Sertraline for her depressive symptoms. As per her, symptoms started after taking that medication. At time of initial presentation, she was feeling so weak that she could not walk so her  brought her to ER. She was also complaining of difficulty urinating. She went to Urologist 10 days ago and she was prescribed Flomax which she has been taking but is unable to void. She was also noting constipation. Pt recently came back from [place] on 18 after staying there for 2.5 to 3 months. She had MRI there in December due to back pain and generalized weakness and it was normal. Workup at [facility] was notable for transverse myelitis.  She received plamapheresis, rtx and steroids and improved. Course complicated with urinary retention and ESBL ECOLI (tx'd with ertapenem, last dose ).     Pt medically optimized and cleared for transfer to [place] for acute rehab on 19.     TODAY'S SUBJECTIVE &  REVIEW OF SYMPTOMS  No acute events overnight. Seen and examined.  Pt reports that she is improved; notes LLE is stronger and has more sensation.  Pt is now voiding better; did not require straight cath today.  Last BM today.  Sleeping very well daily with Melatonin.    Reports less less spasms. Belt like sensation of pain across abdomen slightly improved.  Still notes right eye twitches    [X] Constitutional WNL       [x] HEENT   [X] Cardio WNL              [X] Resp WNL            [X] GI - wnl                   []  - Urinary retention, as above   [] MSK - Weakness  [X] Neuro weakness  [] Cognitive    [X] Psych WNL  [ x ] Skin         VITALS  Vital Signs Last 24 Hrs  T(C): 37.1 (2019 08:16), Max: 37.1 (2019 08:16)  T(F): 98.8 (2019 08:16), Max: 98.8 (2019 08:16)  HR: 98 (2019 08:16) (91 - 98)  BP: 112/80 (2019 08:16) (112/80 - 116/74)  BP(mean): --  RR: 14 (2019 08:16) (14 - 14)  SpO2: 98% (2019 08:16) (98% - 99%)    PHYSICAL EXAM  General- NAD, Comfortable                                                                                    HEENT - NCAT,  Cardio- RRR, S1S2                                                                          Resp- CTA bilaterally, No wheeze   Abdomen - BS+, Soft, NTND                                                                                    Extremities - No C/C/E  Skin: Intact                                                                                                                 Wounds: none     Neuro                   Cognitive - Awake, Alert, AAO x 3                                                                          Communication - Fluent, No dysarthria                                                                             Sensory - Decreased to LT to level of T8       MSK - wnl   Motor   LEFT    UE: SF [5/5], EF [5/5], EE [5/5], WE [5/5],  [wnl]  RIGHT UE: SF [5/5], EF [5/5], EE [5/5], WE [5/5],  [wnl]  LEFT    LE:  HF 5/5, HE 3/5, KE 4+/5, KF 4/5, hip abduction 3/5, hip adduction 3/5, evertors 5/5, invertors 5/5, DF 5/5, EHL 4/5, PF 5/5  RIGHT LE:  HF 5/5, HE 3/5, KE 5/5, KE 4/5, hip abduction 3/5, hip adduction 3/5, evertors 5/5, invertors 5/5, DF 5/5, EHL 3/5, PF 5/5                                                                                    Psychiatric - Mood stable, Affect WNL    FUNCTIONAL PROGRESS:  Bed mobility: CS  Transfers: close supervision  Gait: Amb 130' RW close supervision  ADLs: Laundry - CS    RECENT LABS                                   10.9   7.4   )-----------( 356      ( 2019 10:50 )             33.7   02-16    140  |  103  |  17  ----------------------------<  146<H>  3.8   |  25  |  0.48<L>    Ca    8.7      2019 10:50    TPro  6.2  /  Alb  3.2<L>  /  TBili  0.4  /  DBili  x   /  AST  19  /  ALT  44  /  AlkPhos  67  02-16      Urinalysis Basic - ( 2019 01:00 )    Color: Yellow / Appearance: Clear / S.025 / pH: x  Gluc: x / Ketone: Trace  / Bili: Negative / Urobili: 1 mg/dL   Blood: x / Protein: 30 mg/dL / Nitrite: Negative   Leuk Esterase: Negative / RBC: 0-2 /HPF / WBC 0-2   Sq Epi: x / Non Sq Epi: Occasional / Bacteria: x    Urine Culture NG    CURRENT MEDICATIONS  MEDICATIONS  (STANDING):  baclofen 5 milliGRAM(s) Oral every 8 hours  bethanechol 15 milliGRAM(s) Oral every 8 hours  docusate sodium 100 milliGRAM(s) Oral three times a day  DULoxetine 60 milliGRAM(s) Oral daily  enoxaparin Injectable 40 milliGRAM(s) SubCutaneous daily  ergocalciferol 94195 Unit(s) Oral <User Schedule>  gabapentin 100 milliGRAM(s) Oral two times a day  gabapentin 300 milliGRAM(s) Oral at bedtime  hydroxychloroquine 200 milliGRAM(s) Oral two times a day  magnesium oxide 400 milliGRAM(s) Oral two times a day with meals  melatonin 6 milliGRAM(s) Oral <User Schedule>  multivitamin 1 Tablet(s) Oral daily  mycophenolate mofetil 1500 milliGRAM(s) Oral two times a day  pantoprazole    Tablet 40 milliGRAM(s) Oral before breakfast  predniSONE   Tablet 55 milliGRAM(s) Oral daily  senna 2 Tablet(s) Oral at bedtime  tamsulosin 0.8 milliGRAM(s) Oral at bedtime    MEDICATIONS  (PRN):  acetaminophen   Tablet .. 650 milliGRAM(s) Oral every 6 hours PRN Mild Pain (1 - 3)  artificial  tears Solution 1 Drop(s) Both EYES three times a day PRN Dry Eyes  bisacodyl Suppository 10 milliGRAM(s) Rectal daily PRN Constipation  oxyCODONE    5 mG/acetaminophen 325 mG 1 Tablet(s) Oral every 6 hours PRN Moderate Pain (4 - 6)  polyethylene glycol 3350 17 Gram(s) Oral daily PRN Constipation

## 2019-02-18 NOTE — PROGRESS NOTE ADULT - ASSESSMENT
33F admitted to acute rehab, after diagnosed with transverse myelitis.     #Transverse myelitis with weakness, functional and gait impairment   s/p rituxan, neurology following   pain control  PT/OT as tolerated     #Lupus nephritis  cont plaquenil/prednisone.     #Neurogenic Bladder / Acute urinary retention  s/p abx, no hydronephrosis   continue flomax and Urecholine  IC prn     DVT/GI ppx

## 2019-02-18 NOTE — PROGRESS NOTE ADULT - ASSESSMENT
Assessment and Plan     33 year-old Woman with PMH of SLE, lupus nephritis, and fibromyalgia admitted to rehab with transverse myelitis resulting in decreased functional mobility, gait instability and ADL impairments.    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  Patient received second dose of rituximab IVPB 1 gm infuse over 5 hours 2/16 without side effects.  Premedicated with tylenol 650 mg, bendaryl 20 mg IVPB, and solumedrol 100 mg ivpb.    - f/u with rheumatology as an outpatient after that. To be repeated Rituximab in 6 months after that.    Neurogenic Bladder / Acute urinary retention  - ertapenem started 1/30, last day 2/4 per ID.  Renal us showed no evidence of  hydronephrosis.  Plan   - c/w bladder scans - Latest scans 357, 300  - c/w straight cath as needed.  Recent urine culture 2/6 NG  - c/w flomax 0.8mg po qhs   - 2/11: Added Urecholine 10 mg TID with slight improvement; increased to 15mg TID 2/13    SLE with nephritis    - stable renal disease, elevated dsDNA, low complements  - rheum f/u      Fibromyalgia   - c/w Tylenol for pain prn     Psych  -Depression: c/w Cymbalta 60mg po qd  -Insomnia: Added melatonin 6mg qhs with improvement    Vitamin D deficiency  -Vit D 10.9.  Added Vit D 50,000U weekly    GI  -Bowel Mgmt - Colace, Senna,  Miralax PRN  -FEN: MVI  -GI ppx; Protonix    Pain Mgmt - Tylenol PRN, oxycodone prn, baclofen prn spasms-change to scheduled.  Added Gabapentin 100mg bid and 300mg qhs    /Bladder Mgmt - Retention;  Instructed in self cath.  PVR today improved at 320     DVT ppx: Lovenox, SCDs, TEDs

## 2019-02-19 PROCEDURE — 99232 SBSQ HOSP IP/OBS MODERATE 35: CPT

## 2019-02-19 PROCEDURE — 99233 SBSQ HOSP IP/OBS HIGH 50: CPT

## 2019-02-19 RX ORDER — IBUPROFEN 200 MG
400 TABLET ORAL EVERY 6 HOURS
Qty: 0 | Refills: 0 | Status: DISCONTINUED | OUTPATIENT
Start: 2019-02-19 | End: 2019-02-21

## 2019-02-19 RX ADMIN — Medication 55 MILLIGRAM(S): at 05:12

## 2019-02-19 RX ADMIN — Medication 15 MILLIGRAM(S): at 05:13

## 2019-02-19 RX ADMIN — GABAPENTIN 100 MILLIGRAM(S): 400 CAPSULE ORAL at 17:06

## 2019-02-19 RX ADMIN — MYCOPHENOLATE MOFETIL 1500 MILLIGRAM(S): 250 CAPSULE ORAL at 17:06

## 2019-02-19 RX ADMIN — Medication 15 MILLIGRAM(S): at 13:59

## 2019-02-19 RX ADMIN — Medication 5 MILLIGRAM(S): at 20:57

## 2019-02-19 RX ADMIN — DULOXETINE HYDROCHLORIDE 60 MILLIGRAM(S): 30 CAPSULE, DELAYED RELEASE ORAL at 12:11

## 2019-02-19 RX ADMIN — MAGNESIUM OXIDE 400 MG ORAL TABLET 400 MILLIGRAM(S): 241.3 TABLET ORAL at 08:06

## 2019-02-19 RX ADMIN — GABAPENTIN 100 MILLIGRAM(S): 400 CAPSULE ORAL at 05:12

## 2019-02-19 RX ADMIN — Medication 5 MILLIGRAM(S): at 15:39

## 2019-02-19 RX ADMIN — Medication 400 MILLIGRAM(S): at 14:30

## 2019-02-19 RX ADMIN — Medication 5 MILLIGRAM(S): at 05:12

## 2019-02-19 RX ADMIN — Medication 15 MILLIGRAM(S): at 20:56

## 2019-02-19 RX ADMIN — Medication 100 MILLIGRAM(S): at 13:59

## 2019-02-19 RX ADMIN — MYCOPHENOLATE MOFETIL 1500 MILLIGRAM(S): 250 CAPSULE ORAL at 05:13

## 2019-02-19 RX ADMIN — Medication 400 MILLIGRAM(S): at 13:59

## 2019-02-19 RX ADMIN — MAGNESIUM OXIDE 400 MG ORAL TABLET 400 MILLIGRAM(S): 241.3 TABLET ORAL at 17:06

## 2019-02-19 RX ADMIN — Medication 100 MILLIGRAM(S): at 20:56

## 2019-02-19 RX ADMIN — ENOXAPARIN SODIUM 40 MILLIGRAM(S): 100 INJECTION SUBCUTANEOUS at 12:11

## 2019-02-19 RX ADMIN — Medication 6 MILLIGRAM(S): at 20:56

## 2019-02-19 RX ADMIN — TAMSULOSIN HYDROCHLORIDE 0.8 MILLIGRAM(S): 0.4 CAPSULE ORAL at 20:56

## 2019-02-19 RX ADMIN — Medication 200 MILLIGRAM(S): at 17:06

## 2019-02-19 RX ADMIN — Medication 100 MILLIGRAM(S): at 05:12

## 2019-02-19 RX ADMIN — PANTOPRAZOLE SODIUM 40 MILLIGRAM(S): 20 TABLET, DELAYED RELEASE ORAL at 05:12

## 2019-02-19 RX ADMIN — Medication 1 TABLET(S): at 12:11

## 2019-02-19 RX ADMIN — SENNA PLUS 2 TABLET(S): 8.6 TABLET ORAL at 20:56

## 2019-02-19 RX ADMIN — GABAPENTIN 300 MILLIGRAM(S): 400 CAPSULE ORAL at 20:56

## 2019-02-19 RX ADMIN — Medication 200 MILLIGRAM(S): at 05:13

## 2019-02-19 NOTE — PROGRESS NOTE ADULT - ASSESSMENT
Assessment and Plan   33 year-old Woman with PMH of SLE, lupus nephritis, and fibromyalgia admitted to rehab with transverse myelitis resulting in decreased functional mobility, gait instability and ADL impairments.    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  Patient received second dose of rituximab IVPB 1 gm infuse over 5 hours 2/16 without side effects.  Premedicated with tylenol 650 mg, bendaryl 20 mg IVPB, and solumedrol 100 mg ivpb.    - f/u with rheumatology as an outpatient after that. To be repeated Rituximab in 6 months after that.    Neurogenic Bladder / Acute urinary retention  - ertapenem started 1/30, last day 2/4 per ID.  Renal us showed no evidence of  hydronephrosis.  Plan   - c/w bladder scans - Latest scans 357, 300  - c/w straight cath as needed.  Recent urine culture 2/6 NG  - c/w flomax 0.8mg po qhs   - 2/11: Added Urecholine 10 mg TID with slight improvement; increased to 15mg TID 2/13    SLE with nephritis    - stable renal disease, elevated dsDNA, low complements  - rheum f/u      Fibromyalgia   - c/w Tylenol for pain prn     Psych  -Depression: c/w Cymbalta 60mg po qd  -Insomnia: Added melatonin 6mg qhs with improvement    Vitamin D deficiency  -Vit D 10.9.  Added Vit D 50,000U weekly    GI  -Bowel Mgmt - Colace, Senna,  Miralax PRN  -FEN: MVI  -GI ppx; Protonix    Pain Mgmt - Tylenol PRN, oxycodone prn, baclofen prn spasms-change to scheduled.  Added Gabapentin 100mg bid and 300mg qhs    /Bladder Mgmt - Retention improved;  Instructed in self cath.  Latest      DVT ppx: Lovenox, SCDs, TEDs

## 2019-02-19 NOTE — PROGRESS NOTE ADULT - SUBJECTIVE AND OBJECTIVE BOX
MARCOKANE  33y  Female    Patient is a 33y old  Female who presents with a chief complaint of Weakness, bladder dysfunction, gait instability, ADL impairments 2/2 Transverse myelitis (18 Feb 2019 14:08)    Pt seen and examined, feels well, no acute events overnight     PAST MEDICAL & SURGICAL HISTORY:  Fibromyalgia  Lupus  GERD (gastroesophageal reflux disease)  Lupus nephritis  S/P correction of deviated nasal septum  History of esophagogastroduodenoscopy (EGD)  History of biopsy: Renal        MedsMEDICATIONS  (STANDING):  baclofen 5 milliGRAM(s) Oral every 8 hours  bethanechol 15 milliGRAM(s) Oral every 8 hours  docusate sodium 100 milliGRAM(s) Oral three times a day  DULoxetine 60 milliGRAM(s) Oral daily  enoxaparin Injectable 40 milliGRAM(s) SubCutaneous daily  ergocalciferol 97466 Unit(s) Oral <User Schedule>  gabapentin 100 milliGRAM(s) Oral two times a day  gabapentin 300 milliGRAM(s) Oral at bedtime  hydroxychloroquine 200 milliGRAM(s) Oral two times a day  magnesium oxide 400 milliGRAM(s) Oral two times a day with meals  melatonin 6 milliGRAM(s) Oral <User Schedule>  multivitamin 1 Tablet(s) Oral daily  mycophenolate mofetil 1500 milliGRAM(s) Oral two times a day  pantoprazole    Tablet 40 milliGRAM(s) Oral before breakfast  predniSONE   Tablet 55 milliGRAM(s) Oral daily  senna 2 Tablet(s) Oral at bedtime  tamsulosin 0.8 milliGRAM(s) Oral at bedtime    MEDICATIONS  (PRN):  acetaminophen   Tablet .. 650 milliGRAM(s) Oral every 6 hours PRN Mild Pain (1 - 3)  artificial  tears Solution 1 Drop(s) Both EYES three times a day PRN Dry Eyes  polyethylene glycol 3350 17 Gram(s) Oral daily PRN Constipation      Vital Signs Last 24 Hrs  T(C): 36.8 (19 Feb 2019 08:05), Max: 36.9 (18 Feb 2019 20:06)  T(F): 98.3 (19 Feb 2019 08:05), Max: 98.5 (18 Feb 2019 20:06)  HR: 92 (19 Feb 2019 08:05) (90 - 92)  BP: 107/69 (19 Feb 2019 08:05) (107/69 - 125/78)  BP(mean): --  RR: 14 (19 Feb 2019 08:05) (14 - 14)  SpO2: 95% (19 Feb 2019 08:05) (95% - 100%)    PHYSICAL EXAM:  GENERAL: NAD  HEAD:  NC/AT  EYES: EOMI, PERRLA, conjunctiva and sclera clear  NERVOUS SYSTEM:  Alert & Oriented X3  CHEST/LUNG: Clear lungs b/l  HEART: S1S2, RRR   ABDOMEN: Soft, non-tender, non-distended, + bowel sounds  EXTREMITIES:  2+ Peripheral Pulses, No clubbing, cyanosis, or edema  SKIN: No rashes or lesions    LABS:        RADIOLOGY & ADDITIONAL TESTS:    Imaging Personally Reviewed:  [ ] YES  [ ] NO      HEALTH ISSUES - PROBLEM Dx:  Weakness of both lower extremities: Weakness of both lower extremities  Lupus nephritis: Lupus nephritis  Transverse myelitis: Transverse myelitis          Care Discussed with Consultants/Other Providers [ x] YES  [ ] NO

## 2019-02-19 NOTE — PROGRESS NOTE ADULT - SUBJECTIVE AND OBJECTIVE BOX
HISTORY OF PRESENT ILLNESS  PMHx - 33 years old female with PMH of SLE, Lupus Nephritis and Fibromyalgia admitted to acute rehab after diagnosis of transverse myelitis.  She was initially brought to [facility] on  with generalized weakness, low grade fevers, generalized body pain, throat irritation, cough, ear pain, vomiting, and poor appetite. She went to her PMD few days prior who prescribed Sertraline for her depressive symptoms. As per her, symptoms started after taking that medication. At time of initial presentation, she was feeling so weak that she could not walk so her  brought her to ER. She was also complaining of difficulty urinating. She went to Urologist 10 days ago and she was prescribed Flomax which she has been taking but is unable to void. She was also noting constipation. Pt recently came back from [place] on 18 after staying there for 2.5 to 3 months. She had MRI there in December due to back pain and generalized weakness and it was normal. Workup at [facility] was notable for transverse myelitis.  She received plamapheresis, rtx and steroids and improved. Course complicated with urinary retention and ESBL ECOLI (tx'd with ertapenem, last dose ).     Pt medically optimized and cleared for transfer to [place] for acute rehab on 19.     TODAY'S SUBJECTIVE &  REVIEW OF SYMPTOMS  No acute events overnight. Seen and examined.  Pt reports that she is improved; notes LLE is stronger and has more sensation.  Pt is now voiding better; did not require straight cath today and in past 24hrs only once last night.  Last BM yesterday.  Sleeping very well daily with Melatonin.    Reports less less spasms. Belt like sensation of pain across abdomen slightly improved.  Pt reports that she is menstruating and is requesting an antiinflammatory    [X] Constitutional WNL       [x] HEENT   [X] Cardio WNL              [X] Resp WNL            [X] GI - wnl                   []  - Urinary retention, as above improved  [] MSK - Weakness  [X] Neuro weakness  [] Cognitive    [X] Psych WNL  [ x ] Skin         VITALS  Vital Signs Last 24 Hrs  T(C): 36.8 (2019 08:05), Max: 36.9 (2019 20:06)  T(F): 98.3 (2019 08:05), Max: 98.5 (2019 20:06)  HR: 92 (2019 08:05) (90 - 92)  BP: 107/69 (2019 08:05) (107/69 - 125/78)  BP(mean): --  RR: 14 (2019 08:05) (14 - 14)  SpO2: 95% (2019 08:05) (95% - 100%)    PHYSICAL EXAM  General- NAD, Comfortable                                                                                    HEENT - NCAT,  Cardio- RRR, S1S2                                                                          Resp- CTA bilaterally, No wheeze   Abdomen - BS+, Soft, NTND                                                                                    Extremities - No C/C/E  Skin: Intact                                                                                                                 Wounds: none     Neuro                   Cognitive - Awake, Alert, AAO x 3                                                                          Communication - Fluent, No dysarthria                                                                             Sensory - Decreased to LT to level of T8       MSK - wnl   Motor   LEFT    UE: SF [5/5], EF [5/5], EE [5/5], WE [5/5],  [wnl]  RIGHT UE: SF [5/5], EF [5/5], EE [5/5], WE [5/5],  [wnl]  LEFT    LE:  HF 5/5, HE 3/5, KE 4+/5, KF 4/5, hip abduction 3/5, hip adduction 3/5, evertors 5/5, invertors 5/5, DF 5/5, EHL 4/5, PF 5/5  RIGHT LE:  HF 5/5, HE 3/5, KE 5/5, KE 4/5, hip abduction 3/5, hip adduction 3/5, evertors 5/5, invertors 5/5, DF 5/5, EHL 3/5, PF 5/5                                                                                    Psychiatric - Mood stable, Affect WNL    FUNCTIONAL PROGRESS:  Bed mobility: Supervision  Transfers: Supervision  Gait: Amb 175' RW supervision.  Amb without ' CS/CG  Stairs: 12 steps 2HR CS  ADLs: Laundry - CS    RECENT LABS                                     10.9   7.4   )-----------( 356      ( 2019 10:50 )             33.7   02-16    140  |  103  |  17  ----------------------------<  146<H>  3.8   |  25  |  0.48<L>    Ca    8.7      2019 10:50    TPro  6.2  /  Alb  3.2<L>  /  TBili  0.4  /  DBili  x   /  AST  19  /  ALT  44  /  AlkPhos  67  02-16      Urinalysis Basic - ( 2019 01:00 )    Color: Yellow / Appearance: Clear / S.025 / pH: x  Gluc: x / Ketone: Trace  / Bili: Negative / Urobili: 1 mg/dL   Blood: x / Protein: 30 mg/dL / Nitrite: Negative   Leuk Esterase: Negative / RBC: 0-2 /HPF / WBC 0-2   Sq Epi: x / Non Sq Epi: Occasional / Bacteria: x    Urine Culture NG    CURRENT MEDICATIONS  MEDICATIONS  (STANDING):  baclofen 5 milliGRAM(s) Oral every 8 hours  bethanechol 15 milliGRAM(s) Oral every 8 hours  docusate sodium 100 milliGRAM(s) Oral three times a day  DULoxetine 60 milliGRAM(s) Oral daily  enoxaparin Injectable 40 milliGRAM(s) SubCutaneous daily  ergocalciferol 25594 Unit(s) Oral <User Schedule>  gabapentin 100 milliGRAM(s) Oral two times a day  gabapentin 300 milliGRAM(s) Oral at bedtime  hydroxychloroquine 200 milliGRAM(s) Oral two times a day  magnesium oxide 400 milliGRAM(s) Oral two times a day with meals  melatonin 6 milliGRAM(s) Oral <User Schedule>  multivitamin 1 Tablet(s) Oral daily  mycophenolate mofetil 1500 milliGRAM(s) Oral two times a day  pantoprazole    Tablet 40 milliGRAM(s) Oral before breakfast  predniSONE   Tablet 55 milliGRAM(s) Oral daily  senna 2 Tablet(s) Oral at bedtime  tamsulosin 0.8 milliGRAM(s) Oral at bedtime    MEDICATIONS  (PRN):  acetaminophen   Tablet .. 650 milliGRAM(s) Oral every 6 hours PRN Mild Pain (1 - 3)  artificial  tears Solution 1 Drop(s) Both EYES three times a day PRN Dry Eyes  ibuprofen  Tablet. 400 milliGRAM(s) Oral every 6 hours PRN Moderate Pain (4 - 6)  polyethylene glycol 3350 17 Gram(s) Oral daily PRN Constipation

## 2019-02-20 ENCOUNTER — TRANSCRIPTION ENCOUNTER (OUTPATIENT)
Age: 34
End: 2019-02-20

## 2019-02-20 PROCEDURE — 99233 SBSQ HOSP IP/OBS HIGH 50: CPT

## 2019-02-20 PROCEDURE — 99232 SBSQ HOSP IP/OBS MODERATE 35: CPT

## 2019-02-20 RX ORDER — HYDROXYCHLOROQUINE SULFATE 200 MG
1 TABLET ORAL
Qty: 0 | Refills: 0 | COMMUNITY
Start: 2019-02-20

## 2019-02-20 RX ORDER — TAMSULOSIN HYDROCHLORIDE 0.4 MG/1
0.4 CAPSULE ORAL AT BEDTIME
Qty: 0 | Refills: 0 | Status: DISCONTINUED | OUTPATIENT
Start: 2019-02-20 | End: 2019-02-21

## 2019-02-20 RX ORDER — BETHANECHOL CHLORIDE 25 MG
10 TABLET ORAL EVERY 8 HOURS
Qty: 0 | Refills: 0 | Status: DISCONTINUED | OUTPATIENT
Start: 2019-02-20 | End: 2019-02-21

## 2019-02-20 RX ORDER — GABAPENTIN 400 MG/1
1 CAPSULE ORAL
Qty: 0 | Refills: 0 | COMMUNITY
Start: 2019-02-20

## 2019-02-20 RX ORDER — MYCOPHENOLATE MOFETIL 250 MG/1
1500 CAPSULE ORAL
Qty: 0 | Refills: 0 | COMMUNITY
Start: 2019-02-20

## 2019-02-20 RX ORDER — DULOXETINE HYDROCHLORIDE 30 MG/1
1 CAPSULE, DELAYED RELEASE ORAL
Qty: 0 | Refills: 0 | COMMUNITY
Start: 2019-02-20

## 2019-02-20 RX ORDER — LANOLIN ALCOHOL/MO/W.PET/CERES
2 CREAM (GRAM) TOPICAL
Qty: 0 | Refills: 0 | DISCHARGE
Start: 2019-02-20

## 2019-02-20 RX ADMIN — Medication 6 MILLIGRAM(S): at 21:10

## 2019-02-20 RX ADMIN — PANTOPRAZOLE SODIUM 40 MILLIGRAM(S): 20 TABLET, DELAYED RELEASE ORAL at 06:22

## 2019-02-20 RX ADMIN — Medication 100 MILLIGRAM(S): at 13:14

## 2019-02-20 RX ADMIN — MAGNESIUM OXIDE 400 MG ORAL TABLET 400 MILLIGRAM(S): 241.3 TABLET ORAL at 17:17

## 2019-02-20 RX ADMIN — MYCOPHENOLATE MOFETIL 1500 MILLIGRAM(S): 250 CAPSULE ORAL at 17:17

## 2019-02-20 RX ADMIN — Medication 5 MILLIGRAM(S): at 13:14

## 2019-02-20 RX ADMIN — ENOXAPARIN SODIUM 40 MILLIGRAM(S): 100 INJECTION SUBCUTANEOUS at 11:27

## 2019-02-20 RX ADMIN — Medication 15 MILLIGRAM(S): at 06:22

## 2019-02-20 RX ADMIN — MAGNESIUM OXIDE 400 MG ORAL TABLET 400 MILLIGRAM(S): 241.3 TABLET ORAL at 08:02

## 2019-02-20 RX ADMIN — Medication 10 MILLIGRAM(S): at 21:09

## 2019-02-20 RX ADMIN — GABAPENTIN 100 MILLIGRAM(S): 400 CAPSULE ORAL at 17:18

## 2019-02-20 RX ADMIN — Medication 5 MILLIGRAM(S): at 06:22

## 2019-02-20 RX ADMIN — GABAPENTIN 100 MILLIGRAM(S): 400 CAPSULE ORAL at 06:22

## 2019-02-20 RX ADMIN — DULOXETINE HYDROCHLORIDE 60 MILLIGRAM(S): 30 CAPSULE, DELAYED RELEASE ORAL at 11:27

## 2019-02-20 RX ADMIN — Medication 100 MILLIGRAM(S): at 21:09

## 2019-02-20 RX ADMIN — Medication 200 MILLIGRAM(S): at 06:22

## 2019-02-20 RX ADMIN — Medication 200 MILLIGRAM(S): at 17:17

## 2019-02-20 RX ADMIN — Medication 1 TABLET(S): at 11:27

## 2019-02-20 RX ADMIN — Medication 55 MILLIGRAM(S): at 06:22

## 2019-02-20 RX ADMIN — GABAPENTIN 300 MILLIGRAM(S): 400 CAPSULE ORAL at 21:09

## 2019-02-20 RX ADMIN — SENNA PLUS 2 TABLET(S): 8.6 TABLET ORAL at 21:10

## 2019-02-20 RX ADMIN — MYCOPHENOLATE MOFETIL 1500 MILLIGRAM(S): 250 CAPSULE ORAL at 06:22

## 2019-02-20 RX ADMIN — Medication 5 MILLIGRAM(S): at 21:10

## 2019-02-20 RX ADMIN — TAMSULOSIN HYDROCHLORIDE 0.4 MILLIGRAM(S): 0.4 CAPSULE ORAL at 21:09

## 2019-02-20 RX ADMIN — Medication 100 MILLIGRAM(S): at 06:22

## 2019-02-20 RX ADMIN — Medication 10 MILLIGRAM(S): at 13:14

## 2019-02-20 NOTE — PROGRESS NOTE ADULT - ASSESSMENT
Assessment and Plan   33 year-old Woman with PMH of SLE, lupus nephritis, and fibromyalgia admitted to rehab with transverse myelitis resulting in decreased functional mobility, gait instability and ADL impairments.    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  Patient received second dose of rituximab IVPB 1 gm infuse over 5 hours 2/16 without side effects.  Premedicated with tylenol 650 mg, bendaryl 20 mg IVPB, and solumedrol 100 mg ivpb.    - f/u with rheumatology as an outpatient after that. To be repeated Rituximab in 6 months after that.    Neurogenic Bladder / urinary retention- Retention improved;  Instructed in self cath.    - ertapenem started 1/30, last day 2/4 per ID.  Renal us showed no evidence of  hydronephrosis.  Plan   - c/w bladder scans and straight cath as needed.  Recent urine culture 2/6 NG  - c/w flomax 0.8mg po qhs-reduce to 0.4mg as now with incontinence  - 2/11: Added Urecholine 10 mg TID with slight improvement; increased to 15mg TID 2/13.  Reduce back to 10mg TID as now is incontinent    SLE with nephritis    - stable renal disease, elevated dsDNA, low complements  - rheum f/u      Fibromyalgia   - c/w Tylenol for pain prn     Psych  -Depression: c/w Cymbalta 60mg po qd  -Insomnia: Added melatonin 6mg qhs with improvement    Vitamin D deficiency  -Vit D 10.9.  Added Vit D 50,000U weekly    GI  -Bowel Mgmt - Colace, Senna,  Miralax PRN  -FEN: MVI  -GI ppx; Protonix    Pain Mgmt - Tylenol PRN, oxycodone prn, baclofen prn spasms-change to scheduled.  Added Gabapentin 100mg bid and 300mg qhs     DVT ppx: Lovenox, SCDs, TEDs

## 2019-02-20 NOTE — CHART NOTE - NSCHARTNOTEFT_GEN_A_CORE
Nutrition Follow Up Note  Hospital Course (Per Electronic Medical Record):   Source: Medical Record [X] Patient [X] Family [ ] Nursing Staff [ ]     Diet: Regular Diet w/ Thin Liquids   Tolerates Diet Well   No Chewing/Swallowing Difficulties   No Recent Nausea, Vomiting, Diarrhea or Constipation   Consumes % of Meals (as Per Documentation) - States Good PO Intake/Appetite     Enteral/Parenteral Nutrition: N/A    Current Weight: 122.3lb on 2/6  Obtain New Weight  Obtain Weights Weekly     Pertinent Medications: MEDICATIONS  (STANDING):  baclofen 5 milliGRAM(s) Oral every 8 hours  bethanechol 10 milliGRAM(s) Oral every 8 hours  docusate sodium 100 milliGRAM(s) Oral three times a day  DULoxetine 60 milliGRAM(s) Oral daily  enoxaparin Injectable 40 milliGRAM(s) SubCutaneous daily  ergocalciferol 21947 Unit(s) Oral <User Schedule>  gabapentin 100 milliGRAM(s) Oral two times a day  gabapentin 300 milliGRAM(s) Oral at bedtime  hydroxychloroquine 200 milliGRAM(s) Oral two times a day  magnesium oxide 400 milliGRAM(s) Oral two times a day with meals  melatonin 6 milliGRAM(s) Oral <User Schedule>  multivitamin 1 Tablet(s) Oral daily  mycophenolate mofetil 1500 milliGRAM(s) Oral two times a day  pantoprazole    Tablet 40 milliGRAM(s) Oral before breakfast  predniSONE   Tablet 55 milliGRAM(s) Oral daily  senna 2 Tablet(s) Oral at bedtime  tamsulosin 0.4 milliGRAM(s) Oral at bedtime    MEDICATIONS  (PRN):  acetaminophen   Tablet .. 650 milliGRAM(s) Oral every 6 hours PRN Mild Pain (1 - 3)  artificial  tears Solution 1 Drop(s) Both EYES three times a day PRN Dry Eyes  ibuprofen  Tablet. 400 milliGRAM(s) Oral every 6 hours PRN Moderate Pain (4 - 6)  polyethylene glycol 3350 17 Gram(s) Oral daily PRN Constipation    Pertinent Labs:   02-16 Alb 3.2 g/dL<L>    Skin: No Pressure Ulcers     Edema: None Noted     Last BM: on 2/19    Estimated Needs:   [X] No Change since Previous Assessment    Previous Nutrition Diagnosis:   No Active Nutrition Dx @ This Present Time    Nutrition Diagnosis is [X] Not Applicable      New Nutrition Diagnosis: [X] Not Applicable    Interventions:   1. Recommend Continue Nutrition Plan of Care     Monitoring & Evaluation:   [X] Weights   [X] PO Intake   [X] Follow Up (Per Protocol)  [X] Tolerance to Diet Prescription   [X] Other: Labs     RD Remains Available.  Darnell Galloway RD

## 2019-02-20 NOTE — DISCHARGE NOTE ADULT - MEDICATION SUMMARY - MEDICATIONS TO TAKE
I will START or STAY ON the medications listed below when I get home from the hospital:    predniSONE 50 mg oral tablet  -- 1 tab(s) by mouth once a day   -- It is very important that you take or use this exactly as directed.  Do not skip doses or discontinue unless directed by your doctor.  Obtain medical advice before taking any non-prescription drugs as some may affect the action of this medication.  Take with food or milk.    -- Indication: For Transverse myelitis    predniSONE 5 mg oral tablet  -- 1 tab(s) by mouth once a day   -- It is very important that you take or use this exactly as directed.  Do not skip doses or discontinue unless directed by your doctor.  Obtain medical advice before taking any non-prescription drugs as some may affect the action of this medication.  Take with food or milk.    -- Indication: For Transverse myelitis    ibuprofen 400 mg oral tablet  -- 1 tab(s) by mouth every 6 hours, As needed, Moderate Pain (4 - 6)  -- Indication: For pain    magnesium hydroxide 8% oral suspension  -- 30 milliliter(s) by mouth once a day, As needed, Constipation  -- Indication: For constipation, as needed    tamsulosin 0.4 mg oral capsule  -- 1 cap(s) by mouth once a day (at bedtime)   -- Indication: For urinary retention    gabapentin 100 mg oral capsule  -- 1 cap(s) by mouth 2 times a day  -- Indication: For pain    gabapentin 300 mg oral capsule  -- 1 cap(s) by mouth once a day (at bedtime)  -- Indication: For pain    DULoxetine 60 mg oral delayed release capsule  -- 1 cap(s) by mouth once a day  -- Indication: For pain    hydroxychloroquine 200 mg oral tablet  -- 1 tab(s) by mouth 2 times a day  -- Indication: For Lupus nephritis    mycophenolate mofetil 250 mg oral capsule  -- 1500 milligram(s) by mouth 2 times a day  -- Indication: For Lupus nephritis    docusate sodium 100 mg oral capsule  -- 1 cap(s) by mouth 3 times a day  -- Indication: For constipation, as needed    polyethylene glycol 3350 oral powder for reconstitution  -- 17 gram(s) by mouth once a day, As needed, Constipation  -- Indication: For constipation, as needed    senna oral tablet  -- 2 tab(s) by mouth once a day (at bedtime)  -- Indication: For constipation, as needed    magnesium oxide 400 mg (241.3 mg elemental magnesium) oral tablet  -- 1 tab(s) by mouth 2 times a day (with meals)  -- Indication: For Magnesium supplement    bethanechol 10 mg oral tablet  -- 1 tab(s) by mouth every 8 hours  -- Indication: For urinary retention    baclofen 5 mg oral tablet  -- 1 tab(s) by mouth every 8 hours  -- Indication: For muscle spasms    melatonin 3 mg oral tablet  -- 2 tab(s) by mouth   -- Indication: For For sleep, as needed    pantoprazole 40 mg oral delayed release tablet  -- 1 tab(s) by mouth once a day (before a meal)  -- Indication: For GERD    Multiple Vitamins oral tablet  -- 1 tab(s) by mouth once a day  -- Indication: For vitamin    ergocalciferol 50,000 intl units (1.25 mg) oral capsule  -- 1 cap(s) by mouth once a week  -- Indication: For vitamin D I will START or STAY ON the medications listed below when I get home from the hospital:    predniSONE 50 mg oral tablet  -- 1 tab(s) by mouth once a day   -- It is very important that you take or use this exactly as directed.  Do not skip doses or discontinue unless directed by your doctor.  Obtain medical advice before taking any non-prescription drugs as some may affect the action of this medication.  Take with food or milk.    -- Indication: For Transverse myelitis    predniSONE 5 mg oral tablet  -- 1 tab(s) by mouth once a day   -- It is very important that you take or use this exactly as directed.  Do not skip doses or discontinue unless directed by your doctor.  Obtain medical advice before taking any non-prescription drugs as some may affect the action of this medication.  Take with food or milk.    -- Indication: For Transverse myelitis    ibuprofen 400 mg oral tablet  -- 1 tab(s) by mouth every 6 hours, As needed, Moderate Pain (4 - 6)  -- Indication: For pain    magnesium hydroxide 8% oral suspension  -- 30 milliliter(s) by mouth once a day, As needed, Constipation  -- Indication: For constipation, as needed    tamsulosin 0.4 mg oral capsule  -- 1 cap(s) by mouth once a day (at bedtime)   -- Indication: For urinary retention    gabapentin 100 mg oral capsule  -- 1 cap(s) by mouth 2 times a day  -- Indication: For pain    gabapentin 300 mg oral capsule  -- 1 cap(s) by mouth once a day (at bedtime)  -- Indication: For pain    DULoxetine 60 mg oral delayed release capsule  -- 1 cap(s) by mouth once a day  -- Indication: For pain    hydroxychloroquine 200 mg oral tablet  -- 1 tab(s) by mouth 2 times a day  -- Indication: For Lupus nephritis    mycophenolate mofetil 250 mg oral capsule  -- 1500 milligram(s) by mouth 2 times a day  -- Indication: For Lupus nephritis    docusate sodium 100 mg oral capsule  -- 1 cap(s) by mouth 3 times a day  -- Indication: For constipation, as needed    polyethylene glycol 3350 oral powder for reconstitution  -- 17 gram(s) by mouth once a day, As needed, Constipation  -- Indication: For constipation, as needed    senna oral tablet  -- 2 tab(s) by mouth once a day (at bedtime)  -- Indication: For constipation, as needed    magnesium oxide 400 mg (241.3 mg elemental magnesium) oral tablet  -- 1 tab(s) by mouth 2 times a day (with meals)  -- Indication: For Magnesium supplement    bethanechol 10 mg oral tablet  -- 1 tab(s) by mouth every 8 hours  -- Indication: For urinary retention    baclofen 5 mg oral tablet  -- 1 tab(s) by mouth every 8 hours, As Needed for muscle spasms  -- Indication: For Muscle spasms    melatonin 3 mg oral tablet  -- 2 tab(s) by mouth   -- Indication: For For sleep, as needed    pantoprazole 40 mg oral delayed release tablet  -- 1 tab(s) by mouth once a day (before a meal)  -- Indication: For GERD    Multiple Vitamins oral tablet  -- 1 tab(s) by mouth once a day  -- Indication: For vitamin    ergocalciferol 50,000 intl units (1.25 mg) oral capsule  -- 1 cap(s) by mouth once a week and once completed begin Vitamin D 2,000U daily  -- Indication: For Vitamin D deficiency

## 2019-02-20 NOTE — DISCHARGE NOTE ADULT - CARE PLAN
Principal Discharge DX:	Transverse myelitis  Goal:	improvement in function  Assessment and plan of treatment:	You were admitted to rehab because you had inflammation in your spinal cord, called transverse myelitis.  Secondary Diagnosis:	Fibromyalgia  Secondary Diagnosis:	Lupus nephritis  Secondary Diagnosis:	Bladder dysfunction Principal Discharge DX:	Transverse myelitis  Goal:	improvement in function  Assessment and plan of treatment:	You were admitted to rehab because you had inflammation in your spinal cord, called transverse myelitis. You received steroids and a drug called rituxan and steroids to help treat the inflammation.  Secondary Diagnosis:	Fibromyalgia  Secondary Diagnosis:	Lupus nephritis  Secondary Diagnosis:	Bladder dysfunction Principal Discharge DX:	Transverse myelitis  Goal:	improvement in function  Assessment and plan of treatment:	You were admitted to rehab because you had inflammation in your spinal cord, called transverse myelitis. You received steroids and a drug called rituxan and steroids to help treat the inflammation.  You should continue medications as instructed above.  You should follow up with your neurologist (Dr. Scotty Schneider) within 1-2 weeks of discharge from rehab.  You may also follow up with Dr. Jung in rehab within 1 month of discharge.  Secondary Diagnosis:	Fibromyalgia  Assessment and plan of treatment:	Please continue medications as instructed above.  Follow up with your rheumatologist (Dr. Yuri Ma) within 1-2 weeks of discharge from rehab.  Secondary Diagnosis:	Lupus nephritis  Assessment and plan of treatment:	Continue medications as above.  Follow up with the physician who manages your lupus within 1-2 weeks of discharge from rehab.  Secondary Diagnosis:	Bladder dysfunction  Assessment and plan of treatment:	While you were in rehab, you learned how to catheterize yourself to empty your bladder.  However, you then began having incontinence.  Your medications were adjusted prior to discharge, but you should follow up with your urologist (Dr. Humberto Dawkins) within 1-2 weeks after discharge from rehab.  If feel that you are not urinating enough, you should call Dr. Dawkins or seek medical attention. Principal Discharge DX:	Transverse myelitis  Goal:	improvement in function  Assessment and plan of treatment:	You were admitted to rehab because you had inflammation in your spinal cord, called transverse myelitis. You received steroids and a drug called rituxan and steroids to help treat the inflammation.  You should continue medications as instructed above.  You should follow up with your neurologist (Dr. Scotty Schneider) within 1-2 weeks of discharge from rehab.  You will need another dose of rituxan in about 6 months, so it is very important that you follow up closely with Dr. Schneider.  If the pain around your abdomen is improved, you can stop taking gabapentin/neurontin.  You may also follow up with Dr. Jung in rehab within 1 month of discharge.  Secondary Diagnosis:	Fibromyalgia  Assessment and plan of treatment:	Please continue medications as instructed above.  Follow up with your rheumatologist (Dr. Yuri Ma) within 1-2 weeks of discharge from rehab.  Secondary Diagnosis:	Lupus nephritis  Assessment and plan of treatment:	Continue medications as above.  Follow up with the physician who manages your lupus within 1-2 weeks of discharge from rehab.  Secondary Diagnosis:	Bladder dysfunction  Assessment and plan of treatment:	While you were in rehab, you learned how to catheterize yourself to empty your bladder.  However, you then began having incontinence.  Your medications were adjusted prior to discharge, but you should follow up with your urologist (Dr. Humberto Dawkins) within 1-2 weeks after discharge from rehab.  If you are urinating well in 1 week after discharge, you can stop taking either bethanechol/urecholine or tamsulosin/flomax.  If you are still urinating well 1 week after stopping one of the medications, you can stop the second one.  If feel that you are not urinating enough, you should call Dr. Dawkins or seek medical attention. Principal Discharge DX:	Transverse myelitis  Goal:	improvement in function  Assessment and plan of treatment:	You were admitted to rehab because you had inflammation in your spinal cord, called transverse myelitis. You received steroids and a drug called rituxan and steroids to help treat the inflammation.  You should continue medications as instructed above.  You should follow up with your neurologist (Dr. Scotty Schneider) within 1-2 weeks of discharge from rehab.  You will need another dose of rituxan in about 6 months, so it is very important that you follow up closely with Dr. Schneider.  If the pain around your abdomen is improved, you can stop taking gabapentin/neurontin.  You may also follow up with Dr. Jung in rehab within 1 month of discharge.  Secondary Diagnosis:	Fibromyalgia  Assessment and plan of treatment:	Please continue medications as instructed above.  Follow up with your rheumatologist (Dr. Yuri Ma) within 1-2 weeks of discharge from rehab.  Secondary Diagnosis:	Lupus nephritis  Assessment and plan of treatment:	Continue medications as above.  Follow up with the physician who manages your lupus within 1-2 weeks of discharge from rehab.  Secondary Diagnosis:	Bladder dysfunction  Assessment and plan of treatment:	While you were in rehab, you learned how to catheterize yourself to empty your bladder.  However, you then began having incontinence.  Your medications were adjusted prior to discharge, but you should follow up with your urologist (Dr. Humberto Dawkins) within 1-2 weeks after discharge from rehab.  If feel that you are not urinating enough, you should self catheterize and call Dr. Dawkins or seek medical attention immediately.

## 2019-02-20 NOTE — DISCHARGE NOTE ADULT - CARE PROVIDER_API CALL
Lesly Jung)  PhysicalRehab Medicine  [address] [place] [address]  Phone: [phone]  Fax: [phone]  Follow Up Time:     Humberto Dawkins)  Urology  [address] [facility] [address] [facility], [address]  [place] [address]  Phone: [phone]  Fax: [phone]  Follow Up Time:     Scotty Schneider)  Neurology  [address] [place] [address]  Phone: [phone]  Fax: [phone]  Follow Up Time:     [place] [address]  Phone: [phone]  Fax: [phone]  Follow Up Time:

## 2019-02-20 NOTE — DISCHARGE NOTE ADULT - PLAN OF CARE
improvement in function You were admitted to rehab because you had inflammation in your spinal cord, called transverse myelitis. You were admitted to rehab because you had inflammation in your spinal cord, called transverse myelitis. You received steroids and a drug called rituxan and steroids to help treat the inflammation. You were admitted to rehab because you had inflammation in your spinal cord, called transverse myelitis. You received steroids and a drug called rituxan and steroids to help treat the inflammation.  You should continue medications as instructed above.  You should follow up with your neurologist (Dr. Scotty Schneider) within 1-2 weeks of discharge from rehab.  You may also follow up with Dr. Jung in rehab within 1 month of discharge. Please continue medications as instructed above.  Follow up with your rheumatologist (Dr. Yuri Ma) within 1-2 weeks of discharge from rehab. Continue medications as above.  Follow up with the physician who manages your lupus within 1-2 weeks of discharge from rehab. While you were in rehab, you learned how to catheterize yourself to empty your bladder.  However, you then began having incontinence.  Your medications were adjusted prior to discharge, but you should follow up with your urologist (Dr. Humberto Dawkins) within 1-2 weeks after discharge from rehab.  If feel that you are not urinating enough, you should call Dr. Dawkins or seek medical attention. You were admitted to rehab because you had inflammation in your spinal cord, called transverse myelitis. You received steroids and a drug called rituxan and steroids to help treat the inflammation.  You should continue medications as instructed above.  You should follow up with your neurologist (Dr. Scotty Schneider) within 1-2 weeks of discharge from rehab.  You will need another dose of rituxan in about 6 months, so it is very important that you follow up closely with Dr. Schneider.  If the pain around your abdomen is improved, you can stop taking gabapentin/neurontin.  You may also follow up with Dr. Jung in rehab within 1 month of discharge. While you were in rehab, you learned how to catheterize yourself to empty your bladder.  However, you then began having incontinence.  Your medications were adjusted prior to discharge, but you should follow up with your urologist (Dr. Humberto Dawkins) within 1-2 weeks after discharge from rehab.  If you are urinating well in 1 week after discharge, you can stop taking either bethanechol/urecholine or tamsulosin/flomax.  If you are still urinating well 1 week after stopping one of the medications, you can stop the second one.  If feel that you are not urinating enough, you should call Dr. Dawkins or seek medical attention. While you were in rehab, you learned how to catheterize yourself to empty your bladder.  However, you then began having incontinence.  Your medications were adjusted prior to discharge, but you should follow up with your urologist (Dr. Humberto Dawkins) within 1-2 weeks after discharge from rehab.  If feel that you are not urinating enough, you should self catheterize and call Dr. Dawkins or seek medical attention immediately.

## 2019-02-20 NOTE — DISCHARGE NOTE ADULT - PATIENT PORTAL LINK FT
You can access the [facility] Patient Portal, offered by [facility], by registering with the following website: [email or web address]

## 2019-02-20 NOTE — DISCHARGE NOTE ADULT - HOSPITAL COURSE
33 years old female with PMH of SLE, Lupus Nephritis and Fibromyalgia admitted to acute rehab after diagnosis of transverse myelitis.  She was initially brought to [facility] on 1/16 with generalized weakness, low grade fevers, generalized body pain, throat irritation, cough, ear pain, vomiting, and poor appetite. She went to her PMD few days prior who prescribed Sertraline for her depressive symptoms. As per her, symptoms started after taking that medication. At time of initial presentation, she was feeling so weak that she could not walk so her  brought her to ER. She was also complaining of difficulty urinating. She went to Urologist 10 days ago and she was prescribed Flomax which she has been taking but is unable to void. She was also noting constipation. Pt recently came back from [place] on 12/29/18 after staying there for 2.5 to 3 months. She had MRI there in December due to back pain and generalized weakness and it was normal. Workup at [facility] was notable for transverse myelitis.  She received plamapheresis, rtx and steroids and improved. Course complicated with urinary retention and ESBL ECOLI (tx'd with ertapenem, last dose 2/4).     Rehab course complicated by persistent urinary retention.  Patient was taught to straight cath and was doing so successfully, but after the addition of bethanechol and tamsulosin began experiencing incontinence.  These medications were titrated down to limit incontinence.  Patient received rituxan for transverse myelitis on 2/16.  Gabapentin was added for belt-like pain around abdomen.  Rehab course was otherwise uncomplicated.  Patient participated in daily PT/OT and made good functional gains.  At time of discharge, she was able to transfer from sit to stand with supervision and to ambulate without device with her  and close supervision for 60ft. 33 years old female with PMH of SLE, Lupus Nephritis and Fibromyalgia admitted to acute rehab after diagnosis of transverse myelitis.  She was initially brought to [facility] on 1/16 with generalized weakness, low grade fevers, generalized body pain, throat irritation, cough, ear pain, vomiting, and poor appetite. She went to her PMD few days prior who prescribed Sertraline for her depressive symptoms. As per her, symptoms started after taking that medication. At time of initial presentation, she was feeling so weak that she could not walk so her  brought her to ER. She was also complaining of difficulty urinating. She went to Urologist 10 days ago and she was prescribed Flomax which she has been taking but is unable to void. She was also noting constipation. Pt recently came back from [place] on 12/29/18 after staying there for 2.5 to 3 months. She had MRI there in December due to back pain and generalized weakness and it was normal. Workup at [facility] was notable for transverse myelitis.  She received plamapheresis, rtx and steroids and improved. Course complicated with urinary retention and ESBL ECOLI (tx'd with ertapenem, last dose 2/4).     Rehab course complicated by persistent urinary retention.  Patient was taught to straight cath and was doing so successfully, but after the addition of bethanechol and tamsulosin began experiencing incontinence.  These medications were titrated down to limit incontinence.  Patient received rituxan for transverse myelitis on 2/16.  Gabapentin was added for belt-like pain around abdomen.  Rehab course was otherwise uncomplicated.  Patient participated in daily PT/OT and made good functional gains.  At time of discharge, she was able to transfer from  to [facility] independently and sit to supine independently.  Ambulated independently with RW for 400 feet with continuous heel/toe gait.  Ambulated without device with  lose supervision/CG for 150 feet.  Managed 12 steps with 2 HR independently.  Up/down 4 steps without rails with CG. and able to ambulate without device with her  and close supervision for 60ft.      She was seen and examined on day of discharge.  She verbalized understanding of instructions regarding medications and follow up appointments.  She was without new complain and doing well. 33 years old female with PMH of SLE, Lupus Nephritis and Fibromyalgia admitted to acute rehab after diagnosis of transverse myelitis.  She was initially brought to [facility] on 1/16 with generalized weakness, low grade fevers, generalized body pain, throat irritation, cough, ear pain, vomiting, and poor appetite. She went to her PMD few days prior who prescribed Sertraline for her depressive symptoms. As per her, symptoms started after taking that medication. At time of initial presentation, she was feeling so weak that she could not walk so her  brought her to ER. She was also complaining of difficulty urinating. She went to Urologist 10 days ago and she was prescribed Flomax which she has been taking but is unable to void. She was also noting constipation. Pt recently came back from [place] on 12/29/18 after staying there for 2.5 to 3 months. She had MRI there in December due to back pain and generalized weakness and it was normal. Workup at [facility] was notable for transverse myelitis.  She received plamapheresis, rtx and steroids and improved. Course complicated with urinary retention and ESBL ECOLI (tx'd with ertapenem, last dose 2/4).     Rehab course complicated by persistent urinary retention.  Patient was taught to straight cath and was doing so successfully, but after the addition of bethanechol and tamsulosin began experiencing incontinence.  These medications were titrated down to limit incontinence.  Patient received rituxan for transverse myelitis on 2/16.  Gabapentin was added for belt-like pain around abdomen.  Rehab course was otherwise uncomplicated.  Patient participated in daily PT/OT and made good functional gains.  At time of discharge, she was able to transfer from  to [facility] independently and sit to supine independently.  Ambulated independently with RW for 400 feet with continuous heel/toe gait.  Ambulated without device with  lose supervision/CG for 150 feet.  Managed 12 steps with 2 HR independently.  Up/down 4 steps without rails with CG. and able to ambulate without device with her  and close supervision for 60ft.      She was seen and examined on day of discharge.  She verbalized understanding of instructions regarding medications and follow up appointments.  She was without new complain and doing well.      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  Patient received second dose of rituximab IVPB 1 gm infuse over 5 hours 2/16 without side effects.  Premedicated with tylenol 650 mg, bendaryl 20 mg IVPB, and solumedrol 100 mg ivpb.    - f/u with rheumatology as an outpatient after that. To be repeated Rituximab in 6 months after that.    Neurogenic Bladder / urinary retention- Retention improved;  Instructed in self cath.    - ertapenem started 1/30, last day 2/4 per ID.  Renal us showed no evidence of  hydronephrosis.  Plan   - Bladder scans and straight cath as needed.  Recent urine culture 2/6 NG  - 2/11: Added Urecholine 10 mg TID with slight improvement; increased to 15mg TID 2/13.  Reduce back to 10mg TID as now is incontinent  - Flomax 0.8mg po qhs-reduced to 0.4mg as went from retention to having incontinence and reduced urecholine to 10mg tid with improvement.  Pt no longer requiring straight cath although will provide pt with several catheters for home.  Pt to f/u with urologist within one week      SLE with nephritis    - stable renal disease, elevated dsDNA, low complements  - rheum f/u      Fibromyalgia   - c/w Tylenol for pain prn     Psych  -Depression: c/w Cymbalta 60mg po qd  -Insomnia: Added melatonin 6mg qhs with improvement    Vitamin D deficiency  -Vit D 10.9.  Added Vit D 50,000U weekly    GI  -Bowel Mgmt - Colace, Senna,  Miralax PRN  -FEN: MVI  -GI ppx; Protonix    Pain Mgmt - Tylenol PRN, oxycodone prn, baclofen prn spasms-change to scheduled.  Added Gabapentin 100mg bid and 300mg qhs     DVT ppx: Lovenox, SCDs, TEDs

## 2019-02-20 NOTE — DISCHARGE NOTE ADULT - CARE PROVIDERS DIRECT ADDRESSES
,[email or web address],DirectAddress_Unknown,DirectAddress_Unknown

## 2019-02-20 NOTE — DISCHARGE NOTE ADULT - PROVIDER TOKENS
PROVIDER:[TOKEN:[[id number]]],PROVIDER:[TOKEN:[[id number]]],PROVIDER:[TOKEN:[[id number]]],PROVIDER:[TOKEN:[[id number]]]

## 2019-02-20 NOTE — PROGRESS NOTE ADULT - SUBJECTIVE AND OBJECTIVE BOX
HISTORY OF PRESENT ILLNESS  PMHx - 33 years old female with PMH of SLE, Lupus Nephritis and Fibromyalgia admitted to acute rehab after diagnosis of transverse myelitis.  She was initially brought to [facility] on  with generalized weakness, low grade fevers, generalized body pain, throat irritation, cough, ear pain, vomiting, and poor appetite. She went to her PMD few days prior who prescribed Sertraline for her depressive symptoms. As per her, symptoms started after taking that medication. At time of initial presentation, she was feeling so weak that she could not walk so her  brought her to ER. She was also complaining of difficulty urinating. She went to Urologist 10 days ago and she was prescribed Flomax which she has been taking but is unable to void. She was also noting constipation. Pt recently came back from [place] on 18 after staying there for 2.5 to 3 months. She had MRI there in December due to back pain and generalized weakness and it was normal. Workup at [facility] was notable for transverse myelitis.  She received plamapheresis, rtx and steroids and improved. Course complicated with urinary retention and ESBL ECOLI (tx'd with ertapenem, last dose ).     Pt medically optimized and cleared for transfer to [place] for acute rehab on 19.     TODAY'S SUBJECTIVE &  REVIEW OF SYMPTOMS  No acute events overnight. Seen and examined.  Pt reports that she is improved; notes LLE is stronger and has more sensation.  Pt is now voiding better; did not require straight cath today since evening of  and now notes incontinence.  Last BM yesterday.  Sleeping very well daily with Melatonin.    Reports less less spasms. Belt like sensation of pain across abdomen slightly improved.     [X] Constitutional WNL       [x] HEENT   [X] Cardio WNL              [X] Resp WNL            [X] GI - wnl                   []  - Urinary retention, as above improved  [] MSK - Weakness  [X] Neuro weakness  [] Cognitive    [X] Psych WNL  [ x ] Skin         VITALS  Vital Signs Last 24 Hrs  T(C): 36.4 (2019 07:16), Max: 37 (2019 20:59)  T(F): 97.6 (2019 07:16), Max: 98.6 (2019 20:59)  HR: 80 (2019 07:16) (80 - 88)  BP: 95/63 (2019 07:16) (95/63 - 123/75)  BP(mean): --  RR: 14 (2019 07:16) (14 - 14)  SpO2: 99% (2019 07:16) (98% - 99%)    PHYSICAL EXAM  General- NAD, Comfortable                                                                                    HEENT - NCAT,  Cardio- RRR, S1S2                                                                          Resp- CTA bilaterally, No wheeze   Abdomen - BS+, Soft, NTND                                                                                    Extremities - No C/C/E  Skin: Intact                                                                                                                 Wounds: none     Neuro                   Cognitive - Awake, Alert, AAO x 3                                                                          Communication - Fluent, No dysarthria                                                                             Sensory - Decreased to LT to level of T8       MSK - wnl   Motor   LEFT    UE: SF [5/5], EF [5/5], EE [5/5], WE [5/5],  [wnl]  RIGHT UE: SF [5/5], EF [5/5], EE [5/5], WE [5/5],  [wnl]  LEFT    LE:  HF 5/5, HE 3/5, KE 4+/5, KF 4/5, hip abduction 3/5, hip adduction 3/5, evertors 5/5, invertors 5/5, DF 5/5, EHL 4/5, PF 5/5  RIGHT LE:  HF 5/5, HE 3/5, KE 5/5, KE 4/5, hip abduction 3/5, hip adduction 3/5, evertors 5/5, invertors 5/5, DF 5/5, EHL 3/5, PF 5/5                                                                                    Psychiatric - Mood stable, Affect WNL    FUNCTIONAL PROGRESS:  Bed mobility: Supervision  Transfers: Supervision  Gait: Amb 175' RW supervision.  Amb without ' CS/CG  Stairs: 12 steps 2HR CS  ADLs: Laundry - CS    RECENT LABS                                     10.9   7.4   )-----------( 356      ( 2019 10:50 )             33.7   02-16    140  |  103  |  17  ----------------------------<  146<H>  3.8   |  25  |  0.48<L>    Ca    8.7      2019 10:50    TPro  6.2  /  Alb  3.2<L>  /  TBili  0.4  /  DBili  x   /  AST  19  /  ALT  44  /  AlkPhos  67  02-16      Urinalysis Basic - ( 2019 01:00 )    Color: Yellow / Appearance: Clear / S.025 / pH: x  Gluc: x / Ketone: Trace  / Bili: Negative / Urobili: 1 mg/dL   Blood: x / Protein: 30 mg/dL / Nitrite: Negative   Leuk Esterase: Negative / RBC: 0-2 /HPF / WBC 0-2   Sq Epi: x / Non Sq Epi: Occasional / Bacteria: x    Urine Culture NG    CURRENT MEDICATIONS  MEDICATIONS  (STANDING):  baclofen 5 milliGRAM(s) Oral every 8 hours  bethanechol 10 milliGRAM(s) Oral every 8 hours  docusate sodium 100 milliGRAM(s) Oral three times a day  DULoxetine 60 milliGRAM(s) Oral daily  enoxaparin Injectable 40 milliGRAM(s) SubCutaneous daily  ergocalciferol 40362 Unit(s) Oral <User Schedule>  gabapentin 100 milliGRAM(s) Oral two times a day  gabapentin 300 milliGRAM(s) Oral at bedtime  hydroxychloroquine 200 milliGRAM(s) Oral two times a day  magnesium oxide 400 milliGRAM(s) Oral two times a day with meals  melatonin 6 milliGRAM(s) Oral <User Schedule>  multivitamin 1 Tablet(s) Oral daily  mycophenolate mofetil 1500 milliGRAM(s) Oral two times a day  pantoprazole    Tablet 40 milliGRAM(s) Oral before breakfast  predniSONE   Tablet 55 milliGRAM(s) Oral daily  senna 2 Tablet(s) Oral at bedtime  tamsulosin 0.8 milliGRAM(s) Oral at bedtime    MEDICATIONS  (PRN):  acetaminophen   Tablet .. 650 milliGRAM(s) Oral every 6 hours PRN Mild Pain (1 - 3)  artificial  tears Solution 1 Drop(s) Both EYES three times a day PRN Dry Eyes  ibuprofen  Tablet. 400 milliGRAM(s) Oral every 6 hours PRN Moderate Pain (4 - 6)  polyethylene glycol 3350 17 Gram(s) Oral daily PRN Constipation

## 2019-02-20 NOTE — PROGRESS NOTE ADULT - SUBJECTIVE AND OBJECTIVE BOX
MARCOKANE  33y  Female    Patient is a 33y old  Female who presents with a chief complaint of Weakness, bladder dysfunction, gait instability, ADL impairments 2/2 Transverse myelitis (19 Feb 2019 12:44)    Pt seen and examined, no acute events overnight    PAST MEDICAL & SURGICAL HISTORY:  Fibromyalgia  Lupus  GERD (gastroesophageal reflux disease)  Lupus nephritis  S/P correction of deviated nasal septum  History of esophagogastroduodenoscopy (EGD)  History of biopsy: Renal        MedsMEDICATIONS  (STANDING):  baclofen 5 milliGRAM(s) Oral every 8 hours  bethanechol 15 milliGRAM(s) Oral every 8 hours  docusate sodium 100 milliGRAM(s) Oral three times a day  DULoxetine 60 milliGRAM(s) Oral daily  enoxaparin Injectable 40 milliGRAM(s) SubCutaneous daily  ergocalciferol 49911 Unit(s) Oral <User Schedule>  gabapentin 100 milliGRAM(s) Oral two times a day  gabapentin 300 milliGRAM(s) Oral at bedtime  hydroxychloroquine 200 milliGRAM(s) Oral two times a day  magnesium oxide 400 milliGRAM(s) Oral two times a day with meals  melatonin 6 milliGRAM(s) Oral <User Schedule>  multivitamin 1 Tablet(s) Oral daily  mycophenolate mofetil 1500 milliGRAM(s) Oral two times a day  pantoprazole    Tablet 40 milliGRAM(s) Oral before breakfast  predniSONE   Tablet 55 milliGRAM(s) Oral daily  senna 2 Tablet(s) Oral at bedtime  tamsulosin 0.8 milliGRAM(s) Oral at bedtime    MEDICATIONS  (PRN):  acetaminophen   Tablet .. 650 milliGRAM(s) Oral every 6 hours PRN Mild Pain (1 - 3)  artificial  tears Solution 1 Drop(s) Both EYES three times a day PRN Dry Eyes  ibuprofen  Tablet. 400 milliGRAM(s) Oral every 6 hours PRN Moderate Pain (4 - 6)  polyethylene glycol 3350 17 Gram(s) Oral daily PRN Constipation      Vital Signs Last 24 Hrs  T(C): 36.4 (20 Feb 2019 07:16), Max: 37 (19 Feb 2019 20:59)  T(F): 97.6 (20 Feb 2019 07:16), Max: 98.6 (19 Feb 2019 20:59)  HR: 80 (20 Feb 2019 07:16) (80 - 88)  BP: 95/63 (20 Feb 2019 07:16) (95/63 - 123/75)  BP(mean): --  RR: 14 (20 Feb 2019 07:16) (14 - 14)  SpO2: 99% (20 Feb 2019 07:16) (98% - 99%)    PHYSICAL EXAM:  GENERAL: NAD  HEAD:  NC/AT  EYES: EOMI, PERRLA, conjunctiva and sclera clear  NERVOUS SYSTEM:  Alert & Oriented X3  CHEST/LUNG: Clear lungs b/l  HEART: S1S2, RRR   ABDOMEN: Soft, non-tender, non-distended, + bowel sounds  EXTREMITIES:  2+ Peripheral Pulses, No clubbing, cyanosis, or edema  SKIN: No rashes or lesions    LABS:    reviewed      RADIOLOGY & ADDITIONAL TESTS:    Imaging Personally Reviewed:  [ ] YES  [ ] NO      HEALTH ISSUES - PROBLEM Dx:  Weakness of both lower extremities: Weakness of both lower extremities  Lupus nephritis: Lupus nephritis  Transverse myelitis: Transverse myelitis          Care Discussed with Consultants/Other Providers [ x] YES  [ ] NO

## 2019-02-21 ENCOUNTER — INBOUND DOCUMENT (OUTPATIENT)
Age: 34
End: 2019-02-21

## 2019-02-21 VITALS
OXYGEN SATURATION: 100 % | SYSTOLIC BLOOD PRESSURE: 100 MMHG | HEART RATE: 93 BPM | DIASTOLIC BLOOD PRESSURE: 67 MMHG | TEMPERATURE: 98 F | RESPIRATION RATE: 14 BRPM

## 2019-02-21 PROCEDURE — 97535 SELF CARE MNGMENT TRAINING: CPT

## 2019-02-21 PROCEDURE — 82306 VITAMIN D 25 HYDROXY: CPT

## 2019-02-21 PROCEDURE — 97116 GAIT TRAINING THERAPY: CPT

## 2019-02-21 PROCEDURE — 97110 THERAPEUTIC EXERCISES: CPT

## 2019-02-21 PROCEDURE — 97167 OT EVAL HIGH COMPLEX 60 MIN: CPT

## 2019-02-21 PROCEDURE — 85027 COMPLETE CBC AUTOMATED: CPT

## 2019-02-21 PROCEDURE — 97163 PT EVAL HIGH COMPLEX 45 MIN: CPT

## 2019-02-21 PROCEDURE — 99238 HOSP IP/OBS DSCHRG MGMT 30/<: CPT

## 2019-02-21 PROCEDURE — 80053 COMPREHEN METABOLIC PANEL: CPT

## 2019-02-21 PROCEDURE — 97530 THERAPEUTIC ACTIVITIES: CPT

## 2019-02-21 RX ORDER — POLYETHYLENE GLYCOL 3350 17 G/17G
17 POWDER, FOR SOLUTION ORAL
Qty: 0 | Refills: 0 | DISCHARGE
Start: 2019-02-21

## 2019-02-21 RX ORDER — GABAPENTIN 400 MG/1
1 CAPSULE ORAL
Qty: 60 | Refills: 0
Start: 2019-02-21 | End: 2019-03-22

## 2019-02-21 RX ORDER — DOCUSATE SODIUM 100 MG
1 CAPSULE ORAL
Qty: 0 | Refills: 0 | DISCHARGE
Start: 2019-02-21

## 2019-02-21 RX ORDER — BETHANECHOL CHLORIDE 25 MG
1 TABLET ORAL
Qty: 90 | Refills: 0 | OUTPATIENT
Start: 2019-02-21 | End: 2019-03-22

## 2019-02-21 RX ORDER — BACLOFEN 100 %
1 POWDER (GRAM) MISCELLANEOUS
Qty: 90 | Refills: 0 | OUTPATIENT
Start: 2019-02-21 | End: 2019-03-22

## 2019-02-21 RX ORDER — BACLOFEN 100 %
1 POWDER (GRAM) MISCELLANEOUS
Qty: 90 | Refills: 0
Start: 2019-02-21 | End: 2019-03-22

## 2019-02-21 RX ORDER — HYDROXYCHLOROQUINE SULFATE 200 MG
1 TABLET ORAL
Qty: 60 | Refills: 0
Start: 2019-02-21 | End: 2019-03-22

## 2019-02-21 RX ORDER — IBUPROFEN 200 MG
1 TABLET ORAL
Qty: 0 | Refills: 0 | COMMUNITY
Start: 2019-02-21

## 2019-02-21 RX ORDER — ERGOCALCIFEROL 1.25 MG/1
1 CAPSULE ORAL
Qty: 4 | Refills: 0
Start: 2019-02-21 | End: 2019-03-22

## 2019-02-21 RX ORDER — ERGOCALCIFEROL 1.25 MG/1
1 CAPSULE ORAL
Qty: 4 | Refills: 0 | OUTPATIENT
Start: 2019-02-21 | End: 2019-03-22

## 2019-02-21 RX ORDER — DULOXETINE HYDROCHLORIDE 30 MG/1
1 CAPSULE, DELAYED RELEASE ORAL
Qty: 30 | Refills: 0
Start: 2019-02-21 | End: 2019-03-22

## 2019-02-21 RX ORDER — MYCOPHENOLATE MOFETIL 250 MG/1
1500 CAPSULE ORAL
Qty: 90000 | Refills: 0
Start: 2019-02-21 | End: 2019-03-22

## 2019-02-21 RX ORDER — GABAPENTIN 400 MG/1
1 CAPSULE ORAL
Qty: 30 | Refills: 0
Start: 2019-02-21 | End: 2019-03-22

## 2019-02-21 RX ORDER — TAMSULOSIN HYDROCHLORIDE 0.4 MG/1
1 CAPSULE ORAL
Qty: 30 | Refills: 0 | OUTPATIENT
Start: 2019-02-21 | End: 2019-03-22

## 2019-02-21 RX ORDER — PANTOPRAZOLE SODIUM 20 MG/1
1 TABLET, DELAYED RELEASE ORAL
Qty: 0 | Refills: 0 | COMMUNITY
Start: 2019-02-21

## 2019-02-21 RX ORDER — SENNA PLUS 8.6 MG/1
2 TABLET ORAL
Qty: 0 | Refills: 0 | DISCHARGE
Start: 2019-02-21

## 2019-02-21 RX ORDER — MAGNESIUM OXIDE 400 MG ORAL TABLET 241.3 MG
1 TABLET ORAL
Qty: 60 | Refills: 0
Start: 2019-02-21 | End: 2019-03-22

## 2019-02-21 RX ADMIN — Medication 100 MILLIGRAM(S): at 04:43

## 2019-02-21 RX ADMIN — PANTOPRAZOLE SODIUM 40 MILLIGRAM(S): 20 TABLET, DELAYED RELEASE ORAL at 04:43

## 2019-02-21 RX ADMIN — Medication 1 TABLET(S): at 12:24

## 2019-02-21 RX ADMIN — Medication 5 MILLIGRAM(S): at 12:24

## 2019-02-21 RX ADMIN — Medication 100 MILLIGRAM(S): at 12:24

## 2019-02-21 RX ADMIN — MYCOPHENOLATE MOFETIL 1500 MILLIGRAM(S): 250 CAPSULE ORAL at 04:43

## 2019-02-21 RX ADMIN — MAGNESIUM OXIDE 400 MG ORAL TABLET 400 MILLIGRAM(S): 241.3 TABLET ORAL at 07:18

## 2019-02-21 RX ADMIN — Medication 55 MILLIGRAM(S): at 04:43

## 2019-02-21 RX ADMIN — Medication 200 MILLIGRAM(S): at 04:43

## 2019-02-21 RX ADMIN — Medication 5 MILLIGRAM(S): at 07:18

## 2019-02-21 RX ADMIN — Medication 10 MILLIGRAM(S): at 04:43

## 2019-02-21 RX ADMIN — ENOXAPARIN SODIUM 40 MILLIGRAM(S): 100 INJECTION SUBCUTANEOUS at 12:24

## 2019-02-21 RX ADMIN — GABAPENTIN 100 MILLIGRAM(S): 400 CAPSULE ORAL at 04:43

## 2019-02-21 RX ADMIN — DULOXETINE HYDROCHLORIDE 60 MILLIGRAM(S): 30 CAPSULE, DELAYED RELEASE ORAL at 12:24

## 2019-02-21 RX ADMIN — Medication 10 MILLIGRAM(S): at 12:24

## 2019-02-21 NOTE — PROGRESS NOTE ADULT - REASON FOR ADMISSION
Weakness, bladder dysfunction, gait instability, ADL impairments 2/2 Transverse myelitis

## 2019-02-21 NOTE — PROGRESS NOTE ADULT - ATTENDING COMMENTS
Agree with above.  Medically stable.  Pt progressed well in rehab and met all inpatient rehab goals.  D/C home with home care

## 2019-02-21 NOTE — PROGRESS NOTE ADULT - ASSESSMENT
Assessment and Plan   33 year-old Woman with PMH of SLE, lupus nephritis, and fibromyalgia admitted to rehab with transverse myelitis resulting in decreased functional mobility, gait instability and ADL impairments.    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  Patient received second dose of rituximab IVPB 1 gm infuse over 5 hours 2/16 without side effects.  Premedicated with tylenol 650 mg, bendaryl 20 mg IVPB, and solumedrol 100 mg ivpb.    - f/u with rheumatology as an outpatient after that. To be repeated Rituximab in 6 months after that.    Neurogenic Bladder / urinary retention- Retention improved;  Instructed in self cath.    - ertapenem started 1/30, last day 2/4 per ID.  Renal us showed no evidence of  hydronephrosis.  Plan   - c/w bladder scans and straight cath as needed.  Recent urine culture 2/6 NG  - c/w flomax 0.8mg po qhs-reduce to 0.4mg as now with incontinence  - 2/11: Added Urecholine 10 mg TID with slight improvement; increased to 15mg TID 2/13.  Reduce back to 10mg TID as now is incontinent    SLE with nephritis    - stable renal disease, elevated dsDNA, low complements  - rheum f/u      Fibromyalgia   - c/w Tylenol for pain prn     Psych  -Depression: c/w Cymbalta 60mg po qd  -Insomnia: Added melatonin 6mg qhs with improvement    Vitamin D deficiency  -Vit D 10.9.  Added Vit D 50,000U weekly    GI  -Bowel Mgmt - Colace, Senna,  Miralax PRN  -FEN: MVI  -GI ppx; Protonix    Pain Mgmt - Tylenol PRN, oxycodone prn, baclofen prn spasms-change to scheduled.  Added Gabapentin 100mg bid and 300mg qhs     DVT ppx: Lovenox, SCDs, TEDs Assessment and Plan   33 year-old Woman with PMH of SLE, lupus nephritis, and fibromyalgia admitted to rehab with transverse myelitis resulting in decreased functional mobility, gait instability and ADL impairments.    COMORBIDITES/ACTIVE MEDICAL ISSUES     Gait Instability, ADL impairments and Functional impairments:   - Comprehensive Rehab Program of PT/OT     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   - c/w prednisone 55mg qd for 4 weeks as per rheumatology on dc  - c/w cellcept 1500mg po bid   - c/w hydroxychloroquine 200mg BID  - s/p 5 sessions PLEX  - s/p Rituxan 1/28.  Patient received second dose of rituximab IVPB 1 gm infuse over 5 hours 2/16 without side effects.  Premedicated with tylenol 650 mg, bendaryl 20 mg IVPB, and solumedrol 100 mg ivpb.    - f/u with rheumatology as an outpatient after that. To be repeated Rituximab in 6 months after that.    Neurogenic Bladder / urinary retention- Retention improved;  Instructed in self cath.    - ertapenem started 1/30, last day 2/4 per ID.  Renal us showed no evidence of  hydronephrosis.  Plan   - Bladder scans and straight cath as needed.  Recent urine culture 2/6 NG  - 2/11: Added Urecholine 10 mg TID with slight improvement; increased to 15mg TID 2/13.  Reduce back to 10mg TID as now is incontinent  - Flomax 0.8mg po qhs-reduced to 0.4mg as went from retention to having incontinence and reduced urecholine to 10mg tid with improvement.  Pt no longer requiring straight cath.        SLE with nephritis    - stable renal disease, elevated dsDNA, low complements  - rheum f/u      Fibromyalgia   - c/w Tylenol for pain prn     Psych  -Depression: c/w Cymbalta 60mg po qd  -Insomnia: Added melatonin 6mg qhs with improvement    Vitamin D deficiency  -Vit D 10.9.  Added Vit D 50,000U weekly    GI  -Bowel Mgmt - Colace, Senna,  Miralax PRN  -FEN: MVI  -GI ppx; Protonix    Pain Mgmt - Tylenol PRN, oxycodone prn, baclofen prn spasms-change to scheduled.  Added Gabapentin 100mg bid and 300mg qhs     DVT ppx: Lovenox, SCDs, TEDs

## 2019-02-21 NOTE — PROGRESS NOTE ADULT - SUBJECTIVE AND OBJECTIVE BOX
HISTORY OF PRESENT ILLNESS  PMHx - 33 years old female with PMH of SLE, Lupus Nephritis and Fibromyalgia admitted to acute rehab after diagnosis of transverse myelitis.  She was initially brought to [facility] on  with generalized weakness, low grade fevers, generalized body pain, throat irritation, cough, ear pain, vomiting, and poor appetite. She went to her PMD few days prior who prescribed Sertraline for her depressive symptoms. As per her, symptoms started after taking that medication. At time of initial presentation, she was feeling so weak that she could not walk so her  brought her to ER. She was also complaining of difficulty urinating. She went to Urologist 10 days ago and she was prescribed Flomax which she has been taking but is unable to void. She was also noting constipation. Pt recently came back from [place] on 18 after staying there for 2.5 to 3 months. She had MRI there in December due to back pain and generalized weakness and it was normal. Workup at [facility] was notable for transverse myelitis.  She received plamapheresis, rtx and steroids and improved. Course complicated with urinary retention and ESBL ECOLI (tx'd with ertapenem, last dose ).     Pt medically optimized and cleared for transfer to [place] for acute rehab on 19.     TODAY'S SUBJECTIVE &  REVIEW OF SYMPTOMS  No acute events overnight. Seen and examined.  Pt reports that she is improved; notes LLE is stronger and has more sensation.  Last BM yesterday.  Slept well.  Awaiting discharge home today.  urinating small volumes.     [X] Constitutional WNL       [x] HEENT   [X] Cardio WNL              [X] Resp WNL            [X] GI - wnl                   []  - Urinary retention, as above improved  [] MSK - Weakness  [X] Neuro weakness  [] Cognitive    [X] Psych WNL  [ x ] Skin         VITALS  Vital Signs Last 24 Hrs  T(C): 36.7 (2019 07:38), Max: 36.7 (2019 07:38)  T(F): 98.1 (2019 07:38), Max: 98.1 (2019 07:38)  HR: 93 (2019 07:38) (85 - 93)  BP: 100/67 (2019 07:38) (100/67 - 118/78)  RR: 14 (2019 07:38) (14 - 14)  SpO2: 100% (2019 07:38) (100% - 100%)    PHYSICAL EXAM  General- NAD, Comfortable                                                                                    HEENT - NCAT,  Cardio- RRR, S1S2                                                                          Resp- CTA bilaterally, No wheeze   Abdomen - BS+, Soft, NTND                                                                                    Extremities - No C/C/E  Skin: Intact                                                                                                                 Wounds: none     Neuro                   Cognitive - Awake, Alert, AAO x 3                                                                          Communication - Fluent, No dysarthria                                                                             Sensory - Decreased to LT to level of T8       MSK - wnl   Motor   LEFT    UE: SF [5/5], EF [5/5], EE [5/5], WE [5/5],  [wnl]  RIGHT UE: SF [5/5], EF [5/5], EE [5/5], WE [5/5],  [wnl]  LEFT    LE:  HF 5/5, HE 4/5, KE 4+/5, KF 4/5, hip abduction 3/5, hip adduction 3/5, evertors 5/5, invertors 5/5, DF 5/5, EHL 4/5, PF 5/5  RIGHT LE:  HF 5/5, HE 4/5, KE 5/5, KE 4/5, hip abduction 3/5, hip adduction 3/5, evertors 5/5, invertors 5/5, DF 5/5, EHL 3/5, PF 5/5                                                                                    Psychiatric - Mood stable, Affect WNL    FUNCTIONAL PROGRESS:  Bed mobility: independent  Transfers: independent  Gait: Amb 400' RW ind.  Amb without ' CS/CG  Stairs: 12 steps 2HR CS  ADLs: Laundry - CS    RECENT LABS                                     10.9   7.4   )-----------( 356      ( 2019 10:50 )             33.7   02-16    140  |  103  |  17  ----------------------------<  146<H>  3.8   |  25  |  0.48<L>    Ca    8.7      2019 10:50    TPro  6.2  /  Alb  3.2<L>  /  TBili  0.4  /  DBili  x   /  AST  19  /  ALT  44  /  AlkPhos  67  02-16      Urinalysis Basic - ( 2019 01:00 )    Color: Yellow / Appearance: Clear / S.025 / pH: x  Gluc: x / Ketone: Trace  / Bili: Negative / Urobili: 1 mg/dL   Blood: x / Protein: 30 mg/dL / Nitrite: Negative   Leuk Esterase: Negative / RBC: 0-2 /HPF / WBC 0-2   Sq Epi: x / Non Sq Epi: Occasional / Bacteria: x    Urine Culture NG    CURRENT MEDICATIONS  MEDICATIONS  (STANDING):  baclofen 5 milliGRAM(s) Oral every 8 hours  bethanechol 10 milliGRAM(s) Oral every 8 hours  docusate sodium 100 milliGRAM(s) Oral three times a day  DULoxetine 60 milliGRAM(s) Oral daily  enoxaparin Injectable 40 milliGRAM(s) SubCutaneous daily  ergocalciferol 46059 Unit(s) Oral <User Schedule>  gabapentin 100 milliGRAM(s) Oral two times a day  gabapentin 300 milliGRAM(s) Oral at bedtime  hydroxychloroquine 200 milliGRAM(s) Oral two times a day  magnesium oxide 400 milliGRAM(s) Oral two times a day with meals  melatonin 6 milliGRAM(s) Oral <User Schedule>  multivitamin 1 Tablet(s) Oral daily  mycophenolate mofetil 1500 milliGRAM(s) Oral two times a day  pantoprazole    Tablet 40 milliGRAM(s) Oral before breakfast  predniSONE   Tablet 55 milliGRAM(s) Oral daily  senna 2 Tablet(s) Oral at bedtime  tamsulosin 0.4 milliGRAM(s) Oral at bedtime    MEDICATIONS  (PRN):  acetaminophen   Tablet .. 650 milliGRAM(s) Oral every 6 hours PRN Mild Pain (1 - 3)  artificial  tears Solution 1 Drop(s) Both EYES three times a day PRN Dry Eyes  ibuprofen  Tablet. 400 milliGRAM(s) Oral every 6 hours PRN Moderate Pain (4 - 6)  polyethylene glycol 3350 17 Gram(s) Oral daily PRN Constipation HISTORY OF PRESENT ILLNESS  PMHx - 33 years old female with PMH of SLE, Lupus Nephritis and Fibromyalgia admitted to acute rehab after diagnosis of transverse myelitis.  She was initially brought to [facility] on  with generalized weakness, low grade fevers, generalized body pain, throat irritation, cough, ear pain, vomiting, and poor appetite. She went to her PMD few days prior who prescribed Sertraline for her depressive symptoms. As per her, symptoms started after taking that medication. At time of initial presentation, she was feeling so weak that she could not walk so her  brought her to ER. She was also complaining of difficulty urinating. She went to Urologist 10 days ago and she was prescribed Flomax which she has been taking but is unable to void. She was also noting constipation. Pt recently came back from [place] on 18 after staying there for 2.5 to 3 months. She had MRI there in December due to back pain and generalized weakness and it was normal. Workup at [facility] was notable for transverse myelitis.  She received plamapheresis, rtx and steroids and improved. Course complicated with urinary retention and ESBL ECOLI (tx'd with ertapenem, last dose ).     Pt medically optimized and cleared for transfer to [place] for acute rehab on 19.     TODAY'S SUBJECTIVE &  REVIEW OF SYMPTOMS  No acute events overnight. Seen and examined.  Pt reports that she is improved; notes LLE is stronger and has more sensation.  Last BM yesterday.  Slept well.  Awaiting discharge home today.  urinating small volumes.     [X] Constitutional WNL       [x] HEENT   [X] Cardio WNL              [X] Resp WNL            [X] GI - wnl                   []  - Urinary retention, as above improved  [] MSK - Weakness  [X] Neuro weakness  [] Cognitive    [X] Psych WNL  [ x ] Skin         VITALS  Vital Signs Last 24 Hrs  T(C): 36.7 (2019 07:38), Max: 36.7 (2019 07:38)  T(F): 98.1 (2019 07:38), Max: 98.1 (2019 07:38)  HR: 93 (2019 07:38) (85 - 93)  BP: 100/67 (2019 07:38) (100/67 - 118/78)  RR: 14 (2019 07:38) (14 - 14)  SpO2: 100% (2019 07:38) (100% - 100%)    PHYSICAL EXAM  General- NAD, Comfortable                                                                                    HEENT - NCAT,  Cardio- RRR, S1S2                                                                          Resp- CTA bilaterally, No wheeze   Abdomen - BS+, Soft, NTND                                                                                    Extremities - No C/C/E  Skin: Intact                                                                                                                 Wounds: none     Neuro                   Cognitive - Awake, Alert, AAO x 3                                                                          Communication - Fluent, No dysarthria                                                                             Sensory - Decreased to LT to level of T8       MSK - wnl   Motor   LEFT    UE: SF [5/5], EF [5/5], EE [5/5], WE [5/5],  [wnl]  RIGHT UE: SF [5/5], EF [5/5], EE [5/5], WE [5/5],  [wnl]  LEFT    LE:  HF 5/5, HE 4/5, KE 4+/5, KF 4/5, hip abduction 4/5, hip adduction 4/5, evertors 5/5, invertors 5/5, DF 5/5, EHL 4/5, PF 5/5  RIGHT LE:  HF 5/5, HE 4/5, KE 5/5, KE 4/5, hip abduction 4/5, hip adduction 4/5, evertors 5/5, invertors 5/5, DF 5/5, EHL 4/5, PF 5/5                                                                                    Psychiatric - Mood stable, Affect WNL    FUNCTIONAL PROGRESS:  Bed mobility: independent  Transfers: independent  Gait: Amb 400' RW ind.  Amb without ' CS/CG  Stairs: 12 steps 2HR CS  ADLs: Mod I    RECENT LABS                                     10.9   7.4   )-----------( 356      ( 2019 10:50 )             33.7   02-16    140  |  103  |  17  ----------------------------<  146<H>  3.8   |  25  |  0.48<L>    Ca    8.7      2019 10:50    TPro  6.2  /  Alb  3.2<L>  /  TBili  0.4  /  DBili  x   /  AST  19  /  ALT  44  /  AlkPhos  67  02-16      Urinalysis Basic - ( 2019 01:00 )    Color: Yellow / Appearance: Clear / S.025 / pH: x  Gluc: x / Ketone: Trace  / Bili: Negative / Urobili: 1 mg/dL   Blood: x / Protein: 30 mg/dL / Nitrite: Negative   Leuk Esterase: Negative / RBC: 0-2 /HPF / WBC 0-2   Sq Epi: x / Non Sq Epi: Occasional / Bacteria: x    Urine Culture NG    CURRENT MEDICATIONS  MEDICATIONS  (STANDING):  baclofen 5 milliGRAM(s) Oral every 8 hours  bethanechol 10 milliGRAM(s) Oral every 8 hours  docusate sodium 100 milliGRAM(s) Oral three times a day  DULoxetine 60 milliGRAM(s) Oral daily  enoxaparin Injectable 40 milliGRAM(s) SubCutaneous daily  ergocalciferol 54250 Unit(s) Oral <User Schedule>  gabapentin 100 milliGRAM(s) Oral two times a day  gabapentin 300 milliGRAM(s) Oral at bedtime  hydroxychloroquine 200 milliGRAM(s) Oral two times a day  magnesium oxide 400 milliGRAM(s) Oral two times a day with meals  melatonin 6 milliGRAM(s) Oral <User Schedule>  multivitamin 1 Tablet(s) Oral daily  mycophenolate mofetil 1500 milliGRAM(s) Oral two times a day  pantoprazole    Tablet 40 milliGRAM(s) Oral before breakfast  predniSONE   Tablet 55 milliGRAM(s) Oral daily  senna 2 Tablet(s) Oral at bedtime  tamsulosin 0.4 milliGRAM(s) Oral at bedtime    MEDICATIONS  (PRN):  acetaminophen   Tablet .. 650 milliGRAM(s) Oral every 6 hours PRN Mild Pain (1 - 3)  artificial  tears Solution 1 Drop(s) Both EYES three times a day PRN Dry Eyes  ibuprofen  Tablet. 400 milliGRAM(s) Oral every 6 hours PRN Moderate Pain (4 - 6)  polyethylene glycol 3350 17 Gram(s) Oral daily PRN Constipation

## 2019-02-25 ENCOUNTER — CHART COPY (OUTPATIENT)
Age: 34
End: 2019-02-25

## 2019-02-26 ENCOUNTER — APPOINTMENT (OUTPATIENT)
Dept: RHEUMATOLOGY | Facility: CLINIC | Age: 34
End: 2019-02-26
Payer: COMMERCIAL

## 2019-02-26 VITALS
OXYGEN SATURATION: 99 % | HEIGHT: 62 IN | SYSTOLIC BLOOD PRESSURE: 111 MMHG | HEART RATE: 99 BPM | BODY MASS INDEX: 22.82 KG/M2 | DIASTOLIC BLOOD PRESSURE: 74 MMHG | WEIGHT: 124 LBS | RESPIRATION RATE: 12 BRPM

## 2019-02-26 DIAGNOSIS — K21.9 GASTRO-ESOPHAGEAL REFLUX DISEASE W/OUT ESOPHAGITIS: ICD-10-CM

## 2019-02-26 PROCEDURE — 99215 OFFICE O/P EST HI 40 MIN: CPT

## 2019-02-26 RX ORDER — TRAMADOL HYDROCHLORIDE AND ACETAMINOPHEN 37.5; 325 MG/1; MG/1
37.5-325 TABLET, FILM COATED ORAL 3 TIMES DAILY
Qty: 45 | Refills: 0 | Status: DISCONTINUED | COMMUNITY
Start: 2019-01-07 | End: 2019-02-26

## 2019-02-26 RX ORDER — SERTRALINE HYDROCHLORIDE 50 MG/1
50 TABLET, FILM COATED ORAL DAILY
Qty: 30 | Refills: 4 | Status: DISCONTINUED | COMMUNITY
Start: 2019-01-10 | End: 2019-02-26

## 2019-02-26 RX ORDER — KETOCONAZOLE 20.5 MG/ML
2 SHAMPOO, SUSPENSION TOPICAL
Qty: 1 | Refills: 3 | Status: DISCONTINUED | COMMUNITY
Start: 2017-05-25 | End: 2019-02-26

## 2019-02-26 RX ORDER — TRIAMCINOLONE ACETONIDE 1 MG/G
0.1 CREAM TOPICAL
Refills: 2 | Status: DISCONTINUED | COMMUNITY
Start: 2017-05-25 | End: 2019-02-26

## 2019-02-27 LAB — ERYTHROCYTE [SEDIMENTATION RATE] IN BLOOD BY WESTERGREN METHOD: 16 MM/HR

## 2019-02-27 NOTE — ASSESSMENT
[FreeTextEntry1] : 29 yo F w/ SLE/fibromyalgia diagnosed in 2014, presenting here for her 2-week follow-up after severe flare of both\par \par neurologist - dona barajas\par urologist - thursday - edward hermilo\par tramadol\par mri spine\par \par #. SLE with nephritis,alopecia, arthritis, lymphopenia\par -in study +/- benlysta on cellcept, plaquenil and pred \par -improved - awaiting ds dna \par -decrease pred to 4 mg daily\par \par \par #FMS:\par -continue cymbalta - increase to 60 mg daily\par -if s/s not improved patient will rto\par \par #tinea versicolor\par \par #health maintenance\par -vit d good\par -paperwork filled out for family medical leave\par \par #v58.69\par

## 2019-02-27 NOTE — PHYSICAL EXAM
[General Appearance - Alert] : alert [General Appearance - In No Acute Distress] : in no acute distress [General Appearance - Well Nourished] : well nourished [General Appearance - Well Developed] : well developed [General Appearance - Well-Appearing] : healthy appearing [Sclera] : the sclera and conjunctiva were normal [PERRL With Normal Accommodation] : pupils were equal in size, round, and reactive to light [Extraocular Movements] : extraocular movements were intact [Outer Ear] : the ears and nose were normal in appearance [Neck Appearance] : the appearance of the neck was normal [Heart Rate And Rhythm] : heart rate was normal and rhythm regular [No CVA Tenderness] : no ~M costovertebral angle tenderness [No Spinal Tenderness] : no spinal tenderness [Musculoskeletal - Swelling] : no joint swelling seen [] : no rash [Oriented To Time, Place, And Person] : oriented to person, place, and time [Impaired Insight] : insight and judgment were intact [FreeTextEntry1] : no focal deficits - able to walk without assistive device, component of spasticity noted with ambulation - though individual muscle groups 4+-5/5 b upper and lower

## 2019-02-27 NOTE — REVIEW OF SYSTEMS
[As Noted in HPI] : as noted in HPI [Negative] : Heme/Lymph [Fever] : no fever [Chills] : no chills [Eye Pain] : no eye pain [Earache] : no earache [Nosebleeds] : no nosebleeds [Chest Pain] : no chest pain [Shortness Of Breath] : no shortness of breath [Wheezing] : no wheezing [Cough] : no cough [Abdominal Pain] : no abdominal pain [Vomiting] : no vomiting [Dysuria] : no dysuria [Joint Pain] : no joint pain [Skin Lesions] : no skin lesions [Dizziness] : no dizziness [Anxiety] : no anxiety [Easy Bleeding] : no tendency for easy bleeding [de-identified] : mood much better in [place] -  [FreeTextEntry1] : missed period

## 2019-02-27 NOTE — HISTORY OF PRESENT ILLNESS
[FreeTextEntry1] : Last office visit in May. Was well, in the SLE study and on cellcept.  Went to [place] for an extended stay and ? came off medications.  NOted urinary retention in [place].   Upone return to [place], at the end of January 2019, noted LE pain and weakness.  MRI with diffuse myelitis.  LP w/PMNs.  workup for infectious etiology negative.  NMO negative.   SLE serologies unchanged per report.  clinically was unable to walk with severe pain, weakness, spasticity.  Treated with IV pulse steroids, PLEX, RTX with improvement in symptoms.  \par \par \par

## 2019-02-28 ENCOUNTER — APPOINTMENT (OUTPATIENT)
Dept: UROLOGY | Facility: CLINIC | Age: 34
End: 2019-02-28
Payer: COMMERCIAL

## 2019-02-28 VITALS
WEIGHT: 123 LBS | SYSTOLIC BLOOD PRESSURE: 105 MMHG | HEART RATE: 101 BPM | HEIGHT: 62 IN | BODY MASS INDEX: 22.63 KG/M2 | DIASTOLIC BLOOD PRESSURE: 72 MMHG | RESPIRATION RATE: 15 BRPM

## 2019-02-28 LAB
BASOPHILS # BLD AUTO: 0.03 K/UL
BASOPHILS NFR BLD AUTO: 0.4 %
BILIRUB UR QL STRIP: NORMAL
CLARITY UR: CLEAR
COLLECTION METHOD: NORMAL
EOSINOPHIL # BLD AUTO: 0.06 K/UL
EOSINOPHIL NFR BLD AUTO: 0.9 %
GLUCOSE UR-MCNC: NORMAL
HCG UR QL: 0.2 EU/DL
HCT VFR BLD CALC: 38.3 %
HGB BLD-MCNC: 11.2 G/DL
HGB UR QL STRIP.AUTO: NORMAL
IMM GRANULOCYTES NFR BLD AUTO: 4.8 %
KETONES UR-MCNC: NORMAL
LEUKOCYTE ESTERASE UR QL STRIP: NORMAL
LYMPHOCYTES # BLD AUTO: 1.01 K/UL
LYMPHOCYTES NFR BLD AUTO: 15.1 %
MAN DIFF?: NORMAL
MCHC RBC-ENTMCNC: 28.2 PG
MCHC RBC-ENTMCNC: 29.2 GM/DL
MCV RBC AUTO: 96.5 FL
MONOCYTES # BLD AUTO: 0.85 K/UL
MONOCYTES NFR BLD AUTO: 12.7 %
NEUTROPHILS # BLD AUTO: 4.43 K/UL
NEUTROPHILS NFR BLD AUTO: 66.1 %
NITRITE UR QL STRIP: NORMAL
PH UR STRIP: 5.5
PLATELET # BLD AUTO: 373 K/UL
PROT UR STRIP-MCNC: NORMAL
RBC # BLD: 3.97 M/UL
RBC # FLD: 16.1 %
SP GR UR STRIP: 1.03
WBC # FLD AUTO: 6.7 K/UL

## 2019-02-28 PROCEDURE — 99213 OFFICE O/P EST LOW 20 MIN: CPT | Mod: 25

## 2019-02-28 PROCEDURE — 81003 URINALYSIS AUTO W/O SCOPE: CPT | Mod: QW

## 2019-02-28 RX ORDER — ERGOCALCIFEROL 1.25 MG/1
1.25 MG CAPSULE ORAL
Refills: 0 | Status: ACTIVE | COMMUNITY

## 2019-02-28 RX ORDER — BACLOFEN 5 MG/1
5 TABLET ORAL
Refills: 0 | Status: DISCONTINUED | COMMUNITY

## 2019-02-28 RX ORDER — OMEGA-3/DHA/EPA/FISH OIL 300-1000MG
CAPSULE ORAL
Refills: 0 | Status: ACTIVE | COMMUNITY

## 2019-02-28 NOTE — REVIEW OF SYSTEMS
[Negative] : Heme/Lymph [Feeling Tired] : feeling tired [Dry Eyes] : dryness of the eyes [Constipation] : constipation [see HPI] : see HPI [Date of last menstrual period ____] : date of last menstrual period: [unfilled] [Urine Infection (bladder/kidney)] : bladder/kidney infection [Discharge from urine canal] : discharge from urine canal [History of kidney stones] : history of kidney stones [Urine retention] : urine retention [Wake up at night to urinate  How many times?  ___] : wakes up to urinate [unfilled] times during the night [Wait a long time to urinate] : waits a long time to urinate [Leakage of urine with urgency] : leakage of urine with urgency

## 2019-02-28 NOTE — PHYSICAL EXAM
[General Appearance - Well Developed] : well developed [General Appearance - Well Nourished] : well nourished [Normal Appearance] : normal appearance [Well Groomed] : well groomed [General Appearance - In No Acute Distress] : no acute distress [Edema] : no peripheral edema [] : no respiratory distress [Respiration, Rhythm And Depth] : normal respiratory rhythm and effort [Exaggerated Use Of Accessory Muscles For Inspiration] : no accessory muscle use [Normal Station and Gait] : the gait and station were normal for the patient's age [Oriented To Time, Place, And Person] : oriented to person, place, and time [Affect] : the affect was normal [Mood] : the mood was normal [Not Anxious] : not anxious

## 2019-02-28 NOTE — HISTORY OF PRESENT ILLNESS
[FreeTextEntry1] : Patient had urinary retention.  Was noted to have transverse myelitis. was placed on bethanechol in rehab. Is voiding but not always feel empty.  no longer needing self catheter. sometimes feels empty.

## 2019-02-28 NOTE — LETTER BODY
[Dear  ___] : Dear  [unfilled], [Courtesy Letter:] : I had the pleasure of seeing your patient, [unfilled], in my office today. [Please see my note below.] : Please see my note below. [Sincerely,] : Sincerely, [FreeTextEntry3] : Ed\par \par Humberto Dawkins MD\par [facility]\par  of Urology\par [employer] [facility]\par

## 2019-02-28 NOTE — ASSESSMENT
[FreeTextEntry1] : Impression:\par \par urinary retention, resolved.  due to transverse myelitis. \par self cath as needed.\par \par I do not see any indication for urodynamics.\par \par Plan:\par \par gradual wean off of bethanechol\par self cath daily.\par follow up 6 weeks.

## 2019-03-01 LAB — CELLS.CD3-CD19+/CELLS IN BLOOD: 0 %

## 2019-03-05 ENCOUNTER — OUTPATIENT (OUTPATIENT)
Dept: OUTPATIENT SERVICES | Facility: HOSPITAL | Age: 34
LOS: 1 days | End: 2019-03-05
Payer: COMMERCIAL

## 2019-03-05 DIAGNOSIS — R27.9 UNSPECIFIED LACK OF COORDINATION: ICD-10-CM

## 2019-03-05 DIAGNOSIS — G37.3 ACUTE TRANSVERSE MYELITIS IN DEMYELINATING DISEASE OF CENTRAL NERVOUS SYSTEM: ICD-10-CM

## 2019-03-05 DIAGNOSIS — Z98.890 OTHER SPECIFIED POSTPROCEDURAL STATES: Chronic | ICD-10-CM

## 2019-03-05 DIAGNOSIS — Z51.89 ENCOUNTER FOR OTHER SPECIFIED AFTERCARE: ICD-10-CM

## 2019-03-05 NOTE — CHART NOTE - NSCHARTNOTEFT_GEN_A_CORE
Pt Mague Luis is a 33 year-old Woman with PMH of SLE, lupus nephritis, and fibromyalgia with transverse myelitis resulting in decreased functional mobility, gait instability and ADL impairments.  Please let this serve as a letter of medical necessity that the patient requires outpatient physical therapy due to weakness and persistent deficits in gait and balance.

## 2019-03-13 ENCOUNTER — APPOINTMENT (OUTPATIENT)
Dept: RHEUMATOLOGY | Facility: CLINIC | Age: 34
End: 2019-03-13
Payer: COMMERCIAL

## 2019-03-13 VITALS
HEART RATE: 86 BPM | BODY MASS INDEX: 22.63 KG/M2 | HEIGHT: 62 IN | OXYGEN SATURATION: 99 % | WEIGHT: 123 LBS | DIASTOLIC BLOOD PRESSURE: 74 MMHG | SYSTOLIC BLOOD PRESSURE: 107 MMHG

## 2019-03-13 PROCEDURE — 99214 OFFICE O/P EST MOD 30 MIN: CPT

## 2019-03-13 NOTE — ASSESSMENT
[FreeTextEntry1] : 29 yo F w/ SLE/fibromyalgia diagnosed in 2014, longitudinally extensive and diffuse transverse myelitis (2019) s/p treatment with IV pulse, PLEX, RTX.  here for followup\par \par #. SLE with nephritis,alopecia, arthritis, lymphopenia and now diffuse (transverse) myelitis\par -improving now - muscle strength better and improved ability to void\par -taper steroids to 50 mg daily x 2 weeks then 45 mg\par -continue cellcept and plaquenil\par \par #myelitis\par -continue f/u with neuro and with urology\par -will likely give another course of rtx in 6-9 months\par -will reassess MRI spine in a month \par -decrease daytime gabapentin by 100 mg daily - \par \par #FMS:\par -continue cymbalta - increase to 60 mg daily\par -if s/s not improved patient will rto\par \par #tinea versicolor\par \par #health maintenance\par -vit d good\par \par #v58.69\par

## 2019-03-13 NOTE — PHYSICAL EXAM
[General Appearance - Alert] : alert [General Appearance - In No Acute Distress] : in no acute distress [General Appearance - Well Nourished] : well nourished [General Appearance - Well Developed] : well developed [General Appearance - Well-Appearing] : healthy appearing [Sclera] : the sclera and conjunctiva were normal [PERRL With Normal Accommodation] : pupils were equal in size, round, and reactive to light [Extraocular Movements] : extraocular movements were intact [Outer Ear] : the ears and nose were normal in appearance [Neck Appearance] : the appearance of the neck was normal [Heart Rate And Rhythm] : heart rate was normal and rhythm regular [No CVA Tenderness] : no ~M costovertebral angle tenderness [No Spinal Tenderness] : no spinal tenderness [Musculoskeletal - Swelling] : no joint swelling seen [] : no rash [Oriented To Time, Place, And Person] : oriented to person, place, and time [Impaired Insight] : insight and judgment were intact [FreeTextEntry1] : some weakness in the hip flexors but stronger than last time and able to walk well without assistive device.   ent of spasticity noted with ambulation - though individual muscle groups 4+-5/5 b upper and lower

## 2019-03-13 NOTE — REVIEW OF SYSTEMS
[As Noted in HPI] : as noted in HPI [Negative] : Heme/Lymph [Fever] : no fever [Chills] : no chills [Eye Pain] : no eye pain [Earache] : no earache [Nosebleeds] : no nosebleeds [Chest Pain] : no chest pain [Shortness Of Breath] : no shortness of breath [Wheezing] : no wheezing [Cough] : no cough [Abdominal Pain] : no abdominal pain [Vomiting] : no vomiting [Dysuria] : no dysuria [Joint Pain] : no joint pain [Skin Lesions] : no skin lesions [Dizziness] : no dizziness [Anxiety] : no anxiety [Easy Bleeding] : no tendency for easy bleeding [de-identified] : mood much better in [place] -  [FreeTextEntry1] : missed period

## 2019-03-15 ENCOUNTER — APPOINTMENT (OUTPATIENT)
Dept: INTERNAL MEDICINE | Facility: CLINIC | Age: 34
End: 2019-03-15

## 2019-03-20 ENCOUNTER — RX RENEWAL (OUTPATIENT)
Age: 34
End: 2019-03-20

## 2019-03-20 RX ORDER — CALCIUM CARBONATE/VITAMIN D3 500-10/5ML
400 LIQUID (ML) ORAL
Qty: 30 | Refills: 0 | Status: ACTIVE | COMMUNITY
Start: 2019-03-20 | End: 1900-01-01

## 2019-03-20 RX ORDER — GABAPENTIN 250 MG/5ML
300 SOLUTION ORAL
Refills: 0 | Status: DISCONTINUED | COMMUNITY
End: 2019-03-20

## 2019-03-29 ENCOUNTER — RX RENEWAL (OUTPATIENT)
Age: 34
End: 2019-03-29

## 2019-03-30 ENCOUNTER — RX CHANGE (OUTPATIENT)
Age: 34
End: 2019-03-30

## 2019-04-08 PROCEDURE — 97112 NEUROMUSCULAR REEDUCATION: CPT

## 2019-04-08 PROCEDURE — 97110 THERAPEUTIC EXERCISES: CPT

## 2019-04-08 PROCEDURE — 97163 PT EVAL HIGH COMPLEX 45 MIN: CPT

## 2019-04-11 ENCOUNTER — APPOINTMENT (OUTPATIENT)
Dept: UROLOGY | Facility: CLINIC | Age: 34
End: 2019-04-11

## 2019-04-11 ENCOUNTER — INPATIENT (INPATIENT)
Facility: HOSPITAL | Age: 34
LOS: 6 days | Discharge: HOME | DRG: 853 | End: 2019-04-18
Attending: HOSPITALIST | Admitting: INTERNAL MEDICINE
Payer: COMMERCIAL

## 2019-04-11 VITALS
HEIGHT: 62 IN | RESPIRATION RATE: 20 BRPM | HEART RATE: 140 BPM | OXYGEN SATURATION: 100 % | WEIGHT: 130.07 LBS | TEMPERATURE: 99 F

## 2019-04-11 DIAGNOSIS — Z98.890 OTHER SPECIFIED POSTPROCEDURAL STATES: Chronic | ICD-10-CM

## 2019-04-11 DIAGNOSIS — L03.90 CELLULITIS, UNSPECIFIED: ICD-10-CM

## 2019-04-11 LAB
ALBUMIN SERPL ELPH-MCNC: 4.3 G/DL — SIGNIFICANT CHANGE UP (ref 3.3–5.2)
ALP SERPL-CCNC: 86 U/L — SIGNIFICANT CHANGE UP (ref 40–120)
ALT FLD-CCNC: 19 U/L — SIGNIFICANT CHANGE UP
ANION GAP SERPL CALC-SCNC: 16 MMOL/L — SIGNIFICANT CHANGE UP (ref 5–17)
APTT BLD: 30.8 SEC — SIGNIFICANT CHANGE UP (ref 27.5–36.3)
AST SERPL-CCNC: 16 U/L — SIGNIFICANT CHANGE UP
BILIRUB SERPL-MCNC: 0.8 MG/DL — SIGNIFICANT CHANGE UP (ref 0.4–2)
BUN SERPL-MCNC: 11 MG/DL — SIGNIFICANT CHANGE UP (ref 8–20)
CALCIUM SERPL-MCNC: 9.5 MG/DL — SIGNIFICANT CHANGE UP (ref 8.6–10.2)
CHLORIDE SERPL-SCNC: 92 MMOL/L — LOW (ref 98–107)
CO2 SERPL-SCNC: 26 MMOL/L — SIGNIFICANT CHANGE UP (ref 22–29)
CREAT SERPL-MCNC: 0.72 MG/DL — SIGNIFICANT CHANGE UP (ref 0.5–1.3)
EOSINOPHIL # BLD AUTO: 0 K/UL — SIGNIFICANT CHANGE UP (ref 0–0.5)
GLUCOSE SERPL-MCNC: 114 MG/DL — SIGNIFICANT CHANGE UP (ref 70–115)
HCG SERPL-ACNC: <4 MIU/ML — SIGNIFICANT CHANGE UP
HCT VFR BLD CALC: 41.3 % — SIGNIFICANT CHANGE UP (ref 37–47)
HGB BLD-MCNC: 13.1 G/DL — SIGNIFICANT CHANGE UP (ref 12–16)
INR BLD: 1.25 RATIO — HIGH (ref 0.88–1.16)
LACTATE BLDV-MCNC: 0.6 MMOL/L — SIGNIFICANT CHANGE UP (ref 0.5–2)
LACTATE BLDV-MCNC: 3.4 MMOL/L — HIGH (ref 0.5–2)
LYMPHOCYTES # BLD AUTO: 0.5 K/UL — LOW (ref 1–4.8)
LYMPHOCYTES # BLD AUTO: 6 % — LOW (ref 20–55)
MCHC RBC-ENTMCNC: 27.8 PG — SIGNIFICANT CHANGE UP (ref 27–31)
MCHC RBC-ENTMCNC: 31.7 G/DL — LOW (ref 32–36)
MCV RBC AUTO: 87.5 FL — SIGNIFICANT CHANGE UP (ref 81–99)
MONOCYTES # BLD AUTO: 0.8 K/UL — SIGNIFICANT CHANGE UP (ref 0–0.8)
MONOCYTES NFR BLD AUTO: 8 % — SIGNIFICANT CHANGE UP (ref 3–10)
NEUTROPHILS # BLD AUTO: 7.2 K/UL — SIGNIFICANT CHANGE UP (ref 1.8–8)
NEUTROPHILS NFR BLD AUTO: 79 % — HIGH (ref 37–73)
NEUTS BAND # BLD: 4 % — SIGNIFICANT CHANGE UP (ref 0–8)
PLAT MORPH BLD: NORMAL — SIGNIFICANT CHANGE UP
PLATELET # BLD AUTO: 270 K/UL — SIGNIFICANT CHANGE UP (ref 150–400)
POTASSIUM SERPL-MCNC: 2.9 MMOL/L — CRITICAL LOW (ref 3.5–5.3)
POTASSIUM SERPL-SCNC: 2.9 MMOL/L — CRITICAL LOW (ref 3.5–5.3)
PROT SERPL-MCNC: 7.7 G/DL — SIGNIFICANT CHANGE UP (ref 6.6–8.7)
PROTHROM AB SERPL-ACNC: 14.5 SEC — HIGH (ref 10–12.9)
RBC # BLD: 4.72 M/UL — SIGNIFICANT CHANGE UP (ref 4.4–5.2)
RBC # FLD: 14.4 % — SIGNIFICANT CHANGE UP (ref 11–15.6)
RBC BLD AUTO: NORMAL — SIGNIFICANT CHANGE UP
SODIUM SERPL-SCNC: 134 MMOL/L — LOW (ref 135–145)
VARIANT LYMPHS # BLD: 3 % — SIGNIFICANT CHANGE UP (ref 0–6)
WBC # BLD: 9.4 K/UL — SIGNIFICANT CHANGE UP (ref 4.8–10.8)
WBC # FLD AUTO: 9.4 K/UL — SIGNIFICANT CHANGE UP (ref 4.8–10.8)

## 2019-04-11 PROCEDURE — 71045 X-RAY EXAM CHEST 1 VIEW: CPT | Mod: 26

## 2019-04-11 PROCEDURE — 99222 1ST HOSP IP/OBS MODERATE 55: CPT

## 2019-04-11 PROCEDURE — 99291 CRITICAL CARE FIRST HOUR: CPT

## 2019-04-11 PROCEDURE — 74177 CT ABD & PELVIS W/CONTRAST: CPT | Mod: 26

## 2019-04-11 RX ORDER — POLYETHYLENE GLYCOL 3350 17 G/17G
17 POWDER, FOR SOLUTION ORAL DAILY
Qty: 0 | Refills: 0 | Status: DISCONTINUED | OUTPATIENT
Start: 2019-04-11 | End: 2019-04-18

## 2019-04-11 RX ORDER — PIPERACILLIN AND TAZOBACTAM 4; .5 G/20ML; G/20ML
3.38 INJECTION, POWDER, LYOPHILIZED, FOR SOLUTION INTRAVENOUS EVERY 8 HOURS
Qty: 0 | Refills: 0 | Status: DISCONTINUED | OUTPATIENT
Start: 2019-04-11 | End: 2019-04-13

## 2019-04-11 RX ORDER — PANTOPRAZOLE SODIUM 20 MG/1
40 TABLET, DELAYED RELEASE ORAL
Qty: 0 | Refills: 0 | Status: DISCONTINUED | OUTPATIENT
Start: 2019-04-11 | End: 2019-04-18

## 2019-04-11 RX ORDER — VANCOMYCIN HCL 1 G
1500 VIAL (EA) INTRAVENOUS ONCE
Qty: 0 | Refills: 0 | Status: COMPLETED | OUTPATIENT
Start: 2019-04-11 | End: 2019-04-11

## 2019-04-11 RX ORDER — SODIUM CHLORIDE 9 MG/ML
1850 INJECTION INTRAMUSCULAR; INTRAVENOUS; SUBCUTANEOUS ONCE
Qty: 0 | Refills: 0 | Status: COMPLETED | OUTPATIENT
Start: 2019-04-11 | End: 2019-04-11

## 2019-04-11 RX ORDER — ACETAMINOPHEN 500 MG
975 TABLET ORAL ONCE
Qty: 0 | Refills: 0 | Status: COMPLETED | OUTPATIENT
Start: 2019-04-11 | End: 2019-04-11

## 2019-04-11 RX ORDER — ENOXAPARIN SODIUM 100 MG/ML
40 INJECTION SUBCUTANEOUS EVERY 24 HOURS
Qty: 0 | Refills: 0 | Status: DISCONTINUED | OUTPATIENT
Start: 2019-04-11 | End: 2019-04-18

## 2019-04-11 RX ORDER — DOCUSATE SODIUM 100 MG
100 CAPSULE ORAL THREE TIMES A DAY
Qty: 0 | Refills: 0 | Status: DISCONTINUED | OUTPATIENT
Start: 2019-04-11 | End: 2019-04-18

## 2019-04-11 RX ORDER — KETOROLAC TROMETHAMINE 30 MG/ML
15 SYRINGE (ML) INJECTION ONCE
Qty: 0 | Refills: 0 | Status: DISCONTINUED | OUTPATIENT
Start: 2019-04-11 | End: 2019-04-11

## 2019-04-11 RX ORDER — GABAPENTIN 400 MG/1
300 CAPSULE ORAL AT BEDTIME
Qty: 0 | Refills: 0 | Status: DISCONTINUED | OUTPATIENT
Start: 2019-04-11 | End: 2019-04-18

## 2019-04-11 RX ORDER — HYDROCORTISONE 20 MG
50 TABLET ORAL EVERY 12 HOURS
Qty: 0 | Refills: 0 | Status: DISCONTINUED | OUTPATIENT
Start: 2019-04-11 | End: 2019-04-14

## 2019-04-11 RX ORDER — POTASSIUM CHLORIDE 20 MEQ
10 PACKET (EA) ORAL ONCE
Qty: 0 | Refills: 0 | Status: COMPLETED | OUTPATIENT
Start: 2019-04-11 | End: 2019-04-11

## 2019-04-11 RX ORDER — DULOXETINE HYDROCHLORIDE 30 MG/1
60 CAPSULE, DELAYED RELEASE ORAL DAILY
Qty: 0 | Refills: 0 | Status: DISCONTINUED | OUTPATIENT
Start: 2019-04-11 | End: 2019-04-18

## 2019-04-11 RX ORDER — IBUPROFEN 200 MG
600 TABLET ORAL EVERY 6 HOURS
Qty: 0 | Refills: 0 | Status: DISCONTINUED | OUTPATIENT
Start: 2019-04-11 | End: 2019-04-13

## 2019-04-11 RX ORDER — LANOLIN ALCOHOL/MO/W.PET/CERES
3 CREAM (GRAM) TOPICAL AT BEDTIME
Qty: 0 | Refills: 0 | Status: DISCONTINUED | OUTPATIENT
Start: 2019-04-11 | End: 2019-04-18

## 2019-04-11 RX ORDER — HYDROXYCHLOROQUINE SULFATE 200 MG
200 TABLET ORAL
Qty: 0 | Refills: 0 | Status: DISCONTINUED | OUTPATIENT
Start: 2019-04-11 | End: 2019-04-18

## 2019-04-11 RX ORDER — ACETAMINOPHEN 500 MG
650 TABLET ORAL EVERY 6 HOURS
Qty: 0 | Refills: 0 | Status: DISCONTINUED | OUTPATIENT
Start: 2019-04-11 | End: 2019-04-14

## 2019-04-11 RX ORDER — SODIUM CHLORIDE 9 MG/ML
1000 INJECTION INTRAMUSCULAR; INTRAVENOUS; SUBCUTANEOUS
Qty: 0 | Refills: 0 | Status: DISCONTINUED | OUTPATIENT
Start: 2019-04-11 | End: 2019-04-12

## 2019-04-11 RX ORDER — SACCHAROMYCES BOULARDII 250 MG
250 POWDER IN PACKET (EA) ORAL
Qty: 0 | Refills: 0 | Status: DISCONTINUED | OUTPATIENT
Start: 2019-04-11 | End: 2019-04-12

## 2019-04-11 RX ORDER — PIPERACILLIN AND TAZOBACTAM 4; .5 G/20ML; G/20ML
3.38 INJECTION, POWDER, LYOPHILIZED, FOR SOLUTION INTRAVENOUS ONCE
Qty: 0 | Refills: 0 | Status: COMPLETED | OUTPATIENT
Start: 2019-04-11 | End: 2019-04-11

## 2019-04-11 RX ORDER — VANCOMYCIN HCL 1 G
1250 VIAL (EA) INTRAVENOUS EVERY 8 HOURS
Qty: 0 | Refills: 0 | Status: DISCONTINUED | OUTPATIENT
Start: 2019-04-11 | End: 2019-04-13

## 2019-04-11 RX ORDER — MORPHINE SULFATE 50 MG/1
4 CAPSULE, EXTENDED RELEASE ORAL ONCE
Qty: 0 | Refills: 0 | Status: DISCONTINUED | OUTPATIENT
Start: 2019-04-11 | End: 2019-04-11

## 2019-04-11 RX ORDER — POTASSIUM CHLORIDE 20 MEQ
40 PACKET (EA) ORAL ONCE
Qty: 0 | Refills: 0 | Status: COMPLETED | OUTPATIENT
Start: 2019-04-11 | End: 2019-04-11

## 2019-04-11 RX ORDER — CEFAZOLIN SODIUM 1 G
1000 VIAL (EA) INJECTION EVERY 8 HOURS
Qty: 0 | Refills: 0 | Status: DISCONTINUED | OUTPATIENT
Start: 2019-04-11 | End: 2019-04-11

## 2019-04-11 RX ADMIN — GABAPENTIN 300 MILLIGRAM(S): 400 CAPSULE ORAL at 22:20

## 2019-04-11 RX ADMIN — MORPHINE SULFATE 4 MILLIGRAM(S): 50 CAPSULE, EXTENDED RELEASE ORAL at 09:45

## 2019-04-11 RX ADMIN — Medication 1500 MILLIGRAM(S): at 13:35

## 2019-04-11 RX ADMIN — Medication 40 MILLIEQUIVALENT(S): at 09:20

## 2019-04-11 RX ADMIN — Medication 200 MILLIGRAM(S): at 17:22

## 2019-04-11 RX ADMIN — SODIUM CHLORIDE 1850 MILLILITER(S): 9 INJECTION INTRAMUSCULAR; INTRAVENOUS; SUBCUTANEOUS at 12:30

## 2019-04-11 RX ADMIN — MORPHINE SULFATE 4 MILLIGRAM(S): 50 CAPSULE, EXTENDED RELEASE ORAL at 09:30

## 2019-04-11 RX ADMIN — Medication 100 MILLIEQUIVALENT(S): at 10:41

## 2019-04-11 RX ADMIN — PIPERACILLIN AND TAZOBACTAM 200 GRAM(S): 4; .5 INJECTION, POWDER, LYOPHILIZED, FOR SOLUTION INTRAVENOUS at 08:45

## 2019-04-11 RX ADMIN — PIPERACILLIN AND TAZOBACTAM 3.38 GRAM(S): 4; .5 INJECTION, POWDER, LYOPHILIZED, FOR SOLUTION INTRAVENOUS at 10:00

## 2019-04-11 RX ADMIN — SODIUM CHLORIDE 1850 MILLILITER(S): 9 INJECTION INTRAMUSCULAR; INTRAVENOUS; SUBCUTANEOUS at 09:10

## 2019-04-11 RX ADMIN — Medication 600 MILLIGRAM(S): at 22:40

## 2019-04-11 RX ADMIN — PIPERACILLIN AND TAZOBACTAM 25 GRAM(S): 4; .5 INJECTION, POWDER, LYOPHILIZED, FOR SOLUTION INTRAVENOUS at 21:23

## 2019-04-11 RX ADMIN — SODIUM CHLORIDE 100 MILLILITER(S): 9 INJECTION INTRAMUSCULAR; INTRAVENOUS; SUBCUTANEOUS at 14:47

## 2019-04-11 RX ADMIN — Medication 250 MILLIGRAM(S): at 22:20

## 2019-04-11 RX ADMIN — Medication 250 MILLIGRAM(S): at 12:33

## 2019-04-11 RX ADMIN — Medication 100 MILLIGRAM(S): at 22:20

## 2019-04-11 RX ADMIN — Medication 50 MILLIGRAM(S): at 17:21

## 2019-04-11 RX ADMIN — Medication 600 MILLIGRAM(S): at 22:19

## 2019-04-11 RX ADMIN — Medication 250 MILLIGRAM(S): at 17:21

## 2019-04-11 RX ADMIN — Medication 975 MILLIGRAM(S): at 08:30

## 2019-04-11 RX ADMIN — Medication 975 MILLIGRAM(S): at 09:30

## 2019-04-11 RX ADMIN — ENOXAPARIN SODIUM 40 MILLIGRAM(S): 100 INJECTION SUBCUTANEOUS at 17:21

## 2019-04-11 RX ADMIN — Medication 1 TABLET(S): at 17:21

## 2019-04-11 RX ADMIN — Medication 10 MILLIEQUIVALENT(S): at 11:45

## 2019-04-11 RX ADMIN — Medication 15 MILLIGRAM(S): at 17:20

## 2019-04-11 RX ADMIN — Medication 3 MILLIGRAM(S): at 22:20

## 2019-04-11 NOTE — CONSULT NOTE ADULT - ATTENDING COMMENTS
34 yo female with SLE, transverse myelitis, on immunosuppressives, presents with cellulitis and sepsis.  Currently nontoxic appearing, normotensive.  If patient declines then please reconsult us.    Recommendations:  - stress dose steroids, hold cellcept  - broad spectrum abx  - cultures pending.

## 2019-04-11 NOTE — H&P ADULT - HISTORY OF PRESENT ILLNESS
The patient is a 33 year old female with a history of transverse myelitis and SLE who presented to the ER with complaints of right thigh/buttocks swelling. According to the patient 3 days ago she noted a small pimple on her right buttock. She attempted to pop it open with bloody discharge. Over the course of 3 days she noted progressively worsening pain, redness and swelling associated with fever and chills. In the ER, febrile with temperature of 105.1 HR of 140 and lactate of 3.4. GIven 180ml of NS and a dose of vancomycin and zosyn for sepsis. CT of the abdomen and pelvis showed no evidence of abscess or fluid collection. Was evaluated by surgery with no need for intervention at this time. Repeat lactate of 0.6 after fluid resuscitation.     Of note, the patient was recently admitted to [facility] from 1/6-2/20  diagnosed with transverse myelitis. She was treated with plasmapheresis IV steroids, Cellcept and hydroxychloroquine and a dose of rituximab She was discharged to [place] acute rehab  from 2/6-2/20.

## 2019-04-11 NOTE — ED PROVIDER NOTE - CLINICAL SUMMARY MEDICAL DECISION MAKING FREE TEXT BOX
code sepsis from VS - on daily steroids, recent TM immunocompromised pt with gluteal abscess given size and to determine depth will scan supportive treatment and possible surgical consult

## 2019-04-11 NOTE — PHARMACY COMMUNICATION NOTE - COMMENTS
dosage changed from vanco 1 gram q12 to vanco 1250 q8 to get expected trough of 11.83 cbarto 403pm 4/11/19

## 2019-04-11 NOTE — ED PROVIDER NOTE - OBJECTIVE STATEMENT
32 y/o f hx of lupus. FM, transverse myelitis approx 1 month prior presents with 3 d hx of fever and right gluteal abscess - started off as a pimple she says got bigger quicker she tried to pop and just blood getting sicker last several days from TM impaired sensation below the waist

## 2019-04-11 NOTE — ED ADULT TRIAGE NOTE - CHIEF COMPLAINT QUOTE
Patient arrived ambulatory to ED, awake alert, and oriented times 3, breathing unlabored.  Patient complaining of pain to right buttock.  Patient states, redness, swelling, and drainage from area.  Patient also states fevers at home. unable to visualize in triage  Symptoms started 3 days ago

## 2019-04-11 NOTE — CONSULT NOTE ADULT - ASSESSMENT
34 y/o woman with PMH of transverse myelitis and SLE was admitted on 4/11 with swelling and pain in R buttock for 3 days. 3 days ago she noticed a small pimple on right buttock that started getting bigger and soon spread to whole R buttock area. She also had fever. CT showed no abscess formation.     R gluteal area cellulitis  [place] myelitis recently on immunosuppressive meds    - Blood culture x 2   - No open wound to send culture  - Due to her recent rituximab and steroid/cellcept use and vicinity of cellulitis to anal area will cover broadly until blood cultures are back.   - Will stop cefazolin   - Start vancomycin 1gm q12h  - Start Zosyn 3.375gm q8h until we have more culture information    Will follow.

## 2019-04-11 NOTE — ED ADULT NURSE NOTE - NSIMPLEMENTINTERV_GEN_ALL_ED
Implemented All Universal Safety Interventions:  [place] to call system. Call bell, personal items and telephone within reach. Instruct patient to call for assistance. Room bathroom lighting operational. Non-slip footwear when patient is off stretcher. Physically safe environment: no spills, clutter or unnecessary equipment. Stretcher in lowest position, wheels locked, appropriate side rails in place.

## 2019-04-11 NOTE — ED PROVIDER NOTE - PHYSICAL EXAMINATION
right gluteus with 10 cm by 10 cm area of induration/erythema and TTP in center area of slight fluctuance and central open sore no drainage very ttp induration is not appreciated around the rectum

## 2019-04-11 NOTE — CONSULT NOTE ADULT - SUBJECTIVE AND OBJECTIVE BOX
ACUTE CARE SURGERY CONSULT    Consulting surgical team: [facility] - Acute Care Surgery  Consulting attending: Dr. Jerrod Trimble  Patient seen and examined: 04-11-19 @ 13:21    HPI: 34 y/o F h/o lupus, recent admission for transverse myelitis on chronic immunosuppression presents to the ED with erythema to right buttock, fever, and chills. Patient states she noticed "two small pimples" on right buttock 2 days ago, that worsened and progressed to a large area of erythema and pain to right buttock. Patient had a measured fever of 102 at home last night. Recent admission for transverse myelitis, on steroid taper. Ambulating at home. No chest pain or dyspnea.    PMH: lupus, transverse myelitis, fibromyalgia, GERD  PSH: deviated nasal septum, renal biopsy  Medications: prednisone, hydroxychloroquine, cymbalta  Allergies: NKDA, NKA  Social: denies toxic habits x3     VITALS & I/Os:  Vital Signs Last 24 Hrs  T(C): 37.3 (11 Apr 2019 12:30), Max: 40.6 (11 Apr 2019 08:21)  T(F): 99.2 (11 Apr 2019 12:30), Max: 105.1 (11 Apr 2019 08:21)  HR: 112 (11 Apr 2019 12:30) (112 - 140)  BP: --  BP(mean): --  RR: 20 (11 Apr 2019 08:12) (20 - 20)  SpO2: 100% (11 Apr 2019 08:12) (100% - 100%)  CAPILLARY BLOOD GLUCOSE    General: NAD, AOx3, uncomfortable on examination  HEENT: PERRLA, EOMI, dry mucous membranes  Neck: supple, nontender  Cardiovascular: tachycardia, normal rhythm, no murmurs  Respiratory: no respiratory distress, CTAB  Abdomen: soft, nontender, nondistended. No guarding or rebound. Normal bowel sounds.  Extremities: 15cm x 13cm circular area of erythema to right buttock with central area of induration, no fluctuance; small punctum with blood and purulent drainage; tenderness to right buttock  Integumentary: warm, no rash  Neuro: no motor or sensory deficits       LABS:                        13.1   9.4   )-----------( 270      ( 11 Apr 2019 08:54 )             41.3     04-11    134<L>  |  92<L>  |  11.0  ----------------------------<  114  2.9<LL>   |  26.0  |  0.72    Ca    9.5      11 Apr 2019 08:54    TPro  7.7  /  Alb  4.3  /  TBili  0.8  /  DBili  x   /  AST  16  /  ALT  19  /  AlkPhos  86  04-11    PT/INR - ( 11 Apr 2019 08:54 )   PT: 14.5 sec;   INR: 1.25 ratio    PTT - ( 11 Apr 2019 08:54 )  PTT:30.8 sec  04-11 @ 13:06  0.6  04-11 @ 08:54  3.4    IMAGING:  < from: CT Abdomen and Pelvis w/ IV Cont (04.11.19 @ 10:37) >   EXAM:  CT ABDOMEN AND PELVIS IC                          PROCEDURE DATE:  04/11/2019      INTERPRETATION:  CLINICAL INFORMATION: Gluteal abscess/cellulitis.  Right   buttock swelling for 3 days.    COMPARISON: CT abdomen/pelvis 1/16/2019    PROCEDURE:   CT of the Abdomen and Pelvis was performed with intravenous contrast.   Intravenous contrast: 95 ml Omnipaque 300.   Oral contrast: None.  Sagittal and coronal reformats were performed.    FINDINGS:    LOWER CHEST: Linear atelectasis or scarring at the right lung base.   Calcified granuloma in the left lower lobe.    LIVER: Within normal limits.  BILE DUCTS: Normal caliber.  GALLBLADDER: Within normal limits.  SPLEEN: Within normal limits.  PANCREAS: Within normal limits.  ADRENALS: Within normal limits.  KIDNEYS/URETERS: Kidneys normal in size. No hydronephrosis. There appears   to be a punctate nonobstructing calculus in the lower pole of the left   kidney.    BLADDER:   REPRODUCTIVE ORGANS: 4.4 cm subserosal fibroid again seen arising from   the posterior uterine body. New 4 cm left ovarian cyst.    BOWEL: No bowel obstruction. Appendix is normal.  PERITONEUM: No ascites.  VESSELS:  Abdominal aorta normal in caliber.  RETROPERITONEUM: No lymphadenopathy.    ABDOMINAL WALL: There is infiltration of the subcutaneous fat involving   the right gluteal region involving an area 21 cm in craniocaudad and 18   cm transverse. Infiltration extends from the skin surface to the gluteus   musculature. Small areas of fluid are noted within and at the inferior   aspect of the inflammatory change (series 3 images 148 and 171), however   there is no abscess or drainable fluid collection.  BONES: No aggressive osseous lesion.    IMPRESSION:     Infiltration of the subcutaneous fat involving a large portion of the   right gluteal region which is consistent with cellulitis.    No abscess or drainable fluid collection.    New 4 cm left ovarian cyst, likely functional.    PAULY VITALE   This document has been electronically signed. Apr 11 2019 11:05AM    < end of copied text >

## 2019-04-11 NOTE — H&P ADULT - NSICDXPASTSURGICALHX_GEN_ALL_CORE_FT
PAST SURGICAL HISTORY:  History of biopsy Renal    History of esophagogastroduodenoscopy (EGD)     S/P correction of deviated nasal septum

## 2019-04-11 NOTE — ED ADULT NURSE REASSESSMENT NOTE - NS ED NURSE REASSESS COMMENT FT1
pt. a&ox3. pt. states she is feeling better. pt. on cm. pt. ambulating to the bathroom without difficulty. sinus tachy on cm. will continue to monitor.

## 2019-04-11 NOTE — ED ADULT NURSE REASSESSMENT NOTE - NS ED NURSE REASSESS COMMENT FT1
pt. a&ox3. pt. states she is having discomfort on her rt. sided buttocks. medications given as documented. will continue to monitor.

## 2019-04-11 NOTE — CONSULT NOTE ADULT - SUBJECTIVE AND OBJECTIVE BOX
[facility]  INFECTIOUS DISEASES AND INTERNAL MEDICINE at [place]  =======================================================  Adams Vasquez MD  Diplomates American Board of Internal Medicine and Infectious Diseases  =======================================================    [id number]  KANE LARA     CC: Right gluteal swelling     HPI:  32 y/o woman with PMH of transverse myelitis and SLE was admitted on 4/11 with swelling and pain in R buttock for 3 days. 3 days ago she noticed a small pimple on right buttock that started getting bigger and soon spread to whole R buttock area. She also had fever. She tried to pop it but only small amount of blood came out.    CT showed no abscess formation.   She was admitted to [facility] from 1/6-2/20  diagnosed with transverse myelitis. She was treated with plasmapheresis, IV steroids, Cellcept and hydroxychloroquine and a dose of rituximab She was discharged to [place] [facility] acute rehab  from 2/6-2/20. She feels significantly improved since her last admission.     PAST MEDICAL & SURGICAL HISTORY:  Fibromyalgia  Lupus  GERD (gastroesophageal reflux disease)  Lupus nephritis  S/P correction of deviated nasal septum  History of esophagogastroduodenoscopy (EGD)  History of biopsy: Renal    Social Hx: no smoking or drinking     FAMILY HISTORY:  Family history of melanoma: In Eye  Family history of osteoarthritis  Family history of liver disease    Allergies  No Known Allergies    Antibiotics:  ceFAZolin   IVPB 1000 milliGRAM(s) IV Intermittent every 8 hours    REVIEW OF SYSTEMS:  CONSTITUTIONAL:  No Fever or chills  HEENT:  No diplopia or blurred vision.  No sore throat or runny nose.  CARDIOVASCULAR:  No chest pain or SOB.  RESPIRATORY:  No cough, shortness of breath, PND or orthopnea.  GASTROINTESTINAL:  No nausea, vomiting or diarrhea.  GENITOURINARY:  No dysuria, frequency or urgency. No Blood in urine  MUSCULOSKELETAL:  no joint aches, no muscle pain  SKIN:  R buttock swelling and redness   NEUROLOGIC:  No paresthesias, fasciculations, seizures or weakness.  PSYCHIATRIC:  No disorder of thought or mood.  ENDOCRINE:  No heat or cold intolerance, polyuria or polydipsia.  HEMATOLOGICAL:  No easy bruising or bleeding.     Physical Exam:  Vital Signs Last 24 Hrs  T(C): 37.3 (11 Apr 2019 12:30), Max: 40.6 (11 Apr 2019 08:21)  T(F): 99.2 (11 Apr 2019 12:30), Max: 105.1 (11 Apr 2019 08:21)  HR: 110 (11 Apr 2019 14:44) (110 - 140)  BP: 95/62 (11 Apr 2019 14:44) (95/62 - 95/62)  RR: 18 (11 Apr 2019 14:44) (18 - 20)  SpO2: 99% (11 Apr 2019 14:44) (99% - 100%)  Height (cm): 157.48 (04-11 @ 08:12)  Weight (kg): 59 (04-11 @ 08:12)  BMI (kg/m2): 23.8 (04-11 @ 08:12)  BSA (m2): 1.59 (04-11 @ 08:12)  GEN: NAD  HEENT: normocephalic and atraumatic. EOMI. PERRL.    NECK: Supple.  No lymphadenopathy   LUNGS: Clear to auscultation.  HEART: Regular rate and rhythm without murmur.  ABDOMEN: Soft, nontender, and nondistended.  Positive bowel sounds.    : No CVA tenderness, R buttock with swelling and erythema no ulcer, no fluctuation   EXTREMITIES: Without any cyanosis, clubbing, rash, lesions or edema.  NEUROLOGIC: grossly intact.  PSYCHIATRIC: Appropriate affect .  SKIN: No ulceration or induration present.    Labs:  04-11    134<L>  |  92<L>  |  11.0  ----------------------------<  114  2.9<LL>   |  26.0  |  0.72    Ca    9.5      11 Apr 2019 08:54    TPro  7.7  /  Alb  4.3  /  TBili  0.8  /  DBili  x   /  AST  16  /  ALT  19  /  AlkPhos  86  04-11                        13.1   9.4   )-----------( 270      ( 11 Apr 2019 08:54 )             41.3     PT/INR - ( 11 Apr 2019 08:54 )   PT: 14.5 sec;   INR: 1.25 ratio    PTT - ( 11 Apr 2019 08:54 )  PTT:30.8 sec    LIVER FUNCTIONS - ( 11 Apr 2019 08:54 )  Alb: 4.3 g/dL / Pro: 7.7 g/dL / ALK PHOS: 86 U/L / ALT: 19 U/L / AST: 16 U/L / GGT: x           RECENT CULTURES:  Pending.     All imaging and other data have been reviewed.

## 2019-04-11 NOTE — H&P ADULT - NSICDXFAMILYHX_GEN_ALL_CORE_FT
FAMILY HISTORY:  Father  Still living? Unknown  Family history of liver disease, Age at diagnosis: Age Unknown    Mother  Still living? Unknown  Family history of melanoma, Age at diagnosis: Age Unknown  Family history of osteoarthritis, Age at diagnosis: Age Unknown

## 2019-04-11 NOTE — ED ADULT NURSE REASSESSMENT NOTE - NS ED NURSE REASSESS COMMENT FT1
surgery at bedside to evaluate patient. surgery at bedside to evaluate patient. 15cm by 13cm cellulitic area noted to right gluteal muscle. redness noted, 2 small pimples noted to area.

## 2019-04-11 NOTE — CONSULT NOTE ADULT - ASSESSMENT
32 y/o F h/o lupus, transverse myelitis on chronic immunosuppression with right gluteal cellulitis without evidence of abscess. Area of central induration could potentially evolve into abscess. No signs of necrotizing fasciitis at this time. Cellulitis is likely the source of sepsis. Currently tachycardic, febrile, hypotensive with response to IV fluids.    Plan:  No surgical intervention at this time  IV antibiotics as per ID and primary team  Warm compresses to right buttock  Wound cultures sent from blood/purulent fluid expressed from punctum within wound  Rest of medical management as per primary team  Surgery will continue to follow    Patient seen and evaluated with attending Dr. Trimble.

## 2019-04-11 NOTE — CONSULT NOTE ADULT - SUBJECTIVE AND OBJECTIVE BOX
Patient is a 33y old  Female who presents with a chief complaint of fever, gluteal pain     HPI: 32 y/o f hx of lupus. FM, transverse myelitis approx 1 month prior treated with high dose steroids, plasmapheresis (now on immunosuppressive therapy), presents with 3 day history of fever, gluteal pain, and gluteal abscess. Shes states it started off as a pimple and it was popped. A few days later if became worse with increasing pain, redness, and induration. On presentation to the ER temp 105,  systolic, code sepsis got 2L NS. BP dropped to 90's systolic. MICU called for consult. Patient seen and examined at the bedside. She is awake alert, and oriented. She appears comfortable and non-toxic. BP 91/53, and currently getting another 1L NS. 's sinus, RR 18, no increased WOB. She denies CP, SOB, diarrhea, ab pain, headache. She complains of weakness. She thinks her BP at home are usually high 90's systolic.     PAST MEDICAL & SURGICAL HISTORY:  Fibromyalgia  Lupus  GERD (gastroesophageal reflux disease)  Lupus nephritis  S/P correction of deviated nasal septum  History of esophagogastroduodenoscopy (EGD)  History of biopsy: Renal      Review of Systems:  CONSTITUTIONAL: + fever, chills, and fatigue  EYES: No eye pain, visual disturbances, or discharge  ENMT:  No difficulty hearing, tinnitus, vertigo; No sinus or throat pain  NECK: No pain or stiffness  RESPIRATORY: No cough, wheezing, chills or hemoptysis; No shortness of breath  CARDIOVASCULAR: No chest pain, palpitations, dizziness, or leg swelling  GASTROINTESTINAL: No abdominal or epigastric pain. No nausea, vomiting, or hematemesis; No diarrhea or constipation. No melena or hematochezia.  GENITOURINARY: No dysuria, frequency, hematuria, or incontinence  NEUROLOGICAL: No headaches, memory loss, loss of strength, numbness, or tremors  SKIN: No itching, burning, rashes, or lesions   MUSCULOSKELETAL: No joint pain or swelling; No muscle, back, or extremity pain  PSYCHIATRIC: No depression, anxiety, mood swings, or difficulty sleeping    Medications:    ICU Vital Signs Last 24 Hrs  T(C): 40.6   T(F): 105.1   HR: 120  BP: 91/53  RR: 18  SpO2: 100%       I&O's Detail        LABS:                        13.1   9.4   )-----------( 270      ( 11 Apr 2019 08:54 )             41.3     04-11    134<L>  |  92<L>  |  11.0  ----------------------------<  114  2.9<LL>   |  26.0  |  0.72    Ca    9.5      11 Apr 2019 08:54    TPro  7.7  /  Alb  4.3  /  TBili  0.8  /  DBili  x   /  AST  16  /  ALT  19  /  AlkPhos  86  04-11    CAPILLARY BLOOD GLUCOSE    PT/INR - ( 11 Apr 2019 08:54 )   PT: 14.5 sec;   INR: 1.25 ratio         PTT - ( 11 Apr 2019 08:54 )  PTT:30.8 sec    CULTURES:    Physical Examination:    General: No acute distress.  Alert, oriented, interactive, nonfocal    HEENT: Pupils equal, reactive to light.  Symmetric.    PULM: Clear to auscultation bilaterally, no significant sputum production    CVS: tachycardic with regular rhythm, no murmurs, rubs, or gallops    ABD: Soft, nondistended, nontender, normoactive bowel sounds, no masses    EXT: No edema, nontender    SKIN: Warm and well perfused, no rashes noted.    NEURO: A&Ox3, strength 5/5 all extremities, cranial nerves grossly intact, no focal deficits    BACK: Right Buttock with large area of induration, warmth, erythema about 20cm, painful to palpation       RADIOLOGY: EXAM:  CT ABDOMEN AND PELVIS IC                          PROCEDURE DATE:  04/11/2019          INTERPRETATION:  CLINICAL INFORMATION: Gluteal abscess/cellulitis.  Right   buttock swelling for 3 days.    COMPARISON: CT abdomen/pelvis 1/16/2019    PROCEDURE:   CT of the Abdomen and Pelvis was performed with intravenous contrast.   Intravenous contrast: 95 ml Omnipaque 300.   Oral contrast: None.  Sagittal and coronal reformats were performed.    FINDINGS:    LOWER CHEST: Linear atelectasis or scarring at the right lung base.   Calcified granuloma in the left lower lobe.    LIVER: Within normal limits.  BILE DUCTS: Normal caliber.  GALLBLADDER: Within normal limits.  SPLEEN: Within normal limits.  PANCREAS: Within normal limits.  ADRENALS: Within normal limits.  KIDNEYS/URETERS: Kidneys normal in size. No hydronephrosis. There appears   to be a punctate nonobstructing calculus in the lower pole of the left   kidney.    BLADDER:   REPRODUCTIVE ORGANS: 4.4 cm subserosal fibroid again seen arising from   the posterior uterine body. New 4 cm left ovarian cyst.    BOWEL: No bowel obstruction. Appendix is normal.  PERITONEUM: No ascites.  VESSELS:  Abdominal aorta normal in caliber.  RETROPERITONEUM: No lymphadenopathy.    ABDOMINAL WALL: There is infiltration of the subcutaneous fat involving   the right gluteal region involving an area 21 cm in craniocaudad and 18   cm transverse. Infiltration extends from the skin surface to the gluteus   musculature. Small areas of fluid are noted within and at the inferior   aspect of the inflammatory change (series 3 images 148 and 171), however   there is no abscess or drainable fluid collection.  BONES: No aggressive osseous lesion.    IMPRESSION:     Infiltration of the subcutaneous fat involving a large portion of the   right gluteal region which is consistent with cellulitis.    No abscess or drainable fluid collection.    New 4 cm left ovarian cyst, likely functional.      PAULY VITALE   This document has been electronically signed. Apr 11 2019 11:05AM        TIME SPENT: 40 min   Evaluating/treating patient, reviewing data/labs/imaging, discussing case with multidisciplinary team, discussing plan/goals of care with patient/family. Non-inclusive of procedure time.

## 2019-04-11 NOTE — H&P ADULT - ASSESSMENT
The patient is a 33 year old female with a history of transverse myelitis and SLE who presented to the ER with complaints of right thigh/buttocks swelling. According to the patient 3 days ago she noted a small pimple on her right buttock. She attempted to pop it open with bloody discharge. Over the course of 3 days she noted progressively worsening pain, redness and swelling associated with fever and chills. In the ER, febrile with temperature of 105.1 HR of 140 and lactate of 3.4. GIven 180ml of NS and a dose of vancomycin and zosyn for sepsis. CT of the abdomen and pelvis showed no evidence of abscess or fluid collection. Was evaluated by surgery with no need for intervention at this time. Repeat lactate of 0.6 after fluid resuscitation.     Assessment/Plan:    1. Sepsis secondary to right gluteal cellulitis: Continue IV fluids, given a dose of IV vancomycin and zosyn  IV cefazolin   Blood cultures x 2 sent  ID consult       2. Transverse myelitis with cellcept and prednisone: Hold cellcept in the setting of sepsis  IV solu cortef until BP stable and then resume po steroids    3. History of SLE    4. Hypokalemia given 50meq of KCL in the ER.   Monitor lytes    5. Hypovolemic hyponatremia status post iv fluid resuscitation    VTE- Lovenox subcut

## 2019-04-11 NOTE — CONSULT NOTE ADULT - ASSESSMENT
32 y/o f hx of lupus. FM, transverse myelitis approx 1 month prior treated with high dose steroids, plasmapheresis (now on immunosuppressive therapy), presents with 3 day history of fever, gluteal pain, and gluteal abscess. CT with evidence for cellulitis without clear evidence of fluid collection or drainable abscess. Most likely sepsis from gluteal cellulitis and hypotension from dehydration and sepsis picture. At this time patient appears stable with BP in the 90's systolic, she is mentating well and does not show signs of distress. At this time she is not a candidate for MICU admission and I would recommended continuing fluid bolus, but with LR or plasmalyte. Please reconsult if patient's status changes.     Plan discussed with ICU attending Dr. Delgado    Problem List:     Sepsis   Right gluteal cellulitis  Transverse myelitis   Lupus   Hypokalemia     Sepsis  -Secondary to large 20cm gluteal cellulitis with Tmax 105  -Blood cultures, urine culture/UA  -s/p 2L fluid bolus with SBP in 90's, would give another liter of LR/plasmalyte and then continue maintenance fluids  -Surgery consult for potential I+D  -Broad spectrum abx coverage given immunosuppressive state - cover for MRSA and pseudomonas   -ID consult   -Tylenol for fever/pain   -Lactate 3.4, would repeat to evaluate for clearance     Right Gluteal Cellulitis   -As above    Transverse myelitis  -Supportive care  -Would give hydrocortisone 50mg q6 for now, then transition to back to prednisone when BP stablizes  -Hold cellcept given severe infection and restart once infection improved  -Would continue Plaquenil   -Continue other home medications   -Order diet    Lupus  -As above  -Restart home medications    Hypokalemia/Hyponatremia  -Would give fluid and replacement K  -Recheck BMP in evening  -Most likely from dehydration and hypovolemia

## 2019-04-11 NOTE — ED ADULT NURSE REASSESSMENT NOTE - NS ED NURSE REASSESS COMMENT FT1
MD. Hightower made aware that patient's blood pressure has dropped and heart rate is increasing. instructed to give more fluids.

## 2019-04-12 DIAGNOSIS — L03.317 CELLULITIS OF BUTTOCK: ICD-10-CM

## 2019-04-12 DIAGNOSIS — K21.9 GASTRO-ESOPHAGEAL REFLUX DISEASE WITHOUT ESOPHAGITIS: ICD-10-CM

## 2019-04-12 DIAGNOSIS — M32.14 GLOMERULAR DISEASE IN SYSTEMIC LUPUS ERYTHEMATOSUS: ICD-10-CM

## 2019-04-12 LAB
ANION GAP SERPL CALC-SCNC: 13 MMOL/L — SIGNIFICANT CHANGE UP (ref 5–17)
APPEARANCE UR: CLEAR — SIGNIFICANT CHANGE UP
BACTERIA # UR AUTO: ABNORMAL
BILIRUB UR-MCNC: NEGATIVE — SIGNIFICANT CHANGE UP
BUN SERPL-MCNC: 10 MG/DL — SIGNIFICANT CHANGE UP (ref 8–20)
CALCIUM SERPL-MCNC: 7.6 MG/DL — LOW (ref 8.6–10.2)
CHLORIDE SERPL-SCNC: 107 MMOL/L — SIGNIFICANT CHANGE UP (ref 98–107)
CO2 SERPL-SCNC: 20 MMOL/L — LOW (ref 22–29)
COLOR SPEC: YELLOW — SIGNIFICANT CHANGE UP
CREAT SERPL-MCNC: 0.49 MG/DL — LOW (ref 0.5–1.3)
CRP SERPL-MCNC: 28.61 MG/DL — HIGH (ref 0–0.4)
DIFF PNL FLD: ABNORMAL
EPI CELLS # UR: SIGNIFICANT CHANGE UP
GLUCOSE SERPL-MCNC: 100 MG/DL — SIGNIFICANT CHANGE UP (ref 70–115)
GLUCOSE UR QL: NEGATIVE MG/DL — SIGNIFICANT CHANGE UP
GRAM STN FLD: SIGNIFICANT CHANGE UP
HCT VFR BLD CALC: 32 % — LOW (ref 37–47)
HGB BLD-MCNC: 9.8 G/DL — LOW (ref 12–16)
KETONES UR-MCNC: NEGATIVE — SIGNIFICANT CHANGE UP
LEUKOCYTE ESTERASE UR-ACNC: NEGATIVE — SIGNIFICANT CHANGE UP
MAGNESIUM SERPL-MCNC: 1.5 MG/DL — LOW (ref 1.8–2.6)
MCHC RBC-ENTMCNC: 26.9 PG — LOW (ref 27–31)
MCHC RBC-ENTMCNC: 30.6 G/DL — LOW (ref 32–36)
MCV RBC AUTO: 87.9 FL — SIGNIFICANT CHANGE UP (ref 81–99)
NITRITE UR-MCNC: NEGATIVE — SIGNIFICANT CHANGE UP
PH UR: 6 — SIGNIFICANT CHANGE UP (ref 5–8)
PHOSPHATE SERPL-MCNC: 1.9 MG/DL — LOW (ref 2.4–4.7)
PLATELET # BLD AUTO: 211 K/UL — SIGNIFICANT CHANGE UP (ref 150–400)
POTASSIUM SERPL-MCNC: 2.7 MMOL/L — CRITICAL LOW (ref 3.5–5.3)
POTASSIUM SERPL-SCNC: 2.7 MMOL/L — CRITICAL LOW (ref 3.5–5.3)
PROCALCITONIN SERPL-MCNC: 2.47 NG/ML — HIGH (ref 0.02–0.1)
PROT UR-MCNC: 30 MG/DL
RBC # BLD: 3.64 M/UL — LOW (ref 4.4–5.2)
RBC # FLD: 14.6 % — SIGNIFICANT CHANGE UP (ref 11–15.6)
RBC CASTS # UR COMP ASSIST: SIGNIFICANT CHANGE UP /HPF (ref 0–4)
SODIUM SERPL-SCNC: 140 MMOL/L — SIGNIFICANT CHANGE UP (ref 135–145)
SP GR SPEC: 1.01 — SIGNIFICANT CHANGE UP (ref 1.01–1.02)
SPECIMEN SOURCE: SIGNIFICANT CHANGE UP
UROBILINOGEN FLD QL: NEGATIVE MG/DL — SIGNIFICANT CHANGE UP
VANCOMYCIN TROUGH SERPL-MCNC: 30.9 UG/ML — CRITICAL HIGH (ref 10–20)
WBC # BLD: 4.8 K/UL — SIGNIFICANT CHANGE UP (ref 4.8–10.8)
WBC # FLD AUTO: 4.8 K/UL — SIGNIFICANT CHANGE UP (ref 4.8–10.8)
WBC UR QL: SIGNIFICANT CHANGE UP

## 2019-04-12 PROCEDURE — 99233 SBSQ HOSP IP/OBS HIGH 50: CPT

## 2019-04-12 PROCEDURE — 10060 I&D ABSCESS SIMPLE/SINGLE: CPT | Mod: GC

## 2019-04-12 PROCEDURE — 99232 SBSQ HOSP IP/OBS MODERATE 35: CPT

## 2019-04-12 RX ORDER — OXYCODONE HYDROCHLORIDE 5 MG/1
5 TABLET ORAL EVERY 6 HOURS
Qty: 0 | Refills: 0 | Status: DISCONTINUED | OUTPATIENT
Start: 2019-04-12 | End: 2019-04-18

## 2019-04-12 RX ORDER — POTASSIUM CHLORIDE 20 MEQ
40 PACKET (EA) ORAL EVERY 4 HOURS
Qty: 0 | Refills: 0 | Status: COMPLETED | OUTPATIENT
Start: 2019-04-12 | End: 2019-04-12

## 2019-04-12 RX ORDER — ACETAMINOPHEN 500 MG
1000 TABLET ORAL ONCE
Qty: 0 | Refills: 0 | Status: COMPLETED | OUTPATIENT
Start: 2019-04-12 | End: 2019-04-12

## 2019-04-12 RX ORDER — LACTOBACILLUS ACIDOPHILUS 100MM CELL
1 CAPSULE ORAL
Qty: 0 | Refills: 0 | Status: DISCONTINUED | OUTPATIENT
Start: 2019-04-12 | End: 2019-04-15

## 2019-04-12 RX ORDER — LIDOCAINE HYDROCHLORIDE AND EPINEPHRINE 10; 10 MG/ML; UG/ML
15 INJECTION, SOLUTION INFILTRATION; PERINEURAL ONCE
Qty: 0 | Refills: 0 | Status: COMPLETED | OUTPATIENT
Start: 2019-04-12 | End: 2019-04-12

## 2019-04-12 RX ORDER — MAGNESIUM SULFATE 500 MG/ML
2 VIAL (ML) INJECTION
Qty: 0 | Refills: 0 | Status: COMPLETED | OUTPATIENT
Start: 2019-04-12 | End: 2019-04-12

## 2019-04-12 RX ORDER — KETOROLAC TROMETHAMINE 30 MG/ML
15 SYRINGE (ML) INJECTION EVERY 8 HOURS
Qty: 0 | Refills: 0 | Status: DISCONTINUED | OUTPATIENT
Start: 2019-04-12 | End: 2019-04-13

## 2019-04-12 RX ORDER — MAGNESIUM OXIDE 400 MG ORAL TABLET 241.3 MG
400 TABLET ORAL
Qty: 0 | Refills: 0 | Status: DISCONTINUED | OUTPATIENT
Start: 2019-04-12 | End: 2019-04-12

## 2019-04-12 RX ORDER — SODIUM CHLORIDE 9 MG/ML
1000 INJECTION INTRAMUSCULAR; INTRAVENOUS; SUBCUTANEOUS
Qty: 0 | Refills: 0 | Status: DISCONTINUED | OUTPATIENT
Start: 2019-04-12 | End: 2019-04-13

## 2019-04-12 RX ORDER — SODIUM CHLORIDE 9 MG/ML
1000 INJECTION INTRAMUSCULAR; INTRAVENOUS; SUBCUTANEOUS ONCE
Qty: 0 | Refills: 0 | Status: COMPLETED | OUTPATIENT
Start: 2019-04-12 | End: 2019-04-12

## 2019-04-12 RX ORDER — HYDROMORPHONE HYDROCHLORIDE 2 MG/ML
0.5 INJECTION INTRAMUSCULAR; INTRAVENOUS; SUBCUTANEOUS ONCE
Qty: 0 | Refills: 0 | Status: DISCONTINUED | OUTPATIENT
Start: 2019-04-12 | End: 2019-04-12

## 2019-04-12 RX ADMIN — Medication 40 MILLIEQUIVALENT(S): at 17:34

## 2019-04-12 RX ADMIN — Medication 250 MILLIGRAM(S): at 08:32

## 2019-04-12 RX ADMIN — Medication 650 MILLIGRAM(S): at 09:35

## 2019-04-12 RX ADMIN — Medication 100 MILLIGRAM(S): at 22:27

## 2019-04-12 RX ADMIN — SODIUM CHLORIDE 150 MILLILITER(S): 9 INJECTION INTRAMUSCULAR; INTRAVENOUS; SUBCUTANEOUS at 10:38

## 2019-04-12 RX ADMIN — DULOXETINE HYDROCHLORIDE 60 MILLIGRAM(S): 30 CAPSULE, DELAYED RELEASE ORAL at 08:32

## 2019-04-12 RX ADMIN — Medication 40 MILLIEQUIVALENT(S): at 22:27

## 2019-04-12 RX ADMIN — Medication 50 GRAM(S): at 16:26

## 2019-04-12 RX ADMIN — Medication 1 TABLET(S): at 17:34

## 2019-04-12 RX ADMIN — ENOXAPARIN SODIUM 40 MILLIGRAM(S): 100 INJECTION SUBCUTANEOUS at 16:26

## 2019-04-12 RX ADMIN — Medication 50 GRAM(S): at 18:08

## 2019-04-12 RX ADMIN — PIPERACILLIN AND TAZOBACTAM 25 GRAM(S): 4; .5 INJECTION, POWDER, LYOPHILIZED, FOR SOLUTION INTRAVENOUS at 22:27

## 2019-04-12 RX ADMIN — Medication 50 MILLIGRAM(S): at 05:35

## 2019-04-12 RX ADMIN — SODIUM CHLORIDE 100 MILLILITER(S): 9 INJECTION INTRAMUSCULAR; INTRAVENOUS; SUBCUTANEOUS at 01:59

## 2019-04-12 RX ADMIN — Medication 200 MILLIGRAM(S): at 17:34

## 2019-04-12 RX ADMIN — GABAPENTIN 300 MILLIGRAM(S): 400 CAPSULE ORAL at 22:27

## 2019-04-12 RX ADMIN — Medication 250 MILLIGRAM(S): at 05:35

## 2019-04-12 RX ADMIN — Medication 400 MILLIGRAM(S): at 11:32

## 2019-04-12 RX ADMIN — LIDOCAINE HYDROCHLORIDE AND EPINEPHRINE 15 MILLILITER(S): 10; 10 INJECTION, SOLUTION INFILTRATION; PERINEURAL at 11:57

## 2019-04-12 RX ADMIN — OXYCODONE HYDROCHLORIDE 5 MILLIGRAM(S): 5 TABLET ORAL at 16:24

## 2019-04-12 RX ADMIN — Medication 1000 MILLIGRAM(S): at 12:40

## 2019-04-12 RX ADMIN — Medication 250 MILLIGRAM(S): at 13:10

## 2019-04-12 RX ADMIN — Medication 100 MILLIGRAM(S): at 13:16

## 2019-04-12 RX ADMIN — Medication 50 GRAM(S): at 19:30

## 2019-04-12 RX ADMIN — OXYCODONE HYDROCHLORIDE 5 MILLIGRAM(S): 5 TABLET ORAL at 17:20

## 2019-04-12 RX ADMIN — Medication 15 MILLIGRAM(S): at 08:32

## 2019-04-12 RX ADMIN — Medication 3 MILLIGRAM(S): at 22:27

## 2019-04-12 RX ADMIN — PIPERACILLIN AND TAZOBACTAM 25 GRAM(S): 4; .5 INJECTION, POWDER, LYOPHILIZED, FOR SOLUTION INTRAVENOUS at 06:28

## 2019-04-12 RX ADMIN — Medication 40 MILLIEQUIVALENT(S): at 13:16

## 2019-04-12 RX ADMIN — SODIUM CHLORIDE 1000 MILLILITER(S): 9 INJECTION INTRAMUSCULAR; INTRAVENOUS; SUBCUTANEOUS at 08:45

## 2019-04-12 RX ADMIN — Medication 50 MILLIGRAM(S): at 17:34

## 2019-04-12 RX ADMIN — Medication 200 MILLIGRAM(S): at 05:36

## 2019-04-12 RX ADMIN — SODIUM CHLORIDE 1000 MILLILITER(S): 9 INJECTION INTRAMUSCULAR; INTRAVENOUS; SUBCUTANEOUS at 10:39

## 2019-04-12 RX ADMIN — Medication 1 TABLET(S): at 08:32

## 2019-04-12 RX ADMIN — Medication 650 MILLIGRAM(S): at 08:32

## 2019-04-12 RX ADMIN — Medication 100 MILLIGRAM(S): at 05:36

## 2019-04-12 RX ADMIN — Medication 600 MILLIGRAM(S): at 22:40

## 2019-04-12 RX ADMIN — MAGNESIUM OXIDE 400 MG ORAL TABLET 400 MILLIGRAM(S): 241.3 TABLET ORAL at 13:16

## 2019-04-12 RX ADMIN — Medication 15 MILLIGRAM(S): at 09:39

## 2019-04-12 RX ADMIN — PIPERACILLIN AND TAZOBACTAM 25 GRAM(S): 4; .5 INJECTION, POWDER, LYOPHILIZED, FOR SOLUTION INTRAVENOUS at 13:10

## 2019-04-12 RX ADMIN — PANTOPRAZOLE SODIUM 40 MILLIGRAM(S): 20 TABLET, DELAYED RELEASE ORAL at 08:32

## 2019-04-12 NOTE — PROGRESS NOTE ADULT - SUBJECTIVE AND OBJECTIVE BOX
is a 33 year old female with a history of transverse myelitis and SLE who presented to the ER with complaints of right thigh/buttocks swelling. According to the patient 3 days ago she noted a small pimple on her right buttock. She attempted to pop it open with bloody discharge. Over the course of 3 days she noted progressively worsening pain, redness and swelling associated with fever and chills. In the ER, febrile with temperature of 105.1 HR of 140 and lactate of 3.4.    Today am, she was tachycardic to the 130s and 140s with a low blood pressure, consistent with sepsis. I've bolused her normal saline x2L and upgraded her to 4BRK, where she is going to continue receiving IV antibiotics. A component of her HR is pain, and I've given her IV tylenol, prn tylenol, toradol and oxycodone for pain. On physical exam at the bedside, she reports feeling better since day of admission.     Summary:   REVIEW OF SYSTEMS    General:	"I'm doing a little better, just having pain."    Skin/Breast:  	  Ophthalmologic:  	  ENMT:	    Respiratory and Thorax:  	  Cardiovascular:	    Gastrointestinal:	    Genitourinary:	    Musculoskeletal:	    Neurological:	    Psychiatric:	    Hematology/Lymphatics:	    Endocrine:	    Allergic/Immunologic:	  Vital Signs Last 24 Hrs  T(C): 36.6 (12 Apr 2019 15:27), Max: 37.9 (12 Apr 2019 08:21)  T(F): 97.9 (12 Apr 2019 15:27), Max: 100.3 (12 Apr 2019 08:21)  HR: 106 (12 Apr 2019 16:00) (102 - 144)  BP: 91/71 (12 Apr 2019 16:00) (65/45 - 99/65)  BP(mean): --  RR: 24 (12 Apr 2019 16:00) (18 - 24)  SpO2: 98% (12 Apr 2019 16:00) (96% - 100%)  PHYSICAL EXAM:  GEN: young female, moderate distress but mentating, awake, alert  HEENT: dry MM  CVS: S1S2 tachycardic, HR=12-s  PULM: good breath sounds  ABD: soft, nontender, no rebound, no guarding  EXTREM: +right buttock erythema, right thigh cellulitis  NEURO: intact  PSYCH: mentating  SKIN: warm,l dry                        9.8    4.8   )-----------( 211      ( 12 Apr 2019 10:07 )             32.0     04-12    140  |  107  |  10.0  ----------------------------<  100  2.7<LL>   |  20.0<L>  |  0.49<L>    Ca    7.6<L>      12 Apr 2019 10:07  Phos  1.9     04-12  Mg     1.5     04-12    TPro  7.7  /  Alb  4.3  /  TBili  0.8  /  DBili  x   /  AST  16  /  ALT  19  /  AlkPhos  86  04-11    LIVER FUNCTIONS - ( 11 Apr 2019 08:54 )  Alb: 4.3 g/dL / Pro: 7.7 g/dL / ALK PHOS: 86 U/L / ALT: 19 U/L / AST: 16 U/L / GGT: x           PT/INR - ( 11 Apr 2019 08:54 )   PT: 14.5 sec;   INR: 1.25 ratio      PTT - ( 11 Apr 2019 08:54 )  PTT:30.8 sec    Radiology:     MEDICATIONS  (STANDING):  docusate sodium 100 milliGRAM(s) Oral three times a day  DULoxetine 60 milliGRAM(s) Oral daily  enoxaparin Injectable 40 milliGRAM(s) SubCutaneous every 24 hours  gabapentin 300 milliGRAM(s) Oral at bedtime  hydrocortisone sodium succinate Injectable 50 milliGRAM(s) IV Push every 12 hours  hydroxychloroquine 200 milliGRAM(s) Oral two times a day  magnesium oxide 400 milliGRAM(s) Oral three times a day with meals  magnesium sulfate  IVPB 2 Gram(s) IV Intermittent every 2 hours  melatonin 3 milliGRAM(s) Oral at bedtime  multivitamin 1 Tablet(s) Oral daily  pantoprazole    Tablet 40 milliGRAM(s) Oral before breakfast  piperacillin/tazobactam IVPB. 3.375 Gram(s) IV Intermittent every 8 hours  potassium chloride    Tablet ER 40 milliEquivalent(s) Oral every 4 hours  saccharomyces boulardii 250 milliGRAM(s) Oral two times a day  sodium chloride 0.9%. 1000 milliLiter(s) (150 mL/Hr) IV Continuous <Continuous>  vancomycin  IVPB 1250 milliGRAM(s) IV Intermittent every 8 hours    MEDICATIONS  (PRN):  acetaminophen   Tablet .. 650 milliGRAM(s) Oral every 6 hours PRN Temp greater or equal to 38C (100.4F)  ibuprofen  Tablet. 600 milliGRAM(s) Oral every 6 hours PRN Moderate Pain (4 - 6)  ketorolac   Injectable 15 milliGRAM(s) IV Push every 8 hours PRN Severe Pain (7 - 10)  oxyCODONE    IR 5 milliGRAM(s) Oral every 6 hours PRN Moderate Pain (4 - 6)  polyethylene glycol 3350 17 Gram(s) Oral daily PRN Constipation

## 2019-04-12 NOTE — PROCEDURE NOTE - ADDITIONAL PROCEDURE DETAILS
approximately 15 cc of bloody purulent fluid was drained. 1 cm incision. Packed with 1/4 inch packing strip

## 2019-04-12 NOTE — PROGRESS NOTE ADULT - ASSESSMENT
32 y/o woman with PMH of transverse myelitis and SLE was admitted on 4/11 with swelling and pain in R buttock for 3 days. 3 days ago she noticed a small pimple on right buttock that started getting bigger and soon spread to whole R buttock area. Today there was a opening in the middle of cellulitic area with profuse blood+pus discharge.     R gluteal area cellulitis  [place] myelitis recently on immunosuppressive meds    - Blood culture x 2 neg to date  - Wound was open with bloody pus coming out, culture was sent  - Continue vancomycin 1gm q12h  - Continue Zosyn 3.375gm q8h until we have more culture information  - Send trough level and keep 15 to 20.     Will follow.

## 2019-04-12 NOTE — PROGRESS NOTE ADULT - ASSESSMENT
34 y/o F h/o lupus, transverse myelitis on chronic immunosuppression with right gluteal cellulitis without evidence of abscess. Area of central induration seems to be coalescing into abscess. No signs of necrotizing fasciitis at this time. Cellulitis is likely the source of sepsis. Currently tachycardic, febrile, hypotensive.    Plan:  Will consider bedside I&D today; will sent proper wound cultures  IV antibiotics as per ID and primary team  Warm compresses to right buttock  Rest of medical management as per primary team  Surgery will continue to follow 34 y/o F h/o lupus, transverse myelitis on chronic immunosuppression with right gluteal cellulitis without evidence of abscess. Area of central induration seems to be coalescing into abscess. No signs of necrotizing fasciitis at this time. Cellulitis is likely the source of sepsis. Currently tachycardic, febrile, hypotensive.    Plan:  Will consider bedside I&D today; will send proper wound cultures  IV antibiotics as per ID and primary team  Warm compresses to right buttock  Rest of medical management as per primary team  Surgery will continue to follow

## 2019-04-12 NOTE — PROGRESS NOTE ADULT - PROBLEM SELECTOR PLAN 1
-cont vanc/zosyn  -tachycardic and hyptoensive today due to buttock cellulitis inducing sepsis  -appreciate ID f/u  -CRP and procalcitonin very elevated  -f/u blood cultures

## 2019-04-12 NOTE — PROGRESS NOTE ADULT - SUBJECTIVE AND OBJECTIVE BOX
INTERVAL HPI/OVERNIGHT EVENTS: No acute events overnight. Tachycardia and fever improved. Pain controlled. Erythema to gluteal area reduced. No nausea or vomiting. Ambulating.    MEDICATIONS  (STANDING):  docusate sodium 100 milliGRAM(s) Oral three times a day  DULoxetine 60 milliGRAM(s) Oral daily  enoxaparin Injectable 40 milliGRAM(s) SubCutaneous every 24 hours  gabapentin 300 milliGRAM(s) Oral at bedtime  hydrocortisone sodium succinate Injectable 50 milliGRAM(s) IV Push every 12 hours  hydroxychloroquine 200 milliGRAM(s) Oral two times a day  melatonin 3 milliGRAM(s) Oral at bedtime  multivitamin 1 Tablet(s) Oral daily  pantoprazole    Tablet 40 milliGRAM(s) Oral before breakfast  piperacillin/tazobactam IVPB. 3.375 Gram(s) IV Intermittent every 8 hours  saccharomyces boulardii 250 milliGRAM(s) Oral two times a day  sodium chloride 0.9%. 1000 milliLiter(s) (100 mL/Hr) IV Continuous <Continuous>  vancomycin  IVPB 1250 milliGRAM(s) IV Intermittent every 8 hours    MEDICATIONS  (PRN):  acetaminophen   Tablet .. 650 milliGRAM(s) Oral every 6 hours PRN Temp greater or equal to 38C (100.4F)  ibuprofen  Tablet. 600 milliGRAM(s) Oral every 6 hours PRN Moderate Pain (4 - 6)  polyethylene glycol 3350 17 Gram(s) Oral daily PRN Constipation      Vital Signs Last 24 Hrs  T(C): 37.9 (2019 08:21), Max: 37.9 (2019 08:21)  T(F): 100.3 (2019 08:21), Max: 100.3 (2019 08:21)  HR: 140 (2019 08:47) (102 - 140)  BP: 74/54 (2019 08:47) (67/47 - 99/65)  BP(mean): --  RR: 18 (2019 07:54) (18 - 18)  SpO2: 100% (2019 07:54) (96% - 100%)    Physical exam:  General: NAD, AOx3, uncomfortable on examination  HEENT: PERRLA, EOMI, dry mucous membranes  Neck: supple, nontender  Cardiovascular: tachycardia, normal rhythm, no murmurs  Respiratory: no respiratory distress, CTAB  Abdomen: soft, nontender, nondistended. No guarding or rebound. Normal bowel sounds.  Extremities: 10cm x 8cm circular area of erythema to right buttock with central area of induration with some fluctuance; small punctum with blood and purulent drainage  Integumentary: warm, no rash  Neuro: no motor or sensory deficits        I&O's Detail      LABS:                        13.1   9.4   )-----------( 270      ( 2019 08:54 )             41.3     04-11    134<L>  |  92<L>  |  11.0  ----------------------------<  114  2.9<LL>   |  26.0  |  0.72    Ca    9.5      2019 08:54    TPro  7.7  /  Alb  4.3  /  TBili  0.8  /  DBili  x   /  AST  16  /  ALT  19  /  AlkPhos  86  04-11    PT/INR - ( 2019 08:54 )   PT: 14.5 sec;   INR: 1.25 ratio         PTT - ( 2019 08:54 )  PTT:30.8 sec  Urinalysis Basic - ( 2019 05:34 )    Color: Yellow / Appearance: Clear / S.015 / pH: x  Gluc: x / Ketone: Negative  / Bili: Negative / Urobili: Negative mg/dL   Blood: x / Protein: 30 mg/dL / Nitrite: Negative   Leuk Esterase: Negative / RBC: 0-2 /HPF / WBC 0-2   Sq Epi: x / Non Sq Epi: Occasional / Bacteria: Moderate

## 2019-04-13 LAB
-  AMPICILLIN/SULBACTAM: SIGNIFICANT CHANGE UP
-  CEFAZOLIN: SIGNIFICANT CHANGE UP
-  CLINDAMYCIN: SIGNIFICANT CHANGE UP
-  ERYTHROMYCIN: SIGNIFICANT CHANGE UP
-  GENTAMICIN: SIGNIFICANT CHANGE UP
-  LINEZOLID: SIGNIFICANT CHANGE UP
-  OXACILLIN: SIGNIFICANT CHANGE UP
-  PENICILLIN: SIGNIFICANT CHANGE UP
-  RIFAMPIN: SIGNIFICANT CHANGE UP
-  TETRACYCLINE: SIGNIFICANT CHANGE UP
-  TRIMETHOPRIM/SULFAMETHOXAZOLE: SIGNIFICANT CHANGE UP
-  VANCOMYCIN: SIGNIFICANT CHANGE UP
ANION GAP SERPL CALC-SCNC: 12 MMOL/L — SIGNIFICANT CHANGE UP (ref 5–17)
BUN SERPL-MCNC: 22 MG/DL — HIGH (ref 8–20)
CALCIUM SERPL-MCNC: 7.2 MG/DL — LOW (ref 8.6–10.2)
CHLORIDE SERPL-SCNC: 108 MMOL/L — HIGH (ref 98–107)
CO2 SERPL-SCNC: 14 MMOL/L — LOW (ref 22–29)
CREAT SERPL-MCNC: 1.33 MG/DL — HIGH (ref 0.5–1.3)
CRP SERPL-MCNC: 31.34 MG/DL — HIGH (ref 0–0.4)
CULTURE RESULTS: NO GROWTH — SIGNIFICANT CHANGE UP
CULTURE RESULTS: SIGNIFICANT CHANGE UP
GLUCOSE SERPL-MCNC: 114 MG/DL — SIGNIFICANT CHANGE UP (ref 70–115)
HCT VFR BLD CALC: 30.2 % — LOW (ref 37–47)
HGB BLD-MCNC: 9.4 G/DL — LOW (ref 12–16)
MAGNESIUM SERPL-MCNC: 3.7 MG/DL — HIGH (ref 1.6–2.6)
MCHC RBC-ENTMCNC: 27.2 PG — SIGNIFICANT CHANGE UP (ref 27–31)
MCHC RBC-ENTMCNC: 31.1 G/DL — LOW (ref 32–36)
MCV RBC AUTO: 87.3 FL — SIGNIFICANT CHANGE UP (ref 81–99)
METHOD TYPE: SIGNIFICANT CHANGE UP
ORGANISM # SPEC MICROSCOPIC CNT: SIGNIFICANT CHANGE UP
ORGANISM # SPEC MICROSCOPIC CNT: SIGNIFICANT CHANGE UP
PHOSPHATE SERPL-MCNC: 3 MG/DL — SIGNIFICANT CHANGE UP (ref 2.4–4.7)
PLATELET # BLD AUTO: 199 K/UL — SIGNIFICANT CHANGE UP (ref 150–400)
POTASSIUM SERPL-MCNC: 5 MMOL/L — SIGNIFICANT CHANGE UP (ref 3.5–5.3)
POTASSIUM SERPL-SCNC: 5 MMOL/L — SIGNIFICANT CHANGE UP (ref 3.5–5.3)
PROCALCITONIN SERPL-MCNC: 3.43 NG/ML — HIGH (ref 0.02–0.1)
RBC # BLD: 3.46 M/UL — LOW (ref 4.4–5.2)
RBC # FLD: 15.1 % — SIGNIFICANT CHANGE UP (ref 11–15.6)
SODIUM SERPL-SCNC: 134 MMOL/L — LOW (ref 135–145)
SPECIMEN SOURCE: SIGNIFICANT CHANGE UP
SPECIMEN SOURCE: SIGNIFICANT CHANGE UP
VANCOMYCIN TROUGH SERPL-MCNC: 16.1 UG/ML — SIGNIFICANT CHANGE UP (ref 10–20)
VANCOMYCIN TROUGH SERPL-MCNC: 24.9 UG/ML — HIGH (ref 10–20)
WBC # BLD: 7.9 K/UL — SIGNIFICANT CHANGE UP (ref 4.8–10.8)
WBC # FLD AUTO: 7.9 K/UL — SIGNIFICANT CHANGE UP (ref 4.8–10.8)

## 2019-04-13 PROCEDURE — 99233 SBSQ HOSP IP/OBS HIGH 50: CPT

## 2019-04-13 RX ORDER — DAPTOMYCIN 500 MG/10ML
INJECTION, POWDER, LYOPHILIZED, FOR SOLUTION INTRAVENOUS
Qty: 0 | Refills: 0 | Status: DISCONTINUED | OUTPATIENT
Start: 2019-04-13 | End: 2019-04-15

## 2019-04-13 RX ORDER — ACETAMINOPHEN 500 MG
1000 TABLET ORAL ONCE
Qty: 0 | Refills: 0 | Status: COMPLETED | OUTPATIENT
Start: 2019-04-13 | End: 2019-04-13

## 2019-04-13 RX ORDER — SODIUM CHLORIDE 9 MG/ML
1000 INJECTION INTRAMUSCULAR; INTRAVENOUS; SUBCUTANEOUS
Qty: 0 | Refills: 0 | Status: DISCONTINUED | OUTPATIENT
Start: 2019-04-13 | End: 2019-04-14

## 2019-04-13 RX ORDER — DAPTOMYCIN 500 MG/10ML
250 INJECTION, POWDER, LYOPHILIZED, FOR SOLUTION INTRAVENOUS ONCE
Qty: 0 | Refills: 0 | Status: COMPLETED | OUTPATIENT
Start: 2019-04-13 | End: 2019-04-13

## 2019-04-13 RX ORDER — VANCOMYCIN HCL 1 G
1000 VIAL (EA) INTRAVENOUS EVERY 12 HOURS
Qty: 0 | Refills: 0 | Status: DISCONTINUED | OUTPATIENT
Start: 2019-04-13 | End: 2019-04-13

## 2019-04-13 RX ORDER — OXYCODONE HYDROCHLORIDE 5 MG/1
10 TABLET ORAL EVERY 6 HOURS
Qty: 0 | Refills: 0 | Status: DISCONTINUED | OUTPATIENT
Start: 2019-04-13 | End: 2019-04-18

## 2019-04-13 RX ORDER — DAPTOMYCIN 500 MG/10ML
250 INJECTION, POWDER, LYOPHILIZED, FOR SOLUTION INTRAVENOUS EVERY 24 HOURS
Qty: 0 | Refills: 0 | Status: DISCONTINUED | OUTPATIENT
Start: 2019-04-14 | End: 2019-04-15

## 2019-04-13 RX ADMIN — Medication 50 MILLIGRAM(S): at 05:50

## 2019-04-13 RX ADMIN — Medication 1 TABLET(S): at 16:47

## 2019-04-13 RX ADMIN — OXYCODONE HYDROCHLORIDE 5 MILLIGRAM(S): 5 TABLET ORAL at 01:15

## 2019-04-13 RX ADMIN — GABAPENTIN 300 MILLIGRAM(S): 400 CAPSULE ORAL at 22:22

## 2019-04-13 RX ADMIN — Medication 50 MILLIGRAM(S): at 16:46

## 2019-04-13 RX ADMIN — Medication 100 MILLIGRAM(S): at 14:58

## 2019-04-13 RX ADMIN — OXYCODONE HYDROCHLORIDE 10 MILLIGRAM(S): 5 TABLET ORAL at 23:07

## 2019-04-13 RX ADMIN — Medication 600 MILLIGRAM(S): at 00:34

## 2019-04-13 RX ADMIN — Medication 200 MILLIGRAM(S): at 16:47

## 2019-04-13 RX ADMIN — Medication 200 MILLIGRAM(S): at 05:50

## 2019-04-13 RX ADMIN — OXYCODONE HYDROCHLORIDE 5 MILLIGRAM(S): 5 TABLET ORAL at 00:37

## 2019-04-13 RX ADMIN — Medication 400 MILLIGRAM(S): at 09:30

## 2019-04-13 RX ADMIN — POLYETHYLENE GLYCOL 3350 17 GRAM(S): 17 POWDER, FOR SOLUTION ORAL at 20:38

## 2019-04-13 RX ADMIN — ENOXAPARIN SODIUM 40 MILLIGRAM(S): 100 INJECTION SUBCUTANEOUS at 16:46

## 2019-04-13 RX ADMIN — Medication 100 MILLIGRAM(S): at 05:50

## 2019-04-13 RX ADMIN — Medication 1000 MILLIGRAM(S): at 20:39

## 2019-04-13 RX ADMIN — DAPTOMYCIN 110 MILLIGRAM(S): 500 INJECTION, POWDER, LYOPHILIZED, FOR SOLUTION INTRAVENOUS at 14:57

## 2019-04-13 RX ADMIN — Medication 3 MILLIGRAM(S): at 22:23

## 2019-04-13 RX ADMIN — SODIUM CHLORIDE 100 MILLILITER(S): 9 INJECTION INTRAMUSCULAR; INTRAVENOUS; SUBCUTANEOUS at 12:31

## 2019-04-13 RX ADMIN — PANTOPRAZOLE SODIUM 40 MILLIGRAM(S): 20 TABLET, DELAYED RELEASE ORAL at 05:50

## 2019-04-13 RX ADMIN — DULOXETINE HYDROCHLORIDE 60 MILLIGRAM(S): 30 CAPSULE, DELAYED RELEASE ORAL at 12:31

## 2019-04-13 RX ADMIN — Medication 400 MILLIGRAM(S): at 19:15

## 2019-04-13 RX ADMIN — Medication 1 TABLET(S): at 05:56

## 2019-04-13 RX ADMIN — Medication 1 TABLET(S): at 12:31

## 2019-04-13 RX ADMIN — OXYCODONE HYDROCHLORIDE 10 MILLIGRAM(S): 5 TABLET ORAL at 17:03

## 2019-04-13 RX ADMIN — OXYCODONE HYDROCHLORIDE 10 MILLIGRAM(S): 5 TABLET ORAL at 18:00

## 2019-04-13 RX ADMIN — Medication 100 MILLIGRAM(S): at 22:23

## 2019-04-13 RX ADMIN — SODIUM CHLORIDE 100 MILLILITER(S): 9 INJECTION INTRAMUSCULAR; INTRAVENOUS; SUBCUTANEOUS at 16:46

## 2019-04-13 RX ADMIN — Medication 1000 MILLIGRAM(S): at 10:00

## 2019-04-13 RX ADMIN — PIPERACILLIN AND TAZOBACTAM 25 GRAM(S): 4; .5 INJECTION, POWDER, LYOPHILIZED, FOR SOLUTION INTRAVENOUS at 05:51

## 2019-04-13 NOTE — PROGRESS NOTE ADULT - ASSESSMENT
32 y/o woman with PMH of transverse myelitis and SLE was admitted on 4/11 with swelling and pain in R buttock for 3 days. 3 days ago she noticed a small pimple on right buttock that started getting bigger and soon spread to whole R buttock area. Today there was a opening in the middle of cellulitic area with profuse blood+pus discharge.     R gluteal area cellulitis  [place] myelitis recently on immunosuppressive meds    - Blood culture x 2 neg to date  - Wound culture showed MRSA with Vancomycin MACARIO of 2   - Stop vancomycin and zosyn  - Since linezolid has interaction with her meds will start Daptomycin   - Daptomycin 25mg daily   - Surgery follow up    Will follow.

## 2019-04-13 NOTE — PROGRESS NOTE ADULT - PROBLEM SELECTOR PLAN 1
-cont vanc/zosyn  -BPs have improved  -component of tachycardia is her pain control  -cont IVF, f/u blood cultures  -she has MRSA bacteremia -changed to daptomycin  -BPs have improved  -component of tachycardia is her pain control  -cont IVF, f/u blood cultures  -she has MRSA bacteremia -changed to daptomycin  -appreciate ID f/u  -BPs have improved, systolics in the 100s  -component of tachycardia is her pain control  -cont IVF, f/u blood cultures  -she has MRSA bacteremia

## 2019-04-13 NOTE — PROGRESS NOTE ADULT - ASSESSMENT
32 y/o F h/o lupus, transverse myelitis on chronic immunosuppression with right gluteal cellulitis without evidence of abscess. Area of central induration seems to be coalescing into abscess. No signs of necrotizing fasciitis at this time. Cellulitis is likely the source of sepsis. Currently tachycardic, febrile, hypotensive.    Plan:  Packing was re-packed by surgery team after buttock was washed  -Final wound cultures are pending. Staph aureus, sensitivities pending  IV antibiotics as per ID and primary team  Warm compresses to right buttock  Rest of medical management as per primary team  Surgery will continue to follow

## 2019-04-13 NOTE — PROGRESS NOTE ADULT - SUBJECTIVE AND OBJECTIVE BOX
INTERVAL HPI/OVERNIGHT EVENTS:    SUBJECTIVE:  No acut eevents overnight. Patient's pain improving  The outer dressing of gluteal wound had to be replaced several times due to drainge, cavity is draining freely  Packing was removed this AM by surgery team for later re-packing after the area is washed/bathed.    MEDICATIONS  (STANDING):  acetaminophen  IVPB .. 1000 milliGRAM(s) IV Intermittent once  DAPTOmycin IVPB      docusate sodium 100 milliGRAM(s) Oral three times a day  DULoxetine 60 milliGRAM(s) Oral daily  enoxaparin Injectable 40 milliGRAM(s) SubCutaneous every 24 hours  gabapentin 300 milliGRAM(s) Oral at bedtime  hydrocortisone sodium succinate Injectable 50 milliGRAM(s) IV Push every 12 hours  hydroxychloroquine 200 milliGRAM(s) Oral two times a day  lactobacillus acidophilus 1 Tablet(s) Oral two times a day  melatonin 3 milliGRAM(s) Oral at bedtime  multivitamin 1 Tablet(s) Oral daily  pantoprazole    Tablet 40 milliGRAM(s) Oral before breakfast  sodium chloride 0.9%. 1000 milliLiter(s) (100 mL/Hr) IV Continuous <Continuous>    MEDICATIONS  (PRN):  acetaminophen   Tablet .. 650 milliGRAM(s) Oral every 6 hours PRN Temp greater or equal to 38C (100.4F)  oxyCODONE    IR 5 milliGRAM(s) Oral every 6 hours PRN Moderate Pain (4 - 6)  oxyCODONE    IR 10 milliGRAM(s) Oral every 6 hours PRN Severe Pain (7 - 10)  polyethylene glycol 3350 17 Gram(s) Oral daily PRN Constipation      Vital Signs Last 24 Hrs  T(C): 36.4 (2019 13:02), Max: 36.8 (2019 16:36)  T(F): 97.6 (2019 13:02), Max: 98.2 (2019 16:36)  HR: 108 (2019 11:35) (99 - 109)  BP: 103/77 (2019 11:35) (82/59 - 103/77)  BP(mean): --  RR: 12 (2019 11:35) (12 - 24)  SpO2: 97% (2019 11:35) (96% - 100%)    PE  General: NAD, AOx3, uncomfortable on examination  HEENT: PERRLA, EOMI, dry mucous membranes  Neck: supple, nontender  Cardiovascular: tachycardia, normal rhythm, no murmurs  Respiratory: no respiratory distress, CTAB  Abdomen: soft, nontender, nondistended. No guarding or rebound. Normal bowel sounds.  Extremities: 10cm x 8cm circular area of erythema to Right buttock with central area of induration with some fluctuance; small punctum with blood and purulent drainage  Integumentary: warm, no rash  Neuro: no motor or sensory deficits          I&O's Detail    2019 07:01  -  2019 07:00  --------------------------------------------------------  IN:    Oral Fluid: 480 mL    sodium chloride 0.9%: 1650 mL    Solution: 100 mL    Solution: 150 mL  Total IN: 2380 mL    OUT:  Total OUT: 0 mL    Total NET: 2380 mL          LABS:                        9.4    7.9   )-----------( 199      ( 2019 05:34 )             30.2     04-13    134<L>  |  108<H>  |  22.0<H>  ----------------------------<  114  5.0   |  14.0<L>  |  1.33<H>    Ca    7.2<L>      2019 05:34  Phos  3.0     04-13  Mg     3.7     04-13        Urinalysis Basic - ( 2019 05:34 )    Color: Yellow / Appearance: Clear / S.015 / pH: x  Gluc: x / Ketone: Negative  / Bili: Negative / Urobili: Negative mg/dL   Blood: x / Protein: 30 mg/dL / Nitrite: Negative   Leuk Esterase: Negative / RBC: 0-2 /HPF / WBC 0-2   Sq Epi: x / Non Sq Epi: Occasional / Bacteria: Moderate        RADIOLOGY & ADDITIONAL STUDIES:

## 2019-04-13 NOTE — PROGRESS NOTE ADULT - SUBJECTIVE AND OBJECTIVE BOX
[facility]  INFECTIOUS DISEASES AND INTERNAL MEDICINE at [place]  =======================================================  Adams Vasquez MD  Diplomates American Board of Internal Medicine and Infectious Diseases  =======================================================    MRN-[id number]  KANE ORE     Follow up: Right gluteal abscess and cellulitis     Pain is slightly better. No fever. drained and packed by surgery yesterday.   No fever.  Due to tachycardia and hypotension was transferred to step down.     PAST MEDICAL & SURGICAL HISTORY:  Fibromyalgia  Lupus  GERD (gastroesophageal reflux disease)  Lupus nephritis  S/P correction of deviated nasal septum  History of esophagogastroduodenoscopy (EGD)  History of biopsy: Renal    Social Hx: no smoking or drinking     FAMILY HISTORY:  Family history of melanoma: In Eye  Family history of osteoarthritis  Family history of liver disease    Allergies  No Known Allergies    Antibiotics:  Zosyn and vancomycin     REVIEW OF SYSTEMS:  CONSTITUTIONAL:  No Fever or chills  HEENT:  No diplopia or blurred vision.  No sore throat or runny nose.  CARDIOVASCULAR:  No chest pain or SOB.  RESPIRATORY:  No cough, shortness of breath, PND or orthopnea.  GASTROINTESTINAL:  No nausea, vomiting or diarrhea.  GENITOURINARY:  No dysuria, frequency or urgency. No Blood in urine  MUSCULOSKELETAL:  no joint aches, no muscle pain  SKIN:  R buttock swelling and redness   NEUROLOGIC:  No paresthesias, fasciculations, seizures or weakness.  PSYCHIATRIC:  No disorder of thought or mood.  ENDOCRINE:  No heat or cold intolerance, polyuria or polydipsia.  HEMATOLOGICAL:  No easy bruising or bleeding.     Physical Exam:  ICU Vital Signs Last 24 Hrs  T(C): 37.1 (2019 09:51), Max: 37.9 (2019 08:21)  T(F): 98.7 (2019 09:51), Max: 100.3 (2019 08:21)  HR: 144 (2019 09:51) (102 - 144)  BP: 74/49 (2019 09:51) (67/47 - 99/65)  RR: 18 (2019 09:51) (18 - 18)  SpO2: 98% (2019 09:51) (96% - 100%)  GEN: NAD  HEENT: normocephalic and atraumatic. EOMI. PERRL.    NECK: Supple.  No lymphadenopathy   LUNGS: Clear to auscultation.  HEART: Regular rate and rhythm without murmur.  ABDOMEN: Soft, nontender, and nondistended.  Positive bowel sounds.    : No CVA tenderness, R buttock with swelling and erythema looks better, small opening in the middle bloody discharged packed with gauze   EXTREMITIES: Without any cyanosis, clubbing, rash, lesions or edema.  NEUROLOGIC: grossly intact.  PSYCHIATRIC: Appropriate affect .  SKIN: No ulceration or induration present.    Labs:      134<L>  |  108<H>  |  22.0<H>  ----------------------------<  114  5.0   |  14.0<L>  |  1.33<H>    Ca    7.2<L>      2019 05:34  Phos  3.0       Mg     3.7                             9.4    7.9   )-----------( 199      ( 2019 05:34 )             30.2     Urinalysis Basic - ( 2019 05:34 )    Color: Yellow / Appearance: Clear / S.015 / pH: x  Gluc: x / Ketone: Negative  / Bili: Negative / Urobili: Negative mg/dL   Blood: x / Protein: 30 mg/dL / Nitrite: Negative   Leuk Esterase: Negative / RBC: 0-2 /HPF / WBC 0-2   Sq Epi: x / Non Sq Epi: Occasional / Bacteria: Moderate    RECENT CULTURES:   @ 12:14 .Abscess R Gluteal Abscess     Numerous Staphylococcus aureus Identification and susceptibility to  follow.  Culture in progress    Few White blood cells  Moderate Gram positive cocci in pairs     @ 10:04 .Other right buttock wound     Numerous Staphylococcus aureus Identification and susceptibility to  follow.  Culture in progress     @ 05:34 .Urine     No growth     @ 13:44 .Other right buttock Methicillin resistant Staphylococcus aureus    Numerous Methicillin resistant Staphylococcus aureus     @ 09:16 .Blood     No growth at 48 hours     @ 09:15 .Blood     No growth at 48 hours      All imaging and other data have been reviewed.

## 2019-04-13 NOTE — PROGRESS NOTE ADULT - SUBJECTIVE AND OBJECTIVE BOX
is a 33 year old female with a history of transverse myelitis and SLE who presented to the ER with complaints of right thigh/buttocks swelling. According to the patient 3 days ago she noted a small pimple on her right buttock. She attempted to pop it open with bloody discharge. Over the course of 3 days she noted progressively worsening pain, redness and swelling associated with fever and chills. In the ER, febrile with temperature of 105.1 HR of 140 and lactate of 3.4.    On 04/12 am,, she was tachycardic to the 130s and 140s with a low blood pressure, consistent with sepsis. I bolused her normal saline x2L and upgraded her to 4BRK, where she is going to continue receiving IV antibiotics. She is receiving IVF and she's doing much better, is mentating and clinically nontoxic today. Her bloodwork continues to demonstrate a high CRP and procalcitonin and I was suspicious about bacteremia, which today is confirmed. She has MRSA+ blood cultures.    Our goals of care include pain control, cont IVF, repeat blood cultures and cont IV antibiotics. Her vancomycin was decreased to 1g Q12H after a supratherapeutic trough resulted. I've d/cd her toradol and started IV tylenol.      REVIEW OF SYSTEMS    General:	"I'm feeling much better, just having pain."    Skin/Breast:  	  Ophthalmologic:  	  ENMT:	    Respiratory and Thorax:  	  Cardiovascular:	    Gastrointestinal:	    Genitourinary:	    Musculoskeletal:	    Neurological:	    Psychiatric:	    Hematology/Lymphatics:	    Endocrine:	    Allergic/Immunologic:	    Vital Signs Last 24 Hrs  T(C): 36.6 (13 Apr 2019 05:44), Max: 36.8 (12 Apr 2019 16:36)  T(F): 97.8 (13 Apr 2019 05:44), Max: 98.2 (12 Apr 2019 16:36)  HR: 108 (13 Apr 2019 11:35) (99 - 109)  BP: 103/77 (13 Apr 2019 11:35) (82/59 - 103/77)  BP(mean): --  RR: 12 (13 Apr 2019 11:35) (12 - 24)  SpO2: 97% (13 Apr 2019 11:35) (96% - 100%)  PHYSICAL EXAM:  GEN: young female, moderate distress but mentating, awake, alert  HEENT: dry MM  CVS: S1S2 tachycardic, HR=12-s  PULM: good breath sounds  ABD: soft, nontender, no rebound, no guarding  EXTREM: +right buttock erythema, right thigh cellulitis  NEURO: intact  PSYCH: mentating  SKIN: warm,l dry                                 9.4    7.9   )-----------( 199      ( 13 Apr 2019 05:34 )             30.2     04-13    134<L>  |  108<H>  |  22.0<H>  ----------------------------<  114  5.0   |  14.0<L>  |  1.33<H>    Ca    7.2<L>      13 Apr 2019 05:34  Phos  3.0     04-13  Mg     3.7     04-13    Radiology:     MEDICATIONS  (STANDING):  DAPTOmycin IVPB      DAPTOmycin IVPB 250 milliGRAM(s) IV Intermittent once  docusate sodium 100 milliGRAM(s) Oral three times a day  DULoxetine 60 milliGRAM(s) Oral daily  enoxaparin Injectable 40 milliGRAM(s) SubCutaneous every 24 hours  gabapentin 300 milliGRAM(s) Oral at bedtime  hydrocortisone sodium succinate Injectable 50 milliGRAM(s) IV Push every 12 hours  hydroxychloroquine 200 milliGRAM(s) Oral two times a day  lactobacillus acidophilus 1 Tablet(s) Oral two times a day  melatonin 3 milliGRAM(s) Oral at bedtime  multivitamin 1 Tablet(s) Oral daily  pantoprazole    Tablet 40 milliGRAM(s) Oral before breakfast  sodium chloride 0.9%. 1000 milliLiter(s) (100 mL/Hr) IV Continuous <Continuous>    MEDICATIONS  (PRN):  acetaminophen   Tablet .. 650 milliGRAM(s) Oral every 6 hours PRN Temp greater or equal to 38C (100.4F)  oxyCODONE    IR 5 milliGRAM(s) Oral every 6 hours PRN Moderate Pain (4 - 6)  oxyCODONE    IR 10 milliGRAM(s) Oral every 6 hours PRN Severe Pain (7 - 10)  polyethylene glycol 3350 17 Gram(s) Oral daily PRN Constipation  is a 33 year old female with a history of transverse myelitis and SLE who presented to the ER with complaints of right thigh/buttocks swelling. According to the patient 3 days ago she noted a small pimple on her right buttock. She attempted to pop it open with bloody discharge. Over the course of 3 days she noted progressively worsening pain, redness and swelling associated with fever and chills. In the ER, febrile with temperature of 105.1 HR of 140 and lactate of 3.4.    On 04/12 am,, she was tachycardic to the 130s and 140s with a low blood pressure, consistent with sepsis. I bolused her normal saline x2L and upgraded her to 4BRK, where she is going to continue receiving IV antibiotics. She is receiving IVF and she's doing much better, is mentating and clinically nontoxic today. Her bloodwork continues to demonstrate a high CRP and procalcitonin and I was suspicious about bacteremia, which today is confirmed. She has MRSA+ blood cultures.    Our goals of care include pain control, cont IVF, repeat blood cultures and cont IV antibiotics. Her vancomycin was decreased to 1g Q12H after a supratherapeutic trough resulted. I've d/cd her toradol and started IV tylenol.      REVIEW OF SYSTEMS    General:	"I'm feeling much better, just having pain."    Skin/Breast:  	  Ophthalmologic:  	  ENMT:	    Respiratory and Thorax:  	  Cardiovascular:	    Gastrointestinal:	    Genitourinary:	    Musculoskeletal:	    Neurological:	    Psychiatric:	    Hematology/Lymphatics:	    Endocrine:	    Allergic/Immunologic:	    Vital Signs Last 24 Hrs  T(C): 36.6 (13 Apr 2019 05:44), Max: 36.8 (12 Apr 2019 16:36)  T(F): 97.8 (13 Apr 2019 05:44), Max: 98.2 (12 Apr 2019 16:36)  HR: 108 (13 Apr 2019 11:35) (99 - 109)  BP: 103/77 (13 Apr 2019 11:35) (82/59 - 103/77)  BP(mean): --  RR: 12 (13 Apr 2019 11:35) (12 - 24)  SpO2: 97% (13 Apr 2019 11:35) (96% - 100%)  PHYSICAL EXAM:  GEN: young female, freshly showered, awake, alert, nontoxic, smiling  HEENT: dry MM  CVS: S1S2 tachycardic, PK=[id number]  PULM: good breath sounds  ABD: soft, nontender, no rebound, no guarding  EXTREM: +right buttock erythema, draining serosanguinous with elements of pus, coming out of the puncture I&D site  NEURO: intact  PSYCH: mentating  SKIN: warm, pink, dry                                 9.4    7.9   )-----------( 199      ( 13 Apr 2019 05:34 )             30.2     04-13    134<L>  |  108<H>  |  22.0<H>  ----------------------------<  114  5.0   |  14.0<L>  |  1.33<H>    Ca    7.2<L>      13 Apr 2019 05:34  Phos  3.0     04-13  Mg     3.7     04-13    Radiology:     MEDICATIONS  (STANDING):  DAPTOmycin IVPB      DAPTOmycin IVPB 250 milliGRAM(s) IV Intermittent once  docusate sodium 100 milliGRAM(s) Oral three times a day  DULoxetine 60 milliGRAM(s) Oral daily  enoxaparin Injectable 40 milliGRAM(s) SubCutaneous every 24 hours  gabapentin 300 milliGRAM(s) Oral at bedtime  hydrocortisone sodium succinate Injectable 50 milliGRAM(s) IV Push every 12 hours  hydroxychloroquine 200 milliGRAM(s) Oral two times a day  lactobacillus acidophilus 1 Tablet(s) Oral two times a day  melatonin 3 milliGRAM(s) Oral at bedtime  multivitamin 1 Tablet(s) Oral daily  pantoprazole    Tablet 40 milliGRAM(s) Oral before breakfast  sodium chloride 0.9%. 1000 milliLiter(s) (100 mL/Hr) IV Continuous <Continuous>    MEDICATIONS  (PRN):  acetaminophen   Tablet .. 650 milliGRAM(s) Oral every 6 hours PRN Temp greater or equal to 38C (100.4F)  oxyCODONE    IR 5 milliGRAM(s) Oral every 6 hours PRN Moderate Pain (4 - 6)  oxyCODONE    IR 10 milliGRAM(s) Oral every 6 hours PRN Severe Pain (7 - 10)  polyethylene glycol 3350 17 Gram(s) Oral daily PRN Constipation

## 2019-04-14 ENCOUNTER — APPOINTMENT (OUTPATIENT)
Dept: MRI IMAGING | Facility: CLINIC | Age: 34
End: 2019-04-14

## 2019-04-14 LAB
-  AMPICILLIN/SULBACTAM: SIGNIFICANT CHANGE UP
-  AMPICILLIN/SULBACTAM: SIGNIFICANT CHANGE UP
-  CEFAZOLIN: SIGNIFICANT CHANGE UP
-  CEFAZOLIN: SIGNIFICANT CHANGE UP
-  CLINDAMYCIN: SIGNIFICANT CHANGE UP
-  CLINDAMYCIN: SIGNIFICANT CHANGE UP
-  DAPTOMYCIN: SIGNIFICANT CHANGE UP
-  DAPTOMYCIN: SIGNIFICANT CHANGE UP
-  ERYTHROMYCIN: SIGNIFICANT CHANGE UP
-  ERYTHROMYCIN: SIGNIFICANT CHANGE UP
-  GENTAMICIN: SIGNIFICANT CHANGE UP
-  GENTAMICIN: SIGNIFICANT CHANGE UP
-  LINEZOLID: SIGNIFICANT CHANGE UP
-  LINEZOLID: SIGNIFICANT CHANGE UP
-  OXACILLIN: SIGNIFICANT CHANGE UP
-  OXACILLIN: SIGNIFICANT CHANGE UP
-  PENICILLIN: SIGNIFICANT CHANGE UP
-  PENICILLIN: SIGNIFICANT CHANGE UP
-  RIFAMPIN: SIGNIFICANT CHANGE UP
-  RIFAMPIN: SIGNIFICANT CHANGE UP
-  TETRACYCLINE: SIGNIFICANT CHANGE UP
-  TETRACYCLINE: SIGNIFICANT CHANGE UP
-  TRIMETHOPRIM/SULFAMETHOXAZOLE: SIGNIFICANT CHANGE UP
-  TRIMETHOPRIM/SULFAMETHOXAZOLE: SIGNIFICANT CHANGE UP
-  VANCOMYCIN: SIGNIFICANT CHANGE UP
-  VANCOMYCIN: SIGNIFICANT CHANGE UP
ANION GAP SERPL CALC-SCNC: 13 MMOL/L — SIGNIFICANT CHANGE UP (ref 5–17)
BUN SERPL-MCNC: 27 MG/DL — HIGH (ref 8–20)
CALCIUM SERPL-MCNC: 8.2 MG/DL — LOW (ref 8.6–10.2)
CHLORIDE SERPL-SCNC: 110 MMOL/L — HIGH (ref 98–107)
CO2 SERPL-SCNC: 13 MMOL/L — LOW (ref 22–29)
CREAT SERPL-MCNC: 1.5 MG/DL — HIGH (ref 0.5–1.3)
CRP SERPL-MCNC: 20.94 MG/DL — HIGH (ref 0–0.4)
CULTURE RESULTS: SIGNIFICANT CHANGE UP
GLUCOSE SERPL-MCNC: 83 MG/DL — SIGNIFICANT CHANGE UP (ref 70–115)
HCT VFR BLD CALC: 32.3 % — LOW (ref 37–47)
HGB BLD-MCNC: 10.1 G/DL — LOW (ref 12–16)
MAGNESIUM SERPL-MCNC: 3 MG/DL — HIGH (ref 1.6–2.6)
MCHC RBC-ENTMCNC: 27.2 PG — SIGNIFICANT CHANGE UP (ref 27–31)
MCHC RBC-ENTMCNC: 31.3 G/DL — LOW (ref 32–36)
MCV RBC AUTO: 86.8 FL — SIGNIFICANT CHANGE UP (ref 81–99)
METHOD TYPE: SIGNIFICANT CHANGE UP
METHOD TYPE: SIGNIFICANT CHANGE UP
ORGANISM # SPEC MICROSCOPIC CNT: SIGNIFICANT CHANGE UP
ORGANISM # SPEC MICROSCOPIC CNT: SIGNIFICANT CHANGE UP
PHOSPHATE SERPL-MCNC: 3.5 MG/DL — SIGNIFICANT CHANGE UP (ref 2.4–4.7)
PLATELET # BLD AUTO: 205 K/UL — SIGNIFICANT CHANGE UP (ref 150–400)
POTASSIUM SERPL-MCNC: 4.3 MMOL/L — SIGNIFICANT CHANGE UP (ref 3.5–5.3)
POTASSIUM SERPL-SCNC: 4.3 MMOL/L — SIGNIFICANT CHANGE UP (ref 3.5–5.3)
PROCALCITONIN SERPL-MCNC: 2.36 NG/ML — HIGH (ref 0.02–0.1)
RBC # BLD: 3.72 M/UL — LOW (ref 4.4–5.2)
RBC # FLD: 15.4 % — SIGNIFICANT CHANGE UP (ref 11–15.6)
SODIUM SERPL-SCNC: 136 MMOL/L — SIGNIFICANT CHANGE UP (ref 135–145)
SPECIMEN SOURCE: SIGNIFICANT CHANGE UP
WBC # BLD: 6.2 K/UL — SIGNIFICANT CHANGE UP (ref 4.8–10.8)
WBC # FLD AUTO: 6.2 K/UL — SIGNIFICANT CHANGE UP (ref 4.8–10.8)

## 2019-04-14 PROCEDURE — 99233 SBSQ HOSP IP/OBS HIGH 50: CPT

## 2019-04-14 RX ORDER — ACETAMINOPHEN 500 MG
1000 TABLET ORAL ONCE
Qty: 0 | Refills: 0 | Status: COMPLETED | OUTPATIENT
Start: 2019-04-14 | End: 2019-04-14

## 2019-04-14 RX ORDER — MYCOPHENOLATE MOFETIL 250 MG/1
1500 CAPSULE ORAL
Qty: 0 | Refills: 0 | Status: DISCONTINUED | OUTPATIENT
Start: 2019-04-14 | End: 2019-04-18

## 2019-04-14 RX ORDER — HYDROMORPHONE HYDROCHLORIDE 2 MG/ML
1 INJECTION INTRAMUSCULAR; INTRAVENOUS; SUBCUTANEOUS DAILY
Qty: 0 | Refills: 0 | Status: DISCONTINUED | OUTPATIENT
Start: 2019-04-14 | End: 2019-04-18

## 2019-04-14 RX ORDER — LACTULOSE 10 G/15ML
20 SOLUTION ORAL EVERY 8 HOURS
Qty: 0 | Refills: 0 | Status: COMPLETED | OUTPATIENT
Start: 2019-04-14 | End: 2019-04-16

## 2019-04-14 RX ADMIN — LACTULOSE 20 GRAM(S): 10 SOLUTION ORAL at 21:16

## 2019-04-14 RX ADMIN — MYCOPHENOLATE MOFETIL 1500 MILLIGRAM(S): 250 CAPSULE ORAL at 16:18

## 2019-04-14 RX ADMIN — HYDROMORPHONE HYDROCHLORIDE 1 MILLIGRAM(S): 2 INJECTION INTRAMUSCULAR; INTRAVENOUS; SUBCUTANEOUS at 21:17

## 2019-04-14 RX ADMIN — Medication 100 MILLIGRAM(S): at 06:01

## 2019-04-14 RX ADMIN — OXYCODONE HYDROCHLORIDE 10 MILLIGRAM(S): 5 TABLET ORAL at 05:49

## 2019-04-14 RX ADMIN — DAPTOMYCIN 110 MILLIGRAM(S): 500 INJECTION, POWDER, LYOPHILIZED, FOR SOLUTION INTRAVENOUS at 12:55

## 2019-04-14 RX ADMIN — PANTOPRAZOLE SODIUM 40 MILLIGRAM(S): 20 TABLET, DELAYED RELEASE ORAL at 06:01

## 2019-04-14 RX ADMIN — OXYCODONE HYDROCHLORIDE 10 MILLIGRAM(S): 5 TABLET ORAL at 21:17

## 2019-04-14 RX ADMIN — Medication 1000 MILLIGRAM(S): at 12:55

## 2019-04-14 RX ADMIN — Medication 200 MILLIGRAM(S): at 06:01

## 2019-04-14 RX ADMIN — ENOXAPARIN SODIUM 40 MILLIGRAM(S): 100 INJECTION SUBCUTANEOUS at 16:20

## 2019-04-14 RX ADMIN — GABAPENTIN 300 MILLIGRAM(S): 400 CAPSULE ORAL at 21:17

## 2019-04-14 RX ADMIN — LACTULOSE 20 GRAM(S): 10 SOLUTION ORAL at 13:50

## 2019-04-14 RX ADMIN — Medication 400 MILLIGRAM(S): at 12:03

## 2019-04-14 RX ADMIN — HYDROMORPHONE HYDROCHLORIDE 1 MILLIGRAM(S): 2 INJECTION INTRAMUSCULAR; INTRAVENOUS; SUBCUTANEOUS at 22:45

## 2019-04-14 RX ADMIN — OXYCODONE HYDROCHLORIDE 10 MILLIGRAM(S): 5 TABLET ORAL at 20:15

## 2019-04-14 RX ADMIN — Medication 1 TABLET(S): at 06:01

## 2019-04-14 RX ADMIN — Medication 100 MILLIGRAM(S): at 13:50

## 2019-04-14 RX ADMIN — DULOXETINE HYDROCHLORIDE 60 MILLIGRAM(S): 30 CAPSULE, DELAYED RELEASE ORAL at 12:04

## 2019-04-14 RX ADMIN — Medication 1 TABLET(S): at 12:04

## 2019-04-14 RX ADMIN — Medication 3 MILLIGRAM(S): at 21:17

## 2019-04-14 RX ADMIN — Medication 400 MILLIGRAM(S): at 22:07

## 2019-04-14 RX ADMIN — Medication 1000 MILLIGRAM(S): at 23:00

## 2019-04-14 RX ADMIN — Medication 1 TABLET(S): at 16:18

## 2019-04-14 RX ADMIN — Medication 100 MILLIGRAM(S): at 21:17

## 2019-04-14 RX ADMIN — Medication 50 MILLIGRAM(S): at 06:02

## 2019-04-14 RX ADMIN — Medication 200 MILLIGRAM(S): at 16:18

## 2019-04-14 NOTE — PROGRESS NOTE ADULT - PROBLEM SELECTOR PLAN 1
-changed to daptomycin  -appreciate ID f/u  -BPs have improved, systolics in the 100s  -component of tachycardia is her pain control  -cont IVF, f/u blood cultures  -she has MRSA bacteremia

## 2019-04-14 NOTE — PROGRESS NOTE ADULT - SUBJECTIVE AND OBJECTIVE BOX
INTERVAL HPI/OVERNIGHT EVENTS:    SUBJECTIVE:  No acute events overnight, no complaints    MEDICATIONS  (STANDING):  DAPTOmycin IVPB      DAPTOmycin IVPB 250 milliGRAM(s) IV Intermittent every 24 hours  docusate sodium 100 milliGRAM(s) Oral three times a day  DULoxetine 60 milliGRAM(s) Oral daily  enoxaparin Injectable 40 milliGRAM(s) SubCutaneous every 24 hours  gabapentin 300 milliGRAM(s) Oral at bedtime  hydrocortisone sodium succinate Injectable 50 milliGRAM(s) IV Push every 12 hours  hydroxychloroquine 200 milliGRAM(s) Oral two times a day  lactobacillus acidophilus 1 Tablet(s) Oral two times a day  melatonin 3 milliGRAM(s) Oral at bedtime  multivitamin 1 Tablet(s) Oral daily  pantoprazole    Tablet 40 milliGRAM(s) Oral before breakfast  sodium chloride 0.9%. 1000 milliLiter(s) (100 mL/Hr) IV Continuous <Continuous>    MEDICATIONS  (PRN):  acetaminophen   Tablet .. 650 milliGRAM(s) Oral every 6 hours PRN Temp greater or equal to 38C (100.4F)  oxyCODONE    IR 5 milliGRAM(s) Oral every 6 hours PRN Moderate Pain (4 - 6)  oxyCODONE    IR 10 milliGRAM(s) Oral every 6 hours PRN Severe Pain (7 - 10)  polyethylene glycol 3350 17 Gram(s) Oral daily PRN Constipation      Vital Signs Last 24 Hrs  T(C): 37 (2019 00:10), Max: 37 (2019 00:10)  T(F): 98.6 (2019 00:10), Max: 98.6 (2019 00:10)  HR: 102 (2019 23:37) (102 - 111)  BP: 110/87 (2019 23:37) (98/85 - 113/84)  BP(mean): --  RR: 16 (2019 23:37) (12 - 23)  SpO2: 99% (2019 23:37) (96% - 100%)      PE  General: NAD, AOx3, uncomfortable on examination  HEENT: PERRLA, EOMI, dry mucous membranes  Neck: supple, nontender  Cardiovascular: tachycardia, normal rhythm, no murmurs  Respiratory: no respiratory distress, CTAB  Abdomen: soft, nontender, nondistended. No guarding or rebound. Normal bowel sounds.  Extremities: 10cm x 8cm circular area of erythema to Right buttock with central area of induration with some fluctuance; small punctum with blood and purulent drainage  Integumentary: warm, no rash  Neuro: no motor or sensory deficits          I&O's Detail    2019 07:01  -  2019 07:00  --------------------------------------------------------  IN:    Oral Fluid: 480 mL    sodium chloride 0.9%: 1650 mL    Solution: 100 mL    Solution: 150 mL  Total IN: 2380 mL    OUT:  Total OUT: 0 mL    Total NET: 2380 mL      2019 07:01  -  2019 01:53  --------------------------------------------------------  IN:    Oral Fluid: 480 mL    sodium chloride 0.9%: 300 mL    sodium chloride 0.9%.: 1400 mL  Total IN: 2180 mL    OUT:  Total OUT: 0 mL    Total NET: 2180 mL          LABS:                        9.4    7.9   )-----------( 199      ( 2019 05:34 )             30.2     04-13    134<L>  |  108<H>  |  22.0<H>  ----------------------------<  114  5.0   |  14.0<L>  |  1.33<H>    Ca    7.2<L>      2019 05:34  Phos  3.0     04-13  Mg     3.7     04-13        Urinalysis Basic - ( 2019 05:34 )    Color: Yellow / Appearance: Clear / S.015 / pH: x  Gluc: x / Ketone: Negative  / Bili: Negative / Urobili: Negative mg/dL   Blood: x / Protein: 30 mg/dL / Nitrite: Negative   Leuk Esterase: Negative / RBC: 0-2 /HPF / WBC 0-2   Sq Epi: x / Non Sq Epi: Occasional / Bacteria: Moderate

## 2019-04-14 NOTE — PROGRESS NOTE ADULT - ASSESSMENT
34 y/o F h/o lupus, transverse myelitis on chronic immunosuppression with right gluteal cellulitis which coalesced into abscess which was I&Ded    Plan:  Packing to be removed and re-packed by surgery team after buttock is washed/bathed  -Final wound cultures are pending. Staph aureus, sensitivities pending  IV antibiotics as per ID and primary team  Warm compresses to right buttock  Rest of medical management as per primary team  Surgery will continue to follow

## 2019-04-14 NOTE — PROGRESS NOTE ADULT - SUBJECTIVE AND OBJECTIVE BOX
is a 33 year old female with a history of transverse myelitis and SLE who presented to the ER with complaints of right thigh/buttocks swelling. According to the patient 3 days ago she noted a small pimple on her right buttock. She attempted to pop it open with bloody discharge. Over the course of 3 days she noted progressively worsening pain, redness and swelling associated with fever and chills. In the ER, febrile with temperature of 105.1 HR of 140 and lactate of 3.4.    On 04/13am,, she was still tachycardic to the 120s with a low blood pressure, consistent with sepsis. She continued receiving IV antibiotics. She can stop IVF today and she's doing much better, is mentating and clinically nontoxic today. Her bloodwork continues to demonstrate a high CRP and procalcitonin and I was suspicious about bacteremia, which was confirmed yesterday. She has MRSA+ blood cultures.    Our goals of care include pain control, cont IVF, repeat blood cultures and cont IV antibiotics. Her vancomycin was decreased to 1g Q12H after a supratherapeutic trough resulted. She is constipated today,     REVIEW OF SYSTEMS    General:	"I'm feeling much better, just having pain."    Skin/Breast:  	  Ophthalmologic:  	  ENMT:	    Respiratory and Thorax:  	  Cardiovascular:	    Gastrointestinal:	    Genitourinary:	    Musculoskeletal:	    Neurological:	    Psychiatric:	    Hematology/Lymphatics:	    Endocrine:	    Allergic/Immunologic:	    Vital Signs Last 24 Hrs  Vital Signs Last 24 Hrs  T(C): 36.7 (14 Apr 2019 08:40), Max: 37 (14 Apr 2019 00:10)  T(F): 98.1 (14 Apr 2019 08:40), Max: 98.6 (14 Apr 2019 00:10)  HR: 110 (14 Apr 2019 11:47) (102 - 118)  BP: 123/80 (14 Apr 2019 11:47) (110/87 - 125/95)  BP(mean): --  RR: 16 (14 Apr 2019 11:47) (12 - 20)  SpO2: 98% (14 Apr 2019 11:47) (98% - 100%)  PHYSICAL EXAM:  GEN: young female, freshly showered, awake, alert, nontoxic, smiling  HEENT: dry MM  CVS: S1S2 tachycardic, US=[id number]  PULM: good breath sounds  ABD: soft, nontender, no rebound, no guarding  EXTREM: +right buttock erythema, draining serosanguinous with elements of pus, coming out of the puncture I&D site  NEURO: intact  PSYCH: mentating  SKIN: warm, pink, dry                        10.1   6.2   )-----------( 205      ( 14 Apr 2019 06:00 )             32.3     04-14    136  |  110<H>  |  27.0<H>  ----------------------------<  83  4.3   |  13.0<L>  |  1.50<H>    Ca    8.2<L>      14 Apr 2019 06:00  Phos  3.5     04-14  Mg     3.0     04-14    Radiology:     MEDICATIONS  (STANDING):  acetaminophen  IVPB .. 1000 milliGRAM(s) IV Intermittent once  DAPTOmycin IVPB      DAPTOmycin IVPB 250 milliGRAM(s) IV Intermittent every 24 hours  docusate sodium 100 milliGRAM(s) Oral three times a day  DULoxetine 60 milliGRAM(s) Oral daily  enoxaparin Injectable 40 milliGRAM(s) SubCutaneous every 24 hours  gabapentin 300 milliGRAM(s) Oral at bedtime  HYDROmorphone  Injectable 1 milliGRAM(s) IV Push daily  hydroxychloroquine 200 milliGRAM(s) Oral two times a day  lactobacillus acidophilus 1 Tablet(s) Oral two times a day  lactulose Syrup 20 Gram(s) Oral every 8 hours  melatonin 3 milliGRAM(s) Oral at bedtime  multivitamin 1 Tablet(s) Oral daily  mycophenolate mofetil 1500 milliGRAM(s) Oral two times a day  pantoprazole    Tablet 40 milliGRAM(s) Oral before breakfast    MEDICATIONS  (PRN):  oxyCODONE    IR 10 milliGRAM(s) Oral every 6 hours PRN Severe Pain (7 - 10)  oxyCODONE    IR 5 milliGRAM(s) Oral every 6 hours PRN Moderate Pain (4 - 6)  polyethylene glycol 3350 17 Gram(s) Oral daily PRN Constipation

## 2019-04-15 DIAGNOSIS — L03.90 CELLULITIS, UNSPECIFIED: ICD-10-CM

## 2019-04-15 DIAGNOSIS — N17.1 ACUTE KIDNEY FAILURE WITH ACUTE CORTICAL NECROSIS: ICD-10-CM

## 2019-04-15 LAB
ANION GAP SERPL CALC-SCNC: 13 MMOL/L — SIGNIFICANT CHANGE UP (ref 5–17)
BUN SERPL-MCNC: 23 MG/DL — HIGH (ref 8–20)
CALCIUM SERPL-MCNC: 8.6 MG/DL — SIGNIFICANT CHANGE UP (ref 8.6–10.2)
CHLORIDE SERPL-SCNC: 110 MMOL/L — HIGH (ref 98–107)
CO2 SERPL-SCNC: 14 MMOL/L — LOW (ref 22–29)
CREAT SERPL-MCNC: 2.15 MG/DL — HIGH (ref 0.5–1.3)
CRP SERPL-MCNC: 20.51 MG/DL — HIGH (ref 0–0.4)
EOSINOPHIL NFR URNS MANUAL: SIGNIFICANT CHANGE UP
GLUCOSE SERPL-MCNC: 123 MG/DL — HIGH (ref 70–115)
HCT VFR BLD CALC: 32 % — LOW (ref 37–47)
HGB BLD-MCNC: 9.8 G/DL — LOW (ref 12–16)
MAGNESIUM SERPL-MCNC: 2.5 MG/DL — SIGNIFICANT CHANGE UP (ref 1.8–2.6)
MCHC RBC-ENTMCNC: 26.6 PG — LOW (ref 27–31)
MCHC RBC-ENTMCNC: 30.6 G/DL — LOW (ref 32–36)
MCV RBC AUTO: 86.7 FL — SIGNIFICANT CHANGE UP (ref 81–99)
PHOSPHATE SERPL-MCNC: 4.7 MG/DL — SIGNIFICANT CHANGE UP (ref 2.4–4.7)
PLATELET # BLD AUTO: 218 K/UL — SIGNIFICANT CHANGE UP (ref 150–400)
POTASSIUM SERPL-MCNC: 4.5 MMOL/L — SIGNIFICANT CHANGE UP (ref 3.5–5.3)
POTASSIUM SERPL-SCNC: 4.5 MMOL/L — SIGNIFICANT CHANGE UP (ref 3.5–5.3)
PROCALCITONIN SERPL-MCNC: 1.38 NG/ML — HIGH (ref 0.02–0.1)
RBC # BLD: 3.69 M/UL — LOW (ref 4.4–5.2)
RBC # FLD: 15.7 % — HIGH (ref 11–15.6)
SODIUM SERPL-SCNC: 137 MMOL/L — SIGNIFICANT CHANGE UP (ref 135–145)
WBC # BLD: 8.7 K/UL — SIGNIFICANT CHANGE UP (ref 4.8–10.8)
WBC # FLD AUTO: 8.7 K/UL — SIGNIFICANT CHANGE UP (ref 4.8–10.8)

## 2019-04-15 PROCEDURE — 99232 SBSQ HOSP IP/OBS MODERATE 35: CPT

## 2019-04-15 PROCEDURE — 99233 SBSQ HOSP IP/OBS HIGH 50: CPT

## 2019-04-15 RX ORDER — DAPTOMYCIN 500 MG/10ML
380 INJECTION, POWDER, LYOPHILIZED, FOR SOLUTION INTRAVENOUS EVERY 24 HOURS
Qty: 0 | Refills: 0 | Status: DISCONTINUED | OUTPATIENT
Start: 2019-04-15 | End: 2019-04-18

## 2019-04-15 RX ORDER — SODIUM BICARBONATE 1 MEQ/ML
650 SYRINGE (ML) INTRAVENOUS
Qty: 0 | Refills: 0 | Status: DISCONTINUED | OUTPATIENT
Start: 2019-04-15 | End: 2019-04-18

## 2019-04-15 RX ORDER — ACETAMINOPHEN 500 MG
325 TABLET ORAL EVERY 6 HOURS
Qty: 0 | Refills: 0 | Status: DISCONTINUED | OUTPATIENT
Start: 2019-04-15 | End: 2019-04-18

## 2019-04-15 RX ORDER — ACETAMINOPHEN 500 MG
1000 TABLET ORAL ONCE
Qty: 0 | Refills: 0 | Status: COMPLETED | OUTPATIENT
Start: 2019-04-15 | End: 2019-04-15

## 2019-04-15 RX ORDER — SACCHAROMYCES BOULARDII 250 MG
250 POWDER IN PACKET (EA) ORAL
Qty: 0 | Refills: 0 | Status: DISCONTINUED | OUTPATIENT
Start: 2019-04-15 | End: 2019-04-18

## 2019-04-15 RX ADMIN — DULOXETINE HYDROCHLORIDE 60 MILLIGRAM(S): 30 CAPSULE, DELAYED RELEASE ORAL at 13:49

## 2019-04-15 RX ADMIN — Medication 100 MILLIGRAM(S): at 06:23

## 2019-04-15 RX ADMIN — Medication 1000 MILLIGRAM(S): at 10:00

## 2019-04-15 RX ADMIN — Medication 3 MILLIGRAM(S): at 22:20

## 2019-04-15 RX ADMIN — OXYCODONE HYDROCHLORIDE 5 MILLIGRAM(S): 5 TABLET ORAL at 06:23

## 2019-04-15 RX ADMIN — Medication 100 MILLIGRAM(S): at 22:20

## 2019-04-15 RX ADMIN — HYDROMORPHONE HYDROCHLORIDE 1 MILLIGRAM(S): 2 INJECTION INTRAMUSCULAR; INTRAVENOUS; SUBCUTANEOUS at 23:20

## 2019-04-15 RX ADMIN — PANTOPRAZOLE SODIUM 40 MILLIGRAM(S): 20 TABLET, DELAYED RELEASE ORAL at 06:23

## 2019-04-15 RX ADMIN — Medication 400 MILLIGRAM(S): at 09:12

## 2019-04-15 RX ADMIN — Medication 50 MILLIGRAM(S): at 06:24

## 2019-04-15 RX ADMIN — GABAPENTIN 300 MILLIGRAM(S): 400 CAPSULE ORAL at 22:20

## 2019-04-15 RX ADMIN — DAPTOMYCIN 115.2 MILLIGRAM(S): 500 INJECTION, POWDER, LYOPHILIZED, FOR SOLUTION INTRAVENOUS at 13:50

## 2019-04-15 RX ADMIN — MYCOPHENOLATE MOFETIL 1500 MILLIGRAM(S): 250 CAPSULE ORAL at 18:27

## 2019-04-15 RX ADMIN — OXYCODONE HYDROCHLORIDE 10 MILLIGRAM(S): 5 TABLET ORAL at 18:28

## 2019-04-15 RX ADMIN — Medication 200 MILLIGRAM(S): at 06:26

## 2019-04-15 RX ADMIN — ENOXAPARIN SODIUM 40 MILLIGRAM(S): 100 INJECTION SUBCUTANEOUS at 18:26

## 2019-04-15 RX ADMIN — OXYCODONE HYDROCHLORIDE 5 MILLIGRAM(S): 5 TABLET ORAL at 07:12

## 2019-04-15 RX ADMIN — Medication 650 MILLIGRAM(S): at 18:29

## 2019-04-15 RX ADMIN — Medication 250 MILLIGRAM(S): at 18:27

## 2019-04-15 RX ADMIN — HYDROMORPHONE HYDROCHLORIDE 1 MILLIGRAM(S): 2 INJECTION INTRAMUSCULAR; INTRAVENOUS; SUBCUTANEOUS at 22:20

## 2019-04-15 RX ADMIN — Medication 1 TABLET(S): at 06:25

## 2019-04-15 RX ADMIN — MYCOPHENOLATE MOFETIL 1500 MILLIGRAM(S): 250 CAPSULE ORAL at 06:26

## 2019-04-15 RX ADMIN — Medication 200 MILLIGRAM(S): at 18:27

## 2019-04-15 RX ADMIN — Medication 1 TABLET(S): at 09:12

## 2019-04-15 RX ADMIN — LACTULOSE 20 GRAM(S): 10 SOLUTION ORAL at 06:26

## 2019-04-15 NOTE — PROGRESS NOTE ADULT - ASSESSMENT
32 y/o woman with PMH of transverse myelitis and SLE was admitted on 4/11 with swelling and pain in R buttock for 3 days. 3 days ago she noticed a small pimple on right buttock that started getting bigger and soon spread to whole R buttock area. Today there was a opening in the middle of cellulitic area with profuse blood+pus discharge.     R gluteal area cellulitis  Hoke myelitis recently on immunosuppressive meds    - Blood culture x 2 neg   - Wound culture showed MRSA with Vancomycin MACARIO of 2   - Linezolid has interaction with her Medication so daptomycin was started  - Will increase daptomycin dose to  - Surgery follow up since still has significant discharge, needs packing.     Will follow.

## 2019-04-15 NOTE — PROGRESS NOTE ADULT - ASSESSMENT
34 y/o F h/o lupus, transverse myelitis on chronic immunosuppression with right gluteal cellulitis which coalesced into abscess which was I&Ded    Plan:    Daily dressing changes to right buttock by nursing staff  abscess culture MRSA positive, ID following  IV antibiotics as per ID and primary team  Warm compresses to right buttock  Rest of medical management as per primary team  no further surgical intervention required at this moment, please call back if any questions arise

## 2019-04-15 NOTE — CONSULT NOTE ADULT - ASSESSMENT
CATHERINE - Progressive   H/O SLE , suspect DPGN in past , h/o transverse myelitis   on chronic immunosuppression   No hydro on CT   UA relatively bland   Hemodynamics and Hgb stable   Complicated buttock lesion ( MRSA )     DDX   Vancomycin toxicity   ATN related to Radiocontrast / Torladol   AIN     Given progressive edema , hold off on IVF   Urine EOS   Vancomycin stopped   Check CPK   Follow trend in labs   Add oral bicarb   Patient on Cellcept , steroids and plaquinil     Will follow

## 2019-04-15 NOTE — PROGRESS NOTE ADULT - SUBJECTIVE AND OBJECTIVE BOX
[facility]  INFECTIOUS DISEASES AND INTERNAL MEDICINE at [place]  =======================================================  Adams Vasquez MD  Diplomates American Board of Internal Medicine and Infectious Diseases  =======================================================    [id number]  KANE ORE     Follow up: Right gluteal abscess and cellulitis     Pain is slightly better. Has a low grade fever today, induration has improved but there is significant discharge from wound.   Vitals stable, transferred to 3rd floor from step down.    PAST MEDICAL & SURGICAL HISTORY:  Fibromyalgia  Lupus  GERD (gastroesophageal reflux disease)  Lupus nephritis  S/P correction of deviated nasal septum  History of esophagogastroduodenoscopy (EGD)  History of biopsy: Renal    Social Hx: no smoking or drinking     FAMILY HISTORY:  Family history of melanoma: In Eye  Family history of osteoarthritis  Family history of liver disease    Allergies  No Known Allergies    Antibiotics:  Zosyn and vancomycin     REVIEW OF SYSTEMS:  CONSTITUTIONAL:  No Fever or chills  HEENT:  No diplopia or blurred vision.  No sore throat or runny nose.  CARDIOVASCULAR:  No chest pain or SOB.  RESPIRATORY:  No cough, shortness of breath, PND or orthopnea.  GASTROINTESTINAL:  No nausea, vomiting or diarrhea.  GENITOURINARY:  No dysuria, frequency or urgency. No Blood in urine  MUSCULOSKELETAL:  no joint aches, no muscle pain  SKIN:  R buttock swelling and redness   NEUROLOGIC:  No paresthesias, fasciculations, seizures or weakness.  PSYCHIATRIC:  No disorder of thought or mood.  ENDOCRINE:  No heat or cold intolerance, polyuria or polydipsia.  HEMATOLOGICAL:  No easy bruising or bleeding.     Physical Exam:  Vital Signs Last 24 Hrs  T(C): 38 (15 Apr 2019 09:16), Max: 38 (15 Apr 2019 09:16)  T(F): 100.4 (15 Apr 2019 09:16), Max: 100.4 (15 Apr 2019 09:16)  HR: 128 (15 Apr 2019 09:16) (88 - 128)  BP: 112/56 (15 Apr 2019 06:06) (111/75 - 128/90)  RR: 20 (15 Apr 2019 09:16) (12 - 20)  SpO2: 93% (15 Apr 2019 09:16) (93% - 98%)  GEN: NAD  HEENT: normocephalic and atraumatic. EOMI. PERRL.    NECK: Supple.  No lymphadenopathy   LUNGS: Clear to auscultation.  HEART: Regular rate and rhythm without murmur.  ABDOMEN: Soft, nontender, and nondistended.  Positive bowel sounds.    : No CVA tenderness, R buttock with swelling and erythema looks better, small opening in the middle bloody discharged packed with gauze   EXTREMITIES: Without any cyanosis, clubbing, rash, lesions or edema.  NEUROLOGIC: grossly intact.  PSYCHIATRIC: Appropriate affect .  SKIN: No ulceration or induration present.    Labs:  04-15    137  |  110<H>  |  23.0<H>  ----------------------------<  123<H>  4.5   |  14.0<L>  |  2.15<H>    Ca    8.6      15 Apr 2019 13:01  Phos  4.7     04-15  Mg     2.5     04-15                        9.8    8.7   )-----------( 218      ( 15 Apr 2019 13:01 )             32.0     RECENT CULTURES:  04-12 @ 12:14 .Abscess R Gluteal Abscess Methicillin resistant Staphylococcus aureus    Numerous Methicillin resistant Staphylococcus aureus  Culture in progress    Few White blood cells  Moderate Gram positive cocci in pairs    04-12 @ 10:06 .Blood     No growth at 48 hours    04-12 @ 10:04 .Other right buttock wound Methicillin resistant Staphylococcus aureus    Numerous Methicillin resistant Staphylococcus aureus  ( KNOWN )    04-12 @ 05:34 .Urine     No growth    04-11 @ 13:44 .Other right buttock Methicillin resistant Staphylococcus aureus    Numerous Methicillin resistant Staphylococcus aureus    04-11 @ 09:16 .Blood     No growth at 48 hours    04-11 @ 09:15 .Blood     No growth at 48 hours    All imaging and other data have been reviewed.

## 2019-04-15 NOTE — PROGRESS NOTE ADULT - SUBJECTIVE AND OBJECTIVE BOX
INTERVAL HPI/OVERNIGHT EVENTS:  No acute overnight events reported. Patient seen this AM and gluteal dressing changed. Abscess cavity was flushed with normal saline. Drainage from wound now noted to be serous in character compared to yesterday which was purulent-sanguinous. Some Cellulitis noted on right gluteus. Patient has been washing the wound with water and soap when showering, she has been instructed to continue this practice to maintain the wound clean and help with drainage.    MEDICATIONS  (STANDING):  DAPTOmycin IVPB      DAPTOmycin IVPB 250 milliGRAM(s) IV Intermittent every 24 hours  docusate sodium 100 milliGRAM(s) Oral three times a day  DULoxetine 60 milliGRAM(s) Oral daily  enoxaparin Injectable 40 milliGRAM(s) SubCutaneous every 24 hours  gabapentin 300 milliGRAM(s) Oral at bedtime  HYDROmorphone  Injectable 1 milliGRAM(s) IV Push daily  hydroxychloroquine 200 milliGRAM(s) Oral two times a day  lactulose Syrup 20 Gram(s) Oral every 8 hours  melatonin 3 milliGRAM(s) Oral at bedtime  multivitamin 1 Tablet(s) Oral daily  mycophenolate mofetil 1500 milliGRAM(s) Oral two times a day  pantoprazole    Tablet 40 milliGRAM(s) Oral before breakfast  predniSONE   Tablet 50 milliGRAM(s) Oral daily  saccharomyces boulardii 250 milliGRAM(s) Oral two times a day    MEDICATIONS  (PRN):  acetaminophen   Tablet .. 325 milliGRAM(s) Oral every 6 hours PRN Temp greater or equal to 38C (100.4F), Mild Pain (1 - 3)  oxyCODONE    IR 10 milliGRAM(s) Oral every 6 hours PRN Severe Pain (7 - 10)  oxyCODONE    IR 5 milliGRAM(s) Oral every 6 hours PRN Moderate Pain (4 - 6)  polyethylene glycol 3350 17 Gram(s) Oral daily PRN Constipation      Vital Signs Last 24 Hrs  T(C): 38 (15 Apr 2019 09:16), Max: 38 (15 Apr 2019 09:16)  T(F): 100.4 (15 Apr 2019 09:16), Max: 100.4 (15 Apr 2019 09:16)  HR: 128 (15 Apr 2019 09:16) (88 - 128)  BP: 112/56 (15 Apr 2019 06:06) (111/75 - 128/90)  BP(mean): --  RR: 20 (15 Apr 2019 09:16) (12 - 20)  SpO2: 93% (15 Apr 2019 09:16) (93% - 98%)    PE  General: NAD, AOx3, uncomfortable on examination  HEENT: PERRLA, EOMI, dry mucous membranes  Cardiovascular: tachycardia, normal rhythm, no murmurs  Respiratory: no respiratory distress, CTAB  Abdomen: soft, nontender, nondistended. No guarding or rebound. Normal bowel sounds.  Extremities: cellulitis to Right buttock, 1 cm incision from I&D with healthy margins, serous drainage from abscess cavity noted, no purulent nor sanguinous output at this moment.  Neuro: no motor or sensory deficits      I&O's Detail    14 Apr 2019 07:01  -  15 Apr 2019 07:00  --------------------------------------------------------  IN:    IV PiggyBack: 100 mL    Oral Fluid: 240 mL    sodium chloride 0.9%: 400 mL  Total IN: 740 mL    OUT:    Voided: 1 mL  Total OUT: 1 mL    Total NET: 739 mL          LABS:                        10.1   6.2   )-----------( 205      ( 14 Apr 2019 06:00 )             32.3     04-14    136  |  110<H>  |  27.0<H>  ----------------------------<  83  4.3   |  13.0<L>  |  1.50<H>    Ca    8.2<L>      14 Apr 2019 06:00  Phos  3.5     04-14  Mg     3.0     04-14            RADIOLOGY & ADDITIONAL STUDIES:

## 2019-04-15 NOTE — CONSULT NOTE ADULT - SUBJECTIVE AND OBJECTIVE BOX
Patient is a 33y old  Female who presents with a chief complaint of Right gluteal swelling (15 Apr 2019 15:46)      HPI:  The patient is a 33 year old female with a history of transverse myelitis and SLE who presented to the ER with complaints of right thigh/buttocks swelling. According to the patient 3 days ago she noted a small pimple on her right buttock. She attempted to pop it open with bloody discharge. Over the course of 3 days she noted progressively worsening pain, redness and swelling associated with fever and chills. In the ER, febrile with temperature of 105.1 HR of 140 and lactate of 3.4. GIven 180ml of NS and a dose of vancomycin and zosyn for sepsis. CT of the abdomen and pelvis showed no evidence of abscess or fluid collection. Was evaluated by surgery with no need for intervention at this time. Repeat lactate of 0.6 after fluid resuscitation.     Of note, the patient was recently admitted to [facility] from 1/6-2/20  diagnosed with transverse myelitis. She was treated with plasmapheresis IV steroids, Cellcept and hydroxychloroquine and a dose of rituximab She was discharged to [facility]  from 2/6-2/20. 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 , prior renal biopsy and enrollment in clinical trial   Has been on Cellcept and Plaquinil  since renal Bx  Steroids recently increased after transverse myelitis           PAST MEDICAL & SURGICAL HISTORY:  Fibromyalgia  Lupus  GERD (gastroesophageal reflux disease)  Lupus nephritis  S/P correction of deviated nasal septum  History of esophagogastroduodenoscopy (EGD)  History of biopsy: Renal      FAMILY HISTORY:  Family history of melanoma: In Eye  Family history of osteoarthritis  Family history of liver disease      Social History:    MEDICATIONS  (STANDING):  DAPTOmycin IVPB 380 milliGRAM(s) IV Intermittent every 24 hours  docusate sodium 100 milliGRAM(s) Oral three times a day  DULoxetine 60 milliGRAM(s) Oral daily  enoxaparin Injectable 40 milliGRAM(s) SubCutaneous every 24 hours  gabapentin 300 milliGRAM(s) Oral at bedtime  HYDROmorphone  Injectable 1 milliGRAM(s) IV Push daily  hydroxychloroquine 200 milliGRAM(s) Oral two times a day  lactulose Syrup 20 Gram(s) Oral every 8 hours  melatonin 3 milliGRAM(s) Oral at bedtime  multivitamin 1 Tablet(s) Oral daily  mycophenolate mofetil 1500 milliGRAM(s) Oral two times a day  pantoprazole    Tablet 40 milliGRAM(s) Oral before breakfast  predniSONE   Tablet 50 milliGRAM(s) Oral daily  saccharomyces boulardii 250 milliGRAM(s) Oral two times a day    MEDICATIONS  (PRN):  acetaminophen   Tablet .. 325 milliGRAM(s) Oral every 6 hours PRN Temp greater or equal to 38C (100.4F), Mild Pain (1 - 3)  oxyCODONE    IR 10 milliGRAM(s) Oral every 6 hours PRN Severe Pain (7 - 10)  oxyCODONE    IR 5 milliGRAM(s) Oral every 6 hours PRN Moderate Pain (4 - 6)  polyethylene glycol 3350 17 Gram(s) Oral daily PRN Constipation      Allergies    No Known Allergies    Intolerances        Vital Signs Last 24 Hrs  T(C): 36.4 (15 Apr 2019 16:27), Max: 38 (15 Apr 2019 09:16)  T(F): 97.5 (15 Apr 2019 16:27), Max: 100.4 (15 Apr 2019 09:16)  HR: 99 (15 Apr 2019 16:27) (88 - 128)  BP: 109/73 (15 Apr 2019 16:27) (109/73 - 128/90)  BP(mean): --  RR: 18 (15 Apr 2019 16:27) (18 - 20)  SpO2: 98% (15 Apr 2019 16:27) (93% - 98%)  Daily     Daily   I&O's Detail    14 Apr 2019 07:01  -  15 Apr 2019 07:00  --------------------------------------------------------  IN:    IV PiggyBack: 100 mL    Oral Fluid: 240 mL    sodium chloride 0.9%: 400 mL  Total IN: 740 mL    OUT:    Voided: 1 mL  Total OUT: 1 mL    Total NET: 739 mL        I&O's Summary    14 Apr 2019 07:01  -  15 Apr 2019 07:00  --------------------------------------------------------  IN: 740 mL / OUT: 1 mL / NET: 739 mL        PHYSICAL EXAM:    GENERAL: NAD,     HEAD:  Atraumatic, Anicteric , No facial edema   NECK: Supple, No JVD,   CHEST/LUNG: Clear / EAE , No wheeze   HEART: Regular rate and rhythm; No M or Rub   ABDOMEN: Soft, Nontender, Nondistended; No CVAT   EXTREMITIES:  + edema . no Vasculitis           LABS:                        9.8    8.7   )-----------( 218      ( 15 Apr 2019 13:01 )             32.0     04-15    137  |  110<H>  |  23.0<H>  ----------------------------<  123<H>  4.5   |  14.0<L>  |  2.15<H>    Ca    8.6      15 Apr 2019 13:01  Phos  4.7     04-15  Mg     2.5     04-15          Magnesium, Serum: 2.5 mg/dL (04-15 @ 13:01)  Phosphorus Level, Serum: 4.7 mg/dL (04-15 @ 13:01)     EXAM:  CT ABDOMEN AND PELVIS IC                          PROCEDURE DATE:  04/11/2019          INTERPRETATION:  CLINICAL INFORMATION: Gluteal abscess/cellulitis.  Right   buttock swelling for 3 days.    COMPARISON: CT abdomen/pelvis 1/16/2019    PROCEDURE:   CT of the Abdomen and Pelvis was performed with intravenous contrast.   Intravenous contrast: 95 ml Omnipaque 300.   Oral contrast: None.  Sagittal and coronal reformats were performed.    FINDINGS:    LOWER CHEST: Linear atelectasis or scarring at the right lung base.   Calcified granuloma in the left lower lobe.    LIVER: Within normal limits.  BILE DUCTS: Normal caliber.  GALLBLADDER: Within normal limits.  SPLEEN: Within normal limits.  PANCREAS: Within normal limits.  ADRENALS: Within normal limits.  KIDNEYS/URETERS: Kidneys normal in size. No hydronephrosis. There appears   to be a punctate nonobstructing calculus in the lower pole of the left   kidney.    BLADDER:   REPRODUCTIVE ORGANS: 4.4 cm subserosal fibroid again seen arising from   the posterior uterine body. New 4 cm left ovarian cyst.    BOWEL: No bowel obstruction. Appendix is normal.  PERITONEUM: No ascites.  VESSELS:  Abdominal aorta normal in caliber.  RETROPERITONEUM: No lymphadenopathy.    ABDOMINAL WALL: There is infiltration of the subcutaneous fat involving   the right gluteal region involving an area 21 cm in craniocaudad and 18   cm transverse. Infiltration extends from the skin surface to the gluteus   musculature. Small areas of fluid are noted within and at the inferior   aspect of the inflammatory change (series 3 images 148 and 171), however   there is no abscess or drainable fluid collection.  BONES: No aggressive osseous lesion.    IMPRESSION:     Infiltration of the subcutaneous fat involving a large portion of the   right gluteal region which is consistent with cellulitis.    No abscess or drainable fluid collection.    New 4 cm left ovarian cyst, likely functional.            RADIOLOGY & ADDITIONAL TESTS:

## 2019-04-15 NOTE — PROGRESS NOTE ADULT - SUBJECTIVE AND OBJECTIVE BOX
is a 33 year old female with a history of transverse myelitis and SLE who presented to the ER with complaints of right thigh/buttocks swelling. According to the patient 3 days ago she noted a small pimple on her right buttock. She attempted to pop it open with bloody discharge. Over the course of 3 days she noted progressively worsening pain, redness and swelling associated with fever and chills. In the ER, febrile with temperature of 105.1 HR of 140 and lactate of 3.4.    On 04/13am,, she was still tachycardic to the 120s with a low blood pressure, consistent with sepsis. She continued receiving IV antibiotics. Her IVF was stopped on 04/14 but her creatinine continues to increase. I suspect she has either ATN mediated intrarenal renal failure, versus vancomycin induced nephrotoxicity. Her vancomycin was changed to daptomycin two days ago. She is clinically doing much better, is mentating and clinically nontoxic. Her bloodwork continues to demonstrate a high CRP and procalcitonin and I was suspicious about bacteremia, which was confirmed yesterday. She has MRSA+ blood cultures.    Our goals of care include pain control, blood cultures are negative 04/11 and 04/12.     Summary:   REVIEW OF SYSTEMS    General:	"I'm feeling better."    Skin/Breast:  	  Ophthalmologic:  	  ENMT:	    Respiratory and Thorax:  	  Cardiovascular:	    Gastrointestinal:	    Genitourinary:	    Musculoskeletal:	    Neurological:	    Psychiatric:	    Hematology/Lymphatics:	    Endocrine:	    Allergic/Immunologic:	  Vital Signs Last 24 Hrs  T(C): 37.7 (15 Apr 2019 10:52), Max: 38 (15 Apr 2019 09:16)  T(F): 99.8 (15 Apr 2019 10:52), Max: 100.4 (15 Apr 2019 09:16)  HR: 110 (15 Apr 2019 10:52) (88 - 128)  BP: 114/62 (15 Apr 2019 10:52) (111/75 - 128/90)  BP(mean): --  RR: 18 (15 Apr 2019 10:52) (18 - 20)  SpO2: 96% (15 Apr 2019 10:52) (93% - 98%)  PHYSICAL EXAM:  GEN: young female, freshly showered, awake, alert, nontoxic, smiling  HEENT: dry MM  CVS: S1S2 tachycardic, BM=896b  PULM: good breath sounds  ABD: soft, nontender, no rebound, no guarding  EXTREM: +right buttock erythema, draining serosanguinous with elements of pus, coming out of the puncture I&D site; +bilateral LE edema  NEURO: intact  PSYCH: mentating  SKIN: warm, pink, dry                        9.8    8.7   )-----------( 218      ( 15 Apr 2019 13:01 )             32.0     04-15    137  |  110<H>  |  23.0<H>  ----------------------------<  123<H>  4.5   |  14.0<L>  |  2.15<H>    Ca    8.6      15 Apr 2019 13:01  Phos  4.7     04-15  Mg     2.5     04-15    Radiology:     MEDICATIONS  (STANDING):  DAPTOmycin IVPB 380 milliGRAM(s) IV Intermittent every 24 hours  docusate sodium 100 milliGRAM(s) Oral three times a day  DULoxetine 60 milliGRAM(s) Oral daily  enoxaparin Injectable 40 milliGRAM(s) SubCutaneous every 24 hours  gabapentin 300 milliGRAM(s) Oral at bedtime  HYDROmorphone  Injectable 1 milliGRAM(s) IV Push daily  hydroxychloroquine 200 milliGRAM(s) Oral two times a day  lactulose Syrup 20 Gram(s) Oral every 8 hours  melatonin 3 milliGRAM(s) Oral at bedtime  multivitamin 1 Tablet(s) Oral daily  mycophenolate mofetil 1500 milliGRAM(s) Oral two times a day  pantoprazole    Tablet 40 milliGRAM(s) Oral before breakfast  predniSONE   Tablet 50 milliGRAM(s) Oral daily  saccharomyces boulardii 250 milliGRAM(s) Oral two times a day    MEDICATIONS  (PRN):  acetaminophen   Tablet .. 325 milliGRAM(s) Oral every 6 hours PRN Temp greater or equal to 38C (100.4F), Mild Pain (1 - 3)  oxyCODONE    IR 10 milliGRAM(s) Oral every 6 hours PRN Severe Pain (7 - 10)  oxyCODONE    IR 5 milliGRAM(s) Oral every 6 hours PRN Moderate Pain (4 - 6)  polyethylene glycol 3350 17 Gram(s) Oral daily PRN Constipation

## 2019-04-16 LAB
ALBUMIN SERPL ELPH-MCNC: 2.4 G/DL — LOW (ref 3.3–5.2)
ALP SERPL-CCNC: 312 U/L — HIGH (ref 40–120)
ALT FLD-CCNC: 42 U/L — HIGH
ANION GAP SERPL CALC-SCNC: 12 MMOL/L — SIGNIFICANT CHANGE UP (ref 5–17)
AST SERPL-CCNC: 19 U/L — SIGNIFICANT CHANGE UP
BILIRUB SERPL-MCNC: 0.3 MG/DL — LOW (ref 0.4–2)
BUN SERPL-MCNC: 21 MG/DL — HIGH (ref 8–20)
CALCIUM SERPL-MCNC: 9.1 MG/DL — SIGNIFICANT CHANGE UP (ref 8.6–10.2)
CHLORIDE SERPL-SCNC: 108 MMOL/L — HIGH (ref 98–107)
CK SERPL-CCNC: 25 U/L — SIGNIFICANT CHANGE UP (ref 25–170)
CO2 SERPL-SCNC: 19 MMOL/L — LOW (ref 22–29)
CREAT SERPL-MCNC: 2.01 MG/DL — HIGH (ref 0.5–1.3)
CULTURE RESULTS: SIGNIFICANT CHANGE UP
CULTURE RESULTS: SIGNIFICANT CHANGE UP
GLUCOSE SERPL-MCNC: 91 MG/DL — SIGNIFICANT CHANGE UP (ref 70–115)
HCT VFR BLD CALC: 32.8 % — LOW (ref 37–47)
HGB BLD-MCNC: 10.2 G/DL — LOW (ref 12–16)
MCHC RBC-ENTMCNC: 26.9 PG — LOW (ref 27–31)
MCHC RBC-ENTMCNC: 31.1 G/DL — LOW (ref 32–36)
MCV RBC AUTO: 86.5 FL — SIGNIFICANT CHANGE UP (ref 81–99)
PLATELET # BLD AUTO: 241 K/UL — SIGNIFICANT CHANGE UP (ref 150–400)
POTASSIUM SERPL-MCNC: 4.2 MMOL/L — SIGNIFICANT CHANGE UP (ref 3.5–5.3)
POTASSIUM SERPL-SCNC: 4.2 MMOL/L — SIGNIFICANT CHANGE UP (ref 3.5–5.3)
PROT SERPL-MCNC: 5.5 G/DL — LOW (ref 6.6–8.7)
RBC # BLD: 3.79 M/UL — LOW (ref 4.4–5.2)
RBC # FLD: 15.7 % — HIGH (ref 11–15.6)
SODIUM SERPL-SCNC: 139 MMOL/L — SIGNIFICANT CHANGE UP (ref 135–145)
SPECIMEN SOURCE: SIGNIFICANT CHANGE UP
SPECIMEN SOURCE: SIGNIFICANT CHANGE UP
WBC # BLD: 9.5 K/UL — SIGNIFICANT CHANGE UP (ref 4.8–10.8)
WBC # FLD AUTO: 9.5 K/UL — SIGNIFICANT CHANGE UP (ref 4.8–10.8)

## 2019-04-16 PROCEDURE — 99233 SBSQ HOSP IP/OBS HIGH 50: CPT

## 2019-04-16 PROCEDURE — 99231 SBSQ HOSP IP/OBS SF/LOW 25: CPT

## 2019-04-16 RX ORDER — CHLORHEXIDINE GLUCONATE 213 G/1000ML
1 SOLUTION TOPICAL DAILY
Qty: 0 | Refills: 0 | Status: DISCONTINUED | OUTPATIENT
Start: 2019-04-16 | End: 2019-04-18

## 2019-04-16 RX ORDER — ASCORBIC ACID 60 MG
500 TABLET,CHEWABLE ORAL DAILY
Qty: 0 | Refills: 0 | Status: DISCONTINUED | OUTPATIENT
Start: 2019-04-16 | End: 2019-04-18

## 2019-04-16 RX ADMIN — HYDROMORPHONE HYDROCHLORIDE 1 MILLIGRAM(S): 2 INJECTION INTRAMUSCULAR; INTRAVENOUS; SUBCUTANEOUS at 13:40

## 2019-04-16 RX ADMIN — Medication 650 MILLIGRAM(S): at 05:32

## 2019-04-16 RX ADMIN — Medication 100 MILLIGRAM(S): at 13:22

## 2019-04-16 RX ADMIN — MYCOPHENOLATE MOFETIL 1500 MILLIGRAM(S): 250 CAPSULE ORAL at 17:18

## 2019-04-16 RX ADMIN — Medication 1 TABLET(S): at 13:22

## 2019-04-16 RX ADMIN — DULOXETINE HYDROCHLORIDE 60 MILLIGRAM(S): 30 CAPSULE, DELAYED RELEASE ORAL at 13:22

## 2019-04-16 RX ADMIN — Medication 250 MILLIGRAM(S): at 17:19

## 2019-04-16 RX ADMIN — GABAPENTIN 300 MILLIGRAM(S): 400 CAPSULE ORAL at 22:11

## 2019-04-16 RX ADMIN — Medication 100 MILLIGRAM(S): at 05:32

## 2019-04-16 RX ADMIN — Medication 250 MILLIGRAM(S): at 05:32

## 2019-04-16 RX ADMIN — HYDROMORPHONE HYDROCHLORIDE 1 MILLIGRAM(S): 2 INJECTION INTRAMUSCULAR; INTRAVENOUS; SUBCUTANEOUS at 13:21

## 2019-04-16 RX ADMIN — Medication 500 MILLIGRAM(S): at 15:04

## 2019-04-16 RX ADMIN — MYCOPHENOLATE MOFETIL 1500 MILLIGRAM(S): 250 CAPSULE ORAL at 05:32

## 2019-04-16 RX ADMIN — Medication 200 MILLIGRAM(S): at 17:18

## 2019-04-16 RX ADMIN — Medication 200 MILLIGRAM(S): at 05:32

## 2019-04-16 RX ADMIN — Medication 650 MILLIGRAM(S): at 17:19

## 2019-04-16 RX ADMIN — PANTOPRAZOLE SODIUM 40 MILLIGRAM(S): 20 TABLET, DELAYED RELEASE ORAL at 05:32

## 2019-04-16 RX ADMIN — ENOXAPARIN SODIUM 40 MILLIGRAM(S): 100 INJECTION SUBCUTANEOUS at 17:18

## 2019-04-16 RX ADMIN — Medication 50 MILLIGRAM(S): at 05:32

## 2019-04-16 RX ADMIN — Medication 3 MILLIGRAM(S): at 22:11

## 2019-04-16 RX ADMIN — Medication 100 MILLIGRAM(S): at 22:11

## 2019-04-16 RX ADMIN — DAPTOMYCIN 115.2 MILLIGRAM(S): 500 INJECTION, POWDER, LYOPHILIZED, FOR SOLUTION INTRAVENOUS at 13:20

## 2019-04-16 NOTE — PROGRESS NOTE ADULT - SUBJECTIVE AND OBJECTIVE BOX
NEPHROLOGY INTERVAL HPI/OVERNIGHT EVENTS:    Feels better   improved UO   No new events   CPK 25   Urine EOS (-)  edema better     MEDICATIONS  (STANDING):  ascorbic acid 500 milliGRAM(s) Oral daily  DAPTOmycin IVPB 380 milliGRAM(s) IV Intermittent every 24 hours  docusate sodium 100 milliGRAM(s) Oral three times a day  DULoxetine 60 milliGRAM(s) Oral daily  enoxaparin Injectable 40 milliGRAM(s) SubCutaneous every 24 hours  gabapentin 300 milliGRAM(s) Oral at bedtime  HYDROmorphone  Injectable 1 milliGRAM(s) IV Push daily  hydroxychloroquine 200 milliGRAM(s) Oral two times a day  melatonin 3 milliGRAM(s) Oral at bedtime  multivitamin 1 Tablet(s) Oral daily  multivitamin 1 Tablet(s) Oral daily  mycophenolate mofetil 1500 milliGRAM(s) Oral two times a day  pantoprazole    Tablet 40 milliGRAM(s) Oral before breakfast  predniSONE   Tablet 50 milliGRAM(s) Oral daily  saccharomyces boulardii 250 milliGRAM(s) Oral two times a day  sodium bicarbonate 650 milliGRAM(s) Oral two times a day    MEDICATIONS  (PRN):  acetaminophen   Tablet .. 325 milliGRAM(s) Oral every 6 hours PRN Temp greater or equal to 38C (100.4F), Mild Pain (1 - 3)  oxyCODONE    IR 10 milliGRAM(s) Oral every 6 hours PRN Severe Pain (7 - 10)  oxyCODONE    IR 5 milliGRAM(s) Oral every 6 hours PRN Moderate Pain (4 - 6)  polyethylene glycol 3350 17 Gram(s) Oral daily PRN Constipation      Allergies    No Known Allergies    Intolerances        Vital Signs Last 24 Hrs  T(C): 37.1 (16 Apr 2019 08:34), Max: 37.1 (16 Apr 2019 08:34)  T(F): 98.8 (16 Apr 2019 08:34), Max: 98.8 (16 Apr 2019 08:34)  HR: 89 (16 Apr 2019 08:34) (89 - 99)  BP: 125/82 (16 Apr 2019 08:34) (109/73 - 125/82)  BP(mean): --  RR: 18 (16 Apr 2019 08:34) (18 - 18)  SpO2: 98% (16 Apr 2019 08:34) (96% - 98%)  Daily     Daily   I&O's Detail    I&O's Summary      PHYSICAL EXAM:  HEAD:  Atraumatic, Anicteric , No facial edema   NECK: Supple, No JVD,   CHEST/LUNG: Clear / EAE , No wheeze   HEART: Regular rate and rhythm; No M or Rub   ABDOMEN: Soft, Nontender, Nondistended; No CVAT   EXTREMITIES:  + edema . no Vasculitis         LABS:                        10.2   9.5   )-----------( 241      ( 16 Apr 2019 08:50 )             32.8     04-16    139  |  108<H>  |  21.0<H>  ----------------------------<  91  4.2   |  19.0<L>  |  2.01<H>    Ca    9.1      16 Apr 2019 08:50  Phos  4.7     04-15  Mg     2.5     04-15    TPro  5.5<L>  /  Alb  2.4<L>  /  TBili  0.3<L>  /  DBili  x   /  AST  19  /  ALT  42<H>  /  AlkPhos  312<H>  04-16        Magnesium, Serum: 2.5 mg/dL (04-15 @ 13:01)  Phosphorus Level, Serum: 4.7 mg/dL (04-15 @ 13:01)          RADIOLOGY & ADDITIONAL TESTS:

## 2019-04-16 NOTE — PROGRESS NOTE ADULT - ASSESSMENT
Improved CATHERINE  H/O SLE , suspect DPGN in past , h/o transverse myelitis   ( Patient on Cellcept , steroids and plaquinil )  CPK low   No hydro on CT   UA relatively bland , Urine eosinophils neg   Hemodynamics and Hgb stable   Complicated buttock lesion ( MRSA )   Suspect combination of Vancomycin toxicity +/- ATN related to Radiocontrast / Torladol , luckily renal function better   Abx changed to Daptomycin     MA - Continue oral bicarbonate     Edema - Her UO much better , less edema today .         Will follow

## 2019-04-16 NOTE — PROGRESS NOTE ADULT - SUBJECTIVE AND OBJECTIVE BOX
is a 33 year old female with a history of transverse myelitis and SLE who presented to the ER with complaints of right thigh/buttocks swelling. She had an MRSA abscess with sepsis and has recovered.    We re-started her Cell cept and we have restarted her home dose of prednisone of 50mg given her hx of transverse myelitis. I've called her rheumatologist  at [phone], and updated her about 's hospitalization and sepsis.    's course was also complicated by acute renal failure and her creatinine is now decreasing and plateauing. She is clinically nontoxic, ambulating her room, eating and drinking. She still has problems with asking for pain medications, I've encouraged nursing to assess her pain frequently and control it with pain medications as prescribed. Given the severity of the MRSA infection, her gluteal abscess is still draining, but her right gluteal and upper thigh area looks much better with decreased swelling and erythema. Her leukocytosis has resolved and her immunosuppressive medications were re-started after sepsi resolved and she defervesced.     Summary:   REVIEW OF SYSTEMS    General:	"I'm feeling better."    Skin/Breast:  	  Ophthalmologic:  	  ENMT:	    Respiratory and Thorax:  	  Cardiovascular:	    Gastrointestinal:	    Genitourinary:	    Musculoskeletal:	    Neurological:	    Psychiatric:	    Hematology/Lymphatics:	    Endocrine:	    Allergic/Immunologic:    Vital Signs Last 24 Hrs  T(C): 37.1 (16 Apr 2019 08:34), Max: 37.1 (16 Apr 2019 08:34)  T(F): 98.8 (16 Apr 2019 08:34), Max: 98.8 (16 Apr 2019 08:34)  HR: 89 (16 Apr 2019 08:34) (89 - 99)  BP: 125/82 (16 Apr 2019 08:34) (109/73 - 125/82)  BP(mean): --  RR: 18 (16 Apr 2019 08:34) (18 - 18)  SpO2: 98% (16 Apr 2019 08:34) (96% - 98%)  PHYSICAL EXAM:  GEN: young female, freshly showered, awake, alert, nontoxic, smiling  HEENT: dry MM  CVS: S1S2 tachycardic, QM=995d  PULM: good breath sounds  ABD: soft, nontender, no rebound, no guarding  EXTREM: +right buttock erythema, draining serosanguinous with elements of pus, coming out of the puncture I&D site; +bilateral LE edema  NEURO: intact  PSYCH: mentating  SKIN: warm, pink, dry                                 10.2   9.5   )-----------( 241      ( 16 Apr 2019 08:50 )             32.8     04-16    139  |  108<H>  |  21.0<H>  ----------------------------<  91  4.2   |  19.0<L>  |  2.01<H>    Ca    9.1      16 Apr 2019 08:50  Phos  4.7     04-15  Mg     2.5     04-15    TPro  5.5<L>  /  Alb  2.4<L>  /  TBili  0.3<L>  /  DBili  x   /  AST  19  /  ALT  42<H>  /  AlkPhos  312<H>  04-16    LIVER FUNCTIONS - ( 16 Apr 2019 08:50 )  Alb: 2.4 g/dL / Pro: 5.5 g/dL / ALK PHOS: 312 U/L / ALT: 42 U/L / AST: 19 U/L / GGT: x           Radiology:     MEDICATIONS  (STANDING):  ascorbic acid 500 milliGRAM(s) Oral daily  DAPTOmycin IVPB 380 milliGRAM(s) IV Intermittent every 24 hours  docusate sodium 100 milliGRAM(s) Oral three times a day  DULoxetine 60 milliGRAM(s) Oral daily  enoxaparin Injectable 40 milliGRAM(s) SubCutaneous every 24 hours  gabapentin 300 milliGRAM(s) Oral at bedtime  HYDROmorphone  Injectable 1 milliGRAM(s) IV Push daily  hydroxychloroquine 200 milliGRAM(s) Oral two times a day  melatonin 3 milliGRAM(s) Oral at bedtime  multivitamin 1 Tablet(s) Oral daily  multivitamin 1 Tablet(s) Oral daily  mycophenolate mofetil 1500 milliGRAM(s) Oral two times a day  pantoprazole    Tablet 40 milliGRAM(s) Oral before breakfast  predniSONE   Tablet 50 milliGRAM(s) Oral daily  saccharomyces boulardii 250 milliGRAM(s) Oral two times a day  sodium bicarbonate 650 milliGRAM(s) Oral two times a day    MEDICATIONS  (PRN):  acetaminophen   Tablet .. 325 milliGRAM(s) Oral every 6 hours PRN Temp greater or equal to 38C (100.4F), Mild Pain (1 - 3)  oxyCODONE    IR 10 milliGRAM(s) Oral every 6 hours PRN Severe Pain (7 - 10)  oxyCODONE    IR 5 milliGRAM(s) Oral every 6 hours PRN Moderate Pain (4 - 6)  polyethylene glycol 3350 17 Gram(s) Oral daily PRN Constipation  is a 33 year old female with a history of transverse myelitis and SLE who presented to the ER with complaints of right thigh/buttocks swelling. She had an MRSA abscess with sepsis and has recovered.    We re-started her Cell cept and we have restarted her home dose of prednisone of 50mg given her hx of transverse myelitis. I've called her rheumatologist Dr. Ramonita Barrett at [phone], and updated her about 's hospitalization and sepsis.    's course was also complicated by acute renal failure and her creatinine is now decreasing and plateauing. She is clinically nontoxic, ambulating her room, eating and drinking. She still has problems with asking for pain medications, I've encouraged nursing to assess her pain frequently and control it with pain medications as prescribed. Given the severity of the MRSA infection, her gluteal abscess is still draining, but her right gluteal and upper thigh area looks much better with decreased swelling and erythema. Her leukocytosis has resolved and her immunosuppressive medications were re-started after sepsi resolved and she defervesced.     Summary:   REVIEW OF SYSTEMS    General:	"I'm feeling better."    Skin/Breast:  	  Ophthalmologic:  	  ENMT:	    Respiratory and Thorax:  	  Cardiovascular:	    Gastrointestinal:	    Genitourinary:	    Musculoskeletal:	    Neurological:	    Psychiatric:	    Hematology/Lymphatics:	    Endocrine:	    Allergic/Immunologic:    Vital Signs Last 24 Hrs  T(C): 37.1 (16 Apr 2019 08:34), Max: 37.1 (16 Apr 2019 08:34)  T(F): 98.8 (16 Apr 2019 08:34), Max: 98.8 (16 Apr 2019 08:34)  HR: 89 (16 Apr 2019 08:34) (89 - 99)  BP: 125/82 (16 Apr 2019 08:34) (109/73 - 125/82)  BP(mean): --  RR: 18 (16 Apr 2019 08:34) (18 - 18)  SpO2: 98% (16 Apr 2019 08:34) (96% - 98%)  PHYSICAL EXAM:  GEN: young female, freshly showered, awake, alert, nontoxic, smiling  HEENT: dry MM  CVS: S1S2 tachycardic, RM=260u  PULM: good breath sounds  ABD: soft, nontender, no rebound, no guarding  EXTREM: +right buttock erythema, draining serosanguinous with elements of pus, coming out of the puncture I&D site; +bilateral LE edema  NEURO: intact  PSYCH: mentating  SKIN: warm, pink, dry                                 10.2   9.5   )-----------( 241      ( 16 Apr 2019 08:50 )             32.8     04-16    139  |  108<H>  |  21.0<H>  ----------------------------<  91  4.2   |  19.0<L>  |  2.01<H>    Ca    9.1      16 Apr 2019 08:50  Phos  4.7     04-15  Mg     2.5     04-15    TPro  5.5<L>  /  Alb  2.4<L>  /  TBili  0.3<L>  /  DBili  x   /  AST  19  /  ALT  42<H>  /  AlkPhos  312<H>  04-16    LIVER FUNCTIONS - ( 16 Apr 2019 08:50 )  Alb: 2.4 g/dL / Pro: 5.5 g/dL / ALK PHOS: 312 U/L / ALT: 42 U/L / AST: 19 U/L / GGT: x           Radiology:     MEDICATIONS  (STANDING):  ascorbic acid 500 milliGRAM(s) Oral daily  DAPTOmycin IVPB 380 milliGRAM(s) IV Intermittent every 24 hours  docusate sodium 100 milliGRAM(s) Oral three times a day  DULoxetine 60 milliGRAM(s) Oral daily  enoxaparin Injectable 40 milliGRAM(s) SubCutaneous every 24 hours  gabapentin 300 milliGRAM(s) Oral at bedtime  HYDROmorphone  Injectable 1 milliGRAM(s) IV Push daily  hydroxychloroquine 200 milliGRAM(s) Oral two times a day  melatonin 3 milliGRAM(s) Oral at bedtime  multivitamin 1 Tablet(s) Oral daily  multivitamin 1 Tablet(s) Oral daily  mycophenolate mofetil 1500 milliGRAM(s) Oral two times a day  pantoprazole    Tablet 40 milliGRAM(s) Oral before breakfast  predniSONE   Tablet 50 milliGRAM(s) Oral daily  saccharomyces boulardii 250 milliGRAM(s) Oral two times a day  sodium bicarbonate 650 milliGRAM(s) Oral two times a day    MEDICATIONS  (PRN):  acetaminophen   Tablet .. 325 milliGRAM(s) Oral every 6 hours PRN Temp greater or equal to 38C (100.4F), Mild Pain (1 - 3)  oxyCODONE    IR 10 milliGRAM(s) Oral every 6 hours PRN Severe Pain (7 - 10)  oxyCODONE    IR 5 milliGRAM(s) Oral every 6 hours PRN Moderate Pain (4 - 6)  polyethylene glycol 3350 17 Gram(s) Oral daily PRN Constipation  is a 33 year old female with a history of transverse myelitis and SLE who presented to the ER with complaints of right thigh/buttocks swelling. She had an MRSA abscess with sepsis and has recovered.    We re-started her Cell cept and we have restarted her home dose of prednisone of 50mg given her hx of transverse myelitis. I've called her rheumatologist Dr. Ramonita Barrett and I'm waiting for a call back to update her about 's hospitalization and sepsis.    's course was also complicated by acute renal failure and her creatinine is now decreasing and plateauing. She is clinically nontoxic, ambulating her room, eating and drinking. She still has problems with asking for pain medications, I've encouraged nursing to assess her pain frequently and control it with pain medications as prescribed. Given the severity of the MRSA infection, her gluteal abscess is still draining, but her right gluteal and upper thigh area looks much better with decreased swelling and erythema. Her leukocytosis has resolved and her immunosuppressive medications were re-started after sepsi resolved and she defervesced.     Summary:   REVIEW OF SYSTEMS    General:	"I'm feeling better."    Skin/Breast:  	  Ophthalmologic:  	  ENMT:	    Respiratory and Thorax:  	  Cardiovascular:	    Gastrointestinal:	    Genitourinary:	    Musculoskeletal:	    Neurological:	    Psychiatric:	    Hematology/Lymphatics:	    Endocrine:	    Allergic/Immunologic:    Vital Signs Last 24 Hrs  T(C): 37.2 (17 Apr 2019 07:58), Max: 37.2 (17 Apr 2019 07:58)  T(F): 99 (17 Apr 2019 07:58), Max: 99 (17 Apr 2019 07:58)  HR: 86 (17 Apr 2019 07:58) (86 - 92)  BP: 106/75 (17 Apr 2019 07:58) (106/75 - 129/87)  BP(mean): --  RR: 18 (17 Apr 2019 07:58) (18 - 18)  SpO2: 97% (17 Apr 2019 07:58) (97% - 98%)  PHYSICAL EXAM:  GEN: young female, freshly showered, awake, alert, nontoxic, smiling  HEENT: dry MM  CVS: S1S2 tachycardic, EQ=316a  PULM: good breath sounds  ABD: soft, nontender, no rebound, no guarding  EXTREM: +right buttock erythema, draining serosanguinous with elements of pus, coming out of the puncture I&D site; +bilateral LE edema  NEURO: intact  PSYCH: mentating  SKIN: warm, pink, dry                                  10.3   10.2  )-----------( 312      ( 17 Apr 2019 10:27 )             33.0     04-17    137  |  100  |  23.0<H>  ----------------------------<  97  3.9   |  23.0  |  1.61<H>    Ca    9.1      17 Apr 2019 10:27  Phos  5.5     04-17  Mg     1.9     04-17    TPro  5.5<L>  /  Alb  2.4<L>  /  TBili  0.3<L>  /  DBili  x   /  AST  19  /  ALT  42<H>  /  AlkPhos  312<H>  04-16    LIVER FUNCTIONS - ( 16 Apr 2019 08:50 )  Alb: 2.4 g/dL / Pro: 5.5 g/dL / ALK PHOS: 312 U/L / ALT: 42 U/L / AST: 19 U/L / GGT: x           Radiology:     MEDICATIONS  (STANDING):  ascorbic acid 500 milliGRAM(s) Oral daily  chlorhexidine 2% Cloths 1 Application(s) Topical daily  DAPTOmycin IVPB 380 milliGRAM(s) IV Intermittent every 24 hours  docusate sodium 100 milliGRAM(s) Oral three times a day  DULoxetine 60 milliGRAM(s) Oral daily  enoxaparin Injectable 40 milliGRAM(s) SubCutaneous every 24 hours  ferrous    sulfate 325 milliGRAM(s) Oral two times a day  FIRST- Mouthwash  BLM 10 milliLiter(s) Swish and Spit every 4 hours  gabapentin 300 milliGRAM(s) Oral at bedtime  HYDROmorphone  Injectable 1 milliGRAM(s) IV Push daily  hydroxychloroquine 200 milliGRAM(s) Oral two times a day  melatonin 3 milliGRAM(s) Oral at bedtime  multivitamin 1 Tablet(s) Oral daily  multivitamin 1 Tablet(s) Oral daily  mycophenolate mofetil 1500 milliGRAM(s) Oral two times a day  pantoprazole    Tablet 40 milliGRAM(s) Oral before breakfast  predniSONE   Tablet 50 milliGRAM(s) Oral daily  saccharomyces boulardii 250 milliGRAM(s) Oral two times a day  sodium bicarbonate 650 milliGRAM(s) Oral two times a day    MEDICATIONS  (PRN):  acetaminophen   Tablet .. 325 milliGRAM(s) Oral every 6 hours PRN Temp greater or equal to 38C (100.4F), Mild Pain (1 - 3)  oxyCODONE    IR 10 milliGRAM(s) Oral every 6 hours PRN Severe Pain (7 - 10)  oxyCODONE    IR 5 milliGRAM(s) Oral every 6 hours PRN Moderate Pain (4 - 6)  polyethylene glycol 3350 17 Gram(s) Oral daily PRN Constipation

## 2019-04-17 DIAGNOSIS — K12.0 RECURRENT ORAL APHTHAE: ICD-10-CM

## 2019-04-17 LAB
ANION GAP SERPL CALC-SCNC: 14 MMOL/L — SIGNIFICANT CHANGE UP (ref 5–17)
BUN SERPL-MCNC: 23 MG/DL — HIGH (ref 8–20)
CALCIUM SERPL-MCNC: 9.1 MG/DL — SIGNIFICANT CHANGE UP (ref 8.6–10.2)
CHLORIDE SERPL-SCNC: 100 MMOL/L — SIGNIFICANT CHANGE UP (ref 98–107)
CO2 SERPL-SCNC: 23 MMOL/L — SIGNIFICANT CHANGE UP (ref 22–29)
CREAT SERPL-MCNC: 1.61 MG/DL — HIGH (ref 0.5–1.3)
CRP SERPL-MCNC: 6.08 MG/DL — HIGH (ref 0–0.4)
CULTURE RESULTS: SIGNIFICANT CHANGE UP
CULTURE RESULTS: SIGNIFICANT CHANGE UP
FERRITIN SERPL-MCNC: 197 NG/ML — HIGH (ref 15–150)
GLUCOSE SERPL-MCNC: 97 MG/DL — SIGNIFICANT CHANGE UP (ref 70–115)
HCT VFR BLD CALC: 33 % — LOW (ref 37–47)
HGB BLD-MCNC: 10.3 G/DL — LOW (ref 12–16)
IRON SATN MFR SERPL: 22 UG/DL — LOW (ref 37–145)
IRON SATN MFR SERPL: 8 % — LOW (ref 14–50)
MAGNESIUM SERPL-MCNC: 1.9 MG/DL — SIGNIFICANT CHANGE UP (ref 1.6–2.6)
MCHC RBC-ENTMCNC: 27 PG — SIGNIFICANT CHANGE UP (ref 27–31)
MCHC RBC-ENTMCNC: 31.2 G/DL — LOW (ref 32–36)
MCV RBC AUTO: 86.4 FL — SIGNIFICANT CHANGE UP (ref 81–99)
MRSA PCR RESULT.: DETECTED
ORGANISM # SPEC MICROSCOPIC CNT: SIGNIFICANT CHANGE UP
ORGANISM # SPEC MICROSCOPIC CNT: SIGNIFICANT CHANGE UP
PHOSPHATE SERPL-MCNC: 5.5 MG/DL — HIGH (ref 2.4–4.7)
PLATELET # BLD AUTO: 312 K/UL — SIGNIFICANT CHANGE UP (ref 150–400)
POTASSIUM SERPL-MCNC: 3.9 MMOL/L — SIGNIFICANT CHANGE UP (ref 3.5–5.3)
POTASSIUM SERPL-SCNC: 3.9 MMOL/L — SIGNIFICANT CHANGE UP (ref 3.5–5.3)
PROCALCITONIN SERPL-MCNC: 0.4 NG/ML — HIGH (ref 0.02–0.1)
RBC # BLD: 3.82 M/UL — LOW (ref 4.4–5.2)
RBC # FLD: 15.4 % — SIGNIFICANT CHANGE UP (ref 11–15.6)
S AUREUS DNA NOSE QL NAA+PROBE: DETECTED
SODIUM SERPL-SCNC: 137 MMOL/L — SIGNIFICANT CHANGE UP (ref 135–145)
SPECIMEN SOURCE: SIGNIFICANT CHANGE UP
SPECIMEN SOURCE: SIGNIFICANT CHANGE UP
TIBC SERPL-MCNC: 269 UG/DL — SIGNIFICANT CHANGE UP (ref 220–430)
TRANSFERRIN SERPL-MCNC: 188 MG/DL — LOW (ref 192–382)
WBC # BLD: 10.2 K/UL — SIGNIFICANT CHANGE UP (ref 4.8–10.8)
WBC # FLD AUTO: 10.2 K/UL — SIGNIFICANT CHANGE UP (ref 4.8–10.8)

## 2019-04-17 PROCEDURE — 99232 SBSQ HOSP IP/OBS MODERATE 35: CPT

## 2019-04-17 PROCEDURE — 99233 SBSQ HOSP IP/OBS HIGH 50: CPT

## 2019-04-17 RX ORDER — FERROUS SULFATE 325(65) MG
325 TABLET ORAL
Qty: 0 | Refills: 0 | Status: DISCONTINUED | OUTPATIENT
Start: 2019-04-17 | End: 2019-04-18

## 2019-04-17 RX ORDER — DIPHENHYDRAMINE HYDROCHLORIDE AND LIDOCAINE HYDROCHLORIDE AND ALUMINUM HYDROXIDE AND MAGNESIUM HYDRO
10 KIT EVERY 4 HOURS
Qty: 0 | Refills: 0 | Status: DISCONTINUED | OUTPATIENT
Start: 2019-04-17 | End: 2019-04-18

## 2019-04-17 RX ORDER — NYSTATIN 500MM UNIT
500000 POWDER (EA) MISCELLANEOUS
Qty: 0 | Refills: 0 | Status: DISCONTINUED | OUTPATIENT
Start: 2019-04-17 | End: 2019-04-17

## 2019-04-17 RX ADMIN — MYCOPHENOLATE MOFETIL 1500 MILLIGRAM(S): 250 CAPSULE ORAL at 17:22

## 2019-04-17 RX ADMIN — DULOXETINE HYDROCHLORIDE 60 MILLIGRAM(S): 30 CAPSULE, DELAYED RELEASE ORAL at 13:17

## 2019-04-17 RX ADMIN — Medication 250 MILLIGRAM(S): at 05:14

## 2019-04-17 RX ADMIN — Medication 250 MILLIGRAM(S): at 17:22

## 2019-04-17 RX ADMIN — OXYCODONE HYDROCHLORIDE 5 MILLIGRAM(S): 5 TABLET ORAL at 04:01

## 2019-04-17 RX ADMIN — DIPHENHYDRAMINE HYDROCHLORIDE AND LIDOCAINE HYDROCHLORIDE AND ALUMINUM HYDROXIDE AND MAGNESIUM HYDRO 10 MILLILITER(S): KIT at 17:23

## 2019-04-17 RX ADMIN — Medication 200 MILLIGRAM(S): at 17:22

## 2019-04-17 RX ADMIN — CHLORHEXIDINE GLUCONATE 1 APPLICATION(S): 213 SOLUTION TOPICAL at 14:19

## 2019-04-17 RX ADMIN — DAPTOMYCIN 115.2 MILLIGRAM(S): 500 INJECTION, POWDER, LYOPHILIZED, FOR SOLUTION INTRAVENOUS at 14:20

## 2019-04-17 RX ADMIN — Medication 500 MILLIGRAM(S): at 13:17

## 2019-04-17 RX ADMIN — OXYCODONE HYDROCHLORIDE 10 MILLIGRAM(S): 5 TABLET ORAL at 10:48

## 2019-04-17 RX ADMIN — Medication 650 MILLIGRAM(S): at 05:14

## 2019-04-17 RX ADMIN — GABAPENTIN 300 MILLIGRAM(S): 400 CAPSULE ORAL at 22:32

## 2019-04-17 RX ADMIN — PANTOPRAZOLE SODIUM 40 MILLIGRAM(S): 20 TABLET, DELAYED RELEASE ORAL at 05:14

## 2019-04-17 RX ADMIN — Medication 3 MILLIGRAM(S): at 22:32

## 2019-04-17 RX ADMIN — HYDROMORPHONE HYDROCHLORIDE 1 MILLIGRAM(S): 2 INJECTION INTRAMUSCULAR; INTRAVENOUS; SUBCUTANEOUS at 13:32

## 2019-04-17 RX ADMIN — OXYCODONE HYDROCHLORIDE 10 MILLIGRAM(S): 5 TABLET ORAL at 11:03

## 2019-04-17 RX ADMIN — Medication 650 MILLIGRAM(S): at 17:22

## 2019-04-17 RX ADMIN — Medication 325 MILLIGRAM(S): at 17:22

## 2019-04-17 RX ADMIN — Medication 50 MILLIGRAM(S): at 05:14

## 2019-04-17 RX ADMIN — DIPHENHYDRAMINE HYDROCHLORIDE AND LIDOCAINE HYDROCHLORIDE AND ALUMINUM HYDROXIDE AND MAGNESIUM HYDRO 10 MILLILITER(S): KIT at 22:32

## 2019-04-17 RX ADMIN — Medication 100 MILLIGRAM(S): at 13:18

## 2019-04-17 RX ADMIN — DIPHENHYDRAMINE HYDROCHLORIDE AND LIDOCAINE HYDROCHLORIDE AND ALUMINUM HYDROXIDE AND MAGNESIUM HYDRO 10 MILLILITER(S): KIT at 14:20

## 2019-04-17 RX ADMIN — ENOXAPARIN SODIUM 40 MILLIGRAM(S): 100 INJECTION SUBCUTANEOUS at 17:21

## 2019-04-17 RX ADMIN — HYDROMORPHONE HYDROCHLORIDE 1 MILLIGRAM(S): 2 INJECTION INTRAMUSCULAR; INTRAVENOUS; SUBCUTANEOUS at 13:17

## 2019-04-17 RX ADMIN — Medication 200 MILLIGRAM(S): at 05:14

## 2019-04-17 RX ADMIN — OXYCODONE HYDROCHLORIDE 5 MILLIGRAM(S): 5 TABLET ORAL at 05:00

## 2019-04-17 RX ADMIN — Medication 100 MILLIGRAM(S): at 05:14

## 2019-04-17 RX ADMIN — Medication 1 TABLET(S): at 13:19

## 2019-04-17 RX ADMIN — Medication 100 MILLIGRAM(S): at 22:32

## 2019-04-17 RX ADMIN — MYCOPHENOLATE MOFETIL 1500 MILLIGRAM(S): 250 CAPSULE ORAL at 05:13

## 2019-04-17 NOTE — PROGRESS NOTE ADULT - PROBLEM SELECTOR PROBLEM 1
Cellulitis of buttock
Cellulitis of buttock
MRSA cellulitis
Cellulitis of buttock

## 2019-04-17 NOTE — PROGRESS NOTE ADULT - PROBLEM SELECTOR PROBLEM 3
GERD (gastroesophageal reflux disease)
Acute renal failure with acute cortical necrosis
GERD (gastroesophageal reflux disease)
GERD (gastroesophageal reflux disease)

## 2019-04-17 NOTE — PROGRESS NOTE ADULT - SUBJECTIVE AND OBJECTIVE BOX
is a 33 year old female with a history of transverse myelitis and SLE who presented to the ER with complaints of right thigh/buttocks swelling. She had an MRSA abscess with sepsis and has recovered.    We re-started her Cell cept and we have restarted her home dose of prednisone of 50mg given her hx of transverse myelitis. I've called her rheumatologist Dr. Ramonita Barrett at [phone], and I'm waiting for a call back.    's course was also complicated by acute renal failure and her creatinine is now decreasing and improving steadily. She is clinically nontoxic, ambulating her room, eating and drinking. SGiven the severity of the MRSA infection, her gluteal abscess is still draining, but her right gluteal and upper thigh area looks much better with decreased swelling and erythema. Her leukocytosis has resolved and her immunosuppressive medications were re-started after sepsi resolved and she defervesced.     She needs total 14 days of IV daptomycin, and can get an IV and potentially be discharged home tomm. I spoke w/ID.     Summary:   REVIEW OF SYSTEMS    General:	"I'm feeling better."    Skin/Breast:  	  Ophthalmologic:  	  ENMT:	    Respiratory and Thorax:  	  Cardiovascular:	    Gastrointestinal:	    Genitourinary:	    Musculoskeletal:	    Neurological:	    Psychiatric:	    Hematology/Lymphatics:	    Endocrine:	    Allergic/Immunologic:    Vital Signs Last 24 Hrs  T(C): 37.2 (17 Apr 2019 07:58), Max: 37.2 (17 Apr 2019 07:58)  T(F): 99 (17 Apr 2019 07:58), Max: 99 (17 Apr 2019 07:58)  HR: 86 (17 Apr 2019 07:58) (86 - 92)  BP: 106/75 (17 Apr 2019 07:58) (106/75 - 129/87)  BP(mean): --  RR: 18 (17 Apr 2019 07:58) (18 - 18)  SpO2: 97% (17 Apr 2019 07:58) (97% - 98%)  PHYSICAL EXAM:  GEN: young female, freshly showered, awake, alert, nontoxic, smiling  HEENT: dry MM  CVS: S1S2 tachycardic, WT=385a  PULM: good breath sounds  ABD: soft, nontender, no rebound, no guarding  EXTREM: +right buttock erythema, draining serosanguinous with elements of pus, coming out of the puncture I&D site; +bilateral LE edema  NEURO: intact  PSYCH: mentating  SKIN: warm, pink, dry                        10.3   10.2  )-----------( 312      ( 17 Apr 2019 10:27 )             33.0     04-17    137  |  100  |  23.0<H>  ----------------------------<  97  3.9   |  23.0  |  1.61<H>    Ca    9.1      17 Apr 2019 10:27  Phos  5.5     04-17  Mg     1.9     04-17    TPro  5.5<L>  /  Alb  2.4<L>  /  TBili  0.3<L>  /  DBili  x   /  AST  19  /  ALT  42<H>  /  AlkPhos  312<H>  04-16    LIVER FUNCTIONS - ( 16 Apr 2019 08:50 )  Alb: 2.4 g/dL / Pro: 5.5 g/dL / ALK PHOS: 312 U/L / ALT: 42 U/L / AST: 19 U/L / GGT: x           Radiology:     MEDICATIONS  (STANDING):  ascorbic acid 500 milliGRAM(s) Oral daily  chlorhexidine 2% Cloths 1 Application(s) Topical daily  DAPTOmycin IVPB 380 milliGRAM(s) IV Intermittent every 24 hours  docusate sodium 100 milliGRAM(s) Oral three times a day  DULoxetine 60 milliGRAM(s) Oral daily  enoxaparin Injectable 40 milliGRAM(s) SubCutaneous every 24 hours  ferrous    sulfate 325 milliGRAM(s) Oral two times a day  FIRST- Mouthwash  BLM 10 milliLiter(s) Swish and Spit every 4 hours  gabapentin 300 milliGRAM(s) Oral at bedtime  HYDROmorphone  Injectable 1 milliGRAM(s) IV Push daily  hydroxychloroquine 200 milliGRAM(s) Oral two times a day  melatonin 3 milliGRAM(s) Oral at bedtime  multivitamin 1 Tablet(s) Oral daily  multivitamin 1 Tablet(s) Oral daily  mycophenolate mofetil 1500 milliGRAM(s) Oral two times a day  pantoprazole    Tablet 40 milliGRAM(s) Oral before breakfast  predniSONE   Tablet 50 milliGRAM(s) Oral daily  saccharomyces boulardii 250 milliGRAM(s) Oral two times a day  sodium bicarbonate 650 milliGRAM(s) Oral two times a day    MEDICATIONS  (PRN):  acetaminophen   Tablet .. 325 milliGRAM(s) Oral every 6 hours PRN Temp greater or equal to 38C (100.4F), Mild Pain (1 - 3)  oxyCODONE    IR 10 milliGRAM(s) Oral every 6 hours PRN Severe Pain (7 - 10)  oxyCODONE    IR 5 milliGRAM(s) Oral every 6 hours PRN Moderate Pain (4 - 6)  polyethylene glycol 3350 17 Gram(s) Oral daily PRN Constipation

## 2019-04-17 NOTE — PROGRESS NOTE ADULT - SUBJECTIVE AND OBJECTIVE BOX
[facility]  INFECTIOUS DISEASES AND INTERNAL MEDICINE at [place]  =======================================================  Adams Vasquez MD  Diplomates American Board of Internal Medicine and Infectious Diseases  =======================================================    [id number]  KANE ORE     Follow up: Right gluteal abscess and cellulitis     Significantly better, all indurated areas in R buttock is soft now, still has pus discharge from wound.   Afebrile.     PAST MEDICAL & SURGICAL HISTORY:  Fibromyalgia  Lupus  GERD (gastroesophageal reflux disease)  Lupus nephritis  S/P correction of deviated nasal septum  History of esophagogastroduodenoscopy (EGD)  History of biopsy: Renal    Social Hx: no smoking or drinking     FAMILY HISTORY:  Family history of melanoma: In Eye  Family history of osteoarthritis  Family history of liver disease    Allergies  No Known Allergies    Antibiotics:  Daptomycin     REVIEW OF SYSTEMS:  CONSTITUTIONAL:  No Fever or chills  HEENT:  No diplopia or blurred vision.  No sore throat or runny nose.  CARDIOVASCULAR:  No chest pain or SOB.  RESPIRATORY:  No cough, shortness of breath, PND or orthopnea.  GASTROINTESTINAL:  No nausea, vomiting or diarrhea.  GENITOURINARY:  No dysuria, frequency or urgency. No Blood in urine  MUSCULOSKELETAL:  no joint aches, no muscle pain  SKIN:  R buttock swelling and redness   NEUROLOGIC:  No paresthesias, fasciculations, seizures or weakness.  PSYCHIATRIC:  No disorder of thought or mood.  ENDOCRINE:  No heat or cold intolerance, polyuria or polydipsia.  HEMATOLOGICAL:  No easy bruising or bleeding.     Physical Exam:  Vital Signs Last 24 Hrs  Vital Signs Last 24 Hrs  T(C): 37.2 (17 Apr 2019 07:58), Max: 37.2 (17 Apr 2019 07:58)  T(F): 99 (17 Apr 2019 07:58), Max: 99 (17 Apr 2019 07:58)  HR: 86 (17 Apr 2019 07:58) (86 - 92)  BP: 106/75 (17 Apr 2019 07:58) (106/75 - 129/87)  BP(mean): --  RR: 18 (17 Apr 2019 07:58) (18 - 18)  SpO2: 97% (17 Apr 2019 07:58) (97% - 98%)  GEN: NAD  HEENT: normocephalic and atraumatic. EOMI. PERRL.    NECK: Supple.  No lymphadenopathy   LUNGS: Clear to auscultation.  HEART: Regular rate and rhythm without murmur.  ABDOMEN: Soft, nontender, and nondistended.  Positive bowel sounds.    : No CVA tenderness, R buttock with swelling and erythema looks better, no more induration, small opening in the middle with discharge   EXTREMITIES: Without any cyanosis, clubbing, rash, lesions or edema.  NEUROLOGIC: grossly intact.  PSYCHIATRIC: Appropriate affect .  SKIN: No ulceration or induration present.    Labs:  04-17    137  |  100  |  23.0<H>  ----------------------------<  97  3.9   |  23.0  |  1.61<H>    Ca    9.1      17 Apr 2019 10:27  Phos  5.5     04-17  Mg     1.9     04-17    TPro  5.5<L>  /  Alb  2.4<L>  /  TBili  0.3<L>  /  DBili  x   /  AST  19  /  ALT  42<H>  /  AlkPhos  312<H>  04-16                        10.3   10.2  )-----------( 312      ( 17 Apr 2019 10:27 )             33.0       LIVER FUNCTIONS - ( 16 Apr 2019 08:50 )  Alb: 2.4 g/dL / Pro: 5.5 g/dL / ALK PHOS: 312 U/L / ALT: 42 U/L / AST: 19 U/L / GGT: x           CARDIAC MARKERS ( 16 Apr 2019 08:50 )  x     / x     / 25 U/L / x     / x        RECENT CULTURES:  04-14 @ 03:00 .Blood     No growth at 48 hours    04-13 @ 15:39 .Blood     No growth at 48 hours    04-12 @ 12:14 .Abscess R Gluteal Abscess Methicillin resistant Staphylococcus aureus    Numerous Methicillin resistant Staphylococcus aureus    Few White blood cells  Moderate Gram positive cocci in pairs    04-12 @ 10:06 .Blood     No growth at 5 days.    04-12 @ 10:04 .Other right buttock wound Methicillin resistant Staphylococcus aureus    Numerous Methicillin resistant Staphylococcus aureus  ( KNOWN )    04-12 @ 05:34 .Urine     No growth    04-11 @ 09:16 .Blood     No growth at 5 days.    04-11 @ 09:15 .Blood     No growth at 5 days.    All imaging and other data have been reviewed.

## 2019-04-17 NOTE — PROGRESS NOTE ADULT - ASSESSMENT
34 y/o woman with PMH of transverse myelitis and SLE was admitted on 4/11 with swelling and pain in R buttock for 3 days. 3 days PTA she noticed a small pimple on right buttock that started getting bigger and soon spread to whole R buttock area. T    R gluteal area cellulitis  Charles myelitis recently on immunosuppressive meds    - Blood culture on 4/12, 4/13 and 4/14 all negative.   - Wound culture showed MRSA with Vancomycin MACARIO of 2   - Linezolid has interaction with her Medication so daptomycin was started  - Continue daptomycin 380mg daily  - Will need few more days of Daptomycin IV and then will switch to po doxycycline.   - Creat 1.61  - Will need follow up with ID.     Will follow.

## 2019-04-17 NOTE — PROGRESS NOTE ADULT - PROBLEM SELECTOR PLAN 3
-protonix
-ATN vs. Vanc associated nephrotoxicity?   -I suspect ATN, she has received IVF but is third-spacing
-ATN vs. Vanc associated nephrotoxicity?   -on sodium bicarbonate, appreciate nephrology consult  -no IVF as she is currently edematous
-ATN vs. Vanc associated nephrotoxicity? creatinine improving!  -on sodium bicarbonate, appreciate nephrology consult  -no IVF as she is currently edematous
-protonix
-protonix

## 2019-04-17 NOTE — PROGRESS NOTE ADULT - SUBJECTIVE AND OBJECTIVE BOX
Patient seen and examined    Feels much better  pain over R buttock less      REVIEW OF SYSTEMS:    CONSTITUTIONAL: No F/C  RESPIRATORY: No cough,  No SOB  CARDIOVASCULAR: No CP/palpitations,    GASTROINTESTINAL: No abdominal pain  or NVD   GENITOURINARY: No UTI sx  NEUROLOGICAL: No headaches, NO wk/numbness  MUSCULOSKELETAL: as above  EXTREMITIES : no swelling,    Vital Signs Last 24 Hrs  T(C): 36.7 (17 Apr 2019 15:28), Max: 37.2 (17 Apr 2019 07:58)  T(F): 98.1 (17 Apr 2019 15:28), Max: 99 (17 Apr 2019 07:58)  HR: 93 (17 Apr 2019 15:28) (86 - 93)  BP: 129/82 (17 Apr 2019 15:28) (106/75 - 129/87)  BP(mean): --  RR: 18 (17 Apr 2019 15:28) (18 - 18)  SpO2: 99% (17 Apr 2019 15:28) (97% - 99%)    PHYSICAL EXAM:    GENERAL: NAD,   EYES:  conjunctiva and sclera clear  NECK: Supple, No JVD/Bruit  NERVOUS SYSTEM:  A/O x3,   CHEST:  No rales or rhonchi  HEART:  RRR, No murmur  ABDOMEN: Soft, NT/ND BS+  EXTREMITIES:  No Edema; R buttock dressing noted  SKIN: No rashes    LABS:                          10.3   10.2  )-----------( 312      ( 17 Apr 2019 10:27 )             33.0     04-17    137  |  100  |  23.0<H>  ----------------------------<  97  3.9   |  23.0  |  1.61<H>    Ca    9.1      17 Apr 2019 10:27  Phos  5.5     04-17  Mg     1.9     04-17    TPro  5.5<L>  /  Alb  2.4<L>  /  TBili  0.3<L>  /  DBili  x   /  AST  19  /  ALT  42<H>  /  AlkPhos  312<H>  04-16      MEDICATIONS  (STANDING):  acetaminophen   Tablet .. PRN  ascorbic acid  chlorhexidine 2% Cloths  DAPTOmycin IVPB  docusate sodium  DULoxetine  enoxaparin Injectable  ferrous    sulfate  FIRST- Mouthwash  BLM  gabapentin  HYDROmorphone  Injectable  hydroxychloroquine  melatonin  multivitamin  multivitamin  mycophenolate mofetil  oxyCODONE    IR PRN  oxyCODONE    IR PRN  pantoprazole    Tablet  polyethylene glycol 3350 PRN  predniSONE   Tablet  saccharomyces boulardii  sodium bicarbonate

## 2019-04-17 NOTE — PROGRESS NOTE ADULT - ASSESSMENT
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

## 2019-04-17 NOTE — PROGRESS NOTE ADULT - PROBLEM SELECTOR PLAN 4
-cont magic mouthwash QID  -these ulcers are common in lupus patients, and  has had them before  -brush gently with toothbrush and use magic mouthwash QID

## 2019-04-18 ENCOUNTER — TRANSCRIPTION ENCOUNTER (OUTPATIENT)
Age: 34
End: 2019-04-18

## 2019-04-18 VITALS
OXYGEN SATURATION: 94 % | RESPIRATION RATE: 18 BRPM | SYSTOLIC BLOOD PRESSURE: 115 MMHG | TEMPERATURE: 98 F | HEART RATE: 79 BPM | DIASTOLIC BLOOD PRESSURE: 73 MMHG

## 2019-04-18 LAB
ANION GAP SERPL CALC-SCNC: 18 MMOL/L — HIGH (ref 5–17)
BUN SERPL-MCNC: 17 MG/DL — SIGNIFICANT CHANGE UP (ref 8–20)
CALCIUM SERPL-MCNC: 9.6 MG/DL — SIGNIFICANT CHANGE UP (ref 8.6–10.2)
CHLORIDE SERPL-SCNC: 102 MMOL/L — SIGNIFICANT CHANGE UP (ref 98–107)
CO2 SERPL-SCNC: 23 MMOL/L — SIGNIFICANT CHANGE UP (ref 22–29)
CREAT SERPL-MCNC: 1.49 MG/DL — HIGH (ref 0.5–1.3)
CRP SERPL-MCNC: 4.4 MG/DL — HIGH (ref 0–0.4)
CULTURE RESULTS: SIGNIFICANT CHANGE UP
GLUCOSE SERPL-MCNC: 87 MG/DL — SIGNIFICANT CHANGE UP (ref 70–115)
HCT VFR BLD CALC: 36.2 % — LOW (ref 37–47)
HGB BLD-MCNC: 11.4 G/DL — LOW (ref 12–16)
MAGNESIUM SERPL-MCNC: 2 MG/DL — SIGNIFICANT CHANGE UP (ref 1.6–2.6)
MCHC RBC-ENTMCNC: 27.3 PG — SIGNIFICANT CHANGE UP (ref 27–31)
MCHC RBC-ENTMCNC: 31.5 G/DL — LOW (ref 32–36)
MCV RBC AUTO: 86.8 FL — SIGNIFICANT CHANGE UP (ref 81–99)
PHOSPHATE SERPL-MCNC: 5.6 MG/DL — HIGH (ref 2.4–4.7)
PLATELET # BLD AUTO: 329 K/UL — SIGNIFICANT CHANGE UP (ref 150–400)
POTASSIUM SERPL-MCNC: 3.5 MMOL/L — SIGNIFICANT CHANGE UP (ref 3.5–5.3)
POTASSIUM SERPL-SCNC: 3.5 MMOL/L — SIGNIFICANT CHANGE UP (ref 3.5–5.3)
PROCALCITONIN SERPL-MCNC: 0.27 NG/ML — HIGH (ref 0.02–0.1)
RBC # BLD: 4.17 M/UL — LOW (ref 4.4–5.2)
RBC # FLD: 15.4 % — SIGNIFICANT CHANGE UP (ref 11–15.6)
SODIUM SERPL-SCNC: 143 MMOL/L — SIGNIFICANT CHANGE UP (ref 135–145)
SPECIMEN SOURCE: SIGNIFICANT CHANGE UP
WBC # BLD: 13 K/UL — HIGH (ref 4.8–10.8)
WBC # FLD AUTO: 13 K/UL — HIGH (ref 4.8–10.8)

## 2019-04-18 PROCEDURE — 93005 ELECTROCARDIOGRAM TRACING: CPT

## 2019-04-18 PROCEDURE — 87641 MR-STAPH DNA AMP PROBE: CPT

## 2019-04-18 PROCEDURE — 84145 PROCALCITONIN (PCT): CPT

## 2019-04-18 PROCEDURE — 99285 EMERGENCY DEPT VISIT HI MDM: CPT | Mod: 25

## 2019-04-18 PROCEDURE — 84100 ASSAY OF PHOSPHORUS: CPT

## 2019-04-18 PROCEDURE — 99232 SBSQ HOSP IP/OBS MODERATE 35: CPT

## 2019-04-18 PROCEDURE — 87075 CULTR BACTERIA EXCEPT BLOOD: CPT

## 2019-04-18 PROCEDURE — 36415 COLL VENOUS BLD VENIPUNCTURE: CPT

## 2019-04-18 PROCEDURE — 87186 SC STD MICRODIL/AGAR DIL: CPT

## 2019-04-18 PROCEDURE — 85610 PROTHROMBIN TIME: CPT

## 2019-04-18 PROCEDURE — 87086 URINE CULTURE/COLONY COUNT: CPT

## 2019-04-18 PROCEDURE — 86140 C-REACTIVE PROTEIN: CPT

## 2019-04-18 PROCEDURE — 84466 ASSAY OF TRANSFERRIN: CPT

## 2019-04-18 PROCEDURE — 83735 ASSAY OF MAGNESIUM: CPT

## 2019-04-18 PROCEDURE — 74177 CT ABD & PELVIS W/CONTRAST: CPT

## 2019-04-18 PROCEDURE — 80053 COMPREHEN METABOLIC PANEL: CPT

## 2019-04-18 PROCEDURE — 83550 IRON BINDING TEST: CPT

## 2019-04-18 PROCEDURE — 87640 STAPH A DNA AMP PROBE: CPT

## 2019-04-18 PROCEDURE — 36569 INSJ PICC 5 YR+ W/O IMAGING: CPT

## 2019-04-18 PROCEDURE — 71045 X-RAY EXAM CHEST 1 VIEW: CPT

## 2019-04-18 PROCEDURE — 87205 SMEAR GRAM STAIN: CPT

## 2019-04-18 PROCEDURE — 96365 THER/PROPH/DIAG IV INF INIT: CPT | Mod: XU

## 2019-04-18 PROCEDURE — 82728 ASSAY OF FERRITIN: CPT

## 2019-04-18 PROCEDURE — 85730 THROMBOPLASTIN TIME PARTIAL: CPT

## 2019-04-18 PROCEDURE — 96375 TX/PRO/DX INJ NEW DRUG ADDON: CPT

## 2019-04-18 PROCEDURE — 81001 URINALYSIS AUTO W/SCOPE: CPT

## 2019-04-18 PROCEDURE — 80202 ASSAY OF VANCOMYCIN: CPT

## 2019-04-18 PROCEDURE — 84702 CHORIONIC GONADOTROPIN TEST: CPT

## 2019-04-18 PROCEDURE — 76937 US GUIDE VASCULAR ACCESS: CPT | Mod: 26,59

## 2019-04-18 PROCEDURE — 87040 BLOOD CULTURE FOR BACTERIA: CPT

## 2019-04-18 PROCEDURE — 80048 BASIC METABOLIC PNL TOTAL CA: CPT

## 2019-04-18 PROCEDURE — 83540 ASSAY OF IRON: CPT

## 2019-04-18 PROCEDURE — 85027 COMPLETE CBC AUTOMATED: CPT

## 2019-04-18 PROCEDURE — 87070 CULTURE OTHR SPECIMN AEROBIC: CPT

## 2019-04-18 PROCEDURE — 99239 HOSP IP/OBS DSCHRG MGMT >30: CPT

## 2019-04-18 PROCEDURE — 82550 ASSAY OF CK (CPK): CPT

## 2019-04-18 PROCEDURE — 83605 ASSAY OF LACTIC ACID: CPT

## 2019-04-18 PROCEDURE — 96367 TX/PROPH/DG ADDL SEQ IV INF: CPT

## 2019-04-18 RX ORDER — DIPHENHYDRAMINE HYDROCHLORIDE AND LIDOCAINE HYDROCHLORIDE AND ALUMINUM HYDROXIDE AND MAGNESIUM HYDRO
10 KIT
Qty: 300 | Refills: 0
Start: 2019-04-18 | End: 2019-04-27

## 2019-04-18 RX ORDER — PHENYLEPHRINE-SHARK LIVER OIL-MINERAL OIL-PETROLATUM RECTAL OINTMENT
1 OINTMENT (GRAM) RECTAL
Qty: 0 | Refills: 0 | Status: DISCONTINUED | OUTPATIENT
Start: 2019-04-18 | End: 2019-04-18

## 2019-04-18 RX ORDER — PANTOPRAZOLE SODIUM 20 MG/1
1 TABLET, DELAYED RELEASE ORAL
Qty: 30 | Refills: 0
Start: 2019-04-18 | End: 2019-05-17

## 2019-04-18 RX ORDER — ASCORBIC ACID 60 MG
1 TABLET,CHEWABLE ORAL
Qty: 30 | Refills: 0
Start: 2019-04-18 | End: 2019-05-17

## 2019-04-18 RX ORDER — ACETAMINOPHEN 500 MG
1 TABLET ORAL
Qty: 0 | Refills: 0 | DISCHARGE
Start: 2019-04-18

## 2019-04-18 RX ORDER — OXYCODONE HYDROCHLORIDE 5 MG/1
1 TABLET ORAL
Qty: 20 | Refills: 0
Start: 2019-04-18 | End: 2019-04-22

## 2019-04-18 RX ORDER — FERROUS SULFATE 325(65) MG
1 TABLET ORAL
Qty: 60 | Refills: 0
Start: 2019-04-18 | End: 2019-05-17

## 2019-04-18 RX ORDER — ASCORBIC ACID 60 MG
1 TABLET,CHEWABLE ORAL
Qty: 0 | Refills: 0 | COMMUNITY
Start: 2019-04-18

## 2019-04-18 RX ORDER — SODIUM BICARBONATE 1 MEQ/ML
1 SYRINGE (ML) INTRAVENOUS
Qty: 2 | Refills: 0
Start: 2019-04-18 | End: 2019-04-18

## 2019-04-18 RX ORDER — LACTOBACILLUS ACIDOPHILUS 100MM CELL
1 CAPSULE ORAL
Qty: 14 | Refills: 0
Start: 2019-04-18 | End: 2019-04-24

## 2019-04-18 RX ORDER — SODIUM BICARBONATE 1 MEQ/ML
1 SYRINGE (ML) INTRAVENOUS
Qty: 60 | Refills: 0 | OUTPATIENT
Start: 2019-04-18 | End: 2019-05-17

## 2019-04-18 RX ADMIN — Medication 650 MILLIGRAM(S): at 06:38

## 2019-04-18 RX ADMIN — PANTOPRAZOLE SODIUM 40 MILLIGRAM(S): 20 TABLET, DELAYED RELEASE ORAL at 06:37

## 2019-04-18 RX ADMIN — DIPHENHYDRAMINE HYDROCHLORIDE AND LIDOCAINE HYDROCHLORIDE AND ALUMINUM HYDROXIDE AND MAGNESIUM HYDRO 10 MILLILITER(S): KIT at 06:38

## 2019-04-18 RX ADMIN — DIPHENHYDRAMINE HYDROCHLORIDE AND LIDOCAINE HYDROCHLORIDE AND ALUMINUM HYDROXIDE AND MAGNESIUM HYDRO 10 MILLILITER(S): KIT at 03:00

## 2019-04-18 RX ADMIN — DAPTOMYCIN 115.2 MILLIGRAM(S): 500 INJECTION, POWDER, LYOPHILIZED, FOR SOLUTION INTRAVENOUS at 13:25

## 2019-04-18 RX ADMIN — DIPHENHYDRAMINE HYDROCHLORIDE AND LIDOCAINE HYDROCHLORIDE AND ALUMINUM HYDROXIDE AND MAGNESIUM HYDRO 10 MILLILITER(S): KIT at 13:25

## 2019-04-18 RX ADMIN — Medication 100 MILLIGRAM(S): at 13:24

## 2019-04-18 RX ADMIN — Medication 200 MILLIGRAM(S): at 06:37

## 2019-04-18 RX ADMIN — OXYCODONE HYDROCHLORIDE 10 MILLIGRAM(S): 5 TABLET ORAL at 09:57

## 2019-04-18 RX ADMIN — MYCOPHENOLATE MOFETIL 1500 MILLIGRAM(S): 250 CAPSULE ORAL at 06:37

## 2019-04-18 RX ADMIN — CHLORHEXIDINE GLUCONATE 1 APPLICATION(S): 213 SOLUTION TOPICAL at 13:28

## 2019-04-18 RX ADMIN — Medication 1 TABLET(S): at 13:24

## 2019-04-18 RX ADMIN — Medication 500 MILLIGRAM(S): at 13:24

## 2019-04-18 RX ADMIN — Medication 100 MILLIGRAM(S): at 06:37

## 2019-04-18 RX ADMIN — DIPHENHYDRAMINE HYDROCHLORIDE AND LIDOCAINE HYDROCHLORIDE AND ALUMINUM HYDROXIDE AND MAGNESIUM HYDRO 10 MILLILITER(S): KIT at 09:57

## 2019-04-18 RX ADMIN — Medication 50 MILLIGRAM(S): at 06:37

## 2019-04-18 RX ADMIN — DULOXETINE HYDROCHLORIDE 60 MILLIGRAM(S): 30 CAPSULE, DELAYED RELEASE ORAL at 13:24

## 2019-04-18 RX ADMIN — HYDROMORPHONE HYDROCHLORIDE 1 MILLIGRAM(S): 2 INJECTION INTRAMUSCULAR; INTRAVENOUS; SUBCUTANEOUS at 13:38

## 2019-04-18 RX ADMIN — Medication 325 MILLIGRAM(S): at 06:37

## 2019-04-18 RX ADMIN — Medication 250 MILLIGRAM(S): at 06:37

## 2019-04-18 NOTE — PROGRESS NOTE ADULT - SUBJECTIVE AND OBJECTIVE BOX
Patient seen and examined    Painful right buttock pain          Vital Signs Last 24 Hrs  T(C): 36.9 (18 Apr 2019 00:05), Max: 36.9 (18 Apr 2019 00:05)  T(F): 98.4 (18 Apr 2019 00:05), Max: 98.4 (18 Apr 2019 00:05)  HR: 78 (18 Apr 2019 00:05) (78 - 93)  BP: 121/84 (18 Apr 2019 00:05) (121/84 - 129/82)  BP(mean): --  RR: 18 (18 Apr 2019 00:05) (18 - 18)  SpO2: 99% (18 Apr 2019 00:05) (99% - 99%)  T(C): 36.7 (17 Apr 2019 15:28), Max: 37.2 (17 Apr 2019 07:58)  T(F): 98.1 (17 Apr 2019 15:28), Max: 99 (17 Apr 2019 07:58)  HR: 93 (17 Apr 2019 15:28) (86 - 93)  BP: 129/82 (17 Apr 2019 15:28) (106/75 - 129/87)  BP(mean): --  RR: 18 (17 Apr 2019 15:28) (18 - 18)  SpO2: 99% (17 Apr 2019 15:28) (97% - 99%)    PHYSICAL EXAM:    GENERAL: NAD,   EYES:  conjunctiva and sclera clear  NECK: Supple, No JVD/Bruit  NERVOUS SYSTEM:  WNL  CHEST:  No rales or rhonchi  HEART:  RRR, No murmur  ABDOMEN: Soft, NT/ND BS+  EXTREMITIES:  Trace Edema; R buttock dressing noted  SKIN: No rashes   neg    LABS:  04-17    137  |  100  |  23.0<H>  ----------------------------<  97  3.9   |  23.0  |  1.61<H>    Ca    9.1      17 Apr 2019 10:27  Phos  5.5     04-17  Mg     1.9     04-17                              10.3   10.2  )-----------( 312      ( 17 Apr 2019 10:27 )             33.0     04-17    137  |  100  |  23.0<H>  ----------------------------<  97  3.9   |  23.0  |  1.61<H>    Ca    9.1      17 Apr 2019 10:27  Phos  5.5     04-17  Mg     1.9     04-17    TPro  5.5<L>  /  Alb  2.4<L>  /  TBili  0.3<L>  /  DBili  x   /  AST  19  /  ALT  42<H>  /  AlkPhos  312<H>  04-16      MEDICATIONS  (STANDING):  acetaminophen   Tablet .. PRN  ascorbic acid  chlorhexidine 2% Cloths  DAPTOmycin IVPB  docusate sodium  DULoxetine  enoxaparin Injectable  ferrous    sulfate  FIRST- Mouthwash  BLM  gabapentin  HYDROmorphone  Injectable  hydroxychloroquine  melatonin  multivitamin  multivitamin  mycophenolate mofetil  oxyCODONE    IR PRN  oxyCODONE    IR PRN  pantoprazole    Tablet  polyethylene glycol 3350 PRN  predniSONE   Tablet  saccharomyces boulardii  sodium bicarbonate

## 2019-04-18 NOTE — DISCHARGE NOTE PROVIDER - NSDCCPCAREPLAN_GEN_ALL_CORE_FT
PRINCIPAL DISCHARGE DIAGNOSIS  Diagnosis: Cellulitis  Assessment and Plan of Treatment: MRSA cellulitis

## 2019-04-18 NOTE — PROGRESS NOTE ADULT - PROVIDER SPECIALTY LIST ADULT
Hospitalist
Infectious Disease
Nephrology
Surgery
Hospitalist

## 2019-04-18 NOTE — PROGRESS NOTE ADULT - ATTENDING COMMENTS
IV antibiotic, follow up labs.
Patient seen and examined.   right gluteals abscess drained, induration and cellulitis improving  No need to pack wound.  local wound dressing  adequate evolution./
The patient was seen and examined  No new problems  Continue wound care
The patient was seen and examined  No new problems  Wound looks good  The patient should shower daily  Dry dressing  Will sign off--please re-consult if needed
Bloody purulent drainage from center of buttock cellulitis area.  ? infected hematoma.  No hx fo trauma to area.  Will I&D area to enhance drainage.

## 2019-04-18 NOTE — PROGRESS NOTE ADULT - ASSESSMENT
34 y/o woman with PMH of transverse myelitis and SLE was admitted on 4/11 with swelling and pain in R buttock for 3 days. 3 days PTA she noticed a small pimple on right buttock that started getting bigger and soon spread to whole R buttock area. T    R gluteal area cellulitis  [place] myelitis recently on immunosuppressive meds    - Blood culture on 4/12, 4/13 and 4/14 all negative.   - Wound culture showed MRSA with Vancomycin MACARIO of 2   - Linezolid has interaction with her Medication so daptomycin was started  - Continue daptomycin 380mg daily to complete 2 weeks (one more week remaining)   - Midline has been ordered. [facility] has been informed about the order.   - No need for labs during treatment.   - Last day wound be 4/25.   - Still has some discharge, if in one week it is compelty resolved, no need for po otherwise will need po doxycycline.   - Creat 1.49  - Will need follow up with ID.     Will sign off please call with any question.

## 2019-04-18 NOTE — PROGRESS NOTE ADULT - SUBJECTIVE AND OBJECTIVE BOX
[facility]  INFECTIOUS DISEASES AND INTERNAL MEDICINE at [place]  =======================================================  Adams Vasquez MD  Diplomates American Board of Internal Medicine and Infectious Diseases  =======================================================    MRN-[id number]  KANE ORE     Follow up: Right gluteal abscess and cellulitis     Significantly better, all indurated areas in R buttock is soft now, still has pus discharge from wound.   Afebrile.     PAST MEDICAL & SURGICAL HISTORY:  Fibromyalgia  Lupus  GERD (gastroesophageal reflux disease)  Lupus nephritis  S/P correction of deviated nasal septum  History of esophagogastroduodenoscopy (EGD)  History of biopsy: Renal    Social Hx: no smoking or drinking     FAMILY HISTORY:  Family history of melanoma: In Eye  Family history of osteoarthritis  Family history of liver disease    Allergies  No Known Allergies    Antibiotics:  Daptomycin     REVIEW OF SYSTEMS:  CONSTITUTIONAL:  No Fever or chills  HEENT:  No diplopia or blurred vision.  No sore throat or runny nose.  CARDIOVASCULAR:  No chest pain or SOB.  RESPIRATORY:  No cough, shortness of breath, PND or orthopnea.  GASTROINTESTINAL:  No nausea, vomiting or diarrhea.  GENITOURINARY:  No dysuria, frequency or urgency. No Blood in urine  MUSCULOSKELETAL:  no joint aches, no muscle pain  SKIN:  R buttock swelling and redness   NEUROLOGIC:  No paresthesias, fasciculations, seizures or weakness.  PSYCHIATRIC:  No disorder of thought or mood.  ENDOCRINE:  No heat or cold intolerance, polyuria or polydipsia.  HEMATOLOGICAL:  No easy bruising or bleeding.     Physical Exam:  Vital Signs Last 24 Hrs  Vital Signs Last 24 Hrs  T(C): 37.2 (17 Apr 2019 07:58), Max: 37.2 (17 Apr 2019 07:58)  T(F): 99 (17 Apr 2019 07:58), Max: 99 (17 Apr 2019 07:58)  HR: 86 (17 Apr 2019 07:58) (86 - 92)  BP: 106/75 (17 Apr 2019 07:58) (106/75 - 129/87)  BP(mean): --  RR: 18 (17 Apr 2019 07:58) (18 - 18)  SpO2: 97% (17 Apr 2019 07:58) (97% - 98%)  GEN: NAD  HEENT: normocephalic and atraumatic. EOMI. PERRL.    NECK: Supple.  No lymphadenopathy   LUNGS: Clear to auscultation.  HEART: Regular rate and rhythm without murmur.  ABDOMEN: Soft, nontender, and nondistended.  Positive bowel sounds.    : No CVA tenderness, R buttock with swelling and erythema looks better, no more induration, small opening in the middle with discharge   EXTREMITIES: Without any cyanosis, clubbing, rash, lesions or edema.  NEUROLOGIC: grossly intact.  PSYCHIATRIC: Appropriate affect .  SKIN: No ulceration or induration present.    Labs:  04-17    137  |  100  |  23.0<H>  ----------------------------<  97  3.9   |  23.0  |  1.61<H>    Ca    9.1      17 Apr 2019 10:27  Phos  5.5     04-17  Mg     1.9     04-17    TPro  5.5<L>  /  Alb  2.4<L>  /  TBili  0.3<L>  /  DBili  x   /  AST  19  /  ALT  42<H>  /  AlkPhos  312<H>  04-16                        10.3   10.2  )-----------( 312      ( 17 Apr 2019 10:27 )             33.0       LIVER FUNCTIONS - ( 16 Apr 2019 08:50 )  Alb: 2.4 g/dL / Pro: 5.5 g/dL / ALK PHOS: 312 U/L / ALT: 42 U/L / AST: 19 U/L / GGT: x           CARDIAC MARKERS ( 16 Apr 2019 08:50 )  x     / x     / 25 U/L / x     / x        RECENT CULTURES:  04-14 @ 03:00 .Blood     No growth at 48 hours    04-13 @ 15:39 .Blood     No growth at 48 hours    04-12 @ 12:14 .Abscess R Gluteal Abscess Methicillin resistant Staphylococcus aureus    Numerous Methicillin resistant Staphylococcus aureus    Few White blood cells  Moderate Gram positive cocci in pairs    04-12 @ 10:06 .Blood     No growth at 5 days.    04-12 @ 10:04 .Other right buttock wound Methicillin resistant Staphylococcus aureus    Numerous Methicillin resistant Staphylococcus aureus  ( KNOWN )    04-12 @ 05:34 .Urine     No growth    04-11 @ 09:16 .Blood     No growth at 5 days.    04-11 @ 09:15 .Blood     No growth at 5 days.    All imaging and other data have been reviewed.

## 2019-04-18 NOTE — PROGRESS NOTE ADULT - REASON FOR ADMISSION
Right gluteal swelling

## 2019-04-18 NOTE — DISCHARGE NOTE NURSING/CASE MANAGEMENT/SOCIAL WORK - NSDCDPATPORTLINK_GEN_ALL_CORE
You can access the [facility] Patient Portal, offered by [facility], by registering with the following website: [email or web address]

## 2019-04-18 NOTE — DISCHARGE NOTE PROVIDER - CARE PROVIDER_API CALL
hSine Vasquez (MD)  Infectious Disease; Internal Medicine  [address] [place] [address], [place] [address]  Phone: [phone]  Fax: [phone]  Follow Up Time:

## 2019-04-18 NOTE — PROCEDURE NOTE - NSPROCDETAILS_GEN_ALL_CORE
sterile dressing applied/supine position/ultrasound guidance/ultrasound assessment/sterile technique, catheter placed/location identified, draped/prepped, sterile technique used
incision left open, packing placed

## 2019-04-18 NOTE — DISCHARGE NOTE PROVIDER - HOSPITAL COURSE
is a 33 year old female with a history of transverse myelitis and SLE who presented to the ER with complaints of right thigh/buttocks swelling. She had an MRSA abscess with sepsis and has recovered.        We re-started her Cell cept and we have restarted her home dose of prednisone of 50mg given her hx of transverse myelitis. I've reached out to her rheumatologist Dr. Ramonita Barrett at [phone] (three times), and I'm waiting for a call back.        's course was also complicated by acute renal failure and her creatinine is now decreasing and improving steadily. She is clinically nontoxic, ambulating her room, eating and drinking. SGiven the severity of the MRSA infection, her gluteal abscess is still draining, but her right gluteal and upper thigh area looks much better with decreased swelling and erythema. Her leukocytosis has resolved and her immunosuppressive medications were re-started after sepsi resolved and she defervesced.         She needs total 14 days of IV daptomycin, and she received a midline today, and she can be d/cd on 7 more days of daptomycin. She is pleased

## 2019-04-19 ENCOUNTER — MESSAGE (OUTPATIENT)
Age: 34
End: 2019-04-19

## 2019-04-19 ENCOUNTER — CHART COPY (OUTPATIENT)
Age: 34
End: 2019-04-19

## 2019-04-19 LAB
CULTURE RESULTS: SIGNIFICANT CHANGE UP
SPECIMEN SOURCE: SIGNIFICANT CHANGE UP

## 2019-04-20 LAB
CULTURE RESULTS: SIGNIFICANT CHANGE UP
SPECIMEN SOURCE: SIGNIFICANT CHANGE UP

## 2019-04-21 ENCOUNTER — EMERGENCY (EMERGENCY)
Facility: HOSPITAL | Age: 34
LOS: 1 days | Discharge: HOME | End: 2019-04-21
Attending: EMERGENCY MEDICINE
Payer: COMMERCIAL

## 2019-04-21 VITALS
WEIGHT: 119.93 LBS | TEMPERATURE: 98 F | OXYGEN SATURATION: 100 % | HEIGHT: 63 IN | SYSTOLIC BLOOD PRESSURE: 117 MMHG | RESPIRATION RATE: 16 BRPM | DIASTOLIC BLOOD PRESSURE: 80 MMHG | HEART RATE: 77 BPM

## 2019-04-21 DIAGNOSIS — Z98.890 OTHER SPECIFIED POSTPROCEDURAL STATES: Chronic | ICD-10-CM

## 2019-04-21 PROCEDURE — 71045 X-RAY EXAM CHEST 1 VIEW: CPT | Mod: 26

## 2019-04-21 PROCEDURE — 99284 EMERGENCY DEPT VISIT MOD MDM: CPT | Mod: 25

## 2019-04-21 RX ORDER — DAPTOMYCIN 500 MG/10ML
380 INJECTION, POWDER, LYOPHILIZED, FOR SOLUTION INTRAVENOUS ONCE
Qty: 0 | Refills: 0 | Status: COMPLETED | OUTPATIENT
Start: 2019-04-21 | End: 2019-04-21

## 2019-04-21 NOTE — ED PROVIDER NOTE - PROGRESS NOTE DETAILS
PA note: PICC line not visualized in SVC. Will order IV dapto as prescribed by ID. Pt requesting d/c s/p abx. Pt states she will return to ED in AM and request PICC line be assessed for possible replacement.

## 2019-04-21 NOTE — ED PROVIDER NOTE - CLINICAL SUMMARY MEDICAL DECISION MAKING FREE TEXT BOX
33f presents with PICC line malfunction. Pt states the PICC line was leaking while using a saline flush this evening. PICC line flushing at this time w/o leak. Will order CXR to confirm placement and reassess,

## 2019-04-21 NOTE — ED PROVIDER NOTE - ATTENDING CONTRIBUTION TO CARE
I personally saw the patient with the PA, and completed the key components of the history and physical exam. I then discussed the management plan with the PA.   gen in nad resp clear cardiac no murmur abd soft   picc line appear sinfiltrated pain with injection given iv dose abx here advised call outpt IR number for evaluation tomm for replacement no overriding infection at site neurovasc intact

## 2019-04-21 NOTE — ED ADULT TRIAGE NOTE - CHIEF COMPLAINT QUOTE
Pt had RUE PICC line placed and states when she gives herself the abx the fluid leaks out from under the skin.

## 2019-04-21 NOTE — ED PROVIDER NOTE - PHYSICAL EXAMINATION
Skin: PICC in placed R AC fossa, dried blood at insertion site. No signs of erythema / infection / infiltration.

## 2019-04-21 NOTE — ED PROVIDER NOTE - OBJECTIVE STATEMENT
34 y/o woman with PMH of transverse myelitis and SLE presents to ED c/o PICC line malfunction. Pt was admitted to [facility] 4/11 and treated for buttock abscess + MRSA. Pt was d/c on 4/18 with IV daptomycin and had a PICC line placed. Pt states this evening the PICC line started to leak at the area of the AC fossa. Pt states she also has some discomfort when using the flushes. Pt w/o any other complaints at this time. Pt denies fevers, chills, nausea, vomiting, abdominal pain.

## 2019-04-22 ENCOUNTER — EMERGENCY (EMERGENCY)
Facility: HOSPITAL | Age: 34
LOS: 1 days | Discharge: HOME | End: 2019-04-22
Attending: EMERGENCY MEDICINE
Payer: COMMERCIAL

## 2019-04-22 VITALS
HEIGHT: 62 IN | OXYGEN SATURATION: 100 % | DIASTOLIC BLOOD PRESSURE: 87 MMHG | RESPIRATION RATE: 20 BRPM | SYSTOLIC BLOOD PRESSURE: 123 MMHG | TEMPERATURE: 98 F | WEIGHT: 126.99 LBS | HEART RATE: 76 BPM

## 2019-04-22 DIAGNOSIS — Z98.890 OTHER SPECIFIED POSTPROCEDURAL STATES: Chronic | ICD-10-CM

## 2019-04-22 PROCEDURE — 99284 EMERGENCY DEPT VISIT MOD MDM: CPT

## 2019-04-22 PROCEDURE — 96374 THER/PROPH/DIAG INJ IV PUSH: CPT

## 2019-04-22 PROCEDURE — 99284 EMERGENCY DEPT VISIT MOD MDM: CPT | Mod: 25

## 2019-04-22 PROCEDURE — 93971 EXTREMITY STUDY: CPT

## 2019-04-22 PROCEDURE — 71045 X-RAY EXAM CHEST 1 VIEW: CPT

## 2019-04-22 PROCEDURE — 93971 EXTREMITY STUDY: CPT | Mod: 26,RT

## 2019-04-22 RX ADMIN — DAPTOMYCIN 115.2 MILLIGRAM(S): 500 INJECTION, POWDER, LYOPHILIZED, FOR SOLUTION INTRAVENOUS at 00:17

## 2019-04-22 NOTE — ED STATDOCS - PHYSICAL EXAMINATION
Gen: No acute distress, non toxic  HEENT: Mucous membranes moist, pink conjunctivae, EOMI  CV: RRR  Resp: CTAB, normal rate and effort  GI: Abdomen soft, NT, ND. No rebound, no guarding  Neuro: A&O x 3, moving all 4 extremities  Skin: R PICC line, small dried blood, small bruising at site.  R buttock with healing abscess, no overlying skin changes.

## 2019-04-22 NOTE — ED ADULT NURSE NOTE - OBJECTIVE STATEMENT
Patient presents to ER complaining of PICC line in Patient presents to ER complaining of PICC line not functioning well, patient currently on ABX fo wound infection, no redness, no drainage noted, denies fever.

## 2019-04-22 NOTE — ED STATDOCS - CLINICAL SUMMARY MEDICAL DECISION MAKING FREE TEXT BOX
R PICC line not working, tip not in SVC, received last dosage abx last night, not due till tonight, will contact IV team for PICC line replacement.

## 2019-04-22 NOTE — CHART NOTE - NSCHARTNOTEFT_GEN_A_CORE
Vascular Access PA    Called to evaluate patient for a leaking Midline that was placed about 4 days ago during her admission.  Patient states that her Midline has been leaking the last 2 times it was used.  States that when the antibiotic (Daptomycin) has been infused that she has been experiencing burning up her arm    Denies swelling,    PE   no swelling erythema noted  FROM  NVI  arm circumference 25cm from 24cm  Midline sight looks good, no signs of infection    Duplex:  Shows DVT around midline in the basilic vein    Plan:   Will remove midline - place PIV to complete antibiotics (Daptomycin) for another 3-4 days  Vascular consult for DVT Right basilic vein  DC home when cleared by Vascular surgery.  Possible anticoagulates  Patient can receive antibiotics through PIV that is placed today

## 2019-04-22 NOTE — ED ADULT TRIAGE NOTE - CHIEF COMPLAINT QUOTE
Patient arrived to ED today to have PICC line check in her right arm.  Patient has PICC line placed three days ago, was here yesterday to have PICC evaluated and was told to return today to have PICC evaluated.

## 2019-04-22 NOTE — ED STATDOCS - OBJECTIVE STATEMENT
41 y/o F pt with PMHx fibromyalgia, GERD, nephritis, presents to the ED c/o PICC line complications.  Pt was in the ED April 11th for a buttock abscess, was admitted for IV abx, and discharged with RUE PICC line for continued abx.  Pt notes yesterday the PICC line began leaking, and pt notes burning when injecting the meds.  She was in the ED yesterday, had the IV flushed, had CXR, and was given abx dosage last night.  Pt was told to return to the ED tomorrow morning for evaluation by the PICC line team.  She notes no acute changes to her abscess, the dressing was changed last night when she was in the ED.  Denies fever, chills, N/V.  No further acute complaints at this time.

## 2019-04-22 NOTE — ED STATDOCS - NS ED ROS FT
ROS: No fever/chills. No chest pain. No SOB/cough. No abdominal pain, No N/V/D. No dysuria/frequency.  No headache. No Dizziness. No rashes. No numbness/weakness

## 2019-04-22 NOTE — ED STATDOCS - NS_ ATTENDINGSCRIBEDETAILS _ED_A_ED_FT
not applicable
The scribe's documentation has been prepared under my direction and personally reviewed by me in its entirety. I confirm that the note above accurately reflects all work, treatment, procedures, and medical decision making performed by me.

## 2019-04-22 NOTE — ED ADULT NURSE NOTE - NSIMPLEMENTINTERV_GEN_ALL_ED
Implemented All Universal Safety Interventions:  [place] to call system. Call bell, personal items and telephone within reach. Instruct patient to call for assistance. Room bathroom lighting operational. Non-slip footwear when patient is off stretcher. Physically safe environment: no spills, clutter or unnecessary equipment. Stretcher in lowest position, wheels locked, appropriate side rails in place.

## 2019-04-22 NOTE — ED STATDOCS - PROGRESS NOTE DETAILS
attempted flush with discomfort and small bleeding from site while flushing. Boris: spoke to bethany barrios, had pager for vascular access team that he believes should work: [phone]. I paged this number. surg pa to try and reach out to other people from team. Pt discussed at length with attending HPI/ROS/PE confirmed/ {t seen resting comfortable in no acute distress in chair.   CELE Damon who placed line contacted at [phone] informed of ED findings recommended obtaining US of line and he will come down to eval pt after. PT seen resting comfortably in no acute distress, no acute complaints at this time, PT tolerating PO intake,  PT informed of all results, PT seen by vascular and by IV team pt with superficial thrombins not requiring intervention, Midline pulled, pt still requiring medication will be given Peripheral IV access dc home with it for the next 3 days, PT will be DC home with follow up to vasculair and PCP, educated about when to return to the ED if needed. PT verbalizes that he understands all instructions and results. Pt educated about the risks vs benefits of imaging at this time and agrees that not warranted for their symptoms, and PE.

## 2019-04-22 NOTE — ED STATDOCS - ATTENDING CONTRIBUTION TO CARE
Boris: I performed a face to face bedside interview with patient regarding history of present illness, review of symptoms and past medical history. I completed an independent physical exam and ordered tests/medications as needed.  I have discussed patient's plan of care with advanced care provider. The advanced care provider assisted in  executing the discussed plan. I was available for any questions or issues that may have arose during the execution of the plan of care.

## 2019-04-23 ENCOUNTER — CHART COPY (OUTPATIENT)
Age: 34
End: 2019-04-23

## 2019-04-26 ENCOUNTER — APPOINTMENT (OUTPATIENT)
Dept: RHEUMATOLOGY | Facility: CLINIC | Age: 34
End: 2019-04-26
Payer: COMMERCIAL

## 2019-04-26 VITALS
HEART RATE: 79 BPM | DIASTOLIC BLOOD PRESSURE: 88 MMHG | BODY MASS INDEX: 21.9 KG/M2 | WEIGHT: 119 LBS | HEIGHT: 62 IN | OXYGEN SATURATION: 99 % | SYSTOLIC BLOOD PRESSURE: 129 MMHG

## 2019-04-26 LAB
BASOPHILS # BLD AUTO: 0.09 K/UL
BASOPHILS NFR BLD AUTO: 1.3 %
EOSINOPHIL # BLD AUTO: 0.04 K/UL
EOSINOPHIL NFR BLD AUTO: 0.6 %
HCT VFR BLD CALC: 41.5 %
HGB BLD-MCNC: 12.2 G/DL
IMM GRANULOCYTES NFR BLD AUTO: 2.2 %
LYMPHOCYTES # BLD AUTO: 1.2 K/UL
LYMPHOCYTES NFR BLD AUTO: 16.7 %
MAN DIFF?: NORMAL
MCHC RBC-ENTMCNC: 26.6 PG
MCHC RBC-ENTMCNC: 29.4 GM/DL
MCV RBC AUTO: 90.4 FL
MONOCYTES # BLD AUTO: 0.66 K/UL
MONOCYTES NFR BLD AUTO: 9.2 %
NEUTROPHILS # BLD AUTO: 5.02 K/UL
NEUTROPHILS NFR BLD AUTO: 70 %
PLATELET # BLD AUTO: 657 K/UL
RBC # BLD: 4.59 M/UL
RBC # FLD: 14.4 %
WBC # FLD AUTO: 7.17 K/UL

## 2019-04-26 PROCEDURE — 99215 OFFICE O/P EST HI 40 MIN: CPT

## 2019-04-26 RX ORDER — IBUPROFEN 600 MG/1
600 TABLET, FILM COATED ORAL
Refills: 0 | Status: DISCONTINUED | COMMUNITY
End: 2019-04-26

## 2019-04-26 NOTE — HISTORY OF PRESENT ILLNESS
[FreeTextEntry1] : Here early after hospitalization last week at [facility].  discussion with hospitalist noted that she was admitted with a gluteal abcess and severe MRSA requiring surgical debredement (twice) - currently on IV dapto\par \par #longitudinally extensive transverse myelitis (2019)  - s/p  IV pulse steroids, PLEX, RTX.  Walking better but persistent leg symptoms.  really no better or no worse compared with last visit.  on prednisone 50 mg daily\par #sle - on cellcept and plaquenil - denies symptoms at present.  \par #fms - on cymbalta

## 2019-04-26 NOTE — PHYSICAL EXAM
[General Appearance - Alert] : alert [General Appearance - Well Developed] : well developed [General Appearance - Well Nourished] : well nourished [General Appearance - In No Acute Distress] : in no acute distress [General Appearance - Well-Appearing] : healthy appearing [PERRL With Normal Accommodation] : pupils were equal in size, round, and reactive to light [Sclera] : the sclera and conjunctiva were normal [Extraocular Movements] : extraocular movements were intact [Outer Ear] : the ears and nose were normal in appearance [Neck Appearance] : the appearance of the neck was normal [No CVA Tenderness] : no ~M costovertebral angle tenderness [Heart Rate And Rhythm] : heart rate was normal and rhythm regular [Musculoskeletal - Swelling] : no joint swelling seen [No Spinal Tenderness] : no spinal tenderness [] : no rash [Oriented To Time, Place, And Person] : oriented to person, place, and time [Impaired Insight] : insight and judgment were intact [Cervical Lymph Nodes Enlarged Anterior Bilaterally] : anterior cervical [Supraclavicular Lymph Nodes Enlarged Bilaterally] : supraclavicular [FreeTextEntry1] : no focal deficits - able to walk without assistive device, component of spasticity noted with ambulation - though individual muscle groups 4+-5/5 b upper and lower

## 2019-04-26 NOTE — REASON FOR VISIT
[Follow-Up: _____] : a [unfilled] follow-up visit [Spouse] : spouse [FreeTextEntry1] : here early after hospitalization at [facility]

## 2019-04-26 NOTE — REVIEW OF SYSTEMS
[As Noted in HPI] : as noted in HPI [Negative] : Heme/Lymph [Fever] : no fever [Chills] : no chills [Eye Pain] : no eye pain [Earache] : no earache [Nosebleeds] : no nosebleeds [Chest Pain] : no chest pain [Shortness Of Breath] : no shortness of breath [Wheezing] : no wheezing [Abdominal Pain] : no abdominal pain [Cough] : no cough [Vomiting] : no vomiting [Dysuria] : no dysuria [Joint Pain] : no joint pain [Skin Lesions] : no skin lesions [Anxiety] : no anxiety [Dizziness] : no dizziness [de-identified] : mood much better in peru -  [Easy Bleeding] : no tendency for easy bleeding [FreeTextEntry1] : missed period

## 2019-04-26 NOTE — ASSESSMENT
[FreeTextEntry1] : 31 yo F w/ SLE  (dx 2014, arthritis, +PAUL,  +dsDNA, +SM, +RNP, nephritis 2016),  and fibromyalgia (dx 2014).  Was treated with cellcept and in lupus study at [facility].  sle complicated by longitudinally extensive transverse myelitis (2019) treated with IV pulse steroids, PLEX, RTX with improvement in symptoms.  \par \par #gluteal abcess\par -improving s/p abx (IV)\par -continue to observe - dressing changes by \par \par #. SLE with nephritis,alopecia, arthritis, lymphopenia\par -complicated by longitudinal extensive transverse myelitis\par -improved \par -needs MRI spine\par -continue gabapentin, baclofen\par -taper prednisone to 40 mg daily - \par \par #FMS:\par -continue cymbalta - increase to 60 mg daily\par -if s/s not improved patient will rto\par \par #tinea versicolor\par \par #health maintenance\par -vit d good\par -paperwork filled out for family medical leave\par \par #v58.69\par -ulcers developed after abx - getting better

## 2019-04-27 ENCOUNTER — CLINICAL ADVICE (OUTPATIENT)
Age: 34
End: 2019-04-27

## 2019-04-27 LAB
25(OH)D3 SERPL-MCNC: 28.5 NG/ML
ALBUMIN SERPL ELPH-MCNC: 4.4 G/DL
ALP BLD-CCNC: 134 U/L
ALT SERPL-CCNC: 33 U/L
ANION GAP SERPL CALC-SCNC: 21 MMOL/L
APPEARANCE: CLEAR
AST SERPL-CCNC: 22 U/L
BACTERIA: NEGATIVE
BILIRUB SERPL-MCNC: 0.2 MG/DL
BILIRUBIN URINE: NEGATIVE
BLOOD URINE: NEGATIVE
BUN SERPL-MCNC: 15 MG/DL
CALCIUM SERPL-MCNC: 10 MG/DL
CHLORIDE SERPL-SCNC: 102 MMOL/L
CO2 SERPL-SCNC: 21 MMOL/L
COLOR: YELLOW
CREAT SERPL-MCNC: 0.92 MG/DL
CRP SERPL-MCNC: 0.32 MG/DL
ERYTHROCYTE [SEDIMENTATION RATE] IN BLOOD BY WESTERGREN METHOD: 60 MM/HR
GLUCOSE QUALITATIVE U: NEGATIVE
HYALINE CASTS: 4 /LPF
KETONES URINE: NEGATIVE
LEUKOCYTE ESTERASE URINE: NEGATIVE
MICROSCOPIC-UA: NORMAL
NITRITE URINE: NEGATIVE
PH URINE: 6.5
POTASSIUM SERPL-SCNC: 4.3 MMOL/L
PROT SERPL-MCNC: 6.9 G/DL
PROTEIN URINE: ABNORMAL
RED BLOOD CELLS URINE: 6 /HPF
SODIUM SERPL-SCNC: 144 MMOL/L
SPECIFIC GRAVITY URINE: 1.02
SQUAMOUS EPITHELIAL CELLS: 18 /HPF
URINE COMMENTS: NORMAL
UROBILINOGEN URINE: NORMAL
WHITE BLOOD CELLS URINE: 8 /HPF

## 2019-04-28 LAB
C3 SERPL-MCNC: 148 MG/DL
C4 SERPL-MCNC: 47 MG/DL
CELLS.CD3-CD19+/CELLS IN BLOOD: <1 %
DEPRECATED KAPPA LC FREE/LAMBDA SER: 0.84 RATIO
IGA SER QL IEP: 138 MG/DL
IGG SER QL IEP: 603 MG/DL
IGM SER QL IEP: 31 MG/DL
KAPPA LC CSF-MCNC: 2.12 MG/DL
KAPPA LC SERPL-MCNC: 1.78 MG/DL

## 2019-04-30 LAB — DSDNA AB SER-ACNC: 46 IU/ML

## 2019-05-02 ENCOUNTER — RX RENEWAL (OUTPATIENT)
Age: 34
End: 2019-05-02

## 2019-05-06 ENCOUNTER — RX RENEWAL (OUTPATIENT)
Age: 34
End: 2019-05-06

## 2019-05-11 ENCOUNTER — INPATIENT (INPATIENT)
Facility: HOSPITAL | Age: 34
LOS: 7 days | Discharge: HOME | DRG: 98 | End: 2019-05-19
Attending: GENERAL ACUTE CARE HOSPITAL | Admitting: HOSPITALIST
Payer: COMMERCIAL

## 2019-05-11 VITALS
OXYGEN SATURATION: 99 % | HEART RATE: 160 BPM | HEIGHT: 62 IN | TEMPERATURE: 100 F | DIASTOLIC BLOOD PRESSURE: 60 MMHG | RESPIRATION RATE: 24 BRPM | SYSTOLIC BLOOD PRESSURE: 84 MMHG | WEIGHT: 117.07 LBS

## 2019-05-11 DIAGNOSIS — L03.90 CELLULITIS, UNSPECIFIED: ICD-10-CM

## 2019-05-11 DIAGNOSIS — Z98.890 OTHER SPECIFIED POSTPROCEDURAL STATES: Chronic | ICD-10-CM

## 2019-05-11 LAB
ALBUMIN SERPL ELPH-MCNC: 4.4 G/DL — SIGNIFICANT CHANGE UP (ref 3.3–5.2)
ALP SERPL-CCNC: 85 U/L — SIGNIFICANT CHANGE UP (ref 40–120)
ALT FLD-CCNC: 22 U/L — SIGNIFICANT CHANGE UP
ANION GAP SERPL CALC-SCNC: 18 MMOL/L — HIGH (ref 5–17)
ANISOCYTOSIS BLD QL: SLIGHT — SIGNIFICANT CHANGE UP
APPEARANCE UR: CLEAR — SIGNIFICANT CHANGE UP
APTT BLD: 32.5 SEC — SIGNIFICANT CHANGE UP (ref 27.5–36.3)
AST SERPL-CCNC: 20 U/L — SIGNIFICANT CHANGE UP
BASOPHILS # BLD AUTO: 0 K/UL — SIGNIFICANT CHANGE UP (ref 0–0.2)
BILIRUB SERPL-MCNC: 0.9 MG/DL — SIGNIFICANT CHANGE UP (ref 0.4–2)
BILIRUB UR-MCNC: NEGATIVE — SIGNIFICANT CHANGE UP
BUN SERPL-MCNC: 8 MG/DL — SIGNIFICANT CHANGE UP (ref 8–20)
CALCIUM SERPL-MCNC: 9.2 MG/DL — SIGNIFICANT CHANGE UP (ref 8.6–10.2)
CHLORIDE SERPL-SCNC: 95 MMOL/L — LOW (ref 98–107)
CO2 SERPL-SCNC: 23 MMOL/L — SIGNIFICANT CHANGE UP (ref 22–29)
COLOR SPEC: YELLOW — SIGNIFICANT CHANGE UP
CREAT SERPL-MCNC: 0.61 MG/DL — SIGNIFICANT CHANGE UP (ref 0.5–1.3)
DIFF PNL FLD: NEGATIVE — SIGNIFICANT CHANGE UP
EOSINOPHIL # BLD AUTO: 0 K/UL — SIGNIFICANT CHANGE UP (ref 0–0.5)
EOSINOPHIL NFR BLD AUTO: 1 % — SIGNIFICANT CHANGE UP (ref 0–5)
GLUCOSE SERPL-MCNC: 87 MG/DL — SIGNIFICANT CHANGE UP (ref 70–115)
GLUCOSE UR QL: NEGATIVE MG/DL — SIGNIFICANT CHANGE UP
HCT VFR BLD CALC: 40.3 % — SIGNIFICANT CHANGE UP (ref 37–47)
HGB BLD-MCNC: 12.6 G/DL — SIGNIFICANT CHANGE UP (ref 12–16)
INR BLD: 1.2 RATIO — HIGH (ref 0.88–1.16)
KETONES UR-MCNC: NEGATIVE — SIGNIFICANT CHANGE UP
LACTATE BLDV-MCNC: 2.4 MMOL/L — HIGH (ref 0.5–2)
LEUKOCYTE ESTERASE UR-ACNC: NEGATIVE — SIGNIFICANT CHANGE UP
LYMPHOCYTES # BLD AUTO: 1.4 K/UL — SIGNIFICANT CHANGE UP (ref 1–4.8)
LYMPHOCYTES # BLD AUTO: 5 % — LOW (ref 20–55)
MCHC RBC-ENTMCNC: 27.6 PG — SIGNIFICANT CHANGE UP (ref 27–31)
MCHC RBC-ENTMCNC: 31.3 G/DL — LOW (ref 32–36)
MCV RBC AUTO: 88.2 FL — SIGNIFICANT CHANGE UP (ref 81–99)
MONOCYTES # BLD AUTO: 1.4 K/UL — HIGH (ref 0–0.8)
MONOCYTES NFR BLD AUTO: 6 % — SIGNIFICANT CHANGE UP (ref 3–10)
NEUTROPHILS # BLD AUTO: 20.7 K/UL — HIGH (ref 1.8–8)
NEUTROPHILS NFR BLD AUTO: 87 % — HIGH (ref 37–73)
NEUTS BAND # BLD: 1 % — SIGNIFICANT CHANGE UP (ref 0–8)
NITRITE UR-MCNC: NEGATIVE — SIGNIFICANT CHANGE UP
PH UR: 7 — SIGNIFICANT CHANGE UP (ref 5–8)
PLAT MORPH BLD: NORMAL — SIGNIFICANT CHANGE UP
PLATELET # BLD AUTO: 334 K/UL — SIGNIFICANT CHANGE UP (ref 150–400)
POIKILOCYTOSIS BLD QL AUTO: SLIGHT — SIGNIFICANT CHANGE UP
POTASSIUM SERPL-MCNC: 3.4 MMOL/L — LOW (ref 3.5–5.3)
POTASSIUM SERPL-SCNC: 3.4 MMOL/L — LOW (ref 3.5–5.3)
PROT SERPL-MCNC: 7.4 G/DL — SIGNIFICANT CHANGE UP (ref 6.6–8.7)
PROT UR-MCNC: NEGATIVE MG/DL — SIGNIFICANT CHANGE UP
PROTHROM AB SERPL-ACNC: 13.9 SEC — HIGH (ref 10–12.9)
RBC # BLD: 4.57 M/UL — SIGNIFICANT CHANGE UP (ref 4.4–5.2)
RBC # FLD: 16.2 % — HIGH (ref 11–15.6)
RBC BLD AUTO: SIGNIFICANT CHANGE UP
SODIUM SERPL-SCNC: 136 MMOL/L — SIGNIFICANT CHANGE UP (ref 135–145)
SP GR SPEC: 1 — LOW (ref 1.01–1.02)
UROBILINOGEN FLD QL: NEGATIVE MG/DL — SIGNIFICANT CHANGE UP
WBC # BLD: 23.9 K/UL — HIGH (ref 4.8–10.8)
WBC # FLD AUTO: 23.9 K/UL — HIGH (ref 4.8–10.8)

## 2019-05-11 PROCEDURE — 76882 US LMTD JT/FCL EVL NVASC XTR: CPT | Mod: 26,RT

## 2019-05-11 PROCEDURE — 93010 ELECTROCARDIOGRAM REPORT: CPT

## 2019-05-11 PROCEDURE — 99223 1ST HOSP IP/OBS HIGH 75: CPT

## 2019-05-11 PROCEDURE — 99285 EMERGENCY DEPT VISIT HI MDM: CPT

## 2019-05-11 RX ORDER — POLYETHYLENE GLYCOL 3350 17 G/17G
17 POWDER, FOR SOLUTION ORAL DAILY
Refills: 0 | Status: DISCONTINUED | OUTPATIENT
Start: 2019-05-11 | End: 2019-05-19

## 2019-05-11 RX ORDER — ONDANSETRON 8 MG/1
4 TABLET, FILM COATED ORAL EVERY 6 HOURS
Refills: 0 | Status: DISCONTINUED | OUTPATIENT
Start: 2019-05-11 | End: 2019-05-19

## 2019-05-11 RX ORDER — BACLOFEN 100 %
5 POWDER (GRAM) MISCELLANEOUS EVERY 8 HOURS
Refills: 0 | Status: DISCONTINUED | OUTPATIENT
Start: 2019-05-11 | End: 2019-05-19

## 2019-05-11 RX ORDER — PIPERACILLIN AND TAZOBACTAM 4; .5 G/20ML; G/20ML
3.38 INJECTION, POWDER, LYOPHILIZED, FOR SOLUTION INTRAVENOUS EVERY 8 HOURS
Refills: 0 | Status: DISCONTINUED | OUTPATIENT
Start: 2019-05-11 | End: 2019-05-12

## 2019-05-11 RX ORDER — SACCHAROMYCES BOULARDII 250 MG
250 POWDER IN PACKET (EA) ORAL
Refills: 0 | Status: DISCONTINUED | OUTPATIENT
Start: 2019-05-11 | End: 2019-05-19

## 2019-05-11 RX ORDER — ASCORBIC ACID 60 MG
500 TABLET,CHEWABLE ORAL DAILY
Refills: 0 | Status: DISCONTINUED | OUTPATIENT
Start: 2019-05-11 | End: 2019-05-19

## 2019-05-11 RX ORDER — MAGNESIUM OXIDE 400 MG ORAL TABLET 241.3 MG
400 TABLET ORAL
Refills: 0 | Status: DISCONTINUED | OUTPATIENT
Start: 2019-05-11 | End: 2019-05-19

## 2019-05-11 RX ORDER — ACETAMINOPHEN 500 MG
650 TABLET ORAL ONCE
Refills: 0 | Status: COMPLETED | OUTPATIENT
Start: 2019-05-11 | End: 2019-05-11

## 2019-05-11 RX ORDER — PANTOPRAZOLE SODIUM 20 MG/1
40 TABLET, DELAYED RELEASE ORAL
Refills: 0 | Status: DISCONTINUED | OUTPATIENT
Start: 2019-05-11 | End: 2019-05-19

## 2019-05-11 RX ORDER — VANCOMYCIN HCL 1 G
1000 VIAL (EA) INTRAVENOUS EVERY 8 HOURS
Refills: 0 | Status: DISCONTINUED | OUTPATIENT
Start: 2019-05-11 | End: 2019-05-12

## 2019-05-11 RX ORDER — GABAPENTIN 400 MG/1
100 CAPSULE ORAL
Refills: 0 | Status: DISCONTINUED | OUTPATIENT
Start: 2019-05-11 | End: 2019-05-19

## 2019-05-11 RX ORDER — HEPARIN SODIUM 5000 [USP'U]/ML
5000 INJECTION INTRAVENOUS; SUBCUTANEOUS EVERY 8 HOURS
Refills: 0 | Status: DISCONTINUED | OUTPATIENT
Start: 2019-05-11 | End: 2019-05-18

## 2019-05-11 RX ORDER — GABAPENTIN 400 MG/1
300 CAPSULE ORAL AT BEDTIME
Refills: 0 | Status: DISCONTINUED | OUTPATIENT
Start: 2019-05-11 | End: 2019-05-19

## 2019-05-11 RX ORDER — SENNA PLUS 8.6 MG/1
2 TABLET ORAL AT BEDTIME
Refills: 0 | Status: DISCONTINUED | OUTPATIENT
Start: 2019-05-11 | End: 2019-05-19

## 2019-05-11 RX ORDER — VANCOMYCIN HCL 1 G
1000 VIAL (EA) INTRAVENOUS ONCE
Refills: 0 | Status: COMPLETED | OUTPATIENT
Start: 2019-05-11 | End: 2019-05-11

## 2019-05-11 RX ORDER — DOCUSATE SODIUM 100 MG
100 CAPSULE ORAL THREE TIMES A DAY
Refills: 0 | Status: DISCONTINUED | OUTPATIENT
Start: 2019-05-11 | End: 2019-05-14

## 2019-05-11 RX ORDER — CHOLECALCIFEROL (VITAMIN D3) 125 MCG
2000 CAPSULE ORAL DAILY
Refills: 0 | Status: DISCONTINUED | OUTPATIENT
Start: 2019-05-11 | End: 2019-05-19

## 2019-05-11 RX ORDER — LANOLIN ALCOHOL/MO/W.PET/CERES
6 CREAM (GRAM) TOPICAL AT BEDTIME
Refills: 0 | Status: DISCONTINUED | OUTPATIENT
Start: 2019-05-11 | End: 2019-05-19

## 2019-05-11 RX ORDER — OXYCODONE HYDROCHLORIDE 5 MG/1
5 TABLET ORAL EVERY 6 HOURS
Refills: 0 | Status: DISCONTINUED | OUTPATIENT
Start: 2019-05-11 | End: 2019-05-17

## 2019-05-11 RX ORDER — PIPERACILLIN AND TAZOBACTAM 4; .5 G/20ML; G/20ML
3.38 INJECTION, POWDER, LYOPHILIZED, FOR SOLUTION INTRAVENOUS ONCE
Refills: 0 | Status: COMPLETED | OUTPATIENT
Start: 2019-05-11 | End: 2019-05-11

## 2019-05-11 RX ORDER — VANCOMYCIN HCL 1 G
1000 VIAL (EA) INTRAVENOUS EVERY 12 HOURS
Refills: 0 | Status: DISCONTINUED | OUTPATIENT
Start: 2019-05-11 | End: 2019-05-11

## 2019-05-11 RX ORDER — SODIUM CHLORIDE 9 MG/ML
1000 INJECTION INTRAMUSCULAR; INTRAVENOUS; SUBCUTANEOUS
Refills: 0 | Status: DISCONTINUED | OUTPATIENT
Start: 2019-05-11 | End: 2019-05-14

## 2019-05-11 RX ORDER — DULOXETINE HYDROCHLORIDE 30 MG/1
60 CAPSULE, DELAYED RELEASE ORAL DAILY
Refills: 0 | Status: DISCONTINUED | OUTPATIENT
Start: 2019-05-11 | End: 2019-05-19

## 2019-05-11 RX ORDER — ACETAMINOPHEN 500 MG
650 TABLET ORAL EVERY 6 HOURS
Refills: 0 | Status: DISCONTINUED | OUTPATIENT
Start: 2019-05-11 | End: 2019-05-19

## 2019-05-11 RX ORDER — MAGNESIUM HYDROXIDE 400 MG/1
30 TABLET, CHEWABLE ORAL DAILY
Refills: 0 | Status: DISCONTINUED | OUTPATIENT
Start: 2019-05-11 | End: 2019-05-19

## 2019-05-11 RX ORDER — POTASSIUM CHLORIDE 20 MEQ
40 PACKET (EA) ORAL ONCE
Refills: 0 | Status: COMPLETED | OUTPATIENT
Start: 2019-05-11 | End: 2019-05-11

## 2019-05-11 RX ORDER — FERROUS SULFATE 325(65) MG
325 TABLET ORAL
Refills: 0 | Status: DISCONTINUED | OUTPATIENT
Start: 2019-05-11 | End: 2019-05-19

## 2019-05-11 RX ORDER — LACTOBACILLUS ACIDOPHILUS 100MM CELL
1 CAPSULE ORAL
Refills: 0 | Status: DISCONTINUED | OUTPATIENT
Start: 2019-05-11 | End: 2019-05-13

## 2019-05-11 RX ORDER — SODIUM CHLORIDE 9 MG/ML
1650 INJECTION, SOLUTION INTRAVENOUS ONCE
Refills: 0 | Status: COMPLETED | OUTPATIENT
Start: 2019-05-11 | End: 2019-05-11

## 2019-05-11 RX ADMIN — Medication 250 MILLIGRAM(S): at 21:49

## 2019-05-11 RX ADMIN — Medication 6 MILLIGRAM(S): at 22:10

## 2019-05-11 RX ADMIN — Medication 250 MILLIGRAM(S): at 18:25

## 2019-05-11 RX ADMIN — SODIUM CHLORIDE 1650 MILLILITER(S): 9 INJECTION, SOLUTION INTRAVENOUS at 10:50

## 2019-05-11 RX ADMIN — OXYCODONE HYDROCHLORIDE 5 MILLIGRAM(S): 5 TABLET ORAL at 22:30

## 2019-05-11 RX ADMIN — Medication 40 MILLIEQUIVALENT(S): at 16:47

## 2019-05-11 RX ADMIN — SODIUM CHLORIDE 1650 MILLILITER(S): 9 INJECTION, SOLUTION INTRAVENOUS at 10:00

## 2019-05-11 RX ADMIN — Medication 250 MILLIGRAM(S): at 10:50

## 2019-05-11 RX ADMIN — Medication 325 MILLIGRAM(S): at 17:14

## 2019-05-11 RX ADMIN — SENNA PLUS 2 TABLET(S): 8.6 TABLET ORAL at 21:50

## 2019-05-11 RX ADMIN — PIPERACILLIN AND TAZOBACTAM 200 GRAM(S): 4; .5 INJECTION, POWDER, LYOPHILIZED, FOR SOLUTION INTRAVENOUS at 10:00

## 2019-05-11 RX ADMIN — GABAPENTIN 300 MILLIGRAM(S): 400 CAPSULE ORAL at 21:50

## 2019-05-11 RX ADMIN — PIPERACILLIN AND TAZOBACTAM 25 GRAM(S): 4; .5 INJECTION, POWDER, LYOPHILIZED, FOR SOLUTION INTRAVENOUS at 23:16

## 2019-05-11 RX ADMIN — Medication 1 TABLET(S): at 17:15

## 2019-05-11 RX ADMIN — Medication 650 MILLIGRAM(S): at 10:20

## 2019-05-11 RX ADMIN — GABAPENTIN 100 MILLIGRAM(S): 400 CAPSULE ORAL at 21:50

## 2019-05-11 RX ADMIN — MAGNESIUM OXIDE 400 MG ORAL TABLET 400 MILLIGRAM(S): 241.3 TABLET ORAL at 17:15

## 2019-05-11 RX ADMIN — PIPERACILLIN AND TAZOBACTAM 3.38 GRAM(S): 4; .5 INJECTION, POWDER, LYOPHILIZED, FOR SOLUTION INTRAVENOUS at 10:49

## 2019-05-11 RX ADMIN — OXYCODONE HYDROCHLORIDE 5 MILLIGRAM(S): 5 TABLET ORAL at 21:50

## 2019-05-11 RX ADMIN — Medication 650 MILLIGRAM(S): at 23:30

## 2019-05-11 RX ADMIN — HEPARIN SODIUM 5000 UNIT(S): 5000 INJECTION INTRAVENOUS; SUBCUTANEOUS at 21:50

## 2019-05-11 RX ADMIN — Medication 100 MILLIGRAM(S): at 21:50

## 2019-05-11 RX ADMIN — Medication 1000 MILLIGRAM(S): at 10:50

## 2019-05-11 NOTE — H&P ADULT - NSICDXFAMILYHX_GEN_ALL_CORE_FT
FAMILY HISTORY:  Family history of liver disease  Family history of melanoma, In Eye  Family history of osteoarthritis

## 2019-05-11 NOTE — ED PROVIDER NOTE - PHYSICAL EXAMINATION
Gen: Alert, NAD  Head: NC, AT, PERRL, EOMI, normal lids/conjunctiva  ENT: normal hearing, patent oropharynx without erythema/exudate, uvula midline  Neck: +supple, no tenderness/meningismus/JVD, +Trachea midline  Pulm: Bilateral BS, normal resp effort, no wheeze/stridor/retractions  CV: tachy to 120 but RRR, no M/R/G, +dist pulses  Abd: soft, NT/ND, +BS, no hepatosplenomegaly  Mskel: no edema/erythema/cyanosis  Skin: no rash, rght post thigh with 1 healing abscess and 2 draining with one cellulitic area that is indurated to 12 cm on posterior thigh with central fluctuance  Neuro: AAOx3, BLE tremor (baseline) no new gross sensory/motor deficits, CN 2-12 intact

## 2019-05-11 NOTE — H&P ADULT - NSHPLABSRESULTS_GEN_ALL_CORE
LABS:                        12.6   23.9  )-----------( 334      ( 11 May 2019 10:06 )             40.3     05-11    136  |  95<L>  |  8.0  ----------------------------<  87  3.4<L>   |  23.0  |  0.61    Ca    9.2      11 May 2019 10:06    TPro  7.4  /  Alb  4.4  /  TBili  0.9  /  DBili  x   /  AST  20  /  ALT  22  /  AlkPhos  85  05-11    PT/INR - ( 11 May 2019 10:06 )   PT: 13.9 sec;   INR: 1.20 ratio         PTT - ( 11 May 2019 10:06 )  PTT:32.5 sec    LIVER FUNCTIONS - ( 11 May 2019 10:06 )  Alb: 4.4 g/dL / Pro: 7.4 g/dL / ALK PHOS: 85 U/L / ALT: 22 U/L / AST: 20 U/L / GGT: x           Urinalysis Basic - ( 11 May 2019 14:51 )    Color: Yellow / Appearance: Clear / S.005 / pH: x  Gluc: x / Ketone: Negative  / Bili: Negative / Urobili: Negative mg/dL   Blood: x / Protein: Negative mg/dL / Nitrite: Negative   Leuk Esterase: Negative / RBC: x / WBC x   Sq Epi: x / Non Sq Epi: x / Bacteria: x

## 2019-05-11 NOTE — ED PROVIDER NOTE - EKG ADDITIONAL INFORMATION FREE TEXT
As interpreted by ED physician, ECG is NSR with normal intervals/axis, no changes in QRS, no ST/T changes.

## 2019-05-11 NOTE — ED ADULT TRIAGE NOTE - CHIEF COMPLAINT QUOTE
Patient arrived to ED today with c/o abscess to her right buttocks since yesterday.  Patient states fevers at home.

## 2019-05-11 NOTE — ED PROVIDER NOTE - OBJECTIVE STATEMENT
Pertinent PMH/PSH/FHx/SHx and Review of Systems contained within:  Patient presents to the ED for fever.  PMH of SLE and transverse myelitis on cellcept and prednisone, also fibromyalgia.  Patient had prior episdoe ~1-2  months ago requiring PICC insertion and dapto.  now wworsening abscesses over posterior right thigh for one week.   bedsdie.  VSS but c/w sepsis.  Non toxic.  Well appearing. No aggravating or relieving factors. No other pertinent PMH.  No other pertinent PSH.  No other pertinent FHx.  Patient denies EtOH/tobacco/illicit substance use. No photophobia/eye pain/changes in vision, No ear pain/sore throat/dysphagia, No chest pain/palpitations, no SOB/cough/wheeze/stridor, No abdominal pain, No N/V/D, no dysuria/frequency/discharge, No neck/back pain, no rash, no changes in neurological status/function.

## 2019-05-11 NOTE — ED PROVIDER NOTE - CLINICAL SUMMARY MEDICAL DECISION MAKING FREE TEXT BOX
Patient presents with sepsis from cellulitic lesion on right post thigh.  MRSA previously.  abx and IVF givne.  lactacte elevate.  wound drained but immune compromised on cellcept and prednisone. Patient presents with sepsis from cellulitic lesion on right post thigh.  MRSA previously.  abx and IVF givne.  lactacte elevate.  wound drained but immune compromised on cellcept and prednisone. iodoform pakcing left in situ.  evaluated by telehospitalist and will admit.

## 2019-05-11 NOTE — H&P ADULT - HISTORY OF PRESENT ILLNESS
Pt is 33 yr old female with fibromyalgia, SLE and transverse myelitis on Cellcept and prednisone, presented to ED with c/o R buttock/ posterior thigh infected pimples/ abscess with subjective fever. Pt noticied pimple at right posterior left thigh which started 2-3 days ago, worsening with swelling/ pain and induration and She was also having subjective fever. Pt and  thought its infected hence came to ED. No other complaints, denied headache, dizziness, nausea, vomiting, chest pain, SOB, cough, dysuria urinary frequency or urgency.   Off note Pt was recently at [facility] 3 weeks prior to this admission for left thigh infection, requiring midline insertion and dapto for MRSA. After midline she developed superficial vein thrombosis hence midline was removed and she finished her ABx course on 4/25/19.

## 2019-05-11 NOTE — H&P ADULT - NSICDXPASTMEDICALHX_GEN_ALL_CORE_FT
PAST MEDICAL HISTORY:  Fibromyalgia     GERD (gastroesophageal reflux disease)     Lupus     Lupus nephritis

## 2019-05-11 NOTE — H&P ADULT - ASSESSMENT
Pt is 33 yr old female with fibromyalgia, SLE and transverse myelitis on Cellcept and prednisone, presented to ED with c/o R buttock/ posterior thigh infected pimples/ abscess with subjective fever. In ED BP 84/60 w   T 101, she was code sepsis with appropriate IVF and Abx given in ED, blood and wound cx sent. Pt admitted to medicine with sepsis 2/2 left thigh infected pimple/ abscess.     Impression:  Sepsis 2/2 left thigh infected pimple/ abscess.   Hx of SLE, Transverse myelitis fibromyalgia    Plan:  - HD stable now after fluid resuscitation in ED. BP stable, afebrile now.   - Keep on empiric Vanco/ ZOsyn. (MRSA positive on last admission)  - Tylenol prn, IVFs  - ID input requested.   - C/w rest home medication except Plaquenil/ Cellcept.

## 2019-05-11 NOTE — H&P ADULT - NSHPPHYSICALEXAM_GEN_ALL_CORE
PHYSICAL EXAM:    Vital Signs Last 24 Hrs  T(C): 37.2 (11 May 2019 12:25), Max: 38.3 (11 May 2019 10:40)  T(F): 98.9 (11 May 2019 12:25), Max: 101 (11 May 2019 10:40)  HR: 86 (11 May 2019 16:19) (86 - 160)  BP: 114/66 (11 May 2019 16:19) (84/60 - 120/69)  BP(mean): --  RR: 18 (11 May 2019 16:19) (18 - 24)  SpO2: 100% (11 May 2019 16:19) (99% - 100%)    GENERAL: Pt lying comfortably, NAD.  ENMT: PERRL, +EOMI.  NECK: soft, Supple, No JVD,   CHEST/LUNG: Clear to auscultation bilaterally; No wheezing.  HEART: S1S2+, Regular rate and rhythm; No murmurs.  ABDOMEN: Soft, Nontender, Nondistended; Bowel sounds present.  MUSCULOSKELETAL: Normal range of motion.  SKIN: Right posterior thigh with 1 healing abscess and 2 draining with one cellulitic area that is indurated.   NEURO: AAOX3, no focal deficits, no motor r sensory loss.  PSYCH: normal mood.

## 2019-05-12 LAB
ANION GAP SERPL CALC-SCNC: 14 MMOL/L — SIGNIFICANT CHANGE UP (ref 5–17)
BASOPHILS # BLD AUTO: 0 K/UL — SIGNIFICANT CHANGE UP (ref 0–0.2)
BASOPHILS NFR BLD AUTO: 0.1 % — SIGNIFICANT CHANGE UP (ref 0–2)
BUN SERPL-MCNC: 9 MG/DL — SIGNIFICANT CHANGE UP (ref 8–20)
CALCIUM SERPL-MCNC: 9 MG/DL — SIGNIFICANT CHANGE UP (ref 8.6–10.2)
CHLORIDE SERPL-SCNC: 98 MMOL/L — SIGNIFICANT CHANGE UP (ref 98–107)
CO2 SERPL-SCNC: 24 MMOL/L — SIGNIFICANT CHANGE UP (ref 22–29)
CREAT SERPL-MCNC: 0.47 MG/DL — LOW (ref 0.5–1.3)
CULTURE RESULTS: NO GROWTH — SIGNIFICANT CHANGE UP
EOSINOPHIL # BLD AUTO: 0.1 K/UL — SIGNIFICANT CHANGE UP (ref 0–0.5)
EOSINOPHIL NFR BLD AUTO: 0.2 % — SIGNIFICANT CHANGE UP (ref 0–6)
GLUCOSE SERPL-MCNC: 95 MG/DL — SIGNIFICANT CHANGE UP (ref 70–115)
HCG SERPL-ACNC: <4 MIU/ML — SIGNIFICANT CHANGE UP
HCT VFR BLD CALC: 36.4 % — LOW (ref 37–47)
HGB BLD-MCNC: 11.3 G/DL — LOW (ref 12–16)
LACTATE SERPL-SCNC: 1 MMOL/L — SIGNIFICANT CHANGE UP (ref 0.5–2)
LYMPHOCYTES # BLD AUTO: 0.9 K/UL — LOW (ref 1–4.8)
LYMPHOCYTES # BLD AUTO: 3.5 % — LOW (ref 20–55)
MAGNESIUM SERPL-MCNC: 1.8 MG/DL — SIGNIFICANT CHANGE UP (ref 1.6–2.6)
MCHC RBC-ENTMCNC: 27.5 PG — SIGNIFICANT CHANGE UP (ref 27–31)
MCHC RBC-ENTMCNC: 31 G/DL — LOW (ref 32–36)
MCV RBC AUTO: 88.6 FL — SIGNIFICANT CHANGE UP (ref 81–99)
MONOCYTES # BLD AUTO: 1.7 K/UL — HIGH (ref 0–0.8)
MONOCYTES NFR BLD AUTO: 6.8 % — SIGNIFICANT CHANGE UP (ref 3–10)
NEUTROPHILS # BLD AUTO: 21.1 K/UL — HIGH (ref 1.8–8)
NEUTROPHILS NFR BLD AUTO: 87.1 % — HIGH (ref 37–73)
PHOSPHATE SERPL-MCNC: 3.4 MG/DL — SIGNIFICANT CHANGE UP (ref 2.4–4.7)
PLATELET # BLD AUTO: 295 K/UL — SIGNIFICANT CHANGE UP (ref 150–400)
POTASSIUM SERPL-MCNC: 3.2 MMOL/L — LOW (ref 3.5–5.3)
POTASSIUM SERPL-SCNC: 3.2 MMOL/L — LOW (ref 3.5–5.3)
RBC # BLD: 4.11 M/UL — LOW (ref 4.4–5.2)
RBC # FLD: 16.3 % — HIGH (ref 11–15.6)
SODIUM SERPL-SCNC: 136 MMOL/L — SIGNIFICANT CHANGE UP (ref 135–145)
SPECIMEN SOURCE: SIGNIFICANT CHANGE UP
WBC # BLD: 24.3 K/UL — HIGH (ref 4.8–10.8)
WBC # FLD AUTO: 24.3 K/UL — HIGH (ref 4.8–10.8)

## 2019-05-12 PROCEDURE — 99233 SBSQ HOSP IP/OBS HIGH 50: CPT

## 2019-05-12 PROCEDURE — 99223 1ST HOSP IP/OBS HIGH 75: CPT

## 2019-05-12 PROCEDURE — 72193 CT PELVIS W/DYE: CPT | Mod: 26

## 2019-05-12 RX ORDER — CEFTAROLINE FOSAMIL 600 MG/20ML
600 POWDER, FOR SOLUTION INTRAVENOUS EVERY 12 HOURS
Refills: 0 | Status: DISCONTINUED | OUTPATIENT
Start: 2019-05-12 | End: 2019-05-19

## 2019-05-12 RX ORDER — POTASSIUM CHLORIDE 20 MEQ
40 PACKET (EA) ORAL EVERY 4 HOURS
Refills: 0 | Status: COMPLETED | OUTPATIENT
Start: 2019-05-12 | End: 2019-05-12

## 2019-05-12 RX ADMIN — DULOXETINE HYDROCHLORIDE 60 MILLIGRAM(S): 30 CAPSULE, DELAYED RELEASE ORAL at 12:48

## 2019-05-12 RX ADMIN — HEPARIN SODIUM 5000 UNIT(S): 5000 INJECTION INTRAVENOUS; SUBCUTANEOUS at 06:34

## 2019-05-12 RX ADMIN — GABAPENTIN 100 MILLIGRAM(S): 400 CAPSULE ORAL at 18:18

## 2019-05-12 RX ADMIN — SENNA PLUS 2 TABLET(S): 8.6 TABLET ORAL at 21:47

## 2019-05-12 RX ADMIN — SODIUM CHLORIDE 150 MILLILITER(S): 9 INJECTION INTRAMUSCULAR; INTRAVENOUS; SUBCUTANEOUS at 20:31

## 2019-05-12 RX ADMIN — Medication 1 TABLET(S): at 06:34

## 2019-05-12 RX ADMIN — Medication 100 MILLIGRAM(S): at 06:34

## 2019-05-12 RX ADMIN — Medication 250 MILLIGRAM(S): at 18:18

## 2019-05-12 RX ADMIN — Medication 100 MILLIGRAM(S): at 12:50

## 2019-05-12 RX ADMIN — Medication 2000 UNIT(S): at 12:48

## 2019-05-12 RX ADMIN — Medication 6 MILLIGRAM(S): at 21:48

## 2019-05-12 RX ADMIN — GABAPENTIN 300 MILLIGRAM(S): 400 CAPSULE ORAL at 21:47

## 2019-05-12 RX ADMIN — HEPARIN SODIUM 5000 UNIT(S): 5000 INJECTION INTRAVENOUS; SUBCUTANEOUS at 12:50

## 2019-05-12 RX ADMIN — Medication 650 MILLIGRAM(S): at 00:29

## 2019-05-12 RX ADMIN — Medication 1 TABLET(S): at 18:18

## 2019-05-12 RX ADMIN — Medication 325 MILLIGRAM(S): at 18:18

## 2019-05-12 RX ADMIN — Medication 1 TABLET(S): at 12:49

## 2019-05-12 RX ADMIN — Medication 325 MILLIGRAM(S): at 06:34

## 2019-05-12 RX ADMIN — MAGNESIUM OXIDE 400 MG ORAL TABLET 400 MILLIGRAM(S): 241.3 TABLET ORAL at 08:37

## 2019-05-12 RX ADMIN — PANTOPRAZOLE SODIUM 40 MILLIGRAM(S): 20 TABLET, DELAYED RELEASE ORAL at 08:37

## 2019-05-12 RX ADMIN — OXYCODONE HYDROCHLORIDE 5 MILLIGRAM(S): 5 TABLET ORAL at 18:33

## 2019-05-12 RX ADMIN — MAGNESIUM OXIDE 400 MG ORAL TABLET 400 MILLIGRAM(S): 241.3 TABLET ORAL at 18:18

## 2019-05-12 RX ADMIN — HEPARIN SODIUM 5000 UNIT(S): 5000 INJECTION INTRAVENOUS; SUBCUTANEOUS at 21:47

## 2019-05-12 RX ADMIN — OXYCODONE HYDROCHLORIDE 5 MILLIGRAM(S): 5 TABLET ORAL at 19:03

## 2019-05-12 RX ADMIN — Medication 500 MILLIGRAM(S): at 12:49

## 2019-05-12 RX ADMIN — Medication 250 MILLIGRAM(S): at 06:34

## 2019-05-12 RX ADMIN — Medication 40 MILLIGRAM(S): at 06:34

## 2019-05-12 RX ADMIN — Medication 100 MILLIGRAM(S): at 21:47

## 2019-05-12 RX ADMIN — Medication 650 MILLIGRAM(S): at 09:17

## 2019-05-12 RX ADMIN — Medication 40 MILLIEQUIVALENT(S): at 21:47

## 2019-05-12 RX ADMIN — Medication 650 MILLIGRAM(S): at 08:37

## 2019-05-12 RX ADMIN — Medication 250 MILLIGRAM(S): at 06:35

## 2019-05-12 RX ADMIN — CEFTAROLINE FOSAMIL 50 MILLIGRAM(S): 600 POWDER, FOR SOLUTION INTRAVENOUS at 12:49

## 2019-05-12 RX ADMIN — Medication 40 MILLIEQUIVALENT(S): at 18:18

## 2019-05-12 RX ADMIN — GABAPENTIN 100 MILLIGRAM(S): 400 CAPSULE ORAL at 06:34

## 2019-05-12 RX ADMIN — Medication 5 MILLIGRAM(S): at 18:32

## 2019-05-12 NOTE — PROGRESS NOTE ADULT - ASSESSMENT
Pt is 33 yr old female with fibromyalgia, SLE and transverse myelitis on Cellcept and prednisone, presented to ED with c/o R buttock/ posterior thigh infected pimples/ abscess with subjective fever. In ED BP 84/60 w   T 101, she was code sepsis with appropriate IVF and Abx given in ED, blood and wound cx sent. Pt admitted to medicine with sepsis 2/2 right thigh/ buttock infected pimple/ abscess.     >Sepsis 2/2 right thigh/ buttock infected pimple/ Cellulitis/ abscess:  - Sepsis present on admission, still febrile with leukocytosis.   - ID consult appreciated- Abx changed to Teflaro.  - C/w Tylenol prn, probiotics, IVFs  - Skin cx Staph Aerus, urine cx no growth, blood cx pending.   - CT pelvis ordered by ID.        >Hx of SLE, Transverse myelitis, fibromyalgia- C/w home medication except Plaquenil/ Cellcept- resume these 2 once sepsis resolves.     >DVT ppx- heparin Pt is 33 yr old female with fibromyalgia, SLE and transverse myelitis on Cellcept and prednisone, presented to ED with c/o R buttock/ posterior thigh infected pimples/ abscess with subjective fever. In ED BP 84/60 w   T 101, she was code sepsis with appropriate IVF and Abx given in ED, blood and wound cx sent. Pt admitted to medicine with sepsis 2/2 right thigh/ buttock infected pimple/ abscess.     >Sepsis 2/2 right thigh/ buttock infected pimple/ Cellulitis/ abscess:  - Sepsis present on admission, still febrile with leukocytosis.   - ID consult appreciated- Abx changed to Teflaro.  - C/w Tylenol prn, probiotics, IVFs  - Skin cx Staph Aerus, urine cx no growth, blood cx pending.   - CT pelvis ordered by ID.        >Hx of SLE, Transverse myelitis, fibromyalgia- C/w home medication except Plaquenil/ Cellcept/ prednisone- will resume these 3 once sepsis resolves.     >DVT ppx- heparin

## 2019-05-13 ENCOUNTER — APPOINTMENT (OUTPATIENT)
Dept: INTERNAL MEDICINE | Facility: CLINIC | Age: 34
End: 2019-05-13

## 2019-05-13 LAB
-  AMPICILLIN/SULBACTAM: SIGNIFICANT CHANGE UP
-  CEFAZOLIN: SIGNIFICANT CHANGE UP
-  CLINDAMYCIN: SIGNIFICANT CHANGE UP
-  DAPTOMYCIN: SIGNIFICANT CHANGE UP
-  ERYTHROMYCIN: SIGNIFICANT CHANGE UP
-  GENTAMICIN: SIGNIFICANT CHANGE UP
-  LINEZOLID: SIGNIFICANT CHANGE UP
-  OXACILLIN: SIGNIFICANT CHANGE UP
-  PENICILLIN: SIGNIFICANT CHANGE UP
-  RIFAMPIN: SIGNIFICANT CHANGE UP
-  TETRACYCLINE: SIGNIFICANT CHANGE UP
-  TRIMETHOPRIM/SULFAMETHOXAZOLE: SIGNIFICANT CHANGE UP
-  VANCOMYCIN: SIGNIFICANT CHANGE UP
ANION GAP SERPL CALC-SCNC: 15 MMOL/L — SIGNIFICANT CHANGE UP (ref 5–17)
BUN SERPL-MCNC: 5 MG/DL — LOW (ref 8–20)
CALCIUM SERPL-MCNC: 9 MG/DL — SIGNIFICANT CHANGE UP (ref 8.6–10.2)
CHLORIDE SERPL-SCNC: 101 MMOL/L — SIGNIFICANT CHANGE UP (ref 98–107)
CO2 SERPL-SCNC: 22 MMOL/L — SIGNIFICANT CHANGE UP (ref 22–29)
CREAT SERPL-MCNC: 0.5 MG/DL — SIGNIFICANT CHANGE UP (ref 0.5–1.3)
CULTURE RESULTS: SIGNIFICANT CHANGE UP
GLUCOSE SERPL-MCNC: 85 MG/DL — SIGNIFICANT CHANGE UP (ref 70–115)
HCT VFR BLD CALC: 33.9 % — LOW (ref 37–47)
HGB BLD-MCNC: 10.5 G/DL — LOW (ref 12–16)
MCHC RBC-ENTMCNC: 27 PG — SIGNIFICANT CHANGE UP (ref 27–31)
MCHC RBC-ENTMCNC: 31 G/DL — LOW (ref 32–36)
MCV RBC AUTO: 87.1 FL — SIGNIFICANT CHANGE UP (ref 81–99)
METHOD TYPE: SIGNIFICANT CHANGE UP
ORGANISM # SPEC MICROSCOPIC CNT: SIGNIFICANT CHANGE UP
ORGANISM # SPEC MICROSCOPIC CNT: SIGNIFICANT CHANGE UP
PLATELET # BLD AUTO: 290 K/UL — SIGNIFICANT CHANGE UP (ref 150–400)
POTASSIUM SERPL-MCNC: 3.5 MMOL/L — SIGNIFICANT CHANGE UP (ref 3.5–5.3)
POTASSIUM SERPL-SCNC: 3.5 MMOL/L — SIGNIFICANT CHANGE UP (ref 3.5–5.3)
RBC # BLD: 3.89 M/UL — LOW (ref 4.4–5.2)
RBC # FLD: 16.1 % — HIGH (ref 11–15.6)
SODIUM SERPL-SCNC: 138 MMOL/L — SIGNIFICANT CHANGE UP (ref 135–145)
SPECIMEN SOURCE: SIGNIFICANT CHANGE UP
WBC # BLD: 22.5 K/UL — HIGH (ref 4.8–10.8)
WBC # FLD AUTO: 22.5 K/UL — HIGH (ref 4.8–10.8)

## 2019-05-13 PROCEDURE — 99232 SBSQ HOSP IP/OBS MODERATE 35: CPT

## 2019-05-13 PROCEDURE — 72193 CT PELVIS W/DYE: CPT | Mod: 26

## 2019-05-13 RX ORDER — POTASSIUM CHLORIDE 20 MEQ
40 PACKET (EA) ORAL ONCE
Refills: 0 | Status: COMPLETED | OUTPATIENT
Start: 2019-05-13 | End: 2019-05-13

## 2019-05-13 RX ORDER — SODIUM CHLORIDE 9 MG/ML
250 INJECTION INTRAMUSCULAR; INTRAVENOUS; SUBCUTANEOUS ONCE
Refills: 0 | Status: COMPLETED | OUTPATIENT
Start: 2019-05-13 | End: 2019-05-13

## 2019-05-13 RX ADMIN — GABAPENTIN 100 MILLIGRAM(S): 400 CAPSULE ORAL at 05:27

## 2019-05-13 RX ADMIN — CEFTAROLINE FOSAMIL 50 MILLIGRAM(S): 600 POWDER, FOR SOLUTION INTRAVENOUS at 12:45

## 2019-05-13 RX ADMIN — Medication 100 MILLIGRAM(S): at 21:57

## 2019-05-13 RX ADMIN — Medication 325 MILLIGRAM(S): at 17:12

## 2019-05-13 RX ADMIN — CEFTAROLINE FOSAMIL 50 MILLIGRAM(S): 600 POWDER, FOR SOLUTION INTRAVENOUS at 23:38

## 2019-05-13 RX ADMIN — OXYCODONE HYDROCHLORIDE 5 MILLIGRAM(S): 5 TABLET ORAL at 17:58

## 2019-05-13 RX ADMIN — CEFTAROLINE FOSAMIL 50 MILLIGRAM(S): 600 POWDER, FOR SOLUTION INTRAVENOUS at 00:22

## 2019-05-13 RX ADMIN — Medication 40 MILLIEQUIVALENT(S): at 14:09

## 2019-05-13 RX ADMIN — HEPARIN SODIUM 5000 UNIT(S): 5000 INJECTION INTRAVENOUS; SUBCUTANEOUS at 14:09

## 2019-05-13 RX ADMIN — Medication 6 MILLIGRAM(S): at 21:56

## 2019-05-13 RX ADMIN — SODIUM CHLORIDE 500 MILLILITER(S): 9 INJECTION INTRAMUSCULAR; INTRAVENOUS; SUBCUTANEOUS at 12:56

## 2019-05-13 RX ADMIN — Medication 250 MILLIGRAM(S): at 05:26

## 2019-05-13 RX ADMIN — Medication 1 TABLET(S): at 12:46

## 2019-05-13 RX ADMIN — Medication 250 MILLIGRAM(S): at 17:12

## 2019-05-13 RX ADMIN — SODIUM CHLORIDE 150 MILLILITER(S): 9 INJECTION INTRAMUSCULAR; INTRAVENOUS; SUBCUTANEOUS at 04:01

## 2019-05-13 RX ADMIN — HEPARIN SODIUM 5000 UNIT(S): 5000 INJECTION INTRAVENOUS; SUBCUTANEOUS at 05:26

## 2019-05-13 RX ADMIN — Medication 650 MILLIGRAM(S): at 08:02

## 2019-05-13 RX ADMIN — Medication 5 MILLIGRAM(S): at 17:11

## 2019-05-13 RX ADMIN — OXYCODONE HYDROCHLORIDE 5 MILLIGRAM(S): 5 TABLET ORAL at 17:20

## 2019-05-13 RX ADMIN — PANTOPRAZOLE SODIUM 40 MILLIGRAM(S): 20 TABLET, DELAYED RELEASE ORAL at 05:26

## 2019-05-13 RX ADMIN — Medication 650 MILLIGRAM(S): at 08:59

## 2019-05-13 RX ADMIN — Medication 325 MILLIGRAM(S): at 05:26

## 2019-05-13 RX ADMIN — SENNA PLUS 2 TABLET(S): 8.6 TABLET ORAL at 21:56

## 2019-05-13 RX ADMIN — Medication 100 MILLIGRAM(S): at 05:26

## 2019-05-13 RX ADMIN — DULOXETINE HYDROCHLORIDE 60 MILLIGRAM(S): 30 CAPSULE, DELAYED RELEASE ORAL at 12:46

## 2019-05-13 RX ADMIN — MAGNESIUM OXIDE 400 MG ORAL TABLET 400 MILLIGRAM(S): 241.3 TABLET ORAL at 08:05

## 2019-05-13 RX ADMIN — MAGNESIUM OXIDE 400 MG ORAL TABLET 400 MILLIGRAM(S): 241.3 TABLET ORAL at 17:12

## 2019-05-13 RX ADMIN — Medication 2000 UNIT(S): at 12:46

## 2019-05-13 RX ADMIN — Medication 100 MILLIGRAM(S): at 14:09

## 2019-05-13 RX ADMIN — GABAPENTIN 300 MILLIGRAM(S): 400 CAPSULE ORAL at 21:57

## 2019-05-13 RX ADMIN — Medication 500 MILLIGRAM(S): at 12:46

## 2019-05-13 RX ADMIN — SODIUM CHLORIDE 150 MILLILITER(S): 9 INJECTION INTRAMUSCULAR; INTRAVENOUS; SUBCUTANEOUS at 17:12

## 2019-05-13 RX ADMIN — GABAPENTIN 100 MILLIGRAM(S): 400 CAPSULE ORAL at 17:12

## 2019-05-13 NOTE — PROGRESS NOTE ADULT - ASSESSMENT
32y/o  Female with h/o fibromyalgia, SLE and transverse myelitis on Cellcept and prednisone. Patient presented to ED with c/o R buttock/posterior thigh infected pimples/abscess with subjective fever. Intially symptoms started with a pimple at right posterior left thigh   She was recently at [facility] 3 weeks prior to this admission for left thigh infection, requiring midline insertion and dapto for MRSA completed course on 4/25/19.     Rt Gluteal/Thigh cellulitis r/o abscess  fibromyalgia  SLE   transverse myelitis  Immunosuppressed      - Blood cultures no growth   - Skin culture with staph  - Do CT pelvis to evaluate for abscess, repeat pending  - Need Surgical eval, likely would benefit from plastic surgery  - Continue Teflaro 600mg IV q 12hours  - Trend Fever  - Trend Leukocytosis      Will follow

## 2019-05-13 NOTE — PROGRESS NOTE ADULT - ASSESSMENT
Pt is 33 yr old female with fibromyalgia, SLE and transverse myelitis on Cellcept and prednisone, presented to ED with c/o R buttock/ posterior thigh infected pimples/ abscess with subjective fever. In ED BP 84/60 w   T 101, she was code sepsis with appropriate IVF and Abx given in ED, blood and wound cx sent. Pt admitted to medicine with sepsis 2/2 right thigh/ buttock infected pimple/ abscess. Started on IV Abx in consultation with ID. CT pelvis showed right gluteal/ thigh cellulitis and possible fluid collection.     >Sepsis 2/2 right thigh/ buttock infected pimple/ Cellulitis/ abscess:  - Sepsis present on admission.   - ID consult appreciated- Abx changed to Teflaro.  - C/w Tylenol prn, probiotics, IVFs  - Skin cx Staph Aerus, urine cx no growth, blood cx pending.   - CT pelvis with cellulitis and possible fluid collection, repeat CT pelvis ordered as per radiologist to include leg further down.      >Hx of SLE, Transverse myelitis, fibromyalgia- C/w home medication except Plaquenil/ Cellcept/ prednisone- will resume these 3 once sepsis resolves.     >DVT ppx- heparin

## 2019-05-14 LAB — LACTATE BLDV-MCNC: 1 MMOL/L — SIGNIFICANT CHANGE UP (ref 0.5–2)

## 2019-05-14 PROCEDURE — 99232 SBSQ HOSP IP/OBS MODERATE 35: CPT

## 2019-05-14 RX ORDER — POTASSIUM CHLORIDE 20 MEQ
40 PACKET (EA) ORAL ONCE
Refills: 0 | Status: COMPLETED | OUTPATIENT
Start: 2019-05-14 | End: 2019-05-14

## 2019-05-14 RX ADMIN — HEPARIN SODIUM 5000 UNIT(S): 5000 INJECTION INTRAVENOUS; SUBCUTANEOUS at 15:42

## 2019-05-14 RX ADMIN — Medication 100 MILLIGRAM(S): at 05:56

## 2019-05-14 RX ADMIN — DULOXETINE HYDROCHLORIDE 60 MILLIGRAM(S): 30 CAPSULE, DELAYED RELEASE ORAL at 12:16

## 2019-05-14 RX ADMIN — SENNA PLUS 2 TABLET(S): 8.6 TABLET ORAL at 22:09

## 2019-05-14 RX ADMIN — Medication 6 MILLIGRAM(S): at 22:11

## 2019-05-14 RX ADMIN — SODIUM CHLORIDE 150 MILLILITER(S): 9 INJECTION INTRAMUSCULAR; INTRAVENOUS; SUBCUTANEOUS at 09:46

## 2019-05-14 RX ADMIN — OXYCODONE HYDROCHLORIDE 5 MILLIGRAM(S): 5 TABLET ORAL at 22:09

## 2019-05-14 RX ADMIN — Medication 2000 UNIT(S): at 12:16

## 2019-05-14 RX ADMIN — GABAPENTIN 100 MILLIGRAM(S): 400 CAPSULE ORAL at 05:57

## 2019-05-14 RX ADMIN — Medication 250 MILLIGRAM(S): at 05:56

## 2019-05-14 RX ADMIN — PANTOPRAZOLE SODIUM 40 MILLIGRAM(S): 20 TABLET, DELAYED RELEASE ORAL at 09:47

## 2019-05-14 RX ADMIN — HEPARIN SODIUM 5000 UNIT(S): 5000 INJECTION INTRAVENOUS; SUBCUTANEOUS at 22:12

## 2019-05-14 RX ADMIN — OXYCODONE HYDROCHLORIDE 5 MILLIGRAM(S): 5 TABLET ORAL at 22:39

## 2019-05-14 RX ADMIN — GABAPENTIN 100 MILLIGRAM(S): 400 CAPSULE ORAL at 18:33

## 2019-05-14 RX ADMIN — CEFTAROLINE FOSAMIL 50 MILLIGRAM(S): 600 POWDER, FOR SOLUTION INTRAVENOUS at 12:17

## 2019-05-14 RX ADMIN — Medication 325 MILLIGRAM(S): at 18:33

## 2019-05-14 RX ADMIN — Medication 325 MILLIGRAM(S): at 05:57

## 2019-05-14 RX ADMIN — Medication 1 TABLET(S): at 12:17

## 2019-05-14 RX ADMIN — Medication 40 MILLIEQUIVALENT(S): at 12:16

## 2019-05-14 RX ADMIN — MAGNESIUM OXIDE 400 MG ORAL TABLET 400 MILLIGRAM(S): 241.3 TABLET ORAL at 09:47

## 2019-05-14 RX ADMIN — Medication 500 MILLIGRAM(S): at 12:16

## 2019-05-14 RX ADMIN — MAGNESIUM OXIDE 400 MG ORAL TABLET 400 MILLIGRAM(S): 241.3 TABLET ORAL at 18:33

## 2019-05-14 RX ADMIN — GABAPENTIN 300 MILLIGRAM(S): 400 CAPSULE ORAL at 22:09

## 2019-05-14 RX ADMIN — Medication 250 MILLIGRAM(S): at 18:33

## 2019-05-14 RX ADMIN — Medication 5 MILLIGRAM(S): at 14:28

## 2019-05-14 NOTE — PROGRESS NOTE ADULT - ASSESSMENT
33 yr old female with fibromyalgia, SLE and transverse myelitis on Cellcept and prednisone, presented to ED with c/o R buttock/ posterior thigh infected pimples/ abscess with subjective fever. In ED BP 84/60 w   T 101, she was code sepsis with appropriate IVF and Abx given in ED, blood and wound cx sent. Pt admitted to medicine with sepsis 2/2 right thigh/ buttock infected pimple/ abscess. Started on IV Abx in consultation with ID. CT pelvis showed right gluteal/ thigh cellulitis and possible fluid collection.     >Sepsis 2/2 right thigh/ buttock infected pimple/ Cellulitis/ abscess:  MRSA in wound culture, negative blood cultures    - Sepsis present on admission-resolved   - ID consult appreciated- Abx changed to Teflaro.  - CT pelvis with cellulitis. no abscess  - d/w ID re surgery input (ID will consult surgery)      >Hx of SLE, Transverse myelitis, fibromyalgia- C/w home medication except Plaquenil/ Cellcept/ prednisone- will resume these 3 once sepsis resolves.  resume meds likely in am    >DVT ppx- heparin

## 2019-05-14 NOTE — PROGRESS NOTE ADULT - ASSESSMENT
34y/o  Female with h/o fibromyalgia, SLE and transverse myelitis on Cellcept and prednisone. Patient presented to ED with c/o R buttock/posterior thigh infected pimples/abscess with subjective fever. Intially symptoms started with a pimple at right posterior left thigh   She was recently at [facility] 3 weeks prior to this admission for left thigh infection, requiring midline insertion and dapto for MRSA completed course on 4/25/19.     Rt Gluteal/Thigh cellulitis   fibromyalgia  SLE   transverse myelitis  Immunosuppressed      - Blood cultures no growth   - Skin culture with MRSA  - CT pelvis results reviewed  - plastic surgery eval called   - Continue Teflaro 600mg IV q 12hours  - Trend Fever  - Trend Leukocytosis      Will follow

## 2019-05-15 ENCOUNTER — APPOINTMENT (OUTPATIENT)
Dept: INTERNAL MEDICINE | Facility: CLINIC | Age: 34
End: 2019-05-15

## 2019-05-15 LAB
ANION GAP SERPL CALC-SCNC: 15 MMOL/L — SIGNIFICANT CHANGE UP (ref 5–17)
BASOPHILS # BLD AUTO: 0 K/UL — SIGNIFICANT CHANGE UP (ref 0–0.2)
BASOPHILS NFR BLD AUTO: 0.1 % — SIGNIFICANT CHANGE UP (ref 0–2)
BUN SERPL-MCNC: 6 MG/DL — LOW (ref 8–20)
CALCIUM SERPL-MCNC: 9.2 MG/DL — SIGNIFICANT CHANGE UP (ref 8.6–10.2)
CHLORIDE SERPL-SCNC: 101 MMOL/L — SIGNIFICANT CHANGE UP (ref 98–107)
CO2 SERPL-SCNC: 24 MMOL/L — SIGNIFICANT CHANGE UP (ref 22–29)
CREAT SERPL-MCNC: 0.48 MG/DL — LOW (ref 0.5–1.3)
CREAT SPEC-SCNC: 10 MG/DL
CREAT/PROT UR: 0.7 RATIO
EOSINOPHIL # BLD AUTO: 0.1 K/UL — SIGNIFICANT CHANGE UP (ref 0–0.5)
EOSINOPHIL NFR BLD AUTO: 1 % — SIGNIFICANT CHANGE UP (ref 0–6)
GLUCOSE SERPL-MCNC: 92 MG/DL — SIGNIFICANT CHANGE UP (ref 70–115)
HCT VFR BLD CALC: 33.5 % — LOW (ref 37–47)
HGB BLD-MCNC: 10.2 G/DL — LOW (ref 12–16)
LYMPHOCYTES # BLD AUTO: 0.8 K/UL — LOW (ref 1–4.8)
LYMPHOCYTES # BLD AUTO: 7.8 % — LOW (ref 20–55)
MCHC RBC-ENTMCNC: 26.4 PG — LOW (ref 27–31)
MCHC RBC-ENTMCNC: 30.4 G/DL — LOW (ref 32–36)
MCV RBC AUTO: 86.8 FL — SIGNIFICANT CHANGE UP (ref 81–99)
MONOCYTES # BLD AUTO: 1 K/UL — HIGH (ref 0–0.8)
MONOCYTES NFR BLD AUTO: 9.9 % — SIGNIFICANT CHANGE UP (ref 3–10)
NEUTROPHILS # BLD AUTO: 8.3 K/UL — HIGH (ref 1.8–8)
NEUTROPHILS NFR BLD AUTO: 78.9 % — HIGH (ref 37–73)
PLATELET # BLD AUTO: 325 K/UL — SIGNIFICANT CHANGE UP (ref 150–400)
POTASSIUM SERPL-MCNC: 4 MMOL/L — SIGNIFICANT CHANGE UP (ref 3.5–5.3)
POTASSIUM SERPL-SCNC: 4 MMOL/L — SIGNIFICANT CHANGE UP (ref 3.5–5.3)
PROT UR-MCNC: 8 MG/DL
RBC # BLD: 3.86 M/UL — LOW (ref 4.4–5.2)
RBC # FLD: 16.1 % — HIGH (ref 11–15.6)
SODIUM SERPL-SCNC: 140 MMOL/L — SIGNIFICANT CHANGE UP (ref 135–145)
WBC # BLD: 10.5 K/UL — SIGNIFICANT CHANGE UP (ref 4.8–10.8)
WBC # FLD AUTO: 10.5 K/UL — SIGNIFICANT CHANGE UP (ref 4.8–10.8)

## 2019-05-15 PROCEDURE — 99232 SBSQ HOSP IP/OBS MODERATE 35: CPT

## 2019-05-15 RX ORDER — CHLORHEXIDINE GLUCONATE 213 G/1000ML
1 SOLUTION TOPICAL DAILY
Refills: 0 | Status: DISCONTINUED | OUTPATIENT
Start: 2019-05-15 | End: 2019-05-19

## 2019-05-15 RX ORDER — HYDROXYCHLOROQUINE SULFATE 200 MG
200 TABLET ORAL
Refills: 0 | Status: DISCONTINUED | OUTPATIENT
Start: 2019-05-16 | End: 2019-05-19

## 2019-05-15 RX ORDER — MYCOPHENOLATE MOFETIL 250 MG/1
1500 CAPSULE ORAL
Refills: 0 | Status: DISCONTINUED | OUTPATIENT
Start: 2019-05-16 | End: 2019-05-19

## 2019-05-15 RX ADMIN — GABAPENTIN 300 MILLIGRAM(S): 400 CAPSULE ORAL at 22:32

## 2019-05-15 RX ADMIN — GABAPENTIN 100 MILLIGRAM(S): 400 CAPSULE ORAL at 17:26

## 2019-05-15 RX ADMIN — PANTOPRAZOLE SODIUM 40 MILLIGRAM(S): 20 TABLET, DELAYED RELEASE ORAL at 05:54

## 2019-05-15 RX ADMIN — OXYCODONE HYDROCHLORIDE 5 MILLIGRAM(S): 5 TABLET ORAL at 13:45

## 2019-05-15 RX ADMIN — Medication 250 MILLIGRAM(S): at 17:25

## 2019-05-15 RX ADMIN — Medication 5 MILLIGRAM(S): at 22:33

## 2019-05-15 RX ADMIN — SENNA PLUS 2 TABLET(S): 8.6 TABLET ORAL at 22:31

## 2019-05-15 RX ADMIN — HEPARIN SODIUM 5000 UNIT(S): 5000 INJECTION INTRAVENOUS; SUBCUTANEOUS at 13:03

## 2019-05-15 RX ADMIN — Medication 500 MILLIGRAM(S): at 12:59

## 2019-05-15 RX ADMIN — Medication 6 MILLIGRAM(S): at 22:32

## 2019-05-15 RX ADMIN — OXYCODONE HYDROCHLORIDE 5 MILLIGRAM(S): 5 TABLET ORAL at 22:36

## 2019-05-15 RX ADMIN — OXYCODONE HYDROCHLORIDE 5 MILLIGRAM(S): 5 TABLET ORAL at 05:57

## 2019-05-15 RX ADMIN — HEPARIN SODIUM 5000 UNIT(S): 5000 INJECTION INTRAVENOUS; SUBCUTANEOUS at 22:32

## 2019-05-15 RX ADMIN — Medication 325 MILLIGRAM(S): at 05:54

## 2019-05-15 RX ADMIN — Medication 325 MILLIGRAM(S): at 17:26

## 2019-05-15 RX ADMIN — GABAPENTIN 100 MILLIGRAM(S): 400 CAPSULE ORAL at 05:54

## 2019-05-15 RX ADMIN — Medication 250 MILLIGRAM(S): at 05:54

## 2019-05-15 RX ADMIN — Medication 2000 UNIT(S): at 13:00

## 2019-05-15 RX ADMIN — CHLORHEXIDINE GLUCONATE 1 APPLICATION(S): 213 SOLUTION TOPICAL at 13:01

## 2019-05-15 RX ADMIN — OXYCODONE HYDROCHLORIDE 5 MILLIGRAM(S): 5 TABLET ORAL at 12:59

## 2019-05-15 RX ADMIN — MAGNESIUM OXIDE 400 MG ORAL TABLET 400 MILLIGRAM(S): 241.3 TABLET ORAL at 12:59

## 2019-05-15 RX ADMIN — Medication 5 MILLIGRAM(S): at 00:32

## 2019-05-15 RX ADMIN — CEFTAROLINE FOSAMIL 50 MILLIGRAM(S): 600 POWDER, FOR SOLUTION INTRAVENOUS at 01:17

## 2019-05-15 RX ADMIN — MAGNESIUM OXIDE 400 MG ORAL TABLET 400 MILLIGRAM(S): 241.3 TABLET ORAL at 17:25

## 2019-05-15 RX ADMIN — OXYCODONE HYDROCHLORIDE 5 MILLIGRAM(S): 5 TABLET ORAL at 07:05

## 2019-05-15 RX ADMIN — HEPARIN SODIUM 5000 UNIT(S): 5000 INJECTION INTRAVENOUS; SUBCUTANEOUS at 05:54

## 2019-05-15 RX ADMIN — Medication 1 TABLET(S): at 12:59

## 2019-05-15 RX ADMIN — CEFTAROLINE FOSAMIL 50 MILLIGRAM(S): 600 POWDER, FOR SOLUTION INTRAVENOUS at 13:01

## 2019-05-15 RX ADMIN — DULOXETINE HYDROCHLORIDE 60 MILLIGRAM(S): 30 CAPSULE, DELAYED RELEASE ORAL at 13:00

## 2019-05-15 NOTE — PROGRESS NOTE ADULT - ASSESSMENT
34y/o  Female with h/o fibromyalgia, SLE and transverse myelitis on Cellcept and prednisone. Patient presented to ED with c/o R buttock/posterior thigh infected pimples/abscess with subjective fever. Intially symptoms started with a pimple at right posterior left thigh   She was recently at [facility] 3 weeks prior to this admission for left thigh infection, requiring midline insertion and dapto for MRSA completed course on 4/25/19.       Rt Gluteal/Thigh cellulitis   fibromyalgia  SLE   transverse myelitis  Immunosuppressed      - Blood cultures no growth   - Skin culture with MRSA  - CT pelvis results reviewed  - plastic surgery eval called   - Continue Teflaro 600mg IV q 12hours  - Trend Fever  - Trend Leukocytosis      Will follow

## 2019-05-15 NOTE — PROGRESS NOTE ADULT - ASSESSMENT
33 yr old female with fibromyalgia, SLE and transverse myelitis on Cellcept and prednisone, presented to ED with c/o R buttock/ posterior thigh infected pimples/ abscess with subjective fever. In ED BP 84/60 w   T 101, she was code sepsis with appropriate IVF and Abx given in ED, blood and wound cx sent. Pt admitted to medicine with sepsis 2/2 right thigh/ buttock infected pimple/ abscess. Started on IV Abx in consultation with ID. CT pelvis showed right gluteal/ thigh cellulitis and no drainable fluid collection on repeat CT scan.      >Sepsis 2/2 right thigh/ buttock infected pimple/ Cellulitis:   - MRSA in wound culture, negative blood cultures  - Sepsis present on admission-resolved   - ID following- Abx changed to Teflaro.  - CT pelvis with cellulitis, No abscess  - Plastic surgery consulted called by ID.       >Hx of SLE, Transverse myelitis, fibromyalgia- C/w home medication except Plaquenil/ Cellcept/ prednisone- Resume these medication from tomorrow.     >DVT ppx- heparin

## 2019-05-16 LAB
-  CEFTAROLINE: SIGNIFICANT CHANGE UP
CULTURE RESULTS: SIGNIFICANT CHANGE UP
CULTURE RESULTS: SIGNIFICANT CHANGE UP
GRAM STN FLD: SIGNIFICANT CHANGE UP
GRAM STN FLD: SIGNIFICANT CHANGE UP
MRSA PCR RESULT.: DETECTED
S AUREUS DNA NOSE QL NAA+PROBE: DETECTED
SPECIMEN SOURCE: SIGNIFICANT CHANGE UP

## 2019-05-16 PROCEDURE — 99232 SBSQ HOSP IP/OBS MODERATE 35: CPT

## 2019-05-16 PROCEDURE — 76882 US LMTD JT/FCL EVL NVASC XTR: CPT | Mod: 26,RT

## 2019-05-16 PROCEDURE — 99221 1ST HOSP IP/OBS SF/LOW 40: CPT

## 2019-05-16 RX ORDER — MUPIROCIN 20 MG/G
1 OINTMENT TOPICAL
Refills: 0 | Status: DISCONTINUED | OUTPATIENT
Start: 2019-05-16 | End: 2019-05-19

## 2019-05-16 RX ORDER — HYDROMORPHONE HYDROCHLORIDE 2 MG/ML
1 INJECTION INTRAMUSCULAR; INTRAVENOUS; SUBCUTANEOUS EVERY 4 HOURS
Refills: 0 | Status: DISCONTINUED | OUTPATIENT
Start: 2019-05-16 | End: 2019-05-17

## 2019-05-16 RX ADMIN — HEPARIN SODIUM 5000 UNIT(S): 5000 INJECTION INTRAVENOUS; SUBCUTANEOUS at 13:46

## 2019-05-16 RX ADMIN — CHLORHEXIDINE GLUCONATE 1 APPLICATION(S): 213 SOLUTION TOPICAL at 11:13

## 2019-05-16 RX ADMIN — GABAPENTIN 100 MILLIGRAM(S): 400 CAPSULE ORAL at 06:04

## 2019-05-16 RX ADMIN — Medication 250 MILLIGRAM(S): at 06:06

## 2019-05-16 RX ADMIN — HYDROMORPHONE HYDROCHLORIDE 1 MILLIGRAM(S): 2 INJECTION INTRAMUSCULAR; INTRAVENOUS; SUBCUTANEOUS at 23:43

## 2019-05-16 RX ADMIN — MUPIROCIN 1 APPLICATION(S): 20 OINTMENT TOPICAL at 18:39

## 2019-05-16 RX ADMIN — CEFTAROLINE FOSAMIL 50 MILLIGRAM(S): 600 POWDER, FOR SOLUTION INTRAVENOUS at 00:00

## 2019-05-16 RX ADMIN — Medication 200 MILLIGRAM(S): at 18:39

## 2019-05-16 RX ADMIN — Medication 500 MILLIGRAM(S): at 11:11

## 2019-05-16 RX ADMIN — Medication 40 MILLIGRAM(S): at 06:06

## 2019-05-16 RX ADMIN — Medication 1 TABLET(S): at 11:12

## 2019-05-16 RX ADMIN — MAGNESIUM OXIDE 400 MG ORAL TABLET 400 MILLIGRAM(S): 241.3 TABLET ORAL at 18:39

## 2019-05-16 RX ADMIN — MYCOPHENOLATE MOFETIL 1500 MILLIGRAM(S): 250 CAPSULE ORAL at 18:39

## 2019-05-16 RX ADMIN — Medication 200 MILLIGRAM(S): at 05:59

## 2019-05-16 RX ADMIN — HEPARIN SODIUM 5000 UNIT(S): 5000 INJECTION INTRAVENOUS; SUBCUTANEOUS at 21:42

## 2019-05-16 RX ADMIN — HEPARIN SODIUM 5000 UNIT(S): 5000 INJECTION INTRAVENOUS; SUBCUTANEOUS at 06:04

## 2019-05-16 RX ADMIN — Medication 325 MILLIGRAM(S): at 18:38

## 2019-05-16 RX ADMIN — Medication 325 MILLIGRAM(S): at 06:07

## 2019-05-16 RX ADMIN — OXYCODONE HYDROCHLORIDE 5 MILLIGRAM(S): 5 TABLET ORAL at 14:24

## 2019-05-16 RX ADMIN — MYCOPHENOLATE MOFETIL 1500 MILLIGRAM(S): 250 CAPSULE ORAL at 06:03

## 2019-05-16 RX ADMIN — PANTOPRAZOLE SODIUM 40 MILLIGRAM(S): 20 TABLET, DELAYED RELEASE ORAL at 06:09

## 2019-05-16 RX ADMIN — GABAPENTIN 100 MILLIGRAM(S): 400 CAPSULE ORAL at 18:38

## 2019-05-16 RX ADMIN — Medication 2000 UNIT(S): at 11:12

## 2019-05-16 RX ADMIN — HYDROMORPHONE HYDROCHLORIDE 1 MILLIGRAM(S): 2 INJECTION INTRAMUSCULAR; INTRAVENOUS; SUBCUTANEOUS at 23:13

## 2019-05-16 RX ADMIN — OXYCODONE HYDROCHLORIDE 5 MILLIGRAM(S): 5 TABLET ORAL at 13:54

## 2019-05-16 RX ADMIN — CEFTAROLINE FOSAMIL 50 MILLIGRAM(S): 600 POWDER, FOR SOLUTION INTRAVENOUS at 11:11

## 2019-05-16 RX ADMIN — Medication 6 MILLIGRAM(S): at 21:42

## 2019-05-16 RX ADMIN — OXYCODONE HYDROCHLORIDE 5 MILLIGRAM(S): 5 TABLET ORAL at 06:10

## 2019-05-16 RX ADMIN — DULOXETINE HYDROCHLORIDE 60 MILLIGRAM(S): 30 CAPSULE, DELAYED RELEASE ORAL at 11:11

## 2019-05-16 RX ADMIN — SENNA PLUS 2 TABLET(S): 8.6 TABLET ORAL at 21:42

## 2019-05-16 RX ADMIN — Medication 250 MILLIGRAM(S): at 18:39

## 2019-05-16 RX ADMIN — GABAPENTIN 300 MILLIGRAM(S): 400 CAPSULE ORAL at 21:43

## 2019-05-16 RX ADMIN — OXYCODONE HYDROCHLORIDE 5 MILLIGRAM(S): 5 TABLET ORAL at 21:43

## 2019-05-16 RX ADMIN — MAGNESIUM OXIDE 400 MG ORAL TABLET 400 MILLIGRAM(S): 241.3 TABLET ORAL at 08:16

## 2019-05-16 RX ADMIN — Medication 5 MILLIGRAM(S): at 11:12

## 2019-05-16 NOTE — PROGRESS NOTE ADULT - ASSESSMENT
34y/o  Female with h/o fibromyalgia, SLE and transverse myelitis on Cellcept and prednisone. Patient presented to ED with c/o R buttock/posterior thigh infected pimples/abscess with subjective fever. Intially symptoms started with a pimple at right posterior left thigh   She was recently at [facility] 3 weeks prior to this admission for left thigh infection, requiring midline insertion and dapto for MRSA completed course on 4/25/19.       Rt Gluteal/Thigh cellulitis   fibromyalgia  SLE   transverse myelitis  Immunosuppressed      - Blood cultures no growth   - Skin culture with MRSA  - CT pelvis results reviewed  - surgery eval noted  - Continue Teflaro 600mg IV q 12hours  - Trend Fever  - Trend Leukocytosis      Will follow

## 2019-05-16 NOTE — CONSULT NOTE ADULT - ASSESSMENT
33 yr old female w fibromylagia, SLE and transverse myelitis on cellcept, plaquenil and prednisone c/o R buttock abscess w fever  presented as sepsis in immunocompromised pt      #sepsis  #R gluteal abscess/cellulitis  likely recurrent MRSA  1. admit to hospital monitored   2. continue zosyn/vanco   3. follow up blood cx  4. consult ID  5. continue IVF  6. US to evaluate for deeper space infection    #leukocytosis 23K due to above  1. trend CBC    # fibromylagia  1. continue gabapentin 100 mg BID                                    300 q HS  2. baclofen    #SLE/transverse myelitis  1. continue prednisone 40 mg daily  2. holding plaquenil  3. holding cellcept  4. will notify pt's rheum and neuro of admission    IMPROVE VTE Individual Risk Assessment    RISK                                                                Points    [  ] Previous VTE                                                  3    [  ] Thrombophilia                                               2    [  ] Lower limb paralysis                                      2        (unable to hold up >15 seconds)      [  ] Current Cancer                                              2         (within 6 months)    [  ] Immobilization > 24 hrs                                1    [  ] ICU/CCU stay > 24 hours                              1    [  ] Age > 60                                                      1    IMPROVE VTE Score _____0____    IMPROVE Score 0-1: Low Risk, No VTE prophylaxis required for most patients, encourage ambulation.   IMPROVE Score 2-3: At risk, pharmacologic VTE prophylaxis is indicated for most patients (in the absence of a contraindication)  IMPROVE Score > or = 4: High Risk, pharmacologic VTE prophylaxis is indicated for most patients (in the absence of a contraindication)
32 y/o F h/o lupus, transverse myelitis on chronic immunosuppression with right gluteal/posterior thigh cellulitis and abscess.     Plan:  Continue management per primary team  IV antibiotics as per ID and primary team  Warm compresses to right buttock  Will do Incision and drainage today, 5/17. Will send cultures
34y/o  Female with h/o fibromyalgia, SLE and transverse myelitis on Cellcept and prednisone. Patient presented to ED with c/o R buttock/posterior thigh infected pimples/abscess with subjective fever. Intially symptoms started with a pimple at right posterior left thigh   She was recently at [facility] 3 weeks prior to this admission for left thigh infection, requiring midline insertion and dapto for MRSA completed course on 4/25/19.     Rt buttocks/Thigh cellulitis r/o abscess  fibromyalgia  SLE   transverse myelitis  Immunosuppressed      - Blood cultures pending  - Do CT pelvis to evaluate for abscess  - Need Surgical eval, likely would benefit from plastic surgery  - Will D/C Vancomycin and Zosyn  - Start Teflaro 600mg IV q 12hours  - Trend Fever  - Trend Leukocytosis      Will follow

## 2019-05-16 NOTE — PROCEDURE NOTE - ADDITIONAL PROCEDURE DETAILS
will continue with daily packing changes  found area of fluctuance. approx 3cm linear I & D made with approx 3cc purulent discharge and approx 6-10cc of possible infected hematoma removed. approx 2cm undermining in 2-3 o clock region. surrounding induration extending lisseth incisional for approx 6cm

## 2019-05-16 NOTE — CONSULT NOTE ADULT - SUBJECTIVE AND OBJECTIVE BOX
33 yr old female w fibromylagia, SLE and transverse myelitis on cellcept and prednisone c/o R buttock abscess w fever    Patient had prior episode  requiring PICC insertion and dapto for MRSA.  now w worsening abscesses over posterior right thigh for one week. C/o fever w max at 102 and increased pain and swelling x 2 days.    After PICC pulled, pt had IV dapto through peripheral IV x 3 days finishing     In ED BP 84/60 w   T 99.8  treated as code sepsis w 31 cc/kg as 1650 cc IV RL placed on zosyn and vanco after cx obtained.  WBC 23K lactate 2.4 and K 3.4   indicated I+D as well        Rheumatologist Dr. Ramonita Barrett [phone]   Neuro Dr. Froylan Schneider  Nephro Dr. Teresa while in [facility]  ID Dr. Vasquez [phone]    PAST MEDICAL HX  ARF acute renal failure ATN/AIN due to radio-constrast/ toradol       Catheter related superficial thrombosis of R basilic vein 2019  Fibromyalgia  GERD  Lupus nephritis (DPGN)  SLE  Transverse myelitis    PAST SURGICAL HX  EGD esophagogastroduodenoscopy   Nasal septoplasty  Renal bx    FAMILY HX  Family history of melanoma: In Eye  Family history of osteoarthritis  Family history of liver disease    ROS  GEN + fever 102-102  +chills  MSK pain ephraim when changing positions and walking      T(C): 37.2 (19 @ 12:25), Max: 38.3 (19 @ 10:40)  HR: 106 (19 @ 12:25) (106 - 160)  BP: 120/69 (19 @ 12:25) (84/60 - 120/69)  RR: 20 (19 @ 12:25) (20 - 24)  SpO2: 100% (19 @ 12:25) (99% - 100%)    PHYSICAL EXAM: evaluation precludes physical exam. Pertinent physical exam findings as per video conference with  teleNA at bedside is as follows:    pt appears uncomfortable due to gluteal pain   at bedside                              12.6   23.9  )-----------( 334      ( 11 May 2019 10:06 )             40.3     11 May 2019 10:06    136    |  95     |  8.0    ----------------------------<  87     3.4     |  23.0   |  0.61     Ca    9.2        11 May 2019 10:06    TPro  7.4    /  Alb  4.4    /  TBili  0.9    /  DBili  x      /  AST  20     /  ALT  22     /  AlkPhos  85     11 May 2019 10:06    PT/INR - ( 11 May 2019 10:06 )   PT: 13.9 sec;   INR: 1.20 ratio         PTT - ( 11 May 2019 10:06 )  PTT:32.5 sec  CAPILLARY BLOOD GLUCOSE        LIVER FUNCTIONS - ( 11 May 2019 10:06 )  Alb: 4.4 g/dL / Pro: 7.4 g/dL / ALK PHOS: 85 U/L / ALT: 22 U/L / AST: 20 U/L / GGT: x           Urinalysis Basic - ( 11 May 2019 14:51 )    Color: Yellow / Appearance: Clear / S.005 / pH: x  Gluc: x / Ketone: Negative  / Bili: Negative / Urobili: Negative mg/dL   Blood: x / Protein: Negative mg/dL / Nitrite: Negative   Leuk Esterase: Negative / RBC: x / WBC x   Sq Epi: x / Non Sq Epi: x / Bacteria: x
HPI: HPI: 34 y/o F h/o lupus, transverse myelitis on chronic immunosuppression presents to the ED with erythema to right buttock/posterior thigh, fevers, elevated white count and hypotension. She was admitted to the medical service for IVF and antibiotics. ID is on board for recommendations regarding antibiotic therapy. CT and US was done which revealed no fluid collection, however, wound is draining on examination. Patient states she first noticed "a couple small pimples" on right posterior thigh region around 1 week ago, that worsened and progressed to a large area of erythema with pain and fevers. No chest pain or dyspnea. Was seen in the past by our service in April 2019 for a similar issue in which incision and drainage was required.     PMH: lupus, transverse myelitis, fibromyalgia, GERD  PSH: deviated nasal septum, renal biopsy  Medications: prednisone, hydroxychloroquine, cymbalta  Allergies: NKDA, NKA  Social: denies toxic habits x3       MEDICATIONS  (STANDING):  ascorbic acid 500 milliGRAM(s) Oral daily  ceftaroline fosamil IVPB 600 milliGRAM(s) IV Intermittent every 12 hours  chlorhexidine 2% Cloths 1 Application(s) Topical daily  cholecalciferol 2000 Unit(s) Oral daily  DULoxetine 60 milliGRAM(s) Oral daily  ferrous    sulfate 325 milliGRAM(s) Oral two times a day  gabapentin 100 milliGRAM(s) Oral two times a day  gabapentin 300 milliGRAM(s) Oral at bedtime  heparin  Injectable 5000 Unit(s) SubCutaneous every 8 hours  hydroxychloroquine 200 milliGRAM(s) Oral two times a day  magnesium oxide 400 milliGRAM(s) Oral two times a day with meals  melatonin 6 milliGRAM(s) Oral at bedtime  multivitamin 1 Tablet(s) Oral daily  mupirocin 2% Nasal 1 Application(s) Nasal two times a day  mycophenolate mofetil 1500 milliGRAM(s) Oral two times a day  pantoprazole    Tablet 40 milliGRAM(s) Oral before breakfast  predniSONE   Tablet 40 milliGRAM(s) Oral daily  saccharomyces boulardii 250 milliGRAM(s) Oral two times a day  senna 2 Tablet(s) Oral at bedtime    MEDICATIONS  (PRN):  acetaminophen   Tablet .. 650 milliGRAM(s) Oral every 6 hours PRN Temp greater or equal to 38C (100.4F)  aluminum hydroxide/magnesium hydroxide/simethicone Suspension 30 milliLiter(s) Oral every 4 hours PRN Dyspepsia  baclofen 5 milliGRAM(s) Oral every 8 hours PRN Muscle Spasm  magnesium hydroxide Suspension 30 milliLiter(s) Oral daily PRN Constipation  ondansetron Injectable 4 milliGRAM(s) IV Push every 6 hours PRN Nausea  oxyCODONE    IR 5 milliGRAM(s) Oral every 6 hours PRN Moderate Pain (4 - 6)  polyethylene glycol 3350 17 Gram(s) Oral daily PRN Constipation      Vital Signs Last 24 Hrs  T(C): 36.8 (16 May 2019 07:51), Max: 36.8 (16 May 2019 07:51)  T(F): 98.3 (16 May 2019 07:51), Max: 98.3 (16 May 2019 07:51)  HR: 87 (16 May 2019 07:51) (86 - 87)  BP: 98/63 (16 May 2019 07:51) (98/63 - 102/64)  BP(mean): --  RR: 20 (16 May 2019 07:51) (20 - 20)  SpO2: 97% (15 May 2019 16:40) (97% - 97%)    PE  General: NAD, AOx3, uncomfortable on examination  HEENT: PERRLA, EOMI, dry mucous membranes  Neck: supple, nontender  Cardiovascular: normal rate, normal rhythm, no murmurs  Respiratory: no respiratory distress, CTAB  Abdomen: soft, nontender, nondistended. No guarding or rebound. Normal bowel sounds.  Extremities: 10cm x 12cm area of erythema to right posterior thigh with central area of induration, +fluctuance and pus with palpation of wound from pores; small ulceration at most posterior aspect  Neuro: no motor or sensory deficits       I&O's Detail    15 May 2019 07:01  -  16 May 2019 07:00  --------------------------------------------------------  IN:    Oral Fluid: 760 mL  Total IN: 760 mL    OUT:    Voided: 550 mL  Total OUT: 550 mL    Total NET: 210 mL          LABS:                        10.2   10.5  )-----------( 325      ( 15 May 2019 06:27 )             33.5     05-15    140  |  101  |  6.0<L>  ----------------------------<  92  4.0   |  24.0  |  0.48<L>    Ca    9.2      15 May 2019 06:39            RADIOLOGY & ADDITIONAL STUDIES:
[facility]  INFECTIOUS DISEASES AND INTERNAL MEDICINE at [place]  =======================================================  Adams Vasquez MD  Diplomates American Board of Internal Medicine and Infectious Diseases  =======================================================      MRN-[id number]  KANE LARA     CC: Patient is a 33y old  Female who presents with a chief complaint of Leg wound/ cellulitis (11 May 2019 17:38)      34y/o  Female with h/o fibromyalgia, SLE and transverse myelitis on Cellcept and prednisone. Patient presented to ED with c/o R buttock/posterior thigh infected pimples/abscess with subjective fever. Initially symptoms started with a pimple at right posterior left thigh which started 2-3 days ago, worsening with swelling/ pain and induration associated with subjective fever. came to ER for further evaluation. She was recently at [facility] 3 weeks prior to this admission for left thigh infection, requiring midline insertion and dapto for MRSA. After midline she developed superficial vein thrombosis hence midline was removed and she finished her ABx course on 19. In the ER patient was febrile, leukocytosis to 24.3K. Started on Vancomycin and Zosyn. ID input requested.       Past Medical & Surgical Hx:  Fibromyalgia  Lupus  GERD (gastroesophageal reflux disease)  Lupus nephritis  S/P correction of deviated nasal septum  History of esophagogastroduodenoscopy (EGD)  History of biopsy: Renal      Social Hx:  Denies smoking, ETOH or drug use      FAMILY HISTORY:  No known Family history in mother, father or siblings       Allergies  No Known Allergies      Antibiotics:  Vancomycin  Zosyn       REVIEW OF SYSTEMS:  CONSTITUTIONAL:  + Fever + chills  HEENT:  No diplopia or blurred vision.  No earache, sore throat or runny nose.  CARDIOVASCULAR:  No pressure, squeezing, strangling, tightness, heaviness or aching about the chest, neck, axilla or epigastrium.  RESPIRATORY:  No cough, shortness of breath  GASTROINTESTINAL:  No nausea, vomiting or diarrhea.  GENITOURINARY:  No dysuria, frequency or urgency.   MUSCULOSKELETAL:  no joint aches, no muscle pain  SKIN:  pain on right buttocks and redness  NEUROLOGIC:  No Headaches, seizures or weakness.  PSYCHIATRIC:  No disorder of thought or mood.  ENDOCRINE:  No heat or cold intolerance  HEMATOLOGICAL:  No easy bruising or bleeding.       Physical Exam:  Vital Signs Last 24 Hrs  T(C): 39.3 (12 May 2019 07:58), Max: 39.3 (12 May 2019 07:58)  T(F): 102.7 (12 May 2019 07:58), Max: 102.7 (12 May 2019 07:58)  HR: 116 (12 May 2019 07:58) (86 - 124)  BP: 111/78 (12 May 2019 07:58) (104/64 - 125/73)  RR: 18 (12 May 2019 07:58) (17 - 21)  SpO2: 100% (12 May 2019 07:58) (99% - 100%)      Examined with RN Anne  GEN: NAD, pleasant  HEENT: normocephalic and atraumatic. EOMI. PERRL.  Anicteric  NECK: Supple.   LUNGS: Clear to auscultation.  HEART: Regular rate and rhythm  ABDOMEN: Soft, nontender, and nondistended.  Positive bowel sounds.    : No CVA tenderness  EXTREMITIES: Without any edema.  MSK: No joint swelling  NEUROLOGIC: No Focal Deficits  PSYCHIATRIC: Appropriate affect .  SKIN: Right buttocks with erythema, warmth and swelling, packing in place      Labs:      136  |  98  |  9.0  ----------------------------<  95  3.2<L>   |  24.0  |  0.47<L>    Ca    9.0      12 May 2019 07:39  Phos  3.4       Mg     1.8         TPro  7.4  /  Alb  4.4  /  TBili  0.9  /  DBili  x   /  AST  20  /  ALT  22  /  AlkPhos  85  05-11                          11.3   24.3  )-----------( 295      ( 12 May 2019 07:39 )             36.4       PT/INR - ( 11 May 2019 10:06 )   PT: 13.9 sec;   INR: 1.20 ratio         PTT - ( 11 May 2019 10:06 )  PTT:32.5 sec  Urinalysis Basic - ( 11 May 2019 14:51 )    Color: Yellow / Appearance: Clear / S.005 / pH: x  Gluc: x / Ketone: Negative  / Bili: Negative / Urobili: Negative mg/dL   Blood: x / Protein: Negative mg/dL / Nitrite: Negative   Leuk Esterase: Negative / RBC: x / WBC x   Sq Epi: x / Non Sq Epi: x / Bacteria: x      LIVER FUNCTIONS - ( 11 May 2019 10:06 )  Alb: 4.4 g/dL / Pro: 7.4 g/dL / ALK PHOS: 85 U/L / ALT: 22 U/L / AST: 20 U/L / GGT: x

## 2019-05-16 NOTE — PROCEDURE NOTE - NSSITEPREP_SKIN_A_CORE
Adherence to aseptic technique: hand hygiene prior to donning barriers (gown, gloves), don cap and mask, sterile drape over patient/chlorhexidine
povidone iodine (if allergic to chlorhexidine)

## 2019-05-16 NOTE — PROCEDURE NOTE - NSPROCDETAILS_GEN_ALL_CORE
location identified, draped/prepped, sterile technique used/blood seen on insertion/dressing applied/flushes easily/ultrasound utilization/secured in place/sterile technique, catheter placed
incision left open, packing placed

## 2019-05-16 NOTE — PROGRESS NOTE ADULT - ASSESSMENT
33 yr old female with fibromyalgia, SLE and transverse myelitis on Cellcept and prednisone, presented to ED with c/o R buttock/ posterior thigh infected pimples/ abscess with subjective fever. In ED BP 84/60 w   T 101, she was code sepsis with appropriate IVF and Abx given in ED, blood and wound cx sent. Pt admitted to medicine with sepsis 2/2 right thigh/ buttock infected pimple/ abscess. Started on IV Abx in consultation with ID. CT pelvis showed right gluteal/ thigh cellulitis and no drainable fluid collection on repeat CT scan.  Pt was seen by plastic surgery and recommended I&D by ACS.     >Sepsis 2/2 right thigh/ buttock infected pimple/ Cellulitis:   - MRSA in wound culture, negative blood cultures  - Sepsis present on admission-resolved   - Overall improving, afebrile, wbc trended down, still has swelling on right medial thigh.   - ID following- C/w Teflaro.  - CT pelvis with cellulitis, No abscess  - Plastic surgery note reviewed- recommended ACS consult for possible I&D. Discussed with ACS- recommended to repeat US to check on fluid collection.        >Hx of SLE, Transverse myelitis, fibromyalgia- C/w home medication as ordered.    >DVT ppx- heparin

## 2019-05-17 PROCEDURE — 99232 SBSQ HOSP IP/OBS MODERATE 35: CPT

## 2019-05-17 RX ORDER — HYDROMORPHONE HYDROCHLORIDE 2 MG/ML
1 INJECTION INTRAMUSCULAR; INTRAVENOUS; SUBCUTANEOUS EVERY 6 HOURS
Refills: 0 | Status: DISCONTINUED | OUTPATIENT
Start: 2019-05-17 | End: 2019-05-19

## 2019-05-17 RX ADMIN — MYCOPHENOLATE MOFETIL 1500 MILLIGRAM(S): 250 CAPSULE ORAL at 17:33

## 2019-05-17 RX ADMIN — CEFTAROLINE FOSAMIL 50 MILLIGRAM(S): 600 POWDER, FOR SOLUTION INTRAVENOUS at 00:30

## 2019-05-17 RX ADMIN — HYDROMORPHONE HYDROCHLORIDE 1 MILLIGRAM(S): 2 INJECTION INTRAMUSCULAR; INTRAVENOUS; SUBCUTANEOUS at 21:58

## 2019-05-17 RX ADMIN — MUPIROCIN 1 APPLICATION(S): 20 OINTMENT TOPICAL at 05:23

## 2019-05-17 RX ADMIN — SENNA PLUS 2 TABLET(S): 8.6 TABLET ORAL at 21:24

## 2019-05-17 RX ADMIN — Medication 325 MILLIGRAM(S): at 05:23

## 2019-05-17 RX ADMIN — Medication 200 MILLIGRAM(S): at 17:33

## 2019-05-17 RX ADMIN — GABAPENTIN 300 MILLIGRAM(S): 400 CAPSULE ORAL at 21:24

## 2019-05-17 RX ADMIN — Medication 40 MILLIGRAM(S): at 05:23

## 2019-05-17 RX ADMIN — Medication 500 MILLIGRAM(S): at 13:00

## 2019-05-17 RX ADMIN — GABAPENTIN 100 MILLIGRAM(S): 400 CAPSULE ORAL at 17:33

## 2019-05-17 RX ADMIN — CHLORHEXIDINE GLUCONATE 1 APPLICATION(S): 213 SOLUTION TOPICAL at 13:24

## 2019-05-17 RX ADMIN — OXYCODONE HYDROCHLORIDE 5 MILLIGRAM(S): 5 TABLET ORAL at 05:26

## 2019-05-17 RX ADMIN — HYDROMORPHONE HYDROCHLORIDE 1 MILLIGRAM(S): 2 INJECTION INTRAMUSCULAR; INTRAVENOUS; SUBCUTANEOUS at 16:43

## 2019-05-17 RX ADMIN — HYDROMORPHONE HYDROCHLORIDE 1 MILLIGRAM(S): 2 INJECTION INTRAMUSCULAR; INTRAVENOUS; SUBCUTANEOUS at 15:58

## 2019-05-17 RX ADMIN — Medication 6 MILLIGRAM(S): at 21:24

## 2019-05-17 RX ADMIN — MAGNESIUM OXIDE 400 MG ORAL TABLET 400 MILLIGRAM(S): 241.3 TABLET ORAL at 17:33

## 2019-05-17 RX ADMIN — MUPIROCIN 1 APPLICATION(S): 20 OINTMENT TOPICAL at 18:52

## 2019-05-17 RX ADMIN — HEPARIN SODIUM 5000 UNIT(S): 5000 INJECTION INTRAVENOUS; SUBCUTANEOUS at 21:24

## 2019-05-17 RX ADMIN — Medication 250 MILLIGRAM(S): at 05:23

## 2019-05-17 RX ADMIN — Medication 250 MILLIGRAM(S): at 17:33

## 2019-05-17 RX ADMIN — PANTOPRAZOLE SODIUM 40 MILLIGRAM(S): 20 TABLET, DELAYED RELEASE ORAL at 05:23

## 2019-05-17 RX ADMIN — HEPARIN SODIUM 5000 UNIT(S): 5000 INJECTION INTRAVENOUS; SUBCUTANEOUS at 13:24

## 2019-05-17 RX ADMIN — HYDROMORPHONE HYDROCHLORIDE 1 MILLIGRAM(S): 2 INJECTION INTRAMUSCULAR; INTRAVENOUS; SUBCUTANEOUS at 22:39

## 2019-05-17 RX ADMIN — MAGNESIUM OXIDE 400 MG ORAL TABLET 400 MILLIGRAM(S): 241.3 TABLET ORAL at 08:48

## 2019-05-17 RX ADMIN — CEFTAROLINE FOSAMIL 50 MILLIGRAM(S): 600 POWDER, FOR SOLUTION INTRAVENOUS at 15:52

## 2019-05-17 RX ADMIN — OXYCODONE HYDROCHLORIDE 5 MILLIGRAM(S): 5 TABLET ORAL at 07:07

## 2019-05-17 RX ADMIN — HEPARIN SODIUM 5000 UNIT(S): 5000 INJECTION INTRAVENOUS; SUBCUTANEOUS at 05:23

## 2019-05-17 RX ADMIN — GABAPENTIN 100 MILLIGRAM(S): 400 CAPSULE ORAL at 05:23

## 2019-05-17 RX ADMIN — DULOXETINE HYDROCHLORIDE 60 MILLIGRAM(S): 30 CAPSULE, DELAYED RELEASE ORAL at 15:52

## 2019-05-17 RX ADMIN — MYCOPHENOLATE MOFETIL 1500 MILLIGRAM(S): 250 CAPSULE ORAL at 05:23

## 2019-05-17 RX ADMIN — Medication 2000 UNIT(S): at 13:00

## 2019-05-17 RX ADMIN — Medication 200 MILLIGRAM(S): at 05:23

## 2019-05-17 RX ADMIN — Medication 1 TABLET(S): at 13:00

## 2019-05-17 RX ADMIN — Medication 325 MILLIGRAM(S): at 17:33

## 2019-05-17 NOTE — CHART NOTE - NSCHARTNOTEFT_GEN_A_CORE
34yo F h/o lupus on immunosuppression with right thigh abscess +MRSA s/p bedside I&D.  Pt's wound only required corner of dry guaze to be folded into the wound with the rest of the gauze covering the wound and taped.  Pt is encourage to have daily showers with soap and water running on the wound before daily dressing changes.  Surgery team will sign off at this time. 34yo F h/o lupus on immunosuppression with right thigh abscess +MRSA s/p bedside I&D.  Pt's wound only required corner of dry guaze to be folded into the wound with the rest of the gauze covering the wound and taped.  Pt is encourage to have daily showers with soap and water running on the wound before daily dressing changes.  If pt's wound becomes superficial, daily dry gauze without corner wick packings will suffice. Surgery team will sign off at this time.

## 2019-05-17 NOTE — PROGRESS NOTE ADULT - ASSESSMENT
34 y/o F h/o lupus, transverse myelitis on chronic immunosuppression with right gluteal/posterior thigh cellulitis, s/p I&D.    Plan:  Continue management per primary team  IV antibiotics as per ID and primary team  Warm compresses to right buttock  daily dressing and packing changes   will continue to follow

## 2019-05-17 NOTE — PROGRESS NOTE ADULT - ASSESSMENT
34y/o  Female with h/o fibromyalgia, SLE and transverse myelitis on Cellcept and prednisone. Patient presented to ED with c/o R buttock/posterior thigh infected pimples/abscess with subjective fever. Intially symptoms started with a pimple at right posterior left thigh   She was recently at [facility] 3 weeks prior to this admission for left thigh infection, requiring midline insertion and dapto for MRSA completed course on 4/25/19.       Rt Gluteal/Thigh cellulitis   fibromyalgia  SLE   transverse myelitis  Immunosuppressed      - Blood cultures no growth   - Skin culture with MRSA  - CT pelvis results reviewed  - surgery eval noted  - Continue Teflaro 600mg IV q 12hours  - Trend Fever  - Trend Leukocytosis      Will follow

## 2019-05-18 VITALS
SYSTOLIC BLOOD PRESSURE: 106 MMHG | RESPIRATION RATE: 20 BRPM | TEMPERATURE: 98 F | DIASTOLIC BLOOD PRESSURE: 72 MMHG | OXYGEN SATURATION: 97 % | HEART RATE: 74 BPM

## 2019-05-18 LAB
ANION GAP SERPL CALC-SCNC: 17 MMOL/L — SIGNIFICANT CHANGE UP (ref 5–17)
BUN SERPL-MCNC: 19 MG/DL — SIGNIFICANT CHANGE UP (ref 8–20)
CALCIUM SERPL-MCNC: 9.3 MG/DL — SIGNIFICANT CHANGE UP (ref 8.6–10.2)
CHLORIDE SERPL-SCNC: 100 MMOL/L — SIGNIFICANT CHANGE UP (ref 98–107)
CO2 SERPL-SCNC: 25 MMOL/L — SIGNIFICANT CHANGE UP (ref 22–29)
CREAT SERPL-MCNC: 0.48 MG/DL — LOW (ref 0.5–1.3)
GLUCOSE SERPL-MCNC: 102 MG/DL — SIGNIFICANT CHANGE UP (ref 70–115)
HCT VFR BLD CALC: 34.4 % — LOW (ref 37–47)
HGB BLD-MCNC: 10.2 G/DL — LOW (ref 12–16)
MCHC RBC-ENTMCNC: 26 PG — LOW (ref 27–31)
MCHC RBC-ENTMCNC: 29.7 G/DL — LOW (ref 32–36)
MCV RBC AUTO: 87.5 FL — SIGNIFICANT CHANGE UP (ref 81–99)
PLATELET # BLD AUTO: 483 K/UL — HIGH (ref 150–400)
POTASSIUM SERPL-MCNC: 3.6 MMOL/L — SIGNIFICANT CHANGE UP (ref 3.5–5.3)
POTASSIUM SERPL-SCNC: 3.6 MMOL/L — SIGNIFICANT CHANGE UP (ref 3.5–5.3)
RBC # BLD: 3.93 M/UL — LOW (ref 4.4–5.2)
RBC # FLD: 15.8 % — HIGH (ref 11–15.6)
SODIUM SERPL-SCNC: 142 MMOL/L — SIGNIFICANT CHANGE UP (ref 135–145)
WBC # BLD: 7.1 K/UL — SIGNIFICANT CHANGE UP (ref 4.8–10.8)
WBC # FLD AUTO: 7.1 K/UL — SIGNIFICANT CHANGE UP (ref 4.8–10.8)

## 2019-05-18 PROCEDURE — 99232 SBSQ HOSP IP/OBS MODERATE 35: CPT

## 2019-05-18 RX ORDER — ENOXAPARIN SODIUM 100 MG/ML
40 INJECTION SUBCUTANEOUS DAILY
Refills: 0 | Status: DISCONTINUED | OUTPATIENT
Start: 2019-05-19 | End: 2019-05-19

## 2019-05-18 RX ADMIN — Medication 40 MILLIGRAM(S): at 05:43

## 2019-05-18 RX ADMIN — MYCOPHENOLATE MOFETIL 1500 MILLIGRAM(S): 250 CAPSULE ORAL at 17:20

## 2019-05-18 RX ADMIN — MAGNESIUM OXIDE 400 MG ORAL TABLET 400 MILLIGRAM(S): 241.3 TABLET ORAL at 17:19

## 2019-05-18 RX ADMIN — HYDROMORPHONE HYDROCHLORIDE 1 MILLIGRAM(S): 2 INJECTION INTRAMUSCULAR; INTRAVENOUS; SUBCUTANEOUS at 13:16

## 2019-05-18 RX ADMIN — DULOXETINE HYDROCHLORIDE 60 MILLIGRAM(S): 30 CAPSULE, DELAYED RELEASE ORAL at 12:28

## 2019-05-18 RX ADMIN — GABAPENTIN 100 MILLIGRAM(S): 400 CAPSULE ORAL at 17:21

## 2019-05-18 RX ADMIN — GABAPENTIN 300 MILLIGRAM(S): 400 CAPSULE ORAL at 21:17

## 2019-05-18 RX ADMIN — HYDROMORPHONE HYDROCHLORIDE 1 MILLIGRAM(S): 2 INJECTION INTRAMUSCULAR; INTRAVENOUS; SUBCUTANEOUS at 12:33

## 2019-05-18 RX ADMIN — HEPARIN SODIUM 5000 UNIT(S): 5000 INJECTION INTRAVENOUS; SUBCUTANEOUS at 12:27

## 2019-05-18 RX ADMIN — GABAPENTIN 100 MILLIGRAM(S): 400 CAPSULE ORAL at 05:42

## 2019-05-18 RX ADMIN — PANTOPRAZOLE SODIUM 40 MILLIGRAM(S): 20 TABLET, DELAYED RELEASE ORAL at 05:43

## 2019-05-18 RX ADMIN — Medication 250 MILLIGRAM(S): at 05:43

## 2019-05-18 RX ADMIN — Medication 325 MILLIGRAM(S): at 05:42

## 2019-05-18 RX ADMIN — Medication 1 TABLET(S): at 12:28

## 2019-05-18 RX ADMIN — SENNA PLUS 2 TABLET(S): 8.6 TABLET ORAL at 21:17

## 2019-05-18 RX ADMIN — MYCOPHENOLATE MOFETIL 1500 MILLIGRAM(S): 250 CAPSULE ORAL at 05:42

## 2019-05-18 RX ADMIN — Medication 325 MILLIGRAM(S): at 17:19

## 2019-05-18 RX ADMIN — Medication 200 MILLIGRAM(S): at 05:42

## 2019-05-18 RX ADMIN — CEFTAROLINE FOSAMIL 50 MILLIGRAM(S): 600 POWDER, FOR SOLUTION INTRAVENOUS at 16:17

## 2019-05-18 RX ADMIN — HYDROMORPHONE HYDROCHLORIDE 1 MILLIGRAM(S): 2 INJECTION INTRAMUSCULAR; INTRAVENOUS; SUBCUTANEOUS at 21:19

## 2019-05-18 RX ADMIN — Medication 6 MILLIGRAM(S): at 21:17

## 2019-05-18 RX ADMIN — Medication 2000 UNIT(S): at 12:28

## 2019-05-18 RX ADMIN — CHLORHEXIDINE GLUCONATE 1 APPLICATION(S): 213 SOLUTION TOPICAL at 12:28

## 2019-05-18 RX ADMIN — Medication 500 MILLIGRAM(S): at 12:28

## 2019-05-18 RX ADMIN — Medication 250 MILLIGRAM(S): at 17:19

## 2019-05-18 RX ADMIN — MUPIROCIN 1 APPLICATION(S): 20 OINTMENT TOPICAL at 05:42

## 2019-05-18 RX ADMIN — MUPIROCIN 1 APPLICATION(S): 20 OINTMENT TOPICAL at 17:22

## 2019-05-18 RX ADMIN — HEPARIN SODIUM 5000 UNIT(S): 5000 INJECTION INTRAVENOUS; SUBCUTANEOUS at 05:42

## 2019-05-18 RX ADMIN — HYDROMORPHONE HYDROCHLORIDE 1 MILLIGRAM(S): 2 INJECTION INTRAMUSCULAR; INTRAVENOUS; SUBCUTANEOUS at 21:56

## 2019-05-18 RX ADMIN — Medication 200 MILLIGRAM(S): at 17:19

## 2019-05-18 RX ADMIN — MAGNESIUM OXIDE 400 MG ORAL TABLET 400 MILLIGRAM(S): 241.3 TABLET ORAL at 09:20

## 2019-05-18 RX ADMIN — CEFTAROLINE FOSAMIL 50 MILLIGRAM(S): 600 POWDER, FOR SOLUTION INTRAVENOUS at 04:20

## 2019-05-18 NOTE — PROGRESS NOTE ADULT - SUBJECTIVE AND OBJECTIVE BOX
[facility]  INFECTIOUS DISEASES AND INTERNAL MEDICINE at [place]  =======================================================  Adams Vasquez MD  Diplomates American Board of Internal Medicine and Infectious Diseases  =======================================================    KANE LARA [id number]    Follow up: Gluteal abscess    No more fever  still with pain and swelling  No diarrhea    Allergies:  No Known Allergies      Antibiotics:  ceftaroline fosamil IVPB 600 milliGRAM(s) IV Intermittent every 12 hours      REVIEW OF SYSTEMS:  CONSTITUTIONAL:  No Fever or chills  HEENT:  No diplopia or blurred vision.  No earache, sore throat or runny nose.  CARDIOVASCULAR:  No pressure, squeezing, strangling, tightness, heaviness or aching about the chest, neck, axilla or epigastrium.  RESPIRATORY:  No cough, shortness of breath  GASTROINTESTINAL:  No nausea, vomiting or diarrhea.  GENITOURINARY:  No dysuria, frequency or urgency.   MUSCULOSKELETAL:  no joint aches, no muscle pain  SKIN:  pain on right buttocks and redness  NEUROLOGIC:  No Headaches, seizures or weakness.  PSYCHIATRIC:  No disorder of thought or mood.  ENDOCRINE:  No heat or cold intolerance  HEMATOLOGICAL:  No easy bruising or bleeding      Physical Exam:  Vital Signs Last 24 Hrs  T(C): 36.8 (15 May 2019 07:54), Max: 36.8 (14 May 2019 15:36)  T(F): 98.3 (15 May 2019 07:54), Max: 98.3 (14 May 2019 15:36)  HR: 99 (15 May 2019 07:54) (88 - 99)  BP: 111/77 (15 May 2019 07:54) (107/69 - 115/78)  RR: 20 (15 May 2019 07:54) (16 - 20)  SpO2: 98% (15 May 2019 04:41) (98% - 100%)      GEN: NAD, pleasant  HEENT: normocephalic and atraumatic. EOMI. PERRL.  Anicteric  NECK: Supple.   LUNGS: Clear to auscultation.  HEART: Regular rate and rhythm  ABDOMEN: Soft, nontender, and nondistended.  Positive bowel sounds.    : No CVA tenderness  EXTREMITIES: Without any edema.  MSK: No joint swelling  NEUROLOGIC: No Focal Deficits  PSYCHIATRIC: Appropriate affect .  SKIN: Right buttocks with erythema, warmth and swelling, packing in place      Labs:  05-15    140  |  101  |  6.0<L>  ----------------------------<  92  4.0   |  24.0  |  0.48<L>    Ca    9.2      15 May 2019 06:39               10.2   10.5  )-----------( 325      ( 15 May 2019 06:27 )             33.5       RECENT CULTURES:  05-11 @ 14:51 .Urine     No growth      05-11 @ 14:26 .Other right posterior/leg wound Methicillin resistant Staphylococcus aureus    Moderate Methicillin resistant Staphylococcus aureus      05-11 @ 10:43 .Blood     No growth at 48 hours        EXAM:  CT PELVIS ONLY IC                        PROCEDURE DATE:  05/13/2019    INTERPRETATION:  CLINICAL INFORMATION: Evaluate for abscess.   Buttock/thigh cellulitis. Leg may have been instrumented at the time of   admission to obtain a culture. Patient returned for additional imaging.  COMPARISON: CT abdomen/pelvis 4/11/2019 and CT pelvis 5/12/2019  PROCEDURE:   CT of the Pelvis was performed with intravenous contrast; patient scanned   through the size to include the entire region of cellulitis.  Intravenous contrast: 95 ml Omnipaque 350. 10 ml discarded.  Oral contrast:None.  Sagittal and coronal reformats were performed.  FINDINGS:  SOFT TISSUES: There is infiltration of the subcutaneous fat with   associated soft tissue swelling involving the right gluteal region   through the mid to distal thigh and including intramuscular and mild   fascial edema involving the adductor compartment and hamstrings. A   punctate droplet of air is seen in the mid posterior thigh (series 6   image 270) which is likely be related to instrumentation at the time of   admission. Previously seen focus of high attenuation not seen on the   current study.  BLADDER: Within normal limits.  REPRODUCTIVE ORGANS: Subserosal fibroid again seen arising from the   posterior uterine body measuring 4.2 cm. Rim enhancing focus in the left   uterine body measuring 1.3 cm, possibly a degenerated fibroid. Ovaries   within normal limits for size with follicles measuring up to 1.9 cm in   the right ovary. LYMPH NODES: No pelvic lymphadenopathy.  VISUALIZED PORTIONS:  BOWEL: Within normal limits.  PERITONEUM: No ascites.  VESSELS: Prominent vessels in the left adnexa which could be seen in the   setting of pelvic congestion syndrome.  ABDOMINAL WALL: Infiltration of the subcutaneous fat again seen within   the left lower anterior abdominal wall which can be related to   subcutaneous injections.  BONES: No aggressive osseous lesion.  IMPRESSION:   Infiltration of the subcutaneous fat involving the right gluteal region   through the mid to distal thigh consistent with cellulitis with   associated intramuscular and mild fascial edema involving the adductor   compartment and hamstrings.  No drainable fluid collection.  Punctatefocus of air at the posterior aspect of the right thigh which is   likely related to instrumentation at the time of admission.
CHIEF COMPLAINT/INTERVAL HISTORY:    Patient is a 33y old  Female who presents with a chief complaint of Leg wound/ cellulitis (17 May 2019 14:04)    SUBJECTIVE & OBJECTIVE: Pt seen and examined at bedside. No overnight events. Patient denies any new complaints. On IV abx. Daily dressing changes.    ROS: No chest pain, palpitations, SOB, light headedness, dizziness, headache, nausea/vomiting, fevers/chills, abdominal pain, dysuria or increased urinary frequency.    ICU Vital Signs Last 24 Hrs  T(C): 36.7 (18 May 2019 09:01), Max: 36.7 (18 May 2019 09:01)  T(F): 98 (18 May 2019 09:01), Max: 98 (18 May 2019 09:01)  HR: 74 (18 May 2019 09:01) (71 - 85)  BP: 106/72 (18 May 2019 09:01) (101/63 - 116/77)  RR: 20 (18 May 2019 09:01) (20 - 20)  SpO2: 97% (18 May 2019 09:01) (97% - 99%)    MEDICATIONS  (STANDING):  ascorbic acid 500 milliGRAM(s) Oral daily  ceftaroline fosamil IVPB 600 milliGRAM(s) IV Intermittent every 12 hours  chlorhexidine 2% Cloths 1 Application(s) Topical daily  cholecalciferol 2000 Unit(s) Oral daily  DULoxetine 60 milliGRAM(s) Oral daily  ferrous    sulfate 325 milliGRAM(s) Oral two times a day  gabapentin 100 milliGRAM(s) Oral two times a day  gabapentin 300 milliGRAM(s) Oral at bedtime  heparin  Injectable 5000 Unit(s) SubCutaneous every 8 hours  hydroxychloroquine 200 milliGRAM(s) Oral two times a day  magnesium oxide 400 milliGRAM(s) Oral two times a day with meals  melatonin 6 milliGRAM(s) Oral at bedtime  multivitamin 1 Tablet(s) Oral daily  mupirocin 2% Nasal 1 Application(s) Nasal two times a day  mycophenolate mofetil 1500 milliGRAM(s) Oral two times a day  pantoprazole    Tablet 40 milliGRAM(s) Oral before breakfast  predniSONE   Tablet 40 milliGRAM(s) Oral daily  saccharomyces boulardii 250 milliGRAM(s) Oral two times a day  senna 2 Tablet(s) Oral at bedtime    MEDICATIONS  (PRN):  acetaminophen   Tablet .. 650 milliGRAM(s) Oral every 6 hours PRN Temp greater or equal to 38C (100.4F)  aluminum hydroxide/magnesium hydroxide/simethicone Suspension 30 milliLiter(s) Oral every 4 hours PRN Dyspepsia  baclofen 5 milliGRAM(s) Oral every 8 hours PRN Muscle Spasm  HYDROmorphone  Injectable 1 milliGRAM(s) IV Push every 6 hours PRN Severe Pain (7 - 10)  magnesium hydroxide Suspension 30 milliLiter(s) Oral daily PRN Constipation  ondansetron Injectable 4 milliGRAM(s) IV Push every 6 hours PRN Nausea  oxyCODONE    IR 5 milliGRAM(s) Oral every 6 hours PRN Moderate Pain (4 - 6)  polyethylene glycol 3350 17 Gram(s) Oral daily PRN Constipation      LABS:                        10.2   7.1   )-----------( 483      ( 18 May 2019 08:33 )             34.4     05-18    142  |  100  |  19.0  ----------------------------<  102  3.6   |  25.0  |  0.48<L>    Ca    9.3      18 May 2019 08:33      PHYSICAL EXAM:    GENERAL: NAD, well-groomed, well-developed  HEAD:  Atraumatic, Normocephalic  EYES: EOMI, PERRLA, conjunctiva and sclera clear  ENMT: Moist mucous membranes  NECK: Supple, No JVD  NERVOUS SYSTEM:  Alert & Oriented X3   CHEST/LUNG: Clear to auscultation bilaterally; No rales, rhonchi, wheezing, or rubs  HEART: Regular rate and rhythm; + S1/S2  ABDOMEN: Soft, Nontender, Nondistended; Bowel sounds present  EXTREMITIES:  no pedal edema  Right poster thigh wound, dressing c/d/i
[facility]  INFECTIOUS DISEASES AND INTERNAL MEDICINE at [place]  =======================================================  Adams Vasquez MD  Diplomates American Board of Internal Medicine and Infectious Diseases  =======================================================    KANE LARA [id number]    Follow up: Gluteal abscess    No more fever  still with pain and swelling  No diarrhea    Allergies:  No Known Allergies      Antibiotics:  ceftaroline fosamil IVPB 600 milliGRAM(s) IV Intermittent every 12 hours      REVIEW OF SYSTEMS:  CONSTITUTIONAL:  No Fever or chills  HEENT:  No diplopia or blurred vision.  No earache, sore throat or runny nose.  CARDIOVASCULAR:  No pressure, squeezing, strangling, tightness, heaviness or aching about the chest, neck, axilla or epigastrium.  RESPIRATORY:  No cough, shortness of breath  GASTROINTESTINAL:  No nausea, vomiting or diarrhea.  GENITOURINARY:  No dysuria, frequency or urgency.   MUSCULOSKELETAL:  no joint aches, no muscle pain  SKIN:  pain on right buttocks and redness  NEUROLOGIC:  No Headaches, seizures or weakness.  PSYCHIATRIC:  No disorder of thought or mood.  ENDOCRINE:  No heat or cold intolerance  HEMATOLOGICAL:  No easy bruising or bleeding      Physical Exam:  Vital Signs Last 24 Hrs  T(C): 36.8 (16 May 2019 07:51), Max: 36.8 (16 May 2019 07:51)  T(F): 98.3 (16 May 2019 07:51), Max: 98.3 (16 May 2019 07:51)  HR: 87 (16 May 2019 07:51) (86 - 87)  BP: 98/63 (16 May 2019 07:51) (98/63 - 102/64)  RR: 20 (16 May 2019 07:51) (20 - 20)  SpO2: 97% (15 May 2019 16:40) (97% - 97%)]      GEN: NAD, pleasant  HEENT: normocephalic and atraumatic. EOMI. PERRL.  Anicteric  NECK: Supple.   LUNGS: Clear to auscultation.  HEART: Regular rate and rhythm  ABDOMEN: Soft, nontender, and nondistended.  Positive bowel sounds.    : No CVA tenderness  EXTREMITIES: Without any edema.  MSK: No joint swelling  NEUROLOGIC: No Focal Deficits  PSYCHIATRIC: Appropriate affect .  SKIN: Right buttocks with erythema, warmth and swelling, packing in place      Labs:  05-15    140  |  101  |  6.0<L>  ----------------------------<  92  4.0   |  24.0  |  0.48<L>    Ca    9.2      15 May 2019 06:39               10.2   10.5  )-----------( 325      ( 15 May 2019 06:27 )             33.5         RECENT CULTURES:  05-11 @ 14:51 .Urine     No growth      05-11 @ 14:26 .Other right posterior/leg wound Methicillin resistant Staphylococcus aureus    Moderate Methicillin resistant Staphylococcus aureus      05-11 @ 10:43 .Blood     No growth at 5 days.
[facility]  INFECTIOUS DISEASES AND INTERNAL MEDICINE at [place]  =======================================================  Adams Vasquez MD  Diplomates American Board of Internal Medicine and Infectious Diseases  =======================================================    KANE LARA [id number]    Follow up: Gluteal abscess    No more fever  pain and swelling improcved  No diarrhea    S/P I&D 5/16    Allergies:  No Known Allergies      Antibiotics:  ceftaroline fosamil IVPB 600 milliGRAM(s) IV Intermittent every 12 hours      REVIEW OF SYSTEMS:  CONSTITUTIONAL:  No Fever or chills  HEENT:  No diplopia or blurred vision.  No earache, sore throat or runny nose.  CARDIOVASCULAR:  No pressure, squeezing, strangling, tightness, heaviness or aching about the chest, neck, axilla or epigastrium.  RESPIRATORY:  No cough, shortness of breath  GASTROINTESTINAL:  No nausea, vomiting or diarrhea.  GENITOURINARY:  No dysuria, frequency or urgency.   MUSCULOSKELETAL:  no joint aches, no muscle pain  SKIN:  pain on right buttocks and redness  NEUROLOGIC:  No Headaches, seizures or weakness.  PSYCHIATRIC:  No disorder of thought or mood.  ENDOCRINE:  No heat or cold intolerance  HEMATOLOGICAL:  No easy bruising or bleeding      Physical Exam:  Vital Signs Last 24 Hrs  T(C): 36.7 (18 May 2019 09:01), Max: 36.7 (18 May 2019 09:01)  T(F): 98 (18 May 2019 09:01), Max: 98 (18 May 2019 09:01)  HR: 74 (18 May 2019 09:01) (71 - 85)  BP: 106/72 (18 May 2019 09:01) (101/63 - 116/77)  RR: 20 (18 May 2019 09:01) (20 - 20)  SpO2: 97% (18 May 2019 09:01) (97% - 99%)      GEN: NAD, pleasant  HEENT: normocephalic and atraumatic. EOMI. PERRL.  Anicteric  NECK: Supple.   LUNGS: Clear to auscultation.  HEART: Regular rate and rhythm  ABDOMEN: Soft, nontender, and nondistended.  Positive bowel sounds.    : No CVA tenderness  EXTREMITIES: Without any edema.  MSK: No joint swelling  NEUROLOGIC: No Focal Deficits  PSYCHIATRIC: Appropriate affect .  SKIN: Right buttocks with erythema, warmth and swelling, packing in place      Labs:  05-18    142  |  100  |  19.0  ----------------------------<  102  3.6   |  25.0  |  0.48<L>    Ca    9.3      18 May 2019 08:33               10.2   7.1   )-----------( 483      ( 18 May 2019 08:33 )             34.4         RECENT CULTURES:  05-16 @ 18:29 .Abscess     Only one swab sent, Unable to perform gram stain      05-16 @ 18:28 .Abscess     No growth at 1 day.  Culture in progress  No White blood cells  No organisms seen      05-11 @ 14:51 .Urine     No growth      05-11 @ 14:26 .Other right posterior/leg wound Methicillin resistant Staphylococcus aureus    Moderate Methicillin resistant Staphylococcus aureus      05-11 @ 10:43 .Blood     No growth at 5 days.
INTERVAL HPI/OVERNIGHT EVENTS:    No acute overnight events reported. Patient seen at bedside. Thigh abscess draining, packing was changed. Patient showering and washing area with soapy water. No leukocytosis, patient on Ceftaroline     MEDICATIONS  (STANDING):  ascorbic acid 500 milliGRAM(s) Oral daily  ceftaroline fosamil IVPB 600 milliGRAM(s) IV Intermittent every 12 hours  chlorhexidine 2% Cloths 1 Application(s) Topical daily  cholecalciferol 2000 Unit(s) Oral daily  DULoxetine 60 milliGRAM(s) Oral daily  ferrous    sulfate 325 milliGRAM(s) Oral two times a day  gabapentin 100 milliGRAM(s) Oral two times a day  gabapentin 300 milliGRAM(s) Oral at bedtime  heparin  Injectable 5000 Unit(s) SubCutaneous every 8 hours  hydroxychloroquine 200 milliGRAM(s) Oral two times a day  magnesium oxide 400 milliGRAM(s) Oral two times a day with meals  melatonin 6 milliGRAM(s) Oral at bedtime  multivitamin 1 Tablet(s) Oral daily  mupirocin 2% Nasal 1 Application(s) Nasal two times a day  mycophenolate mofetil 1500 milliGRAM(s) Oral two times a day  pantoprazole    Tablet 40 milliGRAM(s) Oral before breakfast  predniSONE   Tablet 40 milliGRAM(s) Oral daily  saccharomyces boulardii 250 milliGRAM(s) Oral two times a day  senna 2 Tablet(s) Oral at bedtime    MEDICATIONS  (PRN):  acetaminophen   Tablet .. 650 milliGRAM(s) Oral every 6 hours PRN Temp greater or equal to 38C (100.4F)  aluminum hydroxide/magnesium hydroxide/simethicone Suspension 30 milliLiter(s) Oral every 4 hours PRN Dyspepsia  baclofen 5 milliGRAM(s) Oral every 8 hours PRN Muscle Spasm  HYDROmorphone  Injectable 1 milliGRAM(s) IV Push every 6 hours PRN Severe Pain (7 - 10)  magnesium hydroxide Suspension 30 milliLiter(s) Oral daily PRN Constipation  ondansetron Injectable 4 milliGRAM(s) IV Push every 6 hours PRN Nausea  oxyCODONE    IR 5 milliGRAM(s) Oral every 6 hours PRN Moderate Pain (4 - 6)  polyethylene glycol 3350 17 Gram(s) Oral daily PRN Constipation      Vital Signs Last 24 Hrs  T(C): 36.5 (17 May 2019 07:52), Max: 36.7 (17 May 2019 00:09)  T(F): 97.7 (17 May 2019 07:52), Max: 98 (17 May 2019 00:09)  HR: 80 (17 May 2019 07:52) (78 - 92)  BP: 99/65 (17 May 2019 07:52) (99/65 - 131/78)  BP(mean): --  RR: 20 (17 May 2019 07:52) (20 - 20)  SpO2: 98% (17 May 2019 00:09) (98% - 100%)    PE  General: NAD, AOx3, uncomfortable on examination  HEENT: PERRLA, EOMI, dry mucous membranes  Neck: supple, nontender  Cardiovascular: normal rate, normal rhythm, no murmurs  Respiratory: no respiratory distress, CTAB  Abdomen: soft, nontender, nondistended. No guarding or rebound. Normal bowel sounds.  Extremities: 1.5 cm incision on right thigh, packed. no bleeding, minimal purulent discharge  Neuro: no motor or sensory deficits      I&O's Detail    16 May 2019 07:01  -  17 May 2019 07:00  --------------------------------------------------------  IN:    Oral Fluid: 350 mL    Solution: 50 mL  Total IN: 400 mL    OUT:  Total OUT: 0 mL    Total NET: 400 mL          LABS:                RADIOLOGY & ADDITIONAL STUDIES:
KAEN LARA Female 33y [id number]    Patient is a 33y old  Female who presents with a chief complaint of Leg wound/ cellulitis (13 May 2019 11:02)      Subjective/objective:  Pt seen and examined,  at bedside, no over night event reported by night staff. Pt continue to report posterior thigh/ buttock pain with swelling and discharge, unable sit on buttocks.     Review of system:  No nausea, vomiting, headache, dizziness, chest pain, SOB or palpitation.    PHYSICAL EXAM:    Vital Signs Last 24 Hrs  T(C): 36.4 (13 May 2019 11:11), Max: 37.2 (13 May 2019 07:45)  T(F): 97.6 (13 May 2019 11:11), Max: 99 (13 May 2019 07:45)  HR: 90 (13 May 2019 11:11) (85 - 111)  BP: 97/62 (13 May 2019 11:11) (97/62 - 116/83)  BP(mean): --  RR: 18 (13 May 2019 11:11) (18 - 20)  SpO2: 100% (13 May 2019 11:11) (92% - 100%)    GENERAL: Pt lying comfortably, NAD.  CHEST/LUNG: Clear to auscultation bilaterally; No wheezing.  HEART: S1S2+, Regular rate and rhythm; No murmurs.  ABDOMEN: Soft, Nontender, Nondistended; Bowel sounds present.  Extremities: No Le edema, pulses+  SKIN: Right posterior thigh/ buttock with erythema/warm/ swelling, slight discharge.   NEURO: AAOX3, no focal deficits, no motor r sensory loss.  PSYCH: normal mood.      MEDICATIONS  (STANDING):  ascorbic acid 500 milliGRAM(s) Oral daily  ceftaroline fosamil IVPB 600 milliGRAM(s) IV Intermittent every 12 hours  cholecalciferol 2000 Unit(s) Oral daily  docusate sodium 100 milliGRAM(s) Oral three times a day  DULoxetine 60 milliGRAM(s) Oral daily  ferrous    sulfate 325 milliGRAM(s) Oral two times a day  gabapentin 100 milliGRAM(s) Oral two times a day  gabapentin 300 milliGRAM(s) Oral at bedtime  heparin  Injectable 5000 Unit(s) SubCutaneous every 8 hours  magnesium oxide 400 milliGRAM(s) Oral two times a day with meals  melatonin 6 milliGRAM(s) Oral at bedtime  multivitamin 1 Tablet(s) Oral daily  pantoprazole    Tablet 40 milliGRAM(s) Oral before breakfast  potassium chloride    Tablet ER 40 milliEquivalent(s) Oral once  saccharomyces boulardii 250 milliGRAM(s) Oral two times a day  senna 2 Tablet(s) Oral at bedtime  sodium chloride 0.9%. 1000 milliLiter(s) (150 mL/Hr) IV Continuous <Continuous>    MEDICATIONS  (PRN):  acetaminophen   Tablet .. 650 milliGRAM(s) Oral every 6 hours PRN Temp greater or equal to 38C (100.4F)  aluminum hydroxide/magnesium hydroxide/simethicone Suspension 30 milliLiter(s) Oral every 4 hours PRN Dyspepsia  baclofen 5 milliGRAM(s) Oral every 8 hours PRN Muscle Spasm  magnesium hydroxide Suspension 30 milliLiter(s) Oral daily PRN Constipation  ondansetron Injectable 4 milliGRAM(s) IV Push every 6 hours PRN Nausea  oxyCODONE    IR 5 milliGRAM(s) Oral every 6 hours PRN Moderate Pain (4 - 6)  polyethylene glycol 3350 17 Gram(s) Oral daily PRN Constipation        Labs:  LABS:                        10.5   22.5  )-----------( 290      ( 13 May 2019 07:30 )             33.9     05-13    138  |  101  |  5.0<L>  ----------------------------<  85  3.5   |  22.0  |  0.50    Ca    9.0      13 May 2019 07:30  Phos  3.4     05-12  Mg     1.8     05-12            Urinalysis Basic - ( 11 May 2019 14:51 )    Color: Yellow / Appearance: Clear / S.005 / pH: x  Gluc: x / Ketone: Negative  / Bili: Negative / Urobili: Negative mg/dL   Blood: x / Protein: Negative mg/dL / Nitrite: Negative   Leuk Esterase: Negative / RBC: x / WBC x   Sq Epi: x / Non Sq Epi: x / Bacteria: x
KANE LARA Female 33y [id number]    Patient is a 33y old  Female who presents with a chief complaint of Leg wound/ cellulitis (16 May 2019 14:20)      Off Service note  Pt seen and examined at bedside, here for right thigh/ gluteal region cellulitis, last evening I&D done by [facility]. This morning reports overall improvement in sx but still has swelling and pain. Afebrile.     Review of system:  No fever, chills, nausea, vomiting, headache, dizziness, chest pain, SOB or palpitation.      PHYSICAL EXAM:    Vital Signs Last 24 Hrs  T(C): 36.5 (17 May 2019 07:52), Max: 36.7 (17 May 2019 00:09)  T(F): 97.7 (17 May 2019 07:52), Max: 98 (17 May 2019 00:09)  HR: 80 (17 May 2019 07:52) (78 - 92)  BP: 99/65 (17 May 2019 07:52) (99/65 - 131/78)  BP(mean): --  RR: 20 (17 May 2019 07:52) (20 - 20)  SpO2: 98% (17 May 2019 00:09) (98% - 100%)      GENERAL: Pt lying comfortably, NAD.  CHEST/LUNG: Clear to auscultation bilaterally; No wheezing.  HEART: S1S2+, Regular rate and rhythm; No murmurs.  ABDOMEN: Soft, Nontender, Nondistended; Bowel sounds present.  Extremities: No Le edema, pulses+  SKIN: Right thigh with dressing on it today. No discharge noted.     NEURO: AAOX3, no focal deficits, no motor r sensory loss.        MEDICATIONS  (STANDING):  ascorbic acid 500 milliGRAM(s) Oral daily  ceftaroline fosamil IVPB 600 milliGRAM(s) IV Intermittent every 12 hours  chlorhexidine 2% Cloths 1 Application(s) Topical daily  cholecalciferol 2000 Unit(s) Oral daily  DULoxetine 60 milliGRAM(s) Oral daily  ferrous    sulfate 325 milliGRAM(s) Oral two times a day  gabapentin 100 milliGRAM(s) Oral two times a day  gabapentin 300 milliGRAM(s) Oral at bedtime  heparin  Injectable 5000 Unit(s) SubCutaneous every 8 hours  hydroxychloroquine 200 milliGRAM(s) Oral two times a day  magnesium oxide 400 milliGRAM(s) Oral two times a day with meals  melatonin 6 milliGRAM(s) Oral at bedtime  multivitamin 1 Tablet(s) Oral daily  mupirocin 2% Nasal 1 Application(s) Nasal two times a day  mycophenolate mofetil 1500 milliGRAM(s) Oral two times a day  pantoprazole    Tablet 40 milliGRAM(s) Oral before breakfast  predniSONE   Tablet 40 milliGRAM(s) Oral daily  saccharomyces boulardii 250 milliGRAM(s) Oral two times a day  senna 2 Tablet(s) Oral at bedtime    MEDICATIONS  (PRN):  acetaminophen   Tablet .. 650 milliGRAM(s) Oral every 6 hours PRN Temp greater or equal to 38C (100.4F)  aluminum hydroxide/magnesium hydroxide/simethicone Suspension 30 milliLiter(s) Oral every 4 hours PRN Dyspepsia  baclofen 5 milliGRAM(s) Oral every 8 hours PRN Muscle Spasm  HYDROmorphone  Injectable 1 milliGRAM(s) IV Push every 6 hours PRN Severe Pain (7 - 10)  magnesium hydroxide Suspension 30 milliLiter(s) Oral daily PRN Constipation  ondansetron Injectable 4 milliGRAM(s) IV Push every 6 hours PRN Nausea  oxyCODONE    IR 5 milliGRAM(s) Oral every 6 hours PRN Moderate Pain (4 - 6)  polyethylene glycol 3350 17 Gram(s) Oral daily PRN Constipation        Labs:  LABS:
KANE LARA Female 33y [id number]    Patient is a 33y old  Female who presents with a chief complaint of Leg wound/ cellulitis (14 May 2019 11:29)      Subjective/objective:  Pt seen and examined at bedside, no over night event reported by night staff. Pt reports swelling/ pain little better but overall still significant and difficulty sitting/ walking.     Review of system:  No fever, chills, nausea, vomiting, headache, dizziness, chest pain, SOB or palpitation.      PHYSICAL EXAM:    Vital Signs Last 24 Hrs  T(C): 36.8 (15 May 2019 07:54), Max: 36.8 (14 May 2019 15:36)  T(F): 98.3 (15 May 2019 07:54), Max: 98.3 (14 May 2019 15:36)  HR: 99 (15 May 2019 07:54) (88 - 101)  BP: 111/77 (15 May 2019 07:54) (107/69 - 115/78)  BP(mean): --  RR: 20 (15 May 2019 07:54) (16 - 20)  SpO2: 98% (15 May 2019 04:41) (98% - 100%)    GENERAL: Pt lying comfortably, NAD.  CHEST/LUNG: Clear to auscultation bilaterally; No wheezing.  HEART: S1S2+, Regular rate and rhythm; No murmurs.  ABDOMEN: Soft, Nontender, Nondistended; Bowel sounds present.  Extremities: No Le edema, pulses+  SKIN: Right posterior thigh/ buttock with erythema/warm/ swelling, dressing with discharge.   NEURO: AAOX3, no focal deficits, no motor r sensory loss.  PSYCH: normal mood.      MEDICATIONS  (STANDING):  ascorbic acid 500 milliGRAM(s) Oral daily  ceftaroline fosamil IVPB 600 milliGRAM(s) IV Intermittent every 12 hours  chlorhexidine 2% Cloths 1 Application(s) Topical daily  cholecalciferol 2000 Unit(s) Oral daily  DULoxetine 60 milliGRAM(s) Oral daily  ferrous    sulfate 325 milliGRAM(s) Oral two times a day  gabapentin 100 milliGRAM(s) Oral two times a day  gabapentin 300 milliGRAM(s) Oral at bedtime  heparin  Injectable 5000 Unit(s) SubCutaneous every 8 hours  magnesium oxide 400 milliGRAM(s) Oral two times a day with meals  melatonin 6 milliGRAM(s) Oral at bedtime  multivitamin 1 Tablet(s) Oral daily  pantoprazole    Tablet 40 milliGRAM(s) Oral before breakfast  saccharomyces boulardii 250 milliGRAM(s) Oral two times a day  senna 2 Tablet(s) Oral at bedtime    MEDICATIONS  (PRN):  acetaminophen   Tablet .. 650 milliGRAM(s) Oral every 6 hours PRN Temp greater or equal to 38C (100.4F)  aluminum hydroxide/magnesium hydroxide/simethicone Suspension 30 milliLiter(s) Oral every 4 hours PRN Dyspepsia  baclofen 5 milliGRAM(s) Oral every 8 hours PRN Muscle Spasm  magnesium hydroxide Suspension 30 milliLiter(s) Oral daily PRN Constipation  ondansetron Injectable 4 milliGRAM(s) IV Push every 6 hours PRN Nausea  oxyCODONE    IR 5 milliGRAM(s) Oral every 6 hours PRN Moderate Pain (4 - 6)  polyethylene glycol 3350 17 Gram(s) Oral daily PRN Constipation        Labs:  LABS:                        10.2   10.5  )-----------( 325      ( 15 May 2019 06:27 )             33.5     05-15    140  |  101  |  6.0<L>  ----------------------------<  92  4.0   |  24.0  |  0.48<L>    Ca    9.2      15 May 2019 06:39
KANE LARA Female 33y [id number]    Patient is a 33y old  Female who presents with a chief complaint of Leg wound/ cellulitis (12 May 2019 09:36)      Subjective/objective:  Pt seen and examined at bedside, no over night event reported by night staff. Pt reports continuos thigh pain and some discharge, no other complaints. She remained febrile with Tmax 102.7.    Review of system:  No nausea, vomiting, headache, dizziness, chest pain, SOB or palpitation.      PHYSICAL EXAM:    Vital Signs Last 24 Hrs  T(C): 36.7 (12 May 2019 15:52), Max: 39.3 (12 May 2019 07:58)  T(F): 98 (12 May 2019 15:52), Max: 102.7 (12 May 2019 07:58)  HR: 85 (12 May 2019 15:52) (85 - 117)  BP: 107/70 (12 May 2019 15:52) (103/67 - 125/73)  BP(mean): --  RR: 20 (12 May 2019 15:52) (17 - 20)  SpO2: 100% (12 May 2019 15:52) (99% - 100%)    GENERAL: Pt lying comfortably, NAD.  CHEST/LUNG: Clear to auscultation bilaterally; No wheezing.  HEART: S1S2+, Regular rate and rhythm; No murmurs.  ABDOMEN: Soft, Nontender, Nondistended; Bowel sounds present.  Extremities: No Le edema, pulses+  SKIN: Right posterior thigh/ buttock with erythema/warm/ swelling, packing in place.   NEURO: AAOX3, no focal deficits, no motor r sensory loss.  PSYCH: normal mood.    MEDICATIONS  (STANDING):  ascorbic acid 500 milliGRAM(s) Oral daily  ceftaroline fosamil IVPB 600 milliGRAM(s) IV Intermittent every 12 hours  cholecalciferol 2000 Unit(s) Oral daily  docusate sodium 100 milliGRAM(s) Oral three times a day  DULoxetine 60 milliGRAM(s) Oral daily  ferrous    sulfate 325 milliGRAM(s) Oral two times a day  gabapentin 100 milliGRAM(s) Oral two times a day  gabapentin 300 milliGRAM(s) Oral at bedtime  heparin  Injectable 5000 Unit(s) SubCutaneous every 8 hours  lactobacillus acidophilus 1 Tablet(s) Oral two times a day  magnesium oxide 400 milliGRAM(s) Oral two times a day with meals  melatonin 6 milliGRAM(s) Oral at bedtime  multivitamin 1 Tablet(s) Oral daily  pantoprazole    Tablet 40 milliGRAM(s) Oral before breakfast  predniSONE   Tablet 40 milliGRAM(s) Oral daily  saccharomyces boulardii 250 milliGRAM(s) Oral two times a day  senna 2 Tablet(s) Oral at bedtime  sodium chloride 0.9%. 1000 milliLiter(s) (150 mL/Hr) IV Continuous <Continuous>    MEDICATIONS  (PRN):  acetaminophen   Tablet .. 650 milliGRAM(s) Oral every 6 hours PRN Temp greater or equal to 38C (100.4F)  aluminum hydroxide/magnesium hydroxide/simethicone Suspension 30 milliLiter(s) Oral every 4 hours PRN Dyspepsia  baclofen 5 milliGRAM(s) Oral every 8 hours PRN Muscle Spasm  magnesium hydroxide Suspension 30 milliLiter(s) Oral daily PRN Constipation  ondansetron Injectable 4 milliGRAM(s) IV Push every 6 hours PRN Nausea  oxyCODONE    IR 5 milliGRAM(s) Oral every 6 hours PRN Moderate Pain (4 - 6)  polyethylene glycol 3350 17 Gram(s) Oral daily PRN Constipation        Labs:  LABS:                        11.3   24.3  )-----------( 295      ( 12 May 2019 07:39 )             36.4     05-12    136  |  98  |  9.0  ----------------------------<  95  3.2<L>   |  24.0  |  0.47<L>    Ca    9.0      12 May 2019 07:39  Phos  3.4     -  Mg     1.8         TPro  7.4  /  Alb  4.4  /  TBili  0.9  /  DBili  x   /  AST  20  /  ALT  22  /  AlkPhos  85  05-11    PT/INR - ( 11 May 2019 10:06 )   PT: 13.9 sec;   INR: 1.20 ratio         PTT - ( 11 May 2019 10:06 )  PTT:32.5 sec    LIVER FUNCTIONS - ( 11 May 2019 10:06 )  Alb: 4.4 g/dL / Pro: 7.4 g/dL / ALK PHOS: 85 U/L / ALT: 22 U/L / AST: 20 U/L / GGT: x           Urinalysis Basic - ( 11 May 2019 14:51 )    Color: Yellow / Appearance: Clear / S.005 / pH: x  Gluc: x / Ketone: Negative  / Bili: Negative / Urobili: Negative mg/dL   Blood: x / Protein: Negative mg/dL / Nitrite: Negative   Leuk Esterase: Negative / RBC: x / WBC x   Sq Epi: x / Non Sq Epi: x / Bacteria: x
KANE LARA Female 33y [id number]    Patient is a 33y old  Female who presents with a chief complaint of Leg wound/ cellulitis (15 May 2019 23:33)      Subjective/objective:  Pt seen and examined at bedside, no over night event reported by night staff. Pt still with pain and swelling on right thigh inner part, overall she feels she is improving. Afebrile, wbc trended down.     Review of system:  No fever, chills, nausea, vomiting, headache, dizziness, chest pain, SOB or palpitation.      PHYSICAL EXAM:    Vital Signs Last 24 Hrs  T(C): 36.8 (16 May 2019 07:51), Max: 36.8 (16 May 2019 07:51)  T(F): 98.3 (16 May 2019 07:51), Max: 98.3 (16 May 2019 07:51)  HR: 87 (16 May 2019 07:51) (86 - 87)  BP: 98/63 (16 May 2019 07:51) (98/63 - 102/64)  BP(mean): --  RR: 20 (16 May 2019 07:51) (20 - 20)  SpO2: 97% (15 May 2019 16:40) (97% - 97%)    GENERAL: Pt lying comfortably, NAD.  CHEST/LUNG: Clear to auscultation bilaterally; No wheezing.  HEART: S1S2+, Regular rate and rhythm; No murmurs.  ABDOMEN: Soft, Nontender, Nondistended; Bowel sounds present.  Extremities: No Le edema, pulses+  SKIN: Right thigh medical region with erythema/warm/ swelling, dressing with discharge, feeling of collection, gluteal region improved.    NEURO: AAOX3, no focal deficits, no motor r sensory loss.  PSYCH: normal mood.    MEDICATIONS  (STANDING):  ascorbic acid 500 milliGRAM(s) Oral daily  ceftaroline fosamil IVPB 600 milliGRAM(s) IV Intermittent every 12 hours  chlorhexidine 2% Cloths 1 Application(s) Topical daily  cholecalciferol 2000 Unit(s) Oral daily  DULoxetine 60 milliGRAM(s) Oral daily  ferrous    sulfate 325 milliGRAM(s) Oral two times a day  gabapentin 100 milliGRAM(s) Oral two times a day  gabapentin 300 milliGRAM(s) Oral at bedtime  heparin  Injectable 5000 Unit(s) SubCutaneous every 8 hours  hydroxychloroquine 200 milliGRAM(s) Oral two times a day  magnesium oxide 400 milliGRAM(s) Oral two times a day with meals  melatonin 6 milliGRAM(s) Oral at bedtime  multivitamin 1 Tablet(s) Oral daily  mycophenolate mofetil 1500 milliGRAM(s) Oral two times a day  pantoprazole    Tablet 40 milliGRAM(s) Oral before breakfast  predniSONE   Tablet 40 milliGRAM(s) Oral daily  saccharomyces boulardii 250 milliGRAM(s) Oral two times a day  senna 2 Tablet(s) Oral at bedtime    MEDICATIONS  (PRN):  acetaminophen   Tablet .. 650 milliGRAM(s) Oral every 6 hours PRN Temp greater or equal to 38C (100.4F)  aluminum hydroxide/magnesium hydroxide/simethicone Suspension 30 milliLiter(s) Oral every 4 hours PRN Dyspepsia  baclofen 5 milliGRAM(s) Oral every 8 hours PRN Muscle Spasm  magnesium hydroxide Suspension 30 milliLiter(s) Oral daily PRN Constipation  ondansetron Injectable 4 milliGRAM(s) IV Push every 6 hours PRN Nausea  oxyCODONE    IR 5 milliGRAM(s) Oral every 6 hours PRN Moderate Pain (4 - 6)  polyethylene glycol 3350 17 Gram(s) Oral daily PRN Constipation        Labs:  LABS:                        10.2   10.5  )-----------( 325      ( 15 May 2019 06:27 )             33.5     05-15    140  |  101  |  6.0<L>  ----------------------------<  92  4.0   |  24.0  |  0.48<L>    Ca    9.2      15 May 2019 06:39
[facility]  INFECTIOUS DISEASES AND INTERNAL MEDICINE at [place]  =======================================================  Adams Vasquze MD  Diplomates American Board of Internal Medicine and Infectious Diseases  =======================================================    KANE LARA [id number]    Follow up: Gluteal abscess    No more fever  still with pain and swelling  No diarrhea    Allergies:  No Known Allergies      Antibiotics:  ceftaroline fosamil IVPB 600 milliGRAM(s) IV Intermittent every 12 hours      REVIEW OF SYSTEMS:  CONSTITUTIONAL:  No Fever or chills  HEENT:  No diplopia or blurred vision.  No earache, sore throat or runny nose.  CARDIOVASCULAR:  No pressure, squeezing, strangling, tightness, heaviness or aching about the chest, neck, axilla or epigastrium.  RESPIRATORY:  No cough, shortness of breath  GASTROINTESTINAL:  No nausea, vomiting or diarrhea.  GENITOURINARY:  No dysuria, frequency or urgency.   MUSCULOSKELETAL:  no joint aches, no muscle pain  SKIN:  pain on right buttocks and redness  NEUROLOGIC:  No Headaches, seizures or weakness.  PSYCHIATRIC:  No disorder of thought or mood.  ENDOCRINE:  No heat or cold intolerance  HEMATOLOGICAL:  No easy bruising or bleeding      Physical Exam:  Vital Signs Last 24 Hrs  T(C): 37 (14 May 2019 07:34), Max: 37.1 (14 May 2019 04:28)  T(F): 98.6 (14 May 2019 07:34), Max: 98.8 (14 May 2019 04:28)  HR: 95 (14 May 2019 07:34) (89 - 100)  BP: 99/70 (14 May 2019 07:34) (99/70 - 111/78)  RR: 20 (13 May 2019 15:51) (20 - 20)  SpO2: 97% (14 May 2019 07:34) (97% - 100%)      GEN: NAD, pleasant  HEENT: normocephalic and atraumatic. EOMI. PERRL.  Anicteric  NECK: Supple.   LUNGS: Clear to auscultation.  HEART: Regular rate and rhythm  ABDOMEN: Soft, nontender, and nondistended.  Positive bowel sounds.    : No CVA tenderness  EXTREMITIES: Without any edema.  MSK: No joint swelling  NEUROLOGIC: No Focal Deficits  PSYCHIATRIC: Appropriate affect .  SKIN: Right buttocks with erythema, warmth and swelling, packing in place      Labs:  05-13    138  |  101  |  5.0<L>  ----------------------------<  85  3.5   |  22.0  |  0.50    Ca    9.0      13 May 2019 07:30                 10.5   22.5  )-----------( 290      ( 13 May 2019 07:30 )             33.9       RECENT CULTURES:  05-11 @ 14:51 .Urine     No growth      05-11 @ 14:26 .Other right posterior/leg wound Methicillin resistant Staphylococcus aureus    Moderate Methicillin resistant Staphylococcus aureus      05-11 @ 10:43 .Blood     No growth at 48 hours        EXAM:  CT PELVIS ONLY IC                        PROCEDURE DATE:  05/13/2019    INTERPRETATION:  CLINICAL INFORMATION: Evaluate for abscess.   Buttock/thigh cellulitis. Leg may have been instrumented at the time of   admission to obtain a culture. Patient returned for additional imaging.  COMPARISON: CT abdomen/pelvis 4/11/2019 and CT pelvis 5/12/2019  PROCEDURE:   CT of the Pelvis was performed with intravenous contrast; patient scanned   through the size to include the entire region of cellulitis.  Intravenous contrast: 95 ml Omnipaque 350. 10 ml discarded.  Oral contrast:None.  Sagittal and coronal reformats were performed.  FINDINGS:  SOFT TISSUES: There is infiltration of the subcutaneous fat with   associated soft tissue swelling involving the right gluteal region   through the mid to distal thigh and including intramuscular and mild   fascial edema involving the adductor compartment and hamstrings. A   punctate droplet of air is seen in the mid posterior thigh (series 6   image 270) which is likely be related to instrumentation at the time of   admission. Previously seen focus of high attenuation not seen on the   current study.  BLADDER: Within normal limits.  REPRODUCTIVE ORGANS: Subserosal fibroid again seen arising from the   posterior uterine body measuring 4.2 cm. Rim enhancing focus in the left   uterine body measuring 1.3 cm, possibly a degenerated fibroid. Ovaries   within normal limits for size with follicles measuring up to 1.9 cm in   the right ovary. LYMPH NODES: No pelvic lymphadenopathy.  VISUALIZED PORTIONS:  BOWEL: Within normal limits.  PERITONEUM: No ascites.  VESSELS: Prominent vessels in the left adnexa which could be seen in the   setting of pelvic congestion syndrome.  ABDOMINAL WALL: Infiltration of the subcutaneous fat again seen within   the left lower anterior abdominal wall which can be related to   subcutaneous injections.  BONES: No aggressive osseous lesion.  IMPRESSION:   Infiltration of the subcutaneous fat involving the right gluteal region   through the mid to distal thigh consistent with cellulitis with   associated intramuscular and mild fascial edema involving the adductor   compartment and hamstrings.  No drainable fluid collection.  Punctatefocus of air at the posterior aspect of the right thigh which is   likely related to instrumentation at the time of admission.
[facility]  INFECTIOUS DISEASES AND INTERNAL MEDICINE at [place]  =======================================================  Adams Vasquez MD  Diplomates American Board of Internal Medicine and Infectious Diseases  =======================================================    KANE LARA [id number]    Follow up: Gluteal abscess    No more fever  still with pain and swelling  No diarrhea    Allergies:  No Known Allergies      Antibiotics:  ceftaroline fosamil IVPB 600 milliGRAM(s) IV Intermittent every 12 hours      REVIEW OF SYSTEMS:  CONSTITUTIONAL:  No Fever or chills  HEENT:  No diplopia or blurred vision.  No earache, sore throat or runny nose.  CARDIOVASCULAR:  No pressure, squeezing, strangling, tightness, heaviness or aching about the chest, neck, axilla or epigastrium.  RESPIRATORY:  No cough, shortness of breath  GASTROINTESTINAL:  No nausea, vomiting or diarrhea.  GENITOURINARY:  No dysuria, frequency or urgency.   MUSCULOSKELETAL:  no joint aches, no muscle pain  SKIN:  pain on right buttocks and redness  NEUROLOGIC:  No Headaches, seizures or weakness.  PSYCHIATRIC:  No disorder of thought or mood.  ENDOCRINE:  No heat or cold intolerance  HEMATOLOGICAL:  No easy bruising or bleeding      Physical Exam:  Vital Signs Last 24 Hrs  T(C): 37.2 (13 May 2019 07:45), Max: 37.2 (13 May 2019 07:45)  T(F): 99 (13 May 2019 07:45), Max: 99 (13 May 2019 07:45)  HR: 100 (13 May 2019 07:45) (85 - 111)  BP: 116/70 (13 May 2019 07:45) (103/67 - 116/83)  RR: 18 (13 May 2019 07:45) (18 - 20)  SpO2: 92% (13 May 2019 07:45) (92% - 100%)      GEN: NAD, pleasant  HEENT: normocephalic and atraumatic. EOMI. PERRL.  Anicteric  NECK: Supple.   LUNGS: Clear to auscultation.  HEART: Regular rate and rhythm  ABDOMEN: Soft, nontender, and nondistended.  Positive bowel sounds.    : No CVA tenderness  EXTREMITIES: Without any edema.  MSK: No joint swelling  NEUROLOGIC: No Focal Deficits  PSYCHIATRIC: Appropriate affect .  SKIN: Right buttocks with erythema, warmth and swelling, packing in place      Labs:      138  |  101  |  5.0<L>  ----------------------------<  85  3.5   |  22.0  |  0.50    Ca    9.0      13 May 2019 07:30  Phos  3.4       Mg     1.8                    10.5   22.5  )-----------( 290      ( 13 May 2019 07:30 )             33.9     Urinalysis Basic - ( 11 May 2019 14:51 )    Color: Yellow / Appearance: Clear / S.005 / pH: x  Gluc: x / Ketone: Negative  / Bili: Negative / Urobili: Negative mg/dL   Blood: x / Protein: Negative mg/dL / Nitrite: Negative   Leuk Esterase: Negative / RBC: x / WBC x   Sq Epi: x / Non Sq Epi: x / Bacteria: x      RECENT CULTURES:   @ 14:51 .Urine     No growth       @ 14:26 Skin     Moderate Staphylococcus aureus Identification and susceptibility to  follow.  Culture in progress       @ 10:43 .Blood     No growth at 48 hours        EXAM:  CT PELVIS ONLY IC                        PROCEDURE DATE:  2019    INTERPRETATION:  CLINICAL INFORMATION: Buttock/thigh cellulitis.  COMPARISON: CT abdomen/pelvis 2019  PROCEDURE:   CT of the Pelvis was performed with intravenous contrast.   Intravenous contrast: 98 ml Omnipaque 300.   Oral contrast:None.  Sagittal and coronal reformats were performed.  FINDINGS:  BLADDER: Within normal limits.  REPRODUCTIVE ORGANS: 3.8 cm subserosal fibroid arising from the posterior   uterine body. Ovaries within normal limits for size.  LYMPH NODES: No pelvic lymphadenopathy.  VISUALIZED PORTIONS:  BOWEL: Moderate amount of retained fecal material.  PERITONEUM: No ascites.  VESSELS: Within normal limits.  ABDOMINAL WALL: Infiltration of the subcutaneous fat within the left   lower anterior abdominal wall with tiny droplets of air which can be   related to subcutaneous injections.  BONES/SOFT TISSUES: There is infiltration of the subcutaneous fat   involving the right gluteal region extending into the posterior and   medial aspect of the right thigh with associated soft tissue swelling.   There is an area of increased attenuation measuring 1.6 x 1.0 cm at the   posterior aspect of the right thigh (series 8 image 135) which may   correspond with the small collection on the ultrasound of the same day.   The infiltration of the fat continues to extend into the mid thigh which   was not included in the field-of-view.  IMPRESSION:   Infiltration of the subcutaneous fat involving the right gluteal region   extending into the posterior and medial aspect of the right thigh with   associated soft tissue swelling consistent with cellulitis.  1.6 cm focus of higher attenuation at the posterior aspect of theleft   thigh which may correspond to the small collection seen on the ultrasound   of the same day; infiltration of the subcutaneous fat extends into the   mid thigh beyond the field-of-view and the patient is being brought back   for additional imaging of the mid and distal thigh.
[facility]  INFECTIOUS DISEASES AND INTERNAL MEDICINE at [place]  =======================================================  Adams Vasquez MD  Diplomates American Board of Internal Medicine and Infectious Diseases  =======================================================    KANE LARA [id number]    Follow up: Gluteal abscess    No more fever  still with pain and swelling  No diarrhea    S/P I&D 5/16    Allergies:  No Known Allergies      Antibiotics:  ceftaroline fosamil IVPB 600 milliGRAM(s) IV Intermittent every 12 hours      REVIEW OF SYSTEMS:  CONSTITUTIONAL:  No Fever or chills  HEENT:  No diplopia or blurred vision.  No earache, sore throat or runny nose.  CARDIOVASCULAR:  No pressure, squeezing, strangling, tightness, heaviness or aching about the chest, neck, axilla or epigastrium.  RESPIRATORY:  No cough, shortness of breath  GASTROINTESTINAL:  No nausea, vomiting or diarrhea.  GENITOURINARY:  No dysuria, frequency or urgency.   MUSCULOSKELETAL:  no joint aches, no muscle pain  SKIN:  pain on right buttocks and redness  NEUROLOGIC:  No Headaches, seizures or weakness.  PSYCHIATRIC:  No disorder of thought or mood.  ENDOCRINE:  No heat or cold intolerance  HEMATOLOGICAL:  No easy bruising or bleeding      Physical Exam:  Vital Signs Last 24 Hrs  T(C): 36.5 (17 May 2019 07:52), Max: 36.7 (17 May 2019 00:09)  T(F): 97.7 (17 May 2019 07:52), Max: 98 (17 May 2019 00:09)  HR: 80 (17 May 2019 07:52) (78 - 92)  BP: 99/65 (17 May 2019 07:52) (99/65 - 131/78)  RR: 20 (17 May 2019 07:52) (20 - 20)  SpO2: 98% (17 May 2019 00:09) (98% - 100%)      GEN: NAD, pleasant  HEENT: normocephalic and atraumatic. EOMI. PERRL.  Anicteric  NECK: Supple.   LUNGS: Clear to auscultation.  HEART: Regular rate and rhythm  ABDOMEN: Soft, nontender, and nondistended.  Positive bowel sounds.    : No CVA tenderness  EXTREMITIES: Without any edema.  MSK: No joint swelling  NEUROLOGIC: No Focal Deficits  PSYCHIATRIC: Appropriate affect .  SKIN: Right buttocks with erythema, warmth and swelling, packing in place      Labs:  RECENT CULTURES:  05-16 @ 18:29 .Abscess     Only one swab sent, Unable to perform gram stain      05-16 @ 18:28 .Abscess     No White blood cells  No organisms seen      05-14 @ 17:24 .Other         05-11 @ 14:51 .Urine     No growth      05-11 @ 14:26 .Other right posterior/leg wound Methicillin resistant Staphylococcus aureus    Moderate Methicillin resistant Staphylococcus aureus      05-11 @ 10:43 .Blood     No growth at 5 days.
see dictated consult      33 F with hx SLE on prednisone, transverse myelitis, fibromyalgia presenting with 1 week hx of pain, swelling, induration of right thigh.  Hospital addmission 4 weeks ago for right buttock cellulitis/ abscess.  I&D by [facility] surgery.  Buttock infection resolve, now with right medial thigh induration.    CT shows cellulitis, no abscess.  Improved with iv abx but not resolved.      A/P: Right thigh induration, may benefit from I&D.  Recommend re-evaluation by [facility]  will d/w Dr. Carey  Continue abx for now
seen for sepsis, cellulitis    pain ok, right thigh swollen  ros otherwise negative     MEDICATIONS  (STANDING):  ascorbic acid 500 milliGRAM(s) Oral daily  ceftaroline fosamil IVPB 600 milliGRAM(s) IV Intermittent every 12 hours  cholecalciferol 2000 Unit(s) Oral daily  DULoxetine 60 milliGRAM(s) Oral daily  ferrous    sulfate 325 milliGRAM(s) Oral two times a day  gabapentin 100 milliGRAM(s) Oral two times a day  gabapentin 300 milliGRAM(s) Oral at bedtime  heparin  Injectable 5000 Unit(s) SubCutaneous every 8 hours  magnesium oxide 400 milliGRAM(s) Oral two times a day with meals  melatonin 6 milliGRAM(s) Oral at bedtime  multivitamin 1 Tablet(s) Oral daily  pantoprazole    Tablet 40 milliGRAM(s) Oral before breakfast  potassium chloride    Tablet ER 40 milliEquivalent(s) Oral once  saccharomyces boulardii 250 milliGRAM(s) Oral two times a day  senna 2 Tablet(s) Oral at bedtime    MEDICATIONS  (PRN):  acetaminophen   Tablet .. 650 milliGRAM(s) Oral every 6 hours PRN Temp greater or equal to 38C (100.4F)  aluminum hydroxide/magnesium hydroxide/simethicone Suspension 30 milliLiter(s) Oral every 4 hours PRN Dyspepsia  baclofen 5 milliGRAM(s) Oral every 8 hours PRN Muscle Spasm  magnesium hydroxide Suspension 30 milliLiter(s) Oral daily PRN Constipation  ondansetron Injectable 4 milliGRAM(s) IV Push every 6 hours PRN Nausea  oxyCODONE    IR 5 milliGRAM(s) Oral every 6 hours PRN Moderate Pain (4 - 6)  polyethylene glycol 3350 17 Gram(s) Oral daily PRN Constipation      Allergies    No Known Allergies      Vital Signs Last 24 Hrs  T(C): 37 (14 May 2019 07:34), Max: 37.1 (14 May 2019 04:28)  T(F): 98.6 (14 May 2019 07:34), Max: 98.8 (14 May 2019 04:28)  HR: 95 (14 May 2019 07:34) (89 - 100)  BP: 99/70 (14 May 2019 07:34) (97/62 - 111/78)  BP(mean): --  RR: 20 (13 May 2019 15:51) (18 - 20)  SpO2: 97% (14 May 2019 07:34) (97% - 100%)    PHYSICAL EXAM:    GENERAL: NAD  CHEST/LUNG: Clear to percussion bilaterall  HEART: Regular rate and rhythm; S1 S2  ABDOMEN: Soft, Bowel sounds present  EXTREMITIES: no lower leg edema  right thigh swollen with medial erythema. soft compartments.  right gluteal dressing with serosanguinous drainage   NERVOUS SYSTEM:  Alert & Oriented X3 nonfocal    LABS:                        10.5   22.5  )-----------( 290      ( 13 May 2019 07:30 )             33.9     05-13    138  |  101  |  5.0<L>  ----------------------------<  85  3.5   |  22.0  |  0.50    Ca    9.0      13 May 2019 07:30            CAPILLARY BLOOD GLUCOSE            RADIOLOGY & ADDITIONAL TESTS:

## 2019-05-18 NOTE — PROGRESS NOTE ADULT - ASSESSMENT
33 year old female with PMH of fibromyalgia, SLE and transverse myelitis on Cellcept and prednisone who presented to ED with complaints of R buttock/ thigh infected pimples/ abscess with subjective fever. In ED BP 84/60 w   T 101, she was code sepsis with appropriate IVF and Abx given in ED, blood and wound cx sent. Pt admitted to medicine with sepsis 2/2 right thigh/ buttock infected pimple/ abscess. Started on IV Abx in consultation with ID. CT pelvis showed right gluteal/ thigh cellulitis and no drainable fluid collection on repeat CT scan. Pt was seen by plastic surgery and recommended I&D by ACS given local signs of fluid collection now s/p I&D on 5/16 by ACS there was purulent discharge possible infected hematoma removed.     >Sepsis 2/2 right posterior thigh infected pimple   - Sepsis present on admission-resolved   - MRSA in wound culture, negative blood cultures  - S/p I&D by ACS, continue daily dressing changes  - Analgesia PRN  - ID following- C/w Teflaro. Will need to determine course of antibiotics.    >Hx of SLE, Transverse myelitis, fibromyalgia- C/w prednisone, cellcept, Plaquenil.  >Constipation - continue bowel regimen  >Gerd- continue PPI   >Anemia - continue ferrous sulfate    >DVT ppx-  Lovenox SC    >Dispo- Eventual home once cleared by ID.

## 2019-05-18 NOTE — PROGRESS NOTE ADULT - ASSESSMENT
34y/o  Female with h/o fibromyalgia, SLE and transverse myelitis on Cellcept and prednisone. Patient presented to ED with c/o R buttock/posterior thigh infected pimples/abscess with subjective fever. Intially symptoms started with a pimple at right posterior left thigh   She was recently at [facility] 3 weeks prior to this admission for left thigh infection, requiring midline insertion and dapto for MRSA completed course on 4/25/19.       Rt Gluteal/Thigh cellulitis   fibromyalgia  SLE   transverse myelitis  Immunosuppressed      - Blood cultures no growth   - Skin culture with MRSA  - MRSA sensitive to Ceftaroline and Bactrim  - CT pelvis results reviewed  - surgery eval noted   - Continue Teflaro 600mg IV q 12hours  - Cultures from Repeat I&D negative  - On D/C can be discharged on Bactrim DS 1 tab BID till 5/22  - Trend Fever  - Trend Leukocytosis      Will follow 34y/o  Female with h/o fibromyalgia, SLE and transverse myelitis on Cellcept and prednisone. Patient presented to ED with c/o R buttock/posterior thigh infected pimples/abscess with subjective fever. Intially symptoms started with a pimple at right posterior left thigh   She was recently at [facility] 3 weeks prior to this admission for left thigh infection, requiring midline insertion and dapto for MRSA completed course on 4/25/19.       Rt Gluteal/Thigh cellulitis   fibromyalgia  SLE   transverse myelitis  Immunosuppressed      - Blood cultures no growth   - Skin culture with MRSA  - MRSA sensitive to Ceftaroline and Bactrim  - CT pelvis results reviewed  - surgery eval noted   - Continue Teflaro 600mg IV q 12hours  - Cultures from Repeat I&D negative  - On D/C can be discharged on Bactrim DS 1 tab BID till 5/22  - Trend Fever  - Trend Leukocytosis      Will sign off. Please call PRN.

## 2019-05-18 NOTE — PROGRESS NOTE ADULT - REASON FOR ADMISSION
Leg wound/ cellulitis

## 2019-05-18 NOTE — PROGRESS NOTE ADULT - PROVIDER SPECIALTY LIST ADULT
Hospitalist
Infectious Disease
Plastic Surgery
Surgery
Infectious Disease

## 2019-05-19 ENCOUNTER — TRANSCRIPTION ENCOUNTER (OUTPATIENT)
Age: 34
End: 2019-05-19

## 2019-05-19 LAB
ANION GAP SERPL CALC-SCNC: 15 MMOL/L — SIGNIFICANT CHANGE UP (ref 5–17)
ANISOCYTOSIS BLD QL: SLIGHT — SIGNIFICANT CHANGE UP
BASOPHILS # BLD AUTO: 0 K/UL — SIGNIFICANT CHANGE UP (ref 0–0.2)
BASOPHILS NFR BLD AUTO: 1 % — SIGNIFICANT CHANGE UP (ref 0–2)
BUN SERPL-MCNC: 16 MG/DL — SIGNIFICANT CHANGE UP (ref 8–20)
CALCIUM SERPL-MCNC: 9.5 MG/DL — SIGNIFICANT CHANGE UP (ref 8.6–10.2)
CHLORIDE SERPL-SCNC: 98 MMOL/L — SIGNIFICANT CHANGE UP (ref 98–107)
CO2 SERPL-SCNC: 25 MMOL/L — SIGNIFICANT CHANGE UP (ref 22–29)
CREAT SERPL-MCNC: 0.57 MG/DL — SIGNIFICANT CHANGE UP (ref 0.5–1.3)
EOSINOPHIL # BLD AUTO: 0 K/UL — SIGNIFICANT CHANGE UP (ref 0–0.5)
EOSINOPHIL NFR BLD AUTO: 4 % — SIGNIFICANT CHANGE UP (ref 0–5)
GLUCOSE SERPL-MCNC: 110 MG/DL — SIGNIFICANT CHANGE UP (ref 70–115)
HCT VFR BLD CALC: 34.8 % — LOW (ref 37–47)
HGB BLD-MCNC: 10.5 G/DL — LOW (ref 12–16)
HYPOCHROMIA BLD QL: SLIGHT — SIGNIFICANT CHANGE UP
LYMPHOCYTES # BLD AUTO: 0.9 K/UL — LOW (ref 1–4.8)
LYMPHOCYTES # BLD AUTO: 27 % — SIGNIFICANT CHANGE UP (ref 20–55)
MACROCYTES BLD QL: SLIGHT — SIGNIFICANT CHANGE UP
MAGNESIUM SERPL-MCNC: 2.2 MG/DL — SIGNIFICANT CHANGE UP (ref 1.8–2.6)
MCHC RBC-ENTMCNC: 26.2 PG — LOW (ref 27–31)
MCHC RBC-ENTMCNC: 30.2 G/DL — LOW (ref 32–36)
MCV RBC AUTO: 86.8 FL — SIGNIFICANT CHANGE UP (ref 81–99)
MONOCYTES # BLD AUTO: 0.6 K/UL — SIGNIFICANT CHANGE UP (ref 0–0.8)
MONOCYTES NFR BLD AUTO: 15 % — HIGH (ref 3–10)
NEUTROPHILS # BLD AUTO: 3.7 K/UL — SIGNIFICANT CHANGE UP (ref 1.8–8)
NEUTROPHILS NFR BLD AUTO: 52 % — SIGNIFICANT CHANGE UP (ref 37–73)
NEUTS BAND # BLD: 1 % — SIGNIFICANT CHANGE UP (ref 0–8)
OVALOCYTES BLD QL SMEAR: SLIGHT — SIGNIFICANT CHANGE UP
PHOSPHATE SERPL-MCNC: 4.6 MG/DL — SIGNIFICANT CHANGE UP (ref 2.4–4.7)
PLAT MORPH BLD: NORMAL — SIGNIFICANT CHANGE UP
PLATELET # BLD AUTO: 485 K/UL — HIGH (ref 150–400)
POIKILOCYTOSIS BLD QL AUTO: SLIGHT — SIGNIFICANT CHANGE UP
POTASSIUM SERPL-MCNC: 3.5 MMOL/L — SIGNIFICANT CHANGE UP (ref 3.5–5.3)
POTASSIUM SERPL-SCNC: 3.5 MMOL/L — SIGNIFICANT CHANGE UP (ref 3.5–5.3)
RBC # BLD: 4.01 M/UL — LOW (ref 4.4–5.2)
RBC # FLD: 15.9 % — HIGH (ref 11–15.6)
RBC BLD AUTO: ABNORMAL
SCHISTOCYTES BLD QL AUTO: SLIGHT — SIGNIFICANT CHANGE UP
SODIUM SERPL-SCNC: 138 MMOL/L — SIGNIFICANT CHANGE UP (ref 135–145)
WBC # BLD: 6.2 K/UL — SIGNIFICANT CHANGE UP (ref 4.8–10.8)
WBC # FLD AUTO: 6.2 K/UL — SIGNIFICANT CHANGE UP (ref 4.8–10.8)

## 2019-05-19 PROCEDURE — 76882 US LMTD JT/FCL EVL NVASC XTR: CPT

## 2019-05-19 PROCEDURE — 87070 CULTURE OTHR SPECIMN AEROBIC: CPT

## 2019-05-19 PROCEDURE — 84100 ASSAY OF PHOSPHORUS: CPT

## 2019-05-19 PROCEDURE — 85730 THROMBOPLASTIN TIME PARTIAL: CPT

## 2019-05-19 PROCEDURE — 93005 ELECTROCARDIOGRAM TRACING: CPT

## 2019-05-19 PROCEDURE — 87640 STAPH A DNA AMP PROBE: CPT

## 2019-05-19 PROCEDURE — 80053 COMPREHEN METABOLIC PANEL: CPT

## 2019-05-19 PROCEDURE — 80048 BASIC METABOLIC PNL TOTAL CA: CPT

## 2019-05-19 PROCEDURE — 83735 ASSAY OF MAGNESIUM: CPT

## 2019-05-19 PROCEDURE — 87186 SC STD MICRODIL/AGAR DIL: CPT

## 2019-05-19 PROCEDURE — 84702 CHORIONIC GONADOTROPIN TEST: CPT

## 2019-05-19 PROCEDURE — T1013: CPT

## 2019-05-19 PROCEDURE — 72193 CT PELVIS W/DYE: CPT

## 2019-05-19 PROCEDURE — 96365 THER/PROPH/DIAG IV INF INIT: CPT

## 2019-05-19 PROCEDURE — 85027 COMPLETE CBC AUTOMATED: CPT

## 2019-05-19 PROCEDURE — 87086 URINE CULTURE/COLONY COUNT: CPT

## 2019-05-19 PROCEDURE — 87040 BLOOD CULTURE FOR BACTERIA: CPT

## 2019-05-19 PROCEDURE — 81003 URINALYSIS AUTO W/O SCOPE: CPT

## 2019-05-19 PROCEDURE — 83605 ASSAY OF LACTIC ACID: CPT

## 2019-05-19 PROCEDURE — 96375 TX/PRO/DX INJ NEW DRUG ADDON: CPT

## 2019-05-19 PROCEDURE — 99239 HOSP IP/OBS DSCHRG MGMT >30: CPT

## 2019-05-19 PROCEDURE — 99285 EMERGENCY DEPT VISIT HI MDM: CPT | Mod: 25

## 2019-05-19 PROCEDURE — 36415 COLL VENOUS BLD VENIPUNCTURE: CPT

## 2019-05-19 PROCEDURE — 87205 SMEAR GRAM STAIN: CPT

## 2019-05-19 PROCEDURE — 85610 PROTHROMBIN TIME: CPT

## 2019-05-19 RX ORDER — SACCHAROMYCES BOULARDII 250 MG
1 POWDER IN PACKET (EA) ORAL
Qty: 7 | Refills: 0
Start: 2019-05-19 | End: 2019-05-22

## 2019-05-19 RX ORDER — OXYCODONE HYDROCHLORIDE 5 MG/1
1 TABLET ORAL
Qty: 20 | Refills: 0
Start: 2019-05-19 | End: 2019-05-23

## 2019-05-19 RX ADMIN — Medication 40 MILLIGRAM(S): at 05:20

## 2019-05-19 RX ADMIN — CEFTAROLINE FOSAMIL 50 MILLIGRAM(S): 600 POWDER, FOR SOLUTION INTRAVENOUS at 02:14

## 2019-05-19 RX ADMIN — Medication 250 MILLIGRAM(S): at 05:20

## 2019-05-19 RX ADMIN — HYDROMORPHONE HYDROCHLORIDE 1 MILLIGRAM(S): 2 INJECTION INTRAMUSCULAR; INTRAVENOUS; SUBCUTANEOUS at 08:21

## 2019-05-19 RX ADMIN — CHLORHEXIDINE GLUCONATE 1 APPLICATION(S): 213 SOLUTION TOPICAL at 11:19

## 2019-05-19 RX ADMIN — Medication 500 MILLIGRAM(S): at 11:19

## 2019-05-19 RX ADMIN — Medication 325 MILLIGRAM(S): at 05:20

## 2019-05-19 RX ADMIN — HYDROMORPHONE HYDROCHLORIDE 1 MILLIGRAM(S): 2 INJECTION INTRAMUSCULAR; INTRAVENOUS; SUBCUTANEOUS at 14:34

## 2019-05-19 RX ADMIN — MAGNESIUM OXIDE 400 MG ORAL TABLET 400 MILLIGRAM(S): 241.3 TABLET ORAL at 07:57

## 2019-05-19 RX ADMIN — MUPIROCIN 1 APPLICATION(S): 20 OINTMENT TOPICAL at 05:20

## 2019-05-19 RX ADMIN — DULOXETINE HYDROCHLORIDE 60 MILLIGRAM(S): 30 CAPSULE, DELAYED RELEASE ORAL at 11:19

## 2019-05-19 RX ADMIN — Medication 1 TABLET(S): at 12:39

## 2019-05-19 RX ADMIN — MYCOPHENOLATE MOFETIL 1500 MILLIGRAM(S): 250 CAPSULE ORAL at 05:20

## 2019-05-19 RX ADMIN — Medication 2000 UNIT(S): at 11:19

## 2019-05-19 RX ADMIN — Medication 1 TABLET(S): at 11:19

## 2019-05-19 RX ADMIN — Medication 200 MILLIGRAM(S): at 05:20

## 2019-05-19 RX ADMIN — GABAPENTIN 100 MILLIGRAM(S): 400 CAPSULE ORAL at 05:20

## 2019-05-19 RX ADMIN — PANTOPRAZOLE SODIUM 40 MILLIGRAM(S): 20 TABLET, DELAYED RELEASE ORAL at 05:20

## 2019-05-19 NOTE — DISCHARGE NOTE PROVIDER - HOSPITAL COURSE
33 year old female with PMH of fibromyalgia, SLE and transverse myelitis on Cellcept and prednisone who presented to ED with complaints of R buttock/ thigh infected pimples/ abscess with subjective fever. In ED BP 84/60 w   T 101, she was code sepsis with appropriate IVF and Abx given in ED, blood and wound cx sent. Pt admitted to medicine with sepsis 2/2 right thigh/ buttock infected pimple/ abscess. Started on IV Abx in consultation with ID. CT pelvis showed right gluteal/ thigh cellulitis and no drainable fluid collection on repeat CT scan. Pt was seen by plastic surgery and recommended I&D by ACS given local signs of fluid collection now s/p I&D on 5/16 by ACS there was purulent discharge possible infected hematoma removed.         >Sepsis 2/2 right posterior thigh infected pimple     - Sepsis present on admission-resolved     - MRSA in wound culture, negative blood cultures    - S/p I&D by ACS, continue daily dressing changes    - Analgesia PRN    - ID following- switch to Bactrim DS until 5/22    -Outpatient follow up with surgery and PCP        >Hx of SLE, Transverse myelitis, fibromyalgia- C/w prednisone, cellcept, Plaquenil.    >Constipation - continue bowel regimen    >Gerd- continue PPI     >Anemia - continue ferrous sulfate        Medically stable for discharge home today as discussed with ID. Can continue immunosuppressants. Time spent on discharge 45 minutes.

## 2019-05-19 NOTE — DISCHARGE NOTE PROVIDER - NSDCCPCAREPLAN_GEN_ALL_CORE_FT
PRINCIPAL DISCHARGE DIAGNOSIS  Diagnosis: Infection of wound due to methicillin resistant Staphylococcus aureus (MRSA)  Assessment and Plan of Treatment: please take your next dose of Bactrim tonight along with a probiotic  continue Bactrim until Wednesday 5/22  please follow up with your PCP and surgeon within 1 week  please only use pain medications if you have pain  please continue local wound care as instructed   Clean wound with saline and fold dry sterile guaze into the wound with the rest of the gauze covering the wound and then tape it.  Patient is encourage to have daily showers with soap and water running on the wound before daily dressing changes.  If pt's wound becomes superficial, daily dry gauze without corner wick packings will suffice.      SECONDARY DISCHARGE DIAGNOSES  Diagnosis: Systemic lupus erythematosus (SLE) in adult  Assessment and Plan of Treatment: continue home medications and follow up with your PCP within 1 week

## 2019-05-19 NOTE — DISCHARGE NOTE PROVIDER - CARE PROVIDER_API CALL
Chaitanya Osullivan (MD)  Emergency Medicine; Surgical Critical Care  [address], [address] [place] [address]  Phone: [phone]  Fax: [phone]  Follow Up Time:

## 2019-05-19 NOTE — DISCHARGE NOTE NURSING/CASE MANAGEMENT/SOCIAL WORK - NSDCDPATPORTLINK_GEN_ALL_CORE
You can access the [facility] Patient Portal, offered by [facility], by registering with the following website: [email or web address]

## 2019-05-20 ENCOUNTER — CHART COPY (OUTPATIENT)
Age: 34
End: 2019-05-20

## 2019-05-21 ENCOUNTER — APPOINTMENT (OUTPATIENT)
Dept: RHEUMATOLOGY | Facility: CLINIC | Age: 34
End: 2019-05-21
Payer: COMMERCIAL

## 2019-05-21 ENCOUNTER — LABORATORY RESULT (OUTPATIENT)
Age: 34
End: 2019-05-21

## 2019-05-21 VITALS
BODY MASS INDEX: 20.61 KG/M2 | DIASTOLIC BLOOD PRESSURE: 85 MMHG | HEART RATE: 93 BPM | HEIGHT: 62 IN | OXYGEN SATURATION: 97 % | SYSTOLIC BLOOD PRESSURE: 123 MMHG | WEIGHT: 112 LBS

## 2019-05-21 LAB
CULTURE RESULTS: SIGNIFICANT CHANGE UP
CULTURE RESULTS: SIGNIFICANT CHANGE UP
SPECIMEN SOURCE: SIGNIFICANT CHANGE UP
SPECIMEN SOURCE: SIGNIFICANT CHANGE UP

## 2019-05-21 PROCEDURE — 99215 OFFICE O/P EST HI 40 MIN: CPT

## 2019-05-23 ENCOUNTER — RX RENEWAL (OUTPATIENT)
Age: 34
End: 2019-05-23

## 2019-05-23 LAB
ALBUMIN SERPL ELPH-MCNC: 4.6 G/DL
ALP BLD-CCNC: 80 U/L
ALT SERPL-CCNC: 64 U/L
ANION GAP SERPL CALC-SCNC: 14 MMOL/L
AST SERPL-CCNC: 29 U/L
BASOPHILS # BLD AUTO: 0 K/UL
BASOPHILS NFR BLD AUTO: 0 %
BILIRUB SERPL-MCNC: 0.2 MG/DL
BUN SERPL-MCNC: 16 MG/DL
C3 SERPL-MCNC: 121 MG/DL
C4 SERPL-MCNC: 37 MG/DL
CALCIUM SERPL-MCNC: 9.6 MG/DL
CHLORIDE SERPL-SCNC: 101 MMOL/L
CO2 SERPL-SCNC: 23 MMOL/L
CREAT SERPL-MCNC: 0.63 MG/DL
CREAT SPEC-SCNC: 86 MG/DL
CREAT/PROT UR: 0.2 RATIO
DSDNA AB SER-ACNC: 54 IU/ML
EOSINOPHIL # BLD AUTO: 0 K/UL
EOSINOPHIL NFR BLD AUTO: 0 %
GLUCOSE SERPL-MCNC: 118 MG/DL
HCT VFR BLD CALC: 38.4 %
HGB BLD-MCNC: 11.4 G/DL
LYMPHOCYTES # BLD AUTO: 1.09 K/UL
LYMPHOCYTES NFR BLD AUTO: 9.6 %
MAN DIFF?: NORMAL
MCHC RBC-ENTMCNC: 26.6 PG
MCHC RBC-ENTMCNC: 29.7 GM/DL
MCV RBC AUTO: 89.7 FL
MONOCYTES # BLD AUTO: 0.4 K/UL
MONOCYTES NFR BLD AUTO: 3.5 %
NEUTROPHILS # BLD AUTO: 9.68 K/UL
NEUTROPHILS NFR BLD AUTO: 85.2 %
PLATELET # BLD AUTO: 633 K/UL
POTASSIUM SERPL-SCNC: 4.4 MMOL/L
PROT SERPL-MCNC: 6.9 G/DL
PROT UR-MCNC: 17 MG/DL
RBC # BLD: 4.28 M/UL
RBC # FLD: 16.4 %
SODIUM SERPL-SCNC: 138 MMOL/L
WBC # FLD AUTO: 11.36 K/UL

## 2019-05-23 NOTE — ASSESSMENT
[FreeTextEntry1] : 31 yo F w/ SLE  (dx 2014, arthritis, +PAUL,  +dsDNA, +SM, +RNP, nephritis 2016),  and fibromyalgia (dx 2014).  Was treated with cellcept and in lupus study at [facility].  sle complicated by longitudinally extensive transverse myelitis (2019) treated with IV pulse steroids, PLEX, RTX with improvement in symptoms.  \par \par #gluteal abcess\par - initially improvement after IV abx - with recurrence and extension last week.  will call ID to determine if need more extensive course given immunosuppression. \par -continue to observe - dressing changes by \par \par #. SLE with nephritis,alopecia, arthritis, lymphopenia\par -complicated by longitudinal extensive transverse myelitis\par -improved - still with a lot of spasticity - \par -urninary retion has resolved but now with incontinence.   advised to f/u with neuro this week and repeat MRI (which was not done due to hospitalization\par -needs MRI spine\par -continue gabapentin, baclofen\par -taper prednisone to 35 mg then 30 mg - in effert to lesson immunosuppresion.\par \par patient will send me the names of the ID and neurologist to call and consult with\par #FMS:\par -continue cymbalta - increase to 60 mg daily\par -if s/s not improved patient will rto\par \par #health maintenance\par -vit d good\par \par #v58.69\par -ulcers developed after abx - getting better

## 2019-05-23 NOTE — PHYSICAL EXAM
[General Appearance - Alert] : alert [General Appearance - In No Acute Distress] : in no acute distress [General Appearance - Well Nourished] : well nourished [General Appearance - Well Developed] : well developed [General Appearance - Well-Appearing] : healthy appearing [Sclera] : the sclera and conjunctiva were normal [PERRL With Normal Accommodation] : pupils were equal in size, round, and reactive to light [Extraocular Movements] : extraocular movements were intact [Outer Ear] : the ears and nose were normal in appearance [Neck Appearance] : the appearance of the neck was normal [Heart Rate And Rhythm] : heart rate was normal and rhythm regular [Cervical Lymph Nodes Enlarged Anterior Bilaterally] : anterior cervical [Supraclavicular Lymph Nodes Enlarged Bilaterally] : supraclavicular [No CVA Tenderness] : no ~M costovertebral angle tenderness [No Spinal Tenderness] : no spinal tenderness [Musculoskeletal - Swelling] : no joint swelling seen [] : no rash [Oriented To Time, Place, And Person] : oriented to person, place, and time [Impaired Insight] : insight and judgment were intact [FreeTextEntry1] : no focal deficits - able to walk without assistive device, component of spasticity noted with ambulation - though individual muscle groups 4+-5/5 b upper and lower

## 2019-05-23 NOTE — HISTORY OF PRESENT ILLNESS
[FreeTextEntry1] : #Had a second hospitalization for new sores/pimples on buttocks - treated with abx and did well.  never febrile with this event.  healing nicely. \par #longitudinally extensive transverse myelitis (2019)  - s/p  IV pulse steroids, PLEX, RTX.  Walking better but persistent leg symptoms.  really no better or no worse compared with last visit.  on prednisone 50 mg daily\par #sle - on cellcept and plaquenil - denies symptoms at present.  \par #fms - on cymbalta

## 2019-05-23 NOTE — REVIEW OF SYSTEMS
[As Noted in HPI] : as noted in HPI [Negative] : Heme/Lymph [Fever] : no fever [Chills] : no chills [Eye Pain] : no eye pain [Earache] : no earache [Nosebleeds] : no nosebleeds [Chest Pain] : no chest pain [Shortness Of Breath] : no shortness of breath [Wheezing] : no wheezing [Cough] : no cough [Abdominal Pain] : no abdominal pain [Vomiting] : no vomiting [Dysuria] : no dysuria [Joint Pain] : no joint pain [Skin Lesions] : no skin lesions [Dizziness] : no dizziness [Anxiety] : no anxiety [Easy Bleeding] : no tendency for easy bleeding [de-identified] : mood much better in [place] -  [FreeTextEntry1] : missed period

## 2019-05-26 ENCOUNTER — APPOINTMENT (OUTPATIENT)
Dept: MRI IMAGING | Facility: CLINIC | Age: 34
End: 2019-05-26
Payer: COMMERCIAL

## 2019-05-26 ENCOUNTER — OUTPATIENT (OUTPATIENT)
Dept: OUTPATIENT SERVICES | Facility: HOSPITAL | Age: 34
LOS: 1 days | End: 2019-05-26
Payer: COMMERCIAL

## 2019-05-26 DIAGNOSIS — G37.3 ACUTE TRANSVERSE MYELITIS IN DEMYELINATING DISEASE OF CENTRAL NERVOUS SYSTEM: ICD-10-CM

## 2019-05-26 DIAGNOSIS — Z98.890 OTHER SPECIFIED POSTPROCEDURAL STATES: Chronic | ICD-10-CM

## 2019-05-26 PROCEDURE — 72156 MRI NECK SPINE W/O & W/DYE: CPT

## 2019-05-26 PROCEDURE — A9585: CPT

## 2019-05-26 PROCEDURE — 72157 MRI CHEST SPINE W/O & W/DYE: CPT | Mod: 26

## 2019-05-26 PROCEDURE — 72156 MRI NECK SPINE W/O & W/DYE: CPT | Mod: 26

## 2019-05-26 PROCEDURE — 72157 MRI CHEST SPINE W/O & W/DYE: CPT

## 2019-05-29 ENCOUNTER — APPOINTMENT (OUTPATIENT)
Dept: INTERNAL MEDICINE | Facility: CLINIC | Age: 34
End: 2019-05-29
Payer: COMMERCIAL

## 2019-05-29 VITALS
WEIGHT: 119 LBS | BODY MASS INDEX: 21.9 KG/M2 | DIASTOLIC BLOOD PRESSURE: 75 MMHG | RESPIRATION RATE: 87 BRPM | HEIGHT: 62 IN | HEART RATE: 86 BPM | SYSTOLIC BLOOD PRESSURE: 107 MMHG | OXYGEN SATURATION: 98 %

## 2019-05-29 PROCEDURE — 99213 OFFICE O/P EST LOW 20 MIN: CPT

## 2019-05-29 NOTE — ASSESSMENT
[Treatment Adherence] : treatment adherence [Treatment Education] : treatment education [FreeTextEntry1] : Still there is cellulitis but no fluctuation. No need for drainage at this time. \par - Will start Bactrim again DS 1tab q12h for one week. \par - Will send mupirocin to use asap has any folliculitis (since both cellulitis started with a small "pimple").\par - Will try antibacterial soap and chlorhexidine cloths to clean her skin daily or at least once weekly. \par - On immunosuppressive meds and rituxan that makes her susceptible. \par - If any fever or worsening or any other symptoms will go to ED.  [Rx Dose / Side Effects] : Rx dose/side effects [Drug Interactions / Side Effects] : drug interactions/side effects [Disclosure of Diagnosis] : disclosure of diagnosis [Anticipatory Guidance] : anticipatory guidance

## 2019-05-29 NOTE — PHYSICAL EXAM
[General Appearance - In No Acute Distress] : in no acute distress [General Appearance - Alert] : alert [Sclera] : the sclera and conjunctiva were normal [PERRL With Normal Accommodation] : pupils were equal in size, round, reactive to light [Extraocular Movements] : extraocular movements were intact [Outer Ear] : the ears and nose were normal in appearance [Oropharynx] : the oropharynx was normal with no thrush [Auscultation Breath Sounds / Voice Sounds] : lungs were clear to auscultation bilaterally [Heart Sounds] : normal S1 and S2 [Heart Sounds Gallop] : no gallops [Heart Rate And Rhythm] : heart rate was normal and rhythm regular [Murmurs] : no murmurs [Full Pulse] : the pedal pulses are present [Heart Sounds Pericardial Friction Rub] : no pericardial rub [Edema] : there was no peripheral edema [Bowel Sounds] : normal bowel sounds [Abdomen Tenderness] : non-tender [Abdomen Soft] : soft [] : no hepato-splenomegaly [Abdomen Mass (___ Cm)] : no abdominal mass palpated [Costovertebral Angle Tenderness] : no CVA tenderness [No Palpable Adenopathy] : no palpable adenopathy [Musculoskeletal - Swelling] : no joint swelling [Nail Clubbing] : no clubbing  or cyanosis of the fingernails [Motor Tone] : muscle strength and tone were normal [FreeTextEntry1] : RIght upper inner thigh with indurated area about 6-7 cm, healed I&D site. Mild erythema and swelling. Tender.

## 2019-05-29 NOTE — HISTORY OF PRESENT ILLNESS
[FreeTextEntry1] : 34y/o woman with h/o fibromyalgia, SLE and transverse myelitis on Cellcept\par and prednisone and rituxan (received twice and is due for last one in one month). \par She had buttock infection last month with MRSA for which stayed in the hospital for few days and discharged on IV daptomycin. Again had MRSA cellulitis in R upper thigh, s/p I&D about 2 weeks ago. \par THis time she had Ceftaroline for few days and discharged with bactrim that was completed on 5/22. \par - Blood cultures no growth and wound culture was MRSA again.

## 2019-05-30 ENCOUNTER — APPOINTMENT (OUTPATIENT)
Dept: TRAUMA SURGERY | Facility: CLINIC | Age: 34
End: 2019-05-30

## 2019-05-31 ENCOUNTER — RX RENEWAL (OUTPATIENT)
Age: 34
End: 2019-05-31

## 2019-06-03 ENCOUNTER — MESSAGE (OUTPATIENT)
Age: 34
End: 2019-06-03

## 2019-06-17 ENCOUNTER — MEDICATION RENEWAL (OUTPATIENT)
Age: 34
End: 2019-06-17

## 2019-06-18 ENCOUNTER — EMERGENCY (EMERGENCY)
Facility: HOSPITAL | Age: 34
LOS: 1 days | Discharge: HOME | End: 2019-06-18
Attending: EMERGENCY MEDICINE
Payer: COMMERCIAL

## 2019-06-18 VITALS
RESPIRATION RATE: 18 BRPM | HEART RATE: 89 BPM | OXYGEN SATURATION: 96 % | SYSTOLIC BLOOD PRESSURE: 108 MMHG | DIASTOLIC BLOOD PRESSURE: 77 MMHG | TEMPERATURE: 98 F

## 2019-06-18 VITALS — WEIGHT: 115.96 LBS | HEIGHT: 62 IN

## 2019-06-18 DIAGNOSIS — Z98.890 OTHER SPECIFIED POSTPROCEDURAL STATES: Chronic | ICD-10-CM

## 2019-06-18 LAB
ALBUMIN SERPL ELPH-MCNC: 4.1 G/DL — SIGNIFICANT CHANGE UP (ref 3.3–5.2)
ALP SERPL-CCNC: 81 U/L — SIGNIFICANT CHANGE UP (ref 40–120)
ALT FLD-CCNC: 19 U/L — SIGNIFICANT CHANGE UP
ANION GAP SERPL CALC-SCNC: 12 MMOL/L — SIGNIFICANT CHANGE UP (ref 5–17)
APPEARANCE UR: CLEAR — SIGNIFICANT CHANGE UP
APTT BLD: 33.7 SEC — SIGNIFICANT CHANGE UP (ref 27.5–36.3)
AST SERPL-CCNC: 26 U/L — SIGNIFICANT CHANGE UP
BACTERIA # UR AUTO: ABNORMAL
BASOPHILS # BLD AUTO: 0 K/UL — SIGNIFICANT CHANGE UP (ref 0–0.2)
BASOPHILS NFR BLD AUTO: 0.2 % — SIGNIFICANT CHANGE UP (ref 0–2)
BILIRUB SERPL-MCNC: 0.5 MG/DL — SIGNIFICANT CHANGE UP (ref 0.4–2)
BILIRUB UR-MCNC: NEGATIVE — SIGNIFICANT CHANGE UP
BUN SERPL-MCNC: 12 MG/DL — SIGNIFICANT CHANGE UP (ref 8–20)
CALCIUM SERPL-MCNC: 9.4 MG/DL — SIGNIFICANT CHANGE UP (ref 8.6–10.2)
CHLORIDE SERPL-SCNC: 99 MMOL/L — SIGNIFICANT CHANGE UP (ref 98–107)
CO2 SERPL-SCNC: 26 MMOL/L — SIGNIFICANT CHANGE UP (ref 22–29)
COLOR SPEC: YELLOW — SIGNIFICANT CHANGE UP
CREAT SERPL-MCNC: 0.5 MG/DL — SIGNIFICANT CHANGE UP (ref 0.5–1.3)
DIFF PNL FLD: ABNORMAL
EOSINOPHIL # BLD AUTO: 0.1 K/UL — SIGNIFICANT CHANGE UP (ref 0–0.5)
EOSINOPHIL NFR BLD AUTO: 0.9 % — SIGNIFICANT CHANGE UP (ref 0–6)
EPI CELLS # UR: ABNORMAL
GLUCOSE SERPL-MCNC: 94 MG/DL — SIGNIFICANT CHANGE UP (ref 70–115)
GLUCOSE UR QL: NEGATIVE MG/DL — SIGNIFICANT CHANGE UP
HCG SERPL-ACNC: <4 MIU/ML — SIGNIFICANT CHANGE UP
HCT VFR BLD CALC: 38.3 % — SIGNIFICANT CHANGE UP (ref 37–47)
HGB BLD-MCNC: 12.1 G/DL — SIGNIFICANT CHANGE UP (ref 12–16)
INR BLD: 1.07 RATIO — SIGNIFICANT CHANGE UP (ref 0.88–1.16)
KETONES UR-MCNC: NEGATIVE — SIGNIFICANT CHANGE UP
LACTATE BLDV-MCNC: 1.7 MMOL/L — SIGNIFICANT CHANGE UP (ref 0.5–2)
LEUKOCYTE ESTERASE UR-ACNC: ABNORMAL
LYMPHOCYTES # BLD AUTO: 1.2 K/UL — SIGNIFICANT CHANGE UP (ref 1–4.8)
LYMPHOCYTES # BLD AUTO: 17.5 % — LOW (ref 20–55)
MCHC RBC-ENTMCNC: 27.7 PG — SIGNIFICANT CHANGE UP (ref 27–31)
MCHC RBC-ENTMCNC: 31.6 G/DL — LOW (ref 32–36)
MCV RBC AUTO: 87.6 FL — SIGNIFICANT CHANGE UP (ref 81–99)
MONOCYTES # BLD AUTO: 0.2 K/UL — SIGNIFICANT CHANGE UP (ref 0–0.8)
MONOCYTES NFR BLD AUTO: 2.6 % — LOW (ref 3–10)
NEUTROPHILS # BLD AUTO: 4.7 K/UL — SIGNIFICANT CHANGE UP (ref 1.8–8)
NEUTROPHILS NFR BLD AUTO: 70.7 % — SIGNIFICANT CHANGE UP (ref 37–73)
NITRITE UR-MCNC: NEGATIVE — SIGNIFICANT CHANGE UP
PH UR: 7 — SIGNIFICANT CHANGE UP (ref 5–8)
PLATELET # BLD AUTO: 284 K/UL — SIGNIFICANT CHANGE UP (ref 150–400)
POTASSIUM SERPL-MCNC: 3.5 MMOL/L — SIGNIFICANT CHANGE UP (ref 3.5–5.3)
POTASSIUM SERPL-SCNC: 3.5 MMOL/L — SIGNIFICANT CHANGE UP (ref 3.5–5.3)
PROT SERPL-MCNC: 7.2 G/DL — SIGNIFICANT CHANGE UP (ref 6.6–8.7)
PROT UR-MCNC: 30 MG/DL
PROTHROM AB SERPL-ACNC: 12.3 SEC — SIGNIFICANT CHANGE UP (ref 10–12.9)
RBC # BLD: 4.37 M/UL — LOW (ref 4.4–5.2)
RBC # FLD: 16.1 % — HIGH (ref 11–15.6)
RBC CASTS # UR COMP ASSIST: SIGNIFICANT CHANGE UP /HPF (ref 0–4)
SODIUM SERPL-SCNC: 137 MMOL/L — SIGNIFICANT CHANGE UP (ref 135–145)
SP GR SPEC: 1.01 — SIGNIFICANT CHANGE UP (ref 1.01–1.02)
UROBILINOGEN FLD QL: 1 MG/DL
WBC # BLD: 6.6 K/UL — SIGNIFICANT CHANGE UP (ref 4.8–10.8)
WBC # FLD AUTO: 6.6 K/UL — SIGNIFICANT CHANGE UP (ref 4.8–10.8)
WBC UR QL: SIGNIFICANT CHANGE UP

## 2019-06-18 PROCEDURE — 36415 COLL VENOUS BLD VENIPUNCTURE: CPT

## 2019-06-18 PROCEDURE — 87070 CULTURE OTHR SPECIMN AEROBIC: CPT

## 2019-06-18 PROCEDURE — 87040 BLOOD CULTURE FOR BACTERIA: CPT

## 2019-06-18 PROCEDURE — 84702 CHORIONIC GONADOTROPIN TEST: CPT

## 2019-06-18 PROCEDURE — 83605 ASSAY OF LACTIC ACID: CPT

## 2019-06-18 PROCEDURE — 99284 EMERGENCY DEPT VISIT MOD MDM: CPT | Mod: 25

## 2019-06-18 PROCEDURE — 85027 COMPLETE CBC AUTOMATED: CPT

## 2019-06-18 PROCEDURE — 10061 I&D ABSCESS COMP/MULTIPLE: CPT

## 2019-06-18 PROCEDURE — 81001 URINALYSIS AUTO W/SCOPE: CPT

## 2019-06-18 PROCEDURE — 72193 CT PELVIS W/DYE: CPT | Mod: 26

## 2019-06-18 PROCEDURE — 87186 SC STD MICRODIL/AGAR DIL: CPT

## 2019-06-18 PROCEDURE — 72193 CT PELVIS W/DYE: CPT

## 2019-06-18 PROCEDURE — 80053 COMPREHEN METABOLIC PANEL: CPT

## 2019-06-18 PROCEDURE — 85730 THROMBOPLASTIN TIME PARTIAL: CPT

## 2019-06-18 PROCEDURE — 85610 PROTHROMBIN TIME: CPT

## 2019-06-18 RX ORDER — OXYCODONE AND ACETAMINOPHEN 5; 325 MG/1; MG/1
1 TABLET ORAL ONCE
Refills: 0 | Status: DISCONTINUED | OUTPATIENT
Start: 2019-06-18 | End: 2019-06-18

## 2019-06-18 RX ADMIN — OXYCODONE AND ACETAMINOPHEN 1 TABLET(S): 5; 325 TABLET ORAL at 14:05

## 2019-06-18 RX ADMIN — Medication 100 MILLIGRAM(S): at 08:26

## 2019-06-18 NOTE — ED STATDOCS - PROGRESS NOTE DETAILS
PA Note: PT evaluated by intake physician. HPI/PE/ROS as noted above. Will follow up plan per intake physician. PA Note: I&D performed, pt instructed on wound care and for packing removal in 2 days. Pt given antibiotics and pain medication. Pt instructed not to drive or drink while taking pain medication. Pt instructed to follow up with her PCP. Pt acknowledged understanding.

## 2019-06-18 NOTE — ED ADULT TRIAGE NOTE - CHIEF COMPLAINT QUOTE
rt buttock abscess x 2 days, drainage. Pt has auto immune disease Lupus also. low grade temps at home

## 2019-06-18 NOTE — ED ADULT NURSE NOTE - NSIMPLEMENTINTERV_GEN_ALL_ED
Implemented All Universal Safety Interventions:  [place] to call system. Call bell, personal items and telephone within reach. Instruct patient to call for assistance. Room bathroom lighting operational. Non-slip footwear when patient is off stretcher. Physically safe environment: no spills, clutter or unnecessary equipment. Stretcher in lowest position, wheels locked, appropriate side rails in place.

## 2019-06-18 NOTE — ED PROCEDURE NOTE - PROCEDURE NAME, MLM
Incision & Drainage swelling/inflammation/restriction, improving soft tissue extensibility and allowing for proper ROM for normal function with self care, reaching, carrying, lifting, house/yardwork, driving/computer work    Modalities:     Ice - 15'    Charges:  Timed Code Treatment Minutes: 28   Total Treatment Minutes: 46       [] EVAL - LOW (90702)   [] EVAL - MOD (90432)  [] EVAL - HIGH (22433)  [] RE-EVAL (78393)  [x] QV(63169) x  1   [] IONTO  [] NMR (77132) x      [] VASO  [x] Manual (10553) x  1    [] Other:  [] TA x       [] Mech Traction (69181)  [] ES(attended) (67179)      [] ES (un) (46876):     GOALS:  Patient stated goal: return to work     Therapist goals for Patient:   Short Term Goals: To be achieved in: 2 weeks  1. Independent in HEP and progression per patient tolerance, in order to prevent re-injury. 2. Patient will have a decrease in pain to facilitate improvement in movement, function, and ADLs as indicated by Functional Deficits. 3. Patient demonstrates understanding of and compliance with precautions following surgery.      Long Term Goals: To be achieved in: 8-12 weeks  1. Disability index score of 20% or less for the UEFI to assist with reaching prior level of function. 2. Patient will demonstrate increased AROM to equal to R shoulder without pain to allow for proper joint functioning as indicated by patients Functional Deficits. 3. Patient will demonstrate an increase in Strength to at least 4+/5 throughout to allow for proper functional mobility as indicated by patients Functional Deficits. 4. Patient will return to functional activities including dressing and grooming without increased symptoms or restriction. 5. 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 Progression Towards Functional goals:  [] Patient is progressing as expected towards functional goals listed. [] Progression is slowed due to complexities listed.   [] Progression has been slowed due to co-morbidities. [x] Plan just implemented, too soon to assess goals progression  [] Other:     Persisting Functional Limitations/Impairments:  []Sitting []Standing   []Walking []Squatting/bending    []Stairs [x]ADL's    []Transfers [x]Reaching  [x]Housework [x]Job related tasks  [x]Driving []Sports/Recreation   []Other:    ASSESSMENT:    Treatment/Activity Tolerance:  [x] Patient tolerated treatment well [] Patient limited by fatique  [] Patient limited by pain  [] Patient limited by other medical complications  [x] Other: Pt. Tolerated therapy today without complaints. Pt. Demonstrates compliance with precautions following surgery. Pt. Requires occasional cueing for technique with HEP. Pt. Tolerated addition of abduction table slides without issue. Initiated manual PROM today with pt. Requiring frequent cueing to decrease muscle guarding but demonstrates good ranges for 3 days post-op. Pt. Requires continued progression of post-op protocol in order to restore functional use of L UE.      Prognosis: [x] Good [] Fair  [] Poor    Patient Requires Follow-up: [x] Yes  [] No    Return to Play:    [x]  N/A     []  Stage 1: Intro to Strength   []  Stage 2: Dynamic Strength and Intro to Plyometrics   []  Stage 3: Advanced Plyometrics and Intro to Throwing   []  Stage 4: Sport specific Training/Return to Sport     []  Ready to Return to Play, [employer]   []  Not Ready for Return to Sports   Comments:      PLAN: 2x/wk  [x] Continue per plan of care [] Alter current plan (see comments)  [] Plan of care initiated [] Hold pending MD visit [] Discharge    Electronically signed by: Elsa Randolph PT, DPT

## 2019-06-18 NOTE — ED STATDOCS - SKIN, MLM
right buttock cheek 8 x4 cm erythematous plaque with underlying induration and a central ulceration, fluctuance with serous drainage.

## 2019-06-18 NOTE — ED STATDOCS - NS_ ATTENDINGSCRIBEDETAILS _ED_A_ED_FT
I, Mike Mendoza, performed the initial face to face bedside interview with this patient regarding history of present illness, review of symptoms and relevant past medical, social and family history.  I completed an independent physical examination.  I was the provider who initially evaluated this patient.  The history, relevant review of systems, past medical and surgical history, medical decision making, and physical examination was documented by the scribe in my presence and I attest to the accuracy of the documentation. Follow-up on ordered tests (ie labs, radiologic studies) and re-evaluation of the patient's status has been communicated to the ACP.  Disposition of the patient will be based on test outcome and response to ED interventions.

## 2019-06-18 NOTE — ED STATDOCS - CLINICAL SUMMARY MEDICAL DECISION MAKING FREE TEXT BOX
recurrent buttock abscess in setting of lupus and immunosuppressant medication use.  Labs and CT to evaluate for deep tissue infection.  Treatment with abx and pain meds.

## 2019-06-18 NOTE — ED STATDOCS - OBJECTIVE STATEMENT
Patient is a 33 year old F with a PMHx of GERD, fibromyalgia and lupus (on immunosuppressants) who presents to the ED for evaluation of worsening abscess to the right buttock x2 days.  States that it is painful, red and swollen.  Patients significant other placed gauze on the area.  Patient reports fevers (Tmax: 102 yesterday) at home.  Last took Tylenol around midnight last night.  Patient reports same thing has happened twice in the past; last episode was about a month ago.  Of note, patient was at [facility] ED yesterday (6/17) for same complaint and left without being evaluated.  No other medical complaints at this time. PMD: Dr. Kwabena Pham, Rheumatologist: Dr. Barrett.  JAH.

## 2019-06-20 LAB
-  AMPICILLIN/SULBACTAM: SIGNIFICANT CHANGE UP
-  CEFAZOLIN: SIGNIFICANT CHANGE UP
-  CLINDAMYCIN: SIGNIFICANT CHANGE UP
-  DAPTOMYCIN: SIGNIFICANT CHANGE UP
-  ERYTHROMYCIN: SIGNIFICANT CHANGE UP
-  GENTAMICIN: SIGNIFICANT CHANGE UP
-  LINEZOLID: SIGNIFICANT CHANGE UP
-  OXACILLIN: SIGNIFICANT CHANGE UP
-  PENICILLIN: SIGNIFICANT CHANGE UP
-  RIFAMPIN: SIGNIFICANT CHANGE UP
-  TETRACYCLINE: SIGNIFICANT CHANGE UP
-  TRIMETHOPRIM/SULFAMETHOXAZOLE: SIGNIFICANT CHANGE UP
-  VANCOMYCIN: SIGNIFICANT CHANGE UP
CULTURE RESULTS: SIGNIFICANT CHANGE UP
METHOD TYPE: SIGNIFICANT CHANGE UP
ORGANISM # SPEC MICROSCOPIC CNT: SIGNIFICANT CHANGE UP
ORGANISM # SPEC MICROSCOPIC CNT: SIGNIFICANT CHANGE UP
SPECIMEN SOURCE: SIGNIFICANT CHANGE UP

## 2019-06-24 ENCOUNTER — CHART COPY (OUTPATIENT)
Age: 34
End: 2019-06-24

## 2019-06-26 ENCOUNTER — APPOINTMENT (OUTPATIENT)
Dept: INTERNAL MEDICINE | Facility: CLINIC | Age: 34
End: 2019-06-26
Payer: COMMERCIAL

## 2019-06-26 VITALS
HEIGHT: 62 IN | WEIGHT: 118 LBS | SYSTOLIC BLOOD PRESSURE: 110 MMHG | RESPIRATION RATE: 16 BRPM | BODY MASS INDEX: 21.71 KG/M2 | HEART RATE: 83 BPM | DIASTOLIC BLOOD PRESSURE: 78 MMHG

## 2019-06-26 PROCEDURE — 99213 OFFICE O/P EST LOW 20 MIN: CPT

## 2019-06-26 NOTE — REASON FOR VISIT
[Follow-Up: _____] : a [unfilled] follow-up visit [Spouse] : spouse [FreeTextEntry1] : skin and soft tissue infection

## 2019-06-26 NOTE — PHYSICAL EXAM
[General Appearance - Alert] : alert [General Appearance - In No Acute Distress] : in no acute distress [Sclera] : the sclera and conjunctiva were normal [PERRL With Normal Accommodation] : pupils were equal in size, round, reactive to light [Extraocular Movements] : extraocular movements were intact [Outer Ear] : the ears and nose were normal in appearance [Oropharynx] : the oropharynx was normal with no thrush [Auscultation Breath Sounds / Voice Sounds] : lungs were clear to auscultation bilaterally [Heart Rate And Rhythm] : heart rate was normal and rhythm regular [Heart Sounds] : normal S1 and S2 [Heart Sounds Gallop] : no gallops [Murmurs] : no murmurs [Heart Sounds Pericardial Friction Rub] : no pericardial rub [Full Pulse] : the pedal pulses are present [Edema] : there was no peripheral edema [Bowel Sounds] : normal bowel sounds [Abdomen Soft] : soft [Abdomen Tenderness] : non-tender [] : no hepato-splenomegaly [Abdomen Mass (___ Cm)] : no abdominal mass palpated [Costovertebral Angle Tenderness] : no CVA tenderness [No Palpable Adenopathy] : no palpable adenopathy [Musculoskeletal - Swelling] : no joint swelling [Nail Clubbing] : no clubbing  or cyanosis of the fingernails [Motor Tone] : muscle strength and tone were normal [FreeTextEntry1] : R buttock with open wound and induration around it. minimal bloody discharge. 3inches indurated area.

## 2019-06-26 NOTE — HISTORY OF PRESENT ILLNESS
[FreeTextEntry1] : 34y/o woman with h/o fibromyalgia, SLE and transverse myelitis on Cellcept\par and prednisone and rituxan (received twice and is due for last one in one month). \par She had buttock infection in may with MRSA for which stayed in the hospital for few days and discharged on IV daptomycin. Again had MRSA cellulitis in R upper thigh, s/p I&D about last month and again on 6/18. She was seen in ED and had I&D in R buttock.\par No fever or leukocytosis on 6/18, cultures were neg. Doxycycline was given.

## 2019-07-03 ENCOUNTER — RX RENEWAL (OUTPATIENT)
Age: 34
End: 2019-07-03

## 2019-07-08 ENCOUNTER — MESSAGE (OUTPATIENT)
Age: 34
End: 2019-07-08

## 2019-08-02 ENCOUNTER — APPOINTMENT (OUTPATIENT)
Dept: RHEUMATOLOGY | Facility: CLINIC | Age: 34
End: 2019-08-02
Payer: COMMERCIAL

## 2019-08-02 VITALS
OXYGEN SATURATION: 98 % | DIASTOLIC BLOOD PRESSURE: 84 MMHG | HEART RATE: 113 BPM | SYSTOLIC BLOOD PRESSURE: 118 MMHG | HEIGHT: 62 IN | WEIGHT: 130 LBS | BODY MASS INDEX: 23.92 KG/M2

## 2019-08-02 PROCEDURE — 99214 OFFICE O/P EST MOD 30 MIN: CPT

## 2019-08-02 NOTE — HISTORY OF PRESENT ILLNESS
[FreeTextEntry1] : INTERVAL HX\par # Went to ER for another buttock abcess - treated and healing nicely - denies f/c\par # longitudinally extensive transverse myelitis (Feb 2019)  - s/p  IV pulse steroids, PLEX, RTX.  Legs have improved since last visit - feeling stronger and less pain.  does some strengthening at home but hast been to PT.   feels sensory changes in a belt line - numb and cool - these have not improved.  diffuse pain persists for which the gabapentin has helped.  no other lupus sxs of note.  denies leg swelling, frothy urine, skin changes or joint pain.  currently on pred 25 mg daily\par #sle - on cellcept and plaquenil - denies symptoms at present.  \par #fms - on cymbalta

## 2019-08-02 NOTE — REVIEW OF SYSTEMS
[As Noted in HPI] : as noted in HPI [Negative] : Heme/Lymph [Fever] : no fever [Chills] : no chills [Eye Pain] : no eye pain [Earache] : no earache [Nosebleeds] : no nosebleeds [Chest Pain] : no chest pain [Shortness Of Breath] : no shortness of breath [Wheezing] : no wheezing [Cough] : no cough [Abdominal Pain] : no abdominal pain [Dysuria] : no dysuria [Vomiting] : no vomiting [Joint Pain] : no joint pain [Skin Lesions] : no skin lesions [Dizziness] : no dizziness [Anxiety] : no anxiety [Easy Bleeding] : no tendency for easy bleeding [de-identified] : mood much better in peru -  [FreeTextEntry1] : missed period

## 2019-08-02 NOTE — PHYSICAL EXAM
[General Appearance - In No Acute Distress] : in no acute distress [General Appearance - Alert] : alert [General Appearance - Well Nourished] : well nourished [General Appearance - Well Developed] : well developed [General Appearance - Well-Appearing] : healthy appearing [Extraocular Movements] : extraocular movements were intact [Sclera] : the sclera and conjunctiva were normal [PERRL With Normal Accommodation] : pupils were equal in size, round, and reactive to light [Outer Ear] : the ears and nose were normal in appearance [Neck Appearance] : the appearance of the neck was normal [Heart Rate And Rhythm] : heart rate was normal and rhythm regular [Cervical Lymph Nodes Enlarged Anterior Bilaterally] : anterior cervical [Supraclavicular Lymph Nodes Enlarged Bilaterally] : supraclavicular [No CVA Tenderness] : no ~M costovertebral angle tenderness [No Spinal Tenderness] : no spinal tenderness [Musculoskeletal - Swelling] : no joint swelling seen [] : no rash [Oriented To Time, Place, And Person] : oriented to person, place, and time [Impaired Insight] : insight and judgment were intact [FreeTextEntry1] : no focal deficits - able to walk without assistive device, less spasticity than in the past - muscle strength now at 5/5

## 2019-08-02 NOTE — CONSULT LETTER
[Dear  ___] : Dear  [unfilled], [Courtesy Letter:] : I had the pleasure of seeing your patient, [unfilled], in my office today. [Please see my note below.] : Please see my note below. [Consult Closing:] : Thank you very much for allowing me to participate in the care of this patient.  If you have any questions, please do not hesitate to contact me. [Sincerely,] : Sincerely, [FreeTextEntry2] : Scotty Schneider

## 2019-08-02 NOTE — ASSESSMENT
[FreeTextEntry1] : 29 yo F w/ SLE  (dx 2014, arthritis, +PAUL,  +dsDNA, +SM, +RNP, nephritis 2016),  and fibromyalgia (dx 2014).  Was treated with cellcept and in lupus study at [facility].  sle complicated by longitudinally extensive transverse myelitis (2019) treated with IV pulse steroids, PLEX, RTX with improvement in symptoms.  \par \par \par #. SLE with nephritis,alopecia, arthritis, lymphopenia -complicated by longitudinal extensive transverse myelitis in feb 2019 s/p plex, rtx and pulse steroids\par \par >>TM- improving consistently - had repeat MRI but w/o report yet.  will contact imaging and neuro for rec\par - last rtx in feb - will be due soon - check cd19 \par -urninary retion has resolved but now with incontinence.  needs neuro followup\par -continue gabapentin, baclofen\par -taper prednisone to 20 mg in 2 weeks and then 15 mg \par - check cd 19 to assess repopulation \par - increase juan to 200 mg am and 300 mg pm\par - dr dona barajas ([place]) is neuro - had both mri\par - [facility] imaging at [facility] - [phone]\par \par >> neprhitis\par continue cellcept - no edema today - check ds dna and prot: creat ratio\par \par #FMS:\par -continue cymbalta - increase to 60 mg daily\par -PT\par - discusse dmedical marijuan - will d/w neuro and possibly pain\par \par #hemorrhoids\par - new problem - no blood \par - gi eval\par \par #gluteal abcess\par -following with ID \par \par #health maintenance\par -vit d good\par \par #v58.69 \par -cd19

## 2019-08-05 LAB
ALBUMIN SERPL ELPH-MCNC: 4.8 G/DL
ALP BLD-CCNC: 69 U/L
ALT SERPL-CCNC: 48 U/L
ANION GAP SERPL CALC-SCNC: 18 MMOL/L
AST SERPL-CCNC: 19 U/L
BASOPHILS # BLD AUTO: 0.05 K/UL
BASOPHILS NFR BLD AUTO: 0.3 %
BILIRUB SERPL-MCNC: 0.5 MG/DL
BUN SERPL-MCNC: 16 MG/DL
C3 SERPL-MCNC: 114 MG/DL
C4 SERPL-MCNC: 30 MG/DL
CALCIUM SERPL-MCNC: 9.9 MG/DL
CELLS.CD3-CD19+/CELLS IN BLOOD: <1 %
CHLORIDE SERPL-SCNC: 99 MMOL/L
CO2 SERPL-SCNC: 23 MMOL/L
CREAT SERPL-MCNC: 0.47 MG/DL
CRP SERPL-MCNC: <0.1 MG/DL
DSDNA AB SER-ACNC: 41 IU/ML
EOSINOPHIL # BLD AUTO: 0.07 K/UL
EOSINOPHIL NFR BLD AUTO: 0.5 %
ERYTHROCYTE [SEDIMENTATION RATE] IN BLOOD BY WESTERGREN METHOD: 28 MM/HR
HCT VFR BLD CALC: 53 %
HGB BLD-MCNC: 15.8 G/DL
IMM GRANULOCYTES NFR BLD AUTO: 1 %
LYMPHOCYTES # BLD AUTO: 1.14 K/UL
LYMPHOCYTES NFR BLD AUTO: 7.6 %
MAN DIFF?: NORMAL
MCHC RBC-ENTMCNC: 29.3 PG
MCHC RBC-ENTMCNC: 29.8 GM/DL
MCV RBC AUTO: 98.1 FL
MONOCYTES # BLD AUTO: 0.85 K/UL
MONOCYTES NFR BLD AUTO: 5.7 %
NEUTROPHILS # BLD AUTO: 12.67 K/UL
NEUTROPHILS NFR BLD AUTO: 84.9 %
PLATELET # BLD AUTO: 364 K/UL
POTASSIUM SERPL-SCNC: 4.2 MMOL/L
PROT SERPL-MCNC: 7.3 G/DL
RBC # BLD: 5.4 M/UL
RBC # FLD: 15.4 %
SODIUM SERPL-SCNC: 140 MMOL/L
WBC # FLD AUTO: 14.93 K/UL

## 2019-09-10 ENCOUNTER — RX RENEWAL (OUTPATIENT)
Age: 34
End: 2019-09-10

## 2019-09-13 ENCOUNTER — APPOINTMENT (OUTPATIENT)
Dept: RHEUMATOLOGY | Facility: CLINIC | Age: 34
End: 2019-09-13
Payer: COMMERCIAL

## 2019-09-13 ENCOUNTER — APPOINTMENT (OUTPATIENT)
Dept: ULTRASOUND IMAGING | Facility: CLINIC | Age: 34
End: 2019-09-13
Payer: COMMERCIAL

## 2019-09-13 ENCOUNTER — CHART COPY (OUTPATIENT)
Age: 34
End: 2019-09-13

## 2019-09-13 ENCOUNTER — LABORATORY RESULT (OUTPATIENT)
Age: 34
End: 2019-09-13

## 2019-09-13 ENCOUNTER — FORM ENCOUNTER (OUTPATIENT)
Age: 34
End: 2019-09-13

## 2019-09-13 VITALS
SYSTOLIC BLOOD PRESSURE: 124 MMHG | OXYGEN SATURATION: 98 % | RESPIRATION RATE: 16 BRPM | HEART RATE: 90 BPM | DIASTOLIC BLOOD PRESSURE: 87 MMHG | HEIGHT: 62 IN

## 2019-09-13 PROCEDURE — 99215 OFFICE O/P EST HI 40 MIN: CPT

## 2019-09-14 ENCOUNTER — APPOINTMENT (OUTPATIENT)
Dept: ULTRASOUND IMAGING | Facility: CLINIC | Age: 34
End: 2019-09-14
Payer: COMMERCIAL

## 2019-09-14 ENCOUNTER — OUTPATIENT (OUTPATIENT)
Dept: OUTPATIENT SERVICES | Facility: HOSPITAL | Age: 34
LOS: 1 days | End: 2019-09-14
Payer: COMMERCIAL

## 2019-09-14 DIAGNOSIS — Z98.890 OTHER SPECIFIED POSTPROCEDURAL STATES: Chronic | ICD-10-CM

## 2019-09-14 DIAGNOSIS — M79.89 OTHER SPECIFIED SOFT TISSUE DISORDERS: ICD-10-CM

## 2019-09-14 PROCEDURE — 93970 EXTREMITY STUDY: CPT

## 2019-09-14 PROCEDURE — 93970 EXTREMITY STUDY: CPT | Mod: 26

## 2019-09-16 ENCOUNTER — CLINICAL ADVICE (OUTPATIENT)
Age: 34
End: 2019-09-16

## 2019-09-16 LAB
25(OH)D3 SERPL-MCNC: 22.3 NG/ML
ALBUMIN SERPL ELPH-MCNC: 5 G/DL
ALP BLD-CCNC: 79 U/L
ALT SERPL-CCNC: 33 U/L
ANION GAP SERPL CALC-SCNC: 18 MMOL/L
APPEARANCE: CLEAR
AST SERPL-CCNC: 23 U/L
BASOPHILS # BLD AUTO: 0.06 K/UL
BASOPHILS NFR BLD AUTO: 0.6 %
BILIRUB SERPL-MCNC: 0.6 MG/DL
BILIRUBIN URINE: NEGATIVE
BLOOD URINE: NEGATIVE
BUN SERPL-MCNC: 12 MG/DL
C3 SERPL-MCNC: 123 MG/DL
C4 SERPL-MCNC: 35 MG/DL
CALCIUM SERPL-MCNC: 9.5 MG/DL
CELLS.CD3-CD19+/CELLS IN BLOOD: <1 %
CHLORIDE SERPL-SCNC: 99 MMOL/L
CO2 SERPL-SCNC: 22 MMOL/L
COLOR: YELLOW
CREAT SERPL-MCNC: 0.51 MG/DL
DSDNA AB SER-ACNC: 56 IU/ML
EOSINOPHIL # BLD AUTO: 0.14 K/UL
EOSINOPHIL NFR BLD AUTO: 1.4 %
ESTIMATED AVERAGE GLUCOSE: 114 MG/DL
GLUCOSE QUALITATIVE U: NEGATIVE
HBA1C MFR BLD HPLC: 5.6 %
HCG SERPL QL: NEGATIVE
HCT VFR BLD CALC: 51.3 %
HGB BLD-MCNC: 16.1 G/DL
IMM GRANULOCYTES NFR BLD AUTO: 1.3 %
KETONES URINE: NEGATIVE
LEUKOCYTE ESTERASE URINE: NEGATIVE
LYMPHOCYTES # BLD AUTO: 1.42 K/UL
LYMPHOCYTES NFR BLD AUTO: 14.2 %
MAN DIFF?: NORMAL
MCHC RBC-ENTMCNC: 29.1 PG
MCHC RBC-ENTMCNC: 31.4 GM/DL
MCV RBC AUTO: 92.8 FL
MONOCYTES # BLD AUTO: 0.84 K/UL
MONOCYTES NFR BLD AUTO: 8.4 %
NEUTROPHILS # BLD AUTO: 7.39 K/UL
NEUTROPHILS NFR BLD AUTO: 74.1 %
NITRITE URINE: NEGATIVE
PAPP-A SERPL-ACNC: <1 MIU/ML
PH URINE: 6
PLATELET # BLD AUTO: 377 K/UL
POTASSIUM SERPL-SCNC: 3.8 MMOL/L
PROT SERPL-MCNC: 7.4 G/DL
PROTEIN URINE: ABNORMAL
RBC # BLD: 5.53 M/UL
RBC # FLD: 14 %
SODIUM SERPL-SCNC: 139 MMOL/L
SPECIFIC GRAVITY URINE: 1.02
UROBILINOGEN URINE: NORMAL
WBC # FLD AUTO: 9.98 K/UL

## 2019-09-16 NOTE — PHYSICAL EXAM
[General Appearance - Alert] : alert [General Appearance - In No Acute Distress] : in no acute distress [General Appearance - Well Nourished] : well nourished [General Appearance - Well Developed] : well developed [General Appearance - Well-Appearing] : healthy appearing [Sclera] : the sclera and conjunctiva were normal [Extraocular Movements] : extraocular movements were intact [PERRL With Normal Accommodation] : pupils were equal in size, round, and reactive to light [Neck Appearance] : the appearance of the neck was normal [Outer Ear] : the ears and nose were normal in appearance [Cervical Lymph Nodes Enlarged Anterior Bilaterally] : anterior cervical [Heart Rate And Rhythm] : heart rate was normal and rhythm regular [No CVA Tenderness] : no ~M costovertebral angle tenderness [Supraclavicular Lymph Nodes Enlarged Bilaterally] : supraclavicular [No Spinal Tenderness] : no spinal tenderness [Musculoskeletal - Swelling] : no joint swelling seen [] : no rash [Oriented To Time, Place, And Person] : oriented to person, place, and time [Impaired Insight] : insight and judgment were intact [Skin Color & Pigmentation] : normal skin color and pigmentation [Skin Lesions] : no skin lesions [Skin Turgor] : normal skin turgor [FreeTextEntry1] : no focal deficits - able to walk without assistive device, less spasticity than in the past - muscle strength now at 5/5

## 2019-09-16 NOTE — REVIEW OF SYSTEMS
[As Noted in HPI] : as noted in HPI [Negative] : Heme/Lymph [Fever] : no fever [Chills] : no chills [Nosebleeds] : no nosebleeds [Eye Pain] : no eye pain [Earache] : no earache [Chest Pain] : no chest pain [Wheezing] : no wheezing [Shortness Of Breath] : no shortness of breath [Cough] : no cough [Abdominal Pain] : no abdominal pain [Vomiting] : no vomiting [Dysuria] : no dysuria [Joint Pain] : no joint pain [Dizziness] : no dizziness [Skin Lesions] : no skin lesions [Easy Bleeding] : no tendency for easy bleeding [Anxiety] : no anxiety [FreeTextEntry1] : missed period [de-identified] : mood much better in peru -

## 2019-09-16 NOTE — ASSESSMENT
[FreeTextEntry1] : 31 yo F w/ SLE  (dx 2014, arthritis, +PAUL,  +dsDNA, +SM, +RNP, nephritis 2016),  and fibromyalgia (dx 2014).  Was treated with cellcept and in lupus study at [facility].  sle complicated by longitudinally extensive transverse myelitis (2019) treated with IV pulse steroids, PLEX, RTX with improvement in symptoms.  \par \par # RLE acute swelling after plane ride.  no PE s/s at this time\par - rec doppler to r/o DVT \par - instructed patient and family to do this today\par \par #gyn -  has missed several periods.  denies pregnancy\par - check pregnancy test\par - gyn followup\par \par #. SLE with nephritis,alopecia, arthritis, lymphopenia -complicated by longitudinal extensive transverse myelitis in feb 2019 s/p plex, rtx and pulse steroids\par \par >>TM- improving consistently - had repeat MRI but w/o report yet.  will contact imaging and neuro for rec\par - last rtx in feb - will be due soon - check cd19 \par -urninary retion has resolved but now with incontinence.  needs neuro followup\par -continue gabapentin, baclofen\par -taper prednisone from 15 mg - instructions given\par - check cd 19 to assess repopulation \par - juan to 200 mg am and 300 mg pm\par - dr dona barajas ([place]) is neuro - had both mri\par - [facility] imaging at [facility] - [phone]\par \par >> neprhitis\par continue cellcept - no edema today - check ds dna and prot: creat ratio\par \par #FMS:\par -continue cymbalta - increase to 60 mg daily\par -PT\par - discusse dmedical marijuan - will d/w neuro and possibly pain\par \par #hemorrhoids\par - no blood \par - gi eval\par \par #gluteal abcess. now improved\par -following with ID \par \par #health maintenance\par -vit d good\par \par #v58.69 \par -cd19

## 2019-09-16 NOTE — HISTORY OF PRESENT ILLNESS
[FreeTextEntry1] : INTERVAL HX\par > c/p right lower extremity swelling.  was traveling back and [place] from [place].  developed swelling of the right leg during the flight.  no pain. denies sob.   seems to be improving.  denies joint pain\par > denies recurrent buttock abcess - treated and healing nicely - denies f/c\par > longitudinally extensive transverse myelitis (Feb 2019)  - s/p  IV pulse steroids, PLEX, RTX.  Legs have improved since last visit - feeling stronger and less pain.  does some strengthening at home but hast been to PT.   feels sensory changes in a belt line - numb and cool - these have not improved.  diffuse pain persists for which the gabapentin has helped.  no other lupus sxs of note.  denies leg swelling, frothy urine, skin changes or joint pain.  currently on pred 25 mg daily\par > sle - on cellcept and plaquenil - denies symptoms at present.  \par > fms - on cymbalta

## 2019-09-18 LAB
M TB IFN-G BLD-IMP: NEGATIVE
QUANTIFERON TB PLUS MITOGEN MINUS NIL: 3.92 IU/ML
QUANTIFERON TB PLUS NIL: 0.07 IU/ML
QUANTIFERON TB PLUS TB1 MINUS NIL: -0.02 IU/ML
QUANTIFERON TB PLUS TB2 MINUS NIL: -0.03 IU/ML

## 2019-10-30 ENCOUNTER — LABORATORY RESULT (OUTPATIENT)
Age: 34
End: 2019-10-30

## 2019-10-30 ENCOUNTER — APPOINTMENT (OUTPATIENT)
Dept: RHEUMATOLOGY | Facility: CLINIC | Age: 34
End: 2019-10-30
Payer: COMMERCIAL

## 2019-10-30 VITALS
WEIGHT: 130 LBS | BODY MASS INDEX: 23.92 KG/M2 | HEIGHT: 62 IN | RESPIRATION RATE: 16 BRPM | DIASTOLIC BLOOD PRESSURE: 84 MMHG | HEART RATE: 89 BPM | OXYGEN SATURATION: 98 % | SYSTOLIC BLOOD PRESSURE: 119 MMHG

## 2019-10-30 PROCEDURE — 99214 OFFICE O/P EST MOD 30 MIN: CPT

## 2019-10-31 ENCOUNTER — CLINICAL ADVICE (OUTPATIENT)
Age: 34
End: 2019-10-31

## 2019-10-31 ENCOUNTER — RX RENEWAL (OUTPATIENT)
Age: 34
End: 2019-10-31

## 2019-10-31 LAB
25(OH)D3 SERPL-MCNC: 28.4 NG/ML
ALBUMIN SERPL ELPH-MCNC: 4.6 G/DL
ALP BLD-CCNC: 74 U/L
ALT SERPL-CCNC: 18 U/L
ANION GAP SERPL CALC-SCNC: 14 MMOL/L
AST SERPL-CCNC: 18 U/L
BASOPHILS # BLD AUTO: 0.04 K/UL
BASOPHILS NFR BLD AUTO: 0.8 %
BILIRUB SERPL-MCNC: 0.8 MG/DL
BUN SERPL-MCNC: 7 MG/DL
CALCIUM SERPL-MCNC: 9.2 MG/DL
CELLS.CD3-CD19+/CELLS IN BLOOD: 1 %
CHLORIDE SERPL-SCNC: 103 MMOL/L
CO2 SERPL-SCNC: 23 MMOL/L
CREAT SERPL-MCNC: 0.5 MG/DL
CRP SERPL-MCNC: <0.1 MG/DL
DSDNA AB SER-ACNC: 62 IU/ML
EOSINOPHIL # BLD AUTO: 0.08 K/UL
EOSINOPHIL NFR BLD AUTO: 1.6 %
ERYTHROCYTE [SEDIMENTATION RATE] IN BLOOD BY WESTERGREN METHOD: 13 MM/HR
HCT VFR BLD CALC: 51.8 %
HGB BLD-MCNC: 16.1 G/DL
IMM GRANULOCYTES NFR BLD AUTO: 0.4 %
LYMPHOCYTES # BLD AUTO: 1.12 K/UL
LYMPHOCYTES NFR BLD AUTO: 21.9 %
MAN DIFF?: NORMAL
MCHC RBC-ENTMCNC: 28.7 PG
MCHC RBC-ENTMCNC: 31.1 GM/DL
MCV RBC AUTO: 92.3 FL
MONOCYTES # BLD AUTO: 0.51 K/UL
MONOCYTES NFR BLD AUTO: 10 %
NEUTROPHILS # BLD AUTO: 3.35 K/UL
NEUTROPHILS NFR BLD AUTO: 65.3 %
PLATELET # BLD AUTO: 370 K/UL
POTASSIUM SERPL-SCNC: 3.8 MMOL/L
PROT SERPL-MCNC: 6.7 G/DL
RBC # BLD: 5.61 M/UL
RBC # FLD: 12.8 %
SODIUM SERPL-SCNC: 140 MMOL/L
WBC # FLD AUTO: 5.12 K/UL

## 2019-10-31 NOTE — REVIEW OF SYSTEMS
[As Noted in HPI] : as noted in HPI [Negative] : Heme/Lymph [Fever] : no fever [Chills] : no chills [Eye Pain] : no eye pain [Earache] : no earache [Nosebleeds] : no nosebleeds [Chest Pain] : no chest pain [Shortness Of Breath] : no shortness of breath [Wheezing] : no wheezing [Cough] : no cough [Abdominal Pain] : no abdominal pain [Vomiting] : no vomiting [Dysuria] : no dysuria [Joint Pain] : no joint pain [Skin Lesions] : no skin lesions [Dizziness] : no dizziness [Anxiety] : no anxiety [Easy Bleeding] : no tendency for easy bleeding [de-identified] : mood much better in [place] -  [FreeTextEntry1] : missed period

## 2019-10-31 NOTE — ASSESSMENT
[FreeTextEntry1] : 31 yo F w/ SLE  (dx 2014, arthritis, +PAUL,  +dsDNA, +SM, +RNP, nephritis 2016),  and fibromyalgia (dx 2014).  Was treated with cellcept and in lupus study at [facility].  sle complicated by longitudinally extensive transverse myelitis (2019) treated with IV pulse steroids, PLEX, RTX with improvement in symptoms.  \par \par \par #gyn -  has missed several periods.  denies pregnancy\par - check pregnancy test\par - gyn followup\par \par #. SLE with nephritis,alopecia, arthritis, lymphopenia -complicated by longitudinal extensive transverse myelitis in feb 2019 s/p plex, rtx and pulse steroids\par \par >>TM- improved and due for both MRI, Neuro eval and RTX\par -continue gabapentin, baclofen\par - taper prednisone to 4 mg daily x 2 weeks then 3 mg daily\par - check cd 19 to assess repopulation \par - juan to 200 mg am and 300 mg pm\par - PT to help with muscle and neuro strength / balance\par >> neprhitis\par continue cellcept - no edema today - check ds dna and prot: creat ratio\par \par #FMS:\par -continue cymbalta - increase to 60 mg daily\par - PT\par - discusse dmedical marijuan - will d/w neuro and possibly pain\par \par \par #gluteal abcess. now improved\par -following with ID \par \par #health maintenance\par -vit d good\par \par #medication management / v58.69 \par -cd19\par - RTX, cellcept, steroids - PCP prophylaxis bactrim (patient had stopped it) - discussed with patient and  again today

## 2019-10-31 NOTE — PHYSICAL EXAM
[General Appearance - Alert] : alert [General Appearance - In No Acute Distress] : in no acute distress [General Appearance - Well Nourished] : well nourished [General Appearance - Well Developed] : well developed [General Appearance - Well-Appearing] : healthy appearing [Sclera] : the sclera and conjunctiva were normal [PERRL With Normal Accommodation] : pupils were equal in size, round, and reactive to light [Extraocular Movements] : extraocular movements were intact [Outer Ear] : the ears and nose were normal in appearance [Neck Appearance] : the appearance of the neck was normal [Heart Rate And Rhythm] : heart rate was normal and rhythm regular [Cervical Lymph Nodes Enlarged Anterior Bilaterally] : anterior cervical [Supraclavicular Lymph Nodes Enlarged Bilaterally] : supraclavicular [No CVA Tenderness] : no ~M costovertebral angle tenderness [No Spinal Tenderness] : no spinal tenderness [Musculoskeletal - Swelling] : no joint swelling seen [Skin Color & Pigmentation] : normal skin color and pigmentation [Skin Turgor] : normal skin turgor [] : no rash [Skin Lesions] : no skin lesions [Oriented To Time, Place, And Person] : oriented to person, place, and time [Impaired Insight] : insight and judgment were intact [FreeTextEntry1] : no focal deficits - able to walk without assistive device, less spasticity than in the past - muscle strength now at 5/5

## 2019-10-31 NOTE — HISTORY OF PRESENT ILLNESS
[FreeTextEntry1] : INTERVAL HX\par - walking better each day but not in PT yet\par - denies swelling of joints\par - gets back pain occasionally - more often with constipation and straining\par - urniary continence completely resolved - \par - no new neurologic s/s\par - tolerating other meds fine\par - urine without change in color and no bubbles\par - denies rash\par - still gets cramping\par - neuropathy at times bad - goes up and down

## 2019-11-01 LAB
C3 SERPL-MCNC: 90 MG/DL
C4 SERPL-MCNC: 28 MG/DL
HAV IGM SER QL: NONREACTIVE
HBV CORE IGM SER QL: NONREACTIVE
HBV SURFACE AG SER QL: NONREACTIVE
HCV AB SER QL: NONREACTIVE
HCV S/CO RATIO: 0.16 S/CO

## 2019-11-18 NOTE — PROGRESS NOTE ADULT - MINUTES
HPI:    Patient ID: Beatriz Yeh is a 79year old female. Cough   This is a new problem. The current episode started yesterday. The problem has been unchanged. The cough is non-productive.  Associated symptoms include ear pain, headaches, nasal conges mg by mouth daily. • atenolol 50 MG Oral Tab Take 50 mg by mouth.      • LOSARTAN POTASSIUM 50 MG Oral Tab TAKE 1 TABLET BY MOUTH EVERY DAY 90 tablet 0   • GABAPENTIN 300 MG Oral Cap TAKE 1 CAPSULE BY MOUTH 3  TIMES DAILY 270 capsule 1   • AmLODIPine Be appearance. She does not appear ill. No distress. obese   HENT:   Head: Normocephalic and atraumatic. Right Ear: External ear normal.   Left Ear: External ear normal.   Nose: Right sinus exhibits maxillary sinus tenderness and frontal sinus tenderness. 35

## 2019-11-25 ENCOUNTER — FORM ENCOUNTER (OUTPATIENT)
Age: 34
End: 2019-11-25

## 2019-11-26 ENCOUNTER — APPOINTMENT (OUTPATIENT)
Dept: MRI IMAGING | Facility: CLINIC | Age: 34
End: 2019-11-26
Payer: COMMERCIAL

## 2019-11-26 ENCOUNTER — OUTPATIENT (OUTPATIENT)
Dept: OUTPATIENT SERVICES | Facility: HOSPITAL | Age: 34
LOS: 1 days | End: 2019-11-26
Payer: COMMERCIAL

## 2019-11-26 DIAGNOSIS — G37.3 ACUTE TRANSVERSE MYELITIS IN DEMYELINATING DISEASE OF CENTRAL NERVOUS SYSTEM: ICD-10-CM

## 2019-11-26 DIAGNOSIS — Z98.890 OTHER SPECIFIED POSTPROCEDURAL STATES: Chronic | ICD-10-CM

## 2019-11-26 PROCEDURE — 72157 MRI CHEST SPINE W/O & W/DYE: CPT

## 2019-11-26 PROCEDURE — 72157 MRI CHEST SPINE W/O & W/DYE: CPT | Mod: 26

## 2019-11-26 PROCEDURE — A9585: CPT

## 2019-11-26 PROCEDURE — 72156 MRI NECK SPINE W/O & W/DYE: CPT | Mod: 26

## 2019-11-26 PROCEDURE — 72156 MRI NECK SPINE W/O & W/DYE: CPT

## 2019-12-06 ENCOUNTER — APPOINTMENT (OUTPATIENT)
Dept: RHEUMATOLOGY | Facility: CLINIC | Age: 34
End: 2019-12-06
Payer: COMMERCIAL

## 2019-12-06 ENCOUNTER — TRANSCRIPTION ENCOUNTER (OUTPATIENT)
Age: 34
End: 2019-12-06

## 2019-12-06 VITALS
BODY MASS INDEX: 23.92 KG/M2 | HEIGHT: 62 IN | SYSTOLIC BLOOD PRESSURE: 95 MMHG | WEIGHT: 130 LBS | RESPIRATION RATE: 14 BRPM | HEART RATE: 86 BPM | DIASTOLIC BLOOD PRESSURE: 72 MMHG | OXYGEN SATURATION: 99 %

## 2019-12-06 PROCEDURE — 99215 OFFICE O/P EST HI 40 MIN: CPT

## 2019-12-06 NOTE — PHYSICAL EXAM
[General Appearance - Alert] : alert [General Appearance - In No Acute Distress] : in no acute distress [General Appearance - Well Developed] : well developed [General Appearance - Well Nourished] : well nourished [General Appearance - Well-Appearing] : healthy appearing [Sclera] : the sclera and conjunctiva were normal [Extraocular Movements] : extraocular movements were intact [PERRL With Normal Accommodation] : pupils were equal in size, round, and reactive to light [Outer Ear] : the ears and nose were normal in appearance [Heart Rate And Rhythm] : heart rate was normal and rhythm regular [Neck Appearance] : the appearance of the neck was normal [Cervical Lymph Nodes Enlarged Anterior Bilaterally] : anterior cervical [Supraclavicular Lymph Nodes Enlarged Bilaterally] : supraclavicular [No CVA Tenderness] : no ~M costovertebral angle tenderness [No Spinal Tenderness] : no spinal tenderness [Musculoskeletal - Swelling] : no joint swelling seen [Skin Color & Pigmentation] : normal skin color and pigmentation [Skin Turgor] : normal skin turgor [] : no rash [Skin Lesions] : no skin lesions [Impaired Insight] : insight and judgment were intact [Oriented To Time, Place, And Person] : oriented to person, place, and time [FreeTextEntry1] : no focal deficits - able to walk without assistive device, less spasticity than in the past - muscle strength now at 5/5

## 2019-12-06 NOTE — ASSESSMENT
[FreeTextEntry1] : 29 yo F w/ SLE  (dx 2014, arthritis, +PAUL,  +dsDNA, +SM, +RNP, nephritis 2016),  and fibromyalgia (dx 2014).  Was treated with cellcept and in lupus study at [facility].  sle complicated by longitudinally extensive transverse myelitis (2019) treated with IV pulse steroids, PLEX, RTX with improvement in symptoms.  \par \par #gyn -  has missed several periods.  now with slightly increased prolactin\par - repeat to confirm\par - endo\par - may need MRI head/pituitary\par \par #. SLE with nephritis,alopecia, arthritis, lymphopenia -complicated by longitudinal extensive transverse myelitis in feb 2019 s/p plex, rtx and pulse steroids\par \par >>TM- improved and due for both MRI, Neuro eval and RTX\par -continue gabapentin, baclofen\par - taper prednisone\par - check cd 19 to assess repopulation - awaiting next dose - delayed 2/2 insurance issues\par - juan to 200 mg am and 300 mg pm\par - PT to help with muscle and neuro strength / balance\par - neuro evlaution\par \par >> neprhitis\par continue cellcept - no edema today - check ds dna and prot: creat ratio\par - has been much improved\par \par #FMS:\par -continue cymbalta - increase to 60 mg daily\par - PT\par \par #hemorrhoids and constipation.  \par - currently seeing GI and workup is ongoing\par - d/w patient and  that may be related to pain mgmt - will limit use of oxycodone\par \par #health maintenance\par -vit d good\par \par #medication management / v58.69 \par -cd19\par - RTX, cellcept, steroids - PCP prophylaxis bactrim (patient had stopped it) - discussed with patient and  again today

## 2019-12-06 NOTE — HISTORY OF PRESENT ILLNESS
[FreeTextEntry1] : INTERVAL HX\par - denies swelling of joints\par - gets back pain occasionally - more often with constipation and straining.  \par - urniary continence completely resolved - \par - no new neurologic s/s\par - tolerating other meds fine\par - urine without change in color and no bubbles\par - denies rash\par - still gets cramping\par - neuropathy at times bad - goes up and down - taking the gabapentin and awaking consult with Dr. Leija. \par - reviewed MRI and awaiting second RTX course\par - new issue is severe constipation with occasional bloody stools - found to have hemorrhoids.  \par - occasionally takes oxycodone\par - other new issue is increased prolactin found at endo - has had delayed menses - no visual changes - no nipple discharge

## 2019-12-06 NOTE — REVIEW OF SYSTEMS
[As Noted in HPI] : as noted in HPI [Negative] : Endocrine [Chills] : no chills [Fever] : no fever [Eye Pain] : no eye pain [Nosebleeds] : no nosebleeds [Chest Pain] : no chest pain [Earache] : no earache [Wheezing] : no wheezing [Shortness Of Breath] : no shortness of breath [Vomiting] : no vomiting [Cough] : no cough [Abdominal Pain] : no abdominal pain [Joint Pain] : no joint pain [Dysuria] : no dysuria [Dizziness] : no dizziness [Skin Lesions] : no skin lesions [Anxiety] : no anxiety [Easy Bleeding] : no tendency for easy bleeding [de-identified] : mood much better in peru -  [FreeTextEntry1] : missed period

## 2019-12-07 LAB
25(OH)D3 SERPL-MCNC: 47.2 NG/ML
ALBUMIN SERPL ELPH-MCNC: 4.7 G/DL
ALP BLD-CCNC: 68 U/L
ALT SERPL-CCNC: 31 U/L
ANION GAP SERPL CALC-SCNC: 14 MMOL/L
AST SERPL-CCNC: 23 U/L
BASOPHILS # BLD AUTO: 0.02 K/UL
BASOPHILS NFR BLD AUTO: 0.5 %
BILIRUB SERPL-MCNC: 0.7 MG/DL
BUN SERPL-MCNC: 8 MG/DL
C3 SERPL-MCNC: 87 MG/DL
C4 SERPL-MCNC: 29 MG/DL
CALCIUM SERPL-MCNC: 9.1 MG/DL
CELLS.CD3-CD19+/CELLS IN BLOOD: 4 %
CHLORIDE SERPL-SCNC: 102 MMOL/L
CO2 SERPL-SCNC: 23 MMOL/L
CREAT SERPL-MCNC: 0.46 MG/DL
CREAT SPEC-SCNC: 182 MG/DL
CREAT/PROT UR: 0.2 RATIO
CRP SERPL-MCNC: <0.1 MG/DL
EOSINOPHIL # BLD AUTO: 0.11 K/UL
EOSINOPHIL NFR BLD AUTO: 2.6 %
ERYTHROCYTE [SEDIMENTATION RATE] IN BLOOD BY WESTERGREN METHOD: 18 MM/HR
HCT VFR BLD CALC: 44 %
HGB BLD-MCNC: 14.3 G/DL
IMM GRANULOCYTES NFR BLD AUTO: 0.5 %
LYMPHOCYTES # BLD AUTO: 1.12 K/UL
LYMPHOCYTES NFR BLD AUTO: 26.8 %
MAN DIFF?: NORMAL
MCHC RBC-ENTMCNC: 29.4 PG
MCHC RBC-ENTMCNC: 32.5 GM/DL
MCV RBC AUTO: 90.3 FL
MONOCYTES # BLD AUTO: 0.41 K/UL
MONOCYTES NFR BLD AUTO: 9.8 %
NEUTROPHILS # BLD AUTO: 2.5 K/UL
NEUTROPHILS NFR BLD AUTO: 59.8 %
PLATELET # BLD AUTO: 291 K/UL
POTASSIUM SERPL-SCNC: 3.7 MMOL/L
PROT SERPL-MCNC: 6.6 G/DL
PROT UR-MCNC: 38 MG/DL
RBC # BLD: 4.87 M/UL
RBC # FLD: 12.6 %
SODIUM SERPL-SCNC: 139 MMOL/L
WBC # FLD AUTO: 4.18 K/UL

## 2019-12-09 LAB
DSDNA AB SER-ACNC: 40 IU/ML
PROLACTIN SERPL-MCNC: 28.9 NG/ML
TSH SERPL-ACNC: 3.5 UIU/ML

## 2019-12-13 ENCOUNTER — CHART COPY (OUTPATIENT)
Age: 34
End: 2019-12-13

## 2019-12-28 ENCOUNTER — FORM ENCOUNTER (OUTPATIENT)
Age: 34
End: 2019-12-28

## 2019-12-29 ENCOUNTER — APPOINTMENT (OUTPATIENT)
Dept: MRI IMAGING | Facility: CLINIC | Age: 34
End: 2019-12-29
Payer: COMMERCIAL

## 2019-12-29 ENCOUNTER — OUTPATIENT (OUTPATIENT)
Dept: OUTPATIENT SERVICES | Facility: HOSPITAL | Age: 34
LOS: 1 days | End: 2019-12-29
Payer: COMMERCIAL

## 2019-12-29 DIAGNOSIS — Z98.890 OTHER SPECIFIED POSTPROCEDURAL STATES: Chronic | ICD-10-CM

## 2019-12-29 DIAGNOSIS — E22.1 HYPERPROLACTINEMIA: ICD-10-CM

## 2019-12-29 PROCEDURE — A9585: CPT

## 2019-12-29 PROCEDURE — 70553 MRI BRAIN STEM W/O & W/DYE: CPT | Mod: 26

## 2019-12-29 PROCEDURE — 70553 MRI BRAIN STEM W/O & W/DYE: CPT

## 2020-01-04 ENCOUNTER — OUTPATIENT (OUTPATIENT)
Dept: OUTPATIENT SERVICES | Facility: HOSPITAL | Age: 35
LOS: 1 days | End: 2020-01-04
Payer: COMMERCIAL

## 2020-01-04 ENCOUNTER — APPOINTMENT (OUTPATIENT)
Dept: CT IMAGING | Facility: CLINIC | Age: 35
End: 2020-01-04
Payer: COMMERCIAL

## 2020-01-04 DIAGNOSIS — Z00.00 ENCOUNTER FOR GENERAL ADULT MEDICAL EXAMINATION WITHOUT ABNORMAL FINDINGS: ICD-10-CM

## 2020-01-04 DIAGNOSIS — Z98.890 OTHER SPECIFIED POSTPROCEDURAL STATES: Chronic | ICD-10-CM

## 2020-01-04 PROCEDURE — 74177 CT ABD & PELVIS W/CONTRAST: CPT | Mod: 26

## 2020-01-04 PROCEDURE — 74177 CT ABD & PELVIS W/CONTRAST: CPT

## 2020-01-10 ENCOUNTER — APPOINTMENT (OUTPATIENT)
Dept: RHEUMATOLOGY | Facility: CLINIC | Age: 35
End: 2020-01-10
Payer: COMMERCIAL

## 2020-01-10 VITALS
HEART RATE: 82 BPM | DIASTOLIC BLOOD PRESSURE: 70 MMHG | OXYGEN SATURATION: 96 % | SYSTOLIC BLOOD PRESSURE: 104 MMHG | BODY MASS INDEX: 23.92 KG/M2 | HEIGHT: 62 IN | WEIGHT: 130 LBS

## 2020-01-10 PROCEDURE — 99215 OFFICE O/P EST HI 40 MIN: CPT

## 2020-01-10 NOTE — PHYSICAL EXAM
[General Appearance - Well Nourished] : well nourished [General Appearance - In No Acute Distress] : in no acute distress [General Appearance - Alert] : alert [General Appearance - Well-Appearing] : healthy appearing [General Appearance - Well Developed] : well developed [PERRL With Normal Accommodation] : pupils were equal in size, round, and reactive to light [Sclera] : the sclera and conjunctiva were normal [Extraocular Movements] : extraocular movements were intact [Neck Appearance] : the appearance of the neck was normal [Outer Ear] : the ears and nose were normal in appearance [Heart Rate And Rhythm] : heart rate was normal and rhythm regular [Cervical Lymph Nodes Enlarged Anterior Bilaterally] : anterior cervical [No CVA Tenderness] : no ~M costovertebral angle tenderness [Supraclavicular Lymph Nodes Enlarged Bilaterally] : supraclavicular [No Spinal Tenderness] : no spinal tenderness [Skin Color & Pigmentation] : normal skin color and pigmentation [Musculoskeletal - Swelling] : no joint swelling seen [] : no rash [Skin Turgor] : normal skin turgor [Oriented To Time, Place, And Person] : oriented to person, place, and time [Skin Lesions] : no skin lesions [Impaired Insight] : insight and judgment were intact [FreeTextEntry1] : no focal deficits - able to walk without assistive device, less spasticity than in the past - muscle strength now at 5/5

## 2020-01-10 NOTE — CONSULT LETTER
[Courtesy Letter:] : I had the pleasure of seeing your patient, [unfilled], in my office today. [Dear  ___] : Dear  [unfilled], [Please see my note below.] : Please see my note below. [Consult Closing:] : Thank you very much for allowing me to participate in the care of this patient.  If you have any questions, please do not hesitate to contact me. [Sincerely,] : Sincerely, [FreeTextEntry2] : Scotty Schneider

## 2020-01-10 NOTE — HISTORY OF PRESENT ILLNESS
[FreeTextEntry1] : INTERVAL HX\par Here with multiple issues to discuss\par #SLE - TM.  Continues to do well.  denies urinary incontence.  the parasthesias persist, and perhaps a bit worsened.  had come down a bit on the steroids last time but it doesn’t seem to be correlating with that.   rarely takes the baclofen.  still taking the gabapentin at the higher dose\par - awaiting dr jimenez\par - due for the rtx\par #SLE- LN.  denies swelling of legs, rash, arhtriits, urinary symptoms. tolerating the cellcept\par - denies swelling of joints\par - gets back pain occasionally - more often with constipation and straining.  \par - urniary continence completely resolved - \par - urine without change in color and no bubbles\par \par #consitipation and hemorrhoids.  this has been the most problematic issue lately.  when constipated feels pain the lower abdomen and pressure in the backside.  has changed diet and does see gi.\par - awaiting colonoscopy\par - diet change has helped loosen stool\par -  no longer taking oxycodone. \par \par #prolactin.reviewed mri again - normal\par - amenorrhea resulted in elevated prolactin\par - no visual changes 0r new complaints \par  - no nipple discharge

## 2020-01-10 NOTE — ASSESSMENT
[FreeTextEntry1] : 29 yo F w/ SLE  (dx 2014, arthritis, +PAUL,  +dsDNA, +SM, +RNP, nephritis 2016),  and fibromyalgia (dx 2014).  Was treated with cellcept and in lupus study at [facility].  sle complicated by longitudinally extensive transverse myelitis (2019) treated with IV pulse steroids, PLEX, RTX with improvement in symptoms.  \par \par #Hyperprolactinemia.  Found after amenorrhea workup.  \par - reviewed mri again - normal\par - repeat mri \par - rec endo eval for further mgmt or workup\par \par #. SLE with nephritis,alopecia, arthritis, lymphopenia -complicated by longitudinal extensive transverse myelitis in feb 2019 s/p plex, rtx and pulse steroids\par \par >>TM- improved and due for both MRI, Neuro eval and RTX\par -continue gabapentin, baclofen\par - taper prednisone\par - check cd 19 to assess repopulation - awaiting next dose - delayed 2/2 insurance issues\par - juan to 200 mg am and 300 mg pm\par - PT to help with muscle and neuro strength / balance\par - neuro evlaution\par \par >> neprhitis\par continue cellcept - no edema today - check ds dna and prot: creat ratio\par - has been much improved\par \par #FMS:\par -continue cymbalta - increase to 60 mg daily\par - PT\par \par #hemorrhoids and constipation.  \par - has limited the oxycodone (rarely uses it) and changed diet - and is better\par - colonoscopy soon\par - d/w paitnet also possible ae 2/2 gabapentin as it correlated with increased sxs though rare\par - will try to decrease juan back down to 400 mgand assess constipation \par \par #health maintenance\par -vit d good\par \par #medication management / v58.69 \par -cd19\par - RTX, cellcept, steroids - PCP prophylaxis bactrim (patient had stopped it) - discussed with patient and  again today

## 2020-01-12 LAB
25(OH)D3 SERPL-MCNC: 47.8 NG/ML
ALBUMIN SERPL ELPH-MCNC: 4.6 G/DL
ALP BLD-CCNC: 93 U/L
ALT SERPL-CCNC: 40 U/L
ANION GAP SERPL CALC-SCNC: 12 MMOL/L
AST SERPL-CCNC: 28 U/L
BASOPHILS # BLD AUTO: 0.03 K/UL
BASOPHILS NFR BLD AUTO: 0.7 %
BILIRUB SERPL-MCNC: 0.6 MG/DL
BUN SERPL-MCNC: 10 MG/DL
C3 SERPL-MCNC: 119 MG/DL
C4 SERPL-MCNC: 37 MG/DL
CALCIUM SERPL-MCNC: 9.2 MG/DL
CELLS.CD3-CD19+/CELLS IN BLOOD: 6 %
CHLORIDE SERPL-SCNC: 101 MMOL/L
CO2 SERPL-SCNC: 25 MMOL/L
CREAT SERPL-MCNC: 0.5 MG/DL
CREAT SPEC-SCNC: 145 MG/DL
CREAT/PROT UR: 0.2 RATIO
CRP SERPL-MCNC: 0.23 MG/DL
EOSINOPHIL # BLD AUTO: 0.16 K/UL
EOSINOPHIL NFR BLD AUTO: 4 %
ERYTHROCYTE [SEDIMENTATION RATE] IN BLOOD BY WESTERGREN METHOD: 45 MM/HR
HCT VFR BLD CALC: 47.5 %
HGB BLD-MCNC: 14.8 G/DL
IMM GRANULOCYTES NFR BLD AUTO: 0.2 %
LYMPHOCYTES # BLD AUTO: 1.07 K/UL
LYMPHOCYTES NFR BLD AUTO: 26.6 %
MAN DIFF?: NORMAL
MCHC RBC-ENTMCNC: 29.3 PG
MCHC RBC-ENTMCNC: 31.2 GM/DL
MCV RBC AUTO: 94.1 FL
MONOCYTES # BLD AUTO: 0.45 K/UL
MONOCYTES NFR BLD AUTO: 11.2 %
NEUTROPHILS # BLD AUTO: 2.31 K/UL
NEUTROPHILS NFR BLD AUTO: 57.3 %
PLATELET # BLD AUTO: 339 K/UL
POTASSIUM SERPL-SCNC: 4 MMOL/L
PROLACTIN SERPL-MCNC: 43.1 NG/ML
PROT SERPL-MCNC: 6.8 G/DL
PROT UR-MCNC: 34 MG/DL
RBC # BLD: 5.05 M/UL
RBC # FLD: 13.2 %
SODIUM SERPL-SCNC: 138 MMOL/L
TSH SERPL-ACNC: 4.44 UIU/ML
WBC # FLD AUTO: 4.03 K/UL

## 2020-01-15 LAB — DSDNA AB SER-ACNC: 50 IU/ML

## 2020-01-22 ENCOUNTER — APPOINTMENT (OUTPATIENT)
Dept: RHEUMATOLOGY | Facility: CLINIC | Age: 35
End: 2020-01-22
Payer: COMMERCIAL

## 2020-01-22 ENCOUNTER — LABORATORY RESULT (OUTPATIENT)
Age: 35
End: 2020-01-22

## 2020-01-22 ENCOUNTER — RX RENEWAL (OUTPATIENT)
Age: 35
End: 2020-01-22

## 2020-01-22 PROCEDURE — 96413 CHEMO IV INFUSION 1 HR: CPT

## 2020-01-22 PROCEDURE — 96415 CHEMO IV INFUSION ADDL HR: CPT

## 2020-01-22 PROCEDURE — 36415 COLL VENOUS BLD VENIPUNCTURE: CPT

## 2020-01-22 PROCEDURE — 96375 TX/PRO/DX INJ NEW DRUG ADDON: CPT

## 2020-01-24 ENCOUNTER — APPOINTMENT (OUTPATIENT)
Dept: RHEUMATOLOGY | Facility: CLINIC | Age: 35
End: 2020-01-24
Payer: COMMERCIAL

## 2020-01-24 ENCOUNTER — RX RENEWAL (OUTPATIENT)
Age: 35
End: 2020-01-24

## 2020-01-24 VITALS
HEIGHT: 62 IN | SYSTOLIC BLOOD PRESSURE: 115 MMHG | BODY MASS INDEX: 23.92 KG/M2 | DIASTOLIC BLOOD PRESSURE: 79 MMHG | HEART RATE: 85 BPM | OXYGEN SATURATION: 98 % | WEIGHT: 130 LBS

## 2020-01-24 DIAGNOSIS — L03.119 CELLULITIS OF UNSPECIFIED PART OF LIMB: ICD-10-CM

## 2020-01-24 DIAGNOSIS — Z87.440 PERSONAL HISTORY OF URINARY (TRACT) INFECTIONS: ICD-10-CM

## 2020-01-24 DIAGNOSIS — Z87.2 PERSONAL HISTORY OF DISEASES OF THE SKIN AND SUBCUTANEOUS TISSUE: ICD-10-CM

## 2020-01-24 DIAGNOSIS — Z87.39 PERSONAL HISTORY OF OTHER DISEASES OF THE MUSCULOSKELETAL SYSTEM AND CONNECTIVE TISSUE: ICD-10-CM

## 2020-01-24 DIAGNOSIS — M79.89 OTHER SPECIFIED SOFT TISSUE DISORDERS: ICD-10-CM

## 2020-01-24 DIAGNOSIS — Z87.898 PERSONAL HISTORY OF OTHER SPECIFIED CONDITIONS: ICD-10-CM

## 2020-01-24 DIAGNOSIS — Z87.19 PERSONAL HISTORY OF OTHER DISEASES OF THE DIGESTIVE SYSTEM: ICD-10-CM

## 2020-01-24 DIAGNOSIS — J31.0 CHRONIC RHINITIS: ICD-10-CM

## 2020-01-24 DIAGNOSIS — M94.0 CHONDROCOSTAL JUNCTION SYNDROME [TIETZE]: ICD-10-CM

## 2020-01-24 DIAGNOSIS — L02.31 CUTANEOUS ABSCESS OF BUTTOCK: ICD-10-CM

## 2020-01-24 PROCEDURE — 36415 COLL VENOUS BLD VENIPUNCTURE: CPT

## 2020-01-24 PROCEDURE — 99215 OFFICE O/P EST HI 40 MIN: CPT | Mod: 25

## 2020-01-27 ENCOUNTER — RX RENEWAL (OUTPATIENT)
Age: 35
End: 2020-01-27

## 2020-01-27 PROBLEM — M79.89 LEFT LEG SWELLING: Status: RESOLVED | Noted: 2019-09-13 | Resolved: 2020-01-27

## 2020-01-27 PROBLEM — Z87.2 HISTORY OF ECZEMA: Status: RESOLVED | Noted: 2017-05-25 | Resolved: 2020-01-27

## 2020-01-27 PROBLEM — L02.31 ABSCESS, GLUTEAL, LEFT: Status: RESOLVED | Noted: 2019-04-26 | Resolved: 2020-01-27

## 2020-01-27 PROBLEM — Z87.898 HISTORY OF URINARY RETENTION: Status: RESOLVED | Noted: 2019-02-28 | Resolved: 2020-01-27

## 2020-01-27 PROBLEM — M79.89 RIGHT LEG SWELLING: Status: RESOLVED | Noted: 2019-09-13 | Resolved: 2020-01-27

## 2020-01-27 PROBLEM — L03.119 CELLULITIS, LEG: Status: RESOLVED | Noted: 2019-05-29 | Resolved: 2020-01-27

## 2020-01-27 NOTE — REVIEW OF SYSTEMS
[As Noted in HPI] : as noted in HPI [Negative] : Endocrine [Fever] : no fever [Chills] : no chills [Eye Pain] : no eye pain [Earache] : no earache [Nosebleeds] : no nosebleeds [Chest Pain] : no chest pain [Shortness Of Breath] : no shortness of breath [Wheezing] : no wheezing [Cough] : no cough [Abdominal Pain] : no abdominal pain [Vomiting] : no vomiting [Dysuria] : no dysuria [Joint Pain] : no joint pain [Skin Lesions] : no skin lesions [Dizziness] : no dizziness [Anxiety] : no anxiety [Easy Bleeding] : no tendency for easy bleeding [de-identified] : mood much better in peru -  [FreeTextEntry1] : missed period

## 2020-01-27 NOTE — HISTORY OF PRESENT ILLNESS
[FreeTextEntry1] : INTERVAL HX\par Here to review several issues\par #SLE - TM.  Continues to do well.  denies urinary incontinence.  the parasthesias persist, and perhaps a bit worsened.  came down on the gabapentin owing to GI side effects, with no change in neuro symptoms.\par - awaiting dr jimenez\par - tolerating the rituxan well\par #SLE- LN.  denies swelling of legs, rash, arthritis, urinary symptoms. tolerating the cellcept\par - denies swelling of joints\par - gets back pain occasionally - more often with constipation and straining.  \par - urniary continence completely resolved - \par - urine without change in color and no bubbles\par #constipation and hemorrhoids.  this has been the most problematic issue lately.  when constipated feels pain the lower abdomen and pressure in the backside.  has changed diet and does see gi.\par - awaiting colonoscopy.  was supposed to be day after rituxan, but it got canceled as she had some water to drink so they were sent home.  rescheduled for next monday\par - diet change has helped loosen stool\par -  no longer taking oxycodone. \par \par #prolactin.reviewed mri again - normal\par - amenorrhea resulted in elevated prolactin\par - no visual changes 0r new complaints \par  - no nipple discharge\par - helped patient get endo visit\par \par > needs forms to be filled out

## 2020-01-27 NOTE — ASSESSMENT
[FreeTextEntry1] : 29 yo F w/ SLE  (dx 2014, arthritis, +PAUL,  +dsDNA, +SM, +RNP, nephritis 2016),  and fibromyalgia (dx 2014).  Was treated with cellcept and in lupus study at [facility].  sle complicated by longitudinally extensive transverse myelitis (2019) treated with IV pulse steroids, PLEX, RTX with improvement in symptoms.  \par \par #Hyperprolactinemia.  Found after amenorrhea workup.  \par - reviewed mri again - normal\par - rec endo eval for further mgmt or workup\par \par # SLE with nephritis,alopecia, arthritis, lymphopenia -complicated by longitudinal extensive transverse myelitis in feb 2019 s/p plex, rtx and pulse steroids\par \par >>TM (2019)  improved  - Neuro eval and midway through RTX\par -continue gabapentin, baclofen\par - taper prednisone\par - check cd 19 to assess repopulation \par - on gabapentin, rarely needs baclofen\par - PT to help with muscle and neuro strength / balance\par - neuro evlaution\par \par >> neprhitis\par - continue cellcept - no edema today - check ds dna and prot: creat ratio\par \par #FMS:\par -continue Cymbalta - increase to 60 mg daily\par - PT\par \par #hemorrhoids and constipation.  \par - has limited the oxycodone (rarely uses it) and changed diet - and is better\par - colonoscopy\par - d/w patient also possible ae 2/2 gabapentin as it correlated with increased sxs though rare\par - decrease gabapentin to 300 mg daily in divided doses\par \par #health maintenance\par -vit d good\par \par #medication management / v58.69 \par -cd19\par - RTX, cellcept, steroids - PCP prophylaxis bactrim (patient had stopped it) - discussed with patient and  again today\par - unclear if constipation 2/2 meds - tapering gabapentin as tolerated\par \par #soc\par - filled out forms for parking as well as work for

## 2020-01-27 NOTE — PHYSICAL EXAM
[General Appearance - Alert] : alert [General Appearance - Well Nourished] : well nourished [General Appearance - In No Acute Distress] : in no acute distress [General Appearance - Well Developed] : well developed [General Appearance - Well-Appearing] : healthy appearing [PERRL With Normal Accommodation] : pupils were equal in size, round, and reactive to light [Sclera] : the sclera and conjunctiva were normal [Extraocular Movements] : extraocular movements were intact [Outer Ear] : the ears and nose were normal in appearance [Neck Appearance] : the appearance of the neck was normal [Heart Rate And Rhythm] : heart rate was normal and rhythm regular [Cervical Lymph Nodes Enlarged Anterior Bilaterally] : anterior cervical [Supraclavicular Lymph Nodes Enlarged Bilaterally] : supraclavicular [No CVA Tenderness] : no ~M costovertebral angle tenderness [No Spinal Tenderness] : no spinal tenderness [Musculoskeletal - Swelling] : no joint swelling seen [Skin Color & Pigmentation] : normal skin color and pigmentation [] : no rash [Skin Turgor] : normal skin turgor [Skin Lesions] : no skin lesions [Oriented To Time, Place, And Person] : oriented to person, place, and time [Impaired Insight] : insight and judgment were intact [FreeTextEntry1] : no focal deficits - able to walk without assistive device, less spasticity than in the past - muscle strength now at 5/5

## 2020-01-29 ENCOUNTER — APPOINTMENT (OUTPATIENT)
Dept: ENDOCRINOLOGY | Facility: CLINIC | Age: 35
End: 2020-01-29
Payer: COMMERCIAL

## 2020-01-29 VITALS
BODY MASS INDEX: 24.11 KG/M2 | WEIGHT: 131 LBS | OXYGEN SATURATION: 98 % | HEART RATE: 81 BPM | SYSTOLIC BLOOD PRESSURE: 103 MMHG | HEIGHT: 62 IN | DIASTOLIC BLOOD PRESSURE: 71 MMHG

## 2020-01-29 DIAGNOSIS — E22.1 HYPERPROLACTINEMIA: ICD-10-CM

## 2020-01-29 PROCEDURE — 99204 OFFICE O/P NEW MOD 45 MIN: CPT | Mod: 25

## 2020-01-29 PROCEDURE — 36415 COLL VENOUS BLD VENIPUNCTURE: CPT

## 2020-01-29 NOTE — ASSESSMENT
[Levothyroxine] : The patient was instructed to take Levothyroxine on an empty stomach, separate from vitamins, and wait at least 30 minutes before eating [FreeTextEntry1] : Hyperprolactinemia\par Suspect likely 2/2 hypothyroidism. Likely Hashimoto's given that she already has an autoimmune disease.\par Labs from 1/2020 show elevated TSH with no prednisone on board (It can suppress TSH)\par Prolactin level is very mildly elevated. Pituitary MRI neg and microadenoma less likely\par Will check TFTs, antibodies\par Will check macroprolactin\par Will send for US thyroid\par If TSH again high, will start levothyroxine 25-50 mcg\par \par Amenorrhea\par Likely 2/2 elevated prolactin vs hypothyroidism\par Patient has also been under extreme stress from the past year and is still on going SLE tx. \par Will first address thyroid and then if still persistent amenorrhea, then will check sex hormones\par \par Follow up in 6-8 weeks

## 2020-01-29 NOTE — HISTORY OF PRESENT ILLNESS
[FreeTextEntry1] : Ms Mague Luis is a 34 year old female with past medical history of SLE on chronic steroids, transverse myelitis, amenorrhea who presents for evaluation of persistently elevated Prolactin and amenorrhea. She was dx with SLE in 2014. In Dec 2018 she developed transverse myelitis and was hospitalized for 3 months. She was placed on high dose IV steroids, plasmapharesis. She has continued on prendisone, rituxan and cellcept. She was eventually tapered off of prednisone end of last months.\par \par Re Amenorrhea\par Menarche at age 11.It was always regular. \par She had a miscarriage in 2013. \par Stopped having her menses in June 2019. But it was very irregular for few months before. \par She has gained about 5 lbs.\par Patient saw Gyn who said anatomically everything was normal\par \par Re prolactin\par It was found to be elevated during workup for amenorrhea. In Dec 2019 it was 28.9 and then inc to 43.1 in 1/2020. She denies any nipple stimulation, galactorrhea. She denies any significant temperature intolerance, skin and hair changes. She has had fatigue and constipation. She denies any history of antipsychotics, SSRIs, Lithium. \par No family hx of thyroid disease or other autoimmune diseases.\par MRI pituitary showed nothing abnormal. Pituitary normal in size and midline.\par

## 2020-01-29 NOTE — CONSULT LETTER
[Dear  ___] : Dear  [unfilled], [Consult Letter:] : I had the pleasure of evaluating your patient, [unfilled]. [Please see my note below.] : Please see my note below. [Sincerely,] : Sincerely, [Consult Closing:] : Thank you very much for allowing me to participate in the care of this patient.  If you have any questions, please do not hesitate to contact me. [FreeTextEntry3] : Donna Lanier MS. DO.\par Endocrinology\par [address] [place] [address]\[place] [address]\par Tel [phone]\par Fax [phone]\par

## 2020-01-29 NOTE — PHYSICAL EXAM
[No Acute Distress] : no acute distress [Alert] : alert [Well Developed] : well developed [Well Nourished] : well nourished [EOMI] : extra ocular movement intact [Normal Sclera/Conjunctiva] : normal sclera/conjunctiva [No Proptosis] : no proptosis [Normal Oropharynx] : the oropharynx was normal [No Respiratory Distress] : no respiratory distress [No Accessory Muscle Use] : no accessory muscle use [Clear to Auscultation] : lungs were clear to auscultation bilaterally [Normal Rate] : heart rate was normal  [Normal S1, S2] : normal S1 and S2 [Regular Rhythm] : with a regular rhythm [No Edema] : there was no peripheral edema [Pedal Pulses Normal] : the pedal pulses are present [Not Tender] : non-tender [Normal Bowel Sounds] : normal bowel sounds [Soft] : abdomen soft [Not Distended] : not distended [Post Cervical Nodes] : posterior cervical nodes [Anterior Cervical Nodes] : anterior cervical nodes [No Spinal Tenderness] : no spinal tenderness [Axillary Nodes] : axillary nodes [Normal] : normal and non tender [Spine Straight] : spine straight [No Stigmata of Cushings Syndrome] : no stigmata of cushings syndrome [Normal Gait] : normal gait [Normal Strength/Tone] : muscle strength and tone were normal [No Rash] : no rash [No Tremors] : no tremors [Normal Reflexes] : deep tendon reflexes were 2+ and symmetric [Oriented x3] : oriented to person, place, and time [Acanthosis Nigricans] : no acanthosis nigricans [de-identified] : thyroid firm and rubbery. May be slightly larger on right side [de-identified] : dec sensation below waist

## 2020-01-29 NOTE — REASON FOR VISIT
[Initial Evaluation] : an initial evaluation [FreeTextEntry1] : Hyperprolactinemia [Spouse] : spouse

## 2020-01-29 NOTE — REVIEW OF SYSTEMS
[Negative] : Endocrine [Fatigue] : fatigue [Constipation] : constipation [Galactorrhea] : no galactorrhea  [Headache] : no headaches [Blurry Vision] : no blurred vision [de-identified] : numbness below waist

## 2020-02-01 ENCOUNTER — NON-APPOINTMENT (OUTPATIENT)
Age: 35
End: 2020-02-01

## 2020-02-03 LAB
PROLACTIN SERPL-MCNC: 24 NG/ML
T3 SERPL-MCNC: 107 NG/DL
T4 FREE SERPL-MCNC: 1.1 NG/DL
THYROGLOB AB SERPL-ACNC: <20 IU/ML
THYROPEROXIDASE AB SERPL IA-ACNC: <10 IU/ML
TSH SERPL-ACNC: 1.2 UIU/ML

## 2020-02-04 ENCOUNTER — APPOINTMENT (OUTPATIENT)
Dept: RHEUMATOLOGY | Facility: CLINIC | Age: 35
End: 2020-02-04
Payer: COMMERCIAL

## 2020-02-04 ENCOUNTER — LABORATORY RESULT (OUTPATIENT)
Age: 35
End: 2020-02-04

## 2020-02-04 PROCEDURE — 96413 CHEMO IV INFUSION 1 HR: CPT

## 2020-02-04 PROCEDURE — 96375 TX/PRO/DX INJ NEW DRUG ADDON: CPT

## 2020-02-04 PROCEDURE — 96415 CHEMO IV INFUSION ADDL HR: CPT

## 2020-02-04 PROCEDURE — 36415 COLL VENOUS BLD VENIPUNCTURE: CPT

## 2020-02-09 ENCOUNTER — NON-APPOINTMENT (OUTPATIENT)
Age: 35
End: 2020-02-09

## 2020-02-11 LAB
MONOMERIC PROLACTIN (ICMA)*: 22 NG/ML
PERCENT MACROPROLACTIN: 0 %
PROLACTIN, SERUM (ICMA)*: 22 NG/ML

## 2020-02-24 ENCOUNTER — APPOINTMENT (OUTPATIENT)
Dept: ULTRASOUND IMAGING | Facility: CLINIC | Age: 35
End: 2020-02-24
Payer: COMMERCIAL

## 2020-02-24 ENCOUNTER — OUTPATIENT (OUTPATIENT)
Dept: OUTPATIENT SERVICES | Facility: HOSPITAL | Age: 35
LOS: 1 days | End: 2020-02-24

## 2020-02-24 DIAGNOSIS — Z98.890 OTHER SPECIFIED POSTPROCEDURAL STATES: Chronic | ICD-10-CM

## 2020-02-24 DIAGNOSIS — E06.9 THYROIDITIS, UNSPECIFIED: ICD-10-CM

## 2020-02-24 PROCEDURE — 76536 US EXAM OF HEAD AND NECK: CPT | Mod: 26

## 2020-03-02 ENCOUNTER — APPOINTMENT (OUTPATIENT)
Dept: ENDOCRINOLOGY | Facility: CLINIC | Age: 35
End: 2020-03-02
Payer: COMMERCIAL

## 2020-03-02 VITALS
BODY MASS INDEX: 24.11 KG/M2 | SYSTOLIC BLOOD PRESSURE: 112 MMHG | HEIGHT: 62 IN | HEART RATE: 80 BPM | WEIGHT: 131 LBS | DIASTOLIC BLOOD PRESSURE: 81 MMHG | OXYGEN SATURATION: 97 % | RESPIRATION RATE: 14 BRPM

## 2020-03-02 DIAGNOSIS — R79.89 OTHER SPECIFIED ABNORMAL FINDINGS OF BLOOD CHEMISTRY: ICD-10-CM

## 2020-03-02 PROCEDURE — 99214 OFFICE O/P EST MOD 30 MIN: CPT | Mod: 25

## 2020-03-02 PROCEDURE — 36415 COLL VENOUS BLD VENIPUNCTURE: CPT

## 2020-03-02 RX ORDER — PREDNISONE 5 MG/1
5 TABLET ORAL
Qty: 90 | Refills: 0 | Status: DISCONTINUED | COMMUNITY
Start: 2019-09-13 | End: 2020-03-02

## 2020-03-02 RX ORDER — PREDNISONE 20 MG/1
20 TABLET ORAL TWICE DAILY
Qty: 120 | Refills: 0 | Status: DISCONTINUED | COMMUNITY
Start: 2019-04-26 | End: 2020-03-02

## 2020-03-02 RX ORDER — PREDNISONE 1 MG/1
1 TABLET ORAL DAILY
Qty: 120 | Refills: 2 | Status: DISCONTINUED | COMMUNITY
Start: 2019-10-30 | End: 2020-03-02

## 2020-03-02 RX ORDER — PREDNISONE 50 MG/1
50 TABLET ORAL DAILY
Qty: 30 | Refills: 4 | Status: DISCONTINUED | COMMUNITY
Start: 2019-05-02 | End: 2020-03-02

## 2020-03-02 NOTE — CONSULT LETTER
[DrSav  ___] : Dr. RESENDEZ [Dear  ___] : Dear  [unfilled], [Please see my note below.] : Please see my note below. [Consult Letter:] : I had the pleasure of evaluating your patient, [unfilled]. [Sincerely,] : Sincerely, [Consult Closing:] : Thank you very much for allowing me to participate in the care of this patient.  If you have any questions, please do not hesitate to contact me. [FreeTextEntry3] : Donna Lanier MS. DO.\par Endocrinology\par [address] [place] [address]\[place] [address]\par Tel [phone]\par Fax [phone]\par

## 2020-03-02 NOTE — PHYSICAL EXAM
[Alert] : alert [No Acute Distress] : no acute distress [Well Nourished] : well nourished [Well Developed] : well developed [Normal Sclera/Conjunctiva] : normal sclera/conjunctiva [EOMI] : extra ocular movement intact [No Proptosis] : no proptosis [Normal Oropharynx] : the oropharynx was normal [Thyroid Not Enlarged] : the thyroid was not enlarged [No Respiratory Distress] : no respiratory distress [No Accessory Muscle Use] : no accessory muscle use [Clear to Auscultation] : lungs were clear to auscultation bilaterally [Normal Rate] : heart rate was normal  [Normal S1, S2] : normal S1 and S2 [Regular Rhythm] : with a regular rhythm [Pedal Pulses Normal] : the pedal pulses are present [No Edema] : there was no peripheral edema [Normal Bowel Sounds] : normal bowel sounds [Not Tender] : non-tender [Not Distended] : not distended [Soft] : abdomen soft [Post Cervical Nodes] : posterior cervical nodes [Anterior Cervical Nodes] : anterior cervical nodes [Axillary Nodes] : axillary nodes [Normal] : normal and non tender [No Spinal Tenderness] : no spinal tenderness [Spine Straight] : spine straight [No Stigmata of Cushings Syndrome] : no stigmata of cushings syndrome [Normal Gait] : normal gait [No Rash] : no rash [Normal Strength/Tone] : muscle strength and tone were normal [No Tremors] : no tremors [Normal Reflexes] : deep tendon reflexes were 2+ and symmetric [Oriented x3] : oriented to person, place, and time [Acanthosis Nigricans] : no acanthosis nigricans [de-identified] : dec sensation below waist

## 2020-03-03 ENCOUNTER — NON-APPOINTMENT (OUTPATIENT)
Age: 35
End: 2020-03-03

## 2020-03-06 ENCOUNTER — LABORATORY RESULT (OUTPATIENT)
Age: 35
End: 2020-03-06

## 2020-03-06 ENCOUNTER — APPOINTMENT (OUTPATIENT)
Dept: RHEUMATOLOGY | Facility: CLINIC | Age: 35
End: 2020-03-06
Payer: COMMERCIAL

## 2020-03-06 VITALS
BODY MASS INDEX: 24.11 KG/M2 | OXYGEN SATURATION: 99 % | HEIGHT: 62 IN | SYSTOLIC BLOOD PRESSURE: 96 MMHG | WEIGHT: 131 LBS | DIASTOLIC BLOOD PRESSURE: 94 MMHG | HEART RATE: 80 BPM | RESPIRATION RATE: 14 BRPM

## 2020-03-06 DIAGNOSIS — M32.9 SYSTEMIC LUPUS ERYTHEMATOSUS, UNSPECIFIED: ICD-10-CM

## 2020-03-06 PROCEDURE — 99214 OFFICE O/P EST MOD 30 MIN: CPT

## 2020-03-07 LAB
25(OH)D3 SERPL-MCNC: 41.7 NG/ML
ALBUMIN SERPL ELPH-MCNC: 4.8 G/DL
ALP BLD-CCNC: 103 U/L
ALT SERPL-CCNC: 26 U/L
ANION GAP SERPL CALC-SCNC: 15 MMOL/L
AST SERPL-CCNC: 19 U/L
BASOPHILS # BLD AUTO: 0.03 K/UL
BASOPHILS NFR BLD AUTO: 0.4 %
BILIRUB SERPL-MCNC: 0.4 MG/DL
BUN SERPL-MCNC: 8 MG/DL
CALCIUM SERPL-MCNC: 9.6 MG/DL
CHLORIDE SERPL-SCNC: 102 MMOL/L
CO2 SERPL-SCNC: 25 MMOL/L
CREAT SERPL-MCNC: 0.48 MG/DL
CREAT SPEC-SCNC: 136 MG/DL
CREAT/PROT UR: 0.2 RATIO
CRP SERPL-MCNC: 0.51 MG/DL
EOSINOPHIL # BLD AUTO: 0.17 K/UL
EOSINOPHIL NFR BLD AUTO: 2.1 %
ERYTHROCYTE [SEDIMENTATION RATE] IN BLOOD BY WESTERGREN METHOD: 48 MM/HR
HCT VFR BLD CALC: 48.6 %
HGB BLD-MCNC: 15.1 G/DL
IMM GRANULOCYTES NFR BLD AUTO: 0.4 %
LYMPHOCYTES # BLD AUTO: 0.97 K/UL
LYMPHOCYTES NFR BLD AUTO: 12.1 %
MAN DIFF?: NORMAL
MCHC RBC-ENTMCNC: 29.5 PG
MCHC RBC-ENTMCNC: 31.1 GM/DL
MCV RBC AUTO: 95.1 FL
MONOCYTES # BLD AUTO: 0.76 K/UL
MONOCYTES NFR BLD AUTO: 9.5 %
NEUTROPHILS # BLD AUTO: 6.07 K/UL
NEUTROPHILS NFR BLD AUTO: 75.5 %
PLATELET # BLD AUTO: 373 K/UL
POTASSIUM SERPL-SCNC: 4 MMOL/L
PROT SERPL-MCNC: 7.1 G/DL
PROT UR-MCNC: 20 MG/DL
RBC # BLD: 5.11 M/UL
RBC # FLD: 12.3 %
SODIUM SERPL-SCNC: 141 MMOL/L
WBC # FLD AUTO: 8.03 K/UL

## 2020-03-07 NOTE — ASSESSMENT
[FreeTextEntry1] : 31 yo F w/ SLE  (dx 2014, arthritis, +PAUL,  +dsDNA, +SM, +RNP, nephritis 2016, longitudinally extensive TM 2019),  and fibromyalgia (dx 2014).  \par \par #Amenorrhea and Hyperprolactinemia.  \par - d/w patient and endo - prolactin has now normalized, mri normal and hormones normal\par - unclear etiology of amenorrhea\par \par # SLE with nephritis (treated with cellcept+study),alopecia, arthritis, lymphopenia -complicated by longitudinal extensive transverse myelitis in feb 2019 s/p plex, rtx and pulse steroids\par  \par >>TM (2019)  improved  \par -continue gabapentin, baclofen - decreasing gabapentin as tolerated - now decrease to 200 mg daily\par - prednisone being tapered\par - check cd 19 to assess repopulation \par - on gabapentin, rarely needs baclofen\par - PT to help with muscle and neuro strength / balance - reorder today\par - neuro evaluation pending\par \par >> nephritis\par - continue cellcept - no edema today - check ds dna and prot: creat ratio\par \par #FMS:\par -continue Cymbalta - increase to 60 mg daily\par - PT\par \par #hemorrhoids and constipation.  \par - has limited the oxycodone (rarely uses it) and changed diet - and is better\par - colonoscopy okay\par \par #bone health\par -vit d good\par - s/p large amounts of vit d - will have DEXA and evaluation of bone health.  \par - estrogen okay\par - concern over age is that bisphosphonates long half life - will f/u with endo\par \par #medication management / v58.69 \par - cd19\par - RTX, cellcept, steroids - PCP prophylaxis bactrim \par

## 2020-03-07 NOTE — PHYSICAL EXAM
[General Appearance - Alert] : alert [General Appearance - In No Acute Distress] : in no acute distress [General Appearance - Well Nourished] : well nourished [General Appearance - Well Developed] : well developed [General Appearance - Well-Appearing] : healthy appearing [Sclera] : the sclera and conjunctiva were normal [PERRL With Normal Accommodation] : pupils were equal in size, round, and reactive to light [Extraocular Movements] : extraocular movements were intact [Outer Ear] : the ears and nose were normal in appearance [Neck Appearance] : the appearance of the neck was normal [Heart Rate And Rhythm] : heart rate was normal and rhythm regular [Cervical Lymph Nodes Enlarged Anterior Bilaterally] : anterior cervical [Supraclavicular Lymph Nodes Enlarged Bilaterally] : supraclavicular [No CVA Tenderness] : no ~M costovertebral angle tenderness [No Spinal Tenderness] : no spinal tenderness [Musculoskeletal - Swelling] : no joint swelling seen [Skin Color & Pigmentation] : normal skin color and pigmentation [Skin Turgor] : normal skin turgor [] : no rash [Skin Lesions] : no skin lesions [Oriented To Time, Place, And Person] : oriented to person, place, and time [Impaired Insight] : insight and judgment were intact [FreeTextEntry1] : no focal deficits - able to walk without assistive device, MS 5/5, able to walk on toes

## 2020-03-07 NOTE — REVIEW OF SYSTEMS
[As Noted in HPI] : as noted in HPI [Negative] : Heme/Lymph [Fever] : no fever [Chills] : no chills [Eye Pain] : no eye pain [Earache] : no earache [Nosebleeds] : no nosebleeds [Chest Pain] : no chest pain [Shortness Of Breath] : no shortness of breath [Wheezing] : no wheezing [Cough] : no cough [Abdominal Pain] : no abdominal pain [Vomiting] : no vomiting [Dysuria] : no dysuria [Joint Pain] : no joint pain [Skin Lesions] : no skin lesions [Dizziness] : no dizziness [Anxiety] : no anxiety [Easy Bleeding] : no tendency for easy bleeding [de-identified] : mood much better in [place] -  [FreeTextEntry1] : missed period

## 2020-03-09 LAB
C3 SERPL-MCNC: 141 MG/DL
C4 SERPL-MCNC: 42 MG/DL

## 2020-03-10 ENCOUNTER — APPOINTMENT (OUTPATIENT)
Dept: NEUROLOGY | Facility: CLINIC | Age: 35
End: 2020-03-10
Payer: COMMERCIAL

## 2020-03-10 VITALS
DIASTOLIC BLOOD PRESSURE: 72 MMHG | SYSTOLIC BLOOD PRESSURE: 105 MMHG | BODY MASS INDEX: 24.11 KG/M2 | HEIGHT: 62 IN | HEART RATE: 98 BPM | WEIGHT: 131 LBS

## 2020-03-10 LAB
DHEA-S SERPL-MCNC: 44.2 UG/DL
DSDNA AB SER-ACNC: 26 IU/ML
ESTRADIOL SERPL-MCNC: 61 PG/ML
FSH SERPL-MCNC: 13.6 IU/L
HCG SERPL-MCNC: <1 MIU/ML
LH SERPL-ACNC: 6.3 IU/L
PROGEST SERPL-MCNC: 0.3 NG/ML
PROLACTIN SERPL-MCNC: 16.2 NG/ML
TESTOST BND SERPL-MCNC: 0.5 PG/ML
TESTOST SERPL-MCNC: 6.6 NG/DL

## 2020-03-10 PROCEDURE — 99214 OFFICE O/P EST MOD 30 MIN: CPT

## 2020-03-10 RX ORDER — TAMSULOSIN HYDROCHLORIDE 0.4 MG/1
0.4 CAPSULE ORAL
Qty: 30 | Refills: 3 | Status: DISCONTINUED | COMMUNITY
End: 2020-03-10

## 2020-03-10 RX ORDER — GABAPENTIN 100 MG/1
100 CAPSULE ORAL
Qty: 60 | Refills: 0 | Status: DISCONTINUED | COMMUNITY
Start: 2019-03-20 | End: 2020-03-10

## 2020-03-10 RX ORDER — DOXYCYCLINE HYCLATE 100 MG/1
100 TABLET ORAL TWICE DAILY
Qty: 28 | Refills: 0 | Status: DISCONTINUED | COMMUNITY
Start: 2019-06-26 | End: 2020-03-10

## 2020-03-10 RX ORDER — SULFAMETHOXAZOLE AND TRIMETHOPRIM 800; 160 MG/1; MG/1
800-160 TABLET ORAL DAILY
Qty: 30 | Refills: 0 | Status: DISCONTINUED | COMMUNITY
Start: 2019-05-29 | End: 2020-03-10

## 2020-03-10 NOTE — ASSESSMENT
[FreeTextEntry1] : Patient had one episode of transverse myelitis from which she has recovered and is full strength.  \par \par She likely had this as a sequela of SLE, and would focus treatment per rheum for prevention of recurrence. \par \par She does not need further PT and would stop the gabapentin as not helping.  I have reassured her and , and only need to see if new sx's develop. \par \par The prolactin level was not impressively high and pituitary brain normal so would not pursue that either.

## 2020-03-10 NOTE — PHYSICAL EXAM
[General Appearance - Alert] : alert [General Appearance - In No Acute Distress] : in no acute distress [Person] : oriented to person [Place] : oriented to place [Time] : oriented to time [Short Term Intact] : short term memory intact [Remote Intact] : remote memory intact [Registration Intact] : recent registration memory intact [Concentration Intact] : normal concentrating ability [Visual Intact] : visual attention was ~T not ~L decreased [Naming Objects] : no difficulty naming common objects [Repeating Phrases] : no difficulty repeating a phrase [Writing A Sentence] : no difficulty writing a sentence [Fluency] : fluency intact [Comprehension] : comprehension intact [Reading] : reading intact [Past History] : adequate knowledge of personal past history [Cranial Nerves Optic (II)] : visual acuity intact bilaterally,  visual fields full to confrontation, pupils equal round and reactive to light [Cranial Nerves Oculomotor (III)] : extraocular motion intact [Cranial Nerves Trigeminal (V)] : facial sensation intact symmetrically [Cranial Nerves Facial (VII)] : face symmetrical [Cranial Nerves Vestibulocochlear (VIII)] : hearing was intact bilaterally [Cranial Nerves Glossopharyngeal (IX)] : tongue and palate midline [Cranial Nerves Accessory (XI - Cranial And Spinal)] : head turning and shoulder shrug symmetric [Cranial Nerves Hypoglossal (XII)] : there was no tongue deviation with protrusion [Motor Tone] : muscle tone was normal in all four extremities [Motor Strength] : muscle strength was normal in all four extremities [No Muscle Atrophy] : normal bulk in all four extremities [Motor Handedness Right-Handed] : the patient is right hand dominant [Sensation Tactile Decrease] : light touch was intact [Sensation Vibration Decrease] : vibration was intact [Proprioception] : proprioception was intact [Balance] : balance was intact [3+] : Brachioradialis left 3+ [4+] : Ankle jerk left 4+ [Plantar Reflex Right Only] : abnormal on the right [Plantar Reflex Left Only] : abnormal on the left [___] : [unfilled] ~Ubeats present on the right [___] : [unfilled] ~Ubeats present on the left [Neck Appearance] : the appearance of the neck was normal [Neck Cervical Mass (___cm)] : no neck mass was observed [Jugular Venous Distention Increased] : there was no jugular-venous distention [Thyroid Diffuse Enlargement] : the thyroid was not enlarged [Thyroid Nodule] : there were no palpable thyroid nodules [Auscultation Breath Sounds / Voice Sounds] : lungs were clear to auscultation bilaterally [Heart Rate And Rhythm] : heart rate was normal and rhythm regular [Heart Sounds] : normal S1 and S2 [Heart Sounds Gallop] : no gallops [Murmurs] : no murmurs [Heart Sounds Pericardial Friction Rub] : no pericardial rub [Bowel Sounds] : normal bowel sounds [Abdomen Soft] : soft [Abdomen Tenderness] : non-tender [] : no hepato-splenomegaly [Abdomen Mass (___ Cm)] : no abdominal mass palpated [Romberg's Sign] : Romberg's sign was negtive [Allodynia] : no ~T allodynia present [Past-pointing] : there was no past-pointing [Tremor] : no tremor present [FreeTextEntry5] : fundi normal [FreeTextEntry6] : No Lhermitte's sign [FreeTextEntry7] : vague sensory level at about T12/L1 [FreeTextEntry8] : slightly wide based gait [FreeTextEntry9] : left sustained clonus

## 2020-03-10 NOTE — HISTORY OF PRESENT ILLNESS
[FreeTextEntry1] : Patient presents for evaluation of transverse myelitis.  She and  state that she was hospitalized Dec 2018 [facility], she states she had back pain and a burning sensation soles of feet and around abdomen.  She developed poor balance and weakness of the legs.  During that hospitalization she had an MRI and was diagnosed with transverse myelitis.  She was treated with solumedrol 1000mg and plasmapharesis, they are not sure if she had LP.  She was also treated with rituxan after PLEX.  \par \par Currently she is improved and walking and still has the sensation of a belt round her abdomen and some tingling in the legs, and legs are stronger.  She urinates normal now.  She is getting muscle spasms and takes baclofen. \par \par She denies significant FH

## 2020-03-12 ENCOUNTER — FORM ENCOUNTER (OUTPATIENT)
Age: 35
End: 2020-03-12

## 2020-03-13 ENCOUNTER — OUTPATIENT (OUTPATIENT)
Dept: OUTPATIENT SERVICES | Facility: HOSPITAL | Age: 35
LOS: 1 days | End: 2020-03-13
Payer: COMMERCIAL

## 2020-03-13 ENCOUNTER — APPOINTMENT (OUTPATIENT)
Dept: ULTRASOUND IMAGING | Facility: CLINIC | Age: 35
End: 2020-03-13
Payer: COMMERCIAL

## 2020-03-13 DIAGNOSIS — Z98.890 OTHER SPECIFIED POSTPROCEDURAL STATES: Chronic | ICD-10-CM

## 2020-03-13 DIAGNOSIS — Z00.00 ENCOUNTER FOR GENERAL ADULT MEDICAL EXAMINATION WITHOUT ABNORMAL FINDINGS: ICD-10-CM

## 2020-03-13 DIAGNOSIS — K30 FUNCTIONAL DYSPEPSIA: ICD-10-CM

## 2020-03-13 PROCEDURE — 76856 US EXAM PELVIC COMPLETE: CPT | Mod: 26

## 2020-03-13 PROCEDURE — 76700 US EXAM ABDOM COMPLETE: CPT | Mod: 26

## 2020-03-13 PROCEDURE — 76700 US EXAM ABDOM COMPLETE: CPT

## 2020-03-13 PROCEDURE — 76830 TRANSVAGINAL US NON-OB: CPT

## 2020-03-13 PROCEDURE — 76830 TRANSVAGINAL US NON-OB: CPT | Mod: 26

## 2020-03-13 PROCEDURE — 76856 US EXAM PELVIC COMPLETE: CPT

## 2020-03-20 NOTE — ED ADULT TRIAGE NOTE - TEMPERATURE IN CELSIUS (DEGREES C)
Awaiting testing 37.7 Awaiting testing  zithromax and hydroxychloroquine pending results. Awaiting testing  zithromax   Consider  hydroxychloroquine if worse.

## 2020-04-09 ENCOUNTER — APPOINTMENT (OUTPATIENT)
Dept: RHEUMATOLOGY | Facility: CLINIC | Age: 35
End: 2020-04-09
Payer: COMMERCIAL

## 2020-04-09 PROCEDURE — G2012 BRIEF CHECK IN BY MD/QHP: CPT

## 2020-04-29 ENCOUNTER — APPOINTMENT (OUTPATIENT)
Dept: RHEUMATOLOGY | Facility: CLINIC | Age: 35
End: 2020-04-29
Payer: COMMERCIAL

## 2020-04-29 PROCEDURE — 99442: CPT

## 2020-05-19 NOTE — PHYSICAL THERAPY INITIAL EVALUATION ADULT - WEIGHT-BEARING RESTRICTIONS: STAND/SIT, REHAB EVAL
Abdominal pain of the lower quadrant, pt has a suprapubic catheter that was changed today and has been having pain ever since. full weight-bearing

## 2020-06-26 ENCOUNTER — APPOINTMENT (OUTPATIENT)
Dept: RHEUMATOLOGY | Facility: CLINIC | Age: 35
End: 2020-06-26

## 2020-07-21 ENCOUNTER — APPOINTMENT (OUTPATIENT)
Dept: RHEUMATOLOGY | Facility: CLINIC | Age: 35
End: 2020-07-21
Payer: COMMERCIAL

## 2020-07-21 VITALS
SYSTOLIC BLOOD PRESSURE: 113 MMHG | BODY MASS INDEX: 23.55 KG/M2 | HEART RATE: 79 BPM | WEIGHT: 128 LBS | DIASTOLIC BLOOD PRESSURE: 80 MMHG | OXYGEN SATURATION: 98 % | HEIGHT: 62 IN

## 2020-07-21 PROCEDURE — 99215 OFFICE O/P EST HI 40 MIN: CPT | Mod: 25

## 2020-07-21 PROCEDURE — 36415 COLL VENOUS BLD VENIPUNCTURE: CPT

## 2020-07-21 NOTE — REVIEW OF SYSTEMS
[As Noted in HPI] : as noted in HPI [Negative] : Heme/Lymph [Eye Pain] : no eye pain [Fever] : no fever [Chills] : no chills [Nosebleeds] : no nosebleeds [Earache] : no earache [Chest Pain] : no chest pain [Wheezing] : no wheezing [Cough] : no cough [Shortness Of Breath] : no shortness of breath [Abdominal Pain] : no abdominal pain [Vomiting] : no vomiting [Dysuria] : no dysuria [Joint Pain] : no joint pain [Skin Lesions] : no skin lesions [Anxiety] : no anxiety [Dizziness] : no dizziness [Easy Bleeding] : no tendency for easy bleeding [de-identified] : mood much better in [place] -  [FreeTextEntry1] : missed period

## 2020-07-21 NOTE — ASSESSMENT
[FreeTextEntry1] : 31 yo F w/ SLE  (dx 2014, arthritis, +PAUL,  +dsDNA, +SM, +RNP, nephritis 2016, longitudinally extensive TM 2019),  and fibromyalgia (dx 2014).  \par +/- repeat rtx\par inc juan\par - proctollogis - burning fissues, warts hemorrw\par \par #Amenorrhea and Hyperprolactinemia.  endo workup unrevealing - but now improved and getting menstrual cycles\par \par #desire for pregnancy.   discussed at length r/b/a of pregnancy on self and baby\par - currently lupus is controlled - but on medication that are not safe during pregnancy\par - they were going to do it now in [place] - thinking about surrogate mother - but has decided against it 2/2 covid.  now looking at early next year\par - rec hi risk ob\par - if they decide to go forward will have to taper and wean off meds\par - for now may benefit from final rtx dose to ensure improvement in symptoms\par \par # SLE with nephritis (treated with cellcept+study),alopecia, arthritis, lymphopenia -complicated by longitudinal extensive transverse myelitis in feb 2019 s/p plex, rtx and pulse steroids\par  \par >>TM (2019)  improved  \par -continue gabapentin, baclofen - decreasing gabapentin as tolerated - now decrease to 200 mg daily\par - prednisone being tapered\par - check cd 19 to assess repopulation \par - on gabapentin, rarely needs baclofen - now muscles worse.  ? secondary to lower gabapentin dose or rtx coming out of system\par - PT to help with muscle and neuro strength / balance - reorder today\par - neuro evaluation pending\par \par >> nephritis\par - continue cellcept - no edema today - check ds dna and prot: creat ratio\par \par #FMS:\par -continue Cymbalta - increase to 60 mg daily\par - PT\par \par #hemorrhoids and constipation.  \par - has limited the oxycodone (rarely uses it) and changed diet - and is better\par - colonoscopy okay\par \par #bone health\par -vit d good\par - s/p large amounts of vit d - will have DEXA and evaluation of bone health.  \par - estrogen okay\par - concern over age is that bisphosphonates long half life - will f/u with endo\par \par #medication management / v58.69 \par - cd19\par - RTX, cellcept, steroids - PCP prophylaxis bactrim \par \par #social determinants\par - will fill out paperwork

## 2020-07-21 NOTE — PHYSICAL EXAM
[General Appearance - In No Acute Distress] : in no acute distress [General Appearance - Well Nourished] : well nourished [General Appearance - Alert] : alert [General Appearance - Well-Appearing] : healthy appearing [General Appearance - Well Developed] : well developed [Sclera] : the sclera and conjunctiva were normal [PERRL With Normal Accommodation] : pupils were equal in size, round, and reactive to light [Extraocular Movements] : extraocular movements were intact [Outer Ear] : the ears and nose were normal in appearance [Neck Appearance] : the appearance of the neck was normal [Heart Rate And Rhythm] : heart rate was normal and rhythm regular [Cervical Lymph Nodes Enlarged Anterior Bilaterally] : anterior cervical [Supraclavicular Lymph Nodes Enlarged Bilaterally] : supraclavicular [No CVA Tenderness] : no ~M costovertebral angle tenderness [No Spinal Tenderness] : no spinal tenderness [Musculoskeletal - Swelling] : no joint swelling seen [Skin Color & Pigmentation] : normal skin color and pigmentation [] : no rash [Skin Turgor] : normal skin turgor [Skin Lesions] : no skin lesions [Oriented To Time, Place, And Person] : oriented to person, place, and time [Impaired Insight] : insight and judgment were intact [FreeTextEntry1] : no focal deficits - able to walk without assistive device, MS 5/5, able to walk on toes

## 2020-07-21 NOTE — HISTORY OF PRESENT ILLNESS
[FreeTextEntry1] : 29 yo F w/ SLE  (dx 2014, arthritis, +PAUL,  +dsDNA, +SM, +RNP, nephritis 2016),  and fibromyalgia (dx 2014).  Was treated with cellcept and in lupus study at [facility].  sle complicated by longitudinally extensive transverse myelitis (Feb 2019) treated with IV pulse steroids, PLEX, RTX with improvement in symptoms.

## 2020-07-22 LAB
ALBUMIN SERPL ELPH-MCNC: 4.9 G/DL
ALP BLD-CCNC: 93 U/L
ALT SERPL-CCNC: 39 U/L
ANION GAP SERPL CALC-SCNC: 15 MMOL/L
AST SERPL-CCNC: 28 U/L
BASOPHILS # BLD AUTO: 0.03 K/UL
BASOPHILS NFR BLD AUTO: 0.7 %
BILIRUB SERPL-MCNC: 0.3 MG/DL
BUN SERPL-MCNC: 9 MG/DL
C3 SERPL-MCNC: 101 MG/DL
C4 SERPL-MCNC: 32 MG/DL
CALCIUM SERPL-MCNC: 9.4 MG/DL
CHLORIDE SERPL-SCNC: 104 MMOL/L
CK SERPL-CCNC: 66 U/L
CO2 SERPL-SCNC: 22 MMOL/L
CREAT SERPL-MCNC: 0.5 MG/DL
CREAT SPEC-SCNC: 157 MG/DL
CREAT/PROT UR: 0.2 RATIO
CRP SERPL-MCNC: <0.1 MG/DL
EOSINOPHIL # BLD AUTO: 0.15 K/UL
EOSINOPHIL NFR BLD AUTO: 3.4 %
ERYTHROCYTE [SEDIMENTATION RATE] IN BLOOD BY WESTERGREN METHOD: 27 MM/HR
HCT VFR BLD CALC: 48.6 %
HGB BLD-MCNC: 14.5 G/DL
IMM GRANULOCYTES NFR BLD AUTO: 0.2 %
LYMPHOCYTES # BLD AUTO: 1.28 K/UL
LYMPHOCYTES NFR BLD AUTO: 29.2 %
MAN DIFF?: NORMAL
MCHC RBC-ENTMCNC: 29.1 PG
MCHC RBC-ENTMCNC: 29.8 GM/DL
MCV RBC AUTO: 97.4 FL
MONOCYTES # BLD AUTO: 0.47 K/UL
MONOCYTES NFR BLD AUTO: 10.7 %
NEUTROPHILS # BLD AUTO: 2.45 K/UL
NEUTROPHILS NFR BLD AUTO: 55.8 %
PLATELET # BLD AUTO: 268 K/UL
POTASSIUM SERPL-SCNC: 3.7 MMOL/L
PROT SERPL-MCNC: 7 G/DL
PROT UR-MCNC: 24 MG/DL
RBC # BLD: 4.99 M/UL
RBC # FLD: 13.1 %
SODIUM SERPL-SCNC: 141 MMOL/L
WBC # FLD AUTO: 4.39 K/UL

## 2020-07-24 LAB — DSDNA AB SER-ACNC: 22 IU/ML

## 2020-07-30 ENCOUNTER — TRANSCRIPTION ENCOUNTER (OUTPATIENT)
Age: 35
End: 2020-07-30

## 2020-07-30 ENCOUNTER — INPATIENT (INPATIENT)
Facility: HOSPITAL | Age: 35
LOS: 0 days | Discharge: HOME | DRG: 419 | End: 2020-07-31
Attending: SURGERY | Admitting: SURGERY
Payer: COMMERCIAL

## 2020-07-30 VITALS
DIASTOLIC BLOOD PRESSURE: 98 MMHG | HEART RATE: 70 BPM | TEMPERATURE: 98 F | RESPIRATION RATE: 18 BRPM | SYSTOLIC BLOOD PRESSURE: 144 MMHG | OXYGEN SATURATION: 99 %

## 2020-07-30 DIAGNOSIS — Z98.890 OTHER SPECIFIED POSTPROCEDURAL STATES: Chronic | ICD-10-CM

## 2020-07-30 DIAGNOSIS — K80.20 CALCULUS OF GALLBLADDER WITHOUT CHOLECYSTITIS WITHOUT OBSTRUCTION: ICD-10-CM

## 2020-07-30 LAB
ALBUMIN SERPL ELPH-MCNC: 4.7 G/DL — SIGNIFICANT CHANGE UP (ref 3.3–5.2)
ALP SERPL-CCNC: 89 U/L — SIGNIFICANT CHANGE UP (ref 40–120)
ALT FLD-CCNC: 36 U/L — HIGH
ANION GAP SERPL CALC-SCNC: 15 MMOL/L — SIGNIFICANT CHANGE UP (ref 5–17)
AST SERPL-CCNC: 20 U/L — SIGNIFICANT CHANGE UP
BASOPHILS # BLD AUTO: 0.03 K/UL — SIGNIFICANT CHANGE UP (ref 0–0.2)
BASOPHILS NFR BLD AUTO: 0.3 % — SIGNIFICANT CHANGE UP (ref 0–2)
BILIRUB SERPL-MCNC: 0.3 MG/DL — LOW (ref 0.4–2)
BUN SERPL-MCNC: 13 MG/DL — SIGNIFICANT CHANGE UP (ref 8–20)
CALCIUM SERPL-MCNC: 9.3 MG/DL — SIGNIFICANT CHANGE UP (ref 8.6–10.2)
CHLORIDE SERPL-SCNC: 99 MMOL/L — SIGNIFICANT CHANGE UP (ref 98–107)
CO2 SERPL-SCNC: 24 MMOL/L — SIGNIFICANT CHANGE UP (ref 22–29)
CREAT SERPL-MCNC: 0.43 MG/DL — LOW (ref 0.5–1.3)
EOSINOPHIL # BLD AUTO: 0.11 K/UL — SIGNIFICANT CHANGE UP (ref 0–0.5)
EOSINOPHIL NFR BLD AUTO: 1 % — SIGNIFICANT CHANGE UP (ref 0–6)
GLUCOSE SERPL-MCNC: 117 MG/DL — HIGH (ref 70–99)
HCG SERPL-ACNC: <4 MIU/ML — SIGNIFICANT CHANGE UP
HCT VFR BLD CALC: 41 % — SIGNIFICANT CHANGE UP (ref 34.5–45)
HGB BLD-MCNC: 13.4 G/DL — SIGNIFICANT CHANGE UP (ref 11.5–15.5)
IMM GRANULOCYTES NFR BLD AUTO: 0.3 % — SIGNIFICANT CHANGE UP (ref 0–1.5)
LIDOCAIN IGE QN: 48 U/L — SIGNIFICANT CHANGE UP (ref 22–51)
LYMPHOCYTES # BLD AUTO: 1.25 K/UL — SIGNIFICANT CHANGE UP (ref 1–3.3)
LYMPHOCYTES # BLD AUTO: 10.9 % — LOW (ref 13–44)
MCHC RBC-ENTMCNC: 29.5 PG — SIGNIFICANT CHANGE UP (ref 27–34)
MCHC RBC-ENTMCNC: 32.7 GM/DL — SIGNIFICANT CHANGE UP (ref 32–36)
MCV RBC AUTO: 90.1 FL — SIGNIFICANT CHANGE UP (ref 80–100)
MONOCYTES # BLD AUTO: 0.91 K/UL — HIGH (ref 0–0.9)
MONOCYTES NFR BLD AUTO: 7.9 % — SIGNIFICANT CHANGE UP (ref 2–14)
NEUTROPHILS # BLD AUTO: 9.18 K/UL — HIGH (ref 1.8–7.4)
NEUTROPHILS NFR BLD AUTO: 79.6 % — HIGH (ref 43–77)
PLATELET # BLD AUTO: 280 K/UL — SIGNIFICANT CHANGE UP (ref 150–400)
POTASSIUM SERPL-MCNC: 3.7 MMOL/L — SIGNIFICANT CHANGE UP (ref 3.5–5.3)
POTASSIUM SERPL-SCNC: 3.7 MMOL/L — SIGNIFICANT CHANGE UP (ref 3.5–5.3)
PROT SERPL-MCNC: 7.1 G/DL — SIGNIFICANT CHANGE UP (ref 6.6–8.7)
RBC # BLD: 4.55 M/UL — SIGNIFICANT CHANGE UP (ref 3.8–5.2)
RBC # FLD: 12.7 % — SIGNIFICANT CHANGE UP (ref 10.3–14.5)
SARS-COV-2 RNA SPEC QL NAA+PROBE: SIGNIFICANT CHANGE UP
SODIUM SERPL-SCNC: 138 MMOL/L — SIGNIFICANT CHANGE UP (ref 135–145)
WBC # BLD: 11.52 K/UL — HIGH (ref 3.8–10.5)
WBC # FLD AUTO: 11.52 K/UL — HIGH (ref 3.8–10.5)

## 2020-07-30 PROCEDURE — 76705 ECHO EXAM OF ABDOMEN: CPT | Mod: 26

## 2020-07-30 PROCEDURE — 74181 MRI ABDOMEN W/O CONTRAST: CPT | Mod: 26

## 2020-07-30 PROCEDURE — 99285 EMERGENCY DEPT VISIT HI MDM: CPT

## 2020-07-30 RX ORDER — SODIUM CHLORIDE 9 MG/ML
1000 INJECTION INTRAMUSCULAR; INTRAVENOUS; SUBCUTANEOUS ONCE
Refills: 0 | Status: COMPLETED | OUTPATIENT
Start: 2020-07-30 | End: 2020-07-30

## 2020-07-30 RX ORDER — MORPHINE SULFATE 50 MG/1
4 CAPSULE, EXTENDED RELEASE ORAL ONCE
Refills: 0 | Status: DISCONTINUED | OUTPATIENT
Start: 2020-07-30 | End: 2020-07-30

## 2020-07-30 RX ORDER — ONDANSETRON 8 MG/1
4 TABLET, FILM COATED ORAL ONCE
Refills: 0 | Status: COMPLETED | OUTPATIENT
Start: 2020-07-30 | End: 2020-07-30

## 2020-07-30 RX ORDER — SODIUM CHLORIDE 9 MG/ML
1000 INJECTION, SOLUTION INTRAVENOUS
Refills: 0 | Status: DISCONTINUED | OUTPATIENT
Start: 2020-07-30 | End: 2020-07-31

## 2020-07-30 RX ORDER — MORPHINE SULFATE 50 MG/1
6 CAPSULE, EXTENDED RELEASE ORAL ONCE
Refills: 0 | Status: DISCONTINUED | OUTPATIENT
Start: 2020-07-30 | End: 2020-07-30

## 2020-07-30 RX ORDER — ENOXAPARIN SODIUM 100 MG/ML
40 INJECTION SUBCUTANEOUS DAILY
Refills: 0 | Status: DISCONTINUED | OUTPATIENT
Start: 2020-07-30 | End: 2020-07-31

## 2020-07-30 RX ORDER — ACETAMINOPHEN 500 MG
975 TABLET ORAL EVERY 6 HOURS
Refills: 0 | Status: DISCONTINUED | OUTPATIENT
Start: 2020-07-30 | End: 2020-07-31

## 2020-07-30 RX ORDER — IBUPROFEN 200 MG
400 TABLET ORAL EVERY 6 HOURS
Refills: 0 | Status: DISCONTINUED | OUTPATIENT
Start: 2020-07-30 | End: 2020-07-31

## 2020-07-30 RX ORDER — GABAPENTIN 400 MG/1
300 CAPSULE ORAL EVERY 8 HOURS
Refills: 0 | Status: DISCONTINUED | OUTPATIENT
Start: 2020-07-30 | End: 2020-07-31

## 2020-07-30 RX ORDER — TRAMADOL HYDROCHLORIDE 50 MG/1
25 TABLET ORAL EVERY 4 HOURS
Refills: 0 | Status: DISCONTINUED | OUTPATIENT
Start: 2020-07-30 | End: 2020-07-31

## 2020-07-30 RX ADMIN — ONDANSETRON 4 MILLIGRAM(S): 8 TABLET, FILM COATED ORAL at 05:45

## 2020-07-30 RX ADMIN — MORPHINE SULFATE 4 MILLIGRAM(S): 50 CAPSULE, EXTENDED RELEASE ORAL at 05:45

## 2020-07-30 RX ADMIN — Medication 975 MILLIGRAM(S): at 19:29

## 2020-07-30 RX ADMIN — Medication 400 MILLIGRAM(S): at 19:29

## 2020-07-30 RX ADMIN — GABAPENTIN 300 MILLIGRAM(S): 400 CAPSULE ORAL at 19:29

## 2020-07-30 RX ADMIN — MORPHINE SULFATE 6 MILLIGRAM(S): 50 CAPSULE, EXTENDED RELEASE ORAL at 09:48

## 2020-07-30 RX ADMIN — SODIUM CHLORIDE 1000 MILLILITER(S): 9 INJECTION INTRAMUSCULAR; INTRAVENOUS; SUBCUTANEOUS at 05:45

## 2020-07-30 RX ADMIN — SODIUM CHLORIDE 1000 MILLILITER(S): 9 INJECTION INTRAMUSCULAR; INTRAVENOUS; SUBCUTANEOUS at 06:48

## 2020-07-30 RX ADMIN — Medication 400 MILLIGRAM(S): at 23:57

## 2020-07-30 RX ADMIN — Medication 975 MILLIGRAM(S): at 23:57

## 2020-07-30 RX ADMIN — ENOXAPARIN SODIUM 40 MILLIGRAM(S): 100 INJECTION SUBCUTANEOUS at 19:28

## 2020-07-30 RX ADMIN — MORPHINE SULFATE 6 MILLIGRAM(S): 50 CAPSULE, EXTENDED RELEASE ORAL at 09:33

## 2020-07-30 NOTE — ED ADULT TRIAGE NOTE - CHIEF COMPLAINT QUOTE
c/o RUQ abdominal pain since yesterday. pt states she has hx of "gallbladder problems." pt c/o vomiting and nausea and constipation. no sob, no cp. no s/s of acute distress.

## 2020-07-30 NOTE — H&P ADULT - ASSESSMENT
Patient is a 33yo F with hx of Lupus and fibromyalgia, presenting with symptomatic cholelithiasis, AVSS WBC 11.2 and normal LFT's, US performed with findings of gallstone in GB neck and CBD of 9mm.     -Admit to Surgery, Dr. Kay  -MRCP   -Plan for OR pending results  -COVID  -NPO/IVF  -Cefotetan IV  -Pain control   -Medication Reconciliation   -DVT PPX

## 2020-07-30 NOTE — ED ADULT NURSE REASSESSMENT NOTE - NS ED NURSE REASSESS COMMENT FT1
pt return to ED from US, requesting pain medications. MD Martins made aware, md states he will come to bedside
pt baseline ms, pending transport to bed assignment. MD [phone] notified of patient pain and states will check orders to order pain medications. Pt updated on plan

## 2020-07-30 NOTE — ED PROVIDER NOTE - PHYSICAL EXAMINATION
Gen: WD/WN, uncomfortable appearing  Head: NCAT  Cardiac: RRR +S1S2.   Resp: Speaking in full sentences. No evidence of respiratory distress. No wheezes, rales or rhonchi.  Abd: +BS x 4. Soft, TTP RUQ, non-distended. +Voluntary guarding, no rebound  Back: Spine midline and non-tender. No CVAT.  MSK: FROM extremities x 4, no bony ttp  Skin: Warm, dry  Neuro: Awake, alert & oriented x 3. No focal deficits, ambulatory with steady gait

## 2020-07-30 NOTE — H&P ADULT - HISTORY OF PRESENT ILLNESS
Patient is a 34F with hx of Lupus, presenting with RUQ pain. Patient mentions she started nausea after dinner without emesis and afterwards had sudden onset of stabbing, intermittent RUQ pain radiating to the back. Patient endorses previous episodes of biliary colic for few years and last time was around March but due to COVID patient refrained from coming to the hospital. Last meal yesterday, last BM as well. Hx of EGD and colonoscopy with no acute findings. Patient denied fevers, chills, chest pain, SOB, diarrhea or generalized malaise.

## 2020-07-30 NOTE — H&P ADULT - NSHPLABSRESULTS_GEN_ALL_CORE
LABS:                        13.4   11.52 )-----------( 280      ( 30 Jul 2020 05:56 )             41.0     07-30    138  |  99  |  13.0  ----------------------------<  117<H>  3.7   |  24.0  |  0.43<L>    Ca    9.3      30 Jul 2020 05:56    TPro  7.1  /  Alb  4.7  /  TBili  0.3<L>  /  DBili  x   /  AST  20  /  ALT  36<H>  /  AlkPhos  89  07-30          IMAGING:    EXAM:  US GALLBLADDER                          PROCEDURE DATE:  07/30/2020      COMPARISON EXAMINATION: 3/13/2020.    FINDINGS:  Gallbladder:  There is a stone that is immobile in the region of the neck of the gallbladder. This presumably represents the same stone that was seen on the prior sonogram of 3/13/2020 is seen in the fundus of the gallbladder..  Gallbladder Walls:  No evidence of thickening or edema..  Common Bile Duct: Dilated, measuring 9 mm.      IMPRESSION: There appears to be a stone which is immobile in the neck of the gallbladder. In addition the common bile duct is dilated to 9 mm. This raises the possibility of a stone in the distal CBD which was not visualized..

## 2020-07-30 NOTE — ED ADULT NURSE NOTE - NSIMPLEMENTINTERV_GEN_ALL_ED
Implemented All Universal Safety Interventions:  [place] to call system. Call bell, personal items and telephone within reach. Instruct patient to call for assistance. Room bathroom lighting operational. Non-slip footwear when patient is off stretcher. Physically safe environment: no spills, clutter or unnecessary equipment. Stretcher in lowest position, wheels locked, appropriate side rails in place.

## 2020-07-30 NOTE — ED PROVIDER NOTE - ATTENDING CONTRIBUTION TO CARE
Known history of cholelithiasis with recurrent postprandial RUQ pain and nausea that started after lunch yesterday and constant since onset, TTP to RUQ with Boyce's sign, concerned for acute cholecystitis. Plan for labs, ruq ultrasound, pain control, nausea control.

## 2020-07-30 NOTE — H&P ADULT - NSHPPHYSICALEXAM_GEN_ALL_CORE
GENERAL: Alert, well developed, in no acute distress.  MENTAL STATUS: AAOx3. Appropriate affect.  HEENT: PERRLA. EOMI. MMM.  Trachea midline.   RESPIRATORY: CTAB. No wheezing, rales or rhonchi.  CARDIOVASCULAR: RRR. No audible murmurs, rubs or gallops.   GASTROINTESTINAL: Abdomen non-distended soft and depressible, tender to palpation to RUQ, (-) Boyce on pain meds. No guarding or rebound. Non-peritonitic.   INTGUMENTARY: No overt rashes or lesions, petechia or purpura. Good turgor. No edema.  MUSCULOSKELETAL: No cyanosis or clubbing. No gross deformities.

## 2020-07-30 NOTE — ED PROVIDER NOTE - CLINICAL SUMMARY MEDICAL DECISION MAKING FREE TEXT BOX
33yo F with ruq abdominal pain, known hx gallstones. Biliary colic vs cholecystitis. Will check labs, sono, tx pain/nausea in ED and re-assess

## 2020-07-30 NOTE — ED PROVIDER NOTE - PROGRESS NOTE DETAILS
Pt. re-evaluated. pt. still having RUQ pain. US shows gallstone. NO signs of cholecystitis. However pt's CBD has increased to 9mm compared to the US done in March of this year.

## 2020-07-30 NOTE — ED PROVIDER NOTE - OBJECTIVE STATEMENT
35yo F pmhx lupus, gallstones, fibromyalgia presents to ED c/o ruq abdominal pain onset yesterday afternoon.  at bedside states pt had bbq food yesterday and after noted onset of pain. Pain has gradually worsened since, now severe. Has had similar pain in past. Pain is constant, radiating to right flank with associated nausea. Has hx of gallstones, pmd told pt she needed to eventually get her gall bladder removed and provided her surgeon name but due to covid was unable to follow up. Denies fever, chills, cp, sob, urinary sx, ha, dizziness.

## 2020-07-30 NOTE — ED ADULT NURSE NOTE - OBJECTIVE STATEMENT
pt A&Ox4 in NAD respirations even and unlabored. JOSÉ. pt reports RUQ pain with nausea x 1 day. no episodes of vomiting. pt states "I think its my gallbladder". pt with hx of lupus. medicated as ordered. labs sent.

## 2020-07-31 ENCOUNTER — TRANSCRIPTION ENCOUNTER (OUTPATIENT)
Age: 35
End: 2020-07-31

## 2020-07-31 ENCOUNTER — RESULT REVIEW (OUTPATIENT)
Age: 35
End: 2020-07-31

## 2020-07-31 VITALS
TEMPERATURE: 98 F | HEART RATE: 88 BPM | RESPIRATION RATE: 18 BRPM | DIASTOLIC BLOOD PRESSURE: 64 MMHG | SYSTOLIC BLOOD PRESSURE: 112 MMHG | OXYGEN SATURATION: 97 %

## 2020-07-31 LAB
ALBUMIN SERPL ELPH-MCNC: 3.7 G/DL — SIGNIFICANT CHANGE UP (ref 3.3–5.2)
ALP SERPL-CCNC: 97 U/L — SIGNIFICANT CHANGE UP (ref 40–120)
ALT FLD-CCNC: 123 U/L — HIGH
ANION GAP SERPL CALC-SCNC: 9 MMOL/L — SIGNIFICANT CHANGE UP (ref 5–17)
AST SERPL-CCNC: 59 U/L — HIGH
BASOPHILS # BLD AUTO: 0.02 K/UL — SIGNIFICANT CHANGE UP (ref 0–0.2)
BASOPHILS NFR BLD AUTO: 0.3 % — SIGNIFICANT CHANGE UP (ref 0–2)
BILIRUB DIRECT SERPL-MCNC: 0.1 MG/DL — SIGNIFICANT CHANGE UP (ref 0–0.3)
BILIRUB INDIRECT FLD-MCNC: 0.6 MG/DL — SIGNIFICANT CHANGE UP (ref 0.2–1)
BILIRUB SERPL-MCNC: 0.7 MG/DL — SIGNIFICANT CHANGE UP (ref 0.4–2)
BUN SERPL-MCNC: 5 MG/DL — LOW (ref 8–20)
CALCIUM SERPL-MCNC: 8.4 MG/DL — LOW (ref 8.6–10.2)
CHLORIDE SERPL-SCNC: 104 MMOL/L — SIGNIFICANT CHANGE UP (ref 98–107)
CO2 SERPL-SCNC: 27 MMOL/L — SIGNIFICANT CHANGE UP (ref 22–29)
CREAT SERPL-MCNC: 0.39 MG/DL — LOW (ref 0.5–1.3)
EOSINOPHIL # BLD AUTO: 0.13 K/UL — SIGNIFICANT CHANGE UP (ref 0–0.5)
EOSINOPHIL NFR BLD AUTO: 2 % — SIGNIFICANT CHANGE UP (ref 0–6)
GLUCOSE SERPL-MCNC: 86 MG/DL — SIGNIFICANT CHANGE UP (ref 70–99)
HCT VFR BLD CALC: 37 % — SIGNIFICANT CHANGE UP (ref 34.5–45)
HGB BLD-MCNC: 12.1 G/DL — SIGNIFICANT CHANGE UP (ref 11.5–15.5)
IMM GRANULOCYTES NFR BLD AUTO: 0.3 % — SIGNIFICANT CHANGE UP (ref 0–1.5)
LYMPHOCYTES # BLD AUTO: 0.84 K/UL — LOW (ref 1–3.3)
LYMPHOCYTES # BLD AUTO: 13 % — SIGNIFICANT CHANGE UP (ref 13–44)
MAGNESIUM SERPL-MCNC: 2.5 MG/DL — SIGNIFICANT CHANGE UP (ref 1.8–2.6)
MCHC RBC-ENTMCNC: 29.7 PG — SIGNIFICANT CHANGE UP (ref 27–34)
MCHC RBC-ENTMCNC: 32.7 GM/DL — SIGNIFICANT CHANGE UP (ref 32–36)
MCV RBC AUTO: 90.7 FL — SIGNIFICANT CHANGE UP (ref 80–100)
MONOCYTES # BLD AUTO: 0.87 K/UL — SIGNIFICANT CHANGE UP (ref 0–0.9)
MONOCYTES NFR BLD AUTO: 13.5 % — SIGNIFICANT CHANGE UP (ref 2–14)
NEUTROPHILS # BLD AUTO: 4.57 K/UL — SIGNIFICANT CHANGE UP (ref 1.8–7.4)
NEUTROPHILS NFR BLD AUTO: 70.9 % — SIGNIFICANT CHANGE UP (ref 43–77)
PHOSPHATE SERPL-MCNC: 4 MG/DL — SIGNIFICANT CHANGE UP (ref 2.4–4.7)
PLATELET # BLD AUTO: 245 K/UL — SIGNIFICANT CHANGE UP (ref 150–400)
POTASSIUM SERPL-MCNC: 3.3 MMOL/L — LOW (ref 3.5–5.3)
POTASSIUM SERPL-SCNC: 3.3 MMOL/L — LOW (ref 3.5–5.3)
PROT SERPL-MCNC: 5.8 G/DL — LOW (ref 6.6–8.7)
RBC # BLD: 4.08 M/UL — SIGNIFICANT CHANGE UP (ref 3.8–5.2)
RBC # FLD: 12.9 % — SIGNIFICANT CHANGE UP (ref 10.3–14.5)
SARS-COV-2 IGG SERPL QL IA: NEGATIVE — SIGNIFICANT CHANGE UP
SARS-COV-2 IGM SERPL IA-ACNC: 0.08 INDEX — SIGNIFICANT CHANGE UP
SODIUM SERPL-SCNC: 140 MMOL/L — SIGNIFICANT CHANGE UP (ref 135–145)
WBC # BLD: 6.45 K/UL — SIGNIFICANT CHANGE UP (ref 3.8–10.5)
WBC # FLD AUTO: 6.45 K/UL — SIGNIFICANT CHANGE UP (ref 3.8–10.5)

## 2020-07-31 PROCEDURE — 84100 ASSAY OF PHOSPHORUS: CPT

## 2020-07-31 PROCEDURE — 83690 ASSAY OF LIPASE: CPT

## 2020-07-31 PROCEDURE — 84702 CHORIONIC GONADOTROPIN TEST: CPT

## 2020-07-31 PROCEDURE — 76705 ECHO EXAM OF ABDOMEN: CPT

## 2020-07-31 PROCEDURE — 96361 HYDRATE IV INFUSION ADD-ON: CPT

## 2020-07-31 PROCEDURE — 85027 COMPLETE CBC AUTOMATED: CPT

## 2020-07-31 PROCEDURE — 83735 ASSAY OF MAGNESIUM: CPT

## 2020-07-31 PROCEDURE — 80053 COMPREHEN METABOLIC PANEL: CPT

## 2020-07-31 PROCEDURE — 96375 TX/PRO/DX INJ NEW DRUG ADDON: CPT

## 2020-07-31 PROCEDURE — 99285 EMERGENCY DEPT VISIT HI MDM: CPT | Mod: 25

## 2020-07-31 PROCEDURE — 80076 HEPATIC FUNCTION PANEL: CPT

## 2020-07-31 PROCEDURE — 87635 SARS-COV-2 COVID-19 AMP PRB: CPT

## 2020-07-31 PROCEDURE — 36415 COLL VENOUS BLD VENIPUNCTURE: CPT

## 2020-07-31 PROCEDURE — 88304 TISSUE EXAM BY PATHOLOGIST: CPT | Mod: 26

## 2020-07-31 PROCEDURE — T1013: CPT

## 2020-07-31 PROCEDURE — 80048 BASIC METABOLIC PNL TOTAL CA: CPT

## 2020-07-31 PROCEDURE — 88304 TISSUE EXAM BY PATHOLOGIST: CPT

## 2020-07-31 PROCEDURE — 86769 SARS-COV-2 COVID-19 ANTIBODY: CPT

## 2020-07-31 PROCEDURE — 96376 TX/PRO/DX INJ SAME DRUG ADON: CPT

## 2020-07-31 PROCEDURE — 74181 MRI ABDOMEN W/O CONTRAST: CPT

## 2020-07-31 PROCEDURE — 96374 THER/PROPH/DIAG INJ IV PUSH: CPT

## 2020-07-31 RX ORDER — ONDANSETRON 8 MG/1
4 TABLET, FILM COATED ORAL ONCE
Refills: 0 | Status: DISCONTINUED | OUTPATIENT
Start: 2020-07-31 | End: 2020-07-31

## 2020-07-31 RX ORDER — DULOXETINE HYDROCHLORIDE 30 MG/1
60 CAPSULE, DELAYED RELEASE ORAL DAILY
Refills: 0 | Status: DISCONTINUED | OUTPATIENT
Start: 2020-07-31 | End: 2020-07-31

## 2020-07-31 RX ORDER — TRAMADOL HYDROCHLORIDE 50 MG/1
1 TABLET ORAL
Qty: 15 | Refills: 0
Start: 2020-07-31

## 2020-07-31 RX ORDER — POTASSIUM CHLORIDE 20 MEQ
40 PACKET (EA) ORAL EVERY 4 HOURS
Refills: 0 | Status: DISCONTINUED | OUTPATIENT
Start: 2020-07-31 | End: 2020-07-31

## 2020-07-31 RX ORDER — HYDROMORPHONE HYDROCHLORIDE 2 MG/ML
1 INJECTION INTRAMUSCULAR; INTRAVENOUS; SUBCUTANEOUS
Refills: 0 | Status: DISCONTINUED | OUTPATIENT
Start: 2020-07-31 | End: 2020-07-31

## 2020-07-31 RX ORDER — SODIUM CHLORIDE 9 MG/ML
1000 INJECTION, SOLUTION INTRAVENOUS
Refills: 0 | Status: DISCONTINUED | OUTPATIENT
Start: 2020-07-31 | End: 2020-07-31

## 2020-07-31 RX ORDER — ACETAMINOPHEN 500 MG
975 TABLET ORAL EVERY 6 HOURS
Refills: 0 | Status: DISCONTINUED | OUTPATIENT
Start: 2020-07-31 | End: 2020-07-31

## 2020-07-31 RX ORDER — TRAMADOL HYDROCHLORIDE 50 MG/1
25 TABLET ORAL EVERY 4 HOURS
Refills: 0 | Status: DISCONTINUED | OUTPATIENT
Start: 2020-07-31 | End: 2020-07-31

## 2020-07-31 RX ORDER — IBUPROFEN 200 MG
400 TABLET ORAL EVERY 6 HOURS
Refills: 0 | Status: DISCONTINUED | OUTPATIENT
Start: 2020-07-31 | End: 2020-07-31

## 2020-07-31 RX ORDER — ENOXAPARIN SODIUM 100 MG/ML
40 INJECTION SUBCUTANEOUS DAILY
Refills: 0 | Status: DISCONTINUED | OUTPATIENT
Start: 2020-07-31 | End: 2020-07-31

## 2020-07-31 RX ADMIN — GABAPENTIN 300 MILLIGRAM(S): 400 CAPSULE ORAL at 05:00

## 2020-07-31 RX ADMIN — Medication 975 MILLIGRAM(S): at 04:59

## 2020-07-31 RX ADMIN — SODIUM CHLORIDE 125 MILLILITER(S): 9 INJECTION, SOLUTION INTRAVENOUS at 05:00

## 2020-07-31 RX ADMIN — Medication 975 MILLIGRAM(S): at 17:33

## 2020-07-31 RX ADMIN — Medication 400 MILLIGRAM(S): at 05:00

## 2020-07-31 NOTE — DISCHARGE NOTE PROVIDER - NSDCFUSCHEDAPPT_GEN_ALL_CORE_FT
KANE LARA ; 09/04/2020 ; NPP Rheum [facility] KANE LARA ; 09/04/2020 ; NPP Rheum [facility] KANE LARA ; 09/04/2020 ; NPP Rheum [address] [facility]

## 2020-07-31 NOTE — DISCHARGE NOTE PROVIDER - HOSPITAL COURSE
Patient is a 34F with hx of Lupus, presented with RUQ pain. Patient mentioned she started experiencing nausea after dinner without emesis and afterwards had sudden onset of stabbing, intermittent RUQ pain radiating to the back. Patient endorses previous episodes of biliary colic for few years and last time was around March 2020 but due to COVID patient refrained from coming to the hospital. Last meal was day prior, last BM as well. Hx of EGD and colonoscopy with no acute findings. Patient denied fevers, chills, chest pain, SOB, diarrhea or generalized malaise.         Upon presentation, patient with slight elevation of WBC, LFTs WNLs and RUQ US showing a large immobile stone at neck of GB with CBD dilation to 9mm. Further imaging MRCP showed 1.1 cm stone lodged in cystic duct, mild wall thickening and pericholecystic fluid, CBD 6mm. Patient admitted and taken to OR for Lap Zamzam, uncomplicated, patient tolerated procedure well. Postoperatively seen to have adequate pain control and ready for discharge.

## 2020-07-31 NOTE — BRIEF OPERATIVE NOTE - OPERATION/FINDINGS
Edematous gallbladder, distended. Decompressed. Critical view obtained. Hemostasis achieved. Abdomen irrigated.

## 2020-07-31 NOTE — DISCHARGE NOTE PROVIDER - CARE PROVIDER_API CALL
Baldemar Kay  SURGERY  [address] [place] [address] [address]  [place] [address]  Phone: [phone]  Fax: [phone]  Follow Up Time: 2 weeks

## 2020-07-31 NOTE — DISCHARGE NOTE NURSING/CASE MANAGEMENT/SOCIAL WORK - PATIENT PORTAL LINK FT
You can access the FollowMyHealth Patient Portal offered by [facility] by registering at the following website: [email or web address]. By joining [facility]’s FollowMyHealth portal, you will also be able to view your health information using other applications (apps) compatible with our system.

## 2020-07-31 NOTE — PROGRESS NOTE ADULT - SUBJECTIVE AND OBJECTIVE BOX
Progress Note     Subjective/Overnight  Patient had no complaints throughout the night. Pain under control with acetaminophen, ibuprofen gabapentin and tramadol PRN. V/S have been stable BP have remained as 103/65 and heart rate at 93 bpm. Denies chest pain or palpitations. Pulse ox 98%. No SOB, rales, wheezing, rhonchi. Abdomen mildly RUQ tenderness to palpation. Currently NPO. Denies n/v passing gas or bowel movement. Patient has been able to void without difficulty. DVT ppx with Lovenox daily.        MEDICATIONS  (STANDING):  acetaminophen   Tablet .. 975 milliGRAM(s) Oral every 6 hours  enoxaparin Injectable 40 milliGRAM(s) SubCutaneous daily  gabapentin 300 milliGRAM(s) Oral every 8 hours  ibuprofen  Tablet. 400 milliGRAM(s) Oral every 6 hours  multiple electrolytes Injection Type 1 1000 milliLiter(s) (125 mL/Hr) IV Continuous <Continuous>    MEDICATIONS  (PRN):  traMADol 25 milliGRAM(s) Oral every 4 hours PRN Severe Pain (7 - 10)      Vital Signs:  Vital Signs Last 24 Hrs  T(C): 36.8 (30 Jul 2020 23:51), Max: 37 (30 Jul 2020 17:17)  T(F): 98.3 (30 Jul 2020 23:51), Max: 98.6 (30 Jul 2020 17:17)  HR: 93 (30 Jul 2020 23:51) (70 - 93)  BP: 103/65 (30 Jul 2020 23:51) (103/65 - 144/98)  BP(mean): 77 (30 Jul 2020 23:51) (77 - 77)  RR: 18 (30 Jul 2020 23:51) (18 - 18)  SpO2: 98% (30 Jul 2020 23:51) (98% - 100%)    Physical Exam:  General: alert, oriented x 3 in no acute distress   Respiratory: clear to auscultation b/l, symmetric chest movement   CV: S1/S2, RRR  Abdomen: soft, depressible, mildly RUQ tenderness to palpation, non-distended, no guarding or rebound tenderness     Labs:    07-30    138  |  99  |  13.0  ----------------------------<  117<H>  3.7   |  24.0  |  0.43<L>    Ca    9.3      30 Jul 2020 05:56    TPro  7.1  /  Alb  4.7  /  TBili  0.3<L>  /  DBili  x   /  AST  20  /  ALT  36<H>  /  AlkPhos  89  07-30    LIVER FUNCTIONS - ( 30 Jul 2020 05:56 )  Alb: 4.7 g/dL / Pro: 7.1 g/dL / ALK PHOS: 89 U/L / ALT: 36 U/L / AST: 20 U/L / GGT: x                                 13.4   11.52 )-----------( 280      ( 30 Jul 2020 05:56 )             41.0

## 2020-07-31 NOTE — PROGRESS NOTE ADULT - ASSESSMENT
35 y/o F with stable with V/S and MRCP consistent with a 1cm in cystic duct, wall thickening and pericholecystic. CBD 9 mm diameter with no stones.    -Continue pain control with acetaminophen, tramadol, ibuprofen   - Monitor V/S    - 33 y/o F s/p acute cholecystitis     - Pre-op patient for possible OR 7/31   - NPO 33 y/o F presenting with cholelithiasis, 1.1cm stone in cystic duct, no choledocholithiasis.      -MRCP showing 1.1 cm stone impacted within cystic duct, no CBD filling defect, stones nor dilation.  -WBC resolved this AM, afebrile, no abxs  - Plan for Lap Zamzam possible OR 7/31, patient preop'd    - NPO/IVFs  -Monitor LFTs  -pain management

## 2020-07-31 NOTE — DISCHARGE NOTE PROVIDER - NSDCFUADDINST_GEN_ALL_CORE_FT
No Please call the number provided to make an appointment within 2 weeks. Take prescribed medication as instructed. Refrain from lifting heavy items (anything greater than 15lbs) during the next 6 weeks. Please seek medical attention if you experience fevers (Tmax greater than 101F), severe pain, vomiting, bleeding from wounds that does not stop.

## 2020-07-31 NOTE — DISCHARGE NOTE PROVIDER - NSDCMRMEDTOKEN_GEN_ALL_CORE_FT
acetaminophen 325 mg oral tablet: 1 tab(s) orally every 6 hours, As needed, Temp greater or equal to 38C (100.4F), Mild Pain (1 - 3)  ascorbic acid 500 mg oral tablet: 1 tab(s) orally once a day MDD:1 tab  baclofen 5 mg oral tablet: 1 tab(s) orally every 8 hours, As Needed for muscle spasms  docusate sodium 100 mg oral capsule: 1 cap(s) orally 3 times a day  doxycycline hyclate 100 mg oral capsule: 1 cap(s) orally 2 times a day   DULoxetine 60 mg oral delayed release capsule: 1 cap(s) orally once a day  ergocalciferol 50,000 intl units (1.25 mg) oral capsule: 1 cap(s) orally once a week and once completed begin Vitamin D 2,000U daily  ferrous sulfate 325 mg (65 mg elemental iron) oral tablet: 1 tab(s) orally 2 times a day   gabapentin 100 mg oral capsule: 1 cap(s) orally 2 times a day  gabapentin 300 mg oral capsule: 1 cap(s) orally once a day (at bedtime)  hydroxychloroquine 200 mg oral tablet: 1 tab(s) orally 2 times a day  magnesium hydroxide 8% oral suspension: 30 milliliter(s) orally once a day, As needed, Constipation  magnesium oxide 400 mg (241.3 mg elemental magnesium) oral tablet: 1 tab(s) orally 2 times a day (with meals)  melatonin 3 mg oral tablet: 2 tab(s) orally   Multiple Vitamins oral tablet: 1 tab(s) orally once a day MDD:1 tab  mycophenolate mofetil 250 mg oral capsule: 1500 milligram(s) orally 2 times a day  oxyCODONE 5 mg oral tablet: 1 tab(s) orally every 6 hours, As needed, Moderate Pain (4 - 6) MDD:4 tabs  pantoprazole 40 mg oral delayed release tablet: 1 tab(s) orally once a day (before a meal) MDD:1 tab  Percocet 5/325 oral tablet: 1 tab(s) orally every 6 hours MDD:4 tabs  polyethylene glycol 3350 oral powder for reconstitution: 17 gram(s) orally once a day, As needed, Constipation  predniSONE 20 mg oral tablet: 2 tab(s) orally once a day  saccharomyces boulardii lyo 250 mg oral capsule: 1 cap(s) orally 2 times a day  senna oral tablet: 2 tab(s) orally once a day (at bedtime)  sulfamethoxazole-trimethoprim 800 mg-160 mg oral tablet: 1 tab(s) orally 2 times a day ascorbic acid 500 mg oral tablet: 1 tab(s) orally once a day MDD:1 tab  baclofen 5 mg oral tablet: 1 tab(s) orally every 8 hours, As Needed for muscle spasms  docusate sodium 100 mg oral capsule: 1 cap(s) orally 3 times a day  doxycycline hyclate 100 mg oral capsule: 1 cap(s) orally 2 times a day   DULoxetine 60 mg oral delayed release capsule: 1 cap(s) orally once a day  ergocalciferol 50,000 intl units (1.25 mg) oral capsule: 1 cap(s) orally once a week and once completed begin Vitamin D 2,000U daily  ferrous sulfate 325 mg (65 mg elemental iron) oral tablet: 1 tab(s) orally 2 times a day   hydroxychloroquine 200 mg oral tablet: 1 tab(s) orally 2 times a day  magnesium oxide 400 mg (241.3 mg elemental magnesium) oral tablet: 1 tab(s) orally 2 times a day (with meals)  melatonin 3 mg oral tablet: 2 tab(s) orally   Multiple Vitamins oral tablet: 1 tab(s) orally once a day MDD:1 tab  mycophenolate mofetil 250 mg oral capsule: 1500 milligram(s) orally 2 times a day  polyethylene glycol 3350 oral powder for reconstitution: 17 gram(s) orally once a day, As needed, Constipation  predniSONE 20 mg oral tablet: 2 tab(s) orally once a day  saccharomyces boulardii lyo 250 mg oral capsule: 1 cap(s) orally 2 times a day  senna oral tablet: 2 tab(s) orally once a day (at bedtime)  sulfamethoxazole-trimethoprim 800 mg-160 mg oral tablet: 1 tab(s) orally 2 times a day  traMADol 50 mg oral tablet: 1 tab(s) orally every 4 hours MDD:Max 6 tabs per day

## 2020-08-05 LAB — SURGICAL PATHOLOGY STUDY: SIGNIFICANT CHANGE UP

## 2020-08-19 ENCOUNTER — RX RENEWAL (OUTPATIENT)
Age: 35
End: 2020-08-19

## 2020-09-04 ENCOUNTER — APPOINTMENT (OUTPATIENT)
Dept: RHEUMATOLOGY | Facility: CLINIC | Age: 35
End: 2020-09-04
Payer: COMMERCIAL

## 2020-09-04 VITALS
OXYGEN SATURATION: 97 % | HEIGHT: 62 IN | HEART RATE: 85 BPM | SYSTOLIC BLOOD PRESSURE: 105 MMHG | WEIGHT: 128 LBS | DIASTOLIC BLOOD PRESSURE: 71 MMHG | BODY MASS INDEX: 23.55 KG/M2

## 2020-09-04 PROCEDURE — 99215 OFFICE O/P EST HI 40 MIN: CPT | Mod: 25

## 2020-09-04 PROCEDURE — 36415 COLL VENOUS BLD VENIPUNCTURE: CPT

## 2020-09-04 NOTE — HISTORY OF PRESENT ILLNESS
[FreeTextEntry1] : 31 yo F w/ SLE  (dx 2014, arthritis, +PAUL,  +dsDNA, +SM, +RNP, nephritis 2016),  and fibromyalgia (dx 2014).  Was treated with cellcept and in lupus study at [facility].  sle complicated by longitudinally extensive transverse myelitis (Feb 2019) treated with IV pulse steroids, PLEX, RTX with improvement in symptoms.

## 2020-09-04 NOTE — PHYSICAL EXAM
[General Appearance - Alert] : alert [General Appearance - Well Nourished] : well nourished [General Appearance - In No Acute Distress] : in no acute distress [General Appearance - Well-Appearing] : healthy appearing [General Appearance - Well Developed] : well developed [Sclera] : the sclera and conjunctiva were normal [Extraocular Movements] : extraocular movements were intact [PERRL With Normal Accommodation] : pupils were equal in size, round, and reactive to light [Neck Appearance] : the appearance of the neck was normal [Outer Ear] : the ears and nose were normal in appearance [Heart Rate And Rhythm] : heart rate was normal and rhythm regular [Cervical Lymph Nodes Enlarged Anterior Bilaterally] : anterior cervical [Supraclavicular Lymph Nodes Enlarged Bilaterally] : supraclavicular [No Spinal Tenderness] : no spinal tenderness [No CVA Tenderness] : no ~M costovertebral angle tenderness [Musculoskeletal - Swelling] : no joint swelling seen [Skin Turgor] : normal skin turgor [Skin Color & Pigmentation] : normal skin color and pigmentation [] : no rash [Oriented To Time, Place, And Person] : oriented to person, place, and time [Skin Lesions] : no skin lesions [Impaired Insight] : insight and judgment were intact [FreeTextEntry1] : no synovitis noted - from -  good !

## 2020-09-04 NOTE — ASSESSMENT
[FreeTextEntry1] : 31 yo F w/ SLE  (dx 2014, arthritis, +PAUL,  +dsDNA, +SM, +RNP, nephritis 2016, longitudinally extensive TM 2019),  and fibromyalgia (dx 2014).  \par \par #desire for pregnancy.   discussed at length r/b/a of pregnancy on self and baby today again.  now doesn’t want a surrogate mother.  \par - currently lupus is controlled - but on medication that are not safe during pregnancy\par - rec hi risk ob\par - will start to wean off meds\par \par # SLE with nephritis (treated with cellcept+study),alopecia, arthritis, lymphopenia -complicated by longitudinal extensive transverse myelitis in feb 2019 s/p plex, rtx and pulse steroids\par  \par >>TM (2019)  improved . unclear what happened with 2 episodes of loss of urine.  \par - MRI to r/o recurrent TM\par -continue gabapentin, baclofen - decreasing gabapentin as tolerated - now decrease to 200 mg daily\par - check cd 19 to assess repopulation \par - on gabapentin, rarely needs baclofen - now muscles worse.  ? secondary to lower gabapentin dose or rtx coming out of system\par - PT to help with muscle and neuro strength / balance - reorder today\par - d/w patient and  need for regular neuro care - particularly if desiring pregnancy \par - if no changes on mRI - will try to switch to aza instead of rtx\par \par >> nephritis\par - continue cellcept - no edema today - check ds dna and prot: creat ratio\par - cannot be on MMF for pregnancy - will first check MRI - if negative will start to wean and replace with AZA\par - tpmt today\par \par #FMS:\par -continue Cymbalta - increase to 60 mg daily\par - will have to start to wean the cymbalta for pregnancy - start by alternating 40mg with 60 mg\par - PT\par \par #hemorrhoids and constipation.  \par - has limited the oxycodone (rarely uses it) and changed diet - and is better\par - colonoscopy okay\par \par #bone health\par -vit d good\par - s/p large amounts of vit d - will have DEXA and evaluation of bone health.  \par - estrogen okay\par - concern over age is that bisphosphonates long half life - will f/u with endo\par \par #medication management / v58.69 \par - cd19\par - RTX, cellcept, steroids - PCP prophylaxis bactrim \par \par #social determinants \par -  supportive

## 2020-09-04 NOTE — REVIEW OF SYSTEMS
[As Noted in HPI] : as noted in HPI [Negative] : Heme/Lymph [Chills] : no chills [Fever] : no fever [Eye Pain] : no eye pain [Earache] : no earache [Shortness Of Breath] : no shortness of breath [Nosebleeds] : no nosebleeds [Chest Pain] : no chest pain [Cough] : no cough [Abdominal Pain] : no abdominal pain [Wheezing] : no wheezing [Dysuria] : no dysuria [Vomiting] : no vomiting [Joint Pain] : no joint pain [Skin Lesions] : no skin lesions [Dizziness] : no dizziness [Anxiety] : no anxiety [Easy Bleeding] : no tendency for easy bleeding [de-identified] : mood much better in peru -  [FreeTextEntry1] : missed period

## 2020-09-04 NOTE — CONSULT LETTER
[Dear  ___] : Dear  [unfilled], [Courtesy Letter:] : I had the pleasure of seeing your patient, [unfilled], in my office today. [Consult Closing:] : Thank you very much for allowing me to participate in the care of this patient.  If you have any questions, please do not hesitate to contact me. [Please see my note below.] : Please see my note below. [Sincerely,] : Sincerely, [FreeTextEntry2] : Scotty Schneider

## 2020-09-05 LAB
ALBUMIN SERPL ELPH-MCNC: 5.1 G/DL
ALP BLD-CCNC: 102 U/L
ALT SERPL-CCNC: 27 U/L
ANION GAP SERPL CALC-SCNC: 15 MMOL/L
APPEARANCE: CLEAR
AST SERPL-CCNC: 23 U/L
BACTERIA: NEGATIVE
BASOPHILS # BLD AUTO: 0.03 K/UL
BASOPHILS NFR BLD AUTO: 0.7 %
BILIRUB SERPL-MCNC: 0.4 MG/DL
BILIRUBIN URINE: NEGATIVE
BLOOD URINE: NEGATIVE
BUN SERPL-MCNC: 12 MG/DL
CALCIUM SERPL-MCNC: 9.3 MG/DL
CHLORIDE SERPL-SCNC: 104 MMOL/L
CO2 SERPL-SCNC: 23 MMOL/L
COLOR: YELLOW
CREAT SERPL-MCNC: 0.58 MG/DL
CRP SERPL-MCNC: <0.1 MG/DL
EOSINOPHIL # BLD AUTO: 0.11 K/UL
EOSINOPHIL NFR BLD AUTO: 2.6 %
ERYTHROCYTE [SEDIMENTATION RATE] IN BLOOD BY WESTERGREN METHOD: 20 MM/HR
GLUCOSE QUALITATIVE U: NEGATIVE
HCT VFR BLD CALC: 48.2 %
HGB BLD-MCNC: 14.6 G/DL
HYALINE CASTS: 1 /LPF
IMM GRANULOCYTES NFR BLD AUTO: 0.2 %
KETONES URINE: NEGATIVE
LEUKOCYTE ESTERASE URINE: NEGATIVE
LYMPHOCYTES # BLD AUTO: 0.96 K/UL
LYMPHOCYTES NFR BLD AUTO: 22.7 %
MAN DIFF?: NORMAL
MCHC RBC-ENTMCNC: 28.7 PG
MCHC RBC-ENTMCNC: 30.3 GM/DL
MCV RBC AUTO: 94.7 FL
MICROSCOPIC-UA: NORMAL
MONOCYTES # BLD AUTO: 0.48 K/UL
MONOCYTES NFR BLD AUTO: 11.3 %
NEUTROPHILS # BLD AUTO: 2.64 K/UL
NEUTROPHILS NFR BLD AUTO: 62.5 %
NITRITE URINE: NEGATIVE
PH URINE: 6
PLATELET # BLD AUTO: 280 K/UL
POTASSIUM SERPL-SCNC: 4 MMOL/L
PROT SERPL-MCNC: 7.3 G/DL
PROTEIN URINE: NORMAL
RBC # BLD: 5.09 M/UL
RBC # FLD: 13 %
RED BLOOD CELLS URINE: 1 /HPF
SODIUM SERPL-SCNC: 142 MMOL/L
SPECIFIC GRAVITY URINE: 1.02
SQUAMOUS EPITHELIAL CELLS: 1 /HPF
UROBILINOGEN URINE: NORMAL
WBC # FLD AUTO: 4.23 K/UL
WHITE BLOOD CELLS URINE: 1 /HPF

## 2020-09-08 LAB
C3 SERPL-MCNC: 152 MG/DL
C4 SERPL-MCNC: 38 MG/DL
CD16+CD56+ CELLS # BLD: 139 /UL
CD16+CD56+ CELLS NFR BLD: 14 %
CD19 CELLS NFR BLD: 8 /UL
CD3 CELLS # BLD: 808 /UL
CD3 CELLS NFR BLD: 81 %
CD3+CD4+ CELLS # BLD: 483 /UL
CD3+CD4+ CELLS NFR BLD: 48 %
CD3+CD4+ CELLS/CD3+CD8+ CLL SPEC: 1.64 RATIO
CD3+CD8+ CELLS # SPEC: 294 /UL
CD3+CD8+ CELLS NFR BLD: 29 %
CELLS.CD3-CD19+/CELLS IN BLOOD: 1 %
CREAT SPEC-SCNC: 106 MG/DL
CREAT/PROT UR: 0.2 RATIO
DSDNA AB SER-ACNC: 39 IU/ML
PROT UR-MCNC: 18 MG/DL

## 2020-09-12 LAB
TPMT ENZYME INTERPRETATION: NORMAL
TPMT ENZYME METHODOLOGY: NORMAL
TPMT ENZYME: 19.1

## 2020-09-24 NOTE — ED ADULT TRIAGE NOTE - PAIN RATING/NUMBER SCALE (0-10): ACTIVITY
09/24/20 1427   Final Note   Assessment Type Final Discharge Note   Anticipated Discharge Disposition Home   What phone number can be called within the next 1-3 days to see how you are doing after discharge?   ([phone])   Hospital Follow Up  Appt(s) scheduled? No      5

## 2020-09-27 ENCOUNTER — APPOINTMENT (OUTPATIENT)
Dept: MRI IMAGING | Facility: CLINIC | Age: 35
End: 2020-09-27
Payer: COMMERCIAL

## 2020-09-27 ENCOUNTER — OUTPATIENT (OUTPATIENT)
Dept: OUTPATIENT SERVICES | Facility: HOSPITAL | Age: 35
LOS: 1 days | End: 2020-09-27
Payer: COMMERCIAL

## 2020-09-27 DIAGNOSIS — G37.3 ACUTE TRANSVERSE MYELITIS IN DEMYELINATING DISEASE OF CENTRAL NERVOUS SYSTEM: ICD-10-CM

## 2020-09-27 DIAGNOSIS — Z98.890 OTHER SPECIFIED POSTPROCEDURAL STATES: Chronic | ICD-10-CM

## 2020-09-27 DIAGNOSIS — M32.9 SYSTEMIC LUPUS ERYTHEMATOSUS, UNSPECIFIED: ICD-10-CM

## 2020-09-27 DIAGNOSIS — Z00.00 ENCOUNTER FOR GENERAL ADULT MEDICAL EXAMINATION WITHOUT ABNORMAL FINDINGS: ICD-10-CM

## 2020-09-27 PROCEDURE — 72156 MRI NECK SPINE W/O & W/DYE: CPT | Mod: 26

## 2020-09-27 PROCEDURE — 72157 MRI CHEST SPINE W/O & W/DYE: CPT

## 2020-09-27 PROCEDURE — 72157 MRI CHEST SPINE W/O & W/DYE: CPT | Mod: 26

## 2020-09-27 PROCEDURE — 72158 MRI LUMBAR SPINE W/O & W/DYE: CPT | Mod: 26

## 2020-09-27 PROCEDURE — A9585: CPT

## 2020-09-27 PROCEDURE — 72158 MRI LUMBAR SPINE W/O & W/DYE: CPT

## 2020-09-27 PROCEDURE — 72156 MRI NECK SPINE W/O & W/DYE: CPT

## 2020-09-29 ENCOUNTER — APPOINTMENT (OUTPATIENT)
Dept: RHEUMATOLOGY | Facility: CLINIC | Age: 35
End: 2020-09-29
Payer: COMMERCIAL

## 2020-09-29 VITALS
BODY MASS INDEX: 23.37 KG/M2 | HEIGHT: 62 IN | DIASTOLIC BLOOD PRESSURE: 76 MMHG | OXYGEN SATURATION: 97 % | WEIGHT: 127 LBS | SYSTOLIC BLOOD PRESSURE: 104 MMHG | HEART RATE: 94 BPM

## 2020-09-29 DIAGNOSIS — Z00.00 ENCOUNTER FOR GENERAL ADULT MEDICAL EXAMINATION W/OUT ABNORMAL FINDINGS: ICD-10-CM

## 2020-09-29 PROCEDURE — 99215 OFFICE O/P EST HI 40 MIN: CPT

## 2020-10-01 LAB
ALBUMIN SERPL ELPH-MCNC: 4.6 G/DL
ALP BLD-CCNC: 95 U/L
ALT SERPL-CCNC: 17 U/L
ANION GAP SERPL CALC-SCNC: 12 MMOL/L
AST SERPL-CCNC: 16 U/L
BASOPHILS # BLD AUTO: 0.02 K/UL
BASOPHILS NFR BLD AUTO: 0.4 %
BILIRUB SERPL-MCNC: 0.6 MG/DL
BUN SERPL-MCNC: 11 MG/DL
C3 SERPL-MCNC: 103 MG/DL
C4 SERPL-MCNC: 34 MG/DL
CALCIUM SERPL-MCNC: 9.4 MG/DL
CD16+CD56+ CELLS # BLD: 179 /UL
CD16+CD56+ CELLS NFR BLD: 13 %
CD19 CELLS NFR BLD: 31 /UL
CD3 CELLS # BLD: 1121 /UL
CD3 CELLS NFR BLD: 80 %
CD3+CD4+ CELLS # BLD: 660 /UL
CD3+CD4+ CELLS NFR BLD: 47 %
CD3+CD4+ CELLS/CD3+CD8+ CLL SPEC: 1.68 RATIO
CD3+CD8+ CELLS # SPEC: 392 /UL
CD3+CD8+ CELLS NFR BLD: 28 %
CELLS.CD3-CD19+/CELLS IN BLOOD: 2 %
CHLORIDE SERPL-SCNC: 103 MMOL/L
CHOLEST SERPL-MCNC: 154 MG/DL
CHOLEST/HDLC SERPL: 3.3 RATIO
CO2 SERPL-SCNC: 26 MMOL/L
CREAT SERPL-MCNC: 0.59 MG/DL
CREAT SPEC-SCNC: 135 MG/DL
CREAT/PROT UR: 0.1 RATIO
DSDNA AB SER-ACNC: 25 IU/ML
EOSINOPHIL # BLD AUTO: 0.13 K/UL
EOSINOPHIL NFR BLD AUTO: 2.5 %
HCT VFR BLD CALC: 43.1 %
HDLC SERPL-MCNC: 46 MG/DL
HGB BLD-MCNC: 13.9 G/DL
IMM GRANULOCYTES NFR BLD AUTO: 0.2 %
LDLC SERPL CALC-MCNC: 89 MG/DL
LYMPHOCYTES # BLD AUTO: 1.41 K/UL
LYMPHOCYTES NFR BLD AUTO: 26.8 %
MAN DIFF?: NORMAL
MCHC RBC-ENTMCNC: 29.8 PG
MCHC RBC-ENTMCNC: 32.3 GM/DL
MCV RBC AUTO: 92.5 FL
MONOCYTES # BLD AUTO: 0.86 K/UL
MONOCYTES NFR BLD AUTO: 16.3 %
NEUTROPHILS # BLD AUTO: 2.84 K/UL
NEUTROPHILS NFR BLD AUTO: 53.8 %
PLATELET # BLD AUTO: 312 K/UL
POTASSIUM SERPL-SCNC: 4.1 MMOL/L
PROT SERPL-MCNC: 6.8 G/DL
PROT UR-MCNC: 14 MG/DL
RBC # BLD: 4.66 M/UL
RBC # FLD: 12.8 %
SODIUM SERPL-SCNC: 141 MMOL/L
TRIGL SERPL-MCNC: 91 MG/DL
WBC # FLD AUTO: 5.27 K/UL

## 2020-10-01 NOTE — HISTORY OF PRESENT ILLNESS
[FreeTextEntry1] : INTERVAL HX\par Here to review several issues\par #SLE - TM.  Continues to do well.  denies urinary incontinence.  the paraesthesias persist but are not worsening.   came down on the gabapentin owing to GI side effects, with no change in neuro symptoms. lsat rtx 6 months ago\par #SLE- LN.  denies swelling of legs, rash, arthritis, urinary symptoms. tolerating the cellcept\par - denies swelling of joints\par - urine without change in color and no bubbles.  however had a couple of times a loss of urine.  this never happened to her before.  no change in bowle\par #constipation and hemorrhoids.  has a small fistula - going for surgery next week\par #contemplating pregnancy.  really wants to consider pregnancy - but now in early 2021\par - hasn’t seen hi risk yet

## 2020-10-01 NOTE — ASSESSMENT
[FreeTextEntry1] : 29 yo F w/ SLE  (dx 2014, arthritis, +PAUL,  +dsDNA, +SM, +RNP, nephritis 2016, longitudinally extensive TM 2019),  and fibromyalgia (dx 2014).  \par \par #desire for pregnancy.   discussed at length r/b/a of pregnancy on self and baby today again.  now doesn’t want a surrogate mother.  hoping for early 2021\par - this is very complicated, high risk patient given multiple flares of lupus which in the past has been life/organ threatening.  this is further complicated by the fact that although patient's disease is currently in a relative period of  quiescence, she is on multiple medications that are not safe during pregnancy and needs to come off them prior to conception.  will have to monitor closely flare as she will be at very high risk for disease flare in preparation and during pregnancy.  patient and  articulate understanding\par - rec hi risk ob\par - will start to wean off meds - DC MMF now and start AZA\par - needs to see hi risk, nephro and pain management prior to pregnancy (everything on hold until after surgery)\par - will dose one last RTX as that gives 6 months until desire to start. concern is that risk of flare during pregnancy if  stopping MMF and stopping RTX.   therefore use of rtx one last time prior to conception may prevent pregnancy- related flare of SLE.\par \par # SLE with nephritis (treated with cellcept+study),alopecia, arthritis, lymphopenia -complicated by longitudinal extensive transverse myelitis in feb 2019 s/p plex, rtx and pulse steroids.  MRI now normalized. \par - given anticipated desire for pregnancy - will dc MMF.\par - after surgery will start AZA - titrate up\par - RTX once more - and then will re-evaluate if will continue\par \par #FMS:\par - will have to start to wean the cymbalta for pregnancy - start by alternating 40mg with 60 mg\par - f/u pain mgmt - regarding pain control during pregnancy- \par - in terms of gabapentin - will likely also have to wean off - however concern is about taking all these meds off at the same time.  starting with cymbalta now\par - PT\par \par #hemorrhoids and constipation.  \par - has limited the oxycodone (rarely uses it) and changed diet - and is better\par - colonoscopy okay\par \par #bone health\par -vit d good\par - s/p large amounts of vit d - will have DEXA and evaluation of bone health.  \par - estrogen okay\par - concern over age is that bisphosphonates long half life - will f/u with endo\par \par #medication management / v58.69 \par - cd19\par - RTX, cellcept, steroids - PCP prophylaxis bactrim \par \par #social determinants \par -  supportive

## 2020-10-01 NOTE — REVIEW OF SYSTEMS
[As Noted in HPI] : as noted in HPI [Negative] : Heme/Lymph [Fever] : no fever [Chills] : no chills [Eye Pain] : no eye pain [Earache] : no earache [Nosebleeds] : no nosebleeds [Chest Pain] : no chest pain [Shortness Of Breath] : no shortness of breath [Wheezing] : no wheezing [Cough] : no cough [Abdominal Pain] : no abdominal pain [Vomiting] : no vomiting [Dysuria] : no dysuria [Joint Pain] : no joint pain [Skin Lesions] : no skin lesions [Dizziness] : no dizziness [Anxiety] : no anxiety [Easy Bleeding] : no tendency for easy bleeding [de-identified] : mood much better in [place] -  [FreeTextEntry1] : missed period

## 2020-10-20 ENCOUNTER — APPOINTMENT (OUTPATIENT)
Dept: RHEUMATOLOGY | Facility: CLINIC | Age: 35
End: 2020-10-20
Payer: COMMERCIAL

## 2020-10-20 ENCOUNTER — LABORATORY RESULT (OUTPATIENT)
Age: 35
End: 2020-10-20

## 2020-10-20 VITALS
WEIGHT: 128 LBS | OXYGEN SATURATION: 97 % | HEIGHT: 62 IN | HEART RATE: 91 BPM | DIASTOLIC BLOOD PRESSURE: 72 MMHG | BODY MASS INDEX: 23.55 KG/M2 | SYSTOLIC BLOOD PRESSURE: 103 MMHG

## 2020-10-20 DIAGNOSIS — Z87.440 PERSONAL HISTORY OF URINARY (TRACT) INFECTIONS: ICD-10-CM

## 2020-10-20 PROCEDURE — 99214 OFFICE O/P EST MOD 30 MIN: CPT | Mod: 25

## 2020-10-20 PROCEDURE — 99072 ADDL SUPL MATRL&STAF TM PHE: CPT

## 2020-10-20 NOTE — PHYSICAL EXAM
[General Appearance - In No Acute Distress] : in no acute distress [General Appearance - Alert] : alert [General Appearance - Well Nourished] : well nourished [General Appearance - Well-Appearing] : healthy appearing [General Appearance - Well Developed] : well developed [Sclera] : the sclera and conjunctiva were normal [Extraocular Movements] : extraocular movements were intact [PERRL With Normal Accommodation] : pupils were equal in size, round, and reactive to light [Neck Appearance] : the appearance of the neck was normal [Outer Ear] : the ears and nose were normal in appearance [Heart Rate And Rhythm] : heart rate was normal and rhythm regular [Supraclavicular Lymph Nodes Enlarged Bilaterally] : supraclavicular [Cervical Lymph Nodes Enlarged Anterior Bilaterally] : anterior cervical [No CVA Tenderness] : no ~M costovertebral angle tenderness [No Spinal Tenderness] : no spinal tenderness [Musculoskeletal - Swelling] : no joint swelling seen [Skin Color & Pigmentation] : normal skin color and pigmentation [] : no rash [Skin Lesions] : no skin lesions [Skin Turgor] : normal skin turgor [Impaired Insight] : insight and judgment were intact [Oriented To Time, Place, And Person] : oriented to person, place, and time [FreeTextEntry1] : no focal deficits - able to walk without assistive device, MS 5/5, able to walk on toes

## 2020-10-20 NOTE — CONSULT LETTER
[Dear  ___] : Dear  [unfilled], [Consult Closing:] : Thank you very much for allowing me to participate in the care of this patient.  If you have any questions, please do not hesitate to contact me. [Please see my note below.] : Please see my note below. [Courtesy Letter:] : I had the pleasure of seeing your patient, [unfilled], in my office today. [Sincerely,] : Sincerely, [FreeTextEntry2] : Scotty Schneider

## 2020-10-20 NOTE — HISTORY OF PRESENT ILLNESS
[FreeTextEntry1] : INTERVAL HX\par Here to review several issues\par #SLE - TM.  Continues to do well.  denies urinary incontinence.  the paraesthesias persist but are not worsening.   came down on the gabapentin owing to GI side effects, with no change in neuro symptoms. lsat rtx 6 months ago awaiting repeat dose\par #SLE- LN.  denies swelling of legs, rash, arthritis, urinary symptoms. tolerating the cellcept\par - denies swelling of joints stopped cellcept - no changes\par - urine without change in color and no bubbles.  however had a couple of times a loss of urine.  this never happened to her before.  no change in bowle\par #constipation and hemorrhoids.  has a small fistula - surgery went well - healing as anticipated - surgeon feels MMF being held has helped this too\par #contemplating pregnancy.  really wants to consider pregnancy - but now in early 2021\par - hasn’t seen hi risk yet

## 2020-10-20 NOTE — ASSESSMENT
[FreeTextEntry1] : 31 yo F w/ SLE  (dx 2014, arthritis, +PAUL,  +dsDNA, +SM, +RNP, nephritis 2016, longitudinally extensive TM 2019),  and fibromyalgia (dx 2014).  \par \par #desire for pregnancy.   discussed at length r/b/a of pregnancy on self and baby today again.  now doesn’t want a surrogate mother.  hoping for early 2021\par - this is very complicated, high risk patient given multiple flares of lupus which in the past has been life/organ threatening.  this is further complicated by the fact that although patient's disease is currently in a relative period of  quiescence, she is on multiple medications that are not safe during pregnancy and needs to come off them prior to conception.  will have to monitor closely flare as she will be at very high risk for disease flare in preparation and during pregnancy.  patient and  articulate understanding\par - rec hi risk ob\par - will start to wean off meds - DC MMF now and start AZA\par - needs to see hi risk, nephro and pain management prior to pregnancy (everything on hold until after surgery)\par - will dose one last RTX as that gives 6 months until desire to start. concern is that risk of flare during pregnancy if  stopping MMF and stopping RTX.   therefore use of rtx one last time prior to conception may prevent pregnancy- related flare of SLE.\par \par # SLE with nephritis (treated with cellcept+study),alopecia, arthritis, lymphopenia -complicated by longitudinal extensive transverse myelitis in feb 2019 s/p plex, rtx and pulse steroids.  MRI now normalized. \par - given anticipated desire for pregnancy - will dc MMF.\par - after surgery will start AZA - titrate up - will start AZA after next week if healing and no infection in rectum\par - RTX once more - and then will re-evaluate if will continue\par \par #FMS:\par - will have to start to wean the cymbalta for pregnancy - decrease to 40 mg\par - f/u pain mgmt - regarding pain control during pregnancy- \par - in terms of gabapentin - will likely also have to wean off - however concern is about taking all these meds off at the same time.  starting with cymbalta now\par - PT\par \par #hemorrhoids and constipation.  \par - has limited the oxycodone (rarely uses it) and changed diet - and is better\par - colonoscopy okay\par \par #bone health\par -vit d good\par - s/p large amounts of vit d - will have DEXA and evaluation of bone health.  \par - estrogen okay\par - concern over age is that bisphosphonates long half life - will f/u with endo\par \par #medication management / v58.69 \par - cd19\par - RTX, cellcept, steroids - PCP prophylaxis bactrim \par - about 60 kg - \par \par #social determinants \par -  supportive

## 2020-10-20 NOTE — REVIEW OF SYSTEMS
[As Noted in HPI] : as noted in HPI [Negative] : Endocrine [Fever] : no fever [Chills] : no chills [Eye Pain] : no eye pain [Earache] : no earache [Nosebleeds] : no nosebleeds [Chest Pain] : no chest pain [Shortness Of Breath] : no shortness of breath [Wheezing] : no wheezing [Cough] : no cough [Abdominal Pain] : no abdominal pain [Vomiting] : no vomiting [Dysuria] : no dysuria [Joint Pain] : no joint pain [Skin Lesions] : no skin lesions [Dizziness] : no dizziness [Anxiety] : no anxiety [Easy Bleeding] : no tendency for easy bleeding [de-identified] : mood much better in peru -  [FreeTextEntry1] : missed period

## 2020-10-21 LAB
ALBUMIN SERPL ELPH-MCNC: 4.7 G/DL
ALP BLD-CCNC: 100 U/L
ALT SERPL-CCNC: 17 U/L
ANION GAP SERPL CALC-SCNC: 14 MMOL/L
APPEARANCE: CLEAR
AST SERPL-CCNC: 15 U/L
BASOPHILS # BLD AUTO: 0.03 K/UL
BASOPHILS NFR BLD AUTO: 0.5 %
BILIRUB SERPL-MCNC: 0.3 MG/DL
BILIRUBIN URINE: NEGATIVE
BLOOD URINE: NEGATIVE
BUN SERPL-MCNC: 12 MG/DL
C3 SERPL-MCNC: 119 MG/DL
C4 SERPL-MCNC: 35 MG/DL
CALCIUM SERPL-MCNC: 9.4 MG/DL
CHLORIDE SERPL-SCNC: 105 MMOL/L
CO2 SERPL-SCNC: 23 MMOL/L
COLOR: YELLOW
CREAT SERPL-MCNC: 0.71 MG/DL
CREAT SPEC-SCNC: 205 MG/DL
CREAT/PROT UR: 0.1 RATIO
CRP SERPL-MCNC: <0.1 MG/DL
EOSINOPHIL # BLD AUTO: 0.14 K/UL
EOSINOPHIL NFR BLD AUTO: 2.5 %
ERYTHROCYTE [SEDIMENTATION RATE] IN BLOOD BY WESTERGREN METHOD: 11 MM/HR
GLUCOSE QUALITATIVE U: NEGATIVE
HCT VFR BLD CALC: 46.5 %
HGB BLD-MCNC: 14.4 G/DL
IMM GRANULOCYTES NFR BLD AUTO: 0.5 %
KETONES URINE: NEGATIVE
LEUKOCYTE ESTERASE URINE: NEGATIVE
LYMPHOCYTES # BLD AUTO: 1.2 K/UL
LYMPHOCYTES NFR BLD AUTO: 21.7 %
MAN DIFF?: NORMAL
MCHC RBC-ENTMCNC: 29.3 PG
MCHC RBC-ENTMCNC: 31 GM/DL
MCV RBC AUTO: 94.7 FL
MONOCYTES # BLD AUTO: 0.67 K/UL
MONOCYTES NFR BLD AUTO: 12.1 %
NEUTROPHILS # BLD AUTO: 3.47 K/UL
NEUTROPHILS NFR BLD AUTO: 62.7 %
NITRITE URINE: NEGATIVE
PH URINE: 6
PLATELET # BLD AUTO: 331 K/UL
POTASSIUM SERPL-SCNC: 4.2 MMOL/L
PROT SERPL-MCNC: 6.8 G/DL
PROT UR-MCNC: 17 MG/DL
PROTEIN URINE: ABNORMAL
RBC # BLD: 4.91 M/UL
RBC # FLD: 12.9 %
SODIUM SERPL-SCNC: 142 MMOL/L
SPECIFIC GRAVITY URINE: 1.03
UROBILINOGEN URINE: NORMAL
WBC # FLD AUTO: 5.54 K/UL

## 2020-10-22 LAB
DSDNA AB SER-ACNC: 33 IU/ML
HAV IGM SER QL: NONREACTIVE
HBV CORE IGM SER QL: NONREACTIVE
HBV SURFACE AG SER QL: NONREACTIVE
HCV AB SER QL: NONREACTIVE
HCV S/CO RATIO: 0.15 S/CO

## 2020-11-13 ENCOUNTER — LABORATORY RESULT (OUTPATIENT)
Age: 35
End: 2020-11-13

## 2020-11-13 ENCOUNTER — APPOINTMENT (OUTPATIENT)
Dept: RHEUMATOLOGY | Facility: CLINIC | Age: 35
End: 2020-11-13
Payer: COMMERCIAL

## 2020-11-13 PROCEDURE — 96415 CHEMO IV INFUSION ADDL HR: CPT

## 2020-11-13 PROCEDURE — 99072 ADDL SUPL MATRL&STAF TM PHE: CPT

## 2020-11-13 PROCEDURE — 96375 TX/PRO/DX INJ NEW DRUG ADDON: CPT

## 2020-11-13 PROCEDURE — 36415 COLL VENOUS BLD VENIPUNCTURE: CPT

## 2020-11-13 PROCEDURE — 96413 CHEMO IV INFUSION 1 HR: CPT

## 2020-11-20 ENCOUNTER — APPOINTMENT (OUTPATIENT)
Dept: RHEUMATOLOGY | Facility: CLINIC | Age: 35
End: 2020-11-20
Payer: COMMERCIAL

## 2020-11-20 VITALS
RESPIRATION RATE: 14 BRPM | HEART RATE: 80 BPM | WEIGHT: 128 LBS | DIASTOLIC BLOOD PRESSURE: 80 MMHG | HEIGHT: 62 IN | OXYGEN SATURATION: 97 % | SYSTOLIC BLOOD PRESSURE: 110 MMHG | BODY MASS INDEX: 23.55 KG/M2

## 2020-11-20 PROCEDURE — 36415 COLL VENOUS BLD VENIPUNCTURE: CPT

## 2020-11-20 PROCEDURE — 99214 OFFICE O/P EST MOD 30 MIN: CPT | Mod: 25

## 2020-11-20 NOTE — HISTORY OF PRESENT ILLNESS
[FreeTextEntry1] : INTERVAL HX\par Here to review several issues\par #SLE - TM.  Continues to do well.  denies urinary incontinence this week - though last week had some before the rtx.     had gyn eval and was told had infection so now much better.   the paraesthesias persist but are not worsening.   came down on the gabapentin owing to GI side effects, with no change in neuro symptoms. lsat rtx 6 months ago awaiting repeat dose\par #SLE- LN.  denies swelling of legs, rash, arthritis, urinary symptoms. tolerating the cellcept\par - now off the MMF 2/2 planning for pregnancy - started the AZA and tolerating it \par - RTN went well - denies lupus symptoms at this time - no swellin gof the legs or rash.  joitns are not painful\par - urine without change in color and no bubbles.  however had a couple of times a loss of urine.  this never happened to her before.  no change in bowle\par #contemplating pregnancy.  really wants to consider pregnancy - but now in early 2021\par - hasn’t seen hi risk yet - encouraged patient to make appoitnment with hi-risk during planning stages\par #FMS - on duloxetine. tapered to 40 mg 2 weeks ago - noticed a lot of fatigue for a few days.  now feels good.

## 2020-11-20 NOTE — PHYSICAL THERAPY INITIAL EVALUATION ADULT - LIVES WITH, PROFILE
Spoke with patient and advised her  we were following up from her visit yesterday and wanted to know that her lifetime risk score for breast cancer is within normal limits ( it was 14.8%). Pt verbalized understanding. AF   spouse

## 2020-11-20 NOTE — REVIEW OF SYSTEMS
[As Noted in HPI] : as noted in HPI [Negative] : Heme/Lymph [Fever] : no fever [Chills] : no chills [Eye Pain] : no eye pain [Earache] : no earache [Nosebleeds] : no nosebleeds [Chest Pain] : no chest pain [Shortness Of Breath] : no shortness of breath [Wheezing] : no wheezing [Cough] : no cough [Abdominal Pain] : no abdominal pain [Vomiting] : no vomiting [Dysuria] : no dysuria [Joint Pain] : no joint pain [Skin Lesions] : no skin lesions [Dizziness] : no dizziness [Anxiety] : no anxiety [Easy Bleeding] : no tendency for easy bleeding [de-identified] : mood much better in [place] -  [FreeTextEntry1] : missed period

## 2020-11-20 NOTE — ASSESSMENT
[FreeTextEntry1] : 31 yo F w/ SLE  (dx 2014, arthritis, +PAUL,  +dsDNA, +SM, +RNP, nephritis 2016, longitudinally extensive TM 2019),  and fibromyalgia (dx 2014).  \par \par #desire for pregnancy.   discussed at length r/b/a of pregnancy on self and baby today again. has OB/GYN - didn’t yet see high risk  hoping for early 2021\par - this is very complicated, high risk patient given multiple flares of lupus which in the past has been life/organ threatening.  this is further complicated by the fact that although patient's disease is currently in a relative period of  quiescence, she is on multiple medications that are not safe during pregnancy and needs to come off them prior to conception.  will have to monitor closely flare as she will be at very high risk for disease flare in preparation and during pregnancy.  patient and  articulate understanding\par - rec hi risk ob\par - will start to wean off meds - DC MMF now and start AZA - tolerating.  will check labs\par - needs to see hi risk, nephro and pain management prior to pregnancy (everything on hold until after surgery)\par - will dose one last RTX as that gives 6 months until desire to start. concern is that risk of flare during pregnancy if  stopping MMF and stopping RTX.   therefore use of rtx one last time prior to conception may prevent pregnancy- related flare of SLE.\par \par # SLE with nephritis (treated with cellcept+study),alopecia, arthritis, lymphopenia -complicated by longitudinal extensive transverse myelitis in feb 2019 s/p plex, rtx and pulse steroids.  MRI now normalized. \par - given anticipated desire for pregnancy - will dc MMF.\par - RTX once more - and then will re-evaluate if will continue\par - rec neuro folllowup - had episode of incontinence last week - now resolved and patient believes the pain and pressure form the UTI was too much.  now on abx and better.  d/w patient if recurs needs MRI and neuro eval again. \par \par #FMS:\par - will have to start to wean the cymbalta for pregnancy - decrease to 20 mg in a few weeks if no changes in symptoms.  \par - f/u pain mgmt - regarding pain control during pregnancy- \par - in terms of gabapentin - will likely also have to wean off - however concern is about taking all these meds off at the same time.  starting with cymbalta now\par - PT\par \par #bone health\par -vit d good\par - s/p large amounts of vit d - will have DEXA and evaluation of bone health.  \par - estrogen okay\par - concern over age is that bisphosphonates long half life - will f/u with endo\par \par #medication management / v58.69 \par - cd19\par - RTX, cellcept, steroids - PCP prophylaxis bactrim \par - about 60 kg - \par \par #social determinants \par -  supportive

## 2020-11-23 LAB
ALBUMIN SERPL ELPH-MCNC: 4.5 G/DL
ALP BLD-CCNC: 92 U/L
ALT SERPL-CCNC: 18 U/L
ANION GAP SERPL CALC-SCNC: 10 MMOL/L
APPEARANCE: ABNORMAL
AST SERPL-CCNC: 19 U/L
BACTERIA: ABNORMAL
BASOPHILS # BLD AUTO: 0.03 K/UL
BASOPHILS NFR BLD AUTO: 0.6 %
BILIRUB SERPL-MCNC: 0.7 MG/DL
BILIRUBIN URINE: NEGATIVE
BLOOD URINE: NEGATIVE
BUN SERPL-MCNC: 13 MG/DL
C3 SERPL-MCNC: 114 MG/DL
C4 SERPL-MCNC: 32 MG/DL
CALCIUM SERPL-MCNC: 8.8 MG/DL
CD16+CD56+ CELLS # BLD: 183 /UL
CD16+CD56+ CELLS NFR BLD: 15 %
CD19 CELLS NFR BLD: 2 /UL
CD3 CELLS # BLD: 959 /UL
CD3 CELLS NFR BLD: 80 %
CD3+CD4+ CELLS # BLD: 520 /UL
CD3+CD4+ CELLS NFR BLD: 44 %
CD3+CD4+ CELLS/CD3+CD8+ CLL SPEC: 1.31 RATIO
CD3+CD8+ CELLS # SPEC: 397 /UL
CD3+CD8+ CELLS NFR BLD: 33 %
CELLS.CD3-CD19+/CELLS IN BLOOD: <1 %
CHLORIDE SERPL-SCNC: 101 MMOL/L
CO2 SERPL-SCNC: 26 MMOL/L
COLOR: YELLOW
CREAT SERPL-MCNC: 0.46 MG/DL
CREAT SPEC-SCNC: 143 MG/DL
CREAT/PROT UR: 0.1 RATIO
DSDNA AB SER-ACNC: 42 IU/ML
EOSINOPHIL # BLD AUTO: 0.14 K/UL
EOSINOPHIL NFR BLD AUTO: 2.9 %
GLUCOSE QUALITATIVE U: NEGATIVE
HCT VFR BLD CALC: 47.6 %
HGB BLD-MCNC: 14.8 G/DL
HYALINE CASTS: 1 /LPF
IMM GRANULOCYTES NFR BLD AUTO: 0.4 %
KETONES URINE: NEGATIVE
LEUKOCYTE ESTERASE URINE: NEGATIVE
LYMPHOCYTES # BLD AUTO: 1.2 K/UL
LYMPHOCYTES NFR BLD AUTO: 24.9 %
MAN DIFF?: NORMAL
MCHC RBC-ENTMCNC: 29.1 PG
MCHC RBC-ENTMCNC: 31.1 GM/DL
MCV RBC AUTO: 93.7 FL
MICROSCOPIC-UA: NORMAL
MONOCYTES # BLD AUTO: 0.49 K/UL
MONOCYTES NFR BLD AUTO: 10.2 %
NEUTROPHILS # BLD AUTO: 2.93 K/UL
NEUTROPHILS NFR BLD AUTO: 61 %
NITRITE URINE: POSITIVE
PH URINE: 5.5
PLATELET # BLD AUTO: 344 K/UL
POTASSIUM SERPL-SCNC: 4.1 MMOL/L
PROT SERPL-MCNC: 6.9 G/DL
PROT UR-MCNC: 18 MG/DL
PROTEIN URINE: ABNORMAL
RBC # BLD: 5.08 M/UL
RBC # FLD: 12.2 %
RED BLOOD CELLS URINE: 2 /HPF
SODIUM SERPL-SCNC: 138 MMOL/L
SPECIFIC GRAVITY URINE: 1.03
SQUAMOUS EPITHELIAL CELLS: 7 /HPF
UROBILINOGEN URINE: NORMAL
WBC # FLD AUTO: 4.81 K/UL
WHITE BLOOD CELLS URINE: 2 /HPF

## 2020-12-04 ENCOUNTER — APPOINTMENT (OUTPATIENT)
Dept: RHEUMATOLOGY | Facility: CLINIC | Age: 35
End: 2020-12-04
Payer: COMMERCIAL

## 2020-12-04 ENCOUNTER — LABORATORY RESULT (OUTPATIENT)
Age: 35
End: 2020-12-04

## 2020-12-04 PROCEDURE — 96375 TX/PRO/DX INJ NEW DRUG ADDON: CPT

## 2020-12-04 PROCEDURE — 99072 ADDL SUPL MATRL&STAF TM PHE: CPT

## 2020-12-04 PROCEDURE — 96415 CHEMO IV INFUSION ADDL HR: CPT

## 2020-12-04 PROCEDURE — 96413 CHEMO IV INFUSION 1 HR: CPT

## 2020-12-04 PROCEDURE — 36415 COLL VENOUS BLD VENIPUNCTURE: CPT

## 2020-12-18 ENCOUNTER — APPOINTMENT (OUTPATIENT)
Dept: RHEUMATOLOGY | Facility: CLINIC | Age: 35
End: 2020-12-18
Payer: COMMERCIAL

## 2020-12-23 PROBLEM — Z87.440 HISTORY OF URINARY TRACT INFECTION: Status: RESOLVED | Noted: 2020-03-07 | Resolved: 2020-12-23

## 2020-12-23 NOTE — PATIENT PROFILE ADULT - DO YOU FEEL UNSAFE AT HOME, WORK, OR SCHOOL?
Pt is a 24y.o.  @ 40.1wks by 31wk bert presents for IOL for post-edc. Pt denies ctx, LOF or VB and states good FM    Pt denies signs/ symptoms of covid-19 no

## 2021-01-22 ENCOUNTER — APPOINTMENT (OUTPATIENT)
Dept: RHEUMATOLOGY | Facility: CLINIC | Age: 36
End: 2021-01-22
Payer: COMMERCIAL

## 2021-01-22 VITALS
SYSTOLIC BLOOD PRESSURE: 103 MMHG | OXYGEN SATURATION: 90 % | WEIGHT: 128 LBS | HEART RATE: 88 BPM | DIASTOLIC BLOOD PRESSURE: 78 MMHG | RESPIRATION RATE: 14 BRPM | HEIGHT: 62 IN | BODY MASS INDEX: 23.55 KG/M2

## 2021-01-22 DIAGNOSIS — N92.6 IRREGULAR MENSTRUATION, UNSPECIFIED: ICD-10-CM

## 2021-01-22 PROCEDURE — 36415 COLL VENOUS BLD VENIPUNCTURE: CPT

## 2021-01-22 PROCEDURE — 99215 OFFICE O/P EST HI 40 MIN: CPT | Mod: 25

## 2021-01-22 PROCEDURE — 99072 ADDL SUPL MATRL&STAF TM PHE: CPT

## 2021-01-22 NOTE — REVIEW OF SYSTEMS
[As Noted in HPI] : as noted in HPI [Negative] : Heme/Lymph [Fever] : no fever [Chills] : no chills [Eye Pain] : no eye pain [Earache] : no earache [Nosebleeds] : no nosebleeds [Chest Pain] : no chest pain [Shortness Of Breath] : no shortness of breath [Wheezing] : no wheezing [Cough] : no cough [Vomiting] : no vomiting [Abdominal Pain] : no abdominal pain [Dysuria] : no dysuria [Joint Pain] : no joint pain [Skin Lesions] : no skin lesions [Dizziness] : no dizziness [Anxiety] : no anxiety [Easy Bleeding] : no tendency for easy bleeding [de-identified] : [place] much better in [place] -  [FreeTextEntry1] : missed period

## 2021-01-22 NOTE — ASSESSMENT
[FreeTextEntry1] : 29 yo F w/ SLE  (dx 2014, arthritis, +PAUL,  +dsDNA, +SM, +RNP, nephritis 2016, longitudinally extensive TM 2019),  and fibromyalgia (dx 2014).  \par \par #desire for pregnancy.   discussed at length r/b/a of pregnancy on self and baby today again.  \par - this is very complicated, high risk patient given multiple flares of lupus which in the past has been life/organ threatening.  this is further complicated by the fact that although patient's disease is currently in a relative period of  quiescence, she is on multiple medications that are not safe during pregnancy and needs to come off them prior to conception.  will have to monitor closely flare as she will be at very high risk for disease flare in preparation and during pregnancy.  patient and  articulate understanding\par -  and Mrs Luis note that they havent been sexually active though, and are thinking about egg harvesting in [place] ([place] too expensive).  her sensation is abnormal since the TM and this has affected their sexual life together. \par - rec hi risk ob\par - actively weaning off meds - DC MMF now and now on AZA --  tolerating it well\par - needs to see hi risk, nephro and pain management prior to pregnancy (everything on hold until after surgery)\par - tapering the duloxetine and doing okay \par - connect with gyn to see if there is help related to the sexual dysfunction related to the TM\par \par # SLE with nephritis (treated with cellcept+study),alopecia, arthritis, lymphopenia -complicated by longitudinal extensive transverse myelitis in feb 2019 s/p plex, rtx and pulse steroids.  MRI now normalized. \par - given anticipated desire for pregnancy - will dc MMF.\par - AZA started and tolerating\par - s/p RTX - holding on further doses\par \par #TM \par - last MRI okay - still with some urinary incontenance -chronic at this poitn \par - is drinking less at night as it seems to be related to not getting got bathroom in time\par - reinforced to see neurology\par #FMS:\par -down to duloxetine 20 mg and doing okay - taper to alternate day then off\par - f/u pain mgmt - regarding pain control during pregnancy- \par - in terms of gabapentin - needs to discuss with high risk and neurology how and what to transition to\par - PT\par \par #medication management / v58.69 \par - cd19\par - RTX, cellcept, steroids - PCP prophylaxis bactrim \par - about 60 kg - \par \par #social determinants \par -  supportive\par - paper filled out, signed and faxed

## 2021-01-23 LAB
25(OH)D3 SERPL-MCNC: 36.8 NG/ML
ALBUMIN SERPL ELPH-MCNC: 4.7 G/DL
ALP BLD-CCNC: 88 U/L
ALT SERPL-CCNC: 19 U/L
ANION GAP SERPL CALC-SCNC: 13 MMOL/L
AST SERPL-CCNC: 19 U/L
BASOPHILS # BLD AUTO: 0.03 K/UL
BASOPHILS NFR BLD AUTO: 0.7 %
BILIRUB SERPL-MCNC: 0.5 MG/DL
BUN SERPL-MCNC: 13 MG/DL
C3 SERPL-MCNC: 96 MG/DL
C4 SERPL-MCNC: 30 MG/DL
CALCIUM SERPL-MCNC: 8.7 MG/DL
CHLORIDE SERPL-SCNC: 105 MMOL/L
CO2 SERPL-SCNC: 21 MMOL/L
CREAT SERPL-MCNC: 0.58 MG/DL
CRP SERPL-MCNC: <0.1 MG/DL
EOSINOPHIL # BLD AUTO: 0.17 K/UL
EOSINOPHIL NFR BLD AUTO: 4.1 %
ERYTHROCYTE [SEDIMENTATION RATE] IN BLOOD BY WESTERGREN METHOD: 10 MM/HR
HCT VFR BLD CALC: 46.7 %
HGB BLD-MCNC: 15.9 G/DL
IMM GRANULOCYTES NFR BLD AUTO: 0 %
LYMPHOCYTES # BLD AUTO: 1.06 K/UL
LYMPHOCYTES NFR BLD AUTO: 25.5 %
MAN DIFF?: NORMAL
MCHC RBC-ENTMCNC: 31 PG
MCHC RBC-ENTMCNC: 34 GM/DL
MCV RBC AUTO: 91 FL
MONOCYTES # BLD AUTO: 0.49 K/UL
MONOCYTES NFR BLD AUTO: 11.8 %
NEUTROPHILS # BLD AUTO: 2.41 K/UL
NEUTROPHILS NFR BLD AUTO: 57.9 %
PLATELET # BLD AUTO: 273 K/UL
POTASSIUM SERPL-SCNC: 4 MMOL/L
PROT SERPL-MCNC: 7 G/DL
RBC # BLD: 5.13 M/UL
RBC # FLD: 12.1 %
SODIUM SERPL-SCNC: 139 MMOL/L
WBC # FLD AUTO: 4.16 K/UL

## 2021-01-25 LAB
APPEARANCE: CLEAR
BACTERIA: ABNORMAL
BILIRUBIN URINE: NEGATIVE
BLOOD URINE: NEGATIVE
CD16+CD56+ CELLS # BLD: 103 /UL
CD16+CD56+ CELLS NFR BLD: 13 %
CD19 CELLS NFR BLD: 0 /UL
CD3 CELLS # BLD: 669 /UL
CD3 CELLS NFR BLD: 84 %
CD3+CD4+ CELLS # BLD: 402 /UL
CD3+CD4+ CELLS NFR BLD: 50 %
CD3+CD4+ CELLS/CD3+CD8+ CLL SPEC: 1.55 RATIO
CD3+CD8+ CELLS # SPEC: 260 /UL
CD3+CD8+ CELLS NFR BLD: 32 %
CELLS.CD3-CD19+/CELLS IN BLOOD: <1 %
COLOR: YELLOW
CREAT SPEC-SCNC: 153 MG/DL
CREAT/PROT UR: 0.2 RATIO
DSDNA AB SER-ACNC: 42 IU/ML
GLUCOSE QUALITATIVE U: NEGATIVE
HYALINE CASTS: 1 /LPF
KETONES URINE: NEGATIVE
LEUKOCYTE ESTERASE URINE: NEGATIVE
MICROSCOPIC-UA: NORMAL
NITRITE URINE: NEGATIVE
PH URINE: 6
PROT UR-MCNC: 23 MG/DL
PROTEIN URINE: ABNORMAL
RED BLOOD CELLS URINE: 3 /HPF
SPECIFIC GRAVITY URINE: 1.03
SQUAMOUS EPITHELIAL CELLS: 1 /HPF
UROBILINOGEN URINE: NORMAL
WHITE BLOOD CELLS URINE: 0 /HPF

## 2021-02-06 NOTE — H&P ADULT - NSICDXPASTMEDICALHX_GEN_ALL_CORE_FT
PAST MEDICAL HISTORY:  Fibromyalgia     GERD (gastroesophageal reflux disease)     Lupus     Lupus nephritis
3

## 2021-02-26 ENCOUNTER — APPOINTMENT (OUTPATIENT)
Dept: RHEUMATOLOGY | Facility: CLINIC | Age: 36
End: 2021-02-26
Payer: COMMERCIAL

## 2021-02-26 VITALS
SYSTOLIC BLOOD PRESSURE: 118 MMHG | BODY MASS INDEX: 23.55 KG/M2 | HEIGHT: 62 IN | OXYGEN SATURATION: 97 % | HEART RATE: 88 BPM | WEIGHT: 128 LBS | DIASTOLIC BLOOD PRESSURE: 78 MMHG

## 2021-02-26 PROCEDURE — 99215 OFFICE O/P EST HI 40 MIN: CPT | Mod: 25

## 2021-02-26 PROCEDURE — 36415 COLL VENOUS BLD VENIPUNCTURE: CPT

## 2021-02-26 PROCEDURE — 99072 ADDL SUPL MATRL&STAF TM PHE: CPT

## 2021-02-26 NOTE — ASSESSMENT
[FreeTextEntry1] : 31 yo F w/ SLE  (dx 2014, arthritis, +PAUL,  +dsDNA, +SM, +RNP, nephritis 2016, longitudinally extensive TM 2019),  and fibromyalgia (dx 2014).  \par \par complicated pateitn with multiple issues on high risk meds and contemplating hiiugh risk pregnancy\par \par #s/p fall \par - now with subcutaneous lump \par - seems to be better -0 if worsens or does get better will xray\par \par #desire for pregnancy.   discussed at length r/b/a of pregnancy on self and baby today again.  \par - this is very complicated, high risk patient given multiple flares of lupus which in the past has been life/organ threatening.  this is further complicated by the fact that although patient's disease is currently in a relative period of  quiescence, she is on multiple medications that are not safe during pregnancy and needs to come off them prior to conception.  will have to monitor closely flare as she will be at very high risk for disease flare in preparation and during pregnancy.  patient and  articulate understanding\par -  and Mrs Luis note that they havent been sexually active though, and are thinking about egg harvesting in [place] ([place] too expensive).  her sensation is abnormal since the TM and this has affected their sexual life together. \par - rec hi risk ob\par - actively weaning off meds - DC MMF now and now on AZA --  tolerating it well\par - needs to see hi risk, nephro and pain management prior to pregnancy (everything on hold until after surgery)\par - tapering the duloxetine and doing okay - but will hold here as they are recontemplating at this and is [place] help in [place] at this time\par - connect with gyn to see if there is help related to the sexual dysfunction related to the TM\par \par # SLE with nephritis (treated with cellcept+study),alopecia, arthritis, lymphopenia -complicated by longitudinal extensive transverse myelitis in feb 2019 s/p plex, rtx and pulse steroids.  MRI now normalized. \par - given anticipated desire for pregnancy - will dc MMF.\par - AZA started and tolerating\par - s/p RTX - holding on further doses\par \par #TM \par - last MRI okay - still with some urinary incontenance -chronic at this poitn \par - is drinking less at night as it seems to be related to not getting got bathroom in time\par - reinforced to see neurology\par \par #FMS:\par -down to duloxetine 20 mg and doing okay - wants to stay here\par - f/u pain mgmt - regarding pain control during pregnancy- \par - in terms of gabapentin - needs to discuss with high risk and neurology how and what to transition to\par - PT\par \par #medication management / v58.69 \par - cd19\par - RTX, cellcept, steroids - PCP prophylaxis bactrim \par - about 60 kg - \par \par #social determinants \par -  supportive\par - paper filled out, signed and faxed - brought it back - needs it adjusted\par - [employer] - resend all meds\par

## 2021-02-26 NOTE — REVIEW OF SYSTEMS
[As Noted in HPI] : as noted in HPI [Negative] : Heme/Lymph [Fever] : no fever [Chills] : no chills [Eye Pain] : no eye pain [Earache] : no earache [Nosebleeds] : no nosebleeds [Chest Pain] : no chest pain [Shortness Of Breath] : no shortness of breath [Wheezing] : no wheezing [Cough] : no cough [Abdominal Pain] : no abdominal pain [Vomiting] : no vomiting [Dysuria] : no dysuria [Joint Pain] : no joint pain [Skin Lesions] : no skin lesions [Dizziness] : no dizziness [Anxiety] : no anxiety [Easy Bleeding] : no tendency for easy bleeding [de-identified] : mood much better in [place] -  [FreeTextEntry1] : missed period

## 2021-02-26 NOTE — HISTORY OF PRESENT ILLNESS
[FreeTextEntry1] : INTERVAL HX\par Here to review several issues\par #s/p fall 2 days ago - on bottom but down the stairs.  now a lump.  getting gbetter.  able to walk\par #SLE - TM.  Continues to do well. the paraesthesias persist but are not worsening.   came down on the gabapentin but couldn’t stop 2/2 to worsening feeling.  has little to now feeling below the umbilicus - no pain though.   has had a few episodes of urinary incontinence - over the last several months.  not worsening, the issue is doesn’t feel that has to go until later and then there is no time to get quickly to the bathroom. \par #SLE- LN.  denies swelling of legs, rash, arthritis, urinary symptoms. tolerating the cellcept\par - denies swelling of joints stopped cellcept - no changes\par - urine without change in color and no bubbles.  however had a couple of times a loss of urine.  this never happened to her before.  no change in bowle\par \par #contemplating pregnancy.  really wants to consider pregnancy - but now in early 2021\par - hasn’t seen hi risk yet\par - is strongly considering doing this in [place]\par - hasn’t been sexually active now 2/2 the different feeling below the belly - this isnt new and denies pain.

## 2021-02-27 LAB
25(OH)D3 SERPL-MCNC: 32.6 NG/ML
ALBUMIN SERPL ELPH-MCNC: 4.7 G/DL
ALP BLD-CCNC: 102 U/L
ALT SERPL-CCNC: 36 U/L
ANION GAP SERPL CALC-SCNC: 12 MMOL/L
AST SERPL-CCNC: 26 U/L
BASOPHILS # BLD AUTO: 0.02 K/UL
BASOPHILS NFR BLD AUTO: 0.4 %
BILIRUB SERPL-MCNC: 1.2 MG/DL
BUN SERPL-MCNC: 13 MG/DL
CALCIUM SERPL-MCNC: 9.4 MG/DL
CD16+CD56+ CELLS # BLD: 147 /UL
CD16+CD56+ CELLS NFR BLD: 16 %
CD19 CELLS NFR BLD: 0 /UL
CD3 CELLS # BLD: 758 /UL
CD3 CELLS NFR BLD: 80 %
CD3+CD4+ CELLS # BLD: 432 /UL
CD3+CD4+ CELLS NFR BLD: 45 %
CD3+CD4+ CELLS/CD3+CD8+ CLL SPEC: 1.44 RATIO
CD3+CD8+ CELLS # SPEC: 301 /UL
CD3+CD8+ CELLS NFR BLD: 31 %
CELLS.CD3-CD19+/CELLS IN BLOOD: <1 %
CHLORIDE SERPL-SCNC: 104 MMOL/L
CO2 SERPL-SCNC: 23 MMOL/L
CREAT SERPL-MCNC: 0.57 MG/DL
CREAT SPEC-SCNC: 128 MG/DL
CREAT/PROT UR: 0.2 RATIO
CRP SERPL-MCNC: <0.1 MG/DL
EOSINOPHIL # BLD AUTO: 0.12 K/UL
EOSINOPHIL NFR BLD AUTO: 2.6 %
ERYTHROCYTE [SEDIMENTATION RATE] IN BLOOD BY WESTERGREN METHOD: 9 MM/HR
HCT VFR BLD CALC: 47.6 %
HGB BLD-MCNC: 15.5 G/DL
IMM GRANULOCYTES NFR BLD AUTO: 0.2 %
LYMPHOCYTES # BLD AUTO: 0.93 K/UL
LYMPHOCYTES NFR BLD AUTO: 20.2 %
MAN DIFF?: NORMAL
MCHC RBC-ENTMCNC: 29.8 PG
MCHC RBC-ENTMCNC: 32.6 GM/DL
MCV RBC AUTO: 91.4 FL
MONOCYTES # BLD AUTO: 0.49 K/UL
MONOCYTES NFR BLD AUTO: 10.6 %
NEUTROPHILS # BLD AUTO: 3.04 K/UL
NEUTROPHILS NFR BLD AUTO: 66 %
PLATELET # BLD AUTO: 332 K/UL
POTASSIUM SERPL-SCNC: 3.9 MMOL/L
PROT SERPL-MCNC: 7 G/DL
PROT UR-MCNC: 19 MG/DL
RBC # BLD: 5.21 M/UL
RBC # FLD: 12.3 %
SODIUM SERPL-SCNC: 139 MMOL/L
WBC # FLD AUTO: 4.61 K/UL

## 2021-03-01 LAB
C3 SERPL-MCNC: 136 MG/DL
C4 SERPL-MCNC: 37 MG/DL
DEPRECATED KAPPA LC FREE/LAMBDA SER: 0.92 RATIO
DSDNA AB SER-ACNC: 44 IU/ML
IGA SER QL IEP: 149 MG/DL
IGG SER QL IEP: 943 MG/DL
IGM SER QL IEP: 50 MG/DL
KAPPA LC CSF-MCNC: 1.42 MG/DL
KAPPA LC SERPL-MCNC: 1.3 MG/DL

## 2021-03-09 ENCOUNTER — EMERGENCY (EMERGENCY)
Facility: HOSPITAL | Age: 36
LOS: 1 days | Discharge: HOME | End: 2021-03-09
Attending: EMERGENCY MEDICINE
Payer: COMMERCIAL

## 2021-03-09 VITALS
RESPIRATION RATE: 16 BRPM | SYSTOLIC BLOOD PRESSURE: 114 MMHG | WEIGHT: 130.07 LBS | DIASTOLIC BLOOD PRESSURE: 87 MMHG | HEIGHT: 61 IN | HEART RATE: 93 BPM | TEMPERATURE: 98 F | OXYGEN SATURATION: 98 %

## 2021-03-09 DIAGNOSIS — Z98.890 OTHER SPECIFIED POSTPROCEDURAL STATES: Chronic | ICD-10-CM

## 2021-03-09 LAB — SARS-COV-2 RNA SPEC QL NAA+PROBE: DETECTED

## 2021-03-09 PROCEDURE — 99283 EMERGENCY DEPT VISIT LOW MDM: CPT

## 2021-03-09 PROCEDURE — 99284 EMERGENCY DEPT VISIT MOD MDM: CPT

## 2021-03-09 PROCEDURE — U0003: CPT

## 2021-03-09 PROCEDURE — U0005: CPT

## 2021-03-09 NOTE — ED PROVIDER NOTE - CLINICAL SUMMARY MEDICAL DECISION MAKING FREE TEXT BOX
Pt nontoxic appearing, stable vitals,+ sick contact at home with covid . pt advised about self-quarantine instructions until negative test results and/or symptom resolution. pt advised on hand hygiene, monitoring of symptoms, antipyretic use as well as and fu with primary care provider. Instructions given in pre-printed copy.

## 2021-03-09 NOTE — ED PROVIDER NOTE - ATTENDING CONTRIBUTION TO CARE
34 yo F +covid exposure () p/w subj fever. neg covid test 8 days ago  vss, afebrile  pe unremarkable    covid swab  dc with pmd f/u  quarantine precautions  hypoxia precautions  Based on the H&P, I do not suspect any life- / limb- threatening pathology that requires further intervention at this time.

## 2021-03-09 NOTE — ED ADULT TRIAGE NOTE - CHIEF COMPLAINT QUOTE
Pt with hx of Lupus states having same symptoms as her  who tested COVID + recently, fever and mild SOB.

## 2021-03-09 NOTE — ED PROVIDER NOTE - NS ED ROS FT
Denies fever, chills, Denies HA, Dizziness.  ENMT: +URI symptoms, denies difficulty swallowing, sore throat, loss of taste or smell.   CARDIO: Denies CP, palpitations, edema.   RESP: Denies Cough, Diff breathing, mild sob   GI: Denies N/V, ABD pain,   MS: Denies joint pain, back pain, weakness, decreased ROM, swelling.

## 2021-03-09 NOTE — ED PROVIDER NOTE - PHYSICAL EXAMINATION
Gen: Well appearing in NAD  Head: NC/AT  Neck: trachea midline  heart rrr s1s2   Resp:  No distress  Ext: no deformities  Neuro:  A&O appears non focal  Skin:  Warm and dry as visualized  Psych:  Normal affect and mood

## 2021-03-09 NOTE — ED PROVIDER NOTE - OBJECTIVE STATEMENT
36 yo female hx of lups presenting with  whom was dx with covid last monday, she states that she tested negative on monday of last week but currently having mild shortness of breath no chest pain URI symptoms, no loss of taste or smell one episode of loose stool no abd pain. reports that she has not retested for covid since last monday.   denies current chest pain sob nausea vomiting diarrhea abd pain fevers chills

## 2021-04-09 ENCOUNTER — APPOINTMENT (OUTPATIENT)
Dept: RHEUMATOLOGY | Facility: CLINIC | Age: 36
End: 2021-04-09
Payer: COMMERCIAL

## 2021-04-09 ENCOUNTER — LABORATORY RESULT (OUTPATIENT)
Age: 36
End: 2021-04-09

## 2021-04-09 VITALS
WEIGHT: 134 LBS | HEART RATE: 94 BPM | OXYGEN SATURATION: 97 % | DIASTOLIC BLOOD PRESSURE: 76 MMHG | BODY MASS INDEX: 24.66 KG/M2 | SYSTOLIC BLOOD PRESSURE: 110 MMHG | HEIGHT: 62 IN

## 2021-04-09 PROCEDURE — 99214 OFFICE O/P EST MOD 30 MIN: CPT | Mod: 25

## 2021-04-09 PROCEDURE — 99072 ADDL SUPL MATRL&STAF TM PHE: CPT

## 2021-04-09 PROCEDURE — 36415 COLL VENOUS BLD VENIPUNCTURE: CPT

## 2021-04-09 NOTE — REVIEW OF SYSTEMS
[As Noted in HPI] : as noted in HPI [Negative] : Heme/Lymph [Fever] : no fever [Chills] : no chills [Eye Pain] : no eye pain [Earache] : no earache [Nosebleeds] : no nosebleeds [Chest Pain] : no chest pain [Shortness Of Breath] : no shortness of breath [Wheezing] : no wheezing [Cough] : no cough [Abdominal Pain] : no abdominal pain [Vomiting] : no vomiting [Dysuria] : no dysuria [Joint Pain] : no joint pain [Skin Lesions] : no skin lesions [Dizziness] : no dizziness [Anxiety] : no anxiety [Easy Bleeding] : no tendency for easy bleeding [de-identified] : mood much better in [place] -  [FreeTextEntry1] : missed period

## 2021-04-09 NOTE — ASSESSMENT
[FreeTextEntry1] : 29 yo F w/ SLE  (dx 2014, arthritis, +PAUL,  +dsDNA, +SM, +RNP, nephritis 2016, longitudinally extensive TM 2019),  and fibromyalgia (dx 2014).  \par \par high risk ob - fax [phone]\par pain mgmt on monday  - fax [phone] Shorty Morris\par uro-gynecology - [facility] \par \par #desire for pregnancy.   discussed at length r/b/a of pregnancy on self and baby today again.  \par - this is very complicated, high risk patient given multiple flares of lupus which in the past has been life/organ threatening.  this is further complicated by the fact that although patient's disease is currently in a relative period of  quiescence, she is on multiple medications that are not safe during pregnancy and needs to come off them prior to conception.  will have to monitor closely flare as she will be at very high risk for disease flare in preparation and during pregnancy.  patient and  articulate understanding\par -  and Mrs Luis note that they havent been sexually active though, and are thinking about egg harvesting in [place] ([place] too expensive).  her sensation is abnormal since the TM and this has affected their sexual life together. \par - rec hi risk ob\par - actively weaning off meds - DC MMF now and now on AZA --  tolerating it well\par - needs to see hi risk, nephro and pain management prior to pregnancy (everything on hold until after surgery)\par - tapering the duloxetine and doing okay \par - connect with gyn to see if there is help related to the sexual dysfunction related to the TM\par - will send records as requested\par \par # SLE with nephritis (treated with cellcept+study),alopecia, arthritis, lymphopenia -complicated by longitudinal extensive transverse myelitis in feb 2019 s/p plex, rtx and pulse steroids.  MRI now normalized. \par - given anticipated desire for pregnancy - will dc MMF.\par - AZA started and tolerating\par - s/p RTX - holding on further doses\par \par #TM \par - last MRI okay - still with some urinary incontenance -chronic at this poitn \par - is drinking less at night as it seems to be related to not getting got bathroom in time\par - reinforced to see neurology\par \par #FMS:\par -down to duloxetine 20 mg and doing okay - taper to alternate day then off\par - f/u pain mgmt - regarding pain control during pregnancy- \par - in terms of gabapentin - needs to discuss with high risk and neurology how and what to transition to\par - PT\par \par #medication management / v58.69 \par - cd19\par - RTX, cellcept, steroids - PCP prophylaxis bactrim \par - about 60 kg - \par \par #covid.  march 2021. \par - seemd to be okay and exam okay \par - check ab - to assess if was able to make ab to it\par #social determinants \par -  supportive\par

## 2021-04-09 NOTE — HISTORY OF PRESENT ILLNESS
[FreeTextEntry1] : INTERVAL HX\par Here to review several issues\par #covid - s/p covid infection - beginning of march.  had minimal symptoms though fever myalgiua.  doing better now and not sob or any symptoms\par #continues to have uti - going to urogynecology\par #SLE - TM.  Continues to do well. the paraesthesias persist but are not worsening.   came down on the gabapentin but couldn’t stop 2/2 to worsening feeling.  has little to now feeling below the umbilicus - no pain though.   has had a few episodes of urinary incontinence - over the last several months.  not worsening, the issue is doesn’t feel that has to go until later and then there is no time to get quickly to the bathroom. \par #SLE- LN.  denies swelling of legs, rash, arthritis, urinary symptoms. tolerating the cellcept\par - denies swelling of joints stopped cellcept - no changes\par - urine without change in color and no bubbles.  however had a couple of times a loss of urine.  this never happened to her before.  no change in bowle\par #constipation and hemorrhoids.  has a small fistula - surgery went well - healing as anticipated - surgeon feels MMF being held has helped this too\par #contemplating pregnancy.  really wants to consider pregnancy - but now in early 2021\par - hasn’t seen hi risk yet\par - hasn’t been sexually active now 2/2 the different feeling below the belly - this isnt new and denies pain.

## 2021-04-12 LAB
24R-OH-CALCIDIOL SERPL-MCNC: 55.3 PG/ML
ALBUMIN SERPL ELPH-MCNC: 4.6 G/DL
ALP BLD-CCNC: 98 U/L
ALT SERPL-CCNC: 20 U/L
ANION GAP SERPL CALC-SCNC: 11 MMOL/L
APPEARANCE: ABNORMAL
AST SERPL-CCNC: 16 U/L
BASOPHILS # BLD AUTO: 0.03 K/UL
BASOPHILS NFR BLD AUTO: 0.6 %
BILIRUB SERPL-MCNC: 0.5 MG/DL
BILIRUBIN URINE: NEGATIVE
BLOOD URINE: NEGATIVE
BUN SERPL-MCNC: 12 MG/DL
C3 SERPL-MCNC: 111 MG/DL
C4 SERPL-MCNC: 32 MG/DL
CALCIUM SERPL-MCNC: 9.6 MG/DL
CD16+CD56+ CELLS # BLD: 148 /UL
CD16+CD56+ CELLS NFR BLD: 14 %
CD19 CELLS NFR BLD: 0 /UL
CD3 CELLS # BLD: 879 /UL
CD3 CELLS NFR BLD: 83 %
CD3+CD4+ CELLS # BLD: 582 /UL
CD3+CD4+ CELLS NFR BLD: 55 %
CD3+CD4+ CELLS/CD3+CD8+ CLL SPEC: 2.04 RATIO
CD3+CD8+ CELLS # SPEC: 286 /UL
CD3+CD8+ CELLS NFR BLD: 27 %
CELLS.CD3-CD19+/CELLS IN BLOOD: <1 %
CHLORIDE SERPL-SCNC: 101 MMOL/L
CO2 SERPL-SCNC: 28 MMOL/L
COLOR: YELLOW
COVID-19 NUCLEOCAPSID  GAM ANTIBODY INTERPRETATION: NEGATIVE
COVID-19 SPIKE DOMAIN ANTIBODY INTERPRETATION: NEGATIVE
CREAT SERPL-MCNC: 0.48 MG/DL
CREAT SPEC-SCNC: 154 MG/DL
CREAT/PROT UR: 0.1 RATIO
DEPRECATED KAPPA LC FREE/LAMBDA SER: 0.94 RATIO
DSDNA AB SER-ACNC: 28 IU/ML
EOSINOPHIL # BLD AUTO: 0.11 K/UL
EOSINOPHIL NFR BLD AUTO: 2.4 %
GLUCOSE QUALITATIVE U: NEGATIVE
HCT VFR BLD CALC: 48 %
HGB BLD-MCNC: 15.3 G/DL
IGA SER QL IEP: 139 MG/DL
IGG SER QL IEP: 961 MG/DL
IGM SER QL IEP: 51 MG/DL
IMM GRANULOCYTES NFR BLD AUTO: 0.6 %
KAPPA LC CSF-MCNC: 1.55 MG/DL
KAPPA LC SERPL-MCNC: 1.45 MG/DL
KETONES URINE: NORMAL
LEUKOCYTE ESTERASE URINE: NEGATIVE
LYMPHOCYTES # BLD AUTO: 1.03 K/UL
LYMPHOCYTES NFR BLD AUTO: 22.3 %
MAN DIFF?: NORMAL
MCHC RBC-ENTMCNC: 29.7 PG
MCHC RBC-ENTMCNC: 31.9 GM/DL
MCV RBC AUTO: 93.2 FL
MONOCYTES # BLD AUTO: 0.73 K/UL
MONOCYTES NFR BLD AUTO: 15.8 %
NEUTROPHILS # BLD AUTO: 2.69 K/UL
NEUTROPHILS NFR BLD AUTO: 58.3 %
NITRITE URINE: NEGATIVE
PH URINE: 6
PLATELET # BLD AUTO: 303 K/UL
POTASSIUM SERPL-SCNC: 4.2 MMOL/L
PROT SERPL-MCNC: 6.9 G/DL
PROT UR-MCNC: 15 MG/DL
PROTEIN URINE: ABNORMAL
RBC # BLD: 5.15 M/UL
RBC # FLD: 12.8 %
SARS-COV-2 AB SERPL IA-ACNC: 0.4 U/ML
SARS-COV-2 AB SERPL QL IA: 0.8 INDEX
SODIUM SERPL-SCNC: 140 MMOL/L
SPECIFIC GRAVITY URINE: 1.03
UROBILINOGEN URINE: NORMAL
WBC # FLD AUTO: 4.62 K/UL

## 2021-05-05 ENCOUNTER — APPOINTMENT (OUTPATIENT)
Dept: MATERNAL FETAL MEDICINE | Facility: CLINIC | Age: 36
End: 2021-05-05

## 2021-05-05 ENCOUNTER — ASOB RESULT (OUTPATIENT)
Age: 36
End: 2021-05-05

## 2021-05-13 NOTE — ED ADULT TRIAGE NOTE - PRO INTERPRETER NEED 2
Instructed to call immediately if any new distortion, blurring, decreased vision or eye pain. English

## 2021-05-14 ENCOUNTER — APPOINTMENT (OUTPATIENT)
Dept: RHEUMATOLOGY | Facility: CLINIC | Age: 36
End: 2021-05-14
Payer: COMMERCIAL

## 2021-05-14 PROCEDURE — 99215 OFFICE O/P EST HI 40 MIN: CPT | Mod: 25

## 2021-05-14 PROCEDURE — 99072 ADDL SUPL MATRL&STAF TM PHE: CPT

## 2021-05-14 PROCEDURE — 36415 COLL VENOUS BLD VENIPUNCTURE: CPT

## 2021-05-14 NOTE — HISTORY OF PRESENT ILLNESS
[FreeTextEntry1] : INTERVAL HX\par Here to review several issues\par #continues to have uti - going to urogynecology.  they did a procedure - flooded the bladder.  now had incontnence x 2 over the last several days.  still waiting for another f/u test\par \par #SLE - TM.  Continues to do well. the paraesthesias persist but are not worsening.   denies motor issues and no bowel incontinence.  seeing neurology next week\par \par #SLE- LN.  denies swelling of legs, rash, arthritis, urinary symptoms. tolerating the cellcept\par - denies swelling of joints stopped cellcept - no changes\par - urine without change in color and no bubbles.  no change in bowel \par - no new symtpoms \par - no swelling of ankles\par - no back pain and stornger in the legs than befor e- going to neuro tuesda.  will d/w thenm changing baclofen to something safe in prgnancy\par \par #chornic pain - fms - \par - saw pain mgmt - juan increased \par - cymbalta at 19 - high risk ob said this would be okay \par \par \par #contemplating pregnancy.  really wants to consider pregnancy - but now in early 2021\par - hasn’t seen hi risk yet\par - hasn’t been sexually active now 2/2 the different feeling below the belly - this isnt new and denies pain.

## 2021-05-14 NOTE — ASSESSMENT
[FreeTextEntry1] : 31 yo F w/ SLE  (dx 2014, arthritis, +PAUL,  +dsDNA, +SM, +RNP, nephritis 2016, longitudinally extensive TM 2019),  and fibromyalgia (dx 2014).  \par \par high risk ob - fax [phone]\par pain mgmt on monday  - fax [phone] Shorty Morris\par uro-gynecology - bert bocherylabdon \par \par complicated case \par severe disease \par ongoign issues \par \par #desire for pregnancy.   discussed at length r/b/a of pregnancy on self and baby today again.  \par - this is very complicated, high risk patient given multiple flares of lupus which in the past has been life/organ threatening.  this is further complicated by the fact that although patient's disease is currently in a relative period of  quiescence, she is on multiple medications that are not safe during pregnancy and needs to come off them prior to conception.  will have to monitor closely flare as she will be at very high risk for disease flare in preparation and during pregnancy.  patient and  articulate understanding\par -  and Mrs Luis note that they havent been sexually active though, and are thinking about egg harvesting in [place] ([place] too expensive).  her sensation is abnormal since the TM and this has affected their sexual life together. \par - has seen hi risk ob - who sayd that if cannot get off the cymbalta can keep the low dose\par - actively weaning off meds - DC MMF now and now on AZA --  tolerating it well\par - connect with gyn to see if there is help related to the sexual dysfunction related to the TM\par \par # SLE with nephritis (treated with cellcept+study),alopecia, arthritis, lymphopenia -complicated by longitudinal extensive transverse myelitis in feb 2019 s/p plex, rtx and pulse steroids.  MRI now normalized. \par - given anticipated desire for pregnancy - will dc MMF.\par - AZA started and tolerating\par - s/p RTX - holding on further doses - d/w patient and  again.   want to have no meds prior to harvesting eggs this summer in [facility].  will continue to morintor \par \par #TM \par - last MRI okay - still with some urinary incontinence -chronic at this poitn \par - is drinking less at night as it seems to be related to not getting got bathroom in time\par - neurology next week \par - MRI spine to evaluate dand ensure TM not returning\par \par #FMS:\par -down to duloxetine 20 mg and doing okay - taper to alternate day then off\par - f/u pain mgmt - regarding pain control during pregnancy- \par - in terms of gabapentin - needs to discuss with high risk and neurology how and what to transition to\par - PT\par \par #urinary incontinence \par - seems mor elike stress incontinence and worse afte rth procedure \par - seeing gyn uro \par - doesn’t seem like her m - but given severity ofit the last time will get mri \par #medication management / v58.69 \par - cd19\par - RTX, cellcept, steroids - PCP prophylaxis bactrim \par - about 60 kg - \par \par #covid.  march 2021. \par - seemd to be okay and exam okay \par - check ab - to assess if was able to make ab to it\par \par #social determinants \par -  supportive\par

## 2021-05-14 NOTE — REVIEW OF SYSTEMS
[As Noted in HPI] : as noted in HPI [Negative] : Heme/Lymph [Fever] : no fever [Chills] : no chills [Eye Pain] : no eye pain [Earache] : no earache [Nosebleeds] : no nosebleeds [Chest Pain] : no chest pain [Shortness Of Breath] : no shortness of breath [Wheezing] : no wheezing [Cough] : no cough [Abdominal Pain] : no abdominal pain [Vomiting] : no vomiting [Dysuria] : no dysuria [Joint Pain] : no joint pain [Skin Lesions] : no skin lesions [Dizziness] : no dizziness [Anxiety] : no anxiety [Easy Bleeding] : no tendency for easy bleeding [de-identified] : mood much better in [place] -  [FreeTextEntry1] : missed period

## 2021-05-15 LAB
25(OH)D3 SERPL-MCNC: 32.9 NG/ML
ALBUMIN SERPL ELPH-MCNC: 5 G/DL
ALP BLD-CCNC: 108 U/L
ALT SERPL-CCNC: 27 U/L
ANION GAP SERPL CALC-SCNC: 15 MMOL/L
AST SERPL-CCNC: 19 U/L
BASOPHILS # BLD AUTO: 0.03 K/UL
BASOPHILS NFR BLD AUTO: 0.4 %
BILIRUB SERPL-MCNC: 0.7 MG/DL
BUN SERPL-MCNC: 15 MG/DL
C3 SERPL-MCNC: 113 MG/DL
C4 SERPL-MCNC: 30 MG/DL
CALCIUM SERPL-MCNC: 9.5 MG/DL
CHLORIDE SERPL-SCNC: 100 MMOL/L
CO2 SERPL-SCNC: 23 MMOL/L
CREAT SERPL-MCNC: 0.49 MG/DL
CREAT SPEC-SCNC: 158 MG/DL
CREAT/PROT UR: 0.1 RATIO
DEPRECATED KAPPA LC FREE/LAMBDA SER: 0.92 RATIO
EOSINOPHIL # BLD AUTO: 0.13 K/UL
EOSINOPHIL NFR BLD AUTO: 1.9 %
HCT VFR BLD CALC: 45.6 %
HGB BLD-MCNC: 15.4 G/DL
IGA SER QL IEP: 165 MG/DL
IGG SER QL IEP: 1063 MG/DL
IGM SER QL IEP: 58 MG/DL
IMM GRANULOCYTES NFR BLD AUTO: 0.4 %
KAPPA LC CSF-MCNC: 1.46 MG/DL
KAPPA LC SERPL-MCNC: 1.34 MG/DL
LYMPHOCYTES # BLD AUTO: 1.48 K/UL
LYMPHOCYTES NFR BLD AUTO: 21.5 %
MAN DIFF?: NORMAL
MCHC RBC-ENTMCNC: 30.3 PG
MCHC RBC-ENTMCNC: 33.8 GM/DL
MCV RBC AUTO: 89.8 FL
MONOCYTES # BLD AUTO: 0.74 K/UL
MONOCYTES NFR BLD AUTO: 10.8 %
NEUTROPHILS # BLD AUTO: 4.47 K/UL
NEUTROPHILS NFR BLD AUTO: 65 %
PLATELET # BLD AUTO: 309 K/UL
POTASSIUM SERPL-SCNC: 4.1 MMOL/L
PROT SERPL-MCNC: 7.7 G/DL
PROT UR-MCNC: 12 MG/DL
RBC # BLD: 5.08 M/UL
RBC # FLD: 13.2 %
SODIUM SERPL-SCNC: 138 MMOL/L
WBC # FLD AUTO: 6.88 K/UL

## 2021-05-17 LAB
CD16+CD56+ CELLS # BLD: 106 /UL
CD16+CD56+ CELLS NFR BLD: 10 %
CD19 CELLS NFR BLD: 4 /UL
CD3 CELLS # BLD: 959 /UL
CD3 CELLS NFR BLD: 85 %
CD3+CD4+ CELLS # BLD: 617 /UL
CD3+CD4+ CELLS NFR BLD: 54 %
CD3+CD4+ CELLS/CD3+CD8+ CLL SPEC: 1.92 RATIO
CD3+CD8+ CELLS # SPEC: 321 /UL
CD3+CD8+ CELLS NFR BLD: 28 %
CELLS.CD3-CD19+/CELLS IN BLOOD: <1 %

## 2021-05-18 LAB — DSDNA AB SER-ACNC: 38 IU/ML

## 2021-06-03 ENCOUNTER — APPOINTMENT (OUTPATIENT)
Dept: ENDOCRINOLOGY | Facility: CLINIC | Age: 36
End: 2021-06-03
Payer: COMMERCIAL

## 2021-06-03 VITALS — DIASTOLIC BLOOD PRESSURE: 74 MMHG | HEART RATE: 88 BPM | SYSTOLIC BLOOD PRESSURE: 108 MMHG | OXYGEN SATURATION: 98 %

## 2021-06-03 DIAGNOSIS — N91.2 AMENORRHEA, UNSPECIFIED: ICD-10-CM

## 2021-06-03 PROCEDURE — 36415 COLL VENOUS BLD VENIPUNCTURE: CPT

## 2021-06-03 PROCEDURE — 99072 ADDL SUPL MATRL&STAF TM PHE: CPT

## 2021-06-03 PROCEDURE — 99214 OFFICE O/P EST MOD 30 MIN: CPT | Mod: 25

## 2021-06-03 NOTE — PHYSICAL EXAM
[Alert] : alert [Well Nourished] : well nourished [No Acute Distress] : no acute distress [Well Developed] : well developed [Normal Sclera/Conjunctiva] : normal sclera/conjunctiva [EOMI] : extra ocular movement intact [No Proptosis] : no proptosis [Normal Oropharynx] : the oropharynx was normal [Thyroid Not Enlarged] : the thyroid was not enlarged [No Thyroid Nodules] : no palpable thyroid nodules [No Respiratory Distress] : no respiratory distress [No Accessory Muscle Use] : no accessory muscle use [Clear to Auscultation] : lungs were clear to auscultation bilaterally [Normal S1, S2] : normal S1 and S2 [Normal Rate] : heart rate was normal [Regular Rhythm] : with a regular rhythm [No Edema] : no peripheral edema [Pedal Pulses Normal] : the pedal pulses are present [Normal Bowel Sounds] : normal bowel sounds [Not Tender] : non-tender [Not Distended] : not distended [Soft] : abdomen soft [Normal Anterior Cervical Nodes] : no anterior cervical lymphadenopathy [Normal Posterior Cervical Nodes] : no posterior cervical lymphadenopathy [No Spinal Tenderness] : no spinal tenderness [Spine Straight] : spine straight [No Stigmata of Cushings Syndrome] : no stigmata of Cushings Syndrome [Normal Gait] : normal gait [Normal Strength/Tone] : muscle strength and tone were normal [Acanthosis Nigricans] : no acanthosis nigricans [No Rash] : no rash [Normal Reflexes] : deep tendon reflexes were 2+ and symmetric [No Tremors] : no tremors [Oriented x3] : oriented to person, place, and time

## 2021-06-03 NOTE — HISTORY OF PRESENT ILLNESS
[FreeTextEntry1] : Ms Mague Luis is a 35 year old female with past medical history of SLE on chronic steroids, transverse myelitis, amenorrhea who presents for follow-up. Patient has been still undergoing treatment for SLE and TM. Rheumatology has transitioned certain medications to optimize patient for egg retrieval this summer. Patient was having menses after last follow up but it stopped in he last 4 months.\par \par Prior History\par She was dx with SLE in 2014. In Dec 2018 she developed transverse myelitis and was hospitalized for 3 months. She was placed on high dose IV steroids, plasmapharesis. She has continued on prednisone, rituxan and cellcept. She was eventually tapered off of prednisone end of last months.\par \par Re Amenorrhea\par Menarche at age 11. It was always regular. \par She had a miscarriage in 2013. \par Stopped having her menses in June 2019. But it was very irregular for few months before. \par She has gained about 5 lbs.\par Patient saw Gyn who said anatomically everything was normal\par \par Re prolactin\par It was found to be elevated during workup for amenorrhea. In Dec 2019 it was 28.9 and then inc to 43.1 in 1/2020. She denies any nipple stimulation, galactorrhea. She denies any significant temperature intolerance, skin and hair changes. She has had fatigue and constipation. She denies any history of antipsychotics, SSRIs, Lithium. \par No family hx of thyroid disease or other autoimmune diseases.\par MRI pituitary showed nothing abnormal. Pituitary normal in size and midline.\par Repeat evaluation on Jan 29th showed normalization of all her levels and thyroid\par \par \par

## 2021-06-03 NOTE — ASSESSMENT
[FreeTextEntry1] :  35 year old female with past medical history of SLE on chronic steroids, transverse myelitis, amenorrhea who presents for follow-up\par \par 1. Amenorrhea\par PAtient has secondary amenorrhea. Suspect it can be hypogonadotrophic hypogonadism from her medications\par Will check her FSH, LH, E2 and progesterone \par Will check prolactin and TSH\par \par

## 2021-06-06 ENCOUNTER — APPOINTMENT (OUTPATIENT)
Dept: MRI IMAGING | Facility: CLINIC | Age: 36
End: 2021-06-06

## 2021-06-07 ENCOUNTER — NON-APPOINTMENT (OUTPATIENT)
Age: 36
End: 2021-06-07

## 2021-06-07 LAB
ESTRADIOL SERPL-MCNC: 443 PG/ML
FSH SERPL-MCNC: 7.5 IU/L
LH SERPL-ACNC: 19 IU/L
MONOMERIC PROLACTIN (ICMA)*: 13.8 NG/ML
PERCENT MACROPROLACTIN: 37 %
PROGEST SERPL-MCNC: 0.7 NG/ML
PROLACTIN SERPL-MCNC: 22.3 NG/ML
PROLACTIN, SERUM (ICMA)*: 22 NG/ML
T3 SERPL-MCNC: 137 NG/DL
T4 FREE SERPL-MCNC: 1.2 NG/DL
TSH SERPL-ACNC: 3.48 UIU/ML

## 2021-06-07 RX ORDER — MEDROXYPROGESTERONE ACETATE 5 MG/1
5 TABLET ORAL DAILY
Qty: 10 | Refills: 0 | Status: ACTIVE | COMMUNITY
Start: 2021-06-07 | End: 1900-01-01

## 2021-06-12 ENCOUNTER — OUTPATIENT (OUTPATIENT)
Dept: OUTPATIENT SERVICES | Facility: HOSPITAL | Age: 36
LOS: 1 days | End: 2021-06-12
Payer: COMMERCIAL

## 2021-06-12 ENCOUNTER — APPOINTMENT (OUTPATIENT)
Dept: MRI IMAGING | Facility: CLINIC | Age: 36
End: 2021-06-12
Payer: COMMERCIAL

## 2021-06-12 ENCOUNTER — RESULT REVIEW (OUTPATIENT)
Age: 36
End: 2021-06-12

## 2021-06-12 DIAGNOSIS — Z98.890 OTHER SPECIFIED POSTPROCEDURAL STATES: Chronic | ICD-10-CM

## 2021-06-12 DIAGNOSIS — G37.3 ACUTE TRANSVERSE MYELITIS IN DEMYELINATING DISEASE OF CENTRAL NERVOUS SYSTEM: ICD-10-CM

## 2021-06-12 PROCEDURE — 72157 MRI CHEST SPINE W/O & W/DYE: CPT | Mod: 26

## 2021-06-12 PROCEDURE — 72156 MRI NECK SPINE W/O & W/DYE: CPT | Mod: 26

## 2021-06-12 PROCEDURE — 72158 MRI LUMBAR SPINE W/O & W/DYE: CPT | Mod: 26

## 2021-06-12 PROCEDURE — A9585: CPT

## 2021-06-12 PROCEDURE — 72157 MRI CHEST SPINE W/O & W/DYE: CPT

## 2021-06-12 PROCEDURE — 72156 MRI NECK SPINE W/O & W/DYE: CPT

## 2021-06-12 PROCEDURE — 72158 MRI LUMBAR SPINE W/O & W/DYE: CPT

## 2021-06-16 ENCOUNTER — NON-APPOINTMENT (OUTPATIENT)
Age: 36
End: 2021-06-16

## 2021-06-18 ENCOUNTER — APPOINTMENT (OUTPATIENT)
Dept: RHEUMATOLOGY | Facility: CLINIC | Age: 36
End: 2021-06-18
Payer: COMMERCIAL

## 2021-07-09 ENCOUNTER — APPOINTMENT (OUTPATIENT)
Dept: RHEUMATOLOGY | Facility: CLINIC | Age: 36
End: 2021-07-09
Payer: COMMERCIAL

## 2021-07-09 VITALS
OXYGEN SATURATION: 97 % | DIASTOLIC BLOOD PRESSURE: 68 MMHG | SYSTOLIC BLOOD PRESSURE: 87 MMHG | HEIGHT: 62 IN | BODY MASS INDEX: 24.84 KG/M2 | HEART RATE: 89 BPM | WEIGHT: 135 LBS

## 2021-07-09 DIAGNOSIS — Z11.59 ENCOUNTER FOR SCREENING FOR OTHER VIRAL DISEASES: ICD-10-CM

## 2021-07-09 LAB
ALBUMIN SERPL ELPH-MCNC: 4.9 G/DL
ALP BLD-CCNC: 84 U/L
ALT SERPL-CCNC: 13 U/L
ANION GAP SERPL CALC-SCNC: 15 MMOL/L
AST SERPL-CCNC: 20 U/L
BASOPHILS # BLD AUTO: 0.02 K/UL
BASOPHILS NFR BLD AUTO: 0.2 %
BILIRUB SERPL-MCNC: 0.6 MG/DL
BUN SERPL-MCNC: 11 MG/DL
CALCIUM SERPL-MCNC: 9.4 MG/DL
CD16+CD56+ CELLS # BLD: 140 /UL
CD16+CD56+ CELLS NFR BLD: 12 %
CD19 CELLS NFR BLD: 0 /UL
CD3 CELLS # BLD: 977 /UL
CD3 CELLS NFR BLD: 85 %
CD3+CD4+ CELLS # BLD: 600 /UL
CD3+CD4+ CELLS NFR BLD: 52 %
CD3+CD4+ CELLS/CD3+CD8+ CLL SPEC: 1.81 RATIO
CD3+CD8+ CELLS # SPEC: 331 /UL
CD3+CD8+ CELLS NFR BLD: 29 %
CELLS.CD3-CD19+/CELLS IN BLOOD: <1 %
CHLORIDE SERPL-SCNC: 102 MMOL/L
CO2 SERPL-SCNC: 23 MMOL/L
COVID-19 SPIKE DOMAIN ANTIBODY INTERPRETATION: POSITIVE
CREAT SERPL-MCNC: 0.61 MG/DL
CRP SERPL-MCNC: <3 MG/L
EOSINOPHIL # BLD AUTO: 0.18 K/UL
EOSINOPHIL NFR BLD AUTO: 2.2 %
ERYTHROCYTE [SEDIMENTATION RATE] IN BLOOD BY WESTERGREN METHOD: 10 MM/HR
HCT VFR BLD CALC: 46.2 %
HGB BLD-MCNC: 15.7 G/DL
IMM GRANULOCYTES NFR BLD AUTO: 0.4 %
LYMPHOCYTES # BLD AUTO: 1.17 K/UL
LYMPHOCYTES NFR BLD AUTO: 14.1 %
MAN DIFF?: NORMAL
MCHC RBC-ENTMCNC: 31.8 PG
MCHC RBC-ENTMCNC: 34 GM/DL
MCV RBC AUTO: 93.5 FL
MONOCYTES # BLD AUTO: 0.66 K/UL
MONOCYTES NFR BLD AUTO: 7.9 %
NEUTROPHILS # BLD AUTO: 6.25 K/UL
NEUTROPHILS NFR BLD AUTO: 75.2 %
PLATELET # BLD AUTO: 314 K/UL
POTASSIUM SERPL-SCNC: 4 MMOL/L
PROT SERPL-MCNC: 7.2 G/DL
RBC # BLD: 4.94 M/UL
RBC # FLD: 12.9 %
SARS-COV-2 AB SERPL IA-ACNC: 20.5 U/ML
SODIUM SERPL-SCNC: 140 MMOL/L
WBC # FLD AUTO: 8.31 K/UL

## 2021-07-09 PROCEDURE — 99072 ADDL SUPL MATRL&STAF TM PHE: CPT

## 2021-07-09 PROCEDURE — 99215 OFFICE O/P EST HI 40 MIN: CPT

## 2021-07-09 NOTE — ASSESSMENT
[FreeTextEntry1] : 31 yo F w/ SLE  (dx 2014, arthritis, +PAUL,  +dsDNA, +SM, +RNP, nephritis 2016, longitudinally extensive TM 2019),  and fibromyalgia (dx 2014).  \par \par high risk ob - fax [phone]\par pain mgmt on monday  - fax [phone] Shorty Morris\par uro-gynecology - bert rogers \par \par multiple issues - high risk - \par \par #desire for pregnancy.   going to [facility] in 2 weeks for IVF. \par - has seen gyn, ob gyn and urogyn here - and is cleared \par - will be there for 1-2 months for the procedure \par - d/w endo today - was waiting to see if hormone trial helped - she will connect with her first as well\par \par # SLE with nephritis (treated with cellcept+study),alopecia, arthritis, lymphopenia -complicated by longitudinal extensive transverse myelitis in feb 2019 s/p plex, rtx and pulse steroids.  MRI now normalized. \par - given anticipated desire for pregnancy - will dc MMF.\par - AZA started and tolerating\par - s/p RTX - holding on further doses - d/w patient and  again.   want to have no meds prior to harvesting eggs this summer in [facility].  will continue to morintor \par \par #TM \par - MRI spine negative for recurrence\par \par #FMS:\par - off duloxetine\par - f/u pain mgmt - regarding pain control during pregnancy- \par - in terms of gabapentin - needs to discuss with high risk and neurology how and what to transition to\par - PT\par - back pain - switch juan to evening dose \par \par #urinary incontinence \par - resolved after switch between baclofen and flexeril\par \par #medication management / v58.69 \par - cd19\par - RTX, cellcept, steroids - PCP prophylaxis bactrim \par - about 60 kg - \par \par #covid.  march 2021. \par - seemd to be okay and exam okay \par - check ab - to assess if was able to make ab to it\par \par #social determinants \par -  supportive\par - paperwork filled out today for FMLP\par

## 2021-07-09 NOTE — HISTORY OF PRESENT ILLNESS
[FreeTextEntry1] : INTERVAL HX\par Here to review several issues\par #SLE - TM.  Continues to do well. the paraesthesias persist but are not worsening.   denies motor issues and no bowel incontinence. MRI whtout recurrent TM \par #incontenance - switched from baclofen to flexeril - and this has resolved\par #SLE- LN.  denies swelling of legs, rash, arthritis, urinary symptoms. tolerating the cellcept\par - denies swelling of joints stopped cellcept - no changes\par - urine without change in color and no bubbles.  no change in bowel \par - no new symtpoms \par - no swelling of ankles\par - no back pain and stornger in the legs than befor e- going to neuro tuesda.  will d/w thenm changing baclofen to something safe in prgnancy\par \par #chornic pain - fms - \par - saw pain mgmt - juan increased \par - cymbalta off - now with some evening pain in the low back ..  only at night.  doesn’t interfere with movement.  almost feels like a change in sensation\par \par #desires pregnancy.  leaving for [facility] [place] in 2 weeks for IVF (much less expensive there).  will be there for two weeks.

## 2021-07-09 NOTE — REVIEW OF SYSTEMS
[As Noted in HPI] : as noted in HPI [Negative] : Heme/Lymph [Fever] : no fever [Chills] : no chills [Eye Pain] : no eye pain [Earache] : no earache [Nosebleeds] : no nosebleeds [Chest Pain] : no chest pain [Shortness Of Breath] : no shortness of breath [Wheezing] : no wheezing [Cough] : no cough [Abdominal Pain] : no abdominal pain [Vomiting] : no vomiting [Dysuria] : no dysuria [Joint Pain] : no joint pain [Skin Lesions] : no skin lesions [Dizziness] : no dizziness [Anxiety] : no anxiety [Easy Bleeding] : no tendency for easy bleeding [de-identified] : mood much better in [place] -  [FreeTextEntry1] : missed period

## 2021-07-11 LAB
C3 SERPL-MCNC: 108 MG/DL
C4 SERPL-MCNC: 32 MG/DL
CREAT SPEC-SCNC: 171 MG/DL
CREAT/PROT UR: 0.2 RATIO
DEPRECATED KAPPA LC FREE/LAMBDA SER: 0.81 RATIO
IGA SER QL IEP: 140 MG/DL
IGG SER QL IEP: 856 MG/DL
IGM SER QL IEP: 49 MG/DL
KAPPA LC CSF-MCNC: 1.49 MG/DL
KAPPA LC SERPL-MCNC: 1.21 MG/DL
PROT UR-MCNC: 37 MG/DL

## 2021-07-13 LAB — DSDNA AB SER-ACNC: 27 IU/ML

## 2021-09-17 ENCOUNTER — LABORATORY RESULT (OUTPATIENT)
Age: 36
End: 2021-09-17

## 2021-09-17 ENCOUNTER — APPOINTMENT (OUTPATIENT)
Dept: RHEUMATOLOGY | Facility: CLINIC | Age: 36
End: 2021-09-17
Payer: COMMERCIAL

## 2021-09-17 VITALS
BODY MASS INDEX: 26.13 KG/M2 | HEIGHT: 62 IN | HEART RATE: 75 BPM | DIASTOLIC BLOOD PRESSURE: 73 MMHG | SYSTOLIC BLOOD PRESSURE: 104 MMHG | WEIGHT: 142 LBS | OXYGEN SATURATION: 99 %

## 2021-09-17 PROCEDURE — 99214 OFFICE O/P EST MOD 30 MIN: CPT

## 2021-09-20 NOTE — ASSESSMENT
[FreeTextEntry1] : 31 yo F w/ SLE  (dx 2014, arthritis, +PAUL,  +dsDNA, +SM, +RNP, nephritis 2016, longitudinally extensive TM 2019),  and fibromyalgia (dx 2014).  \par \par high risk ob - fax [phone]\par pain mgmt on monday  - fax [phone] Shorty Morris\par uro-gynecology - bert rogers \par \par multiple issues - high risk - \par \par #desire for pregnancy.   \par just returned from [place], eggs harvested - going back for implanataion in a few months.   is excited. \par - has seen gyn, ob gyn and urogyn here - and is cleared \par \par # SLE \par with nephritis (treated with cellcept+study),alopecia, arthritis, lymphopenia -complicated by longitudinal extensive transverse myelitis in feb 2019 s/p plex, rtx and pulse steroids.  MRI now normalized. Was on MMF,which was stopped 2/2 desire for pregnancy.   seems to be doing well on AZA \par -- continue AZA\par - s/p RTX - holding on further doses - d/w patient and  again.   want to have no meds prior to harvesting eggs this summer in [facility].  will continue to morintor \par \par #TM \par - MRI spine negative for recurrence\par \par #FMS:\par - off duloxetine\par - f/u pain mgmt - regarding pain control during pregnancy- \par - in terms of gabapentin - needs to discuss with high risk and neurology how and what to transition to\par - PT\par - back pain - switch juan to evening dose \par \par #urinary incontinence \par - resolved after switch between baclofen and flexeril\par \par #medication management / v58.69 \par - cd19\par - RTX, cellcept, steroids - PCP prophylaxis bactrim \par - about 60 kg - \par -- quant tb neg november 2020\par \par #social determinants \par -  supportive\par - paperwork filled out today for FMLP\par

## 2021-09-20 NOTE — REVIEW OF SYSTEMS
[As Noted in HPI] : as noted in HPI [Negative] : Heme/Lymph [Fever] : no fever [Chills] : no chills [Eye Pain] : no eye pain [Earache] : no earache [Nosebleeds] : no nosebleeds [Shortness Of Breath] : no shortness of breath [Chest Pain] : no chest pain [Wheezing] : no wheezing [Cough] : no cough [Abdominal Pain] : no abdominal pain [Dysuria] : no dysuria [Vomiting] : no vomiting [Joint Pain] : no joint pain [Skin Lesions] : no skin lesions [Dizziness] : no dizziness [Anxiety] : no anxiety [Easy Bleeding] : no tendency for easy bleeding [de-identified] : mood much better in [place] -  [FreeTextEntry1] : missed period

## 2021-09-20 NOTE — PHYSICAL EXAM
[General Appearance - Alert] : alert [General Appearance - In No Acute Distress] : in no acute distress [General Appearance - Well Nourished] : well nourished [General Appearance - Well Developed] : well developed [General Appearance - Well-Appearing] : healthy appearing [Sclera] : the sclera and conjunctiva were normal [Extraocular Movements] : extraocular movements were intact [PERRL With Normal Accommodation] : pupils were equal in size, round, and reactive to light [Outer Ear] : the ears and nose were normal in appearance [Neck Appearance] : the appearance of the neck was normal [Heart Rate And Rhythm] : heart rate was normal and rhythm regular [Cervical Lymph Nodes Enlarged Anterior Bilaterally] : anterior cervical [Supraclavicular Lymph Nodes Enlarged Bilaterally] : supraclavicular [No CVA Tenderness] : no ~M costovertebral angle tenderness [No Spinal Tenderness] : no spinal tenderness [Musculoskeletal - Swelling] : no joint swelling seen [Skin Color & Pigmentation] : normal skin color and pigmentation [Skin Turgor] : normal skin turgor [] : no rash [Skin Lesions] : no skin lesions [Oriented To Time, Place, And Person] : oriented to person, place, and time [Impaired Insight] : insight and judgment were intact [FreeTextEntry1] : no focal deficits - able to walk without assistive device, MS 5/5, able to walk on toes

## 2021-09-20 NOTE — HISTORY OF PRESENT ILLNESS
[FreeTextEntry1] : INTERVAL HX\par Here to review several issues\par #SLE - TM.  Continues to do well. the paraesthesias persist but are not worsening.   denies motor issues and no bowel incontinence. MRI whtout recurrent TM \par #incontenance - switched from baclofen to flexeril - and this has resolved\par #SLE- LN.  denies swelling of legs, rash, arthritis, urinary symptoms. tolerating the cellcept\par - denies swelling of joints stopped cellcept - no changes\par - urine without change in color and no bubbles.  no change in bowel \par - no new symtpoms \par - no swelling of ankles\par - no back pain and stornger in the legs than befor e- going to neuro tuesda.  will d/w thenm changing baclofen to something safe in prgnancy\par \par #chornic pain - fms - \par - saw pain mgmt - juan increased \par - cymbalta off - now with some evening pain in the low back ..  only at night.  doesn’t interfere with movement.  almost feels like a change in sensation\par \par #desires pregnancy. \par - just returned from [place] - currently doing IVF there as it is slightly cheaper.   patient desires to carry pregnancy her self.   She was given medications and eggs harvested already.  will be back in a couple of months for implantation.\par

## 2021-09-21 LAB
25(OH)D3 SERPL-MCNC: 27.5 NG/ML
ALBUMIN SERPL ELPH-MCNC: 4.7 G/DL
ALP BLD-CCNC: 83 U/L
ALT SERPL-CCNC: 23 U/L
ANION GAP SERPL CALC-SCNC: 12 MMOL/L
APPEARANCE: ABNORMAL
AST SERPL-CCNC: 19 U/L
BASOPHILS # BLD AUTO: 0.02 K/UL
BASOPHILS NFR BLD AUTO: 0.5 %
BILIRUB SERPL-MCNC: 0.6 MG/DL
BILIRUBIN URINE: NEGATIVE
BLOOD URINE: NEGATIVE
BUN SERPL-MCNC: 8 MG/DL
C3 SERPL-MCNC: 109 MG/DL
C4 SERPL-MCNC: 38 MG/DL
CALCIUM SERPL-MCNC: 9.1 MG/DL
CD16+CD56+ CELLS # BLD: 95 /UL
CD16+CD56+ CELLS NFR BLD: 9 %
CD19 CELLS NFR BLD: 22 /UL
CD3 CELLS # BLD: 891 /UL
CD3 CELLS NFR BLD: 83 %
CD3+CD4+ CELLS # BLD: 581 /UL
CD3+CD4+ CELLS NFR BLD: 55 %
CD3+CD4+ CELLS/CD3+CD8+ CLL SPEC: 2.3 RATIO
CD3+CD8+ CELLS # SPEC: 253 /UL
CD3+CD8+ CELLS NFR BLD: 24 %
CELLS.CD3-CD19+/CELLS IN BLOOD: 2 %
CHLORIDE SERPL-SCNC: 103 MMOL/L
CO2 SERPL-SCNC: 26 MMOL/L
COLOR: YELLOW
CREAT SERPL-MCNC: 0.52 MG/DL
CREAT SPEC-SCNC: 148 MG/DL
CREAT/PROT UR: 0.1 RATIO
CRP SERPL-MCNC: <3 MG/L
DEPRECATED KAPPA LC FREE/LAMBDA SER: 0.76 RATIO
DSDNA AB SER-ACNC: 34 IU/ML
EOSINOPHIL # BLD AUTO: 0.18 K/UL
EOSINOPHIL NFR BLD AUTO: 4.1 %
ERYTHROCYTE [SEDIMENTATION RATE] IN BLOOD BY WESTERGREN METHOD: 9 MM/HR
GLUCOSE QUALITATIVE U: NEGATIVE
HCT VFR BLD CALC: 46.6 %
HGB BLD-MCNC: 15.1 G/DL
IGA SER QL IEP: 144 MG/DL
IGG SER QL IEP: 908 MG/DL
IGM SER QL IEP: 46 MG/DL
IMM GRANULOCYTES NFR BLD AUTO: 0.2 %
KAPPA LC CSF-MCNC: 1.46 MG/DL
KAPPA LC SERPL-MCNC: 1.11 MG/DL
KETONES URINE: NEGATIVE
LEUKOCYTE ESTERASE URINE: ABNORMAL
LYMPHOCYTES # BLD AUTO: 1.26 K/UL
LYMPHOCYTES NFR BLD AUTO: 29 %
MAN DIFF?: NORMAL
MCHC RBC-ENTMCNC: 30.1 PG
MCHC RBC-ENTMCNC: 32.4 GM/DL
MCV RBC AUTO: 92.8 FL
MONOCYTES # BLD AUTO: 0.47 K/UL
MONOCYTES NFR BLD AUTO: 10.8 %
NEUTROPHILS # BLD AUTO: 2.41 K/UL
NEUTROPHILS NFR BLD AUTO: 55.4 %
NITRITE URINE: POSITIVE
PH URINE: 6
PLATELET # BLD AUTO: 323 K/UL
POTASSIUM SERPL-SCNC: 4 MMOL/L
PROT SERPL-MCNC: 6.6 G/DL
PROT UR-MCNC: 16 MG/DL
PROTEIN URINE: ABNORMAL
RBC # BLD: 5.02 M/UL
RBC # FLD: 12.3 %
SODIUM SERPL-SCNC: 141 MMOL/L
SPECIFIC GRAVITY URINE: 1.02
UROBILINOGEN URINE: NORMAL
WBC # FLD AUTO: 4.35 K/UL

## 2021-09-21 NOTE — HISTORY OF PRESENT ILLNESS
Chief Complaint   Patient presents with     RECHECK     Neurogenic bladder follow up     Andreia Camarena, CMA   
[FreeTextEntry1] : INTERVAL HX\par Here to review several issues\par #SLE - TM.  Continues to do well. the paraesthesias persist but are not worsening.   came down on the gabapentin but couldn’t stop 2/2 to worsening feeling.  has little to now feeling below the umbilicus - no pain though.   has had a few episodes of urinary incontinence - over the last several months.  not worsening, the issue is doesn’t feel that has to go until later and then there is no time to get quickly to the bathroom. \par #SLE- LN.  denies swelling of legs, rash, arthritis, urinary symptoms. tolerating the cellcept\par - denies swelling of joints stopped cellcept - no changes\par - urine without change in color and no bubbles.  however had a couple of times a loss of urine.  this never happened to her before.  no change in bowle\par #constipation and hemorrhoids.  has a small fistula - surgery went well - healing as anticipated - surgeon feels MMF being held has helped this too\par #contemplating pregnancy.  really wants to consider pregnancy - but now in early 2021\par - hasn’t seen hi risk yet\par - hasn’t been sexually active now 2/2 the different feeling below the belly - this isnt new and denies pain.

## 2021-09-30 ENCOUNTER — RX RENEWAL (OUTPATIENT)
Age: 36
End: 2021-09-30

## 2021-10-29 ENCOUNTER — APPOINTMENT (OUTPATIENT)
Dept: RHEUMATOLOGY | Facility: CLINIC | Age: 36
End: 2021-10-29
Payer: COMMERCIAL

## 2021-10-29 VITALS
BODY MASS INDEX: 25.03 KG/M2 | OXYGEN SATURATION: 98 % | DIASTOLIC BLOOD PRESSURE: 70 MMHG | WEIGHT: 136 LBS | SYSTOLIC BLOOD PRESSURE: 104 MMHG | HEART RATE: 75 BPM | HEIGHT: 62 IN

## 2021-10-29 PROCEDURE — 36415 COLL VENOUS BLD VENIPUNCTURE: CPT

## 2021-10-29 PROCEDURE — 99214 OFFICE O/P EST MOD 30 MIN: CPT | Mod: 25

## 2021-10-29 NOTE — ASSESSMENT
[FreeTextEntry1] : 29 yo F w/ SLE  (dx 2014, arthritis, +PAUL,  +dsDNA, +SM, +RNP, nephritis 2016, longitudinally extensive TM 2019),  and fibromyalgia (dx 2014).  \par \par high risk ob - fax [phone]\par pain mgmt on monday  - fax [phone] Shorty Morris\par uro-gynecology - bert rogers \par \par multiple issues - high risk - \par \par #desire for pregnancy.   \par just returned from [place], eggs harvested - going back for implantation in a few months.   is excited. \par -- has seen gyn, ob gyn and urogyn here - and is cleared \par -- going to [place] in December for implantation\par \par # SLE \par with nephritis (treated with cellcept+study),alopecia, arthritis, lymphopenia -complicated by longitudinal extensive transverse myelitis in feb 2019 s/p plex, rtx and pulse steroids.  MRI now normalized. Was on MMF,which was stopped 2/2 desire for pregnancy.   seems to be doing well on AZA \par -- continue AZA\par -- s/p RTX - holding on further doses - d/w patient and  again.   want to have no meds prior to harvesting eggs this summer in [facility].  will continue to monitor \par -- lupus is at optimal time for pregnancy at this time\par \par #TM \par - MRI spine negative for recurrence\par \par #FMS:\par -- off duloxetine and on flexeril\par -- f/u pain mgmt - regarding pain control during pregnancy- \par -- in terms of gabapentin - needs to discuss with high risk and neurology how and what to transition to\par -- PT\par -- back pain - switch juan to evening dose \par \par #medication management / v58.69 \par -- cd19 - repopulating\par -- RTX, cellcept, steroids - PCP prophylaxis bactrim \par -- quant tb neg november 2020\par \par #social determinants \par --  supportive\par \par More than 50% of the encounter was spent counseling the patient on differential, workup, disease course and treatment/management.  Education was provided to the patient during this encounter.  All questions and concerns were addressed and answered.   The patient verbalized understanding and agreed to the plan. \par \par Patient has been instructed to call for an appointment if new symptoms develop.\par Patient has been instructed to make a followup appointment in 1 month\par \par

## 2021-10-29 NOTE — HISTORY OF PRESENT ILLNESS
[FreeTextEntry1] : INTERVAL HX\par Here to review several issues\par #SLE - TM.  Continues to do well. the paraesthesias persist but are not worsening.   denies motor issues and no bowel incontinence. MRI whtout recurrent TM \par  - switched from baclofen to flexeril but needs rx\par #SLE- LN.  denies swelling of legs, rash, arthritis, urinary symptoms.\par - urine without change in color and no bubbles.  no change in bowel \par - no new symtpoms \par - no swelling of ankles\par - switched from the MMF to the AZA in preparation for pregnancy - doing well \par \par no swelling, rash or joint pain \par embryos [place] - to be there several months\par \par \par #chornic pain - fms - \par - saw pain mgmt - juan increased \par - cymbalta off - now with some evening pain in the low back ..  only at night.  doesn’t interfere with movement.  almost feels like a change in sensation\par \par #desires pregnancy. \par - just returned from [place] - currently doing IVF there as it is slightly cheaper.   patient desires to carry pregnancy her self.   She was given medications and eggs harvested already.  will be back in a couple of months for implantation.\par

## 2021-10-29 NOTE — REVIEW OF SYSTEMS
[As Noted in HPI] : as noted in HPI [Negative] : Heme/Lymph [Fever] : no fever [Chills] : no chills [Eye Pain] : no eye pain [Earache] : no earache [Nosebleeds] : no nosebleeds [Chest Pain] : no chest pain [Shortness Of Breath] : no shortness of breath [Wheezing] : no wheezing [Cough] : no cough [Abdominal Pain] : no abdominal pain [Vomiting] : no vomiting [Dysuria] : no dysuria [Joint Pain] : no joint pain [Skin Lesions] : no skin lesions [Dizziness] : no dizziness [Anxiety] : no anxiety [Easy Bleeding] : no tendency for easy bleeding [de-identified] : mood much better in [place] -  [FreeTextEntry1] : missed period

## 2021-11-06 LAB
25(OH)D3 SERPL-MCNC: 35.8 NG/ML
ALBUMIN SERPL ELPH-MCNC: 4.6 G/DL
ALP BLD-CCNC: 87 U/L
ALT SERPL-CCNC: 54 U/L
ANION GAP SERPL CALC-SCNC: 15 MMOL/L
APPEARANCE: CLEAR
AST SERPL-CCNC: 38 U/L
BACTERIA: NEGATIVE
BILIRUB SERPL-MCNC: 0.5 MG/DL
BILIRUBIN URINE: NEGATIVE
BLOOD URINE: NEGATIVE
BUN SERPL-MCNC: 12 MG/DL
C3 SERPL-MCNC: 129 MG/DL
C4 SERPL-MCNC: 37 MG/DL
CALCIUM SERPL-MCNC: 9 MG/DL
CHLORIDE SERPL-SCNC: 103 MMOL/L
CO2 SERPL-SCNC: 21 MMOL/L
COLOR: YELLOW
CREAT SERPL-MCNC: 0.47 MG/DL
CREAT SPEC-SCNC: 140 MG/DL
CREAT/PROT UR: 0.2 RATIO
CRP SERPL-MCNC: <3 MG/L
DSDNA AB SER-ACNC: 37 IU/ML
GLUCOSE QUALITATIVE U: NEGATIVE
HYALINE CASTS: 0 /LPF
KETONES URINE: NEGATIVE
LEUKOCYTE ESTERASE URINE: NEGATIVE
MICROSCOPIC-UA: NORMAL
NITRITE URINE: NEGATIVE
PH URINE: 6
POTASSIUM SERPL-SCNC: 5.8 MMOL/L
PROT SERPL-MCNC: 6.9 G/DL
PROT UR-MCNC: 24 MG/DL
PROTEIN URINE: ABNORMAL
RED BLOOD CELLS URINE: 3 /HPF
RIBOSOMAL P AB SER IA-ACNC: <0.2 AL
SODIUM SERPL-SCNC: 139 MMOL/L
SPECIFIC GRAVITY URINE: 1.02
SQUAMOUS EPITHELIAL CELLS: 4 /HPF
UROBILINOGEN URINE: NORMAL
WHITE BLOOD CELLS URINE: 1 /HPF

## 2021-11-18 ENCOUNTER — RX RENEWAL (OUTPATIENT)
Age: 36
End: 2021-11-18

## 2021-11-23 LAB
ALBUMIN SERPL ELPH-MCNC: 4.7 G/DL
ALP BLD-CCNC: 85 U/L
ALT SERPL-CCNC: 16 U/L
ANION GAP SERPL CALC-SCNC: 12 MMOL/L
AST SERPL-CCNC: 19 U/L
BASOPHILS # BLD AUTO: 0.03 K/UL
BASOPHILS NFR BLD AUTO: 0.6 %
BILIRUB SERPL-MCNC: 0.6 MG/DL
BUN SERPL-MCNC: 11 MG/DL
CALCIUM SERPL-MCNC: 9.3 MG/DL
CHLORIDE SERPL-SCNC: 105 MMOL/L
CO2 SERPL-SCNC: 26 MMOL/L
CREAT SERPL-MCNC: 0.54 MG/DL
CRP SERPL-MCNC: 6 MG/L
EOSINOPHIL # BLD AUTO: 0.2 K/UL
EOSINOPHIL NFR BLD AUTO: 4.2 %
ERYTHROCYTE [SEDIMENTATION RATE] IN BLOOD BY WESTERGREN METHOD: 16 MM/HR
HCT VFR BLD CALC: 43.5 %
HGB BLD-MCNC: 14.1 G/DL
IMM GRANULOCYTES NFR BLD AUTO: 0.4 %
LYMPHOCYTES # BLD AUTO: 1.42 K/UL
LYMPHOCYTES NFR BLD AUTO: 29.6 %
MAN DIFF?: NORMAL
MCHC RBC-ENTMCNC: 29.3 PG
MCHC RBC-ENTMCNC: 32.4 GM/DL
MCV RBC AUTO: 90.4 FL
MONOCYTES # BLD AUTO: 0.59 K/UL
MONOCYTES NFR BLD AUTO: 12.3 %
NEUTROPHILS # BLD AUTO: 2.53 K/UL
NEUTROPHILS NFR BLD AUTO: 52.9 %
PLATELET # BLD AUTO: 315 K/UL
POTASSIUM SERPL-SCNC: 4 MMOL/L
PROT SERPL-MCNC: 6.7 G/DL
RBC # BLD: 4.81 M/UL
RBC # FLD: 12.9 %
SODIUM SERPL-SCNC: 144 MMOL/L
WBC # FLD AUTO: 4.79 K/UL

## 2021-11-30 ENCOUNTER — APPOINTMENT (OUTPATIENT)
Dept: RHEUMATOLOGY | Facility: CLINIC | Age: 36
End: 2021-11-30
Payer: COMMERCIAL

## 2021-11-30 VITALS
OXYGEN SATURATION: 96 % | HEIGHT: 62 IN | WEIGHT: 129 LBS | SYSTOLIC BLOOD PRESSURE: 110 MMHG | BODY MASS INDEX: 23.74 KG/M2 | HEART RATE: 92 BPM | DIASTOLIC BLOOD PRESSURE: 75 MMHG

## 2021-11-30 PROCEDURE — 99215 OFFICE O/P EST HI 40 MIN: CPT

## 2021-12-01 LAB
25(OH)D3 SERPL-MCNC: 36.9 NG/ML
ALBUMIN SERPL ELPH-MCNC: 5.1 G/DL
ALP BLD-CCNC: 90 U/L
ALT SERPL-CCNC: 28 U/L
ANION GAP SERPL CALC-SCNC: 15 MMOL/L
APPEARANCE: CLEAR
AST SERPL-CCNC: 25 U/L
BACTERIA: ABNORMAL
BASOPHILS # BLD AUTO: 0.03 K/UL
BASOPHILS NFR BLD AUTO: 0.6 %
BILIRUB SERPL-MCNC: 0.8 MG/DL
BILIRUBIN URINE: NEGATIVE
BLOOD URINE: NEGATIVE
BUN SERPL-MCNC: 9 MG/DL
C3 SERPL-MCNC: 139 MG/DL
C4 SERPL-MCNC: 39 MG/DL
CALCIUM SERPL-MCNC: 9.5 MG/DL
CHLORIDE SERPL-SCNC: 102 MMOL/L
CO2 SERPL-SCNC: 24 MMOL/L
COLOR: YELLOW
CREAT SERPL-MCNC: 0.46 MG/DL
CRP SERPL-MCNC: <3 MG/L
DSDNA AB SER-ACNC: 33 IU/ML
EOSINOPHIL # BLD AUTO: 0.13 K/UL
EOSINOPHIL NFR BLD AUTO: 2.5 %
ERYTHROCYTE [SEDIMENTATION RATE] IN BLOOD BY WESTERGREN METHOD: 21 MM/HR
GLUCOSE QUALITATIVE U: NEGATIVE
HCT VFR BLD CALC: 48.2 %
HGB BLD-MCNC: 16 G/DL
HYALINE CASTS: 1 /LPF
IMM GRANULOCYTES NFR BLD AUTO: 0.4 %
KETONES URINE: NEGATIVE
LEUKOCYTE ESTERASE URINE: NEGATIVE
LYMPHOCYTES # BLD AUTO: 1.03 K/UL
LYMPHOCYTES NFR BLD AUTO: 19.9 %
MAN DIFF?: NORMAL
MCHC RBC-ENTMCNC: 30.1 PG
MCHC RBC-ENTMCNC: 33.2 GM/DL
MCV RBC AUTO: 90.6 FL
MICROSCOPIC-UA: NORMAL
MONOCYTES # BLD AUTO: 0.52 K/UL
MONOCYTES NFR BLD AUTO: 10 %
NEUTROPHILS # BLD AUTO: 3.45 K/UL
NEUTROPHILS NFR BLD AUTO: 66.6 %
NITRITE URINE: NEGATIVE
PH URINE: 7
PLATELET # BLD AUTO: 332 K/UL
POTASSIUM SERPL-SCNC: 4.4 MMOL/L
PROT SERPL-MCNC: 7.3 G/DL
PROTEIN URINE: ABNORMAL
RBC # BLD: 5.32 M/UL
RBC # FLD: 12.8 %
RED BLOOD CELLS URINE: 1 /HPF
SODIUM SERPL-SCNC: 140 MMOL/L
SPECIFIC GRAVITY URINE: 1.02
SQUAMOUS EPITHELIAL CELLS: 4 /HPF
UROBILINOGEN URINE: NORMAL
WBC # FLD AUTO: 5.18 K/UL
WHITE BLOOD CELLS URINE: 6 /HPF

## 2021-12-02 LAB
CREAT SPEC-SCNC: 124 MG/DL
CREAT/PROT UR: 0.2 RATIO
PROT UR-MCNC: 26 MG/DL

## 2021-12-02 NOTE — CONSULT LETTER
[Dear  ___] : Dear  [unfilled], [Courtesy Letter:] : I had the pleasure of seeing your patient, [unfilled], in my office today. [Please see my note below.] : Please see my note below. [Sincerely,] : Sincerely, [FreeTextEntry2] : Dr. Ashley Rochealez\par [address]\par [facility]  [id number] [FreeTextEntry1] : I have been Leslie Toby's rheumatologist for the past 7 years.  I treat her for systemic lupus erythematosus which has been complicated by both lupus nephritis and longitudinally extensive transverse myelitis.   Her condition is now stable for pregnancy at this time.   She is currently not in a flare clinically.   \par \par Mague's medications include: \par gabapentin 100 mg daily \par azathioprine 50 mg \par hydroxychloroquine 200 mg \par cyclobenzaprine 10 mg \par \par If you have further questions, please do not hesitate to contact me. [FreeTextEntry3] : Freda Swenson MD

## 2021-12-02 NOTE — ASSESSMENT
[FreeTextEntry1] : 31 yo F w/ SLE  (dx 2014, arthritis, +PAUL,  +dsDNA, +SM, +RNP, nephritis 2016, longitudinally extensive TM 2019),  and fibromyalgia (dx 2014).  \par \par high risk ob - fax [phone]\par pain mgmt on monday  - fax [phone] Shorty Morris\par uro-gynecology - bert rogers \par \par multiple issues - high risk - \par \par #desire for pregnancy.   \par just returned from [place], eggs harvested - going back for implantation in a few months.   is excited. \par -- has seen gyn, ob gyn and urogyn here - and is cleared \par -- going to [place] in December for implantation - levaing friday \par -- letter written to dr. Ashley Christie\par \par # SLE \par with nephritis (treated with cellcept+study),alopecia, arthritis, lymphopenia -complicated by longitudinal extensive transverse myelitis in feb 2019 s/p plex, rtx and pulse steroids.  MRI now normalized. Was on MMF,which was stopped 2/2 desire for pregnancy.   seems to be doing well on AZA \par -- continue AZA\par -- s/p RTX - holding on further doses - d/w patient and  again.   want to have no meds prior to harvesting eggs this summer in [facility].  will continue to monitor \par -- lupus is at optimal time for pregnancy at this time\par -- esr and crp a bit elevated but no s/s of active disease - will recheck today\par \par #TM \par - MRI spine negative for recurrence\par \par #FMS:\par -- off duloxetine and on flexeril\par -- f/u pain mgmt - regarding pain control during pregnancy- \par -- in terms of gabapentin - needs to discuss with high risk and neurology how and what to transition to\par -- PT\par -- back pain - switch juan to evening dose \par \par #medication management / v58.69 \par -- cd19 - repopulating\par -- RTX, cellcept, steroids - PCP prophylaxis bactrim \par -- quant tb neg november 2020\par \par #social determinants \par --  supportive\par \par 3covid \par s/p vaccin in june - needs booster - note written\par \par More than 50% of the encounter was spent counseling the patient on differential, workup, disease course and treatment/management.  Education was provided to the patient during this encounter.  All questions and concerns were addressed and answered.   The patient verbalized understanding and agreed to the plan. \par \par Patient has been instructed to call for an appointment if new symptoms develop.\par Patient has been instructed to make a followup appointment in 1 month\par \par

## 2021-12-02 NOTE — REVIEW OF SYSTEMS
[As Noted in HPI] : as noted in HPI [Negative] : Heme/Lymph [Fever] : no fever [Chills] : no chills [Eye Pain] : no eye pain [Earache] : no earache [Nosebleeds] : no nosebleeds [Chest Pain] : no chest pain [Shortness Of Breath] : no shortness of breath [Wheezing] : no wheezing [Cough] : no cough [Abdominal Pain] : no abdominal pain [Vomiting] : no vomiting [Dysuria] : no dysuria [Joint Pain] : no joint pain [Skin Lesions] : no skin lesions [Dizziness] : no dizziness [Anxiety] : no anxiety [Easy Bleeding] : no tendency for easy bleeding [de-identified] : mood much better in [place] -  [FreeTextEntry1] : missed period

## 2021-12-02 NOTE — HISTORY OF PRESENT ILLNESS
[FreeTextEntry1] : INTERVAL HX\par Here to review several issues\par \par going to [place] to IVF\par feels well in general \par needs covid shot prior to going\par \par #SLE - TM.  Continues to do well. the paraesthesias persist but are not worsening.   denies motor issues and no bowel incontinence. MRI whtout recurrent TM \par  - switched from baclofen to flexeril but needs rx\par #SLE- LN.  denies swelling of legs, rash, arthritis, urinary symptoms.\par - urine without change in color and no bubbles.  no change in bowel \par - no new symtpoms \par - no swelling of ankles\par - switched from the MMF to the AZA in preparation for pregnancy - doing well \par \par no swelling, rash or joint pain \par embryos [place] - to be there several months\par \par #chornic pain - fms - \par - saw pain mgmt - juan increased \par - cymbalta off - now with some evening pain in the low back ..  only at night.  doesn’t interfere with movement.  almost feels like a change in sensation\par \par #desires pregnancy. \par - just returned from [place] - currently doing IVF there as it is slightly cheaper.   patient desires to carry pregnancy her self.   She was given medications and eggs harvested already.  will be back in a couple of months for implantation.\par

## 2022-03-25 NOTE — PROVIDER CONTACT NOTE (CRITICAL VALUE NOTIFICATION) - NS PROVIDER READ BACK
[Restricted in physically strenuous activity but ambulatory and able to carry out work of a light or sedentary nature] : Status 1- Restricted in physically strenuous activity but ambulatory and able to carry out work of a light or sedentary nature, e.g., light house work, office work yes [Normal] : affect appropriate [de-identified] : Dense cystic breast. No obvious masses bL

## 2022-04-11 PROBLEM — Z11.59 SCREENING FOR VIRAL DISEASE: Status: ACTIVE | Noted: 2021-04-09

## 2022-05-03 ENCOUNTER — APPOINTMENT (OUTPATIENT)
Dept: RHEUMATOLOGY | Facility: CLINIC | Age: 37
End: 2022-05-03

## 2022-05-03 ENCOUNTER — TRANSCRIPTION ENCOUNTER (OUTPATIENT)
Age: 37
End: 2022-05-03

## 2022-05-03 ENCOUNTER — LABORATORY RESULT (OUTPATIENT)
Age: 37
End: 2022-05-03

## 2022-05-03 DIAGNOSIS — E06.9 THYROIDITIS, UNSPECIFIED: ICD-10-CM

## 2022-05-06 ENCOUNTER — APPOINTMENT (OUTPATIENT)
Dept: RHEUMATOLOGY | Facility: CLINIC | Age: 37
End: 2022-05-06
Payer: COMMERCIAL

## 2022-05-06 ENCOUNTER — LABORATORY RESULT (OUTPATIENT)
Age: 37
End: 2022-05-06

## 2022-05-06 DIAGNOSIS — Z87.09 PERSONAL HISTORY OF OTHER DISEASES OF THE RESPIRATORY SYSTEM: ICD-10-CM

## 2022-05-06 DIAGNOSIS — R79.89 OTHER SPECIFIED ABNORMAL FINDINGS OF BLOOD CHEMISTRY: ICD-10-CM

## 2022-05-06 DIAGNOSIS — Z87.19 PERSONAL HISTORY OF OTHER DISEASES OF THE DIGESTIVE SYSTEM: ICD-10-CM

## 2022-05-06 DIAGNOSIS — N92.6 IRREGULAR MENSTRUATION, UNSPECIFIED: ICD-10-CM

## 2022-05-06 PROCEDURE — 99214 OFFICE O/P EST MOD 30 MIN: CPT

## 2022-05-07 LAB
25(OH)D3 SERPL-MCNC: 22.4 NG/ML
ALBUMIN SERPL ELPH-MCNC: 5 G/DL
ALP BLD-CCNC: 97 U/L
ALT SERPL-CCNC: 22 U/L
ANION GAP SERPL CALC-SCNC: 16 MMOL/L
APPEARANCE: ABNORMAL
AST SERPL-CCNC: 21 U/L
BASOPHILS # BLD AUTO: 0.04 K/UL
BASOPHILS NFR BLD AUTO: 0.9 %
BILIRUB SERPL-MCNC: 0.8 MG/DL
BILIRUBIN URINE: NEGATIVE
BLOOD URINE: NEGATIVE
BUN SERPL-MCNC: 12 MG/DL
C3 SERPL-MCNC: 140 MG/DL
C4 SERPL-MCNC: 44 MG/DL
CALCIUM SERPL-MCNC: 10 MG/DL
CHLORIDE SERPL-SCNC: 100 MMOL/L
CO2 SERPL-SCNC: 24 MMOL/L
COLOR: YELLOW
CREAT SERPL-MCNC: 0.55 MG/DL
CREAT SPEC-SCNC: 153 MG/DL
CREAT/PROT UR: 0.1 RATIO
CRP SERPL-MCNC: <3 MG/L
EGFR: 121 ML/MIN/1.73M2
EOSINOPHIL # BLD AUTO: 0.14 K/UL
EOSINOPHIL NFR BLD AUTO: 3.1 %
ERYTHROCYTE [SEDIMENTATION RATE] IN BLOOD BY WESTERGREN METHOD: 26 MM/HR
GLUCOSE QUALITATIVE U: NEGATIVE
HCT VFR BLD CALC: 47.1 %
HGB BLD-MCNC: 15.3 G/DL
IMM GRANULOCYTES NFR BLD AUTO: 0.2 %
KETONES URINE: NEGATIVE
LEUKOCYTE ESTERASE URINE: ABNORMAL
LYMPHOCYTES # BLD AUTO: 1.19 K/UL
LYMPHOCYTES NFR BLD AUTO: 26 %
MAN DIFF?: NORMAL
MCHC RBC-ENTMCNC: 30 PG
MCHC RBC-ENTMCNC: 32.5 GM/DL
MCV RBC AUTO: 92.4 FL
MONOCYTES # BLD AUTO: 0.49 K/UL
MONOCYTES NFR BLD AUTO: 10.7 %
NEUTROPHILS # BLD AUTO: 2.7 K/UL
NEUTROPHILS NFR BLD AUTO: 59.1 %
NITRITE URINE: POSITIVE
PH URINE: 6.5
PLATELET # BLD AUTO: 308 K/UL
POTASSIUM SERPL-SCNC: 3.9 MMOL/L
PROT SERPL-MCNC: 7.5 G/DL
PROT UR-MCNC: 18 MG/DL
PROTEIN URINE: ABNORMAL
RBC # BLD: 5.1 M/UL
RBC # FLD: 12.5 %
SODIUM SERPL-SCNC: 140 MMOL/L
SPECIFIC GRAVITY URINE: 1.02
TSH SERPL-ACNC: 3.36 UIU/ML
UROBILINOGEN URINE: NORMAL
WBC # FLD AUTO: 4.57 K/UL

## 2022-05-07 RX ORDER — ERGOCALCIFEROL 1.25 MG/1
1.25 MG CAPSULE, LIQUID FILLED ORAL
Qty: 12 | Refills: 0 | Status: ACTIVE | COMMUNITY
Start: 2019-09-16 | End: 1900-01-01

## 2022-05-07 NOTE — HISTORY OF PRESENT ILLNESS
[FreeTextEntry1] : INTERVAL HX \par [place].  there are three embryos and she had 1 implanted which did not take.  while there she had injections and hormones which she did fine with.  she does not feel like her lupus is active and she is adherent to her meds.  she is going back in june to have a 2nd attempt at embryo implantation.  if this fails they nolan explore a surrogate mother for the last embryo\par \par Rheum ROS \par - denies RP, sicca, oral ulcers, rashes, photosensitivity.   \par - denies constitutional symptoms, fatigue, night sweats.  weight is stable \par - Denies psoriasis, IBD, Inflammatory eye disease, STD, infectious diarrhea\par - breathes well without h/o of pleuritis, pericarditis.  renal function is normal and urine is not frothy\par - muscles are strong and there is no neurologic issues\par \par

## 2022-05-07 NOTE — ASSESSMENT
[FreeTextEntry1] : 31 yo F w/ SLE  (dx 2014, arthritis, +PAUL,  +dsDNA, +SM, +RNP, nephritis 2016, longitudinally extensive TM 2019),  and fibromyalgia (dx 2014).  \par \par high risk ob - fax [phone]\par pain mgmt on monday  - fax [phone] Shorty oMrris\par uro-gynecology - bert rogers \par \par multiple issues - high risk - \par \par #desire for pregnancy.   \par s/p IV attempt 1 -  failure \par -- support given \par -- returning to [place] in 4-6 weeks for 2nd attempt\par \par # SLE \par with nephritis (treated with cellcept+study),alopecia, arthritis, lymphopenia -complicated by longitudinal extensive transverse myelitis in feb 2019 s/p plex, rtx and pulse steroids.  MRI now normalized. Was on MMF,which was stopped 2/2 desire for pregnancy.   seems to be doing well on AZA \par -- continue AZA\par -- s/p RTX - holding on further doses - d/w patient and  again.   want to have no meds prior to harvesting eggs this summer in [facility].  will continue to monitor \par -- lupus is at optimal time for pregnancy at this time\par \par #TM \par - MRI spine negative for recurrence\par \par #FMS:\par -- off duloxetine and on flexeril only now - doing okay\par -- f/u pain mgmt - regarding pain control during pregnancy- \par -- in terms of gabapentin - needs to discuss with high risk and neurology how and what to transition to\par -- PT\par -- back pain - switch juan to evening dose \par \par #medication management / v58.69 \par -- cd19 - repopulating\par -- RTX, cellcept, steroids - PCP prophylaxis bactrim \par -- quant tb neg november 2020\par \par #social determinants \par --  supportive\par \par 3covid \par s/p vaccin in june - needs booster - note written\par \par \par \par More than 50% of the encounter was spent counseling the patient on differential, workup, disease course and treatment/management.  Education was provided to the patient during this encounter.  All questions and concerns were addressed and answered.   The patient verbalized understanding and agreed to the plan. \par \par Patient has been instructed to call for an appointment if new symptoms develop.\par Patient has been instructed to make a followup appointment in 3 months upon return from [facility]\par \par Time spent on the encounter included, but is not limited to, preparing to see the patient, obtaining and/or reviewing separately obtained history, performing the evaluation, counseling and educating, independently interpreting results with communication to patient, order placement, referring and/or communicating with other health professionals as described, and documenting clinical information in the electronic health record\par \par

## 2022-05-07 NOTE — REVIEW OF SYSTEMS
[As Noted in HPI] : as noted in HPI [Negative] : Heme/Lymph [Fever] : no fever [Chills] : no chills [Eye Pain] : no eye pain [Earache] : no earache [Nosebleeds] : no nosebleeds [Chest Pain] : no chest pain [Shortness Of Breath] : no shortness of breath [Wheezing] : no wheezing [Cough] : no cough [Abdominal Pain] : no abdominal pain [Vomiting] : no vomiting [Dysuria] : no dysuria [Joint Pain] : no joint pain [Dizziness] : no dizziness [Skin Lesions] : no skin lesions [Anxiety] : no anxiety [Easy Bleeding] : no tendency for easy bleeding [de-identified] : mood much better in peru -  [FreeTextEntry1] : missed period

## 2022-05-07 NOTE — CONSULT LETTER
[Dear  ___] : Dear  [unfilled], [Courtesy Letter:] : I had the pleasure of seeing your patient, [unfilled], in my office today. [Please see my note below.] : Please see my note below. [Sincerely,] : Sincerely, [FreeTextEntry2] : Dr. Ashley Rochealez\[address]\par [facility]  [address] [FreeTextEntry1] : I have been Leslie Toby's rheumatologist for the past 7 years.  I treat her for systemic lupus erythematosus which has been complicated by both lupus nephritis and longitudinally extensive transverse myelitis.   Her condition is now stable for pregnancy at this time.   She is currently not in a flare clinically.   \par \par Mague's medications include: \par gabapentin 100 mg daily \par azathioprine 50 mg \par hydroxychloroquine 200 mg \par cyclobenzaprine 10 mg \par \par If you have further questions, please do not hesitate to contact me. [FreeTextEntry3] : Freda Swenson MD

## 2022-05-09 ENCOUNTER — NON-APPOINTMENT (OUTPATIENT)
Age: 37
End: 2022-05-09

## 2022-05-09 LAB — DSDNA AB SER-ACNC: 32 IU/ML

## 2022-05-19 ENCOUNTER — RX RENEWAL (OUTPATIENT)
Age: 37
End: 2022-05-19

## 2022-07-30 NOTE — ED ADULT NURSE NOTE - MUSCULOSKELETAL ASSESSMENT
CM Note    Spoke w/Maude at [facility] [phone]. Remain on Transfer hold due to staffing and holding patients in ED. Weekend transfer unlikely, CM to follow up on Monday.    WDL

## 2022-08-23 NOTE — PATIENT PROFILE ADULT - REASON FOR REFUSAL (REFER PATIENT TO HEALTHCARE PROVIDER FOR FOLLOW-UP):
[facility]  [address] [id number]  Dept Phone: [phone]    SPACE OAR PROCEDURE    08/23/2022    Patient Name:  Jluis Arevalo  YOB: 1955  MRN:  [id number]  Diagnosis:  Cancer Staging  Malignant neoplasm of prostate (CMS/HCC)  Staging form: Prostate, AJCC 8th Edition  - Clinical stage from 8/1/2022: Stage IIB (cT1c, cN0, cM0, PSA: 11.2, Grade Group: 2) - Signed by Christine Cordova MD on 8/1/2022      Procedure: The patient underwent placement of fiducial markers immediately prior to this procedure.  He remained in the dorsal lithotomy position, with the ultrasound transducer in the rectum. The grid was removed from the stepper. A time out was performed verifying the patient was to receive placement of the Hydrogel. I then prepared the syringes with the Hydrogel solutions and locked them into place in the guidance system.  Images were obtained of the prostate gland in the sagittal and transverse planes.  The transducer was moved posteriorly slightly, and some of the fluid was removed from around the balloon to take pressure off of the anterior rectal wall, but still allowed for imaging. Once adequate imaging was established, the transducer was again locked into the stepper.     Under ultrasound guidance, a 15 cm 18G needle was used to find the pre-rectal space.  The needle was inserted between the prostate and Denonvilliers' fascia at the mid-point of prostate gland.  Approximately 10 cc of saline was used to hydrodissect the plane, with good separation between the prostate and fascia.  The syringe was removed from the needle hub and the hydrogel y-connector was attached to the needle.  Under continuous pressure, 7 cc's of the hydrogel solution was injected into this space over a 10 second period. I viewed the simultaneous sagittal and transverse images to ensure appropriate placement of the hydrogel.  An axial measurement of the space between the mid  prostate gland and rectum was noted to measure 7 mm immediately after.     The ultrasound probe was then removed from his rectum. I used gauze to apply pressure to the perineum for a period of time to ensure that there was no bleeding. We kept the patient in the supine position for about 10 minutes. He was then assisted to a sitting position for several minutes and then to his feet. He was taken to the examining room, and we continued to monitor his vital signs additional 30 minutes following the procedure. His vital signs were monitored at 10 minute intervals throughout the entire procedure.    He tolerated the procedure fairly well, and there were no complications of the procedure. He will return in several days to proceed with the treatment planning CT scan. I have asked him to call if he has significant bleeding from the perineum, rectum or in the urine. I also asked him to call if he develops a fever of over 100°F.        Juana Medina MD   Medical Director, Radiation Oncology  [facility]  [phone] dept  [phone] fax    does not take it

## 2022-09-16 ENCOUNTER — APPOINTMENT (OUTPATIENT)
Dept: RHEUMATOLOGY | Facility: CLINIC | Age: 37
End: 2022-09-16

## 2022-09-16 VITALS
DIASTOLIC BLOOD PRESSURE: 87 MMHG | BODY MASS INDEX: 23.74 KG/M2 | WEIGHT: 129 LBS | HEIGHT: 62 IN | HEART RATE: 80 BPM | SYSTOLIC BLOOD PRESSURE: 109 MMHG | OXYGEN SATURATION: 99 %

## 2022-09-16 DIAGNOSIS — Z71.9 COUNSELING, UNSPECIFIED: ICD-10-CM

## 2022-09-16 PROCEDURE — 99215 OFFICE O/P EST HI 40 MIN: CPT

## 2022-09-18 PROBLEM — Z71.9 ENCOUNTER FOR EDUCATION: Status: ACTIVE | Noted: 2022-09-18

## 2022-09-18 LAB
CREAT SPEC-SCNC: 152 MG/DL
CREAT/PROT UR: 0.1 RATIO
PROT UR-MCNC: 22 MG/DL

## 2022-09-18 NOTE — ASSESSMENT
[FreeTextEntry1] : 31 yo F w/ SLE  (dx 2014, arthritis, +PAUL,  +dsDNA, +SM, +RNP, nephritis 2016, longitudinally extensive TM 2019),  and fibromyalgia (dx 2014).  \par \par high risk ob - fax [phone]\par pain mgmt  - fax [phone] Shorty Morris\par uro-gynecology - bert rogers \par \par multiple issues - complex patient - currently going through IVF with multiple failures -  high risk - \par \par #desire for pregnancy.   \par just returned from [place], eggs harvested (3 embryo).  now with 2 failed IVF implantation attempts.   leaving for [facility] in a few weeks for final attempt.   issues seem to be progesterone related\par -- has seen gyn, ob gyn and urogyn here - and is cleared \par -- support given \par -- red patietn f/u high risk here prior to going to [facility] failures prior to next attempt\par -- d/w patient and  option of surrogacy\par \par # SLE \par with nephritis (treated with cellcept+study),alopecia, arthritis, lymphopenia -complicated by longitudinal extensive transverse myelitis in feb 2019 s/p plex, rtx and pulse steroids.  MRI now normalized. Was on MMF,which was stopped 2/2 desire for pregnancy.   seems to be doing well on AZA and HCQ\par -- patient and  wondering if they should come off all lupus meds tprio to next IVF attempt.  Cautioned patient and  against this as these meds are okay during pregnancy and withdrawal may lead to a flare in a pregnancy which would be more dangerous to mom and baby.   \par -- continue AZA\par -- s/p RTX - holding on further doses - d/w patient and  again.   want to have no meds prior IVF\par -- lupus is at optimal time for pregnancy at this time\par -- esr and crp a bit elevated but no s/s of active disease - will recheck today\par \par #TM \par MRI spine negative for recurrence\par -- continue flexeril prn for intermittent muscle spasm that has resulted forrom this\par \par #FMS:\par -- off duloxetine and on flexeril\par -- f/u pain mgmt - regarding pain control during pregnancy- \par -- in terms of gabapentin - needs to discuss with high risk and neurology how and what to transition to\par -- PT\par -- back pain - switch juan to evening dose \par \par #medication management / v58.69 \par -- cd19 - repopulating\par -- RTX, cellcept, steroids - PCP prophylaxis bactrim - now off\par -- quant tb neg november 2020\par \par #social determinants \par --  supportive\par -- paperwork filled out \par \par \par More than 50% of the encounter was spent counseling the patient on differential, workup, disease course and treatment/management.  Education was provided to the patient during this encounter.  All questions and concerns were addressed and answered.   The patient verbalized understanding and agreed to the plan. \par \par Patient has been instructed to call for an appointment if new symptoms develop.\par Patient has been instructed to make a followup appointment in 3 months.\par \par Time spent on the encounter included, but is not limited to, preparing to see the patient, obtaining and/or reviewing separately obtained history, performing the evaluation, counseling and educating, independently interpreting results with communication to patient, order placement, referring and/or communicating with other health professionals as described, and documenting clinical information in the electronic health record\par

## 2022-09-18 NOTE — REVIEW OF SYSTEMS
[As Noted in HPI] : as noted in HPI [Negative] : Heme/Lymph [Fever] : no fever [Chills] : no chills [Eye Pain] : no eye pain [Earache] : no earache [Nosebleeds] : no nosebleeds [Chest Pain] : no chest pain [Shortness Of Breath] : no shortness of breath [Wheezing] : no wheezing [Cough] : no cough [Abdominal Pain] : no abdominal pain [Vomiting] : no vomiting [Dysuria] : no dysuria [Joint Pain] : no joint pain [Skin Lesions] : no skin lesions [Dizziness] : no dizziness [Anxiety] : no anxiety [Easy Bleeding] : no tendency for easy bleeding [de-identified] : mood much better in [place] -  [FreeTextEntry1] : missed period

## 2022-09-18 NOTE — CONSULT LETTER
[Dear  ___] : Dear  [unfilled], [Courtesy Letter:] : I had the pleasure of seeing your patient, [unfilled], in my office today. [Please see my note below.] : Please see my note below. [Sincerely,] : Sincerely, [FreeTextEntry2] : Dr. Ashley Rochealez\[address]\par [facility]  [address] [FreeTextEntry1] : I have been Leslie Toby's rheumatologist for the past 7 years.  I treat her for systemic lupus erythematosus which has been complicated by both lupus nephritis and longitudinally extensive transverse myelitis.   Her condition is now stable for pregnancy at this time.   She is currently not in a flare clinically.   \par \par Mague's medications include: \par gabapentin 100 mg daily \par azathioprine 50 mg \par hydroxychloroquine 200 mg \par cyclobenzaprine 10 mg \par \par If you have further questions, please do not hesitate to contact me. [FreeTextEntry3] : Freda Swenson MD

## 2022-09-18 NOTE — HISTORY OF PRESENT ILLNESS
[FreeTextEntry1] : INTERVAL HX\par Here to review several issues\par # in terms of the lupus - this has been okay and denies rash, joint pain or recurrent neurologic issues \par # in terms of pregnancy and interval events - returned from [place] after have 2 failed IVF treatments.   first one didn’t implant - 2nd implanted but didn’t stay more than 2 days.  going back down for the 3rd in a few weeks.   was told the progesterone level was too low and will be increasing that with the next attempt\par #in terms fo the FMS - continues to have intermittent back issues - takes the flexeril - off her regular meds 2/2 attempting pregnancy\par \par Rheum ROS \par - denies RP, sicca, oral ulcers, rashes, photosensitivity.   \par - denies skin tightening, ulcerations, nodules\par - denies constitutional symptoms, fatigue, night sweats.  weight is stable \par - Denies psoriasis, IBD, Inflammatory eye disease, STD, infectious diarrhea\par - breathes well without h/o of pleuritis, pericarditis.  renal function is normal and urine is not frothy\par - muscles are strong and there is no neurologic issues\par - no headaches, jaw pain with chewing, visual loss, scalp tenderness or double vision\par

## 2022-09-21 LAB
ALBUMIN SERPL ELPH-MCNC: 4.5 G/DL
ALP BLD-CCNC: 76 U/L
ALT SERPL-CCNC: 19 U/L
ANION GAP SERPL CALC-SCNC: 12 MMOL/L
AST SERPL-CCNC: 18 U/L
BASOPHILS # BLD AUTO: 0.03 K/UL
BASOPHILS NFR BLD AUTO: 0.6 %
BILIRUB SERPL-MCNC: 0.4 MG/DL
BUN SERPL-MCNC: 8 MG/DL
C3 SERPL-MCNC: 104 MG/DL
C4 SERPL-MCNC: 35 MG/DL
CALCIUM SERPL-MCNC: 9.1 MG/DL
CHLORIDE SERPL-SCNC: 103 MMOL/L
CO2 SERPL-SCNC: 25 MMOL/L
CREAT SERPL-MCNC: 0.51 MG/DL
CRP SERPL-MCNC: <3 MG/L
DSDNA AB SER-ACNC: 41 IU/ML
EGFR: 123 ML/MIN/1.73M2
EOSINOPHIL # BLD AUTO: 0.12 K/UL
EOSINOPHIL NFR BLD AUTO: 2.6 %
ERYTHROCYTE [SEDIMENTATION RATE] IN BLOOD BY WESTERGREN METHOD: 21 MM/HR
HCT VFR BLD CALC: 45 %
HGB BLD-MCNC: 15 G/DL
IMM GRANULOCYTES NFR BLD AUTO: 0.4 %
LYMPHOCYTES # BLD AUTO: 1.4 K/UL
LYMPHOCYTES NFR BLD AUTO: 30.3 %
MAN DIFF?: NORMAL
MCHC RBC-ENTMCNC: 30.2 PG
MCHC RBC-ENTMCNC: 33.3 GM/DL
MCV RBC AUTO: 90.5 FL
MONOCYTES # BLD AUTO: 0.47 K/UL
MONOCYTES NFR BLD AUTO: 10.2 %
NEUTROPHILS # BLD AUTO: 2.58 K/UL
NEUTROPHILS NFR BLD AUTO: 55.9 %
PLATELET # BLD AUTO: 283 K/UL
POTASSIUM SERPL-SCNC: 3.9 MMOL/L
PROT SERPL-MCNC: 6.8 G/DL
RBC # BLD: 4.97 M/UL
RBC # FLD: 12.8 %
SODIUM SERPL-SCNC: 140 MMOL/L
WBC # FLD AUTO: 4.62 K/UL

## 2022-09-22 LAB
M TB IFN-G BLD-IMP: NEGATIVE
QUANTIFERON TB PLUS MITOGEN MINUS NIL: 7.2 IU/ML
QUANTIFERON TB PLUS NIL: 0.02 IU/ML
QUANTIFERON TB PLUS TB1 MINUS NIL: -0.01 IU/ML
QUANTIFERON TB PLUS TB2 MINUS NIL: -0.01 IU/ML

## 2022-09-27 ENCOUNTER — RX RENEWAL (OUTPATIENT)
Age: 37
End: 2022-09-27

## 2022-11-10 NOTE — ED PROVIDER NOTE - MUSCULOSKELETAL NEGATIVE STATEMENT, MLM
no back pain, no gout, no musculoskeletal pain, no neck pain, and no weakness. Gerald Manning  CARDIOVASCULAR DISEASE  [address] [place] [address]  Phone: [phone]  Fax: [phone]  Follow Up Time: 1 week   Gerald Manning  CARDIOVASCULAR DISEASE  [address] [place] [address]  Phone: [phone]  Fax: [phone]  Follow Up Time: 1 week    Moose Eaton (DO)  Internal Medicine; Pulmonary Disease; Sleep Medicine  [address] [facility], [address]  [place] [address]  Phone: [phone]  Fax: [phone]  Follow Up Time:     Edwar Merida)  Cardiology  [address] [place] [address]  Phone: [phone]  Fax: [phone]  Follow Up Time:

## 2022-11-16 NOTE — ED PROVIDER NOTE - PATIENT PORTAL LINK FT
You can access the FollowMyHealth Patient Portal offered by [facility] by registering at the following website: [email or web address]. By joining [employer]’s FollowMyHealth portal, you will also be able to view your health information using other applications (apps) compatible with our system.
JOINT PAIN

## 2022-12-02 ENCOUNTER — APPOINTMENT (OUTPATIENT)
Dept: RHEUMATOLOGY | Facility: CLINIC | Age: 37
End: 2022-12-02

## 2022-12-02 VITALS
HEIGHT: 62 IN | WEIGHT: 134.31 LBS | BODY MASS INDEX: 24.71 KG/M2 | DIASTOLIC BLOOD PRESSURE: 89 MMHG | SYSTOLIC BLOOD PRESSURE: 136 MMHG | OXYGEN SATURATION: 99 % | HEART RATE: 104 BPM

## 2022-12-02 DIAGNOSIS — Z31.9 ENCOUNTER FOR PROCREATIVE MANAGEMENT, UNSPECIFIED: ICD-10-CM

## 2022-12-02 PROCEDURE — 99215 OFFICE O/P EST HI 40 MIN: CPT

## 2022-12-05 LAB
ALBUMIN SERPL ELPH-MCNC: 4.3 G/DL
ALP BLD-CCNC: 90 U/L
ALT SERPL-CCNC: 88 U/L
ANION GAP SERPL CALC-SCNC: 14 MMOL/L
AST SERPL-CCNC: 45 U/L
BASOPHILS # BLD AUTO: 0.02 K/UL
BASOPHILS NFR BLD AUTO: 0.4 %
BILIRUB SERPL-MCNC: 0.5 MG/DL
BUN SERPL-MCNC: 7 MG/DL
C3 SERPL-MCNC: 134 MG/DL
C4 SERPL-MCNC: 42 MG/DL
CALCIUM SERPL-MCNC: 9 MG/DL
CHLORIDE SERPL-SCNC: 104 MMOL/L
CO2 SERPL-SCNC: 22 MMOL/L
CREAT SERPL-MCNC: 0.53 MG/DL
CREAT SPEC-SCNC: 204 MG/DL
CREAT/PROT UR: 0.1 RATIO
CRP SERPL-MCNC: 3 MG/L
DSDNA AB SER-ACNC: 38 IU/ML
EGFR: 121 ML/MIN/1.73M2
EOSINOPHIL # BLD AUTO: 0.13 K/UL
EOSINOPHIL NFR BLD AUTO: 2.5 %
ERYTHROCYTE [SEDIMENTATION RATE] IN BLOOD BY WESTERGREN METHOD: 31 MM/HR
HCG SERPL QL: POSITIVE
HCT VFR BLD CALC: 44.3 %
HGB BLD-MCNC: 14.8 G/DL
IMM GRANULOCYTES NFR BLD AUTO: 0.6 %
LYMPHOCYTES # BLD AUTO: 0.96 K/UL
LYMPHOCYTES NFR BLD AUTO: 18.7 %
MAN DIFF?: NORMAL
MCHC RBC-ENTMCNC: 30.5 PG
MCHC RBC-ENTMCNC: 33.4 GM/DL
MCV RBC AUTO: 91.2 FL
MONOCYTES # BLD AUTO: 0.58 K/UL
MONOCYTES NFR BLD AUTO: 11.3 %
NEUTROPHILS # BLD AUTO: 3.41 K/UL
NEUTROPHILS NFR BLD AUTO: 66.5 %
PAPP-A SERPL-ACNC: 9358 MIU/ML
PLATELET # BLD AUTO: 326 K/UL
POTASSIUM SERPL-SCNC: 4.6 MMOL/L
PROGEST SERPL-MCNC: 17 NG/ML
PROT SERPL-MCNC: 7.2 G/DL
PROT UR-MCNC: 28 MG/DL
RBC # BLD: 4.86 M/UL
RBC # FLD: 13.2 %
SODIUM SERPL-SCNC: 139 MMOL/L
WBC # FLD AUTO: 5.13 K/UL

## 2022-12-05 RX ORDER — NITROFURANTOIN (MONOHYDRATE/MACROCRYSTALS) 25; 75 MG/1; MG/1
100 CAPSULE ORAL TWICE DAILY
Qty: 10 | Refills: 0 | Status: COMPLETED | COMMUNITY
Start: 2021-09-21 | End: 2022-12-05

## 2022-12-06 PROBLEM — Z31.9 DESIRE FOR PREGNANCY: Status: RESOLVED | Noted: 2020-07-21 | Resolved: 2022-12-06

## 2022-12-06 NOTE — REVIEW OF SYSTEMS
[As Noted in HPI] : as noted in HPI [Negative] : Heme/Lymph [Fever] : no fever [Chills] : no chills [Eye Pain] : no eye pain [Earache] : no earache [Nosebleeds] : no nosebleeds [Chest Pain] : no chest pain [Shortness Of Breath] : no shortness of breath [Wheezing] : no wheezing [Cough] : no cough [Abdominal Pain] : no abdominal pain [Vomiting] : no vomiting [Dysuria] : no dysuria [Joint Pain] : no joint pain [Skin Lesions] : no skin lesions [Dizziness] : no dizziness [Anxiety] : no anxiety [Easy Bleeding] : no tendency for easy bleeding [de-identified] : mood much better in [place] -  [FreeTextEntry1] : missed period

## 2022-12-06 NOTE — ASSESSMENT
[FreeTextEntry1] : 29 yo F w/ SLE  (dx 2014, arthritis, +PAUL,  +dsDNA, +SM, +RNP, nephritis 2016, longitudinally extensive TM 2019),  and fibromyalgia (dx 2014).  \par \par pain mgmt  - fax [phone] Shorty Morris\par uro-gynecology - bert rogers \par \par multiple issues - complex patient\par \par # Pregnancy, early about 3 weeks   \par just returned from [place], now 3 weeks pregnant   issues seem to be progesterone related so started injection progesterone.  \par -- has seen gyn, ob gyn and urogyn here - switching her high risk team to be closer to home.  first visit today\par -- is going to stay on the progesterone for 3 moths \par -- d/w patient and  impact of sle on pregnancy, mom and baby \par -- needs maternal fetal echoes starting around 10 - 12 weeks \par \par # SLE \par with nephritis (treated with cellcept+study),alopecia, arthritis, lymphopenia -complicated by longitudinal extensive transverse myelitis in feb 2019 s/p plex, rtx and pulse steroids.  MRI now normalized. Was on MMF,which was stopped 2/2 desire for pregnancy.   seems to be doing well on AZA and HCQ\par -- patient and  wondering if they should come off all lupus meds tprio to next IVF attempt.  Cautioned patient and  against this as these meds are okay during pregnancy and withdrawal may lead to a flare in a pregnancy which would be more dangerous to mom and baby.   \par -- continue AZA\par -- s/p RTX - holding on further doses - d/w patient and  again.   want to have no meds prior IVF\par -- lupus is at optimal time for pregnancy at this time\par -- esr and crp a bit elevated but no s/s of active disease - will recheck today\par \par #TM \par MRI spine negative for recurrence\par -- continue flexeril prn for intermittent muscle spasm that has resulted forrom this\par -- no s/s of recurrence\par \par #FMS:\par -- off duloxetine and on flexeril\par -- f/u pain mgmt - regarding pain control during pregnancy- \par -- in terms of gabapentin - needs to discuss with high risk and neurology how and what to transition to\par -- PT\par -- back pain - switch juan to evening dose \par \par #medication management / v58.69 \par -- cd19 - repopulating\par -- RTX, cellcept, steroids - PCP prophylaxis bactrim - now off\par -- quant tb neg november 2020\par \par \par \par More than 50% of the encounter was spent counseling the patient on differential, workup, disease course and treatment/management.  Education was provided to the patient during this encounter.  All questions and concerns were addressed and answered.   The patient verbalized understanding and agreed to the plan. \par \par Patient has been instructed to call for an appointment if new symptoms develop.\par Patient has been instructed to make a followup appointment in 1 months.\par \par Time spent on the encounter included, but is not limited to, preparing to see the patient, obtaining and/or reviewing separately obtained history, performing the evaluation, counseling and educating, independently interpreting results with communication to patient, order placement, referring and/or communicating with other health professionals as described, and documenting clinical information in the electronic health record\par

## 2022-12-06 NOTE — CONSULT LETTER
[Dear  ___] : Dear  [unfilled], [Courtesy Letter:] : I had the pleasure of seeing your patient, [unfilled], in my office today. [Please see my note below.] : Please see my note below. [Sincerely,] : Sincerely, [FreeTextEntry2] : Dr. Ashley Rochealez\par [address]\par [facility]  [id number] [FreeTextEntry1] : I have been Leslie Toby's rheumatologist for the past 7 years.  I treat her for systemic lupus erythematosus which has been complicated by both lupus nephritis and longitudinally extensive transverse myelitis.   Her condition is now stable for pregnancy at this time.   She is currently not in a flare clinically.   \par \par Mague's medications include: \par gabapentin 100 mg daily \par azathioprine 50 mg \par hydroxychloroquine 200 mg \par cyclobenzaprine 10 mg \par \par If you have further questions, please do not hesitate to contact me. [FreeTextEntry3] : rFeda Swenson MD

## 2022-12-06 NOTE — HISTORY OF PRESENT ILLNESS
[FreeTextEntry1] : INTERVAL HX\par Here to review several issues\par # in terms of the lupus - this has been okay and denies rash, joint pain or recurrent neurologic issues \par # in terms of pregnancy and interval events - returned from [place] after third IVF attemp and now is pregnant.  was determined to have low progesterone in the first two attempts and so now on injection progesterone.\par #in terms fo the FMS - continues to have intermittent back issues - takes the flexeril - off her regular meds 2/2 attempting pregnancy\par \par Rheum ROS \par - denies RP, sicca, oral ulcers, rashes, photosensitivity.   \par - denies skin tightening, ulcerations, nodules\par - denies constitutional symptoms, fatigue, night sweats.  weight is stable \par - Denies psoriasis, IBD, Inflammatory eye disease, STD, infectious diarrhea\par - breathes well without h/o of pleuritis, pericarditis.  renal function is normal and urine is not frothy\par - muscles are strong and there is no neurologic issues\par - no headaches, jaw pain with chewing, visual loss, scalp tenderness or double vision\par

## 2022-12-14 ENCOUNTER — NON-APPOINTMENT (OUTPATIENT)
Age: 37
End: 2022-12-14

## 2023-01-05 ENCOUNTER — LABORATORY RESULT (OUTPATIENT)
Age: 38
End: 2023-01-05

## 2023-01-06 ENCOUNTER — APPOINTMENT (OUTPATIENT)
Dept: RHEUMATOLOGY | Facility: CLINIC | Age: 38
End: 2023-01-06
Payer: COMMERCIAL

## 2023-01-06 VITALS
HEIGHT: 62 IN | OXYGEN SATURATION: 99 % | HEART RATE: 81 BPM | WEIGHT: 134 LBS | SYSTOLIC BLOOD PRESSURE: 118 MMHG | BODY MASS INDEX: 24.66 KG/M2 | DIASTOLIC BLOOD PRESSURE: 81 MMHG

## 2023-01-06 PROCEDURE — 36415 COLL VENOUS BLD VENIPUNCTURE: CPT

## 2023-01-06 PROCEDURE — 99215 OFFICE O/P EST HI 40 MIN: CPT | Mod: 25

## 2023-01-06 NOTE — ASSESSMENT
[FreeTextEntry1] : 31 yo F w/ SLE  (dx 2014, arthritis, +PAUL,  +dsDNA, +SM, +RNP, nephritis 2016, longitudinally extensive TM 2019),  and fibromyalgia (dx 2014).  \par \par pain mgmt  - fax [phone] Shorty Morris\par uro-gynecology - [facility] \par \par Please send labs and note to high risk OB:\par Paul Boyle , DO \par OB/GYN\par [address] \par [place]\par [address] \par \par multiple issues - complex patient\par now pregnant through IVF - feels well but very high risk for multiple reasons\par \par LMP  10 / 10 / 22\par \par # Pregnancy, 11 weeks\par just [place] from [place], now 3 weeks pregnant   issues seem to be progesterone related so started injection progesterone.  \par -- has seen gyn, ob gyn and urogyn here - switching her high risk team to be closer to home.  first visit today\par -- is going to stay on the progesterone for a few more weeks - needs level\par -- has started to have the fetal echos and was told everything was okay thus far\par -- send note to OB/GYN\par \par # SLE \par with nephritis (treated with cellcept+study),alopecia, arthritis, lymphopenia -complicated by longitudinal extensive transverse myelitis in feb 2019 s/p plex, rtx and pulse steroids.  MRI now normalized. Was on MMF,which was stopped 2/2 desire for pregnancy.   seems to be doing well on AZA and HCQ\par -- continue AZA\par -- s/p RTX - holding on further doses \par -- lupus is at optimal time for pregnancy at this time\par -- high risk from lupus perspective as well - no sis of activation at this time \par -- high risk for pre-eclampsia - check baseline sUA\par \par #TM \par MRI spine negative for recurrence\par -- continue flexeril prn for intermittent muscle spasm that has resulted forrom this\par -- no s/s of recurrence\par \par #FMS:\par -- off duloxetine and on flexeril\par -- f/u pain mgmt - regarding pain control during pregnancy- \par -- in terms of gabapentin - needs to discuss with high risk and neurology how and what to transition to\par -- PT\par -- back pain - switch juan to evening dose \par \par #medication management / v58.69 \par -- cd19 - repopulating\par -- RTX, cellcept, steroids - PCP prophylaxis bactrim - now off\par -- quant tb neg September 2022\par \par \par \par More than 50% of the encounter was spent counseling the patient on differential, workup, disease course and treatment/management.  Education was provided to the patient during this encounter.  All questions and concerns were addressed and answered.   The patient verbalized understanding and agreed to the plan. \par \par Patient has been instructed to call for an appointment if new symptoms develop.\par Patient has been instructed to make a followup appointment in 1 months.\par \par Time spent on the encounter included, but is not limited to, preparing to see the patient, obtaining and/or reviewing separately obtained history, performing the evaluation, counseling and educating, independently interpreting results with communication to patient, order placement, referring and/or communicating with other health professionals as described, and documenting clinical information in the electronic health record\par

## 2023-01-06 NOTE — HISTORY OF PRESENT ILLNESS
[FreeTextEntry1] : Mrs Luis has a PMHx SLE  (dx 2014, arthritis, +PAUL,  +dsDNA, +SM, +RNP, nephritis 2016),  and fibromyalgia (dx 2014).  Was treated with cellcept and in lupus study at [facility].  sle complicated by longitudinally extensive transverse myelitis (Feb 2019) treated with IV pulse steroids, PLEX, RTX with improvement in symptoms.  '\par \par \par INTERVAL Hx\par \par Here to review several issues\par # in terms of the lupus - this has been okay and denies rash, joint pain or recurrent neurologic issues \par \par # in terms of pregnancy and interval events - is pregnant and doing well.  had baseline echo and was doing okay in terms of baby and fetal heartbeat.  seeing high risk but out in sb given proximity to home. still on progesterone and needs her level checked and setn to ob\par \par #in terms fo the FMS - continues to have intermittent back issues - takes the flexeril -but wants to take less 2/2 pregnancy - since takeing less notices some increased spasms and cramps.  tolerable.  no motor issues and no pain\par \par Rheum ROS \par - denies RP, sicca, oral ulcers, rashes, photosensitivity.   \par - denies skin tightening, ulcerations, nodules\par - denies constitutional symptoms, fatigue, night sweats.  weight is stable \par - Denies psoriasis, IBD, Inflammatory eye disease, STD, infectious diarrhea\par - breathes well without h/o of pleuritis, pericarditis.  renal function is normal and urine is not frothy\par - muscles are strong and there is no neurologic issues\par - no headaches, jaw pain with chewing, visual loss, scalp tenderness or double vision\par

## 2023-01-06 NOTE — REVIEW OF SYSTEMS
[As Noted in HPI] : as noted in HPI [Negative] : Heme/Lymph [Fever] : no fever [Chills] : no chills [Eye Pain] : no eye pain [Earache] : no earache [Nosebleeds] : no nosebleeds [Chest Pain] : no chest pain [Shortness Of Breath] : no shortness of breath [Wheezing] : no wheezing [Cough] : no cough [Abdominal Pain] : no abdominal pain [Vomiting] : no vomiting [Dysuria] : no dysuria [Joint Pain] : no joint pain [Skin Lesions] : no skin lesions [Dizziness] : no dizziness [Anxiety] : no anxiety [Easy Bleeding] : no tendency for easy bleeding [de-identified] : mood much better in [place] -  [FreeTextEntry1] : missed period

## 2023-01-06 NOTE — CONSULT LETTER
[Dear  ___] : Dear  [unfilled], [Courtesy Letter:] : I had the pleasure of seeing your patient, [unfilled], in my office today. [Please see my note below.] : Please see my note below. [Sincerely,] : Sincerely, [FreeTextEntry2] : Paul Boyle ,  \par OB/GYN\par [address] \par [place]\par [address]  [FreeTextEntry3] : Freda Swenson MD

## 2023-01-07 LAB
ALBUMIN SERPL ELPH-MCNC: 4.6 G/DL
ALP BLD-CCNC: 81 U/L
ALT SERPL-CCNC: 46 U/L
ANION GAP SERPL CALC-SCNC: 13 MMOL/L
APPEARANCE: ABNORMAL
AST SERPL-CCNC: 26 U/L
BASOPHILS # BLD AUTO: 0.02 K/UL
BASOPHILS NFR BLD AUTO: 0.4 %
BILIRUB SERPL-MCNC: 0.4 MG/DL
BILIRUBIN URINE: NEGATIVE
BLOOD URINE: NEGATIVE
BUN SERPL-MCNC: 8 MG/DL
CALCIUM SERPL-MCNC: 9.3 MG/DL
CHLORIDE SERPL-SCNC: 102 MMOL/L
CO2 SERPL-SCNC: 24 MMOL/L
COLOR: NORMAL
CREAT SERPL-MCNC: 0.44 MG/DL
CREAT SPEC-SCNC: 28 MG/DL
CREAT/PROT UR: 0.2 RATIO
CRP SERPL-MCNC: <3 MG/L
EGFR: 127 ML/MIN/1.73M2
EOSINOPHIL # BLD AUTO: 0.1 K/UL
EOSINOPHIL NFR BLD AUTO: 2.2 %
ERYTHROCYTE [SEDIMENTATION RATE] IN BLOOD BY WESTERGREN METHOD: 6 MM/HR
GLUCOSE QUALITATIVE U: NEGATIVE
HCT VFR BLD CALC: 46 %
HGB BLD-MCNC: 14.6 G/DL
IMM GRANULOCYTES NFR BLD AUTO: 0.4 %
KETONES URINE: NEGATIVE
LEUKOCYTE ESTERASE URINE: ABNORMAL
LYMPHOCYTES # BLD AUTO: 1.14 K/UL
LYMPHOCYTES NFR BLD AUTO: 24.6 %
MAN DIFF?: NORMAL
MCHC RBC-ENTMCNC: 29.8 PG
MCHC RBC-ENTMCNC: 31.7 GM/DL
MCV RBC AUTO: 93.9 FL
MONOCYTES # BLD AUTO: 0.49 K/UL
MONOCYTES NFR BLD AUTO: 10.6 %
NEUTROPHILS # BLD AUTO: 2.87 K/UL
NEUTROPHILS NFR BLD AUTO: 61.8 %
NITRITE URINE: POSITIVE
PH URINE: 6.5
PLATELET # BLD AUTO: 317 K/UL
POTASSIUM SERPL-SCNC: 4.3 MMOL/L
PROGEST SERPL-MCNC: 51.9 NG/ML
PROT SERPL-MCNC: 7 G/DL
PROT UR-MCNC: 5 MG/DL
PROTEIN URINE: NEGATIVE
RBC # BLD: 4.9 M/UL
RBC # FLD: 13 %
SODIUM SERPL-SCNC: 139 MMOL/L
SPECIFIC GRAVITY URINE: 1.01
URATE SERPL-MCNC: 2.7 MG/DL
UROBILINOGEN URINE: NORMAL
WBC # FLD AUTO: 4.64 K/UL

## 2023-01-08 LAB
C3 SERPL-MCNC: 139 MG/DL
C4 SERPL-MCNC: 42 MG/DL

## 2023-01-09 LAB — DSDNA AB SER-ACNC: 58 IU/ML

## 2023-01-11 ENCOUNTER — NON-APPOINTMENT (OUTPATIENT)
Age: 38
End: 2023-01-11

## 2023-01-13 ENCOUNTER — NON-APPOINTMENT (OUTPATIENT)
Age: 38
End: 2023-01-13

## 2023-02-03 ENCOUNTER — APPOINTMENT (OUTPATIENT)
Dept: RHEUMATOLOGY | Facility: CLINIC | Age: 38
End: 2023-02-03
Payer: COMMERCIAL

## 2023-02-03 ENCOUNTER — LABORATORY RESULT (OUTPATIENT)
Age: 38
End: 2023-02-03

## 2023-02-03 VITALS
BODY MASS INDEX: 24.29 KG/M2 | DIASTOLIC BLOOD PRESSURE: 71 MMHG | WEIGHT: 132 LBS | OXYGEN SATURATION: 100 % | HEIGHT: 62 IN | HEART RATE: 83 BPM | SYSTOLIC BLOOD PRESSURE: 101 MMHG

## 2023-02-03 PROCEDURE — 36415 COLL VENOUS BLD VENIPUNCTURE: CPT

## 2023-02-03 PROCEDURE — 99214 OFFICE O/P EST MOD 30 MIN: CPT | Mod: 25

## 2023-02-03 NOTE — HISTORY OF PRESENT ILLNESS
[FreeTextEntry1] : Mrs Luis has a PMHx SLE  (dx 2014, arthritis, +PAUL,  +dsDNA, +SM, +RNP, nephritis 2016),  and fibromyalgia (dx 2014).  Was treated with cellcept and in lupus study at [facility].  sle complicated by longitudinally extensive transverse myelitis (Feb 2019) treated with IV pulse steroids, PLEX, RTX with improvement in symptoms.  '\par \par \par INTERVAL Hx\par Here to review several issues\par # in terms of the lupus - this has been okay and denies rash, joint pain or recurrent neurologic issues \par \par # in terms of pregnancy and interval events - is pregnant and doing well.  4 months.  echos have been fine\par \par #in terms fo the FMS - continues to have intermittent back issues - takes the flexeril -but wants to take less 2/2 pregnancy - since takeing less notices some increased spasms and cramps.  tolerable.  no motor issues and no pain\par \par Rheum ROS \par - denies RP, sicca, oral ulcers, rashes, photosensitivity.   \par - denies skin tightening, ulcerations, nodules\par - denies constitutional symptoms, fatigue, night sweats.  weight is stable \par - Denies psoriasis, IBD, Inflammatory eye disease, STD, infectious diarrhea\par - breathes well without h/o of pleuritis, pericarditis.  renal function is normal and urine is not frothy\par - muscles are strong and there is no neurologic issues\par - no headaches, jaw pain with chewing, visual loss, scalp tenderness or double vision\par

## 2023-02-03 NOTE — ASSESSMENT
[FreeTextEntry1] : 29 yo F w/ SLE  (dx 2014, arthritis, +PAUL,  +dsDNA, +SM, +RNP, nephritis 2016, longitudinally extensive TM 2019),  and fibromyalgia (dx 2014).  \par \par pain mgmt  - fax [phone] Shorty Morris\par uro-gynecology - [facility] \par \par Please send labs and note to high risk OB:\par Paul Boyle , DO \par OB/GYN\par [address] \par [place]\par [address] \par \par multiple issues - complex patient\par now pregnant through IVF - feels well but very high risk for multiple reasons\par \par LMP  10 / 10 / 22\par \par # Pregnancy, 16 weeks\par doing well - fetal echos fine\par -- f/u ob high risk \par -- support given\par \par # SLE \par with nephritis (treated with cellcept+study),alopecia, arthritis, lymphopenia -complicated by longitudinal extensive transverse myelitis in feb 2019 s/p plex, rtx and pulse steroids.  MRI now normalized. Was on MMF,which was stopped 2/2 desire for pregnancy.   seems to be doing well on AZA and HCQ\par -- continue AZA\par -- s/p RTX - holding on further doses \par -- lupus is at optimal time for pregnancy at this time\par -- high risk from lupus perspective as well - no sis of activation at this time \par -- high risk for pre-eclampsia - check baseline sUA\par -- check inflamamtory markers - though sed rate will start to rise from the pregnancy \par -- check activity montiroty labs\par -- no current s/s of the lupus activiation - though at high risk - monitoring monthly\par \par #TM \par MRI spine negative for recurrence\par -- continue flexeril prn for intermittent muscle spasm that has resulted forrom this\par -- no s/s of recurrence\par \par #FMS:\par -- off duloxetine and on flexeril\par -- f/u pain mgmt - regarding pain control during pregnancy- \par -- in terms of gabapentin - needs to discuss with high risk and neurology how and what to transition to\par -- PT\par -- back pain - switch juan to evening dose \par \par #medication management / v58.69 \par -- cd19 - repopulating\par -- RTX, cellcept, steroids - PCP prophylaxis bactrim - now off\par -- quant tb neg September 2022\par \par \par More than 50% of the encounter was spent counseling the patient on differential, workup, disease course and treatment/management.  Education was provided to the patient during this encounter.  All questions and concerns were addressed and answered.   The patient verbalized understanding and agreed to the plan. \par \par Patient has been instructed to call for an appointment if new symptoms develop.\par Patient has been instructed to make a followup appointment in 1 months.\par \par Time spent on the encounter included, but is not limited to, preparing to see the patient, obtaining and/or reviewing separately obtained history, performing the evaluation, counseling and educating, independently interpreting results with communication to patient, order placement, referring and/or communicating with other health professionals as described, and documenting clinical information in the electronic health record\par

## 2023-02-03 NOTE — REVIEW OF SYSTEMS
[Fever] : no fever [Chills] : no chills [Eye Pain] : no eye pain [Earache] : no earache [Nosebleeds] : no nosebleeds [Chest Pain] : no chest pain [Shortness Of Breath] : no shortness of breath [Wheezing] : no wheezing [Cough] : no cough [Abdominal Pain] : no abdominal pain [Vomiting] : no vomiting [Dysuria] : no dysuria [As Noted in HPI] : as noted in HPI [Joint Pain] : no joint pain [Skin Lesions] : no skin lesions [Dizziness] : no dizziness [Anxiety] : no anxiety [Easy Bleeding] : no tendency for easy bleeding [Negative] : Heme/Lymph [de-identified] : mood much better in [place] -  [FreeTextEntry1] : missed period

## 2023-02-03 NOTE — PHYSICAL EXAM
[General Appearance - Alert] : alert [General Appearance - In No Acute Distress] : in no acute distress [General Appearance - Well Nourished] : well nourished [General Appearance - Well Developed] : well developed [General Appearance - Well-Appearing] : healthy appearing [Sclera] : the sclera and conjunctiva were normal [PERRL With Normal Accommodation] : pupils were equal in size, round, and reactive to light [Extraocular Movements] : extraocular movements were intact [Outer Ear] : the ears and nose were normal in appearance [Neck Appearance] : the appearance of the neck was normal [Edema] : there was no peripheral edema [No CVA Tenderness] : no ~M costovertebral angle tenderness [No Spinal Tenderness] : no spinal tenderness [Musculoskeletal - Swelling] : no joint swelling seen [Skin Color & Pigmentation] : normal skin color and pigmentation [Skin Turgor] : normal skin turgor [] : no rash [Skin Lesions] : no skin lesions [FreeTextEntry1] : no focal deficits - able to walk without assistive device, MS 5/5, able to walk on toes [Oriented To Time, Place, And Person] : oriented to person, place, and time [Impaired Insight] : insight and judgment were intact

## 2023-02-03 NOTE — CONSULT LETTER
[Dear  ___] : Dear  [unfilled], [Courtesy Letter:] : I had the pleasure of seeing your patient, [unfilled], in my office today. [Please see my note below.] : Please see my note below. [Sincerely,] : Sincerely, [FreeTextEntry2] : Paul Boyle ,  \par OB/GYN\par [address] \par [place]\par [address]  [FreeTextEntry3] : Freda Swenson MD

## 2023-02-07 ENCOUNTER — EMERGENCY (EMERGENCY)
Facility: HOSPITAL | Age: 38
LOS: 1 days | Discharge: HOME | End: 2023-02-07
Attending: EMERGENCY MEDICINE
Payer: COMMERCIAL

## 2023-02-07 VITALS
TEMPERATURE: 98 F | OXYGEN SATURATION: 98 % | DIASTOLIC BLOOD PRESSURE: 69 MMHG | HEART RATE: 80 BPM | SYSTOLIC BLOOD PRESSURE: 99 MMHG | RESPIRATION RATE: 18 BRPM

## 2023-02-07 VITALS
HEIGHT: 61 IN | TEMPERATURE: 98 F | DIASTOLIC BLOOD PRESSURE: 79 MMHG | HEART RATE: 99 BPM | RESPIRATION RATE: 18 BRPM | WEIGHT: 132.5 LBS | SYSTOLIC BLOOD PRESSURE: 111 MMHG | OXYGEN SATURATION: 100 %

## 2023-02-07 DIAGNOSIS — Z98.890 OTHER SPECIFIED POSTPROCEDURAL STATES: Chronic | ICD-10-CM

## 2023-02-07 LAB
ALBUMIN SERPL ELPH-MCNC: 4.3 G/DL
ALP BLD-CCNC: 71 U/L
ALT SERPL-CCNC: 34 U/L
ANION GAP SERPL CALC-SCNC: 15 MMOL/L
APPEARANCE: ABNORMAL
AST SERPL-CCNC: 24 U/L
BASOPHILS # BLD AUTO: 0.02 K/UL
BASOPHILS NFR BLD AUTO: 0.4 %
BILIRUB SERPL-MCNC: 0.4 MG/DL
BILIRUBIN URINE: NEGATIVE
BLOOD URINE: NEGATIVE
BUN SERPL-MCNC: 10 MG/DL
C3 SERPL-MCNC: 102 MG/DL
C4 SERPL-MCNC: 35 MG/DL
CALCIUM SERPL-MCNC: 9.1 MG/DL
CHLORIDE SERPL-SCNC: 102 MMOL/L
CO2 SERPL-SCNC: 20 MMOL/L
COLOR: YELLOW
CREAT SERPL-MCNC: 0.44 MG/DL
CREAT SPEC-SCNC: 141 MG/DL
CREAT/PROT UR: 0.2 RATIO
CRP SERPL-MCNC: <3 MG/L
DSDNA AB SER-ACNC: 86 IU/ML
EGFR: 127 ML/MIN/1.73M2
EOSINOPHIL # BLD AUTO: 0.06 K/UL
EOSINOPHIL NFR BLD AUTO: 1.1 %
ERYTHROCYTE [SEDIMENTATION RATE] IN BLOOD BY WESTERGREN METHOD: 27 MM/HR
GLUCOSE QUALITATIVE U: NEGATIVE
HCT VFR BLD CALC: 40.9 %
HGB BLD-MCNC: 13.8 G/DL
IMM GRANULOCYTES NFR BLD AUTO: 0.6 %
KETONES URINE: NEGATIVE
LEUKOCYTE ESTERASE URINE: ABNORMAL
LYMPHOCYTES # BLD AUTO: 1.01 K/UL
LYMPHOCYTES NFR BLD AUTO: 18.6 %
MAN DIFF?: NORMAL
MCHC RBC-ENTMCNC: 31.3 PG
MCHC RBC-ENTMCNC: 33.7 GM/DL
MCV RBC AUTO: 92.7 FL
MONOCYTES # BLD AUTO: 0.52 K/UL
MONOCYTES NFR BLD AUTO: 9.6 %
NEUTROPHILS # BLD AUTO: 3.8 K/UL
NEUTROPHILS NFR BLD AUTO: 69.7 %
NITRITE URINE: NEGATIVE
PH URINE: 6
PLATELET # BLD AUTO: 266 K/UL
POTASSIUM SERPL-SCNC: 4.1 MMOL/L
PROT SERPL-MCNC: 7 G/DL
PROT UR-MCNC: 21 MG/DL
PROTEIN URINE: ABNORMAL
RBC # BLD: 4.41 M/UL
RBC # FLD: 13.1 %
SODIUM SERPL-SCNC: 137 MMOL/L
SPECIFIC GRAVITY URINE: 1.03
UROBILINOGEN URINE: NORMAL
WBC # FLD AUTO: 5.44 K/UL

## 2023-02-07 PROCEDURE — 99284 EMERGENCY DEPT VISIT MOD MDM: CPT | Mod: 25

## 2023-02-07 PROCEDURE — 76805 OB US >/= 14 WKS SNGL FETUS: CPT

## 2023-02-07 PROCEDURE — 99284 EMERGENCY DEPT VISIT MOD MDM: CPT

## 2023-02-07 PROCEDURE — 76805 OB US >/= 14 WKS SNGL FETUS: CPT | Mod: 26

## 2023-02-07 NOTE — ED ADULT NURSE NOTE - NSIMPLEMENTINTERV_GEN_ALL_ED
Implemented All Fall Risk Interventions:  [place] to call system. Call bell, personal items and telephone within reach. Instruct patient to call for assistance. Room bathroom lighting operational. Non-slip footwear when patient is off stretcher. Physically safe environment: no spills, clutter or unnecessary equipment. Stretcher in lowest position, wheels locked, appropriate side rails in place. Provide visual cue, wrist band, yellow gown, etc. Monitor gait and stability. Monitor for mental status changes and reorient to person, place, and time. Review medications for side effects contributing to fall risk. Reinforce activity limits and safety measures with patient and family.

## 2023-02-07 NOTE — ED STATDOCS - OBJECTIVE STATEMENT
38 y/o female with PMHx of Fibromyalgia, GERD, and Lupus on Plaquenil presents to ED s/p fall. Patient A0 @ 16 weeks reports she had a mechanical trip and fall yesterday, endorsing right knee and lower back pain. Also endorsing abdominal discomfort, she is high risk pregnancy.     Denies vaginal discharge or bleeding, N/V/D, hitting head, LOC, falling on abdomen

## 2023-02-07 NOTE — ED STATDOCS - ATTENDING APP SHARED VISIT CONTRIBUTION OF CARE
This was a shared visit with VENICE. I reviewed and verified the documentation and independently performed the documented history/exam/mdm.

## 2023-02-07 NOTE — ED PROCEDURE NOTE - PROCEDURE ADDITIONAL DETAILS
Educational Ultrasound  Please refer to official study Emergency Department Focused Ultrasound performed at patient's bedside for educational purposes. The study will have a follow up study performed or was performed in the direct supervision of an ultrasound trained attending.

## 2023-02-07 NOTE — ED STATDOCS - CLINICAL SUMMARY MEDICAL DECISION MAKING FREE TEXT BOX
Formal OB US to assess for abdominal discomfort 1 day after fall. Formal OB US to assess for abdominal discomfort 1 day after fall.    Edwin OAKLEY @ 14:10: 37 female 16 weeks pregnant presents to the ED status post fall.  Patient had ultrasound that shows 15 weeks and 5 days. Pt stable for d/c with OB/GYN f/u.

## 2023-02-07 NOTE — ED STATDOCS - PATIENT PORTAL LINK FT
You can access the FollowMyHealth Patient Portal offered by [facility] by registering at the following website: [email or web address]. By joining [employer]’s FollowMyHealth portal, you will also be able to view your health information using other applications (apps) compatible with our system.

## 2023-02-07 NOTE — ED STATDOCS - NS ED ATTENDING STATEMENT MOD
This was a shared visit with the VENICE. I reviewed and verified the documentation and independently performed the documented:

## 2023-02-07 NOTE — ED ADULT TRIAGE NOTE - CHIEF COMPLAINT QUOTE
Pt A&Ox4, NAD. Pt presents to the ED 16 weeks pregnant with complaints of knee pain and back pain s/p trip and fall. Pt is high risk pregnancy. Denies LOC, denies vaginal bleeding. Breathing even and unlabored.

## 2023-02-07 NOTE — ED STATDOCS - CARE PLAN
1 Principal Discharge DX:	Fall   Principal Discharge DX:	Fall  Secondary Diagnosis:	Abdominal pain in pregnancy

## 2023-02-07 NOTE — ED STATDOCS - NS ED MD DISPO DISCHARGE CCDA
Actions Requested: office visit  Problem: finger injury  Onset and Timin-10 days ago, reinjured 3-4 days ago  Associated Symptoms: swelling, pain  Aggravating by: touch, bumping finger  Alleviated by: not bumping finger.   Triage Note: Patient's wife ca injury (e.g., from paint gun, usually work-related)  * Looks like a broken bone or dislocated joint (e.g., crooked or deformed)  * Skin is split open or gaping (length > 1/2 inch or 12 mm)  * Cut or scrape is very deep (e.g., can see bone or tendons)  * Bl Patient/Caregiver provided printed discharge information.

## 2023-03-17 ENCOUNTER — LABORATORY RESULT (OUTPATIENT)
Age: 38
End: 2023-03-17

## 2023-03-17 ENCOUNTER — APPOINTMENT (OUTPATIENT)
Dept: RHEUMATOLOGY | Facility: CLINIC | Age: 38
End: 2023-03-17
Payer: COMMERCIAL

## 2023-03-17 VITALS
OXYGEN SATURATION: 98 % | HEIGHT: 62 IN | SYSTOLIC BLOOD PRESSURE: 102 MMHG | DIASTOLIC BLOOD PRESSURE: 68 MMHG | BODY MASS INDEX: 24.29 KG/M2 | HEART RATE: 119 BPM | WEIGHT: 132 LBS

## 2023-03-17 PROCEDURE — 99214 OFFICE O/P EST MOD 30 MIN: CPT

## 2023-03-17 NOTE — REVIEW OF SYSTEMS
[As Noted in HPI] : as noted in HPI [Negative] : Heme/Lymph [Fever] : no fever [Chills] : no chills [Eye Pain] : no eye pain [Earache] : no earache [Nosebleeds] : no nosebleeds [Chest Pain] : no chest pain [Shortness Of Breath] : no shortness of breath [Wheezing] : no wheezing [Cough] : no cough [Abdominal Pain] : no abdominal pain [Vomiting] : no vomiting [Dysuria] : no dysuria [Joint Pain] : no joint pain [Skin Lesions] : no skin lesions [Dizziness] : no dizziness [Anxiety] : no anxiety [Easy Bleeding] : no tendency for easy bleeding [de-identified] : mood much better in [place] -  [FreeTextEntry1] : missed period

## 2023-03-17 NOTE — PHYSICAL EXAM
[General Appearance - Alert] : alert [General Appearance - In No Acute Distress] : in no acute distress [General Appearance - Well Nourished] : well nourished [General Appearance - Well Developed] : well developed [General Appearance - Well-Appearing] : healthy appearing [Edema] : there was no peripheral edema [No CVA Tenderness] : no ~M costovertebral angle tenderness [No Spinal Tenderness] : no spinal tenderness [Musculoskeletal - Swelling] : no joint swelling seen [Skin Color & Pigmentation] : normal skin color and pigmentation [Skin Turgor] : normal skin turgor [] : no rash [Skin Lesions] : no skin lesions [Oriented To Time, Place, And Person] : oriented to person, place, and time [Impaired Insight] : insight and judgment were intact [FreeTextEntry1] : no focal deficits - able to walk without assistive device, MS 5/5, able to walk on toes

## 2023-03-17 NOTE — HISTORY OF PRESENT ILLNESS
[FreeTextEntry1] : Mrs Luis has a PMHx SLE  (dx 2014, arthritis, +PAUL,  +dsDNA, +SM, +RNP, nephritis 2016),  and fibromyalgia (dx 2014).  Was treated with cellcept and in lupus study at [facility].  sle complicated by longitudinally extensive transverse myelitis (Feb 2019) treated with IV pulse steroids, PLEX, RTX with improvement in symptoms.  '\par \par \par INTERVAL Hx\par Here to review several issues\par # in terms of the lupus - this has been okay and denies rash, joint pain or recurrent neurologic issues - last visit had a slight incresae in the dsDNA - but denies problems with urination - or swellign of the legs.  feels quite good\par \par # in terms of pregnancy and interval events - is pregnant and doing well.  4 months.  echos have been fine\par \par #in terms fo the FMS - continues to have intermittent back issues - takes the flexeril -but wants to take less 2/2 pregnancy - since takeing less notices some increased spasms and cramps.  tolerable.  no motor issues and no pain\par \par Rheum ROS \par - denies RP, sicca, oral ulcers, rashes, photosensitivity.   \par - denies skin tightening, ulcerations, nodules\par - denies constitutional symptoms, fatigue, night sweats.  weight is stable \par - Denies psoriasis, IBD, Inflammatory eye disease, STD, infectious diarrhea\par - breathes well without h/o of pleuritis, pericarditis.  renal function is normal and urine is not frothy\par - muscles are strong and there is no neurologic issues\par - no headaches, jaw pain with chewing, visual loss, scalp tenderness or double vision\par

## 2023-03-17 NOTE — CONSULT LETTER
[Dear  ___] : Dear  [unfilled], [Courtesy Letter:] : I had the pleasure of seeing your patient, [unfilled], in my office today. [Please see my note below.] : Please see my note below. [Sincerely,] : Sincerely, [FreeTextEntry2] : Paul Boyle ,  \par OB/GYN\par [address] \par [place]\par [address]  [FreeTextEntry3] : Freda Swenson MD

## 2023-03-17 NOTE — ASSESSMENT
[FreeTextEntry1] : 31 yo F w/ SLE  (dx 2014, arthritis, +PAUL,  +dsDNA, +SM, +RNP, nephritis 2016, longitudinally extensive TM 2019),  and fibromyalgia (dx 2014).  \par \par pain mgmt  - fax [phone] Shorty Morris\par uro-gynecology - bert rogers \par \par Please send labs and note to high risk OB:\par Paul Boyle , DO \par OB/GYN\par [address] \par [place]\par [address] \par \par multiple issues - complex patient\par now pregnant through IVF - feels well but very high risk for multiple reasons\par \par LMP  10 / 10 / 22\par \par # Pregnancy,\par doing well - fetal echos fine\par -- f/u ob high risk \par -- support given\par \par # SLE \par with nephritis (treated with cellcept+study),alopecia, arthritis, lymphopenia -complicated by longitudinal extensive transverse myelitis in feb 2019 s/p plex, rtx and pulse steroids.  MRI now normalized. Was on MMF,which was stopped 2/2 desire for pregnancy.   seems to be doing well on AZA and HCQ\par -- continue AZA\par -- s/p RTX - holding on further doses \par -- lupus is at optimal time for pregnancy at this time\par -- high risk from lupus perspective as well - no sis of activation at this time \par -- high risk for pre-eclampsia - check baseline sUA\par -- check inflamamtory markers - though sed rate will start to rise from the pregnancy \par -- check activity montiroty labs - dsDNA was slightly bumped last visit.  no proteinuria.   no sxs of activity.  montir very closely at very high risk for activitation\par -- no current s/s of the lupus activiation - though at high risk - monitoring monthly\par \par #TM \par MRI spine negative for recurrence\par -- continue flexeril prn for intermittent muscle spasm that has resulted forrom this\par -- no s/s of recurrence\par \par #FMS:\par -- off duloxetine and on flexeril\par -- f/u pain mgmt - regarding pain control during pregnancy- \par -- in terms of gabapentin - needs to discuss with high risk and neurology how and what to transition to\par -- PT\par -- back pain - switch juan to evening dose \par \par #medication management / v58.69 \par -- cd19 - repopulating\par -- RTX, cellcept, steroids - PCP prophylaxis bactrim - now off\par -- quant tb neg September 2022\par \par \par More than 50% of the encounter was spent counseling the patient on differential, workup, disease course and treatment/management.  Education was provided to the patient during this encounter.  All questions and concerns were addressed and answered.   The patient verbalized understanding and agreed to the plan. \par \par Patient has been instructed to call for an appointment if new symptoms develop.\par Patient has been instructed to make a followup appointment in 1 months.\par \par Time spent on the encounter included, but is not limited to, preparing to see the patient, obtaining and/or reviewing separately obtained history, performing the evaluation, counseling and educating, independently interpreting results with communication to patient, order placement, referring and/or communicating with other health professionals as described, and documenting clinical information in the electronic health record\par

## 2023-03-18 LAB
ALBUMIN SERPL ELPH-MCNC: 3.9 G/DL
ALP BLD-CCNC: 96 U/L
ALT SERPL-CCNC: 28 U/L
ANION GAP SERPL CALC-SCNC: 13 MMOL/L
AST SERPL-CCNC: 27 U/L
BASOPHILS # BLD AUTO: 0.02 K/UL
BASOPHILS NFR BLD AUTO: 0.4 %
BILIRUB SERPL-MCNC: 0.3 MG/DL
BUN SERPL-MCNC: 10 MG/DL
CALCIUM SERPL-MCNC: 8.7 MG/DL
CHLORIDE SERPL-SCNC: 106 MMOL/L
CO2 SERPL-SCNC: 20 MMOL/L
CREAT SERPL-MCNC: 0.71 MG/DL
CREAT SPEC-SCNC: 110 MG/DL
CREAT/PROT UR: 0.1 RATIO
CRP SERPL-MCNC: 5 MG/L
EGFR: 112 ML/MIN/1.73M2
EOSINOPHIL # BLD AUTO: 0.09 K/UL
EOSINOPHIL NFR BLD AUTO: 1.6 %
ERYTHROCYTE [SEDIMENTATION RATE] IN BLOOD BY WESTERGREN METHOD: 46 MM/HR
HCT VFR BLD CALC: 39.8 %
HGB BLD-MCNC: 13.2 G/DL
IMM GRANULOCYTES NFR BLD AUTO: 0.5 %
LYMPHOCYTES # BLD AUTO: 0.82 K/UL
LYMPHOCYTES NFR BLD AUTO: 15 %
MAN DIFF?: NORMAL
MCHC RBC-ENTMCNC: 31 PG
MCHC RBC-ENTMCNC: 33.2 GM/DL
MCV RBC AUTO: 93.4 FL
MONOCYTES # BLD AUTO: 0.4 K/UL
MONOCYTES NFR BLD AUTO: 7.3 %
NEUTROPHILS # BLD AUTO: 4.12 K/UL
NEUTROPHILS NFR BLD AUTO: 75.2 %
PLATELET # BLD AUTO: 266 K/UL
POTASSIUM SERPL-SCNC: 3.9 MMOL/L
PROT SERPL-MCNC: 6.3 G/DL
PROT UR-MCNC: 12 MG/DL
RBC # BLD: 4.26 M/UL
RBC # FLD: 14 %
SODIUM SERPL-SCNC: 140 MMOL/L
WBC # FLD AUTO: 5.48 K/UL

## 2023-03-24 LAB
APPEARANCE: CLEAR
BILIRUBIN URINE: NEGATIVE
BLOOD URINE: NEGATIVE
C3 SERPL-MCNC: 131 MG/DL
C4 SERPL-MCNC: 46 MG/DL
COLOR: YELLOW
DSDNA AB SER-ACNC: 102 IU/ML
GLUCOSE QUALITATIVE U: NEGATIVE
KETONES URINE: NEGATIVE
LEUKOCYTE ESTERASE URINE: ABNORMAL
NITRITE URINE: NEGATIVE
PH URINE: 7
PROTEIN URINE: ABNORMAL
SPECIFIC GRAVITY URINE: 1.02
UROBILINOGEN URINE: NORMAL

## 2023-04-13 NOTE — PROGRESS NOTE ADULT - SUBJECTIVE AND OBJECTIVE BOX
[facility]  INFECTIOUS DISEASES AND INTERNAL MEDICINE at [place]  =======================================================  Adams Vasquez MD  Diplomates American Board of Internal Medicine and Infectious Diseases  =======================================================    MRN-[id number]  KANE ORE     Follow up: Right gluteal abscess and cellulitis     Pain is slightly better. No fever. No new event. Seen by surgery.     PAST MEDICAL & SURGICAL HISTORY:  Fibromyalgia  Lupus  GERD (gastroesophageal reflux disease)  Lupus nephritis  S/P correction of deviated nasal septum  History of esophagogastroduodenoscopy (EGD)  History of biopsy: Renal    Social Hx: no smoking or drinking     FAMILY HISTORY:  Family history of melanoma: In Eye  Family history of osteoarthritis  Family history of liver disease    Allergies  No Known Allergies    Antibiotics:  Zosyn and vancomycin     REVIEW OF SYSTEMS:  CONSTITUTIONAL:  No Fever or chills  HEENT:  No diplopia or blurred vision.  No sore throat or runny nose.  CARDIOVASCULAR:  No chest pain or SOB.  RESPIRATORY:  No cough, shortness of breath, PND or orthopnea.  GASTROINTESTINAL:  No nausea, vomiting or diarrhea.  GENITOURINARY:  No dysuria, frequency or urgency. No Blood in urine  MUSCULOSKELETAL:  no joint aches, no muscle pain  SKIN:  R buttock swelling and redness   NEUROLOGIC:  No paresthesias, fasciculations, seizures or weakness.  PSYCHIATRIC:  No disorder of thought or mood.  ENDOCRINE:  No heat or cold intolerance, polyuria or polydipsia.  HEMATOLOGICAL:  No easy bruising or bleeding.     Physical Exam:  ICU Vital Signs Last 24 Hrs  T(C): 37.1 (2019 09:51), Max: 37.9 (2019 08:21)  T(F): 98.7 (2019 09:51), Max: 100.3 (2019 08:21)  HR: 144 (2019 09:51) (102 - 144)  BP: 74/49 (2019 09:51) (67/47 - 99/65)  RR: 18 (2019 09:51) (18 - 18)  SpO2: 98% (2019 09:51) (96% - 100%)  GEN: NAD  HEENT: normocephalic and atraumatic. EOMI. PERRL.    NECK: Supple.  No lymphadenopathy   LUNGS: Clear to auscultation.  HEART: Regular rate and rhythm without murmur.  ABDOMEN: Soft, nontender, and nondistended.  Positive bowel sounds.    : No CVA tenderness, R buttock with swelling and erythema small opening in the middle with profuse bloody pus pouring out.   EXTREMITIES: Without any cyanosis, clubbing, rash, lesions or edema.  NEUROLOGIC: grossly intact.  PSYCHIATRIC: Appropriate affect .  SKIN: No ulceration or induration present.    Labs:      134<L>  |  92<L>  |  11.0  ----------------------------<  114  2.9<LL>   |  26.0  |  0.72    Ca    9.5      2019 08:54    TPro  7.7  /  Alb  4.3  /  TBili  0.8  /  DBili  x   /  AST  16  /  ALT  19  /  AlkPhos  86                          13.1   9.4   )-----------( 270      ( 2019 08:54 )             41.3     PT/INR - ( 2019 08:54 )   PT: 14.5 sec;   INR: 1.25 ratio     PTT - ( 2019 08:54 )  PTT:30.8 sec  Urinalysis Basic - ( 2019 05:34 )    Color: Yellow / Appearance: Clear / S.015 / pH: x  Gluc: x / Ketone: Negative  / Bili: Negative / Urobili: Negative mg/dL   Blood: x / Protein: 30 mg/dL / Nitrite: Negative   Leuk Esterase: Negative / RBC: 0-2 /HPF / WBC 0-2   Sq Epi: x / Non Sq Epi: Occasional / Bacteria: Moderate    LIVER FUNCTIONS - ( 2019 08:54 )  Alb: 4.3 g/dL / Pro: 7.7 g/dL / ALK PHOS: 86 U/L / ALT: 19 U/L / AST: 16 U/L / GGT: x           RECENT CULTURES:   @ 13:44 .Other right buttock     Numerous Staphylococcus aureus Identification and susceptibility to  follow.  Culture in progress    All imaging and other data have been reviewed. 103

## 2023-04-14 ENCOUNTER — APPOINTMENT (OUTPATIENT)
Dept: RHEUMATOLOGY | Facility: CLINIC | Age: 38
End: 2023-04-14
Payer: COMMERCIAL

## 2023-04-14 VITALS
SYSTOLIC BLOOD PRESSURE: 108 MMHG | BODY MASS INDEX: 25.03 KG/M2 | OXYGEN SATURATION: 98 % | WEIGHT: 136 LBS | DIASTOLIC BLOOD PRESSURE: 75 MMHG | RESPIRATION RATE: 18 BRPM | HEIGHT: 62 IN | HEART RATE: 96 BPM

## 2023-04-14 PROCEDURE — 99214 OFFICE O/P EST MOD 30 MIN: CPT

## 2023-04-14 NOTE — REVIEW OF SYSTEMS
[As Noted in HPI] : as noted in HPI [Negative] : Heme/Lymph [Fever] : no fever [Chills] : no chills [Eye Pain] : no eye pain [Earache] : no earache [Nosebleeds] : no nosebleeds [Chest Pain] : no chest pain [Shortness Of Breath] : no shortness of breath [Wheezing] : no wheezing [Cough] : no cough [Abdominal Pain] : no abdominal pain [Vomiting] : no vomiting [Dysuria] : no dysuria [Joint Pain] : no joint pain [Skin Lesions] : no skin lesions [Dizziness] : no dizziness [Anxiety] : no anxiety [Easy Bleeding] : no tendency for easy bleeding [de-identified] : mood much better in [place] -  [FreeTextEntry1] : missed period

## 2023-04-14 NOTE — HISTORY OF PRESENT ILLNESS
[FreeTextEntry1] : Mrs Luis has a PMHx SLE  (dx 2014, arthritis, +PAUL,  +dsDNA, +SM, +RNP, nephritis 2016),  and fibromyalgia (dx 2014).  Was treated with cellcept and in lupus study at [facility].  sle complicated by longitudinally extensive transverse myelitis (Feb 2019) treated with IV pulse steroids, PLEX, RTX with improvement in symptoms.  '\par \par \par INTERVAL Hx\par # in terms of the lupus - this has been okay and denies rash, joint pain or recurrent neurologic issues - last visit had a slight incresae in the dsDNA - but denies problems with urination - or swellign of the legs.  feels quite good\par - denies swelling - but craving a lot of salt and doesn’t drink a lot of water \par - was having some worsening leg cramping - but that improved with more water and electroylyte drink \par - ambulating well and no recurrent weakness or numbness down the leg\par - no HA or visual changes\par \par # in terms of pregnancy and interval events - is pregnant and doing well.  25 weeks   echos have been fine now getting every other week - will stop them at 30 weeks. \par \par #in terms fo the FMS - continues to have intermittent back issues - takes the flexeril -but wants to take less 2/2 pregnancy - since takeing less notices some increased spasms and cramps.  tolerable.  no motor issues and no pain\par \par Rheum ROS \par - denies RP, sicca, oral ulcers, rashes, photosensitivity.   \par - denies skin tightening, ulcerations, nodules\par - denies constitutional symptoms, fatigue, night sweats.  weight is stable \par - Denies psoriasis, IBD, Inflammatory eye disease, STD, infectious diarrhea\par - breathes well without h/o of pleuritis, pericarditis.  renal function is normal and urine is not frothy\par - muscles are strong and there is no neurologic issues\par - no headaches, jaw pain with chewing, visual loss, scalp tenderness or double vision\par

## 2023-04-14 NOTE — ASSESSMENT
[FreeTextEntry1] : 31 yo F w/ SLE  (dx 2014, arthritis, +PAUL,  +dsDNA, +SM, +RNP, nephritis 2016, longitudinally extensive TM 2019),  and fibromyalgia (dx 2014).  \par \par pain mgmt  - fax [phone] Shorty Morris\par uro-gynecology - bert singerabdon \par \par Please send labs and note to high risk OB:\par Paul Boyle , DO \par OB/GYN\par [address] \par [place]\par [address] \par \par multiple issues - complex patient\par now pregnant through IVF - feels well but very high risk for multiple reasons\par \par LMP  10 / 10 / 22\par \par # Pregnancy,\par doing well - fetal echos fine\par -- f/u ob high risk \par -- support given\par \par # SLE \par with nephritis (treated with cellcept+study),alopecia, arthritis, lymphopenia -complicated by longitudinal extensive transverse myelitis in feb 2019 s/p plex, rtx and pulse steroids.  MRI now normalized. Was on MMF,which was stopped 2/2 desire for pregnancy.   seems to be doing well on AZA and HCQ\par -- continue AZA\par -- s/p RTX - holding on further doses \par -- lupus is at optimal time for pregnancy at this time\par -- high risk from lupus perspective as well - no sis of activation at this time \par -- high risk for pre-eclampsia - check baseline sUA\par -- check inflamamtory markers - though sed rate will start to rise from the pregnancy \par -- check activity montiroty labs \par -- no current s/s of the lupus activiation - though at high risk - monitoring monthly\par -- dsDNA has been climbing - no s/s of clinical activity - may benefit from neprho evaluation - high risk for re-activation of nephritis - saw dr spence in the past\par -- trace edema - though could be from recent increase in salt intake (has had more cravings for this lately) - will restrict dietary sal, drink fluids and f/u neprho - close eye on urine functio\par \par #TM \par MRI spine negative for recurrence\par -- continue flexeril prn for intermittent muscle spasm that has resulted forrom this\par -- no s/s of recurrence\par \par #FMS:\par -- off duloxetine and on flexeril\par -- f/u pain mgmt - regarding pain control during pregnancy- \par -- in terms of gabapentin - needs to discuss with high risk and neurology how and what to transition to\par -- PT\par -- back pain - switch juan to evening dose \par \par #medication management / v58.69 \par -- cd19 - repopulating\par -- RTX, cellcept, steroids - PCP prophylaxis bactrim - now off\par -- quant tb neg September 2022\par \par \par More than 50% of the encounter was spent counseling the patient on differential, workup, disease course and treatment/management.  Education was provided to the patient during this encounter.  All questions and concerns were addressed and answered.   The patient verbalized understanding and agreed to the plan. \par \par Patient has been instructed to call for an appointment if new symptoms develop.\par Patient has been instructed to make a followup appointment in 1 months.\par \par Time spent on the encounter included, but is not limited to, preparing to see the patient, obtaining and/or reviewing separately obtained history, performing the evaluation, counseling and educating, independently interpreting results with communication to patient, order placement, referring and/or communicating with other health professionals as described, and documenting clinical information in the electronic health record\par

## 2023-04-14 NOTE — PHYSICAL EXAM
[General Appearance - Alert] : alert [General Appearance - In No Acute Distress] : in no acute distress [General Appearance - Well Nourished] : well nourished [General Appearance - Well Developed] : well developed [General Appearance - Well-Appearing] : healthy appearing [No CVA Tenderness] : no ~M costovertebral angle tenderness [No Spinal Tenderness] : no spinal tenderness [Musculoskeletal - Swelling] : no joint swelling seen [Skin Color & Pigmentation] : normal skin color and pigmentation [Skin Turgor] : normal skin turgor [] : no rash [Skin Lesions] : no skin lesions [Oriented To Time, Place, And Person] : oriented to person, place, and time [Impaired Insight] : insight and judgment were intact [FreeTextEntry1] : no focal deficits - able to walk without assistive device, MS 5/5, able to walk on toes

## 2023-04-14 NOTE — CONSULT LETTER
[Dear  ___] : Dear  [unfilled], [Courtesy Letter:] : I had the pleasure of seeing your patient, [unfilled], in my office today. [Please see my note below.] : Please see my note below. [Sincerely,] : Sincerely, [FreeTextEntry2] : Paul Boyle ,  \par OB/GYN\par [address] \par [place]\par [address]  [FreeTextEntry3] : Freda Swenson MD

## 2023-04-15 LAB
ALBUMIN SERPL ELPH-MCNC: 3.7 G/DL
ALP BLD-CCNC: 107 U/L
ALT SERPL-CCNC: 23 U/L
ANION GAP SERPL CALC-SCNC: 10 MMOL/L
AST SERPL-CCNC: 19 U/L
BILIRUB SERPL-MCNC: 0.3 MG/DL
BUN SERPL-MCNC: 11 MG/DL
C3 SERPL-MCNC: 106 MG/DL
C4 SERPL-MCNC: 34 MG/DL
CALCIUM SERPL-MCNC: 8.5 MG/DL
CHLORIDE SERPL-SCNC: 107 MMOL/L
CO2 SERPL-SCNC: 22 MMOL/L
CREAT SERPL-MCNC: 0.65 MG/DL
CREAT SPEC-SCNC: 113 MG/DL
CREAT/PROT UR: 0.1 RATIO
CRP SERPL-MCNC: <3 MG/L
EGFR: 116 ML/MIN/1.73M2
ERYTHROCYTE [SEDIMENTATION RATE] IN BLOOD BY WESTERGREN METHOD: 48 MM/HR
HCT VFR BLD CALC: 39.1 %
HGB BLD-MCNC: 12.4 G/DL
MCHC RBC-ENTMCNC: 29.7 PG
MCHC RBC-ENTMCNC: 31.7 GM/DL
MCV RBC AUTO: 93.5 FL
PLATELET # BLD AUTO: 286 K/UL
POTASSIUM SERPL-SCNC: 4.1 MMOL/L
PROT SERPL-MCNC: 5.8 G/DL
PROT UR-MCNC: 9 MG/DL
RBC # BLD: 4.18 M/UL
RBC # FLD: 14 %
SODIUM SERPL-SCNC: 139 MMOL/L
WBC # FLD AUTO: 6.66 K/UL

## 2023-04-21 LAB — DSDNA AB SER-ACNC: 114 IU/ML

## 2023-05-02 ENCOUNTER — RX RENEWAL (OUTPATIENT)
Age: 38
End: 2023-05-02

## 2023-05-19 ENCOUNTER — LABORATORY RESULT (OUTPATIENT)
Age: 38
End: 2023-05-19

## 2023-05-19 ENCOUNTER — APPOINTMENT (OUTPATIENT)
Dept: RHEUMATOLOGY | Facility: CLINIC | Age: 38
End: 2023-05-19
Payer: COMMERCIAL

## 2023-05-19 VITALS
OXYGEN SATURATION: 98 % | WEIGHT: 136 LBS | SYSTOLIC BLOOD PRESSURE: 115 MMHG | BODY MASS INDEX: 25.03 KG/M2 | HEART RATE: 100 BPM | HEIGHT: 62 IN | DIASTOLIC BLOOD PRESSURE: 84 MMHG

## 2023-05-19 DIAGNOSIS — R30.0 DYSURIA: ICD-10-CM

## 2023-05-19 PROCEDURE — 99214 OFFICE O/P EST MOD 30 MIN: CPT

## 2023-05-22 ENCOUNTER — NON-APPOINTMENT (OUTPATIENT)
Age: 38
End: 2023-05-22

## 2023-05-22 LAB
ALBUMIN SERPL ELPH-MCNC: 3.5 G/DL
ALP BLD-CCNC: 176 U/L
ALT SERPL-CCNC: 35 U/L
ANION GAP SERPL CALC-SCNC: 14 MMOL/L
APPEARANCE: ABNORMAL
AST SERPL-CCNC: 28 U/L
BACTERIA UR CULT: NORMAL
BILIRUB SERPL-MCNC: 0.4 MG/DL
BILIRUBIN URINE: NEGATIVE
BLOOD URINE: NEGATIVE
BUN SERPL-MCNC: 9 MG/DL
C3 SERPL-MCNC: 115 MG/DL
C4 SERPL-MCNC: 33 MG/DL
CALCIUM SERPL-MCNC: 9.1 MG/DL
CHLORIDE SERPL-SCNC: 105 MMOL/L
CO2 SERPL-SCNC: 20 MMOL/L
COLOR: NORMAL
CREAT SERPL-MCNC: 0.49 MG/DL
CREAT SPEC-SCNC: 105 MG/DL
CREAT/PROT UR: 0.2 RATIO
CRP SERPL-MCNC: 4 MG/L
DSDNA AB SER-ACNC: 82 IU/ML
EGFR: 124 ML/MIN/1.73M2
ERYTHROCYTE [SEDIMENTATION RATE] IN BLOOD BY WESTERGREN METHOD: 72 MM/HR
GLUCOSE QUALITATIVE U: NEGATIVE MG/DL
HCT VFR BLD CALC: 39.5 %
HGB BLD-MCNC: 12.9 G/DL
KETONES URINE: ABNORMAL MG/DL
LEUKOCYTE ESTERASE URINE: ABNORMAL
MCHC RBC-ENTMCNC: 29.7 PG
MCHC RBC-ENTMCNC: 32.7 GM/DL
MCV RBC AUTO: 90.8 FL
NITRITE URINE: NEGATIVE
PH URINE: 6
PLATELET # BLD AUTO: 275 K/UL
POTASSIUM SERPL-SCNC: 3.5 MMOL/L
PROT SERPL-MCNC: 5.8 G/DL
PROT UR-MCNC: 17 MG/DL
PROTEIN URINE: 30 MG/DL
RBC # BLD: 4.35 M/UL
RBC # FLD: 13.3 %
SODIUM SERPL-SCNC: 139 MMOL/L
SPECIFIC GRAVITY URINE: 1.02
UROBILINOGEN URINE: 1 MG/DL
WBC # FLD AUTO: 6.93 K/UL

## 2023-05-22 NOTE — PHYSICAL EXAM
[General Appearance - Alert] : alert [General Appearance - In No Acute Distress] : in no acute distress [General Appearance - Well Nourished] : well nourished [General Appearance - Well Developed] : well developed [General Appearance - Well-Appearing] : healthy appearing [No CVA Tenderness] : no ~M costovertebral angle tenderness [No Spinal Tenderness] : no spinal tenderness [Musculoskeletal - Swelling] : no joint swelling seen [Skin Color & Pigmentation] : normal skin color and pigmentation [Skin Turgor] : normal skin turgor [] : no rash [Skin Lesions] : no skin lesions [Oriented To Time, Place, And Person] : oriented to person, place, and time [Impaired Insight] : insight and judgment were intact [FreeTextEntry1] : no synovitis noted - from -  good !

## 2023-05-22 NOTE — HISTORY OF PRESENT ILLNESS
[FreeTextEntry1] : Mrs Luis has a PMHx SLE  (dx 2014, arthritis, +PAUL,  +dsDNA, +SM, +RNP, nephritis 2016),  and fibromyalgia (dx 2014).  Was treated with cellcept and in lupus study at [facility].  sle complicated by longitudinally extensive transverse myelitis (Feb 2019) treated with IV pulse steroids, PLEX, RTX with improvement in symptoms.  '\par \par \par INTERVAL Hx\par \par #in terms of new issues - mild dysuria - no other s/s - no fevers\par \par # in terms of the lupus - this has been okay and denies rash, joint pain or recurrent neurologic issues - last visit had a slight incresae in the dsDNA - but denies problems with urination - or swellign of the legs.  feels quite good\par - denies swelling - but craving a lot of salt and doesn’t drink a lot of water \par - was having some worsening leg cramping - but that improved with more water and electroylyte drink \par - ambulating well and no recurrent weakness or numbness down the leg\par - no HA or visual changes\par \par # in terms of pregnancy and interval events - is pregnant and doing well.  25 weeks   echos have been fine now getting every other week - will stop them at 30 weeks. \par \par #in terms fo the FMS - continues to have intermittent back issues - takes the flexeril -but wants to take less 2/2 pregnancy - since takeing less notices some increased spasms and cramps.  tolerable.  no motor issues and no pain\par \par Rheum ROS \par - denies RP, sicca, oral ulcers, rashes, photosensitivity.   \par - denies skin tightening, ulcerations, nodules\par - denies constitutional symptoms, fatigue, night sweats.  weight is stable \par - Denies psoriasis, IBD, Inflammatory eye disease, STD, infectious diarrhea\par - breathes well without h/o of pleuritis, pericarditis.  renal function is normal and urine is not frothy\par - muscles are strong and there is no neurologic issues\par - no headaches, jaw pain with chewing, visual loss, scalp tenderness or double vision\par

## 2023-05-22 NOTE — REVIEW OF SYSTEMS
[As Noted in HPI] : as noted in HPI [Negative] : Heme/Lymph [Fever] : no fever [Chills] : no chills [Eye Pain] : no eye pain [Earache] : no earache [Nosebleeds] : no nosebleeds [Chest Pain] : no chest pain [Shortness Of Breath] : no shortness of breath [Wheezing] : no wheezing [Cough] : no cough [Vomiting] : no vomiting [Abdominal Pain] : no abdominal pain [Dysuria] : no dysuria [Joint Pain] : no joint pain [Skin Lesions] : no skin lesions [Dizziness] : no dizziness [Easy Bleeding] : no tendency for easy bleeding [Anxiety] : no anxiety [de-identified] : mood much better in peru -  [FreeTextEntry1] : missed period

## 2023-05-22 NOTE — CONSULT LETTER
[Dear  ___] : Dear  [unfilled], [Courtesy Letter:] : I had the pleasure of seeing your patient, [unfilled], in my office today. [Please see my note below.] : Please see my note below. [Sincerely,] : Sincerely, [FreeTextEntry2] : Paul Boyle ,  \par OB/GYN\par [address] \par [place]\par [address]  [FreeTextEntry3] : Freda Swenson MD

## 2023-05-22 NOTE — ASSESSMENT
[FreeTextEntry1] : 29 yo F w/ SLE  (dx 2014, arthritis, +PAUL,  +dsDNA, +SM, +RNP, nephritis 2016, longitudinally extensive TM 2019),  and fibromyalgia (dx 2014).  \par \par pain mgmt  - fax [phone] Shorty Morris\par uro-gynecology - bert singerabdon \par \par Please send labs and note to high risk OB:\par Paul Boyle , DO \par OB/GYN\par [address] \par [place]\par [address] \par \par multiple issues - complex patient\par now pregnant through IVF - feels well but very high risk for multiple reasons\par \par LMP  10 / 10 / 22\par \par # Pregnancy,\par doing well - fetal echos fine\par -- f/u ob high risk \par -- support given\par \par # SLE \par with nephritis (treated with cellcept+study),alopecia, arthritis, lymphopenia -complicated by longitudinal extensive transverse myelitis in feb 2019 s/p plex, rtx and pulse steroids.  MRI now normalized. Was on MMF,which was stopped 2/2 desire for pregnancy.   seems to be doing well on AZA and HCQ\par -- continue AZA\par -- s/p RTX - holding on further doses \par -- lupus is at optimal time for pregnancy at this time\par -- high risk from lupus perspective as well - no sis of activation at this time \par -- high risk for pre-eclampsia - check baseline sUA\par -- check inflammatory markers - though sed rate will start to rise from the pregnancy \par -- check activity monitor labs \par -- no current s/s of the lupus activation - though at high risk - monitoring monthly\par -- dsDNA has been climbing - no s/s of clinical activity - may benefit from neprho evaluation - high risk for re-activation of nephritis - saw dr spence in the past\par -- trace edema - though could be from recent increase in salt intake (has had more cravings for this lately) - will restrict dietary sal, drink fluids and f/u neprho - close eye on urine function.  dsDNA is up from her baseline - but not dramatically so\par \par #TM \par MRI spine negative for recurrence\par -- continue flexeril prn for intermittent muscle spasm that has resulted forrom this\par -- no s/s of recurrence\par \par #FMS:\par -- off duloxetine and on flexeril\par -- f/u pain mgmt - regarding pain control during pregnancy- \par -- in terms of gabapentin - needs to discuss with high risk and neurology how and what to transition to\par -- PT\par -- back pain - switch juan to evening dose \par \par #medication management / v58.69 \par -- cd19 - repopulating\par -- RTX, cellcept, steroids - PCP prophylaxis bactrim - now off\par -- quant tb neg September 2022\par \par \par More than 50% of the encounter was spent counseling the patient on differential, workup, disease course and treatment/management.  Education was provided to the patient during this encounter.  All questions and concerns were addressed and answered.   The patient verbalized understanding and agreed to the plan. \par \par Patient has been instructed to call for an appointment if new symptoms develop.\par Patient has been instructed to make a followup appointment in 1 months.\par \par Time spent on the encounter included, but is not limited to, preparing to see the patient, obtaining and/or reviewing separately obtained history, performing the evaluation, counseling and educating, independently interpreting results with communication to patient, order placement, referring and/or communicating with other health professionals as described, and documenting clinical information in the electronic health record\par

## 2023-06-20 ENCOUNTER — APPOINTMENT (OUTPATIENT)
Dept: RHEUMATOLOGY | Facility: CLINIC | Age: 38
End: 2023-06-20

## 2023-06-22 ENCOUNTER — NON-APPOINTMENT (OUTPATIENT)
Age: 38
End: 2023-06-22

## 2023-06-26 ENCOUNTER — APPOINTMENT (OUTPATIENT)
Dept: NEPHROLOGY | Facility: CLINIC | Age: 38
End: 2023-06-26
Payer: COMMERCIAL

## 2023-06-26 ENCOUNTER — LABORATORY RESULT (OUTPATIENT)
Age: 38
End: 2023-06-26

## 2023-06-26 VITALS
HEIGHT: 62 IN | TEMPERATURE: 97.1 F | WEIGHT: 135.58 LBS | SYSTOLIC BLOOD PRESSURE: 108 MMHG | BODY MASS INDEX: 24.95 KG/M2 | HEART RATE: 111 BPM | DIASTOLIC BLOOD PRESSURE: 80 MMHG | OXYGEN SATURATION: 98 %

## 2023-06-26 PROCEDURE — 99205 OFFICE O/P NEW HI 60 MIN: CPT

## 2023-06-26 NOTE — HISTORY OF PRESENT ILLNESS
[FreeTextEntry1] : 38 yo:\par lupus diagnosed 2014; treated with prednisone; presented with back pain, hand arthralgias\par Lupus complicated by lupus nephritis diagnosed in 2016- Renal biopsy done showed Class III lupus nephritis-\par she was on cellcept (stopped one year ago) and lupus study x 2 years\par In 2019 she had transverse myelitis and was treated with IV pulse steroids, PLEX, RTX\par \par Pregnancy hx:\par #1 2013- @ 4 weeks, noted to have no heartbeat;? ectopic\par #2 IVF pregnancy x 2; second IVF miscarried\par #3 via IVF - currently 35 weeks; RIVAS July 29th; OB is Dr Paul Boyle- [place];  Pt plans to deliver in [place]; Last week went to [place] for itchy hands, was told BR is still <10, level reported as 7; Had labs Saturday at [facility] \par Also report ?infection last week \par \par denies foamy urine, bubbly urine\par c/o back pain left side/lower back- which represents her lupus nephritis\par Left leg swelling last week, which decreased\par c/o urinary Frequency of pregnancy; no pain or burning with urination\par Patient denies any blurred vision, double vision, headaches, hand swelling, leg swelling, or RUQ pain\par Patient denies any burning or pain with urination.\par +itchy hands\par \par Does not check BP at home\par prot/cr May 2023 0.2\par UA 30 prot/+ket/sg 1020 in may\par \par Current Meds:\par Azothioprine 50mg daily\par Plaquenil 100mg bid\par Flexeril 10mg daily  \par Baby ASa 81mg - one tablet daily\par prenatal vitamin\par Lovenox daily

## 2023-06-26 NOTE — ASSESSMENT
[FreeTextEntry1] : 38 yo, h/o SLE, lupus nephritis, transverse myelitis\par \par Currently 35 weeks IVF pregnancy, sent due to h/o lupus nephritis, proteinuria\par \par #h/o Lupus nephritis- seems to be in remission but on treatment \par Biopsy proven Class III/V in biopsy \par now on imuran during pregnancy\par last prot/cr nl in May prot/cr 0.2\par check ua, prot/cr today\par #PEC risk\par on baby asa 81mg daily\par check PEC labs\par BP normal\par keri PEC sx at this time\par #h/o +SSA/SSB- pt and  state they are getting fetal echo; \par #on Plaquenil \par #on Lovenox\par \par labs done last week [facility]\par [place] \par   \par \par OB Dr Boyle - \par \par spent>60min time after office visit, reading rheum notes, reviewing prior labs\par \par \par \par \par \par

## 2023-06-26 NOTE — PHYSICAL EXAM
[General Appearance - Alert] : alert [General Appearance - In No Acute Distress] : in no acute distress [General Appearance - Well Nourished] : well nourished [General Appearance - Well Developed] : well developed [Sclera] : the sclera and conjunctiva were normal [Outer Ear] : the ears and nose were normal in appearance [Neck Appearance] : the appearance of the neck was normal [Neck Cervical Mass (___cm)] : no neck mass was observed [Jugular Venous Distention Increased] : there was no jugular-venous distention [] : no respiratory distress [Respiration, Rhythm And Depth] : normal respiratory rhythm and effort [Auscultation Breath Sounds / Voice Sounds] : lungs were clear to auscultation bilaterally [Apical Impulse] : the apical impulse was normal [Heart Rate And Rhythm] : heart rate was normal and rhythm regular [Heart Sounds] : normal S1 and S2 [Bowel Sounds] : normal bowel sounds [Abdomen Soft] : soft [Abdomen Tenderness] : non-tender [No CVA Tenderness] : no ~M costovertebral angle tenderness [Abnormal Walk] : normal gait [Skin Color & Pigmentation] : normal skin color and pigmentation [No Focal Deficits] : no focal deficits [Oriented To Time, Place, And Person] : oriented to person, place, and time [Impaired Insight] : insight and judgment were intact [Affect] : the affect was normal [Mood] : the mood was normal [FreeTextEntry1] : trace LE edema b/l

## 2023-06-26 NOTE — REASON FOR VISIT
[Initial Eval - Existing Diagnosis] : an initial evaluation of an existing diagnosis [Spouse] : spouse [FreeTextEntry1] : h/o lupus nephritis; proteinuria

## 2023-06-27 ENCOUNTER — NON-APPOINTMENT (OUTPATIENT)
Age: 38
End: 2023-06-27

## 2023-06-28 ENCOUNTER — NON-APPOINTMENT (OUTPATIENT)
Age: 38
End: 2023-06-28

## 2023-06-28 LAB
ALBUMIN SERPL ELPH-MCNC: 3.2 G/DL
ALBUMIN SERPL ELPH-MCNC: 3.2 G/DL
ALBUMIN SERPL ELPH-MCNC: 3.6 G/DL
ALBUMIN SERPL ELPH-MCNC: 3.6 G/DL
ALP BLD-CCNC: 285 U/L
ALP BLD-CCNC: 299 U/L
ALT SERPL-CCNC: 58 U/L
ALT SERPL-CCNC: 89 U/L
ANION GAP SERPL CALC-SCNC: 12 MMOL/L
ANION GAP SERPL CALC-SCNC: 16 MMOL/L
APPEARANCE: ABNORMAL
AST SERPL-CCNC: 49 U/L
AST SERPL-CCNC: 76 U/L
BACTERIA: ABNORMAL /HPF
BILIRUB DIRECT SERPL-MCNC: 0.2 MG/DL
BILIRUB DIRECT SERPL-MCNC: 0.2 MG/DL
BILIRUB INDIRECT SERPL-MCNC: 0.2 MG/DL
BILIRUB INDIRECT SERPL-MCNC: 0.2 MG/DL
BILIRUB SERPL-MCNC: 0.4 MG/DL
BILIRUB SERPL-MCNC: 0.4 MG/DL
BILIRUBIN URINE: NEGATIVE
BLOOD URINE: NEGATIVE
BUN SERPL-MCNC: 10 MG/DL
BUN SERPL-MCNC: 11 MG/DL
C3 SERPL-MCNC: 122 MG/DL
C4 SERPL-MCNC: 28 MG/DL
CALCIUM SERPL-MCNC: 8.9 MG/DL
CALCIUM SERPL-MCNC: 9.1 MG/DL
CAST: 1 /LPF
CHLORIDE SERPL-SCNC: 105 MMOL/L
CHLORIDE SERPL-SCNC: 107 MMOL/L
CO2 SERPL-SCNC: 16 MMOL/L
CO2 SERPL-SCNC: 19 MMOL/L
COLOR: YELLOW
CREAT SERPL-MCNC: 0.69 MG/DL
CREAT SERPL-MCNC: 0.75 MG/DL
CREAT SPEC-SCNC: 62 MG/DL
CREAT/PROT UR: 0.2 RATIO
DSDNA AB SER-ACNC: 77 IU/ML
EGFR: 104 ML/MIN/1.73M2
EGFR: 114 ML/MIN/1.73M2
ENA SS-A AB SER IA-ACNC: >8 AL
ENA SS-B AB SER IA-ACNC: <0.2 AL
EPITHELIAL CELLS: 5 /HPF
FERRITIN SERPL-MCNC: 30 NG/ML
GLUCOSE QUALITATIVE U: NEGATIVE MG/DL
GLUCOSE SERPL-MCNC: 78 MG/DL
GLUCOSE SERPL-MCNC: 79 MG/DL
HAPTOGLOB SERPL-MCNC: 120 MG/DL
IRON SATN MFR SERPL: 8 %
IRON SERPL-MCNC: 55 UG/DL
KETONES URINE: NEGATIVE MG/DL
LDH SERPL-CCNC: 234 U/L
LEUKOCYTE ESTERASE URINE: ABNORMAL
MAGNESIUM SERPL-MCNC: 2.5 MG/DL
MICROSCOPIC-UA: NORMAL
NITRITE URINE: NEGATIVE
PH URINE: 6
PHOSPHATE SERPL-MCNC: 4.3 MG/DL
PHOSPHATE SERPL-MCNC: 4.4 MG/DL
POTASSIUM SERPL-SCNC: 4.5 MMOL/L
POTASSIUM SERPL-SCNC: 4.5 MMOL/L
PROT SERPL-MCNC: 5.8 G/DL
PROT SERPL-MCNC: 6.3 G/DL
PROT UR-MCNC: 14 MG/DL
PROTEIN URINE: NORMAL MG/DL
RED BLOOD CELLS URINE: 2 /HPF
REVIEW: NORMAL
SODIUM SERPL-SCNC: 138 MMOL/L
SODIUM SERPL-SCNC: 138 MMOL/L
SPECIFIC GRAVITY URINE: 1.01
TIBC SERPL-MCNC: 702 UG/DL
UIBC SERPL-MCNC: 648 UG/DL
URATE SERPL-MCNC: 6.1 MG/DL
UROBILINOGEN URINE: 1 MG/DL
WHITE BLOOD CELLS URINE: 0 /HPF

## 2023-06-30 ENCOUNTER — NON-APPOINTMENT (OUTPATIENT)
Age: 38
End: 2023-06-30

## 2023-07-07 NOTE — HISTORY OF PRESENT ILLNESS
[FreeTextEntry1] : INTERVAL HX\par Here to review several issues\par #SLE - TM.  Continues to do well.  denies urinary incontinence.  the paraesthesias persist but are not worsening.   came down on the gabapentin owing to GI side effects, with no change in neuro symptoms.\par - awaiting dr jimenez\par - tolerating the Rituxan well - noted some increased urinary urgency for 1 week after th rtx which has now abated.  no incontinence\par #SLE- LN.  denies swelling of legs, rash, arthritis, urinary symptoms. tolerating the cellcept\par - denies swelling of joints\par - urine without change in color and no bubbles\par #constipation and hemorrhoids.  this has been the most problematic issue lately.  when constipated feels pain the lower abdomen and pressure in the backside.  has changed diet and does see gi. had colonoscopy which was okay and now awaiting  EGD\par - diet change has helped loosen stool\par -  no longer taking oxycodone. \par \par #amenorrhea and episodic increased prolactin. mri again - normal\par - has seen endo - workup thus far negative\par - unclear etiology at this time\par  - no nipple discharge\par  done Self

## 2023-08-18 ENCOUNTER — APPOINTMENT (OUTPATIENT)
Dept: RHEUMATOLOGY | Facility: CLINIC | Age: 38
End: 2023-08-18
Payer: COMMERCIAL

## 2023-08-18 VITALS
HEIGHT: 62 IN | BODY MASS INDEX: 23 KG/M2 | WEIGHT: 125 LBS | OXYGEN SATURATION: 99 % | HEART RATE: 93 BPM | DIASTOLIC BLOOD PRESSURE: 83 MMHG | SYSTOLIC BLOOD PRESSURE: 116 MMHG

## 2023-08-18 DIAGNOSIS — Z87.59 PERSONAL HISTORY OF OTHER COMPLICATIONS OF PREGNANCY, CHILDBIRTH AND THE PUERPERIUM: ICD-10-CM

## 2023-08-18 DIAGNOSIS — R79.89 OTHER SPECIFIED ABNORMAL FINDINGS OF BLOOD CHEMISTRY: ICD-10-CM

## 2023-08-18 DIAGNOSIS — G47.9 SLEEP DISORDER, UNSPECIFIED: ICD-10-CM

## 2023-08-18 DIAGNOSIS — R80.9 PROTEINURIA, UNSPECIFIED: ICD-10-CM

## 2023-08-18 DIAGNOSIS — O09.90 SUPERVISION OF HIGH RISK PREGNANCY, UNSPECIFIED, UNSPECIFIED TRIMESTER: ICD-10-CM

## 2023-08-18 PROCEDURE — 99215 OFFICE O/P EST HI 40 MIN: CPT | Mod: 25

## 2023-08-18 PROCEDURE — 36415 COLL VENOUS BLD VENIPUNCTURE: CPT

## 2023-08-19 PROBLEM — O09.90 PREGNANCY, HIGH-RISK: Status: RESOLVED | Noted: 2022-12-06 | Resolved: 2023-08-19

## 2023-08-19 PROBLEM — R79.89 LOW SERUM PROGESTERONE: Status: RESOLVED | Noted: 2022-12-02 | Resolved: 2023-08-19

## 2023-08-19 PROBLEM — Z87.59 HISTORY OF PRE-ECLAMPSIA: Status: RESOLVED | Noted: 2023-08-19 | Resolved: 2023-08-19

## 2023-08-19 LAB
ALBUMIN SERPL ELPH-MCNC: 4.8 G/DL
ALP BLD-CCNC: 110 U/L
ALT SERPL-CCNC: 15 U/L
ANION GAP SERPL CALC-SCNC: 16 MMOL/L
AST SERPL-CCNC: 15 U/L
BILIRUB SERPL-MCNC: 0.5 MG/DL
BUN SERPL-MCNC: 14 MG/DL
CALCIUM SERPL-MCNC: 9.4 MG/DL
CHLORIDE SERPL-SCNC: 103 MMOL/L
CO2 SERPL-SCNC: 22 MMOL/L
CREAT SERPL-MCNC: 0.51 MG/DL
CRP SERPL-MCNC: <3 MG/L
EGFR: 122 ML/MIN/1.73M2
HCT VFR BLD CALC: 41.5 %
HGB BLD-MCNC: 12.9 G/DL
MCHC RBC-ENTMCNC: 27.7 PG
MCHC RBC-ENTMCNC: 31.1 GM/DL
MCV RBC AUTO: 89.1 FL
PLATELET # BLD AUTO: 345 K/UL
POTASSIUM SERPL-SCNC: 3.9 MMOL/L
PROT SERPL-MCNC: 7.1 G/DL
RBC # BLD: 4.66 M/UL
RBC # FLD: 14.1 %
SODIUM SERPL-SCNC: 140 MMOL/L
WBC # FLD AUTO: 4.16 K/UL

## 2023-08-19 NOTE — REVIEW OF SYSTEMS
[As Noted in HPI] : as noted in HPI [Negative] : Heme/Lymph [Fever] : no fever [Chills] : no chills [Eye Pain] : no eye pain [Earache] : no earache [Nosebleeds] : no nosebleeds [Chest Pain] : no chest pain [Shortness Of Breath] : no shortness of breath [Wheezing] : no wheezing [Cough] : no cough [Abdominal Pain] : no abdominal pain [Vomiting] : no vomiting [Dysuria] : no dysuria [Joint Pain] : no joint pain [Skin Lesions] : no skin lesions [Dizziness] : no dizziness [Anxiety] : no anxiety [Easy Bleeding] : no tendency for easy bleeding [de-identified] : mood much better in [place] -  [FreeTextEntry1] : missed period

## 2023-08-19 NOTE — HISTORY OF PRESENT ILLNESS
[FreeTextEntry1] : Mrs Luis has a PMHx SLE  (dx 2014, arthritis, +PAUL,  +dsDNA, +SM, +RNP, nephritis 2016),  and fibromyalgia (dx 2014).  Was treated with cellcept and in lupus study at [facility].  sle complicated by longitudinally extensive transverse myelitis (Feb 2019) treated with IV pulse steroids, PLEX, RTX with improvement in symptoms.  '   INTERVAL Hx in terms of pregnancies - s/p emergency C section due to preeclampsia at 35 weeks.....son was delivered 6/30/2023 - had elevated liver enzymes and swelling in bl ankles  - kidney function and bp remained okay  in terms of Lupus - she is currently on cyclobenzaprine 5 mg intermittently  - her only complaints are leg spasming  in terms of Fibro  - c/o weird intermittent sensations on her back "as if ants are walking on my back" - was not on any medication for her fibro throughout her pregnancy - she is inquiring about restarting duloxetine and maybe gabapentin  Rheum ROS  - denies RP, sicca, oral ulcers, rashes, photosensitivity.    - denies constitutional symptoms, fatigue, night sweats.  weight is stable and losing weight after pregnancy as expected - breathes well without h/o of pleuritis, pericarditis.  renal function is normal and urine is not frothy - muscles are strong

## 2023-08-19 NOTE — CONSULT LETTER
[Dear  ___] : Dear  [unfilled], [Courtesy Letter:] : I had the pleasure of seeing your patient, [unfilled], in my office today. [Please see my note below.] : Please see my note below. [Sincerely,] : Sincerely, [FreeTextEntry2] : Paul Boyle ,  \par  OB/GYN\par  [address] \par  [place]\par  [address]  [FreeTextEntry3] : Freda Swenson MD

## 2023-08-19 NOTE — PHYSICAL EXAM
[General Appearance - Alert] : alert [General Appearance - In No Acute Distress] : in no acute distress [General Appearance - Well Nourished] : well nourished [General Appearance - Well Developed] : well developed [General Appearance - Well-Appearing] : healthy appearing [No CVA Tenderness] : no ~M costovertebral angle tenderness [No Spinal Tenderness] : no spinal tenderness [Musculoskeletal - Swelling] : no joint swelling seen [Skin Color & Pigmentation] : normal skin color and pigmentation [Skin Turgor] : normal skin turgor [] : no rash [Skin Lesions] : no skin lesions [Oriented To Time, Place, And Person] : oriented to person, place, and time [Impaired Insight] : insight and judgment were intact [Heart Rate And Rhythm] : heart rate was normal and rhythm regular [Heart Sounds Gallop] : no gallops [Heart Sounds] : normal S1 and S2 [Murmurs] : no murmurs [Heart Sounds Pericardial Friction Rub] : no pericardial rub [FreeTextEntry1] : no focal deficits - able to walk without assistive device, MS 5/5, able to walk on toes

## 2023-08-19 NOTE — ASSESSMENT
[FreeTextEntry1] : Ms Luis is a non-smoker  F w/ SLE  (dx 2014, arthritis, +PAUL,  +dsDNA, +SM, +RNP, nephritis 2016, longitudinally extensive TM 2019),  and fibromyalgia (dx 2014).    multiple issues - complex patient  # Post-partum Pregnancy, -- s/p acute pre-ecclampsia and emergency csection -- doing well now - no residual BP issues  -- check labs - her LFT had climbed during this time at [facility] -- support given  # SLE  with nephritis (treated with cellcept+study),alopecia, arthritis, lymphopenia -complicated by longitudinal extensive transverse myelitis in feb 2019 s/p plex, rtx and pulse steroids.  MRI now normalized. Was on MMF,which was stopped 2/2 desire for pregnancy.   seems to be doing well on AZA and HCQ -- continue AZA -- s/p RTX - holding on further doses  -- s/p preeclampsia - now improved after emergency delivery -- check inflammatory markers  -- check activity monitor labs  -- no current s/s of the lupus activation - though at high risk - monitoring monthly  #TM  MRI spine negative for recurrence -- continue flexeril prn for intermittent muscle spasm that has resulted forrom this -- no s/s of recurrence -- has been off the baclofen and gabapentin.  discussed that she has been doing about the same on and off the meds -- may be good to stay off the meds and try to minimize in the future.    #FMS: -- off duloxetine and on flexeril -- increase flexeril to 10 mg daily -- f/u pain mgmt - regarding pain control during pregnancy-  -- in terms of gabapentin - needs to discuss with high risk and neurology how and what to transition to -- PT -- back pain - switch juan to evening dose   #medication management / v58.69  -- cd19 - repopulating -- RTX, cellcept, steroids - PCP prophylaxis bactrim - now off -- quant tb neg September 2022  #barriers  paperwork filled out for FMLA   More than 50% of the encounter was spent counseling the patient on differential, workup, disease course and treatment/management.  Education was provided to the patient during this encounter.  All questions and concerns were addressed and answered.   The patient verbalized understanding and agreed to the plan.   Patient has been instructed to call for an appointment if new symptoms develop. Patient has been instructed to make a followup appointment in 3 months.  Time spent on the encounter included, but is not limited to, preparing to see the patient, obtaining and/or reviewing separately obtained history, performing the evaluation, counseling and educating, independently interpreting results with communication to patient, order placement, referring and/or communicating with other health professionals as described, and documenting clinical information in the electronic health record

## 2023-08-21 LAB
C3 SERPL-MCNC: 97 MG/DL
C4 SERPL-MCNC: 26 MG/DL
CREAT SPEC-SCNC: 82 MG/DL
CREAT/PROT UR: 0.2 RATIO
DSDNA AB SER-ACNC: 148 IU/ML
ERYTHROCYTE [SEDIMENTATION RATE] IN BLOOD BY WESTERGREN METHOD: 12 MM/HR
PROT UR-MCNC: 18 MG/DL

## 2023-08-25 LAB
HCT VFR BLD CALC: 38.5 %
HGB BLD-MCNC: 12 G/DL
MCHC RBC-ENTMCNC: 27.3 PG
MCHC RBC-ENTMCNC: 31.2 GM/DL
MCV RBC AUTO: 87.7 FL
PLATELET # BLD AUTO: 291 K/UL
RBC # BLD: 4.39 M/UL
RBC # FLD: 13.9 %
WBC # FLD AUTO: 4.64 K/UL

## 2023-08-26 LAB
ALBUMIN SERPL ELPH-MCNC: 4.5 G/DL
ALP BLD-CCNC: 99 U/L
ALT SERPL-CCNC: 12 U/L
ANION GAP SERPL CALC-SCNC: 18 MMOL/L
AST SERPL-CCNC: 18 U/L
BILIRUB SERPL-MCNC: 0.6 MG/DL
BUN SERPL-MCNC: 13 MG/DL
C3 SERPL-MCNC: 90 MG/DL
C4 SERPL-MCNC: 23 MG/DL
CALCIUM SERPL-MCNC: 9.3 MG/DL
CHLORIDE SERPL-SCNC: 103 MMOL/L
CO2 SERPL-SCNC: 23 MMOL/L
CREAT SERPL-MCNC: 0.5 MG/DL
CREAT SPEC-SCNC: 102 MG/DL
CREAT/PROT UR: 0.3 RATIO
CRP SERPL-MCNC: <3 MG/L
EGFR: 123 ML/MIN/1.73M2
ERYTHROCYTE [SEDIMENTATION RATE] IN BLOOD BY WESTERGREN METHOD: 11 MM/HR
POTASSIUM SERPL-SCNC: 3.4 MMOL/L
PROT SERPL-MCNC: 6.4 G/DL
PROT UR-MCNC: 28 MG/DL
SODIUM SERPL-SCNC: 144 MMOL/L

## 2023-08-27 LAB
CHROMATIN AB SERPL-ACNC: 1.2 AL
DSDNA AB SER-ACNC: 117 IU/ML

## 2023-09-04 NOTE — ASSESSMENT
[id number] Patient arrived for infusion. VSS as charted. IV placed without complication. Patient tolerated infusion well. New appt set for 09/11/23 at 0800. Patient left infusion center with all belongings and steady gate.   [facility] [FreeTextEntry1] : R buttock with possible another MRSA infection. \par Still has some discharge and induratin but wound is open, \par Will continue doxycycline for 7days. 100mg q12h \par Advised about colorox bath 1teaspoon in one gallon of water and bath with it or 1/4 cup in 8gallon in tub.\par Use chlorhexidine cloths daily to wash perineal area and buttocks. \par Have handy doxycycline for 7days, as soon as has any small pimple start using it. If worsening will go to ED.  [Treatment Education] : treatment education [Treatment Adherence] : treatment adherence [Rx Dose / Side Effects] : Rx dose/side effects [Risk Reduction] : risk reduction [Drug Interactions / Side Effects] : drug interactions/side effects [Disclosure of Diagnosis] : disclosure of diagnosis [Anticipatory Guidance] : anticipatory guidance

## 2023-09-22 ENCOUNTER — APPOINTMENT (OUTPATIENT)
Dept: RHEUMATOLOGY | Facility: CLINIC | Age: 38
End: 2023-09-22
Payer: COMMERCIAL

## 2023-09-22 ENCOUNTER — LABORATORY RESULT (OUTPATIENT)
Age: 38
End: 2023-09-22

## 2023-09-22 VITALS
HEART RATE: 74 BPM | DIASTOLIC BLOOD PRESSURE: 83 MMHG | HEIGHT: 62 IN | SYSTOLIC BLOOD PRESSURE: 130 MMHG | BODY MASS INDEX: 23.92 KG/M2 | WEIGHT: 130 LBS | OXYGEN SATURATION: 99 %

## 2023-09-22 DIAGNOSIS — E55.9 VITAMIN D DEFICIENCY, UNSPECIFIED: ICD-10-CM

## 2023-09-22 PROCEDURE — 99214 OFFICE O/P EST MOD 30 MIN: CPT

## 2023-09-25 LAB
25(OH)D3 SERPL-MCNC: 30.2 NG/ML
ALBUMIN SERPL ELPH-MCNC: 4.8 G/DL
ALP BLD-CCNC: 95 U/L
ALT SERPL-CCNC: 35 U/L
ANION GAP SERPL CALC-SCNC: 14 MMOL/L
APPEARANCE: CLEAR
AST SERPL-CCNC: 32 U/L
BILIRUB SERPL-MCNC: 0.9 MG/DL
BILIRUBIN URINE: NEGATIVE
BLOOD URINE: NEGATIVE
BUN SERPL-MCNC: 17 MG/DL
C3 SERPL-MCNC: 119 MG/DL
C4 SERPL-MCNC: 31 MG/DL
CALCIUM SERPL-MCNC: 9.7 MG/DL
CHLORIDE SERPL-SCNC: 104 MMOL/L
CHROMATIN AB SERPL-ACNC: 0.9 AL
CO2 SERPL-SCNC: 25 MMOL/L
COLOR: YELLOW
CREAT SERPL-MCNC: 0.46 MG/DL
CREAT SPEC-SCNC: 83 MG/DL
CREAT/PROT UR: 0.4 RATIO
CRP SERPL-MCNC: <3 MG/L
DSDNA AB SER-ACNC: 90 IU/ML
EGFR: 126 ML/MIN/1.73M2
ERYTHROCYTE [SEDIMENTATION RATE] IN BLOOD BY WESTERGREN METHOD: 8 MM/HR
GLUCOSE QUALITATIVE U: NEGATIVE MG/DL
HCT VFR BLD CALC: 41.2 %
HGB BLD-MCNC: 13 G/DL
KETONES URINE: NEGATIVE MG/DL
LEUKOCYTE ESTERASE URINE: ABNORMAL
MCHC RBC-ENTMCNC: 27.8 PG
MCHC RBC-ENTMCNC: 31.6 GM/DL
MCV RBC AUTO: 88.2 FL
NITRITE URINE: POSITIVE
PH URINE: 6.5
PLATELET # BLD AUTO: 324 K/UL
POTASSIUM SERPL-SCNC: 4.2 MMOL/L
PROT SERPL-MCNC: 7.1 G/DL
PROT UR-MCNC: 32 MG/DL
PROTEIN URINE: 30 MG/DL
RBC # BLD: 4.67 M/UL
RBC # FLD: 14.5 %
SODIUM SERPL-SCNC: 143 MMOL/L
SPECIFIC GRAVITY URINE: 1.02
UROBILINOGEN URINE: 1 MG/DL
WBC # FLD AUTO: 3.51 K/UL

## 2023-10-02 NOTE — PATIENT PROFILE ADULT - NSPROMUTANXFEARFT_GEN_A_NUR
patient c/o abdominal pain with nausea for the past few days has history of chron's disease  
0 = independent
a aleksandrae anxious about starting therapy

## 2023-10-03 NOTE — ED ADULT NURSE NOTE - NS ED NURSE RECORD ANOTHER HT AND WT
Alcohol    1-2 per day, servings or alcohol        Cholesterol    Your labs show elevated cholesterol.  I recommend diet with less saturated fat and cholesterol.  Less animal fats.  Leaner meats (less red meats, less dark meats).  No skin. No deep frying.  Less dairy products or at least low fat dairy products.  Less eggs.  Less desserts.  If you have desserts, try to have lighter, lower fat versions  More  Exercise, especially aerobic exercise.  Weight loss.    Use the atorvastatin daily.  In the evening      The Shingles shot (Shingrix) is recommended for people over 50. The shot is very effective and safe. Cash price on it is around 300-400 dollars. Check with your insurance on coverage. Check on where it is best covered. The cheapest option may be a pharmacy or a doctor's office.     Do flu shot this fall    Do covid shot this fall.        Ankle  Foot and ankle specialist  Dr. Moctezuma here    Ice at least twice a day, 20-30 minutes    Compression stocking  Brace around the ankle.    Range of motion - stretching, spell the alphabet    Be active but avoid overdoing it    Voltaren gel on it.    
Yes

## 2023-10-13 ENCOUNTER — APPOINTMENT (OUTPATIENT)
Dept: MRI IMAGING | Facility: CLINIC | Age: 38
End: 2023-10-13
Payer: COMMERCIAL

## 2023-10-13 ENCOUNTER — OUTPATIENT (OUTPATIENT)
Dept: OUTPATIENT SERVICES | Facility: HOSPITAL | Age: 38
LOS: 1 days | End: 2023-10-13
Payer: COMMERCIAL

## 2023-10-13 DIAGNOSIS — G37.3 ACUTE TRANSVERSE MYELITIS IN DEMYELINATING DISEASE OF CENTRAL NERVOUS SYSTEM: ICD-10-CM

## 2023-10-13 DIAGNOSIS — Z98.890 OTHER SPECIFIED POSTPROCEDURAL STATES: Chronic | ICD-10-CM

## 2023-10-13 PROCEDURE — 72156 MRI NECK SPINE W/O & W/DYE: CPT | Mod: 26

## 2023-10-13 PROCEDURE — 72157 MRI CHEST SPINE W/O & W/DYE: CPT | Mod: 26

## 2023-10-13 PROCEDURE — 72158 MRI LUMBAR SPINE W/O & W/DYE: CPT | Mod: 26

## 2023-10-13 PROCEDURE — A9585: CPT

## 2023-10-13 PROCEDURE — 72158 MRI LUMBAR SPINE W/O & W/DYE: CPT

## 2023-10-13 PROCEDURE — 72156 MRI NECK SPINE W/O & W/DYE: CPT

## 2023-10-13 PROCEDURE — 72157 MRI CHEST SPINE W/O & W/DYE: CPT

## 2023-10-25 ENCOUNTER — APPOINTMENT (OUTPATIENT)
Dept: RHEUMATOLOGY | Facility: CLINIC | Age: 38
End: 2023-10-25
Payer: COMMERCIAL

## 2023-10-25 VITALS
BODY MASS INDEX: 23.37 KG/M2 | HEIGHT: 62 IN | HEART RATE: 82 BPM | SYSTOLIC BLOOD PRESSURE: 109 MMHG | DIASTOLIC BLOOD PRESSURE: 76 MMHG | WEIGHT: 127 LBS | OXYGEN SATURATION: 99 %

## 2023-10-25 PROCEDURE — 99214 OFFICE O/P EST MOD 30 MIN: CPT

## 2023-10-27 RX ORDER — CYCLOBENZAPRINE HYDROCHLORIDE 10 MG/1
10 TABLET, FILM COATED ORAL
Qty: 60 | Refills: 0 | Status: DISCONTINUED | COMMUNITY
Start: 2021-10-29 | End: 2023-10-27

## 2023-10-27 RX ORDER — GABAPENTIN 100 MG/1
100 CAPSULE ORAL
Qty: 90 | Refills: 3 | Status: DISCONTINUED | COMMUNITY
Start: 2020-04-29 | End: 2023-10-27

## 2023-10-27 RX ORDER — AZATHIOPRINE 50 1/1
50 TABLET ORAL
Qty: 45 | Refills: 7 | Status: DISCONTINUED | COMMUNITY
Start: 2020-10-20 | End: 2023-10-27

## 2023-10-28 LAB
ALBUMIN SERPL ELPH-MCNC: 4.7 G/DL
ALP BLD-CCNC: 115 U/L
ALT SERPL-CCNC: 20 U/L
ANION GAP SERPL CALC-SCNC: 14 MMOL/L
AST SERPL-CCNC: 20 U/L
BILIRUB SERPL-MCNC: 0.6 MG/DL
BUN SERPL-MCNC: 17 MG/DL
C3 SERPL-MCNC: 96 MG/DL
C4 SERPL-MCNC: 26 MG/DL
CALCIUM SERPL-MCNC: 9.8 MG/DL
CD16+CD56+ CELLS # BLD: 122 CELLS/UL
CD16+CD56+ CELLS NFR BLD: 9 %
CD19 CELLS NFR BLD: 16 CELLS/UL
CD3 CELLS # BLD: 1168 CELLS/UL
CD3 CELLS NFR BLD: 88 %
CD3+CD4+ CELLS # BLD: 657 CELLS/UL
CD3+CD4+ CELLS NFR BLD: 50 %
CD3+CD4+ CELLS/CD3+CD8+ CLL SPEC: 1.38 RATIO
CD3+CD8+ CELLS # SPEC: 476 CELLS/UL
CD3+CD8+ CELLS NFR BLD: 36 %
CELLS.CD3-CD19+/CELLS IN BLOOD: 1 %
CHLORIDE SERPL-SCNC: 104 MMOL/L
CO2 SERPL-SCNC: 23 MMOL/L
CREAT SERPL-MCNC: 0.73 MG/DL
CREAT SPEC-SCNC: 108 MG/DL
CREAT/PROT UR: 0.2 RATIO
CRP SERPL-MCNC: <3 MG/L
DEPRECATED KAPPA LC FREE/LAMBDA SER: 0.9 RATIO
DSDNA AB SER-ACNC: 123 IU/ML
EGFR: 108 ML/MIN/1.73M2
ERYTHROCYTE [SEDIMENTATION RATE] IN BLOOD BY WESTERGREN METHOD: 31 MM/HR
HCT VFR BLD CALC: 42.6 %
HGB BLD-MCNC: 13.4 G/DL
IGA SER QL IEP: 158 MG/DL
IGG SER QL IEP: 991 MG/DL
IGM SER QL IEP: 42 MG/DL
KAPPA LC CSF-MCNC: 1.44 MG/DL
KAPPA LC SERPL-MCNC: 1.3 MG/DL
MCHC RBC-ENTMCNC: 27 PG
MCHC RBC-ENTMCNC: 31.5 GM/DL
MCV RBC AUTO: 85.9 FL
PLATELET # BLD AUTO: 345 K/UL
POTASSIUM SERPL-SCNC: 3.9 MMOL/L
PROT SERPL-MCNC: 7.3 G/DL
PROT UR-MCNC: 22 MG/DL
RBC # BLD: 4.96 M/UL
RBC # FLD: 13.8 %
SODIUM SERPL-SCNC: 141 MMOL/L
WBC # FLD AUTO: 4.75 K/UL

## 2023-11-22 ENCOUNTER — APPOINTMENT (OUTPATIENT)
Dept: NEPHROLOGY | Facility: CLINIC | Age: 38
End: 2023-11-22
Payer: COMMERCIAL

## 2023-11-22 VITALS — DIASTOLIC BLOOD PRESSURE: 80 MMHG | SYSTOLIC BLOOD PRESSURE: 108 MMHG

## 2023-11-22 VITALS
HEIGHT: 62 IN | SYSTOLIC BLOOD PRESSURE: 124 MMHG | TEMPERATURE: 98 F | WEIGHT: 127 LBS | DIASTOLIC BLOOD PRESSURE: 88 MMHG | HEART RATE: 77 BPM | BODY MASS INDEX: 23.37 KG/M2 | OXYGEN SATURATION: 99 %

## 2023-11-22 PROCEDURE — 99214 OFFICE O/P EST MOD 30 MIN: CPT

## 2023-11-27 DIAGNOSIS — N18.1 CHRONIC KIDNEY DISEASE, STAGE 1: ICD-10-CM

## 2023-11-27 LAB
ALBUMIN SERPL ELPH-MCNC: 4.9 G/DL
ANION GAP SERPL CALC-SCNC: 15 MMOL/L
APPEARANCE: CLEAR
BACTERIA UR CULT: ABNORMAL
BACTERIA: NEGATIVE /HPF
BILIRUBIN URINE: NEGATIVE
BLOOD URINE: NEGATIVE
BUN SERPL-MCNC: 13 MG/DL
C3 SERPL-MCNC: 102 MG/DL
C4 SERPL-MCNC: 27 MG/DL
CALCIUM SERPL-MCNC: 9.5 MG/DL
CAST: 2 /LPF
CHLORIDE SERPL-SCNC: 103 MMOL/L
CO2 SERPL-SCNC: 25 MMOL/L
COLOR: NORMAL
CREAT SERPL-MCNC: 0.52 MG/DL
CREAT SPEC-SCNC: 211 MG/DL
CREAT/PROT UR: 0.4 RATIO
DSDNA AB SER-ACNC: 111 IU/ML
EGFR: 121 ML/MIN/1.73M2
EPITHELIAL CELLS: 7 /HPF
FERRITIN SERPL-MCNC: 15 NG/ML
GLUCOSE QUALITATIVE U: NEGATIVE MG/DL
GLUCOSE SERPL-MCNC: 100 MG/DL
HCT VFR BLD CALC: 40.5 %
HGB BLD-MCNC: 12.7 G/DL
IRON SATN MFR SERPL: 17 %
IRON SERPL-MCNC: 70 UG/DL
KETONES URINE: NEGATIVE MG/DL
LEUKOCYTE ESTERASE URINE: NEGATIVE
MCHC RBC-ENTMCNC: 27.4 PG
MCHC RBC-ENTMCNC: 31.4 GM/DL
MCV RBC AUTO: 87.3 FL
MICROSCOPIC-UA: NORMAL
NITRITE URINE: NEGATIVE
PH URINE: 5.5
PHOSPHATE SERPL-MCNC: 4.9 MG/DL
PLATELET # BLD AUTO: 326 K/UL
POTASSIUM SERPL-SCNC: 4 MMOL/L
PROT UR-MCNC: 76 MG/DL
PROTEIN URINE: 100 MG/DL
RBC # BLD: 4.64 M/UL
RBC # FLD: 14.1 %
RED BLOOD CELLS URINE: 2 /HPF
SODIUM SERPL-SCNC: 142 MMOL/L
SPECIFIC GRAVITY URINE: 1.03
TIBC SERPL-MCNC: 408 UG/DL
UIBC SERPL-MCNC: 339 UG/DL
UROBILINOGEN URINE: 1 MG/DL
WBC # FLD AUTO: 3.75 K/UL
WHITE BLOOD CELLS URINE: 2 /HPF

## 2023-11-29 LAB
APPEARANCE: CLEAR
BACTERIA: ABNORMAL /HPF
BILIRUBIN URINE: NEGATIVE
BLOOD URINE: NEGATIVE
CAST: 0 /LPF
COLOR: NORMAL
CREAT SPEC-SCNC: 145 MG/DL
CREAT/PROT UR: 0.4 RATIO
EPITHELIAL CELLS: 2 /HPF
GLUCOSE QUALITATIVE U: NEGATIVE MG/DL
KETONES URINE: NEGATIVE MG/DL
LEUKOCYTE ESTERASE URINE: ABNORMAL
MICROSCOPIC-UA: NORMAL
NITRITE URINE: POSITIVE
PH URINE: 5.5
PROT UR-MCNC: 56 MG/DL
PROTEIN URINE: 100 MG/DL
RED BLOOD CELLS URINE: 1 /HPF
SPECIFIC GRAVITY URINE: 1.03
UROBILINOGEN URINE: 0.2 MG/DL
WHITE BLOOD CELLS URINE: 10 /HPF

## 2023-11-30 ENCOUNTER — APPOINTMENT (OUTPATIENT)
Dept: ULTRASOUND IMAGING | Facility: CLINIC | Age: 38
End: 2023-11-30
Payer: COMMERCIAL

## 2023-11-30 PROCEDURE — 76770 US EXAM ABDO BACK WALL COMP: CPT

## 2023-11-30 RX ORDER — CIPROFLOXACIN HYDROCHLORIDE 500 MG/1
500 TABLET, FILM COATED ORAL DAILY
Qty: 10 | Refills: 0 | Status: ACTIVE | COMMUNITY
Start: 2023-11-30 | End: 1900-01-01

## 2023-12-01 DIAGNOSIS — N30.00 ACUTE CYSTITIS W/OUT HEMATURIA: ICD-10-CM

## 2023-12-01 LAB — BACTERIA UR CULT: ABNORMAL

## 2023-12-01 RX ORDER — SULFAMETHOXAZOLE AND TRIMETHOPRIM 800; 160 MG/1; MG/1
800-160 TABLET ORAL TWICE DAILY
Qty: 14 | Refills: 0 | Status: ACTIVE | COMMUNITY
Start: 2023-12-01 | End: 1900-01-01

## 2023-12-08 ENCOUNTER — APPOINTMENT (OUTPATIENT)
Dept: RHEUMATOLOGY | Facility: CLINIC | Age: 38
End: 2023-12-08
Payer: COMMERCIAL

## 2023-12-08 VITALS
WEIGHT: 131 LBS | OXYGEN SATURATION: 98 % | HEART RATE: 79 BPM | BODY MASS INDEX: 24.11 KG/M2 | DIASTOLIC BLOOD PRESSURE: 67 MMHG | SYSTOLIC BLOOD PRESSURE: 103 MMHG | HEIGHT: 62 IN

## 2023-12-08 PROCEDURE — 99214 OFFICE O/P EST MOD 30 MIN: CPT

## 2023-12-08 RX ORDER — PANTOPRAZOLE 40 MG/1
40 TABLET, DELAYED RELEASE ORAL
Qty: 90 | Refills: 3 | Status: DISCONTINUED | COMMUNITY
Start: 2019-02-26 | End: 2023-12-08

## 2023-12-08 RX ORDER — MUPIROCIN 20 MG/G
2 OINTMENT TOPICAL 3 TIMES DAILY
Qty: 1 | Refills: 1 | Status: DISCONTINUED | COMMUNITY
Start: 2019-05-29 | End: 2023-12-08

## 2023-12-08 RX ORDER — RITUXIMAB 10 MG/ML
500 INJECTION, SOLUTION INTRAVENOUS
Qty: 2 | Refills: 0 | Status: DISCONTINUED | OUTPATIENT
Start: 2019-10-31 | End: 2023-12-08

## 2023-12-08 RX ORDER — CHLORHEXIDINE GLUCONATE 500 MG/1
2 CLOTH TOPICAL
Qty: 20 | Refills: 1 | Status: DISCONTINUED | COMMUNITY
Start: 2019-05-29 | End: 2023-12-08

## 2023-12-08 RX ORDER — AZATHIOPRINE 100 MG/1
TABLET ORAL
Refills: 0 | Status: ACTIVE | COMMUNITY

## 2023-12-08 RX ORDER — NITROFURANTOIN (MONOHYDRATE/MACROCRYSTALS) 25; 75 MG/1; MG/1
100 CAPSULE ORAL TWICE DAILY
Qty: 10 | Refills: 0 | Status: DISCONTINUED | COMMUNITY
Start: 2020-03-07 | End: 2023-12-08

## 2023-12-09 LAB
CREAT SPEC-SCNC: 144 MG/DL
CREAT/PROT UR: 0.3 RATIO
PROT UR-MCNC: 38 MG/DL

## 2023-12-22 ENCOUNTER — NON-APPOINTMENT (OUTPATIENT)
Age: 38
End: 2023-12-22

## 2023-12-26 ENCOUNTER — APPOINTMENT (OUTPATIENT)
Dept: UROLOGY | Facility: CLINIC | Age: 38
End: 2023-12-26
Payer: COMMERCIAL

## 2023-12-26 VITALS
BODY MASS INDEX: 23.92 KG/M2 | WEIGHT: 130 LBS | DIASTOLIC BLOOD PRESSURE: 76 MMHG | HEIGHT: 62 IN | SYSTOLIC BLOOD PRESSURE: 112 MMHG | HEART RATE: 87 BPM | OXYGEN SATURATION: 98 % | TEMPERATURE: 98 F | RESPIRATION RATE: 17 BRPM

## 2023-12-26 DIAGNOSIS — N20.0 CALCULUS OF KIDNEY: ICD-10-CM

## 2023-12-26 DIAGNOSIS — N39.0 URINARY TRACT INFECTION, SITE NOT SPECIFIED: ICD-10-CM

## 2023-12-26 PROCEDURE — 99204 OFFICE O/P NEW MOD 45 MIN: CPT

## 2023-12-26 NOTE — HISTORY OF PRESENT ILLNESS
[FreeTextEntry1] : 38-year-old female with a history of lupus who presents as a new patient for abnormal imaging findings.  She follows with nephrology, was found to have hydronephrosis.  Of note, she does have a history of transverse myelitis in the past, and previously required self-catheterization. Regarding her urination, she reports sensation of incomplete emptying, frequent urination (10 times / day). Voids 3-4 times overnight. Stream is strong. Has intermittent dysuria - "always feels like I have a UTI." Also reports mild stress incontinence. Has a 6 month old child - born via C/S. This was her first pregnancy - unknown if would like additional children.   Transverse myelitis was diagnosed in 2018. Was cathing for 3-5 months, especially while in the hospital, but now voids on her own. Last saw a neurologist last year - was told everything is "back to normal." Saw Dr Cole in 2019. She thinks she may have had UDS in the past but not within our system - does not remember name of physician.   She does report occasional, mild right flank pain.  No fever, chills, nausea, emesis.  Lab / imaging review: Renal ultrasound December 2023: 5 mm left lower pole stone, mild right hydronephrosis. Left hydro which resolves w/ voiding Creatinine December 2023: 0.65 UA November 2023: 100 mg/dL protein, 1 RBC per high-power field  She is on azathioprine and hydroxychloroquine because of her lupus

## 2023-12-26 NOTE — PHYSICAL EXAM
[Normal Appearance] : normal appearance [Well Groomed] : well groomed [Edema] : no peripheral edema [Exaggerated Use Of Accessory Muscles For Inspiration] : no accessory muscle use [Bowel Sounds] : normal bowel sounds [Abdomen Tenderness] : non-tender [Urethral Meatus] : the meatus of the urethra showed no abnormalities [Vagina] : normal vaginal exam [Cervix] : normal cervix [Uterus] : uterus was normal size, without masses or tenderness [] : no rash [de-identified] : lap incisions

## 2023-12-27 LAB
APPEARANCE: CLEAR
BACTERIA: NEGATIVE /HPF
BILIRUBIN URINE: NEGATIVE
BLOOD URINE: NEGATIVE
CAST: 0 /LPF
COLOR: YELLOW
EPITHELIAL CELLS: 2 /HPF
GLUCOSE QUALITATIVE U: NEGATIVE MG/DL
KETONES URINE: NEGATIVE MG/DL
LEUKOCYTE ESTERASE URINE: NEGATIVE
MICROSCOPIC-UA: NORMAL
NITRITE URINE: NEGATIVE
PH URINE: 6
PROTEIN URINE: 30 MG/DL
RED BLOOD CELLS URINE: 1 /HPF
SPECIFIC GRAVITY URINE: 1.03
UROBILINOGEN URINE: 1 MG/DL
WHITE BLOOD CELLS URINE: 0 /HPF

## 2023-12-28 LAB — BACTERIA UR CULT: NORMAL

## 2024-01-06 ENCOUNTER — OUTPATIENT (OUTPATIENT)
Dept: OUTPATIENT SERVICES | Facility: HOSPITAL | Age: 39
LOS: 1 days | End: 2024-01-06
Payer: COMMERCIAL

## 2024-01-06 ENCOUNTER — APPOINTMENT (OUTPATIENT)
Dept: CT IMAGING | Facility: CLINIC | Age: 39
End: 2024-01-06
Payer: COMMERCIAL

## 2024-01-06 DIAGNOSIS — Z98.890 OTHER SPECIFIED POSTPROCEDURAL STATES: Chronic | ICD-10-CM

## 2024-01-06 DIAGNOSIS — N13.30 UNSPECIFIED HYDRONEPHROSIS: ICD-10-CM

## 2024-01-06 PROCEDURE — 74178 CT ABD&PLV WO CNTR FLWD CNTR: CPT | Mod: 26

## 2024-01-06 PROCEDURE — 74178 CT ABD&PLV WO CNTR FLWD CNTR: CPT

## 2024-01-22 ENCOUNTER — NON-APPOINTMENT (OUTPATIENT)
Age: 39
End: 2024-01-22

## 2024-01-22 LAB
ALBUMIN SERPL ELPH-MCNC: 4.6 G/DL
ANION GAP SERPL CALC-SCNC: 11 MMOL/L
APPEARANCE: CLEAR
BACTERIA UR CULT: NORMAL
BACTERIA: NEGATIVE /HPF
BILIRUBIN URINE: NEGATIVE
BLOOD URINE: NEGATIVE
BUN SERPL-MCNC: 13 MG/DL
CALCIUM SERPL-MCNC: 9 MG/DL
CAST: 2 /LPF
CHLORIDE SERPL-SCNC: 100 MMOL/L
CO2 SERPL-SCNC: 26 MMOL/L
COLOR: ABNORMAL
CREAT SERPL-MCNC: 0.65 MG/DL
EGFR: 115 ML/MIN/1.73M2
EPITHELIAL CELLS: 12 /HPF
GLUCOSE QUALITATIVE U: NEGATIVE MG/DL
GLUCOSE SERPL-MCNC: 95 MG/DL
KETONES URINE: NEGATIVE MG/DL
LEUKOCYTE ESTERASE URINE: ABNORMAL
MICROSCOPIC-UA: NORMAL
NITRITE URINE: NEGATIVE
PH URINE: 5.5
PHOSPHATE SERPL-MCNC: 4.1 MG/DL
POTASSIUM SERPL-SCNC: 3.6 MMOL/L
PROTEIN URINE: 100 MG/DL
RED BLOOD CELLS URINE: 1 /HPF
SODIUM SERPL-SCNC: 137 MMOL/L
SPECIFIC GRAVITY URINE: 1.03
UROBILINOGEN URINE: 1 MG/DL
WHITE BLOOD CELLS URINE: 1 /HPF

## 2024-01-26 ENCOUNTER — APPOINTMENT (OUTPATIENT)
Dept: RHEUMATOLOGY | Facility: CLINIC | Age: 39
End: 2024-01-26
Payer: COMMERCIAL

## 2024-01-26 ENCOUNTER — LABORATORY RESULT (OUTPATIENT)
Age: 39
End: 2024-01-26

## 2024-01-26 VITALS
SYSTOLIC BLOOD PRESSURE: 124 MMHG | BODY MASS INDEX: 25.03 KG/M2 | DIASTOLIC BLOOD PRESSURE: 81 MMHG | HEART RATE: 72 BPM | HEIGHT: 62 IN | WEIGHT: 136 LBS | RESPIRATION RATE: 18 BRPM | OXYGEN SATURATION: 99 %

## 2024-01-26 PROCEDURE — 99214 OFFICE O/P EST MOD 30 MIN: CPT

## 2024-01-26 PROCEDURE — G2211 COMPLEX E/M VISIT ADD ON: CPT

## 2024-01-27 LAB
ALBUMIN SERPL ELPH-MCNC: 4.8 G/DL
ALP BLD-CCNC: 104 U/L
ALT SERPL-CCNC: 20 U/L
ANION GAP SERPL CALC-SCNC: 13 MMOL/L
APPEARANCE: CLEAR
AST SERPL-CCNC: 23 U/L
BILIRUB SERPL-MCNC: 0.6 MG/DL
BILIRUBIN URINE: NEGATIVE
BLOOD URINE: NEGATIVE
BUN SERPL-MCNC: 12 MG/DL
C3 SERPL-MCNC: 108 MG/DL
C4 SERPL-MCNC: 27 MG/DL
CALCIUM SERPL-MCNC: 8.6 MG/DL
CHLORIDE SERPL-SCNC: 101 MMOL/L
CO2 SERPL-SCNC: 24 MMOL/L
COLOR: YELLOW
CREAT SERPL-MCNC: 0.5 MG/DL
CREAT SPEC-SCNC: 99 MG/DL
CREAT/PROT UR: 0.3 RATIO
CRP SERPL-MCNC: <3 MG/L
EGFR: 122 ML/MIN/1.73M2
ERYTHROCYTE [SEDIMENTATION RATE] IN BLOOD BY WESTERGREN METHOD: 13 MM/HR
GLUCOSE QUALITATIVE U: NEGATIVE MG/DL
HCT VFR BLD CALC: 41.8 %
HGB BLD-MCNC: 13.2 G/DL
KETONES URINE: NEGATIVE MG/DL
LEUKOCYTE ESTERASE URINE: NEGATIVE
MCHC RBC-ENTMCNC: 27.5 PG
MCHC RBC-ENTMCNC: 31.6 GM/DL
MCV RBC AUTO: 87.1 FL
NITRITE URINE: NEGATIVE
PH URINE: 6
PLATELET # BLD AUTO: 356 K/UL
POTASSIUM SERPL-SCNC: 3.6 MMOL/L
PROT SERPL-MCNC: 7.1 G/DL
PROT UR-MCNC: 33 MG/DL
PROTEIN URINE: 30 MG/DL
RBC # BLD: 4.8 M/UL
RBC # FLD: 14.9 %
SODIUM SERPL-SCNC: 137 MMOL/L
SPECIFIC GRAVITY URINE: 1.02
UROBILINOGEN URINE: 0.2 MG/DL
WBC # FLD AUTO: 4.4 K/UL

## 2024-01-29 LAB — DSDNA AB SER-ACNC: 84 IU/ML

## 2024-01-29 NOTE — PHYSICAL EXAM
[General Appearance - Alert] : alert [General Appearance - In No Acute Distress] : in no acute distress [General Appearance - Well Nourished] : well nourished [General Appearance - Well Developed] : well developed [General Appearance - Well-Appearing] : healthy appearing [Heart Rate And Rhythm] : heart rate was normal and rhythm regular [Heart Sounds] : normal S1 and S2 [Heart Sounds Gallop] : no gallops [Murmurs] : no murmurs [Heart Sounds Pericardial Friction Rub] : no pericardial rub [No CVA Tenderness] : no ~M costovertebral angle tenderness [No Spinal Tenderness] : no spinal tenderness [Musculoskeletal - Swelling] : no joint swelling seen [Skin Color & Pigmentation] : normal skin color and pigmentation [Skin Turgor] : normal skin turgor [] : no rash [Skin Lesions] : no skin lesions [Oriented To Time, Place, And Person] : oriented to person, place, and time [Impaired Insight] : insight and judgment were intact [FreeTextEntry1] : no focal deficits - able to walk without assistive device, MS 5/5, able to walk on toes

## 2024-01-29 NOTE — REVIEW OF SYSTEMS
[Negative] : Heme/Lymph [Fever] : no fever [Chills] : no chills [Eye Pain] : no eye pain [Earache] : no earache [Nosebleeds] : no nosebleeds [Chest Pain] : no chest pain [Shortness Of Breath] : no shortness of breath [Wheezing] : no wheezing [Cough] : no cough [Abdominal Pain] : no abdominal pain [Vomiting] : no vomiting [Dysuria] : no dysuria [Joint Pain] : no joint pain [Skin Lesions] : no skin lesions [Dizziness] : no dizziness [Anxiety] : no anxiety [Easy Bleeding] : no tendency for easy bleeding [de-identified] : mood much better in [place] -  [FreeTextEntry1] : missed period

## 2024-01-29 NOTE — ASSESSMENT
[FreeTextEntry1] : .  Ms Luis is a non-smoker F w/ SLE (dx 2014, arthritis, +PAUL, +dsDNA, +SM, +RNP, nephritis 2016, longitudinally extensive TM 2019), and fibromyalgia (dx 2014).  multiple issues - complex patient  # proteinuria  very mild increase in protein  -- nephro and uro are both involved - felt to be 2/2 bladder issue -- f/u with uro and nephro   # SLE with nephritis (treated with cellcept+study),alopecia, arthritis, lymphopenia -complicated by longitudinal extensive transverse myelitis in feb 2019 s/p plex, rtx and pulse steroids. MRI now normalized. Was on MMF,which was stopped 2/2 desire for pregnancy. seems to be doing well on AZA and HCQ -- check inflammatory markers -- check activity monitor labs -- f/u w/ nephrologist for rose -- felt better on her prior regiment in general - would like to go back on the MMF, come off the aza -- continue MMF 1 BID for now - may need increase -- will consider giving another Rituxan infusion depending on how she does on cellcept  -- d/c azathioprine   #TM MRI spine negative for recurrence - no s/s of active neurologic change. -- continue flexeril prn for intermittent muscle spasm that has resulted forrom this -- no s/s of recurrence -- has been off the baclofen and gabapentin. does notice the spasms and wants to start back on a few meds for this- will restart gabapentin 100 mg - didnt help much -- Last MRI spine 2021 - and updated 2023 - similar without changes  #FMS: -- PT -- back paresthesia  -- continue duloxetine at 40 mg daily -- continue gabapentin 100 mg daily -- d/c cyclobenzaprine  -- continue baclofen 1 tablet PRN   #medication management / v58.69 -- cd19 - repopulating -- RTX, cellcept, steroids - PCP prophylaxis bactrim - now off -- quant tb neg September 2022   More than 50% of the encounter was spent counseling the patient on differential, workup, disease course and treatment/management. Education was provided to the patient during this encounter. All questions and concerns were addressed and answered. The patient verbalized understanding and agreed to the plan.  Patient has been instructed to call for an appointment if new symptoms develop. Patient has been instructed to make a followup appointment in 3 months.  Time spent on the encounter included, but is not limited to, preparing to see the patient, obtaining and/or reviewing separately obtained history, performing the evaluation, counseling and educating, independently interpreting results with communication to patient, order placement, referring and/or communicating with other health professionals as described, and documenting clinical information in the electronic health record.

## 2024-01-29 NOTE — HISTORY OF PRESENT ILLNESS
[FreeTextEntry1] : Mrs Luis has a PMHx SLE  (dx 2014, arthritis, +PAUL,  +dsDNA, +SM, +RNP, nephritis 2016),  and fibromyalgia (dx 2014).  Was treated with cellcept and in lupus study at [facility].  sle complicated by longitudinally extensive transverse myelitis (Feb 2019) treated with IV pulse steroids, PLEX, RTX with improvement in symptoms.  '  INTERVAL Hx since last visit did go up on the duloxetine for her FMS and this really did help  continues to have spasm - despite the baclofen.  hasnt seen neuro doing well in terms of kidney and saw her specialists  baby doing well and able to care for him no new lupus s/s - breathing well, no rashes, joint swelling or oral ulcers

## 2024-01-29 NOTE — CONSULT LETTER
[Dear  ___] : Dear  [unfilled], [Courtesy Letter:] : I had the pleasure of seeing your patient, [unfilled], in my office today. [Please see my note below.] : Please see my note below. [Sincerely,] : Sincerely, [FreeTextEntry2] : Paul Boyle ,  \par  OB/GYN\par  [address] \par  [place]\par  [address]  [FreeTextEntry3] : Freda Swenson MD

## 2024-01-31 ENCOUNTER — OUTPATIENT (OUTPATIENT)
Dept: OUTPATIENT SERVICES | Facility: HOSPITAL | Age: 39
LOS: 1 days | End: 2024-01-31
Payer: COMMERCIAL

## 2024-01-31 ENCOUNTER — APPOINTMENT (OUTPATIENT)
Dept: UROLOGY | Facility: CLINIC | Age: 39
End: 2024-01-31
Payer: COMMERCIAL

## 2024-01-31 DIAGNOSIS — Z98.890 OTHER SPECIFIED POSTPROCEDURAL STATES: Chronic | ICD-10-CM

## 2024-01-31 DIAGNOSIS — R35.0 FREQUENCY OF MICTURITION: ICD-10-CM

## 2024-01-31 PROCEDURE — 52000 CYSTOURETHROSCOPY: CPT

## 2024-01-31 PROCEDURE — 51797 INTRAABDOMINAL PRESSURE TEST: CPT

## 2024-01-31 PROCEDURE — 51736 URINE FLOW MEASUREMENT: CPT | Mod: 26

## 2024-01-31 PROCEDURE — 51741 ELECTRO-UROFLOWMETRY FIRST: CPT

## 2024-01-31 PROCEDURE — 51784 ANAL/URINARY MUSCLE STUDY: CPT | Mod: 26

## 2024-01-31 PROCEDURE — 51728 CYSTOMETROGRAM W/VP: CPT

## 2024-01-31 PROCEDURE — 51784 ANAL/URINARY MUSCLE STUDY: CPT

## 2024-01-31 PROCEDURE — 51728 CYSTOMETROGRAM W/VP: CPT | Mod: 26

## 2024-02-02 DIAGNOSIS — R39.9 UNSPECIFIED SYMPTOMS AND SIGNS INVOLVING THE GENITOURINARY SYSTEM: ICD-10-CM

## 2024-02-02 DIAGNOSIS — N31.9 NEUROMUSCULAR DYSFUNCTION OF BLADDER, UNSPECIFIED: ICD-10-CM

## 2024-02-12 NOTE — DISCHARGE NOTE ADULT - HOSPITAL COURSE
2nd attempt and pt declined at this time. Will follow up as appropriate and schedule permitting. RN aware.    HPI:  33 years old female with PMH of SLE, Lupus Nephritis and Fibromyalgia brought by her  yesterday evening with generalized weakness. As per patient, she is not feeling well for last few days. She is having low grade fevers, generalized body pain, throat irritation, cough, ear pain and vomiting. Her appetite is very poor. She went to her PMD few days ago who prescribed Sertraline for her depressive symptoms. As per her, symptoms started after taking that medication. Yesterday, she was feeling so weak that she could not walk so her  brought her to ER. She is also complaining of difficulty urinating. She went to Urologist 10 days ago and she was prescribed Flomax which she has been taking but is unable to void. She also has constipation.   She recently came back from [place] on 12/29/18 after staying there for 2.5 to 3 months. She had MRI there in December due to back pain and generalized weakness and it was normal.   She has headache, neck pain and lightheadedness. Denies photophobia. + sick contacts with common cold. (16 Jan 2019 11:58)  33 years old female with hx of SLE, lupus Nephritis and Fibromyalgia, recently returned from [place] on 12/29 who presented with generalized weakness found to have transverse myelitis. She got plamapheresis, rtx and steroids and improved. Course complicated with urinary retention and ESBL ECOLI.  urology consulted. Patient is accepted by acute rehab and will go to acute rehab. awaiting insurance authorization Paper work sent.    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   - Improving function in b/l le   - leukocytosis likely 2/2 steroids - resolved   - c/w solumedrol 55mg IV QD, and was changed to prednisone 55 for 4 weeks as per rheumatology on dc  - c/w cellcept 1500mg po bid   - c/w hydroxychloroquine 200mg BID  - sp 5 sessions PLEX  - plan for acute rehab w/ continued rheum + neuro fu    GENERAL: NAD, well-groomed, well-developed  HEAD:  Atraumatic, Normocephalic  EYES: EOMI, PERRLA, conjunctiva and sclera clear  ENMT: No tonsillar erythema, exudates, or enlargement; Moist mucous membranes,   NERVOUS SYSTEM:  Alert & Oriented X3, Good concentration; Moving B/L upper and lower extremities;CHEST/LUNG: Clear to percussion bilaterally; No rales, rhonchi, wheezing, or rubs  HEART: Regular rate and rhythm; No murmurs, rubs, or gallops  ABDOMEN: Soft, Nontender, Nondistended; Bowel sounds present  EXTREMITIES:  2+ Peripheral Pulses, No clubbing, cyanosis, or edema  s      Patient will get 1 dose of rituximab IVPB 1 gm infuse over 5 hours on Feb 11. Premedication with tylenol 650 mg, bendaryl 20 mg IVPB, and solumedrol 100 mg ivpb.  f/u with rheumatology as an outpatient after that. To be repeated Rituximab in 6 months after that.      Acute urinary retention- patient ad  request repeat UA   - c/w bladder scans q8hrs  - c/w straight cath as needed  - c/w flomax 0.8mg po qhs   - likely combo of neurogenic bladder + UTI, uc confirming esbl ecoli uti  - ertapenem started 1/30, last day 2/4 per ID, today, Renal us showed no evidence of  hydronephrosis, Urology note appreciated.    SLE with nephritis -   - stable renal disease, elevated dsDNA, low complements  - rheum f/u      Fibromyalgia   - c/w Tylenol for pain prn     Depression  - c/w Cymbalta 60mg po qd    Constipation  - Miralax / senna   improved      Dispo: PT consulted and recommended acute rehab, PMNR consulted  - as per PMNR - patient will benefit from acute rehab. plan for dc to [place] [facility] once insurance auth is done as per sw. Patient is medically ready for discharge.

## 2024-02-27 ENCOUNTER — RX RENEWAL (OUTPATIENT)
Age: 39
End: 2024-02-27

## 2024-03-28 ENCOUNTER — APPOINTMENT (OUTPATIENT)
Dept: UROLOGY | Facility: CLINIC | Age: 39
End: 2024-03-28
Payer: COMMERCIAL

## 2024-03-28 VITALS
HEIGHT: 62 IN | DIASTOLIC BLOOD PRESSURE: 68 MMHG | SYSTOLIC BLOOD PRESSURE: 103 MMHG | RESPIRATION RATE: 18 BRPM | OXYGEN SATURATION: 97 % | TEMPERATURE: 98 F | HEART RATE: 74 BPM | WEIGHT: 136 LBS | BODY MASS INDEX: 25.03 KG/M2

## 2024-03-28 DIAGNOSIS — N31.9 NEUROMUSCULAR DYSFUNCTION OF BLADDER, UNSPECIFIED: ICD-10-CM

## 2024-03-28 DIAGNOSIS — R32 UNSPECIFIED URINARY INCONTINENCE: ICD-10-CM

## 2024-03-28 DIAGNOSIS — N13.30 UNSPECIFIED HYDRONEPHROSIS: ICD-10-CM

## 2024-03-28 PROCEDURE — 99214 OFFICE O/P EST MOD 30 MIN: CPT

## 2024-03-28 RX ORDER — SOLIFENACIN SUCCINATE 10 MG/1
10 TABLET ORAL
Qty: 30 | Refills: 6 | Status: ACTIVE | COMMUNITY
Start: 2024-01-31 | End: 1900-01-01

## 2024-03-28 RX ORDER — TAMSULOSIN HYDROCHLORIDE 0.4 MG/1
0.4 CAPSULE ORAL
Qty: 30 | Refills: 0 | Status: ACTIVE | COMMUNITY
Start: 2024-03-28 | End: 1900-01-01

## 2024-03-28 NOTE — HISTORY OF PRESENT ILLNESS
[FreeTextEntry1] : 38-year-old female who presents for follow-up  Was initially seen as a new patient December 2023.  She was found to have mild right hydronephrosis and a left lower pole stone.  She is a history of transverse myelitis, and previously required intermittent catheterization. She is since undergone urodynamics and cystoscopy by Dr. Pedro - trabeculated bladder w/ cellules.  UDS revealed small capacity bladder, DO, PARISA, Valsalva voiding.  No reflux on fluoroscopy She was prescribed Vesicare presents for follow-up  She has not started the Vesicare.  She is concerned about the risk of retention.  She feels like she has to push to void.  No flank pain, fever, chills.  She does have mild stress incontinence.  She voids every 1 hour.  No gross hematuria, UTIs She was given tamsulosin in [place], and is inquiring if she needs this.  She was told this helps her void better  (from initial note): She follows with nephrology, was found to have hydronephrosis. Of note, she does have a history of transverse myelitis in the past, and previously required self-catheterization. Regarding her urination, she reports sensation of incomplete emptying, frequent urination (10 times / day). Voids 3-4 times overnight. Stream is strong. Has intermittent dysuria - "always feels like I have a UTI." Also reports mild stress incontinence. Has a 6 month old child - born via C/S. This was her first pregnancy - unknown if would like additional children.  Transverse myelitis was diagnosed in 2018. Was cathing for 3-5 months, especially while in the hospital, but now voids on her own. Last saw a neurologist last year - was told everything is "back to normal." Saw Dr Cole in 2019. She thinks she may have had UDS in the past but not within our system - does not remember name of physician.  She does report occasional, mild right flank pain. No fever, chills, nausea, emesis.  Lab / imaging review: Renal ultrasound December 2023: 5 mm left lower pole stone, mild right hydronephrosis. Left hydro which resolves w/ voiding Creatinine December 2023: 0.65 UA November 2023: 100 mg/dL protein, 1 RBC per high-power field CTU 1/2024: Bilateral nonobstructing renal stones. Moderately trabeculated bladder. Left ovarian cyst.

## 2024-03-28 NOTE — ASSESSMENT
[FreeTextEntry1] : 38-year-old female who presents with the following:  #B/l non obstructing stones - Discussed options, including observation, ureteroscopy, ESWL.  She is asymptomatic.  Stones are small, discussed she may be able to pass these.  She prefers observation  #Neurogenic bladder, ?hydronephrosis - History of transverse myelitis, CIC in the past. - UDS tracing and cystoscopy report reviewed: Trabeculated bladder, DO, Valsalva voiding, low capacity bladder - No VUR seen on fluoroscopy, however, given DO  with small capacity bladder, and absence of obstruction on CTU, suspect bladder pathology is the source of the hydro. There was no hydronephrosis on most recent CTU.  Did discuss that starting anticholinergic, such as Vesicare, will reduce bladder pressures and increased capacity.  Discussed it could make it more difficult for her to void.  She will try this.  She and her  are also inquiring about tamsulosin.  Discussed this is typically only used in men, but there is some data that this may help in woman - although it is not FDA approved for this indication. I provided a prescription, but instructed them to only use on an as-needed basis if she has difficulty voiding -Return for ultrasound in 4 months

## 2024-03-28 NOTE — PHYSICAL EXAM
[Edema] : no peripheral edema [Normal Appearance] : normal appearance [Exaggerated Use Of Accessory Muscles For Inspiration] : no accessory muscle use [Bowel Sounds] : normal bowel sounds

## 2024-03-29 LAB
APPEARANCE: ABNORMAL
BACTERIA: ABNORMAL /HPF
BILIRUBIN URINE: NEGATIVE
BLOOD URINE: NEGATIVE
CAST: 0 /LPF
COLOR: NORMAL
EPITHELIAL CELLS: 5 /HPF
GLUCOSE QUALITATIVE U: NEGATIVE MG/DL
KETONES URINE: NEGATIVE MG/DL
LEUKOCYTE ESTERASE URINE: NEGATIVE
MICROSCOPIC-UA: NORMAL
NITRITE URINE: NEGATIVE
PH URINE: 5.5
PROTEIN URINE: 100 MG/DL
RED BLOOD CELLS URINE: 1 /HPF
SPECIFIC GRAVITY URINE: 1.03
UROBILINOGEN URINE: 1 MG/DL
WHITE BLOOD CELLS URINE: 0 /HPF

## 2024-04-10 NOTE — H&P ADULT - CLICK TO LAUNCH ORM
Jolanta Min MD     .

## 2024-04-12 ENCOUNTER — LABORATORY RESULT (OUTPATIENT)
Age: 39
End: 2024-04-12

## 2024-04-12 ENCOUNTER — APPOINTMENT (OUTPATIENT)
Dept: RHEUMATOLOGY | Facility: CLINIC | Age: 39
End: 2024-04-12
Payer: COMMERCIAL

## 2024-04-12 VITALS
BODY MASS INDEX: 25.03 KG/M2 | DIASTOLIC BLOOD PRESSURE: 74 MMHG | HEART RATE: 87 BPM | WEIGHT: 136 LBS | SYSTOLIC BLOOD PRESSURE: 110 MMHG | HEIGHT: 62 IN | RESPIRATION RATE: 18 BRPM | OXYGEN SATURATION: 99 %

## 2024-04-12 DIAGNOSIS — M32.14 GLOMERULAR DISEASE IN SYSTEMIC LUPUS ERYTHEMATOSUS: ICD-10-CM

## 2024-04-12 DIAGNOSIS — Z51.81 ENCOUNTER FOR THERAPEUTIC DRUG LVL MONITORING: ICD-10-CM

## 2024-04-12 DIAGNOSIS — Z79.899 OTHER LONG TERM (CURRENT) DRUG THERAPY: ICD-10-CM

## 2024-04-12 DIAGNOSIS — M79.7 FIBROMYALGIA: ICD-10-CM

## 2024-04-12 DIAGNOSIS — G37.3 ACUTE TRANSVERSE MYELITIS IN DEMYELINATING DISEASE OF CENTRAL NERVOUS SYSTEM: ICD-10-CM

## 2024-04-12 DIAGNOSIS — R80.9 PROTEINURIA, UNSPECIFIED: ICD-10-CM

## 2024-04-12 DIAGNOSIS — M79.89 OTHER SPECIFIED SOFT TISSUE DISORDERS: ICD-10-CM

## 2024-04-12 DIAGNOSIS — M32.9 SYSTEMIC LUPUS ERYTHEMATOSUS, UNSPECIFIED: ICD-10-CM

## 2024-04-12 PROCEDURE — 99215 OFFICE O/P EST HI 40 MIN: CPT

## 2024-04-12 PROCEDURE — G2211 COMPLEX E/M VISIT ADD ON: CPT

## 2024-04-12 RX ORDER — BACLOFEN 10 MG/1
10 TABLET ORAL
Qty: 90 | Refills: 3 | Status: ACTIVE | COMMUNITY
Start: 2019-03-20 | End: 1900-01-01

## 2024-04-14 PROBLEM — M32.14 LUPUS NEPHRITIS: Status: ACTIVE | Noted: 2019-08-02

## 2024-04-14 PROBLEM — G37.3 TRANSVERSE MYELITIS: Status: ACTIVE | Noted: 2019-02-26

## 2024-04-14 PROBLEM — R80.9 PROTEINURIA: Status: ACTIVE | Noted: 2023-06-26

## 2024-04-14 PROBLEM — Z79.899 LONG TERM CURRENT USE OF IMMUNOSUPPRESSIVE DRUG: Status: ACTIVE | Noted: 2021-06-18

## 2024-04-14 LAB
ALBUMIN SERPL ELPH-MCNC: 4.9 G/DL
ALP BLD-CCNC: 108 U/L
ALT SERPL-CCNC: 23 U/L
ANION GAP SERPL CALC-SCNC: 18 MMOL/L
AST SERPL-CCNC: 24 U/L
BILIRUB SERPL-MCNC: 0.7 MG/DL
BUN SERPL-MCNC: 18 MG/DL
C3 SERPL-MCNC: 106 MG/DL
C4 SERPL-MCNC: 29 MG/DL
CALCIUM SERPL-MCNC: 9.5 MG/DL
CHLORIDE SERPL-SCNC: 98 MMOL/L
CO2 SERPL-SCNC: 23 MMOL/L
CREAT SERPL-MCNC: 0.5 MG/DL
CREAT SPEC-SCNC: 94 MG/DL
CREAT/PROT UR: 0.3 RATIO
CRP SERPL-MCNC: <3 MG/L
DEPRECATED D DIMER PPP IA-ACNC: <150 NG/ML DDU
DSDNA AB SER-ACNC: 16 IU/ML
EGFR: 122 ML/MIN/1.73M2
ERYTHROCYTE [SEDIMENTATION RATE] IN BLOOD BY WESTERGREN METHOD: 20 MM/HR
HCT VFR BLD CALC: 45.6 %
HGB BLD-MCNC: 14.8 G/DL
MCHC RBC-ENTMCNC: 28.6 PG
MCHC RBC-ENTMCNC: 32.5 GM/DL
MCV RBC AUTO: 88.2 FL
PLATELET # BLD AUTO: 364 K/UL
POTASSIUM SERPL-SCNC: 3.6 MMOL/L
PROT SERPL-MCNC: 7.4 G/DL
PROT UR-MCNC: 28 MG/DL
RBC # BLD: 5.17 M/UL
RBC # FLD: 14.6 %
SODIUM SERPL-SCNC: 138 MMOL/L
WBC # FLD AUTO: 12.26 K/UL

## 2024-04-14 NOTE — HISTORY OF PRESENT ILLNESS
[___ Week(s) Ago] : [unfilled] week(s) ago [FreeTextEntry1] : Mrs. Luis has a PMHx SLE  (dx 2014, arthritis, +PAUL,  +dsDNA, +SM, +RNP, nephritis 2016),  and fibromyalgia (dx 2014).  Was treated with cellcept and in lupus study at [facility].  sle complicated by longitudinally extensive transverse myelitis (Feb 2019) treated with IV pulse steroids, PLEX, RTX with improvement in symptoms. '   INTERVAL Hx - doing okay  - right leg swelling - no pain no SOB no recent travel - working with her urologist and nephrologist about the protien in the urine.  the cystoscopy was negative  - no joint pain or swelling  - no constitional symptoms  - no rash

## 2024-04-14 NOTE — PHYSICAL EXAM
[General Appearance - Alert] : alert [General Appearance - In No Acute Distress] : in no acute distress [General Appearance - Well Nourished] : well nourished [General Appearance - Well Developed] : well developed [General Appearance - Well-Appearing] : healthy appearing [Musculoskeletal - Swelling] : no joint swelling seen [Skin Turgor] : normal skin turgor [Skin Color & Pigmentation] : normal skin color and pigmentation [] : no rash [Skin Lesions] : no skin lesions [Oriented To Time, Place, And Person] : oriented to person, place, and time [Impaired Insight] : insight and judgment were intact [FreeTextEntry1] : no synovitis noted - from

## 2024-04-14 NOTE — ASSESSMENT
[FreeTextEntry1] : Ms Luis is a non-smoker F w/ SLE (dx 2014, arthritis, +PAUL, +dsDNA, +SM, +RNP, nephritis 2016, longitudinally extensive TM 2019), and fibromyalgia (dx 2014).  multiple issues - complex patient now with new acute issue in already complicated complext  high decision making  # RLE swelling  new  -- doppler US r/o DVT -- ddidimers  # proteinuria  very mild increase in protein  -- nephro and uro are both involved - felt to be 2/2 bladder issue -- f/u with uro and nephro   # SLE with nephritis (treated with cellcept+study),alopecia, arthritis, lymphopenia -complicated by longitudinal extensive transverse myelitis in feb 2019 s/p plex, rtx and pulse steroids. MRI now normalized. Was on MMF,which was stopped 2/2 desire for pregnancy. seems to be doing well on AZA and HCQ -- check inflammatory markers -- check activity monitor labs -- f/u w/ nephrologist for eval -- continue MMF 1 BID for now - may need increase - if doppler negative will consider increase -- will consider giving another Rituxan infusion depending on how she does on cellcept  -- d/c azathioprine   #TM MRI spine negative for recurrence - no s/s of active neurologic change. -- continue flexeril prn for intermittent muscle spasm that has resulted forrom this -- no s/s of recurrence -- has been off the baclofen and gabapentin. does notice the spasms and wants to start back on a few meds for this- will restart gabapentin 100 mg - didnt help much -- Last MRI spine 2021 - and updated 2023 - similar without changes  #FMS: -- PT -- back paresthesia  -- continue duloxetine at 40 mg daily -- continue gabapentin 100 mg daily -- d/c cyclobenzaprine  -- continue baclofen 1 tablet PRN   #medication management / v58.69 -- cd19 - repopulating -- RTX, cellcept, steroids - PCP prophylaxis bactrim - now off -- quant tb neg September 2022   More than 50% of the encounter was spent counseling the patient on differential, workup, disease course and treatment/management. Education was provided to the patient during this encounter. All questions and concerns were addressed and answered. The patient verbalized understanding and agreed to the plan.  Patient has been instructed to call for an appointment if new symptoms develop. Patient has been instructed to make a follow-up appointment in 3 months.  Time spent on the encounter included, but is not limited to, preparing to see the patient, obtaining and/or reviewing separately obtained history, performing the evaluation, counseling and educating, independently interpreting results with communication to patient, order placement, referring and/or communicating with other health professionals as described, and documenting clinical information in the electronic health record. avulsion

## 2024-04-14 NOTE — REVIEW OF SYSTEMS
[Negative] : Heme/Lymph [Fever] : no fever [Chills] : no chills [Eye Pain] : no eye pain [Earache] : no earache [Nosebleeds] : no nosebleeds [Chest Pain] : no chest pain [Shortness Of Breath] : no shortness of breath [Wheezing] : no wheezing [Cough] : no cough [Abdominal Pain] : no abdominal pain [Vomiting] : no vomiting [Dysuria] : no dysuria [Joint Pain] : no joint pain [Skin Lesions] : no skin lesions [Dizziness] : no dizziness [Anxiety] : no anxiety [Easy Bleeding] : no tendency for easy bleeding [de-identified] : mood much better in [place] -  [FreeTextEntry1] : missed period

## 2024-04-14 NOTE — ADDENDUM
[FreeTextEntry1] : This note was written by Pebbles Bhatia, acting as the  for Dr. Swenson. This note accurately reflects the work and decisions made by Dr. Swenson.

## 2024-04-30 ENCOUNTER — RX RENEWAL (OUTPATIENT)
Age: 39
End: 2024-04-30

## 2024-04-30 RX ORDER — GABAPENTIN 100 MG/1
100 CAPSULE ORAL
Qty: 90 | Refills: 0 | Status: ACTIVE | COMMUNITY
Start: 2019-03-20 | End: 1900-01-01

## 2024-07-15 ENCOUNTER — RX RENEWAL (OUTPATIENT)
Age: 39
End: 2024-07-15

## 2024-08-01 ENCOUNTER — APPOINTMENT (OUTPATIENT)
Dept: UROLOGY | Facility: CLINIC | Age: 39
End: 2024-08-01
Payer: COMMERCIAL

## 2024-08-01 DIAGNOSIS — N20.0 CALCULUS OF KIDNEY: ICD-10-CM

## 2024-08-01 DIAGNOSIS — N31.9 NEUROMUSCULAR DYSFUNCTION OF BLADDER, UNSPECIFIED: ICD-10-CM

## 2024-08-01 PROCEDURE — 76775 US EXAM ABDO BACK WALL LIM: CPT

## 2024-08-01 PROCEDURE — 99214 OFFICE O/P EST MOD 30 MIN: CPT | Mod: 25

## 2024-08-01 RX ORDER — SOLIFENACIN SUCCINATE 5 MG/1
5 TABLET ORAL
Qty: 90 | Refills: 3 | Status: ACTIVE | COMMUNITY
Start: 2024-08-01 | End: 1900-01-01

## 2024-08-01 NOTE — HISTORY OF PRESENT ILLNESS
[FreeTextEntry1] : 38-year-old female who presents for follow-up  Was initially seen as a new patient December 2023.  She was found to have mild right hydronephrosis and a left lower pole stone.  She is a history of transverse myelitis, and previously required intermittent catheterization. She is since undergone urodynamics and cystoscopy by Dr. Pedro - trabeculated bladder w/ cellules.  UDS revealed small capacity bladder, DO, PARISA, Valsalva voiding.  No reflux on fluoroscopy She was prescribed Vesicare - not taking Also given flomax in [place] She does report occasional, mild right flank pain. No fever, chills, nausea, emesis.  (from initial note): She follows with nephrology, was found to have hydronephrosis. Of note, she does have a history of transverse myelitis in the past, and previously required self-catheterization. Regarding her urination, she reports sensation of incomplete emptying, frequent urination (10 times / day). Voids 3-4 times overnight. Stream is strong. Has intermittent dysuria - "always feels like I have a UTI." Also reports mild stress incontinence. Has a 6 month old child - born via C/S. This was her first pregnancy - unknown if would like additional children.  Transverse myelitis was diagnosed in 2018. Was cathing for 3-5 months, especially while in the hospital, but now voids on her own. Last saw a neurologist last year - was told everything is "back to normal." Saw Dr Cole in 2019. She thinks she may have had UDS in the past but not within our system - does not remember name of physician.  Lab / imaging review: Renal ultrasound December 2023: 5 mm left lower pole stone, mild right hydronephrosis. Left hydro which resolves w/ voiding Creatinine December 2023: 0.65 UA November 2023: 100 mg/dL protein, 1 RBC per high-power field CTU 1/2024: Bilateral nonobstructing renal stones. Moderately trabeculated bladder. Left ovarian cyst.

## 2024-08-01 NOTE — PHYSICAL EXAM
[Normal Appearance] : normal appearance [Edema] : no peripheral edema [] : no respiratory distress [Exaggerated Use Of Accessory Muscles For Inspiration] : no accessory muscle use [Bowel Sounds] : normal bowel sounds [Abdomen Tenderness] : non-tender

## 2024-08-01 NOTE — ASSESSMENT
[FreeTextEntry1] : 38-year-old female who presents with the following:  #B/l non obstructing stones - Discussed options, including observation, ureteroscopy, ESWL.  - Has RIGHT flank pain. Stones are small, discussed she may be able to pass these.  She is considering URS. Discussed still may have pain after surgery  #Neurogenic bladder, ?hydronephrosis - History of transverse myelitis, CIC in the past. - PVR 39 mL today - Vesicare renewed - UDS tracing and cystoscopy report reviewed: Trabeculated bladder, DO, Valsalva voiding, low capacity bladder

## 2024-08-07 NOTE — ED ADULT TRIAGE NOTE - CCCP TRG CHIEF CMPLNT
Pain over entire body.
Keep your intake of vitamin K regular. The highest amount of vitamin K is found in green and leafy vegetables like broccoli, lettuces, cabbage, and spinach. You can eat these foods but keep the portion size the same. Changes in the amount you eat can affect your PT/INR blood test. Contact your doctor before making any major changes in your diet. Limit your alcohol intake.

## 2024-08-29 NOTE — ED PROVIDER NOTE - DISPOSITION TYPE
[FreeTextEntry1] : annual-gc/ch discussed MRI breast as a supplement f/u 1 yr prn annual  agree with pa a/p as covering MD - nt
ADMIT

## 2024-09-10 ENCOUNTER — APPOINTMENT (OUTPATIENT)
Dept: RHEUMATOLOGY | Facility: CLINIC | Age: 39
End: 2024-09-10
Payer: COMMERCIAL

## 2024-09-10 VITALS
SYSTOLIC BLOOD PRESSURE: 125 MMHG | BODY MASS INDEX: 25.03 KG/M2 | HEART RATE: 86 BPM | WEIGHT: 136 LBS | OXYGEN SATURATION: 97 % | HEIGHT: 62 IN | DIASTOLIC BLOOD PRESSURE: 79 MMHG

## 2024-09-10 DIAGNOSIS — N18.1 CHRONIC KIDNEY DISEASE, STAGE 1: ICD-10-CM

## 2024-09-10 DIAGNOSIS — M32.9 SYSTEMIC LUPUS ERYTHEMATOSUS, UNSPECIFIED: ICD-10-CM

## 2024-09-10 DIAGNOSIS — Z51.81 ENCOUNTER FOR THERAPEUTIC DRUG LVL MONITORING: ICD-10-CM

## 2024-09-10 DIAGNOSIS — R80.9 PROTEINURIA, UNSPECIFIED: ICD-10-CM

## 2024-09-10 DIAGNOSIS — M79.7 FIBROMYALGIA: ICD-10-CM

## 2024-09-10 DIAGNOSIS — M32.14 GLOMERULAR DISEASE IN SYSTEMIC LUPUS ERYTHEMATOSUS: ICD-10-CM

## 2024-09-10 DIAGNOSIS — Z79.899 OTHER LONG TERM (CURRENT) DRUG THERAPY: ICD-10-CM

## 2024-09-10 PROCEDURE — 99214 OFFICE O/P EST MOD 30 MIN: CPT

## 2024-09-10 PROCEDURE — G2211 COMPLEX E/M VISIT ADD ON: CPT | Mod: NC

## 2024-09-10 NOTE — PHYSICAL EXAM
[General Appearance - Alert] : alert [General Appearance - In No Acute Distress] : in no acute distress [General Appearance - Well Nourished] : well nourished [General Appearance - Well Developed] : well developed [General Appearance - Well-Appearing] : healthy appearing [Skin Color & Pigmentation] : normal skin color and pigmentation [Skin Turgor] : normal skin turgor [Skin Lesions] : no skin lesions [] : no rash [Oriented To Time, Place, And Person] : oriented to person, place, and time [Impaired Insight] : insight and judgment were intact [Musculoskeletal - Swelling] : no joint swelling seen [FreeTextEntry1] : no synovitis noted - from

## 2024-09-10 NOTE — REVIEW OF SYSTEMS
[Negative] : Heme/Lymph [Fever] : no fever [Chills] : no chills [Eye Pain] : no eye pain [Earache] : no earache [Nosebleeds] : no nosebleeds [Chest Pain] : no chest pain [Shortness Of Breath] : no shortness of breath [Wheezing] : no wheezing [Cough] : no cough [Abdominal Pain] : no abdominal pain [Vomiting] : no vomiting [Dysuria] : no dysuria [Joint Pain] : no joint pain [Skin Lesions] : no skin lesions [Dizziness] : no dizziness [Anxiety] : no anxiety [Easy Bleeding] : no tendency for easy bleeding [de-identified] : mood much better in [place] -  [FreeTextEntry1] : missed period

## 2024-09-10 NOTE — ASSESSMENT
[FreeTextEntry1] : Ms Luis is a non-smoker F w/ SLE (dx 2014, arthritis, +PAUL, +dsDNA, +SM, +RNP, nephritis 2016, longitudinally extensive TM 2019), and fibromyalgia (dx 2014).  multiple issues - complex patient  # proteinuria  very mild increase in protein  -- nephro and uro are both involved - felt to be 2/2 bladder issue -- f/u with uro and nephro   # SLE with nephritis (treated with cellcept+study),alopecia, arthritis, lymphopenia -complicated by longitudinal extensive transverse myelitis in feb 2019 s/p plex, rtx and pulse steroids. MRI now normalized. Was on MMF,which was stopped 2/2 desire for pregnancy. seems to be doing well on AZA and HCQ -- check inflammatory markers -- check activity monitor labs -- f/u w/ nephrologist for eval -- will consider giving another Rituxan infusion depending on how she does on cellcept  -- continue azathioprine  #TM MRI spine negative for recurrence - no s/s of active neurologic change. -- continue flexeril prn for intermittent muscle spasm that has resulted forrom this -- no s/s of recurrence -- has been off the baclofen and gabapentin. does notice the spasms and wants to start back on a few meds for this- will restart gabapentin 100 mg - didnt help much -- Last MRI spine 2021 - and updated 2023 - similar without changes -- saw neuro - now on increased baclofen  #FMS: -- PT -- back paresthesia  -- continue duloxetine at 40 mg daily -- continue gabapentin 100 mg daily -- d/c cyclobenzaprine   #medication management / v58.69 -- cd19 - repopulating -- RTX, cellcept, steroids - PCP prophylaxis bactrim - now off -- quant tb neg September 2022 -- rx sent   More than 50% of the encounter was spent counseling the patient on differential, workup, disease course and treatment/management. Education was provided to the patient during this encounter. All questions and concerns were addressed and answered. The patient verbalized understanding and agreed to the plan.  Patient has been instructed to call for an appointment if new symptoms develop. Patient has been instructed to make a follow-up appointment in 3 months.  Time spent on the encounter included, but is not limited to, preparing to see the patient, obtaining and/or reviewing separately obtained history, performing the evaluation, counseling and educating, independently interpreting results with communication to patient, order placement, referring and/or communicating with other health professionals as described, and documenting clinical information in the electronic health record.

## 2024-09-10 NOTE — HISTORY OF PRESENT ILLNESS
[___ Week(s) Ago] : [unfilled] week(s) ago [FreeTextEntry1] : Mrs. Luis has a PMHx SLE  (dx 2014, arthritis, +PAUL,  +dsDNA, +SM, +RNP, nephritis 2016),  and fibromyalgia (dx 2014).  Was treated with cellcept and in lupus study at [facility].  sle complicated by longitudinally extensive transverse myelitis (Feb 2019) treated with IV pulse steroids, PLEX, RTX with improvement in symptoms. '   INTERVAL Hx  in terms of lupus - doing okay  - saw neurology -> increased the baclofin and ordering MRI brain  - no urinary issues  - denies rash, joint pain or trouble breathing  -  never started gama mmf again - stayed on aza and doing well  in terms of medications - adherent and tolerant

## 2024-09-11 LAB
ALBUMIN SERPL ELPH-MCNC: 4.6 G/DL
ALP BLD-CCNC: 109 U/L
ALT SERPL-CCNC: 44 U/L
ANION GAP SERPL CALC-SCNC: 12 MMOL/L
AST SERPL-CCNC: 48 U/L
BILIRUB SERPL-MCNC: 0.7 MG/DL
BUN SERPL-MCNC: 12 MG/DL
C3 SERPL-MCNC: 138 MG/DL
C4 SERPL-MCNC: 38 MG/DL
CALCIUM SERPL-MCNC: 8.9 MG/DL
CHLORIDE SERPL-SCNC: 103 MMOL/L
CO2 SERPL-SCNC: 25 MMOL/L
CREAT SERPL-MCNC: 0.47 MG/DL
CREAT SPEC-SCNC: 91 MG/DL
CREAT/PROT UR: 0.3 RATIO
CRP SERPL-MCNC: <3 MG/L
EGFR: 124 ML/MIN/1.73M2
ERYTHROCYTE [SEDIMENTATION RATE] IN BLOOD BY WESTERGREN METHOD: 28 MM/HR
HCT VFR BLD CALC: 44 %
HGB BLD-MCNC: 14.1 G/DL
MCHC RBC-ENTMCNC: 29.3 PG
MCHC RBC-ENTMCNC: 32 GM/DL
MCV RBC AUTO: 91.3 FL
PLATELET # BLD AUTO: 300 K/UL
POTASSIUM SERPL-SCNC: 4.2 MMOL/L
PROT SERPL-MCNC: 7 G/DL
PROT UR-MCNC: 27 MG/DL
RBC # BLD: 4.82 M/UL
RBC # FLD: 13.3 %
SODIUM SERPL-SCNC: 140 MMOL/L
WBC # FLD AUTO: 5.41 K/UL

## 2024-09-12 LAB
CHROMATIN AB SERPL-ACNC: 0.6 AL
DSDNA AB SER-ACNC: 16 IU/ML

## 2024-09-18 LAB
MISCELLANEOUS TEST: NORMAL
PROC NAME: NORMAL

## 2024-10-02 NOTE — ED PROCEDURE NOTE - CPROC ED ANATOMIC LOCATION1
Please call [phone] to schedule your mammogram.    Please have blood work done in the next 1-2 weeks - no food after midnight, no appointment necessary.  We are open Monday-Saturday starting at 7am.    Return to clinic in 1 year.       right/buttock

## 2025-01-04 NOTE — PROCEDURE NOTE - PROCEDURE
room air <<-----Click on this checkbox to enter Procedure Central venous catheter placement with ultrasound guidance  01/21/2019    Active  TORI

## 2025-01-10 ENCOUNTER — APPOINTMENT (OUTPATIENT)
Dept: RHEUMATOLOGY | Facility: CLINIC | Age: 40
End: 2025-01-10
Payer: COMMERCIAL

## 2025-01-10 DIAGNOSIS — Z79.60 LONG TERM (CURRENT) USE OF UNSPECIFIED IMMUNOMODULATORS AND IMMUNOSUPPRESSANTS: ICD-10-CM

## 2025-01-10 DIAGNOSIS — N18.1 CHRONIC KIDNEY DISEASE, STAGE 1: ICD-10-CM

## 2025-01-10 DIAGNOSIS — E55.9 VITAMIN D DEFICIENCY, UNSPECIFIED: ICD-10-CM

## 2025-01-10 DIAGNOSIS — M32.9 SYSTEMIC LUPUS ERYTHEMATOSUS, UNSPECIFIED: ICD-10-CM

## 2025-01-10 DIAGNOSIS — M32.14 GLOMERULAR DISEASE IN SYSTEMIC LUPUS ERYTHEMATOSUS: ICD-10-CM

## 2025-01-10 DIAGNOSIS — Z51.81 ENCOUNTER FOR THERAPEUTIC DRUG LVL MONITORING: ICD-10-CM

## 2025-01-10 DIAGNOSIS — M79.7 FIBROMYALGIA: ICD-10-CM

## 2025-01-10 DIAGNOSIS — Z79.899 OTHER LONG TERM (CURRENT) DRUG THERAPY: ICD-10-CM

## 2025-01-10 PROCEDURE — G2211 COMPLEX E/M VISIT ADD ON: CPT | Mod: NC

## 2025-01-10 PROCEDURE — 99214 OFFICE O/P EST MOD 30 MIN: CPT

## 2025-01-11 LAB
25(OH)D3 SERPL-MCNC: 28.5 NG/ML
ALBUMIN SERPL ELPH-MCNC: 4.8 G/DL
ALP BLD-CCNC: 122 U/L
ALT SERPL-CCNC: 37 U/L
ANION GAP SERPL CALC-SCNC: 13 MMOL/L
AST SERPL-CCNC: 19 U/L
BILIRUB SERPL-MCNC: 0.5 MG/DL
BUN SERPL-MCNC: 16 MG/DL
C3 SERPL-MCNC: 114 MG/DL
C4 SERPL-MCNC: 29 MG/DL
CALCIUM SERPL-MCNC: 9.3 MG/DL
CHLORIDE SERPL-SCNC: 103 MMOL/L
CO2 SERPL-SCNC: 26 MMOL/L
CREAT SERPL-MCNC: 0.52 MG/DL
CREAT SPEC-SCNC: 90 MG/DL
CREAT/PROT UR: 0.2 RATIO
CRP SERPL-MCNC: <3 MG/L
DSDNA AB SER-ACNC: 15 IU/ML
EGFR: 120 ML/MIN/1.73M2
ERYTHROCYTE [SEDIMENTATION RATE] IN BLOOD BY WESTERGREN METHOD: 11 MM/HR
HCT VFR BLD CALC: 48.2 %
HGB BLD-MCNC: 15.5 G/DL
MCHC RBC-ENTMCNC: 29.2 PG
MCHC RBC-ENTMCNC: 32.2 G/DL
MCV RBC AUTO: 90.8 FL
PLATELET # BLD AUTO: 351 K/UL
POTASSIUM SERPL-SCNC: 3.7 MMOL/L
PROT SERPL-MCNC: 7.4 G/DL
PROT UR-MCNC: 20 MG/DL
RBC # BLD: 5.31 M/UL
RBC # FLD: 13.2 %
SODIUM SERPL-SCNC: 143 MMOL/L
WBC # FLD AUTO: 5.45 K/UL

## 2025-01-18 LAB
ANTI-C1Q IGG CONCENTRATION: 5.4 UNITS
ANTI-DOUBLE-STRANDED DNA IGG CONCT CIA: 94.3 IU/ML
COMPLEMENT C3C CONCENTRATION: 124.52 MG/DL
COMPLEMENT C4 CONCENTRATION: 31.87 MG/DL
ERYTHROCYTE-BOUND C4D (EC4D) LEVEL: 11

## 2025-02-06 ENCOUNTER — APPOINTMENT (OUTPATIENT)
Dept: UROLOGY | Facility: CLINIC | Age: 40
End: 2025-02-06

## 2025-03-15 NOTE — CONSULT NOTE ADULT - SUBJECTIVE AND OBJECTIVE BOX
Vascular Attending:  keren       HPI: 33 year old female hist of lupus, buttock abscess on iv antibiotics via right medline, placed 4/18 .  patient with right arm pain and reported non functioning medline.  Duplex obtained reveals catheter related thrombus within the Basilic vein.  No fever , chills. no sob. no purulent drainage reported       PAST MEDICAL & SURGICAL HISTORY:  Fibromyalgia  Lupus  GERD (gastroesophageal reflux disease)  Lupus nephritis  S/P correction of deviated nasal septum  History of esophagogastroduodenoscopy (EGD)  History of biopsy: Renal      REVIEW OF SYSTEMS;  see HPi                   MEDICATIONS  (STANDING):    MEDICATIONS  (PRN):      Allergies    No Known Allergies    Intolerances        SOCIAL HISTORY:  non contributory       Vital Signs Last 24 Hrs  T(C): 36.9 (22 Apr 2019 09:28), Max: 36.9 (21 Apr 2019 22:00)  T(F): 98.4 (22 Apr 2019 09:28), Max: 98.5 (21 Apr 2019 22:00)  HR: 76 (22 Apr 2019 09:28) (76 - 77)  BP: 123/87 (22 Apr 2019 09:28) (117/80 - 123/87)  BP(mean): --  RR: 20 (22 Apr 2019 09:28) (16 - 20)  SpO2: 100% (22 Apr 2019 09:28) (100% - 100%)    PHYSICAL EXAM:      Constitutional:  no distress     Eyes:  no jaundice     ENMT: atraumatic     Neck: supple         Respiratory: non labored breathing         Gastrointestinal: soft, non tender         Extremities: warm.  right medial iv line present.  no erythema.  No gross edema to the right upper extremity.  no gross motor or sensory deficits     Vascular: 2+ radials                      RADIOLOGY & ADDITIONAL STUDIES    < from: US Duplex Venous Upper Ext Ltd, Right (04.22.19 @ 11:40) >     EXAM:  US DPLX UPR EXT VEINS LTD RT                          PROCEDURE DATE:  04/22/2019          INTERPRETATION:  CLINICAL INFORMATION: Right arm pain    COMPARISON: Chest x-ray of the same day    TECHNIQUE: Duplex sonography of the RIGHT UPPER extremity with color and   spectral Doppler, with and without compression.      FINDINGS:    There is thrombus in the right basilic vein in the upper and mid right   upper arm. A PICC line is noted in the imaged portion of the basilic vein.    The right internal jugular, subclavian, axillary, brachial,and cephalic   veins are patent and compressible where applicable.  The distal right   brachial vein could not be seen secondary to an overlying bandage. The   imaged portion of the right ulnar and radial veins are patent and   compressible without evidence of thrombus. Doppler examination shows   normal spontaneous and phasic flow.    IMPRESSION:     Superficial thrombus in the right basilic vein in the upper arm which   contains a PICC line.    The findings were discussed with CELE Kim at 12:17 PM on 4/22/2019.              < end of copied text >      Impression and Plan:    Right Arm pain  Localized catheter associated superficial venous thrombus   Non functioning catheter       - Remove catheter  - Warm compress to region, nsaids for pain if needed  - no need for anticoagulation   - may follow up in office if needed     d/w Dr. Kay [id number]

## 2025-03-21 ENCOUNTER — LABORATORY RESULT (OUTPATIENT)
Age: 40
End: 2025-03-21

## 2025-03-21 ENCOUNTER — APPOINTMENT (OUTPATIENT)
Dept: RHEUMATOLOGY | Facility: CLINIC | Age: 40
End: 2025-03-21

## 2025-03-21 VITALS
HEART RATE: 77 BPM | HEIGHT: 62 IN | OXYGEN SATURATION: 97 % | BODY MASS INDEX: 25.21 KG/M2 | WEIGHT: 137 LBS | SYSTOLIC BLOOD PRESSURE: 102 MMHG | DIASTOLIC BLOOD PRESSURE: 64 MMHG

## 2025-03-21 DIAGNOSIS — N39.0 URINARY TRACT INFECTION, SITE NOT SPECIFIED: ICD-10-CM

## 2025-03-21 DIAGNOSIS — Z51.81 ENCOUNTER FOR THERAPEUTIC DRUG LVL MONITORING: ICD-10-CM

## 2025-03-21 DIAGNOSIS — E55.9 VITAMIN D DEFICIENCY, UNSPECIFIED: ICD-10-CM

## 2025-03-21 DIAGNOSIS — Z79.60 LONG TERM (CURRENT) USE OF UNSPECIFIED IMMUNOMODULATORS AND IMMUNOSUPPRESSANTS: ICD-10-CM

## 2025-03-21 DIAGNOSIS — Z79.899 OTHER LONG TERM (CURRENT) DRUG THERAPY: ICD-10-CM

## 2025-03-21 DIAGNOSIS — R30.0 DYSURIA: ICD-10-CM

## 2025-03-21 DIAGNOSIS — R25.2 CRAMP AND SPASM: ICD-10-CM

## 2025-03-21 PROCEDURE — G2211 COMPLEX E/M VISIT ADD ON: CPT | Mod: NC

## 2025-03-21 PROCEDURE — 99214 OFFICE O/P EST MOD 30 MIN: CPT

## 2025-03-25 PROBLEM — N39.0 UTI (URINARY TRACT INFECTION): Status: ACTIVE | Noted: 2025-03-25 | Resolved: 2025-04-24

## 2025-03-25 LAB
25(OH)D3 SERPL-MCNC: 22.7 NG/ML
ALBUMIN SERPL ELPH-MCNC: 4.6 G/DL
ALP BLD-CCNC: 146 U/L
ALT SERPL-CCNC: 28 U/L
ANION GAP SERPL CALC-SCNC: 12 MMOL/L
APPEARANCE: CLEAR
AST SERPL-CCNC: 20 U/L
BACTERIA UR CULT: ABNORMAL
BASOPHILS # BLD AUTO: 0.02 K/UL
BASOPHILS NFR BLD AUTO: 0.4 %
BILIRUB SERPL-MCNC: 0.6 MG/DL
BILIRUBIN URINE: NEGATIVE
BLOOD URINE: NEGATIVE
BUN SERPL-MCNC: 13 MG/DL
C3 SERPL-MCNC: 104 MG/DL
C4 SERPL-MCNC: 27 MG/DL
CALCIUM SERPL-MCNC: 9 MG/DL
CHLORIDE SERPL-SCNC: 104 MMOL/L
CO2 SERPL-SCNC: 25 MMOL/L
COLOR: NORMAL
CREAT SERPL-MCNC: 0.55 MG/DL
CREAT SPEC-SCNC: 120 MG/DL
CREAT/PROT UR: 0.2 RATIO
CRP SERPL-MCNC: <3 MG/L
DSDNA AB SER-ACNC: 15 IU/ML
EGFRCR SERPLBLD CKD-EPI 2021: 119 ML/MIN/1.73M2
EOSINOPHIL # BLD AUTO: 0.13 K/UL
EOSINOPHIL NFR BLD AUTO: 2.6 %
ERYTHROCYTE [SEDIMENTATION RATE] IN BLOOD BY WESTERGREN METHOD: 16 MM/HR
GLUCOSE QUALITATIVE U: NEGATIVE MG/DL
HCT VFR BLD CALC: 44.9 %
HGB BLD-MCNC: 14.4 G/DL
IMM GRANULOCYTES NFR BLD AUTO: 0.4 %
KETONES URINE: NEGATIVE MG/DL
LEUKOCYTE ESTERASE URINE: ABNORMAL
LYMPHOCYTES # BLD AUTO: 1.13 K/UL
LYMPHOCYTES NFR BLD AUTO: 22.4 %
MAGNESIUM SERPL-MCNC: 2.4 MG/DL
MAN DIFF?: NORMAL
MCHC RBC-ENTMCNC: 29.4 PG
MCHC RBC-ENTMCNC: 32.1 G/DL
MCV RBC AUTO: 91.6 FL
MONOCYTES # BLD AUTO: 0.67 K/UL
MONOCYTES NFR BLD AUTO: 13.3 %
NEUTROPHILS # BLD AUTO: 3.08 K/UL
NEUTROPHILS NFR BLD AUTO: 60.9 %
NITRITE URINE: NEGATIVE
PH URINE: 6.5
PLATELET # BLD AUTO: 341 K/UL
POTASSIUM SERPL-SCNC: 4 MMOL/L
PROT SERPL-MCNC: 6.6 G/DL
PROT UR-MCNC: 19 MG/DL
PROTEIN URINE: 30 MG/DL
RBC # BLD: 4.9 M/UL
RBC # FLD: 12.9 %
SODIUM SERPL-SCNC: 141 MMOL/L
SPECIFIC GRAVITY URINE: 1.02
UROBILINOGEN URINE: 0.2 MG/DL
WBC # FLD AUTO: 5.05 K/UL

## 2025-03-25 RX ORDER — NITROFURANTOIN (MONOHYDRATE/MACROCRYSTALS) 25; 75 MG/1; MG/1
100 CAPSULE ORAL TWICE DAILY
Qty: 10 | Refills: 0 | Status: ACTIVE | COMMUNITY
Start: 2025-03-25 | End: 1900-01-01

## 2025-03-26 LAB
ANTI-C1Q IGG CONCENTRATION: 3.7 UNITS
ANTI-DOUBLE-STRANDED DNA IGG CONCT CIA: 78.6 IU/ML
COMPLEMENT C3C CONCENTRATION: 108.18 MG/DL
COMPLEMENT C4 CONCENTRATION: 27.59 MG/DL
ERYTHROCYTE-BOUND C4D (EC4D) LEVEL (FC): 14

## 2025-08-26 ENCOUNTER — APPOINTMENT (OUTPATIENT)
Dept: RHEUMATOLOGY | Facility: CLINIC | Age: 40
End: 2025-08-26
Payer: COMMERCIAL

## 2025-08-26 DIAGNOSIS — Z79.60 LONG TERM (CURRENT) USE OF UNSPECIFIED IMMUNOMODULATORS AND IMMUNOSUPPRESSANTS: ICD-10-CM

## 2025-08-26 DIAGNOSIS — M32.9 SYSTEMIC LUPUS ERYTHEMATOSUS, UNSPECIFIED: ICD-10-CM

## 2025-08-26 DIAGNOSIS — Z51.81 ENCOUNTER FOR THERAPEUTIC DRUG LVL MONITORING: ICD-10-CM

## 2025-08-26 PROCEDURE — G2211 COMPLEX E/M VISIT ADD ON: CPT | Mod: NC

## 2025-08-26 PROCEDURE — 99214 OFFICE O/P EST MOD 30 MIN: CPT

## 2025-08-29 ENCOUNTER — APPOINTMENT (OUTPATIENT)
Dept: RHEUMATOLOGY | Facility: CLINIC | Age: 40
End: 2025-08-29

## 2025-08-29 LAB
ALBUMIN SERPL ELPH-MCNC: 4.7 G/DL
ALP BLD-CCNC: 114 U/L
ALT SERPL-CCNC: 39 U/L
ANION GAP SERPL CALC-SCNC: 16 MMOL/L
AST SERPL-CCNC: 39 U/L
BASOPHILS # BLD AUTO: 0.03 K/UL
BASOPHILS NFR BLD AUTO: 0.5 %
BILIRUB SERPL-MCNC: 0.6 MG/DL
BUN SERPL-MCNC: 17 MG/DL
C3 SERPL-MCNC: 134 MG/DL
C4 SERPL-MCNC: 30 MG/DL
CALCIUM SERPL-MCNC: 9.1 MG/DL
CHLORIDE SERPL-SCNC: 102 MMOL/L
CO2 SERPL-SCNC: 22 MMOL/L
CREAT SERPL-MCNC: 0.58 MG/DL
CREAT SPEC-SCNC: 97 MG/DL
CREAT/PROT UR: 0.2 RATIO
CRP SERPL-MCNC: <3 MG/L
DSDNA AB SER-ACNC: 20 IU/ML
EGFRCR SERPLBLD CKD-EPI 2021: 117 ML/MIN/1.73M2
EOSINOPHIL # BLD AUTO: 0.17 K/UL
EOSINOPHIL NFR BLD AUTO: 3 %
ERYTHROCYTE [SEDIMENTATION RATE] IN BLOOD BY WESTERGREN METHOD: 20 MM/HR
HCT VFR BLD CALC: 45.2 %
HGB BLD-MCNC: 14.1 G/DL
IMM GRANULOCYTES NFR BLD AUTO: 0.2 %
LYMPHOCYTES # BLD AUTO: 1.12 K/UL
LYMPHOCYTES NFR BLD AUTO: 19.7 %
MAN DIFF?: NORMAL
MCHC RBC-ENTMCNC: 29.8 PG
MCHC RBC-ENTMCNC: 31.2 G/DL
MCV RBC AUTO: 95.6 FL
MONOCYTES # BLD AUTO: 0.53 K/UL
MONOCYTES NFR BLD AUTO: 9.3 %
NEUTROPHILS # BLD AUTO: 3.83 K/UL
NEUTROPHILS NFR BLD AUTO: 67.3 %
PLATELET # BLD AUTO: 300 K/UL
POTASSIUM SERPL-SCNC: 4.2 MMOL/L
PROT SERPL-MCNC: 7.2 G/DL
PROT UR-MCNC: 17 MG/DL
RBC # BLD: 4.73 M/UL
RBC # FLD: 13 %
SODIUM SERPL-SCNC: 140 MMOL/L
WBC # FLD AUTO: 5.69 K/UL